# Patient Record
Sex: FEMALE | Race: BLACK OR AFRICAN AMERICAN | NOT HISPANIC OR LATINO | Employment: OTHER | URBAN - METROPOLITAN AREA
[De-identification: names, ages, dates, MRNs, and addresses within clinical notes are randomized per-mention and may not be internally consistent; named-entity substitution may affect disease eponyms.]

---

## 2017-01-20 ENCOUNTER — HOSPITAL ENCOUNTER (OUTPATIENT)
Facility: HOSPITAL | Age: 62
Discharge: HOME | End: 2017-01-20
Attending: INTERNAL MEDICINE | Admitting: INTERNAL MEDICINE
Payer: MEDICARE

## 2017-01-20 VITALS
SYSTOLIC BLOOD PRESSURE: 150 MMHG | HEART RATE: 97 BPM | TEMPERATURE: 98 F | OXYGEN SATURATION: 88 % | HEIGHT: 59 IN | RESPIRATION RATE: 20 BRPM | WEIGHT: 163.13 LBS | DIASTOLIC BLOOD PRESSURE: 67 MMHG | BODY MASS INDEX: 32.88 KG/M2

## 2017-01-20 DIAGNOSIS — T82.598D MALFUNCTION OF PERIPHERAL INSERTED CENTRAL CATHETER, SUBSEQUENT ENCOUNTER: Primary | ICD-10-CM

## 2017-01-20 DIAGNOSIS — N18.6 END STAGE RENAL DISEASE: Chronic | ICD-10-CM

## 2017-01-20 PROBLEM — T82.598A MALFUNCTION OF PERIPHERAL INSERTED CENTRAL CATHETER: Status: ACTIVE | Noted: 2017-01-20

## 2017-01-20 PROCEDURE — 25000003 PHARM REV CODE 250

## 2017-01-20 PROCEDURE — 77001 FLUOROGUIDE FOR VEIN DEVICE: CPT | Mod: 26,,, | Performed by: INTERNAL MEDICINE

## 2017-01-20 PROCEDURE — C1769 GUIDE WIRE: HCPCS

## 2017-01-20 PROCEDURE — 63600175 PHARM REV CODE 636 W HCPCS

## 2017-01-20 PROCEDURE — 25500020 PHARM REV CODE 255

## 2017-01-20 PROCEDURE — 36581 REPLACE TUNNELED CV CATH: CPT

## 2017-01-20 PROCEDURE — 36581 REPLACE TUNNELED CV CATH: CPT | Mod: ,,, | Performed by: INTERNAL MEDICINE

## 2017-01-20 NOTE — PROGRESS NOTES
Pt is AAOx3 and in no apparent distress.  Provided a copy of discharge instructions.  Teaching performed.  Pt verbalized understanding and denied any questions.  PIV d/c catheter tip intact.  2x2 applied and no active bleeding noted.  Pt waiting for  to arrive for transport home.

## 2017-01-20 NOTE — PLAN OF CARE
Problem: Patient Care Overview  Goal: Plan of Care Review  Outcome: Ongoing (interventions implemented as appropriate)  Received report from JEFFERY Crowe. Patient s/p fistulagram, AAOx3. VSS, no c/o pain or discomfort at this time, resp even and unlabored. Post procedure protocol reviewed with patient. Understanding verbalized. Family members called and updated by RN. Nurse call bell within reach. Will continue to monitor per post procedure protocol.

## 2017-01-20 NOTE — IP AVS SNAPSHOT
[facility]  [address]  [facility] [address]  Phone: [phone]           Patient Discharge Instructions     Our goal is to set you up for success. This packet includes information on your condition, medications, and your home care. It will help you to care for yourself so you don't get sicker and need to go back to the hospital.     Please ask your nurse if you have any questions.        There are many details to remember when preparing to leave the hospital. Here is what you will need to do:    1. Take your medicine. If you are prescribed medications, review your Medication List in the following pages. You may have new medications to  at the pharmacy and others that you'll need to stop taking. Review the instructions for how and when to take your medications. Talk with your doctor or nurses if you are unsure of what to do.     2. Go to your follow-up appointments. Specific follow-up information is listed in the following pages. Your may be contacted by a transition nurse or clinical provider about future appointments. Be sure we have all of the phone numbers to reach you, if needed. Please contact your provider's office if you are unable to make an appointment.     3. Watch for warning signs. Your doctor or nurse will give you detailed warning signs to watch for and when to call for assistance. These instructions may also include educational information about your condition. If you experience any of warning signs to your health, call your doctor.               [facility]  Unless otherwise directed by your provider, please contact [facility], our nurse care line that is available for 24/7 assistance.     [phone] (toll-free)    Registered nurses in the [facility] provide clinical advisement, health education, appointment booking, and other advisory services.                    ** Verify the list of medication(s) below is accurate and up  to date. Carry this with you in case of emergency. If your medications have changed, please notify your healthcare provider.             Medication List      ASK your doctor about these medications        Additional Info                      amlodipine 5 MG tablet   Commonly known as:  NORVASC   Quantity:  90 tablet   Refills:  3   Dose:  5 mg    Instructions:  Take 1 tablet (5 mg total) by mouth once daily.     Begin Date    AM    Noon    PM    Bedtime       calcitRIOL 0.5 MCG Cap   Commonly known as:  ROCALTROL   Quantity:  30 capsule   Refills:  1   Dose:  0.5 mcg    Instructions:  Take 1 capsule (0.5 mcg total) by mouth once daily.     Begin Date    AM    Noon    PM    Bedtime       epoetin batsheva 10,000 unit/mL injection   Commonly known as:  PROCRIT   Refills:  0   Dose:  100 Units/kg    Instructions:  Inject 0.74 mLs (7,400 Units total) into the skin every Mon, Wed, Fri.     Begin Date    AM    Noon    PM    Bedtime       insulin detemir 100 unit/mL (3 mL) Inpn pen   Commonly known as:  LEVEMIR FLEXPEN   Refills:  0   Dose:  8 Units    Instructions:  Inject 8 Units into the skin 2 (two) times daily.     Begin Date    AM    Noon    PM    Bedtime       inulin 1.5 gram Chew   Commonly known as:  FIBER CHOICE (INULIN)   Refills:  11   Dose:  2 tablet    Instructions:  Take 2 tablets by mouth 2 (two) times daily. Or as directed on product packaging.     Begin Date    AM    Noon    PM    Bedtime       lisinopril 20 MG tablet   Commonly known as:  PRINIVIL,ZESTRIL   Quantity:  180 tablet   Refills:  3   Dose:  40 mg    Instructions:  Take 2 tablets (40 mg total) by mouth once daily.     Begin Date    AM    Noon    PM    Bedtime       metoprolol tartrate 100 MG tablet   Commonly known as:  LOPRESSOR   Quantity:  180 tablet   Refills:  3   Comments:  **Patient requests 90 days supply**    Instructions:  TAKE 1 TABLET BY MOUTH TWICE DAILY     Begin Date    AM    Noon    PM    Bedtime       NOVOLOG FLEXPEN 100 unit/mL  "Inpn pen   Refills:  0   Generic drug:  insulin aspart    Instructions:  as dir Insulin Pen Subcutaneous Before meals and at bedtime.  If blood sugar is 151-200 1 units SQif blood sugar is 201-250 2 units SQIf blood sugar is 251-300 3 units SQIf blood sugar is 301-350 4 units SQIf blood sugar is > or = 351 5 units SQand call MD;  Dispense with needles     Begin Date    AM    Noon    PM    Bedtime       polyethylene glycol 17 gram/dose powder   Commonly known as:  GLYCOLAX   Quantity:  510 g   Refills:  11   Dose:  17 g    Instructions:  Take 17 g by mouth once daily. May take up to 3 times per day as needed for constipation.     Begin Date    AM    Noon    PM    Bedtime       sevelamer carbonate 800 mg Tab   Commonly known as:  RENVELA   Refills:  0   Dose:  2400 mg    Instructions:  Take 2,400 mg by mouth 3 (three) times daily with meals.     Begin Date    AM    Noon    PM    Bedtime                  Please bring to all follow up appointments:    1. A copy of your discharge instructions.  2. All medicines you are currently taking in their original bottles.  3. Identification and insurance card.    Please arrive 15 minutes ahead of scheduled appointment time.    Please call 24 hours in advance if you must reschedule your appointment and/or time.            Admission Information     Date & Time Provider Department CSN    1/20/2017 12:24 PM Rikki Doan MD [facility] [id number]      Care Providers     Provider Role Specialty Primary office phone    Rikki Doan MD Attending Provider Nephrology [phone]      Your Vitals Were     BP Pulse Temp Resp Height Weight    150/67 97 98 °F (36.7 °C) (Oral) 20 4' 11" (1.499 m) 74 kg (163 lb 2.3 oz)    SpO2 BMI             88% 32.95 kg/m2         Recent Lab Values        9/2/2009 11/4/2009 5/21/2013 7/2/2013 9/5/2013 7/31/2015 4/8/2016 7/6/2016      4:44 PM 11:37 AM  9:15 AM  8:30 AM  7:00 AM  3:41 PM  8:23 PM 11:42 AM    A1C 10.3 (H) 9.6 (H) " 7.2 (H) 7.0 (H) 7.5 (H) 7.8 (H) 5.3 5.4    Comment for A1C at 11:42 AM on 7/6/2016:  According to ADA guidelines, hemoglobin A1C <7.0% represents  optimal control in non-pregnant diabetic patients.  Different  metrics may apply to specific populations.   Standards of Medical Care in Diabetes - 2016.  For the purpose of screening for the presence of diabetes:  <5.7%     Consistent with the absence of diabetes  5.7-6.4%  Consistent with increasing risk for diabetes   (prediabetes)  >or=6.5%  Consistent with diabetes  Currently no consensus exists for use of hemoglobin A1C  for diagnosis of diabetes for children.        Allergies as of 1/20/2017     No Known Allergies      Advance Directives     An advance directive is a document which, in the event you are no longer able to make decisions for yourself, tells your healthcare team what kind of treatment you do or do not want to receive, or who you would like to make those decisions for you.  If you do not currently have an advance directive, [facility] encourages you to create one.  For more information call:  [phone]), [phone] ([phone]),  or log on to [email or web address].        Smoking Cessation     If you would like to quit smoking:   You may be eligible for free services if you are a [place] resident and started smoking cigarettes before September 1, 1988.  Call the Smoking Cessation Trust ([facility]) toll free at (933) 673-5013 or ([phone].   Call 8-800-QUIT-NOW if you do not meet the above criteria.            Language Assistance Services     ATTENTION: Language assistance services are available, free of charge. Please call [phone].      ATENCIÓN: Si habla español, tiene a shook disposición servicios gratuitos de asistencia lingüística. Llame al [phone].     CHÚ Ý: N?u b?n nói Ti?ng Vi?t, có các d?ch v? h? tr? ngôn ng? mi?n phí dành cho b?n. G?i s? [phone].        Stroke Education              Chronic [facility]  Disease Education             Diabetes Discharge Instructions                                   [facility] Sign-Up     Activating your [facility] account is as easy as 1-2-3!     1) Visit [email or web address], select Sign Up Now, enter this activation code and your date of birth, then select Next.  [email or web address]  Expires: 3/6/2017  5:10 PM      2) Create a username and password to use when you visit [facility] in the future and select a security question in case you lose your password and select Next.    3) Enter your e-mail address and click Sign Up!    Additional Information  If you have questions, please e-mail [email or web address] or call [phone] to talk to our [facility] staff. Remember, [facility] is NOT to be used for urgent needs. For medical emergencies, dial 911.          [facility] complies with applicable Federal civil rights laws and does not discriminate on the basis of race, color, national origin, age, disability, or sex.

## 2017-01-20 NOTE — DISCHARGE SUMMARY
[facility]  Discharge Note  Short Stay    Admit Date: 1/20/2017    Discharge Date and Time: 1/20/17    Attending Physician: Rikki Doan MD     Discharge Provider: Rikki Doan    Diagnoses:  Active Hospital Problems    Diagnosis  POA    Malfunction of peripheral inserted central catheter [T82.598A]  Yes      Resolved Hospital Problems    Diagnosis Date Resolved POA   No resolved problems to display.       Discharged Condition: stable    Hospital Course: Patient was admitted for an outpatient procedure and tolerated the procedure well with no complications.    Final Diagnoses: Same as principal problem.    Disposition: Home or Self Care    Follow up/Patient Instructions:  Instruction given. F/u with Dr. Navas as outpatient.     Medications:  Reconciled Home Medications:   Current Discharge Medication List      CONTINUE these medications which have NOT CHANGED    Details   amlodipine (NORVASC) 5 MG tablet Take 1 tablet (5 mg total) by mouth once daily.  Qty: 90 tablet, Refills: 3      calcitRIOL (ROCALTROL) 0.5 MCG Cap Take 1 capsule (0.5 mcg total) by mouth once daily.  Qty: 30 capsule, Refills: 1      epoetin batsheva (PROCRIT) 10,000 unit/mL injection Inject 0.74 mLs (7,400 Units total) into the skin every Mon, Wed, Fri.      insulin aspart (NOVOLOG FLEXPEN) 100 unit/mL InPn as dir Insulin Pen Subcutaneous Before meals and at bedtime.  If blood sugar is 151-200 1 units SQif blood sugar is 201-250 2 units SQIf blood sugar is 251-300 3 units SQIf blood sugar is 301-350 4 units SQIf blood sugar is > or = 351 5 units SQand call MD;  Dispense with needles      insulin detemir (LEVEMIR FLEXPEN) 100 unit/mL (3 mL) SubQ InPn pen Inject 8 Units into the skin 2 (two) times daily.      inulin (FIBER CHOICE, INULIN,) 1.5 gram Chew Take 2 tablets by mouth 2 (two) times daily. Or as directed on product packaging.  Refills: 11      lisinopril (PRINIVIL,ZESTRIL) 20 MG tablet Take 2 tablets (40 mg total) by  mouth once daily.  Qty: 180 tablet, Refills: 3    Associated Diagnoses: Hypertension, essential      metoprolol tartrate (LOPRESSOR) 100 MG tablet TAKE 1 TABLET BY MOUTH TWICE DAILY  Qty: 180 tablet, Refills: 3    Comments: **Patient requests 90 days supply**      polyethylene glycol (GLYCOLAX) 17 gram/dose powder Take 17 g by mouth once daily. May take up to 3 times per day as needed for constipation.  Qty: 510 g, Refills: 11      sevelamer carbonate (RENVELA) 800 mg Tab Take 2,400 mg by mouth 3 (three) times daily with meals.           No discharge procedures on file.      Discharge Procedure Orders: Resume previous diet and activity.

## 2017-01-20 NOTE — PLAN OF CARE
Problem: Patient Care Overview  Goal: Plan of Care Review  Outcome: Ongoing (interventions implemented as appropriate)  Patient arrived to room. Admit assessment completed. Plan of care discussed with patient. Will monitor

## 2017-01-20 NOTE — H&P
[facility]  History & Physical    Subjective:      Chief Complaint/Reason for Admission: catheter malfunction     Aleta Herrera is a 61 y.o. female with right femoral vein TDC presents with catheter malfunction after catheter was inadvertently pulled and subcutaneous cuff came out.      Past Medical History   Diagnosis Date    Anemia of chronic renal failure 2013    Anticoagulant long-term use 2015    CHF (congestive heart failure)     Coronary artery disease     Diabetes mellitus     Diabetic neuropathy 2013    DR (diabetic retinopathy) 2013    End stage renal disease on dialysis     Hypertension     Hypertension, renal 2013    Kidney transplant candidate 2013    Secondary hyperparathyroidism of renal origin 2013    Stroke 2015    Type 2 diabetes mellitus since 2013     Past Surgical History   Procedure Laterality Date    Av fistula placement      Hysterectomy       Endometriosis    Tonsillectomy       section, low transverse       x 3    Eye surgery      Vascular surgery      Cataract surgery      Colonoscopy N/A 2016     Procedure: COLONOSCOPY;  Surgeon: Roverto Patel MD;  [facility] [place] [address]);  Service: Endoscopy;  Laterality: N/A;     Family History   Problem Relation Age of Onset    Heart disease Mother      MI    Hypertension Mother     Cancer Mother 70     breast    Heart attack Neg Hx      Social History   Substance Use Topics    Smoking status: Former Smoker     Packs/day: 0.25     Years: 0.50     Types: Cigarettes     Quit date: 1975    Smokeless tobacco: Never Used      Comment: quit smoking cigarettes 40+ years ago    Alcohol use No       PTA Medications   Medication Sig    amlodipine (NORVASC) 5 MG tablet Take 1 tablet (5 mg total) by mouth once daily.    calcitRIOL (ROCALTROL) 0.5 MCG Cap Take 1 capsule (0.5 mcg total) by mouth once daily.    epoetin batsheva (PROCRIT) 10,000  unit/mL injection Inject 0.74 mLs (7,400 Units total) into the skin every Mon, Wed, Fri.    insulin aspart (NOVOLOG FLEXPEN) 100 unit/mL InPn as dir Insulin Pen Subcutaneous Before meals and at bedtime.  If blood sugar is 151-200 1 units SQif blood sugar is 201-250 2 units SQIf blood sugar is 251-300 3 units SQIf blood sugar is 301-350 4 units SQIf blood sugar is > or = 351 5 units SQand call MD;  Dispense with needles    insulin detemir (LEVEMIR FLEXPEN) 100 unit/mL (3 mL) SubQ InPn pen Inject 8 Units into the skin 2 (two) times daily. (Patient taking differently: Inject 4 Units into the skin 2 (two) times daily. )    inulin (FIBER CHOICE, INULIN,) 1.5 gram Chew Take 2 tablets by mouth 2 (two) times daily. Or as directed on product packaging.    lisinopril (PRINIVIL,ZESTRIL) 20 MG tablet Take 2 tablets (40 mg total) by mouth once daily.    metoprolol tartrate (LOPRESSOR) 100 MG tablet TAKE 1 TABLET BY MOUTH TWICE DAILY    polyethylene glycol (GLYCOLAX) 17 gram/dose powder Take 17 g by mouth once daily. May take up to 3 times per day as needed for constipation.    sevelamer carbonate (RENVELA) 800 mg Tab Take 2,400 mg by mouth 3 (three) times daily with meals.     Review of patient's allergies indicates:  No Known Allergies     ROS Constitutional: No fever or chills, no weight changes.  Eyes: No visual changes or photophobia  HEENT: No nasal congestion or sore throat  Respiratory: No cough or shortness of breath  Cardiovascular: No chest pain or palpitations  Gastrointestinal: Good appetite, no nausea or vomiting, no change in bowel habits  Genitourinary: No hematuria or dysuria  Skin: No rash or pruritis  Hematologic/lymphatic: No easy bruising, bleeding or lymphadenopathy  Musculoskeletal: No arthralgias or myalgias  Neurological: No seizures or tremors  Endocrine: No heat/cold intolerance.  No polyuria/polydipsia.  Psychiatric:  No depression or anxiety.    Objective:      Vital Signs (Most Recent)  Temp:  97.9 °F (36.6 °C) (01/20/17 1300)  Pulse: 70 (01/20/17 1300)  Resp: 20 (01/20/17 1300)  BP: (!) 167/77 (01/20/17 1302)  SpO2: (!) 89 % (01/20/17 1300)    Vital Signs Range (Last 24H):  Temp:  [97.9 °F (36.6 °C)]   Pulse:  [70]   Resp:  [20]   BP: (167-180)/(77-80)   SpO2:  [89 %]     Physical Exam General appearance: Well developed, well nourished  Head: Normocephalic, atraumatic  Eyes:  Conjunctivae nl. Sclera anicteric. PERRL.  HEENT: Lips, mucosa, and tongue normal; teeth and gums normal and oropharynx clear.  Neck: Supple, trachea midline, thyroid not enlarged,   Lungs: Clear to auscultation bilaterally and normal respiratory effort  Heart: Regular rate and rhythm, S1, S2 normal, no murmur, click, rub or gallop  Abdomen: Soft, non-tender non-distended; bowel sounds normal; no masses,  no organomegaly  Extremities: No cyanosis or clubbing. No edema  Pulses: 2+ and symmetric  Skin: Skin color, texture, turgor normal. No rashes or lesions  Lymph nodes: Cervical, supraclavicular, and axillary nodes normal.  Neurologic: Normal strength and tone. No focal numbness or weakness  Psychiatric:  Alert and oriented times 3.  Affect appropriate.  Right FVTDC; no evidence of tunnel infection.    Assessment:      Active Hospital Problems    Diagnosis  POA    Malfunction of peripheral inserted central catheter [T82.246B]  Yes      Resolved Hospital Problems    Diagnosis Date Resolved POA   No resolved problems to display.   CRBSI    Plan:    1. OTW of Right FVTDC.

## 2017-01-20 NOTE — PROCEDURES
DATE OF PROCEDURE: 1/20/17      PROCEDURE TYPE:   1. Placement of right femoral vein tunneled dialysis catheter rewiring from the existing tunneled dialysis catheter site.   2. Inferior Venacavogram       INDICATIONS:   1. catheter malfunction   2. Exposed cuff      Pre-procedure Diagnoses:   ESRD  Catheter Malfunction       Post-procedure diagnosis:  ESRD  Catheter Malfunction      Operators: Rikki Doan MD       PROCEDURE NOTE: After informed consent was obtained, the area of the right thigh, catheter and right groin were sterilely prepped and draped in the usual fashion. Anesthesia was then obtained with 2% lidocaine with epinephrine, then the subcutaneous cuff was dissected free and the catheter was pulled back to the level of right femoral vein. Then a central venogram/inferior venacavogram was done that showed no evidence of fibrin sheath, clots or stenosis. Then a two guidewire were inserted under fluoroscopy guidance and parked in the RA. Then, catheter was removed. Then a 44cm GlidePath catheter was inserted over the wire and the catheter was advanced into the RA using fluoroscopic guidance. The tip of the catheter was confirmed to be in the RA via fluoroscopy. The lock hub was connected. There was excellent aspiration and flush through both ports. The exit site was closed with 3-0 Prolene in a wrapped stitch. She tolerated the procedure well. There were no immediate complications. Estimated blood loss was less than 5 mL. She was then discharged from the Nephrology Access Suite in stable condition.

## 2017-01-23 RX ORDER — AMLODIPINE BESYLATE 10 MG/1
TABLET ORAL
Qty: 90 TABLET | Refills: 0 | Status: SHIPPED | OUTPATIENT
Start: 2017-01-23 | End: 2017-06-03 | Stop reason: SDUPTHER

## 2017-03-09 ENCOUNTER — HOSPITAL ENCOUNTER (OUTPATIENT)
Facility: HOSPITAL | Age: 62
Discharge: HOME | End: 2017-03-09
Attending: INTERNAL MEDICINE | Admitting: INTERNAL MEDICINE
Payer: MEDICARE

## 2017-03-09 VITALS
HEIGHT: 59 IN | SYSTOLIC BLOOD PRESSURE: 127 MMHG | RESPIRATION RATE: 18 BRPM | OXYGEN SATURATION: 91 % | TEMPERATURE: 96 F | WEIGHT: 162.06 LBS | HEART RATE: 73 BPM | DIASTOLIC BLOOD PRESSURE: 74 MMHG | BODY MASS INDEX: 32.67 KG/M2

## 2017-03-09 DIAGNOSIS — N18.6 END STAGE RENAL DISEASE: Primary | Chronic | ICD-10-CM

## 2017-03-09 DIAGNOSIS — T82.598D MALFUNCTION OF PERIPHERAL INSERTED CENTRAL CATHETER, SUBSEQUENT ENCOUNTER: ICD-10-CM

## 2017-03-09 PROBLEM — Z95.828 PERMANENT CENTRAL VENOUS CATHETER IN PLACE: Status: ACTIVE | Noted: 2017-03-09

## 2017-03-09 LAB — POCT GLUCOSE: 84 MG/DL (ref 70–110)

## 2017-03-09 PROCEDURE — 63600175 PHARM REV CODE 636 W HCPCS

## 2017-03-09 PROCEDURE — 77001 FLUOROGUIDE FOR VEIN DEVICE: CPT

## 2017-03-09 PROCEDURE — 36581 REPLACE TUNNELED CV CATH: CPT

## 2017-03-09 PROCEDURE — 77001 FLUOROGUIDE FOR VEIN DEVICE: CPT | Mod: 26,,, | Performed by: INTERNAL MEDICINE

## 2017-03-09 PROCEDURE — 25000003 PHARM REV CODE 250

## 2017-03-09 PROCEDURE — 36581 REPLACE TUNNELED CV CATH: CPT | Mod: ,,, | Performed by: INTERNAL MEDICINE

## 2017-03-09 NOTE — IP AVS SNAPSHOT
[facility] [address]  [address]  [facility] [address]  Phone: [phone]           Patient Discharge Instructions     Our goal is to set you up for success. This packet includes information on your condition, medications, and your home care. It will help you to care for yourself so you don't get sicker and need to go back to the hospital.     Please ask your nurse if you have any questions.        There are many details to remember when preparing to leave the hospital. Here is what you will need to do:    1. Take your medicine. If you are prescribed medications, review your Medication List in the following pages. You may have new medications to  at the pharmacy and others that you'll need to stop taking. Review the instructions for how and when to take your medications. Talk with your doctor or nurses if you are unsure of what to do.     2. Go to your follow-up appointments. Specific follow-up information is listed in the following pages. Your may be contacted by a transition nurse or clinical provider about future appointments. Be sure we have all of the phone numbers to reach you, if needed. Please contact your provider's office if you are unable to make an appointment.     3. Watch for warning signs. Your doctor or nurse will give you detailed warning signs to watch for and when to call for assistance. These instructions may also include educational information about your condition. If you experience any of warning signs to your health, call your doctor.               [facility]  Unless otherwise directed by your provider, please contact [facility], our nurse care line that is available for 24/7 assistance.     [phone] (toll-free)    Registered nurses in the [facility] provide clinical advisement, health education, appointment booking, and other advisory services.                    ** Verify the list of medication(s) below is accurate and up  to date. Carry this with you in case of emergency. If your medications have changed, please notify your healthcare provider.             Medication List      ASK your doctor about these medications        Additional Info                      * amlodipine 5 MG tablet   Commonly known as:  NORVASC   Quantity:  90 tablet   Refills:  3   Dose:  5 mg    Instructions:  Take 1 tablet (5 mg total) by mouth once daily.     Begin Date    AM    Noon    PM    Bedtime       * amlodipine 10 MG tablet   Commonly known as:  NORVASC   Quantity:  90 tablet   Refills:  0    Instructions:  TAKE ONE TABLET BY MOUTH ONCE DAILY     Begin Date    AM    Noon    PM    Bedtime       calcitRIOL 0.5 MCG Cap   Commonly known as:  ROCALTROL   Quantity:  30 capsule   Refills:  1   Dose:  0.5 mcg    Instructions:  Take 1 capsule (0.5 mcg total) by mouth once daily.     Begin Date    AM    Noon    PM    Bedtime       epoetin batsheva 10,000 unit/mL injection   Commonly known as:  PROCRIT   Refills:  0   Dose:  100 Units/kg    Instructions:  Inject 0.74 mLs (7,400 Units total) into the skin every Mon, Wed, Fri.     Begin Date    AM    Noon    PM    Bedtime       insulin detemir 100 unit/mL (3 mL) Inpn pen   Commonly known as:  LEVEMIR FLEXPEN   Refills:  0   Dose:  8 Units    Instructions:  Inject 8 Units into the skin 2 (two) times daily.     Begin Date    AM    Noon    PM    Bedtime       inulin 1.5 gram Chew   Commonly known as:  FIBER CHOICE (INULIN)   Refills:  11   Dose:  2 tablet    Instructions:  Take 2 tablets by mouth 2 (two) times daily. Or as directed on product packaging.     Begin Date    AM    Noon    PM    Bedtime       lisinopril 20 MG tablet   Commonly known as:  PRINIVIL,ZESTRIL   Quantity:  180 tablet   Refills:  3   Dose:  40 mg    Instructions:  Take 2 tablets (40 mg total) by mouth once daily.     Begin Date    AM    Noon    PM    Bedtime       metoprolol tartrate 100 MG tablet   Commonly known as:  LOPRESSOR   Quantity:  180  tablet   Refills:  3   Comments:  **Patient requests 90 days supply**    Instructions:  TAKE 1 TABLET BY MOUTH TWICE DAILY     Begin Date    AM    Noon    PM    Bedtime       NOVOLOG FLEXPEN 100 unit/mL Inpn pen   Refills:  0   Generic drug:  insulin aspart    Instructions:  as dir Insulin Pen Subcutaneous Before meals and at bedtime.  If blood sugar is 151-200 1 units SQif blood sugar is 201-250 2 units SQIf blood sugar is 251-300 3 units SQIf blood sugar is 301-350 4 units SQIf blood sugar is > or = 351 5 units SQand call MD;  Dispense with needles     Begin Date    AM    Noon    PM    Bedtime       polyethylene glycol 17 gram/dose powder   Commonly known as:  GLYCOLAX   Quantity:  510 g   Refills:  11   Dose:  17 g    Instructions:  Take 17 g by mouth once daily. May take up to 3 times per day as needed for constipation.     Begin Date    AM    Noon    PM    Bedtime       sevelamer carbonate 800 mg Tab   Commonly known as:  RENVELA   Refills:  0   Dose:  2400 mg    Instructions:  Take 2,400 mg by mouth 3 (three) times daily with meals.     Begin Date    AM    Noon    PM    Bedtime       * Notice:  This list has 2 medication(s) that are the same as other medications prescribed for you. Read the directions carefully, and ask your doctor or other care provider to review them with you.               Please bring to all follow up appointments:    1. A copy of your discharge instructions.  2. All medicines you are currently taking in their original bottles.  3. Identification and insurance card.    Please arrive 15 minutes ahead of scheduled appointment time.    Please call 24 hours in advance if you must reschedule your appointment and/or time.            Admission Information     Date & Time Provider Department [facility]    3/9/2017  8:36 AM Bobby Sanabria MD [facility] [phone]      Care Providers     Provider Role Specialty Primary office phone    Bobby Sanabria MD Attending Provider  "Nephrology [phone]      Your Vitals Were     BP Pulse Temp Resp Height Weight    127/74 (BP Location: Left arm, Patient Position: Lying, BP Method: Automatic) 73 96.4 °F (35.8 °C) (Oral) 18 4' 11" (1.499 m) 73.5 kg (162 lb 0.6 oz)    SpO2 BMI             91% 32.73 kg/m2         Recent Lab Values        9/2/2009 11/4/2009 5/21/2013 7/2/2013 9/5/2013 7/31/2015 4/8/2016 7/6/2016      4:44 PM 11:37 AM  9:15 AM  8:30 AM  7:00 AM  3:41 PM  8:23 PM 11:42 AM    A1C 10.3 (H) 9.6 (H) 7.2 (H) 7.0 (H) 7.5 (H) 7.8 (H) 5.3 5.4    Comment for A1C at 11:42 AM on 7/6/2016:  According to ADA guidelines, hemoglobin A1C <7.0% represents  optimal control in non-pregnant diabetic patients.  Different  metrics may apply to specific populations.   Standards of Medical Care in Diabetes - 2016.  For the purpose of screening for the presence of diabetes:  <5.7%     Consistent with the absence of diabetes  5.7-6.4%  Consistent with increasing risk for diabetes   (prediabetes)  >or=6.5%  Consistent with diabetes  Currently no consensus exists for use of hemoglobin A1C  for diagnosis of diabetes for children.        Allergies as of 3/9/2017     No Known Allergies      Advance Directives     An advance directive is a document which, in the event you are no longer able to make decisions for yourself, tells your healthcare team what kind of treatment you do or do not want to receive, or who you would like to make those decisions for you.  If you do not currently have an advance directive, [facility] encourages you to create one.  For more information call:  [phone]), [phone] ([phone]),  or log on to [email or web address].        Language Assistance Services     ATTENTION: Language assistance services are available, free of charge. Please call [phone].      ATENCIÓN: Si habla español, tiene a shook disposición servicios gratuitos de asistencia lingüística. Llame al [phone].     MELISSA Ý: N?u b?n nói Ti?ng " Vi?t, có các d?ch v? h? tr? ngôn ng? mi?n phí dành cho b?n. G?i s? [phone].        Stroke Education              Chronic Kindey Disease Education             Diabetes Discharge Instructions                                   [facility] Sign-Up     Activating your [facility] account is as easy as 1-2-3!     1) Visit [email or web address], select Sign Up Now, enter this activation code and your date of birth, then select Next.  [email or web address]  Expires: 4/23/2017 12:05 PM      2) Create a username and password to use when you visit [facility] in the future and select a security question in case you lose your password and select Next.    3) Enter your e-mail address and click Sign Up!    Additional Information  If you have questions, please e-mail [email or web address] or call [phone] to talk to our [facility] staff. Remember, [facility] is NOT to be used for urgent needs. For medical emergencies, dial 911.          [facility] complies with applicable Federal civil rights laws and does not discriminate on the basis of race, color, national origin, age, disability, or sex.

## 2017-03-09 NOTE — H&P
[facility]  History & Physical    Subjective:      Chief Complaint/Reason for Admission: catheter malfunction      Aleta Herrera is a 61 y.o. female with right femoral vein TDC presents with catheter malfunction after catheter was inadvertently pulled and subcutaneous cuff came out.            Past Medical History:   Diagnosis Date    Anemia of chronic renal failure 2013    Anticoagulant long-term use 2015    CHF (congestive heart failure)     Coronary artery disease     Diabetes mellitus     Diabetic neuropathy 2013    DR (diabetic retinopathy) 2013    End stage renal disease on dialysis     Hypertension     Hypertension, renal 2013    Kidney transplant candidate 2013    Secondary hyperparathyroidism of renal origin 2013    Stroke 2015    Type 2 diabetes mellitus since 2013     Past Surgical History:   Procedure Laterality Date    AV FISTULA PLACEMENT      CATARACT SURGERY       SECTION, LOW TRANSVERSE      x 3    COLONOSCOPY N/A 2016    Procedure: COLONOSCOPY;  Surgeon: Roverto Patel MD;  Location: [facility] ([address] [place] [address]);  Service: Endoscopy;  Laterality: N/A;    EYE SURGERY      HYSTERECTOMY      Endometriosis    TONSILLECTOMY      VASCULAR SURGERY       Family History   Problem Relation Age of Onset    Heart disease Mother      MI    Hypertension Mother     Cancer Mother 70     breast    Heart attack Neg Hx      Social History   Substance Use Topics    Smoking status: Former Smoker     Packs/day: 0.25     Years: 0.50     Types: Cigarettes     Quit date: 1975    Smokeless tobacco: Never Used      Comment: quit smoking cigarettes 40+ years ago    Alcohol use No       PTA Medications   Medication Sig    amlodipine (NORVASC) 10 MG tablet TAKE ONE TABLET BY MOUTH ONCE DAILY    amlodipine (NORVASC) 5 MG tablet Take 1 tablet (5 mg total) by mouth once daily.    calcitRIOL (ROCALTROL) 0.5 MCG Cap Take 1  capsule (0.5 mcg total) by mouth once daily.    epoetin batsheva (PROCRIT) 10,000 unit/mL injection Inject 0.74 mLs (7,400 Units total) into the skin every Mon, Wed, Fri.    insulin aspart (NOVOLOG FLEXPEN) 100 unit/mL InPn as dir Insulin Pen Subcutaneous Before meals and at bedtime.  If blood sugar is 151-200 1 units SQif blood sugar is 201-250 2 units SQIf blood sugar is 251-300 3 units SQIf blood sugar is 301-350 4 units SQIf blood sugar is > or = 351 5 units SQand call MD;  Dispense with needles    insulin detemir (LEVEMIR FLEXPEN) 100 unit/mL (3 mL) SubQ InPn pen Inject 8 Units into the skin 2 (two) times daily. (Patient taking differently: Inject 4 Units into the skin 2 (two) times daily. )    inulin (FIBER CHOICE, INULIN,) 1.5 gram Chew Take 2 tablets by mouth 2 (two) times daily. Or as directed on product packaging.    lisinopril (PRINIVIL,ZESTRIL) 20 MG tablet Take 2 tablets (40 mg total) by mouth once daily.    metoprolol tartrate (LOPRESSOR) 100 MG tablet TAKE 1 TABLET BY MOUTH TWICE DAILY    polyethylene glycol (GLYCOLAX) 17 gram/dose powder Take 17 g by mouth once daily. May take up to 3 times per day as needed for constipation.    sevelamer carbonate (RENVELA) 800 mg Tab Take 2,400 mg by mouth 3 (three) times daily with meals.     Review of patient's allergies indicates:  No Known Allergies     ROS Constitutional: No fever or chills, no weight changes.  Eyes: No visual changes or photophobia  HEENT: No nasal congestion or sore throat  Respiratory: No cough or shortness of breath  Cardiovascular: No chest pain or palpitations  Gastrointestinal: Good appetite, no nausea or vomiting, no change in bowel habits  Genitourinary: No hematuria or dysuria  Skin: No rash or pruritis  Hematologic/lymphatic: No easy bruising, bleeding or lymphadenopathy  Musculoskeletal: No arthralgias or myalgias  Neurological: No seizures or tremors  Endocrine: No heat/cold intolerance.  No polyuria/polydipsia.  Psychiatric:   No depression or anxiety.    Objective:      Vital Signs (Most Recent)  Temp: 98.8 °F (37.1 °C) (03/09/17 0900)  Pulse: 73 (03/09/17 0900)  Resp: 20 (03/09/17 0900)  BP: 134/65 (03/09/17 0908)  SpO2: 95 % (03/09/17 0908)    Vital Signs Range (Last 24H):  Temp:  [98.8 °F (37.1 °C)]   Pulse:  [73]   Resp:  [20]   BP: (134-148)/(65-70)   SpO2:  [80 %-95 %]     Physical Exam   General appearance: Well developed, well nourished  Head: Normocephalic, atraumatic  Eyes:  Conjunctivae nl. Sclera anicteric. PERRL.  HEENT: Lips, mucosa, and tongue normal; teeth and gums normal and oropharynx clear.  Neck: Supple, trachea midline, thyroid not enlarged,   Lungs: Clear to auscultation bilaterally and normal respiratory effort  Heart: Regular rate and rhythm, S1, S2 normal, no murmur, click, rub or gallop  Abdomen: Soft, non-tender non-distended; bowel sounds normal; no masses,  no organomegaly  Extremities: No cyanosis or clubbing. No edema  Pulses: 2+ and symmetric  Skin: Skin color, texture, turgor normal. No rashes or lesions  Lymph nodes: Cervical, supraclavicular, and axillary nodes normal.  Neurologic: Normal strength and tone. No focal numbness or weakness  Psychiatric:  Alert and oriented times 3.  Affect appropriate.  Right femoral vein TDC ; subcutaneous cuff out side the tunnel and no evidence of tunnel tenderness.  Assessment:      Active Hospital Problems    Diagnosis  POA    Permanent central venous catheter in place [Z95.828]  Not Applicable      Resolved Hospital Problems    Diagnosis Date Resolved POA   No resolved problems to display.       Plan:    1. Over the wire exchange of TDC and blood cultures and antibiotics per KDOQI recommendations.

## 2017-03-09 NOTE — PROGRESS NOTES
Patient discharged per MD orders. Instructions given on medications, wound care, activity, signs of infection, when to call MD, and follow up appointments. Pt verbalized understanding. PIV removed. Patient brought to car by escort.

## 2017-03-09 NOTE — PLAN OF CARE
Problem: Patient Care Overview  Goal: Plan of Care Review  Outcome: Ongoing (interventions implemented as appropriate)  Report received from JEFFERY Crowe. Patient s/p permacath rewire. Dressing cdi. Vss. Call light in reach. Will monitor.

## 2017-03-09 NOTE — DISCHARGE SUMMARY
[facility]  Brief Operative Note     SUMMARY     Surgery Date: 3/9/2017     Surgeon(s) and Role:     * Bobby Sanabria MD - Primary    Assisting Surgeon: None    Pre-op Diagnosis:  ESRD (end stage renal disease) [N18.6]  Malfunction of peripheral inserted central catheter, subsequent encounter [T82.204D]    Post-op Diagnosis:  Procedure(s) (LRB):  PERMCATH REWIRE- TUNNELED CATH REWIRE (Right)    Anesthesia: RN IV Sedation  Estimated Blood Loss: 5 ml.       Specimens:   Specimen     None          Discharge Note    SUMMARY     Admit Date: 3/9/2017    Discharge Date and Time: 3/9/2017    Hospital Course; Tolerated procedure    Final Diagnosis: PROCEDURE TYPE:  1. Over the wires exchange of right femoral tunneled dialysis catheter with   44-cm Palindrome catheter.  .    Disposition: Home or Self Care    Follow Up/Patient Instructions:  Resume previous diet and activity, follow up as needed basis.    Medications:  Reconciled Home Medications:   Current Discharge Medication List      CONTINUE these medications which have NOT CHANGED    Details   !! amlodipine (NORVASC) 10 MG tablet TAKE ONE TABLET BY MOUTH ONCE DAILY  Qty: 90 tablet, Refills: 0      !! amlodipine (NORVASC) 5 MG tablet Take 1 tablet (5 mg total) by mouth once daily.  Qty: 90 tablet, Refills: 3      calcitRIOL (ROCALTROL) 0.5 MCG Cap Take 1 capsule (0.5 mcg total) by mouth once daily.  Qty: 30 capsule, Refills: 1      epoetin batsheva (PROCRIT) 10,000 unit/mL injection Inject 0.74 mLs (7,400 Units total) into the skin every Mon, Wed, Fri.      insulin aspart (NOVOLOG FLEXPEN) 100 unit/mL InPn as dir Insulin Pen Subcutaneous Before meals and at bedtime.  If blood sugar is 151-200 1 units SQif blood sugar is 201-250 2 units SQIf blood sugar is 251-300 3 units SQIf blood sugar is 301-350 4 units SQIf blood sugar is > or = 351 5 units SQand call MD;  Dispense with needles      insulin detemir (LEVEMIR FLEXPEN) 100 unit/mL (3 mL) SubQ InPn  pen Inject 8 Units into the skin 2 (two) times daily.      inulin (FIBER CHOICE, INULIN,) 1.5 gram Chew Take 2 tablets by mouth 2 (two) times daily. Or as directed on product packaging.  Refills: 11      lisinopril (PRINIVIL,ZESTRIL) 20 MG tablet Take 2 tablets (40 mg total) by mouth once daily.  Qty: 180 tablet, Refills: 3    Associated Diagnoses: Hypertension, essential      metoprolol tartrate (LOPRESSOR) 100 MG tablet TAKE 1 TABLET BY MOUTH TWICE DAILY  Qty: 180 tablet, Refills: 3    Comments: **Patient requests 90 days supply**      polyethylene glycol (GLYCOLAX) 17 gram/dose powder Take 17 g by mouth once daily. May take up to 3 times per day as needed for constipation.  Qty: 510 g, Refills: 11      sevelamer carbonate (RENVELA) 800 mg Tab Take 2,400 mg by mouth 3 (three) times daily with meals.       !! - Potential duplicate medications found. Please discuss with provider.        No discharge procedures on file.

## 2017-03-09 NOTE — PROCEDURES
DATE OF PROCEDURE: 3/9/17     PROCEDURE TYPE:  1. Over the wires exchange of right femoral tunneled dialysis catheter with   44-cm Palindrome catheter.  .     INDICATION: Cather related blood stream infection     REFERRING PHYSICIAN: Harinder Navas D.O.     : Bobby Sanabria M.D.     POSTPROCEDURE DIAGNOSES:  1. Over the wires exchange of right femoral tunneled dialysis catheter with a   44-cm Palindrome catheter and tip of the catheter is in the right atrium.     PROCEDURE NOTE IN DETAIL: After informed consent was obtained from Ms. Herrera,   She was brought to the Access Suite and placed in a supine position. The area   Of her right femoral triangle was cleaned and draped in the usual sterile   Fashion. After achieving local anesthesia with lidocaine and epinephrine at the  exit site it was blunt dissected and cuff was released  both stiff angled guidewires and amplatz wire cwere advanced under fluoroscopy guidance   into the SVC through the two ports of the both lumens of the catheter.   The catheter was removed and subsequently a new 44-cm   Palindrome catheter was exchanged over the wire and tip of the catheter was   placed in the right atrium and both the wires were removed and exit portion of   the catheter was attached to the tip and there was good aspiration and flush in   both ports. No immediate complication. Estimated blood loss was 5 mL. The   patient tolerated the procedure well. Prior to this, the exit site was closed   with 3-0 Prolene sutures. No immediate complications. Discharged from Access   Suite in a stable condition.     PLAN:We will treat her for catheter related bloodstream infection with the broad-spectrum antibiotic   initially and subsequently deescalate abx based on the culture results.

## 2017-03-13 ENCOUNTER — TELEPHONE (OUTPATIENT)
Dept: VASCULAR SURGERY | Facility: CLINIC | Age: 62
End: 2017-03-13

## 2017-03-13 DIAGNOSIS — N18.5 CKD (CHRONIC KIDNEY DISEASE), STAGE 5: ICD-10-CM

## 2017-03-13 DIAGNOSIS — N18.6 END STAGE RENAL DISEASE: ICD-10-CM

## 2017-03-13 DIAGNOSIS — M79.604 LEG PAIN, BILATERAL: ICD-10-CM

## 2017-03-13 DIAGNOSIS — N18.6 ESRD (END STAGE RENAL DISEASE) ON DIALYSIS: Primary | ICD-10-CM

## 2017-03-13 DIAGNOSIS — M79.605 LEG PAIN, BILATERAL: ICD-10-CM

## 2017-03-13 DIAGNOSIS — Z99.2 ESRD (END STAGE RENAL DISEASE) ON DIALYSIS: Primary | ICD-10-CM

## 2017-03-13 DIAGNOSIS — I73.9 PVD (PERIPHERAL VASCULAR DISEASE): ICD-10-CM

## 2017-03-13 DIAGNOSIS — I87.2 VENOUS INSUFFICIENCY (CHRONIC) (PERIPHERAL): ICD-10-CM

## 2017-04-06 ENCOUNTER — HOSPITAL ENCOUNTER (OUTPATIENT)
Dept: VASCULAR SURGERY | Facility: CLINIC | Age: 62
Discharge: HOME | End: 2017-04-06
Attending: SURGERY
Payer: MEDICARE

## 2017-04-06 ENCOUNTER — OFFICE VISIT (OUTPATIENT)
Dept: VASCULAR SURGERY | Facility: CLINIC | Age: 62
End: 2017-04-06
Payer: MEDICARE

## 2017-04-06 VITALS
HEART RATE: 67 BPM | SYSTOLIC BLOOD PRESSURE: 117 MMHG | WEIGHT: 163.13 LBS | DIASTOLIC BLOOD PRESSURE: 71 MMHG | TEMPERATURE: 98 F | HEIGHT: 60 IN | BODY MASS INDEX: 32.02 KG/M2

## 2017-04-06 DIAGNOSIS — I73.9 PERIPHERAL VASCULAR DISEASE: ICD-10-CM

## 2017-04-06 DIAGNOSIS — N18.5 CKD (CHRONIC KIDNEY DISEASE), STAGE 5: ICD-10-CM

## 2017-04-06 DIAGNOSIS — I87.2 VENOUS INSUFFICIENCY (CHRONIC) (PERIPHERAL): ICD-10-CM

## 2017-04-06 DIAGNOSIS — M79.604 LEG PAIN, BILATERAL: ICD-10-CM

## 2017-04-06 DIAGNOSIS — N18.6 END STAGE RENAL DISEASE: Primary | Chronic | ICD-10-CM

## 2017-04-06 DIAGNOSIS — Z01.818 PRE-OP EVALUATION: Primary | ICD-10-CM

## 2017-04-06 DIAGNOSIS — M79.605 LEG PAIN, BILATERAL: ICD-10-CM

## 2017-04-06 PROCEDURE — 99213 OFFICE O/P EST LOW 20 MIN: CPT | Mod: PBBFAC | Performed by: SURGERY

## 2017-04-06 PROCEDURE — 99214 OFFICE O/P EST MOD 30 MIN: CPT | Mod: S$PBB,,, | Performed by: SURGERY

## 2017-04-06 PROCEDURE — 99999 PR PBB SHADOW E&M-EST. PATIENT-LVL III: CPT | Mod: PBBFAC,,, | Performed by: SURGERY

## 2017-04-06 PROCEDURE — G0365 VESSEL MAPPING HEMO ACCESS: HCPCS | Mod: 26,S$PBB,, | Performed by: SURGERY

## 2017-04-06 RX ORDER — LIDOCAINE HYDROCHLORIDE 10 MG/ML
1 INJECTION, SOLUTION EPIDURAL; INFILTRATION; INTRACAUDAL; PERINEURAL ONCE
Status: CANCELLED | OUTPATIENT
Start: 2017-04-06 | End: 2017-04-06

## 2017-04-06 NOTE — PROGRESS NOTES
Patient ID: Aleta Herrera is a 61 y.o. female.    I. HISTORY     Chief Complaint: Follow-up      HPI Aleta Herrera is a 61 y.o. female here for evaluation of her dialysis access. Renal failure is due to HTN/DM. Currently dialyzes on M/W/F via a Right femoral catheter. Patient is right handed. She has had bilateral upper arm AVGs. The right was complicated by steal. Both are now thrombosed following an acute illness. Previously planned for R thigh AVG which patient cancelled, she has since been using a catheter. She is now ready to proceed with the surgery.      Review of Systems   Constitution: Negative.   HENT: Negative.    Eyes: Negative.    Cardiovascular: Negative.    Respiratory: Negative.    Endocrine: Negative.    Hematologic/Lymphatic: Negative.    Skin: Negative.    Musculoskeletal: Negative.    Gastrointestinal: Negative.    Neurological: Negative.    Psychiatric/Behavioral: Negative.    Allergic/Immunologic: Negative.        II. PHYSICAL EXAM     Physical Exam   Constitutional: She is oriented to person, place, and time. No distress.   HENT:   Head: Normocephalic and atraumatic.   Eyes: Conjunctivae and EOM are normal.   Neck: Neck supple. No JVD present.   Cardiovascular: Normal rate.    Pulses:       Dorsalis pedis pulses are 2+ on the right side, and 2+ on the left side.   Pulmonary/Chest: Effort normal. No respiratory distress.   Abdominal: Soft.   Musculoskeletal: Normal range of motion. She exhibits edema. She exhibits no tenderness.   Neurological: She is alert and oriented to person, place, and time.   Skin: Skin is warm. No rash noted. No erythema.   Psychiatric: She has a normal mood and affect.       III. ASSESSMENT & PLAN (MEDICAL DECISION MAKING)     1. ESRD 2/2 HTN on HD since 11/12/09        Assessment/Diagnosis and Plan:  Aleta Herrera is a 61 y.o. female with ESRD, on dialysis in need of new access.    Plan left thigh AVG Thursday April 27th.     Dr. Madera has explained the  risks, benefits, and alternatives of the procedure in detail.   Risks include but are not limited to arterial steal, inability to use access, failure to mature, permanent injury to nerves or blood vessels, and need for repeat interventions or new access creation.  They have voiced understanding, all questions were answered, and agree to proceed as planned.    Jet Wolff MD  Surgery Resident, PGY-1  Pager [phone]  04/06/2017      Vascular Surgery Staff  I have seen and examined the patient and reviewed the residents note. I agree with their assessment and plan.    Left thigh AVG Thursday Apr 27 - date picked at pt request    Nathalie Madera MD FACS [facility]  Vascular & Endovascular Surgery

## 2017-04-14 ENCOUNTER — DOCUMENTATION ONLY (OUTPATIENT)
Dept: NEPHROLOGY | Facility: CLINIC | Age: 62
End: 2017-04-14

## 2017-04-14 NOTE — PROGRESS NOTES
Notified about panic value of potassium levels of 8.3 on the patient. Called and Ms. Herrera  answered, Was notified about the laboratories and he notified me she [facility]. Called was made to [facility] to notified the value and laboratories will be draw after HD for follow up.     Tony Snowden   Nephrology fellow PGY- 5  [phone]

## 2017-04-26 ENCOUNTER — ANESTHESIA EVENT (OUTPATIENT)
Dept: SURGERY | Facility: HOSPITAL | Age: 62
DRG: 252 | End: 2017-04-26
Payer: MEDICARE

## 2017-04-26 ENCOUNTER — TELEPHONE (OUTPATIENT)
Dept: VASCULAR SURGERY | Facility: CLINIC | Age: 62
End: 2017-04-26

## 2017-04-26 NOTE — PRE-PROCEDURE INSTRUCTIONS
"Preop instructions: NPO after midnight or 8 hours prior to procedure time, shower instructions, directions, leave all valuables at home, medication instructions for PM prior & am of procedure explained. Patient stated an understanding.    Patient and  very concerned of prior issues with anesthesia. Stated it "puts me in a coma" or "she bottoms out".     AM blood sugars running in the 180's  "

## 2017-04-26 NOTE — ANESTHESIA PREPROCEDURE EVALUATION
2017  Aleta Herrera is a 62 y.o., female.    Anesthesia Evaluation         Review of Systems  Anesthesia Hx:  Personal Hx of Anesthesia complications Slow To Awaken/Delayed Emergence                                                                                                                  2017  Pre-operative evaluation for [id number] Left Thigh (Left)    HPI: Ms. Aleta Herrera is a 60yo F with PMH significant for ESRD on HD, HFpEF, CAD, HTN, DM2, and CVA Previous Airway:  -8/3 EGD: Grade II, Attempts 1 with Ceja #2, easy mask ventilation noted, complicating factors included large/floppy epiglottis     Drips: none    PMH:  ESRD on HD MWF - last obtained HD yesterday 16  HFpEF  CAD  HTN  DM2  CVA - no residual deficits  Remote DVT, no longer on Coumadin    Past Surgical History:   Procedure Laterality Date    AV FISTULA PLACEMENT      CATARACT SURGERY       SECTION, LOW TRANSVERSE      x 3    COLONOSCOPY N/A 2016    Procedure: COLONOSCOPY;  Surgeon: Roverto Patel MD;  Location: [facility] ([address] [place] [address]);  Service: Endoscopy;  Laterality: N/A;    EYE SURGERY      HYSTERECTOMY      Endometriosis    TONSILLECTOMY      VASCULAR SURGERY       Vital Signs Range (Last 24H):         CBC:       Recent Labs  Lab 17  1221   WBC 6.87   RBC 4.14   HGB 13.2   HCT 41.2   *   *   MCH 31.9*   MCHC 32.0       CMP:     Recent Labs  Lab 17  1221      K 5.3*      CO2 24   BUN 30*   CREATININE 5.2*   GLU 77   CALCIUM 8.5*     INR:  No results for input(s): INR, PROTIME, APTT in the last 720 hours.    Invalid input(s): PT  Diagnostic Studies:  CXR 16:   1. Improved right sided pleural effusion with persistent right lower lung consolidation, status post right thoracentesis.    EK16: Normal sinus rhythm  Left axis  deviation  Nonspecific T wave abnormality  Abnormal ECG  When compared with ECG of 14-APR-2016 17:50,  Nonspecific T wave abnormality now evident in Anterior leads  Confirmed by AKASH SMALLS MD ([id number]) on 7/6/2016 12:32:17 PM    2D Echo (6/21/16:  CONCLUSIONS     1 - Normal left ventricular systolic function (EF 60-65%).     2 - Concentric hypertrophy.     3 - Biatrial enlargement.     4 - Left ventricular diastolic dysfunction.     5 - Normal right ventricular systolic function .     6 - Pulmonary hypertension. The estimated PA systolic pressure is 57 mmHg.     7 - Increased central venous pressure.     [facility] Anesthesia Evaluation    I have reviewed the Patient Summary Reports.    I have reviewed the Nursing Notes.   I have reviewed the Medications.     Review of Systems  Anesthesia Hx:  Hx of Anesthetic complications  History of prior surgery of interest to airway management or planning: Denies Family Hx of Anesthesia complications.  Personal Hx of Anesthesia complications (Pt with cardiac arrest immediately post EGD. Was found to be in asytole and required 2 round of ACLS. Ultimately ROSC acheived, intubated, to SICU. systolic, 2 rounds of epi, compressions. Intubated and sent to ICU. )   Social:  Non-Smoker, No Alcohol Use    Hematology/Oncology:         -- Anemia: Denies Current/Recent Cancer   EENT/Dental:   denies chronic allergic rhinitis   Cardiovascular:   Denies MI. CAD    Denies CABG/stent.  Denies Dysrhythmias.   Denies Angina. CHF    Pulmonary:   Denies Pneumonia Denies COPD.  Denies Asthma. Shortness of breath (Reports SOB while obtaining dialysis)    Renal/:   Chronic Renal Disease, ESRD    Hepatic/GI:   Bowel Prep. PUD, Denies GERD.    Neurological:   CVA, no residual symptoms Denies Seizures.    Endocrine:   Diabetes, type 2        Physical Exam  General:  Well nourished, Obesity    Airway/Jaw/Neck:  Airway Findings: Mouth Opening: Small, but > 3cm Tongue: Normal  Pre-Existing Airway Tube(s):  "Oral Endotracheal tube  General Airway Assessment: Adult  Mallampati: III  Improves to II with phonation.  TM Distance: Normal, at least 6 cm  Jaw/Neck Findings:  Neck ROM: Normal ROM      Dental:  Dental Findings:    Chest/Lungs:  Chest/Lungs Findings: Clear to auscultation, Normal Respiratory Rate     Heart/Vascular:  Heart Findings: Rate: Normal  Rhythm: Regular Rhythm  Sounds: Normal     Abdomen:  Abdomen Findings:  Normal, Soft, Nontender     Musculoskeletal:  Musculoskeletal Findings:     Mental Status:  Mental Status Findings:  Cooperative, Alert and Oriented                   patient/ stated she "bottomed out", was "in a coma" post anesthesia       Anesthesia Plan  Type of Anesthesia, risks & benefits discussed:  Anesthesia Type:  general  Patient's Preference: General  Intra-op Monitoring Plan: standard ASA monitors  Intra-op Monitoring Plan Comments:   Post Op Pain Control Plan:   Post Op Pain Control Plan Comments: Per primary service  Induction:   IV  Beta Blocker:         Informed Consent: Patient understands risks and agrees with Anesthesia plan.  Questions answered. Anesthesia consent signed with patient.  ASA Score: 3     Day of Surgery Review of History & Physical:    H&P update referred to the surgeon.         Ready For Surgery From Anesthesia Perspective.       "

## 2017-04-27 ENCOUNTER — HOSPITAL ENCOUNTER (INPATIENT)
Facility: HOSPITAL | Age: 62
LOS: 8 days | Discharge: HOME HEALTH | DRG: 252 | End: 2017-05-05
Attending: SURGERY | Admitting: SURGERY
Payer: MEDICARE

## 2017-04-27 ENCOUNTER — ANESTHESIA (OUTPATIENT)
Dept: SURGERY | Facility: HOSPITAL | Age: 62
DRG: 252 | End: 2017-04-27
Payer: MEDICARE

## 2017-04-27 DIAGNOSIS — I12.0 TYPE 2 DM WITH HYPERTENSION AND ESRD ON DIALYSIS: ICD-10-CM

## 2017-04-27 DIAGNOSIS — J90 PLEURAL EFFUSION, RIGHT: Primary | ICD-10-CM

## 2017-04-27 DIAGNOSIS — I50.9 CONGESTIVE HEART FAILURE, UNSPECIFIED CONGESTIVE HEART FAILURE CHRONICITY, UNSPECIFIED CONGESTIVE HEART FAILURE TYPE: ICD-10-CM

## 2017-04-27 DIAGNOSIS — Z79.01 ANTICOAGULANT LONG-TERM USE: ICD-10-CM

## 2017-04-27 DIAGNOSIS — I46.9 CARDIAC ARREST: ICD-10-CM

## 2017-04-27 DIAGNOSIS — D63.1 ANEMIA OF CHRONIC RENAL FAILURE, STAGE 5: Chronic | ICD-10-CM

## 2017-04-27 DIAGNOSIS — N18.6 END STAGE RENAL DISEASE: Chronic | ICD-10-CM

## 2017-04-27 DIAGNOSIS — S09.8XXA APHASIA DUE TO TBI (TRAUMATIC BRAIN INJURY), CLOSED: ICD-10-CM

## 2017-04-27 DIAGNOSIS — E08.43 DIABETIC AUTONOMIC NEUROPATHY ASSOCIATED WITH DIABETES MELLITUS DUE TO UNDERLYING CONDITION: Chronic | ICD-10-CM

## 2017-04-27 DIAGNOSIS — N25.81 SECONDARY HYPERPARATHYROIDISM OF RENAL ORIGIN: Chronic | ICD-10-CM

## 2017-04-27 DIAGNOSIS — J90 RECURRENT PLEURAL EFFUSION ON RIGHT: ICD-10-CM

## 2017-04-27 DIAGNOSIS — N18.9 CKD (CHRONIC KIDNEY DISEASE), UNSPECIFIED STAGE: ICD-10-CM

## 2017-04-27 DIAGNOSIS — E11.319 DIABETIC RETINOPATHY ASSOCIATED WITH TYPE 2 DIABETES MELLITUS, MACULAR EDEMA PRESENCE UNSPECIFIED, UNSPECIFIED LATERALITY, UNSPECIFIED RETINOPATHY SEVERITY: Chronic | ICD-10-CM

## 2017-04-27 DIAGNOSIS — I12.0 HYPERTENSION, RENAL, STAGE 5 CHRONIC KIDNEY DISEASE OR END STAGE RENAL DISEASE: Chronic | ICD-10-CM

## 2017-04-27 DIAGNOSIS — Z99.2 HEMODIALYSIS STATUS: ICD-10-CM

## 2017-04-27 DIAGNOSIS — Z12.11 SCREEN FOR COLON CANCER: ICD-10-CM

## 2017-04-27 DIAGNOSIS — N18.6 TYPE 2 DM WITH HYPERTENSION AND ESRD ON DIALYSIS: ICD-10-CM

## 2017-04-27 DIAGNOSIS — R47.01 APHASIA DUE TO TBI (TRAUMATIC BRAIN INJURY), CLOSED: ICD-10-CM

## 2017-04-27 DIAGNOSIS — K57.31 DIVERTICULOSIS OF LARGE INTESTINE WITH HEMORRHAGE: ICD-10-CM

## 2017-04-27 DIAGNOSIS — M79.605 LEG PAIN, BILATERAL: ICD-10-CM

## 2017-04-27 DIAGNOSIS — R41.89 UNRESPONSIVE STATE: ICD-10-CM

## 2017-04-27 DIAGNOSIS — Z12.39 BREAST CANCER SCREENING, HIGH RISK PATIENT: ICD-10-CM

## 2017-04-27 DIAGNOSIS — N18.5 ANEMIA OF CHRONIC RENAL FAILURE, STAGE 5: Chronic | ICD-10-CM

## 2017-04-27 DIAGNOSIS — I73.9 PERIPHERAL VASCULAR DISEASE: ICD-10-CM

## 2017-04-27 DIAGNOSIS — Z99.2 TYPE 2 DM WITH HYPERTENSION AND ESRD ON DIALYSIS: ICD-10-CM

## 2017-04-27 DIAGNOSIS — Z95.828 PERMANENT CENTRAL VENOUS CATHETER IN PLACE: ICD-10-CM

## 2017-04-27 DIAGNOSIS — K92.2 ACUTE GASTROINTESTINAL HEMORRHAGE: ICD-10-CM

## 2017-04-27 DIAGNOSIS — M79.604 LEG PAIN, BILATERAL: ICD-10-CM

## 2017-04-27 DIAGNOSIS — J90 PLEURAL EFFUSION ON RIGHT: ICD-10-CM

## 2017-04-27 DIAGNOSIS — E11.22 TYPE 2 DM WITH HYPERTENSION AND ESRD ON DIALYSIS: ICD-10-CM

## 2017-04-27 DIAGNOSIS — I63.9 CEREBROVASCULAR ACCIDENT (CVA), UNSPECIFIED MECHANISM: ICD-10-CM

## 2017-04-27 DIAGNOSIS — D62 ACUTE BLOOD LOSS ANEMIA: ICD-10-CM

## 2017-04-27 DIAGNOSIS — Z86.718 HISTORY OF DVT (DEEP VEIN THROMBOSIS): ICD-10-CM

## 2017-04-27 LAB
ABO GROUP BLD: NORMAL
ALBUMIN SERPL BCP-MCNC: 3.4 G/DL
ALLENS TEST: ABNORMAL
ALP SERPL-CCNC: 75 U/L
ALT SERPL W/O P-5'-P-CCNC: 11 U/L
ANION GAP SERPL CALC-SCNC: 15 MMOL/L
AST SERPL-CCNC: 18 U/L
BASOPHILS # BLD AUTO: 0.05 K/UL
BASOPHILS NFR BLD: 0.4 %
BILIRUB SERPL-MCNC: 0.5 MG/DL
BLD GP AB SCN CELLS X3 SERPL QL: NORMAL
BUN SERPL-MCNC: 26 MG/DL
CALCIUM SERPL-MCNC: 8.6 MG/DL
CHLORIDE SERPL-SCNC: 104 MMOL/L
CK MB SERPL-MCNC: 2.2 NG/ML
CK MB SERPL-RTO: 4.2 %
CK SERPL-CCNC: 52 U/L
CO2 SERPL-SCNC: 20 MMOL/L
CREAT SERPL-MCNC: 5.4 MG/DL
DELSYS: ABNORMAL
DIFFERENTIAL METHOD: ABNORMAL
EOSINOPHIL # BLD AUTO: 0.2 K/UL
EOSINOPHIL NFR BLD: 1.4 %
ERYTHROCYTE [DISTWIDTH] IN BLOOD BY AUTOMATED COUNT: 17.6 %
EST. GFR  (AFRICAN AMERICAN): 9.1 ML/MIN/1.73 M^2
EST. GFR  (NON AFRICAN AMERICAN): 7.9 ML/MIN/1.73 M^2
GLUCOSE SERPL-MCNC: 196 MG/DL
HCO3 UR-SCNC: 24.6 MMOL/L (ref 24–28)
HCT VFR BLD AUTO: 40.2 %
HGB BLD-MCNC: 13.3 G/DL
INR PPP: 1.1
LACTATE SERPL-SCNC: 3.9 MMOL/L
LYMPHOCYTES # BLD AUTO: 4 K/UL
LYMPHOCYTES NFR BLD: 30.2 %
MCH RBC QN AUTO: 31.8 PG
MCHC RBC AUTO-ENTMCNC: 33.1 %
MCV RBC AUTO: 96 FL
MODE: ABNORMAL
MONOCYTES # BLD AUTO: 1.2 K/UL
MONOCYTES NFR BLD: 8.8 %
NEUTROPHILS # BLD AUTO: 7.7 K/UL
NEUTROPHILS NFR BLD: 58.1 %
PCO2 BLDA: 48.7 MMHG (ref 35–45)
PH SMN: 7.31 [PH] (ref 7.35–7.45)
PLATELET # BLD AUTO: 178 K/UL
PMV BLD AUTO: 11.3 FL
PO2 BLDA: 222 MMHG (ref 80–100)
POC BE: -2 MMOL/L
POC SATURATED O2: 100 % (ref 95–100)
POC TCO2: 26 MMOL/L (ref 23–27)
POCT GLUCOSE: 102 MG/DL (ref 70–110)
POCT GLUCOSE: 117 MG/DL (ref 70–110)
POCT GLUCOSE: 145 MG/DL (ref 70–110)
POCT GLUCOSE: 150 MG/DL (ref 70–110)
POTASSIUM SERPL-SCNC: 4.7 MMOL/L
PROT SERPL-MCNC: 8.2 G/DL
PROTHROMBIN TIME: 11.3 SEC
RBC # BLD AUTO: 4.18 M/UL
RH BLD: NORMAL
SAMPLE: ABNORMAL
SITE: ABNORMAL
SODIUM SERPL-SCNC: 139 MMOL/L
TROPONIN I SERPL DL<=0.01 NG/ML-MCNC: 0.02 NG/ML
WBC # BLD AUTO: 13.24 K/UL

## 2017-04-27 PROCEDURE — 25000003 PHARM REV CODE 250: Performed by: SURGERY

## 2017-04-27 PROCEDURE — 63600531 PHARM REV CODE 636 NO ALT 250 W HCPCS: Performed by: SURGERY

## 2017-04-27 PROCEDURE — 36000706: Performed by: SURGERY

## 2017-04-27 PROCEDURE — 92950 HEART/LUNG RESUSCITATION CPR: CPT

## 2017-04-27 PROCEDURE — 85025 COMPLETE CBC W/AUTO DIFF WBC: CPT

## 2017-04-27 PROCEDURE — 63600175 PHARM REV CODE 636 W HCPCS: Performed by: NURSE ANESTHETIST, CERTIFIED REGISTERED

## 2017-04-27 PROCEDURE — 20000000 HC ICU ROOM

## 2017-04-27 PROCEDURE — 82803 BLOOD GASES ANY COMBINATION: CPT

## 2017-04-27 PROCEDURE — 93005 ELECTROCARDIOGRAM TRACING: CPT

## 2017-04-27 PROCEDURE — 86900 BLOOD TYPING SEROLOGIC ABO: CPT

## 2017-04-27 PROCEDURE — 71000039 HC RECOVERY, EACH ADD'L HOUR: Performed by: SURGERY

## 2017-04-27 PROCEDURE — 94002 VENT MGMT INPAT INIT DAY: CPT

## 2017-04-27 PROCEDURE — 0BH17EZ INSERTION OF ENDOTRACHEAL AIRWAY INTO TRACHEA, VIA NATURAL OR ARTIFICIAL OPENING: ICD-10-PCS | Performed by: SURGERY

## 2017-04-27 PROCEDURE — 27201423 OPTIME MED/SURG SUP & DEVICES STERILE SUPPLY: Performed by: SURGERY

## 2017-04-27 PROCEDURE — 37000008 HC ANESTHESIA 1ST 15 MINUTES: Performed by: SURGERY

## 2017-04-27 PROCEDURE — 25000003 PHARM REV CODE 250: Performed by: NURSE ANESTHETIST, CERTIFIED REGISTERED

## 2017-04-27 PROCEDURE — 25000003 PHARM REV CODE 250

## 2017-04-27 PROCEDURE — 36415 COLL VENOUS BLD VENIPUNCTURE: CPT

## 2017-04-27 PROCEDURE — 71000033 HC RECOVERY, INTIAL HOUR: Performed by: SURGERY

## 2017-04-27 PROCEDURE — 83605 ASSAY OF LACTIC ACID: CPT

## 2017-04-27 PROCEDURE — 63600175 PHARM REV CODE 636 W HCPCS: Performed by: STUDENT IN AN ORGANIZED HEALTH CARE EDUCATION/TRAINING PROGRAM

## 2017-04-27 PROCEDURE — 71000015 HC POSTOP RECOV 1ST HR: Performed by: SURGERY

## 2017-04-27 PROCEDURE — 63600175 PHARM REV CODE 636 W HCPCS: Performed by: ANESTHESIOLOGY

## 2017-04-27 PROCEDURE — 37000009 HC ANESTHESIA EA ADD 15 MINS: Performed by: SURGERY

## 2017-04-27 PROCEDURE — C1768 GRAFT, VASCULAR: HCPCS | Performed by: SURGERY

## 2017-04-27 PROCEDURE — 86901 BLOOD TYPING SEROLOGIC RH(D): CPT

## 2017-04-27 PROCEDURE — 36830 ARTERY-VEIN NONAUTOGRAFT: CPT | Mod: GC,,, | Performed by: SURGERY

## 2017-04-27 PROCEDURE — D9220A PRA ANESTHESIA: Mod: ANES,,, | Performed by: ANESTHESIOLOGY

## 2017-04-27 PROCEDURE — 80053 COMPREHEN METABOLIC PANEL: CPT

## 2017-04-27 PROCEDURE — 82553 CREATINE MB FRACTION: CPT

## 2017-04-27 PROCEDURE — 25500020 PHARM REV CODE 255: Performed by: SURGERY

## 2017-04-27 PROCEDURE — 93010 ELECTROCARDIOGRAM REPORT: CPT | Mod: ,,, | Performed by: INTERNAL MEDICINE

## 2017-04-27 PROCEDURE — 25000003 PHARM REV CODE 250: Performed by: ANESTHESIOLOGY

## 2017-04-27 PROCEDURE — D9220A PRA ANESTHESIA: Mod: CRNA,,, | Performed by: NURSE ANESTHETIST, CERTIFIED REGISTERED

## 2017-04-27 PROCEDURE — 5A1935Z RESPIRATORY VENTILATION, LESS THAN 24 CONSECUTIVE HOURS: ICD-10-PCS | Performed by: SURGERY

## 2017-04-27 PROCEDURE — 86850 RBC ANTIBODY SCREEN: CPT

## 2017-04-27 PROCEDURE — 84484 ASSAY OF TROPONIN QUANT: CPT

## 2017-04-27 PROCEDURE — 37799 UNLISTED PX VASCULAR SURGERY: CPT

## 2017-04-27 PROCEDURE — 27000221 HC OXYGEN, UP TO 24 HOURS

## 2017-04-27 PROCEDURE — 041L4JS BYPASS LEFT FEMORAL ARTERY TO LOWER EXTREMITY VEIN WITH SYNTHETIC SUBSTITUTE, PERCUTANEOUS ENDOSCOPIC APPROACH: ICD-10-PCS | Performed by: SURGERY

## 2017-04-27 PROCEDURE — 25000003 PHARM REV CODE 250: Performed by: STUDENT IN AN ORGANIZED HEALTH CARE EDUCATION/TRAINING PROGRAM

## 2017-04-27 PROCEDURE — 36000707: Performed by: SURGERY

## 2017-04-27 PROCEDURE — 85610 PROTHROMBIN TIME: CPT

## 2017-04-27 PROCEDURE — 63600175 PHARM REV CODE 636 W HCPCS: Performed by: SURGERY

## 2017-04-27 DEVICE — GRAFT STRETCH TW RING 4-7 45C: Type: IMPLANTABLE DEVICE | Site: LEG | Status: FUNCTIONAL

## 2017-04-27 RX ORDER — SODIUM CHLORIDE 0.9 % (FLUSH) 0.9 %
3 SYRINGE (ML) INJECTION EVERY 8 HOURS
Status: DISCONTINUED | OUTPATIENT
Start: 2017-04-27 | End: 2017-05-05 | Stop reason: HOSPADM

## 2017-04-27 RX ORDER — LIDOCAINE HYDROCHLORIDE 10 MG/ML
1 INJECTION, SOLUTION EPIDURAL; INFILTRATION; INTRACAUDAL; PERINEURAL ONCE
Status: COMPLETED | OUTPATIENT
Start: 2017-04-27 | End: 2017-04-27

## 2017-04-27 RX ORDER — SODIUM CHLORIDE 0.9 % (FLUSH) 0.9 %
3 SYRINGE (ML) INJECTION
Status: DISCONTINUED | OUTPATIENT
Start: 2017-04-27 | End: 2017-04-27

## 2017-04-27 RX ORDER — HYDROCODONE BITARTRATE AND ACETAMINOPHEN 5; 325 MG/1; MG/1
1 TABLET ORAL EVERY 4 HOURS PRN
Status: DISCONTINUED | OUTPATIENT
Start: 2017-04-27 | End: 2017-04-27

## 2017-04-27 RX ORDER — CEFAZOLIN SODIUM 2 G/50ML
2 SOLUTION INTRAVENOUS
Status: COMPLETED | OUTPATIENT
Start: 2017-04-27 | End: 2017-04-27

## 2017-04-27 RX ORDER — HEPARIN SODIUM 1000 [USP'U]/ML
INJECTION, SOLUTION INTRAVENOUS; SUBCUTANEOUS
Status: DISCONTINUED | OUTPATIENT
Start: 2017-04-27 | End: 2017-04-27 | Stop reason: HOSPADM

## 2017-04-27 RX ORDER — SODIUM CHLORIDE 9 MG/ML
INJECTION, SOLUTION INTRAVENOUS CONTINUOUS PRN
Status: DISCONTINUED | OUTPATIENT
Start: 2017-04-27 | End: 2017-04-27

## 2017-04-27 RX ORDER — HYDROMORPHONE HYDROCHLORIDE 1 MG/ML
0.2 INJECTION, SOLUTION INTRAMUSCULAR; INTRAVENOUS; SUBCUTANEOUS EVERY 5 MIN PRN
Status: DISCONTINUED | OUTPATIENT
Start: 2017-04-27 | End: 2017-04-27

## 2017-04-27 RX ORDER — MAGNESIUM SULFATE HEPTAHYDRATE 40 MG/ML
4 INJECTION, SOLUTION INTRAVENOUS
Status: DISCONTINUED | OUTPATIENT
Start: 2017-04-27 | End: 2017-04-28

## 2017-04-27 RX ORDER — PROTAMINE SULFATE 10 MG/ML
INJECTION, SOLUTION INTRAVENOUS
Status: DISCONTINUED | OUTPATIENT
Start: 2017-04-27 | End: 2017-04-27

## 2017-04-27 RX ORDER — NEOSTIGMINE METHYLSULFATE 1 MG/ML
INJECTION, SOLUTION INTRAVENOUS
Status: DISCONTINUED | OUTPATIENT
Start: 2017-04-27 | End: 2017-04-27

## 2017-04-27 RX ORDER — SODIUM CHLORIDE 9 MG/ML
INJECTION, SOLUTION INTRAVENOUS CONTINUOUS
Status: DISCONTINUED | OUTPATIENT
Start: 2017-04-27 | End: 2017-04-28

## 2017-04-27 RX ORDER — MAGNESIUM SULFATE HEPTAHYDRATE 40 MG/ML
2 INJECTION, SOLUTION INTRAVENOUS
Status: DISCONTINUED | OUTPATIENT
Start: 2017-04-27 | End: 2017-04-28

## 2017-04-27 RX ORDER — POTASSIUM CHLORIDE 7.45 MG/ML
10 INJECTION INTRAVENOUS
Status: DISCONTINUED | OUTPATIENT
Start: 2017-04-27 | End: 2017-04-28

## 2017-04-27 RX ORDER — EPINEPHRINE 1 MG/ML
INJECTION INTRAMUSCULAR; INTRAVENOUS; SUBCUTANEOUS
Status: COMPLETED | OUTPATIENT
Start: 2017-04-27 | End: 2017-04-27

## 2017-04-27 RX ORDER — HYDROCODONE BITARTRATE AND ACETAMINOPHEN 5; 325 MG/1; MG/1
1 TABLET ORAL EVERY 4 HOURS PRN
Qty: 40 TABLET | Refills: 0 | Status: SHIPPED | OUTPATIENT
Start: 2017-04-27 | End: 2017-05-05 | Stop reason: HOSPADM

## 2017-04-27 RX ORDER — ROCURONIUM BROMIDE 10 MG/ML
INJECTION, SOLUTION INTRAVENOUS
Status: DISCONTINUED | OUTPATIENT
Start: 2017-04-27 | End: 2017-04-27

## 2017-04-27 RX ORDER — NICARDIPINE HYDROCHLORIDE 0.2 MG/ML
1 INJECTION INTRAVENOUS CONTINUOUS
Status: DISCONTINUED | OUTPATIENT
Start: 2017-04-27 | End: 2017-04-27 | Stop reason: SDUPTHER

## 2017-04-27 RX ORDER — OXYCODONE HYDROCHLORIDE 5 MG/1
10 TABLET ORAL EVERY 4 HOURS PRN
Status: DISCONTINUED | OUTPATIENT
Start: 2017-04-27 | End: 2017-04-27

## 2017-04-27 RX ORDER — FENTANYL CITRATE 50 UG/ML
INJECTION, SOLUTION INTRAMUSCULAR; INTRAVENOUS
Status: DISCONTINUED | OUTPATIENT
Start: 2017-04-27 | End: 2017-04-27

## 2017-04-27 RX ORDER — NICARDIPINE HYDROCHLORIDE 0.2 MG/ML
INJECTION INTRAVENOUS CONTINUOUS PRN
Status: COMPLETED | OUTPATIENT
Start: 2017-04-27 | End: 2017-04-27

## 2017-04-27 RX ORDER — FENTANYL CITRAT/DEXTROSE 5%/PF 100 MCG/10
PATIENT CONTROLLED ANALGESIA SYRINGE INTRAVENOUS
Status: COMPLETED
Start: 2017-04-27 | End: 2017-04-27

## 2017-04-27 RX ORDER — FENTANYL CITRAT/DEXTROSE 5%/PF 100 MCG/10
12.5 PATIENT CONTROLLED ANALGESIA SYRINGE INTRAVENOUS CONTINUOUS
Status: DISCONTINUED | OUTPATIENT
Start: 2017-04-27 | End: 2017-04-29

## 2017-04-27 RX ORDER — LIDOCAINE HCL/PF 100 MG/5ML
SYRINGE (ML) INTRAVENOUS
Status: DISCONTINUED | OUTPATIENT
Start: 2017-04-27 | End: 2017-04-27

## 2017-04-27 RX ORDER — NICARDIPINE HYDROCHLORIDE 0.2 MG/ML
1 INJECTION INTRAVENOUS CONTINUOUS
Status: DISCONTINUED | OUTPATIENT
Start: 2017-04-27 | End: 2017-04-29

## 2017-04-27 RX ORDER — PROPOFOL 10 MG/ML
VIAL (ML) INTRAVENOUS
Status: DISCONTINUED | OUTPATIENT
Start: 2017-04-27 | End: 2017-04-27

## 2017-04-27 RX ORDER — GLYCOPYRROLATE 0.2 MG/ML
INJECTION INTRAMUSCULAR; INTRAVENOUS
Status: DISCONTINUED | OUTPATIENT
Start: 2017-04-27 | End: 2017-04-27

## 2017-04-27 RX ADMIN — HYDROCODONE BITARTRATE AND ACETAMINOPHEN 1 TABLET: 5; 325 TABLET ORAL at 04:04

## 2017-04-27 RX ADMIN — FENTANYL CITRATE 100 MCG: 50 INJECTION, SOLUTION INTRAMUSCULAR; INTRAVENOUS at 12:04

## 2017-04-27 RX ADMIN — SODIUM CHLORIDE: 0.9 INJECTION, SOLUTION INTRAVENOUS at 09:04

## 2017-04-27 RX ADMIN — EPINEPHRINE 1 MG: 1 INJECTION, SOLUTION INTRAMUSCULAR; INTRAVENOUS; SUBCUTANEOUS at 07:04

## 2017-04-27 RX ADMIN — IOHEXOL 100 ML: 350 INJECTION, SOLUTION INTRAVENOUS at 08:04

## 2017-04-27 RX ADMIN — Medication 3 ML: at 10:04

## 2017-04-27 RX ADMIN — SODIUM CHLORIDE 1 UNITS/HR: 9 INJECTION, SOLUTION INTRAVENOUS at 10:04

## 2017-04-27 RX ADMIN — NEOSTIGMINE METHYLSULFATE 5 MG: 1 INJECTION INTRAVENOUS at 02:04

## 2017-04-27 RX ADMIN — LIDOCAINE HYDROCHLORIDE 1 MG: 10 INJECTION, SOLUTION EPIDURAL; INFILTRATION; INTRACAUDAL; PERINEURAL at 12:04

## 2017-04-27 RX ADMIN — ROCURONIUM BROMIDE 30 MG: 10 INJECTION, SOLUTION INTRAVENOUS at 12:04

## 2017-04-27 RX ADMIN — Medication 25 MCG/HR: at 08:04

## 2017-04-27 RX ADMIN — CEFAZOLIN SODIUM 2 G: 2 SOLUTION INTRAVENOUS at 12:04

## 2017-04-27 RX ADMIN — FENTANYL CITRATE 50 MCG: 50 INJECTION, SOLUTION INTRAMUSCULAR; INTRAVENOUS at 12:04

## 2017-04-27 RX ADMIN — NICARDIPINE HYDROCHLORIDE 2.5 MG/HR: 0.2 INJECTION, SOLUTION INTRAVENOUS at 09:04

## 2017-04-27 RX ADMIN — HEPARIN SODIUM 5000 UNITS: 1000 INJECTION, SOLUTION INTRAVENOUS; SUBCUTANEOUS at 01:04

## 2017-04-27 RX ADMIN — PROTAMINE SULFATE 25 MG: 10 INJECTION, SOLUTION INTRAVENOUS at 02:04

## 2017-04-27 RX ADMIN — NICARDIPINE HYDROCHLORIDE 5 MG/HR: 0.2 INJECTION, SOLUTION INTRAVENOUS at 07:04

## 2017-04-27 RX ADMIN — SODIUM CHLORIDE: 0.9 INJECTION, SOLUTION INTRAVENOUS at 12:04

## 2017-04-27 RX ADMIN — GLYCOPYRROLATE 0.6 MG: 0.2 INJECTION, SOLUTION INTRAMUSCULAR; INTRAVENOUS at 02:04

## 2017-04-27 RX ADMIN — PROPOFOL 120 MG: 10 INJECTION, EMULSION INTRAVENOUS at 12:04

## 2017-04-27 RX ADMIN — LIDOCAINE HYDROCHLORIDE 70 MG: 20 INJECTION, SOLUTION INTRAVENOUS at 12:04

## 2017-04-27 NOTE — ANESTHESIA RELEASE NOTE
Anesthesia Release from PACU Note    Patient: Aleta Herrera    Procedure(s) Performed: Procedure(s) (LRB):  [id number] Left Thigh (Left)    Anesthesia type: general    Post pain: Adequate analgesia    Post assessment: no apparent anesthetic complications    Last Vitals:   Visit Vitals    /61    Pulse (!) 58    Temp 36.6 °C (97.9 °F) (Oral)    Resp 16    Ht 5' (1.524 m)    Wt 73.9 kg (163 lb)    SpO2 99%    Breastfeeding No    BMI 31.83 kg/m2       Post vital signs: stable    Level of consciousness: awake    Nausea/Vomiting: no nausea/no vomiting    Complications: none    Airway Patency: patent    Respiratory: unassisted    Cardiovascular: stable and blood pressure at baseline    Hydration: euvolemic

## 2017-04-27 NOTE — DISCHARGE INSTRUCTIONS
Arteriovenous (AV) Fistula for Dialysis  An AV fistula is a connection between an artery and a vein. For this procedure, an AV fistula is surgically created using an artery and a vein in your arm. (Your doctor will let you know if another site is to be used.) When the artery and vein are joined, blood flow increases from the artery into the vein. As a result, the vein enlarges over time. The enlarged vein provides easier access to the blood for dialysis (a treatment for kidney failure). This sheet explains the procedure and what to expect.     An AV fistula increases blood flow from the artery into the vein. Over time, the vein becomes stronger and enlarged.   Preparing for the Procedure  Prepare as you have been told. In addition:  · Tell your doctor about all medications you take. This includes over-the-counter drugs. It also includes herbs and other supplements. You may need to stop taking some or all of them before the procedure.  · Follow any directions youre given for not eating or drinking before the procedure.  · Do not allow anyone to draw blood from or take blood pressure on the arm that will have the fistula prior to the procedure.  The Day of the Procedure  The procedure takes about 1-2 hours. Youll likely go home the same day.  Before the procedure begins:  · An IV line is put into a vein in the arm or hand not being used for the procedure. This line supplies fluids and medications.  · To keep you free of pain during the procedure, youre given general anesthesia. This medication puts you into a state like deep sleep through the procedure. Or a nerve block may be used. This medication numbs the arm. With it, you may also be given medication that makes you relaxed and drowsy through the procedure.  During the procedure:  · The skin over your arm may be injected with numbing medication.  · One or more small incisions are then made through the numbed skin. This depends on the size of your arm and the  depth of the vein in your arm.  · The vein is attached to the selected artery.  · Any incisions made are then closed with sutures (stitches), staples, surgical glue, or strips of surgical tape.  After the procedure:  · Youll be asked to keep your arm raised (elevated) as often as possible for at least a week after the procedure.  · Youll be given medications to manage pain as needed.  · Your arm and hand will be checked to make sure blood is flowing through the fistula properly. The feeling of blood rushing through the fistula is called a thrill. It is somewhat similar to the purring of a cat. Youll be taught how to check for this feeling each day to make sure there are no problems with your fistula. Youll also be taught how to care for your fistula at home.  · When its time for you to leave the hospital, have an adult family member or friend ready to drive you home.  Recovering at Home  Once at home, follow all of the instructions youve been given. Be sure to:  · Take all medications as directed.  · Care for your incision as instructed.  · Check for signs of infection at the incision site (see below).  · Avoid heavy lifting and strenuous activities as directed.  · Monitor and care for your fistula as instructed.  · Do your hand and arm exercises as instructed. This usually involves squeezing a ball in your hand for a few minutes each hour.  Call Your Health Care Provider If You Have Any of the Following:  · Fever of 100.4°F or higher  · Signs of infection at the incision site, such as increased redness or swelling, warmth, worsening pain, bleeding, or foul-smelling drainage  · Lack of a thrill (you cant feel it)  · Pain or numbness in your fingers, hand, or arm  · Bleeding, redness, or warmth around your fistula  · Sudden bulging of the fistula (more than usual; a slight bulge is normal)   Follow-Up  Your health care provider will check your fistula within 1 to 2 weeks after the procedure. It will likely  take about 6 to 8 weeks for the fistula to enlarge enough to start dialysis. After that, make sure the fistula is checked each time you have dialysis. Your health care provider may also suggest checkups every 6 months.  Risks and Possible Complications Include:  · Failure of the fistula to work properly  · Long wait before the fistula is ready (up to 6 months)  · Coldness or numbness in the hand (due to blood flowing away from the hand and into the fistula)  · An unsightly bump under the skin (due to enlargement of the fistula)  · Prolonged bleeding from the fistula after dialysis  · Narrowing or weakening of the blood vessels used for the fistula  · Formation of blood clots in the blood vessels used for the fistula  · Risks of anesthesia or any other medications used during the procedure   Living with an AV Fistula  A problem, such as narrowing of the vein (stricture) or infection, can make the fistula unusable. If this happens, you may need other treatments to repair or make a new fistula. To protect your fistula, follow these and any other guidelines youre given:  · Check your fistula as often as your health care provider says. If you cant feel your thrill, let he or she know right away.  · Make sure your fistula is checked before each dialysis treatment.  · Dont let anyone draw blood from or take blood pressure on the arm that has the fistula.  · Wash your hands often and keep the area around your fistula clean.  · Dont sleep on the arm that has the fistula.  · Dont wear tight jewelry or a watch on the arm with your fistula.  · Protect your fistula from cuts, scrapes, or blows.  Date Last Reviewed: 11/26/2014  © 3009-2840 [employer]. [address] [place] [address], [place] [address]. All rights reserved. This information is not intended as a substitute for professional medical care. Always follow your healthcare professional's instructions.      Procedural Sedation (Adult)  You have been given  medicine by vein to make you sleep during your surgery. This may have included both a pain medicine and sleeping medicine. Most of the effects have worn off. But you may still have some drowsiness for the next 6 to 8 hours.  Home care  Follow these guidelines when you get home:  · For the next 8 hours, you should be watched by a responsible adult. This person should make sure your condition is not getting worse.  · Don't take any medicine by mouth for pain or for sleep during the next 4 hours. These might react with the medicines you were given in the hospital. This could cause a much stronger response than usual.  · Don't drink any alcohol for the next 24 hours.  · Don't drive, operate dangerous machinery, or make important business or personal decisions during the next 24 hours.  Follow-up care  Follow up with your healthcare provider if you are not alert and back to your usual level of activity within 12 hours.  When to seek medical advice  Call your healthcare provider right away if any of these occur:  · Drowsiness gets worse  · Weakness or dizziness gets worse  · Repeated vomiting  · You cannot be awakened   Date Last Reviewed: 10/18/2016  © 6205-6560 [employer]. [address] [place] [address], [place] [address]. All rights reserved. This information is not intended as a substitute for professional medical care. Always follow your healthcare professional's instructions.

## 2017-04-27 NOTE — H&P (VIEW-ONLY)
Patient ID: Aleta Herrera is a 61 y.o. female.    I. HISTORY     Chief Complaint: Follow-up      HPI Aleta Herrera is a 61 y.o. female here for evaluation of her dialysis access. Renal failure is due to HTN/DM. Currently dialyzes on M/W/F via a Right femoral catheter. Patient is right handed. She has had bilateral upper arm AVGs. The right was complicated by steal. Both are now thrombosed following an acute illness. Previously planned for R thigh AVG which patient cancelled, she has since been using a catheter. She is now ready to proceed with the surgery.      Review of Systems   Constitution: Negative.   HENT: Negative.    Eyes: Negative.    Cardiovascular: Negative.    Respiratory: Negative.    Endocrine: Negative.    Hematologic/Lymphatic: Negative.    Skin: Negative.    Musculoskeletal: Negative.    Gastrointestinal: Negative.    Neurological: Negative.    Psychiatric/Behavioral: Negative.    Allergic/Immunologic: Negative.        II. PHYSICAL EXAM     Physical Exam   Constitutional: She is oriented to person, place, and time. No distress.   HENT:   Head: Normocephalic and atraumatic.   Eyes: Conjunctivae and EOM are normal.   Neck: Neck supple. No JVD present.   Cardiovascular: Normal rate.    Pulses:       Dorsalis pedis pulses are 2+ on the right side, and 2+ on the left side.   Pulmonary/Chest: Effort normal. No respiratory distress.   Abdominal: Soft.   Musculoskeletal: Normal range of motion. She exhibits edema. She exhibits no tenderness.   Neurological: She is alert and oriented to person, place, and time.   Skin: Skin is warm. No rash noted. No erythema.   Psychiatric: She has a normal mood and affect.       III. ASSESSMENT & PLAN (MEDICAL DECISION MAKING)     1. ESRD 2/2 HTN on HD since 11/12/09        Assessment/Diagnosis and Plan:  Aleta Herrera is a 61 y.o. female with ESRD, on dialysis in need of new access.    Plan left thigh AVG Thursday April 27th.     Dr. Madera has explained the  risks, benefits, and alternatives of the procedure in detail.   Risks include but are not limited to arterial steal, inability to use access, failure to mature, permanent injury to nerves or blood vessels, and need for repeat interventions or new access creation.  They have voiced understanding, all questions were answered, and agree to proceed as planned.    Jet Wolff MD  Surgery Resident, PGY-1  Pager [phone]  04/06/2017      Vascular Surgery Staff  I have seen and examined the patient and reviewed the residents note. I agree with their assessment and plan.    Left thigh AVG Thursday Apr 27 - date picked at pt request    Nathalie Madera MD FACS [facility]  Vascular & Endovascular Surgery

## 2017-04-27 NOTE — ANESTHESIA POSTPROCEDURE EVALUATION
Anesthesia Post Evaluation    Patient: Aleta Herrera    Procedure(s) Performed: Procedure(s) (LRB):  [id number] Left Thigh (Left)    Final Anesthesia Type: general  Patient location during evaluation: PACU  Patient participation: Yes- Able to Participate  Level of consciousness: awake and alert  Post-procedure vital signs: reviewed and stable  Pain management: adequate  Airway patency: patent  PONV status at discharge: No PONV  Anesthetic complications: no      Cardiovascular status: blood pressure returned to baseline  Respiratory status: unassisted  Hydration status: euvolemic          Visit Vitals    /61    Pulse (!) 58    Temp 36.6 °C (97.9 °F) (Oral)    Resp 16    Ht 5' (1.524 m)    Wt 73.9 kg (163 lb)    SpO2 99%    Breastfeeding No    BMI 31.83 kg/m2       Pain/Hector Score: Pain Assessment Performed: Yes (4/27/2017  3:30 PM)  Presence of Pain: denies (4/27/2017  3:30 PM)  Pain Rating Prior to Med Admin: 4 (4/27/2017  4:25 PM)  Hector Score: 8 (4/27/2017  3:30 PM)

## 2017-04-27 NOTE — TRANSFER OF CARE
Anesthesia Transfer of Care Note    Patient: Aleta Herrera    Procedure(s) Performed: Procedure(s) (LRB):  [id number] Left Thigh (Left)    Patient location: PACU    Anesthesia Type: general    Transport from OR: Transported from OR on 6-10 L/min O2 by face mask with adequate spontaneous ventilation    Post pain: adequate analgesia    Post assessment: no apparent anesthetic complications and tolerated procedure well    Post vital signs: stable    Level of consciousness: responds to stimulation    Nausea/Vomiting: no nausea/vomiting    Complications: none          Last vitals:   Visit Vitals    /60    Pulse (!) 58    Temp 36.6 °C (97.9 °F) (Oral)    Resp 18    Ht 5' (1.524 m)    Wt 73.9 kg (163 lb)    SpO2 97%    Breastfeeding No    BMI 31.83 kg/m2

## 2017-04-27 NOTE — BRIEF OP NOTE
[facility]  Brief Operative Note    SUMMARY     Surgery Date: 4/27/2017     Surgeon(s) and Role:     * Nathalie Madera MD - Primary     * An Allan MD - Fellow    Assisting Surgeon: None    Pre-op Diagnosis:  End stage renal disease [N18.6]    Post-op Diagnosis:  Post-Op Diagnosis Codes:     * End stage renal disease [N18.6]    Procedure:  Left femoral AV Graft (4-7mm [place] Intering)    Anesthesia:   General    Description of Procedure: Femoral AV graft    Description of the findings of the procedure: Excellent thrill    Discharge Note    SUMMARY     Admit Date: 4/27/2017    Discharge Date and Time:  04/27/2017     Hospital Course (synopsis of major diagnoses, care, treatment, and services provided during the course of the hospital stay): no complications     Final Diagnosis: Post-Op Diagnosis Codes:     * End stage renal disease [N18.6]    Disposition: Home or Self Care    Follow Up/Patient Instructions: Resume regular diet, follow-up in 2 weeks, resume regular activity in 2-3 days.    Resume medications per post-procedure med reconciliation.    Medications:  Reconciled Home Medications:   Current Discharge Medication List      CONTINUE these medications which have NOT CHANGED    Details   amlodipine (NORVASC) 10 MG tablet TAKE ONE TABLET BY MOUTH ONCE DAILY  Qty: 90 tablet, Refills: 0      aspirin 81 mg Tab Take by mouth.      calcitRIOL (ROCALTROL) 0.5 MCG Cap Take 1 capsule (0.5 mcg total) by mouth once daily.  Qty: 30 capsule, Refills: 1      epoetin batsheva (PROCRIT) 10,000 unit/mL injection Inject 0.74 mLs (7,400 Units total) into the skin every Mon, Wed, Fri.      insulin aspart (NOVOLOG FLEXPEN) 100 unit/mL InPn as dir Insulin Pen Subcutaneous Before meals and at bedtime.  If blood sugar is 151-200 1 units SQif blood sugar is 201-250 2 units SQIf blood sugar is 251-300 3 units SQIf blood sugar is 301-350 4 units SQIf blood sugar is > or = 351 5 units SQand call MD;  Dispense with  needles      inulin (FIBER CHOICE, INULIN,) 1.5 gram Chew Take 2 tablets by mouth 2 (two) times daily. Or as directed on product packaging.  Refills: 11      lisinopril (PRINIVIL,ZESTRIL) 20 MG tablet Take 2 tablets (40 mg total) by mouth once daily.  Qty: 180 tablet, Refills: 3    Associated Diagnoses: Hypertension, essential      metoprolol tartrate (LOPRESSOR) 100 MG tablet TAKE 1 TABLET BY MOUTH TWICE DAILY  Qty: 180 tablet, Refills: 3    Comments: **Patient requests 90 days supply**      polyethylene glycol (GLYCOLAX) 17 gram/dose powder Take 17 g by mouth once daily. May take up to 3 times per day as needed for constipation.  Qty: 510 g, Refills: 11      sevelamer carbonate (RENVELA) 800 mg Tab Take 2,400 mg by mouth 3 (three) times daily with meals.      insulin detemir (LEVEMIR FLEXPEN) 100 unit/mL (3 mL) SubQ InPn pen Inject 8 Units into the skin 2 (two) times daily.           No discharge procedures on file.      Estimated Blood Loss: 25mL         Specimens:   Specimen     None

## 2017-04-27 NOTE — OP NOTE
04/27/2017      Indications: Aleta Herrera is a 62 y.o. female with chronic renal insufficiency and need for long-term dialysis access.    Pre-operative Diagnosis:  ESRD.    Post-operative Diagnosis: Same    Operation: Left Femoral AV graft (4-7mm [place] Intering)          Surgeon: Nathalie Madera MD FACS    Assistants: ADRI Allan DO    Anesthesia: IV + Regional    Findings:  Adequate artery and vein for graft. .    Estimate Blood Loss:  25mL            Specimens: None               Complications:  None; patient tolerated the procedure well.           Disposition: PACU, then home.           Attending Attestation: I was present and scrubbed for the entire procedure.      Procedure Details   The patient was seen in the Holding Room. The risks, benefits, complications, treatment options, and expected outcomes were discussed with the patient. The patient concurred with the proposed plan, giving informed consent.  The site of surgery properly noted/marked. The patient was taken to the Operating Room, identified correctly and the procedure verified. A Time Out was held and the above information confirmed.    After an informed consent was obtained the patient was taken to the operating room and placed in the supine position. The left lower extremity/groin was prepped and draped in standard sterile fashion. An oblique skin incision was made along the left upper thigh.  The SFA was dissected free of the surrounding tissue and controlled with vessel loops. The left femoral vein was dissected free of the surrounding tissue and isolated with vessel loops. The patient was systemically anticoagulated with IV heparin. A 4-7mm PTFE graft was tunneled through the subcutaneous tissue with a 1cm counter incision in the mid-left thigh. The vein was controlled with a profunda clamp and vessel loop and the 7mm end of the graft was sewn end-to-side to the femoral vein with 6-0 Prolene in a continuous fashion. The vascular clamp  and vessel loops were released from the femoral vein and a a-traumatic clamp was placed on the graft distal to the anastomosis. Next, the 4mm end of the graft was sewn in an end-to-side to the superficial femoral artery with 6-0 Prolene. Prior to completion the graft clamp was released and the femoral vein was allowed to backbleed into the graft to evacuate all air and debris. After completion of the anastomosis, the clamps were released. Hemostasis was achieved and good thrill was noted in the graft. The incisions were then closed in two layers, with deep dermal reapproximated with 3-0 Monocryl and skin with 4-0 Monocryl. Steri-Strips, telfa dressing and tegaderm were applied

## 2017-04-27 NOTE — IP AVS SNAPSHOT
[facility]  [address]  [facility] [address]  Phone: [phone]           Patient Discharge Instructions   Our goal is to set you up for success. This packet includes information on your condition, medications, and your home care.  It will help you care for yourself to prevent having to return to the hospital.     Please ask your nurse if you have any questions.      There are many details to remember when preparing to leave the hospital. Here is what you will need to do:    1. Take your medicine. If you are prescribed medications, review your Medication List on the following pages. You may have new medications to  at the pharmacy and others that you'll need to stop taking. Review the instructions for how and when to take your medications. Talk with your doctor or nurses if you are unsure of what to do.     2. Go to your follow-up appointments. Specific follow-up information is listed in the following pages. Your may be contacted by a nurse or clinical provider about future appointments. Be sure we have all of the phone numbers to reach you. Please contact your provider's office if you are unable to make an appointment.     3. Watch for warning signs. Your doctor or nurse will give you detailed warning signs to watch for and when to call for assistance. These instructions may also include educational information about your condition. If you experience any of warning signs to your health, call your doctor.           [facility]  Unless otherwise directed by your provider, please   contact [facility], our nurse care line   that is available for 24/7 assistance.     [phone] (toll-free)     Registered nurses in the [facility]   provide: appointment scheduling, clinical advisement, health education, and other advisory services.                  ** Verify the list of medication(s) below is accurate and up to date. Carry this with you in case of  emergency. If your medications have changed, please notify your healthcare provider.             Medication List      START taking these medications        Additional Info                      oxycodone 5 MG immediate release tablet   Commonly known as:  ROXICODONE   Quantity:  31 tablet   Refills:  0   Dose:  5-10 mg    Last time this was given:  10 mg on 5/5/2017  6:00 AM   Instructions:  Take 1-2 tablets (5-10 mg total) by mouth every 4 to 6 hours as needed for Pain.     Begin Date    AM    Noon    PM    Bedtime         CHANGE how you take these medications        Additional Info                      insulin detemir 100 unit/mL (3 mL) Inpn pen   Commonly known as:  LEVEMIR FLEXPEN   Refills:  0   Dose:  8 Units   What changed:    - how much to take  - when to take this    Instructions:  Inject 8 Units into the skin 2 (two) times daily.     Begin Date    AM    Noon    PM    Bedtime         CONTINUE taking these medications        Additional Info                      amlodipine 10 MG tablet   Commonly known as:  NORVASC   Quantity:  90 tablet   Refills:  0    Instructions:  TAKE ONE TABLET BY MOUTH ONCE DAILY     Begin Date    AM    Noon    PM    Bedtime       aspirin 81 mg Tab   Refills:  0    Instructions:  Take by mouth.     Begin Date    AM    Noon    PM    Bedtime       calcitRIOL 0.5 MCG Cap   Commonly known as:  ROCALTROL   Quantity:  30 capsule   Refills:  1   Dose:  0.5 mcg    Instructions:  Take 1 capsule (0.5 mcg total) by mouth once daily.     Begin Date    AM    Noon    PM    Bedtime       epoetin batsheva 10,000 unit/mL injection   Commonly known as:  PROCRIT   Refills:  0   Dose:  100 Units/kg    Last time this was given:  7,000 Units on 5/5/2017 12:39 PM   Instructions:  Inject 0.74 mLs (7,400 Units total) into the skin every Mon, Wed, Fri.     Begin Date    AM    Noon    PM    Bedtime       inulin 1.5 gram Chew   Commonly known as:  FIBER CHOICE (INULIN)   Refills:  11   Dose:  2 tablet     Instructions:  Take 2 tablets by mouth 2 (two) times daily. Or as directed on product packaging.     Begin Date    AM    Noon    PM    Bedtime       lisinopril 20 MG tablet   Commonly known as:  PRINIVIL,ZESTRIL   Quantity:  180 tablet   Refills:  3   Dose:  40 mg    Instructions:  Take 2 tablets (40 mg total) by mouth once daily.     Begin Date    AM    Noon    PM    Bedtime       metoprolol tartrate 100 MG tablet   Commonly known as:  LOPRESSOR   Quantity:  180 tablet   Refills:  3   Comments:  **Patient requests 90 days supply**    Instructions:  TAKE 1 TABLET BY MOUTH TWICE DAILY     Begin Date    AM    Noon    PM    Bedtime       NOVOLOG FLEXPEN 100 unit/mL Inpn pen   Refills:  0   Generic drug:  insulin aspart    Last time this was given:  1 Units on 5/3/2017  9:16 PM   Instructions:  as dir Insulin Pen Subcutaneous Before meals and at bedtime.  If blood sugar is 151-200 1 units SQif blood sugar is 201-250 2 units SQIf blood sugar is 251-300 3 units SQIf blood sugar is 301-350 4 units SQIf blood sugar is > or = 351 5 units SQand call MD;  Dispense with needles     Begin Date    AM    Noon    PM    Bedtime       polyethylene glycol 17 gram/dose powder   Commonly known as:  GLYCOLAX   Quantity:  510 g   Refills:  11   Dose:  17 g    Instructions:  Take 17 g by mouth once daily. May take up to 3 times per day as needed for constipation.     Begin Date    AM    Noon    PM    Bedtime       sevelamer carbonate 800 mg Tab   Commonly known as:  RENVELA   Refills:  0   Dose:  2400 mg    Instructions:  Take 2,400 mg by mouth 3 (three) times daily with meals.     Begin Date    AM    Noon    PM    Bedtime            Where to Get Your Medications      You can get these medications from any pharmacy     Bring a paper prescription for each of these medications     oxycodone 5 MG immediate release tablet                  Please bring to all follow up appointments:    1. A copy of your discharge instructions.  2. All medicines  you are currently taking in their original bottles.  3. Identification and insurance card.    Please arrive 15 minutes ahead of scheduled appointment time.    Please call 24 hours in advance if you must reschedule your appointment and/or time.        Your Scheduled Appointments     May 25, 2017  9:30 AM CDT   [facility] Hemodialysis AC with VASCULAR, LAB   [facility] ([facility] )    [address]  [place] [id number] [phone]            May 25, 2017 10:15 AM CDT   Post OP with Nathalie Madera MD   [facility] - Vascular Surgery ([facility] )    [address]  [place] [id number] [phone]              Follow-up Information     Follow up with Nathalie Madera MD In 2 weeks.    Specialty:  Vascular Surgery    Why:  For wound re-check    Contact information:    [address] [facility] [address]  [phone]        Referrals     Future Orders    Ambulatory referral to Outpatient Case Management     Questions:    Does the patient have a chronic or uncontrolled disease process?:      Does the patient have a new diagnosis of a catastrophic or life altering illness/treatment?:      Does the patient have any psycho-social issues that may affect their ability to adhere to treatment plan?:      Does patient have any behaviors or circumstances that may impede ability to adhere to treatment plan?:      Is patient at risk for admission/readmission?:          Discharge Instructions     Future Orders    Activity as tolerated     Call MD for:  difficulty breathing, headache or visual disturbances     Call MD for:  hives     Call MD for:  persistent dizziness or light-headedness     Call MD for:  persistent nausea and vomiting     Call MD for:  redness, tenderness, or signs of infection (pain, swelling, redness, odor or green/yellow discharge around incision site)     Call MD for:  severe uncontrolled pain     Call MD for:  temperature >100.4     Diet  general     Questions:    Total calories:      Fat restriction, if any:      Protein restriction, if any:      Na restriction, if any:      Fluid restriction:      Additional restrictions:      Diet general     Questions:    Total calories:      Fat restriction, if any:      Protein restriction, if any:      Na restriction, if any:      Fluid restriction:      Additional restrictions:      Other restrictions (specify):     Comments:    Keep left groin surgical incision clean and dry. Can shower or sponge bath but no sitting in standing water until the surgical incision site is well healed    OXYGEN FOR HOME USE     Questions:    Liter Flow:  3 Comment - 1-10    Duration:  Continuous Comment - as needed    Qualifying SpO2:  greater than or equal to 92    Testing done at:  [facility]    Route:  nasal cannula    Portable mode:      Device:  home concentrator with portable unit    Length of need (in months):      Patient condition with qualifying saturation:  CHF Comment - With hypoxemia     Height:  5' (1.524 m)    Weight:  71.2 kg (156 lb 15.5 oz)    Does patient have medical equipment at home?:  bedside commode    rollator    wheelchair    Alternative treatment measures have been tried or considered and deemed clinically ineffective.:  Yes    Vendor:  Other (use comments) Comment - [employer]reg[employer]    Expected Date of Delivery:  5/5/2017    Expected Time of Delivery:      OXYGEN FOR HOME USE     Questions:    Liter Flow:  3    Duration:  Continuous    Qualifying SpO2:  81%    Testing done at:  [facility]    Route:  nasal cannula    Portable mode:  continuous    Device:  home concentrator with portable unit    Length of need (in months):  99 mos    Patient condition with qualifying saturation:  CHF Comment - Hypoxia    Height:  5' (1.524 m)    Weight:  71.2 kg (156 lb 15.5 oz)    Does patient have medical equipment at home?:  bedside commode    rollator    wheelchair    Alternative treatment measures have been tried or considered and  deemed clinically ineffective.:  Yes    Vendor:  Other (use comments) Comment - [employer]    Expected Date of Delivery:  5/5/2017    Expected Time of Delivery:      Remove dressing in 48 hours     Comments:    Remove dressing in 48hrs. Clean access site with soap and water. Pat dry and cover with dry dressing. Keep clean and dry.    VAS US Hemodialysis Access         Discharge Instructions         Arteriovenous (AV) Fistula for Dialysis  An AV fistula is a connection between an artery and a vein. For this procedure, an AV fistula is surgically created using an artery and a vein in your arm. (Your doctor will let you know if another site is to be used.) When the artery and vein are joined, blood flow increases from the artery into the vein. As a result, the vein enlarges over time. The enlarged vein provides easier access to the blood for dialysis (a treatment for kidney failure). This sheet explains the procedure and what to expect.     An AV fistula increases blood flow from the artery into the vein. Over time, the vein becomes stronger and enlarged.   Preparing for the Procedure  Prepare as you have been told. In addition:  · Tell your doctor about all medications you take. This includes over-the-counter drugs. It also includes herbs and other supplements. You may need to stop taking some or all of them before the procedure.  · Follow any directions youre given for not eating or drinking before the procedure.  · Do not allow anyone to draw blood from or take blood pressure on the arm that will have the fistula prior to the procedure.  The Day of the Procedure  The procedure takes about 1-2 hours. Youll likely go home the same day.  Before the procedure begins:  · An IV line is put into a vein in the arm or hand not being used for the procedure. This line supplies fluids and medications.  · To keep you free of pain during the procedure, youre given general anesthesia. This medication puts you into a state  like deep sleep through the procedure. Or a nerve block may be used. This medication numbs the arm. With it, you may also be given medication that makes you relaxed and drowsy through the procedure.  During the procedure:  · The skin over your arm may be injected with numbing medication.  · One or more small incisions are then made through the numbed skin. This depends on the size of your arm and the depth of the vein in your arm.  · The vein is attached to the selected artery.  · Any incisions made are then closed with sutures (stitches), staples, surgical glue, or strips of surgical tape.  After the procedure:  · Youll be asked to keep your arm raised (elevated) as often as possible for at least a week after the procedure.  · Youll be given medications to manage pain as needed.  · Your arm and hand will be checked to make sure blood is flowing through the fistula properly. The feeling of blood rushing through the fistula is called a thrill. It is somewhat similar to the purring of a cat. Youll be taught how to check for this feeling each day to make sure there are no problems with your fistula. Youll also be taught how to care for your fistula at home.  · When its time for you to leave the hospital, have an adult family member or friend ready to drive you home.  Recovering at Home  Once at home, follow all of the instructions youve been given. Be sure to:  · Take all medications as directed.  · Care for your incision as instructed.  · Check for signs of infection at the incision site (see below).  · Avoid heavy lifting and strenuous activities as directed.  · Monitor and care for your fistula as instructed.  · Do your hand and arm exercises as instructed. This usually involves squeezing a ball in your hand for a few minutes each hour.  Call Your Health Care Provider If You Have Any of the Following:  · Fever of 100.4°F or higher  · Signs of infection at the incision site, such as increased redness or  swelling, warmth, worsening pain, bleeding, or foul-smelling drainage  · Lack of a thrill (you cant feel it)  · Pain or numbness in your fingers, hand, or arm  · Bleeding, redness, or warmth around your fistula  · Sudden bulging of the fistula (more than usual; a slight bulge is normal)   Follow-Up  Your health care provider will check your fistula within 1 to 2 weeks after the procedure. It will likely take about 6 to 8 weeks for the fistula to enlarge enough to start dialysis. After that, make sure the fistula is checked each time you have dialysis. Your health care provider may also suggest checkups every 6 months.  Risks and Possible Complications Include:  · Failure of the fistula to work properly  · Long wait before the fistula is ready (up to 6 months)  · Coldness or numbness in the hand (due to blood flowing away from the hand and into the fistula)  · An unsightly bump under the skin (due to enlargement of the fistula)  · Prolonged bleeding from the fistula after dialysis  · Narrowing or weakening of the blood vessels used for the fistula  · Formation of blood clots in the blood vessels used for the fistula  · Risks of anesthesia or any other medications used during the procedure   Living with an AV Fistula  A problem, such as narrowing of the vein (stricture) or infection, can make the fistula unusable. If this happens, you may need other treatments to repair or make a new fistula. To protect your fistula, follow these and any other guidelines youre given:  · Check your fistula as often as your health care provider says. If you cant feel your thrill, let he or she know right away.  · Make sure your fistula is checked before each dialysis treatment.  · Dont let anyone draw blood from or take blood pressure on the arm that has the fistula.  · Wash your hands often and keep the area around your fistula clean.  · Dont sleep on the arm that has the fistula.  · Dont wear tight jewelry or a watch on the arm  with your fistula.  · Protect your fistula from cuts, scrapes, or blows.  Date Last Reviewed: 11/26/2014  © 0218-3241 [employer]. [address] [place] [address]. All rights reserved. This information is not intended as a substitute for professional medical care. Always follow your healthcare professional's instructions.      Procedural Sedation (Adult)  You have been given medicine by vein to make you sleep during your surgery. This may have included both a pain medicine and sleeping medicine. Most of the effects have worn off. But you may still have some drowsiness for the next 6 to 8 hours.  Home care  Follow these guidelines when you get home:  · For the next 8 hours, you should be watched by a responsible adult. This person should make sure your condition is not getting worse.  · Don't take any medicine by mouth for pain or for sleep during the next 4 hours. These might react with the medicines you were given in the hospital. This could cause a much stronger response than usual.  · Don't drink any alcohol for the next 24 hours.  · Don't drive, operate dangerous machinery, or make important business or personal decisions during the next 24 hours.  Follow-up care  Follow up with your healthcare provider if you are not alert and back to your usual level of activity within 12 hours.  When to seek medical advice  Call your healthcare provider right away if any of these occur:  · Drowsiness gets worse  · Weakness or dizziness gets worse  · Repeated vomiting  · You cannot be awakened   Date Last Reviewed: 10/18/2016  © 1781-5476 [employer]. [address] [place] [address]. All rights reserved. This information is not intended as a substitute for professional medical care. Always follow your healthcare professional's instructions.              Primary Diagnosis     Your primary diagnosis was:  Not on File      Admission Information     Date & Time Provider Department  CSN    4/27/2017 10:19 AM Nathalie Madera MD [facility] [id number]      Care Providers     Provider Role Specialty Primary office phone    Nathalie Madera MD Attending Provider Vascular Surgery [phone]    Nathalie Madera MD Surgeon  Vascular Surgery [phone]    Bobby Sanabria MD Consulting Physician  Nephrology [phone]      Important Medicare Message          Most Recent Value    Important Message from Medicare Regarding Discharge Appeal Rights  Given to patient/caregiver, Explained to patient/caregiver, Signed/date by patient/caregiver yes 05/02/2017 1030      Your Vitals Were     BP Pulse Temp Resp Height Weight    140/89 (BP Location: Left arm, Patient Position: Lying, BP Method: Automatic) 99 98.8 °F (37.1 °C) (Oral) 16 5' (1.524 m) 71.2 kg (156 lb 15.5 oz)    SpO2 BMI             98% 30.66 kg/m2         Recent Lab Values        11/4/2009 5/21/2013 7/2/2013 9/5/2013 7/31/2015 4/8/2016 7/6/2016 4/28/2017     11:37 AM  9:15 AM  8:30 AM  7:00 AM  3:41 PM  8:23 PM 11:42 AM  3:10 AM    A1C 9.6 (H) 7.2 (H) 7.0 (H) 7.5 (H) 7.8 (H) 5.3 5.4 6.3 (H)    Comment for A1C at 11:42 AM on 7/6/2016:  According to ADA guidelines, hemoglobin A1C <7.0% represents  optimal control in non-pregnant diabetic patients.  Different  metrics may apply to specific populations.   Standards of Medical Care in Diabetes - 2016.  For the purpose of screening for the presence of diabetes:  <5.7%     Consistent with the absence of diabetes  5.7-6.4%  Consistent with increasing risk for diabetes   (prediabetes)  >or=6.5%  Consistent with diabetes  Currently no consensus exists for use of hemoglobin A1C  for diagnosis of diabetes for children.      Comment for A1C at  3:10 AM on 4/28/2017:  According to ADA guidelines, hemoglobin A1C <7.0% represents  optimal control in non-pregnant diabetic patients.  Different  metrics may apply to specific populations.   Standards of Medical Care in Diabetes - 2016.  For  the purpose of screening for the presence of diabetes:  <5.7%     Consistent with the absence of diabetes  5.7-6.4%  Consistent with increasing risk for diabetes   (prediabetes)  >or=6.5%  Consistent with diabetes  Currently no consensus exists for use of hemoglobin A1C  for diagnosis of diabetes for children.        Pending Labs     Order Current Status    Prepare RBC 1 Unit In process    AFB Culture & Smear Preliminary result    Culture, Anaerobe Preliminary result    Prepare RBC Preliminary result      Allergies as of 5/5/2017     No Known Allergies      Advance Directives     An advance directive is a document which, in the event you are no longer able to make decisions for yourself, tells your healthcare team what kind of treatment you do or do not want to receive, or who you would like to make those decisions for you.  If you do not currently have an advance directive, [facility] encourages you to create one.  For more information call:  [phone]), [phone] ([phone]),  or log on to [email or web address].        Smoking Cessation     If you would like to quit smoking:   You may be eligible for free services if you are a [place] resident and started smoking cigarettes before September 1, 1988.  Call the Smoking Cessation Trust (RUST) toll free at (581) 264-0417 or (070) 923-0896.   Call 9-344-QUIT-NOW if you do not meet the above criteria.   Contact us via email: [email or web address]   View our website for more information: [email or web address]        Language Assistance Services     ATTENTION: Language assistance services are available, free of charge. Please call [phone].      ATENCIÓN: Si habla español, tiene a shook disposición servicios gratuitos de asistencia lingüística. Llame al [phone].     CHÚ Ý: N?u b?n nói Ti?ng Vi?t, có các d?ch v? h? tr? ngôn ng? mi?n phí dành cho b?n. G?i s? [phone].        Blood Transfusion Reaction Signs and Symptoms      The blood you have received has been matched for you as carefully as possible. Most patients who receive a blood transfusion do not experience problems. However, there can be a delayed reaction that happens a few weeks after your blood transfusion. Contact your physician immediately if you experience any NEW SYMPTOMS listed below:     Fever greater than 100.4 degrees    Chills   Yellow color to your skin or eyes(Jaundice)   Back pain, chest pain, or pain at the infusion site   Weakness (more than usual)   Discomfort or uneasiness more than usual (Malaise)   Nausea or vomiting   Shortness of breath, wheezing, or coughing   Higher or lower blood pressure than normal   Skin rash, itching, skin redness, or localized swelling (example: hands or feet)   Urinating less than normal   Urine appears reddish or orange and is darker than normal      Remember that some these signs may already exist for you--such as having chronic back pain or high blood pressure. You only need to look for and report to your doctor any new occurrences since your blood transfusion that are of concern.        Stroke Education              Chronic Kindey Disease Education             Diabetes Discharge Instructions                                   [facility] Sign-Up     Activating your [facility] account is as easy as 1-2-3!     1) Visit [email or web address], select Sign Up Now, enter this activation code and your date of birth, then select Next.  [email or web address]  Expires: 6/11/2017  6:46 PM      2) Create a username and password to use when you visit [facility] in the future and select a security question in case you lose your password and select Next.    3) Enter your e-mail address and click Sign Up!    Additional Information  If you have questions, please e-mail [email or web address] or call [phone] to talk to our [facility] staff. Remember, [facility] is NOT to be used for urgent needs. For medical emergencies, dial 911.           [facility] complies with applicable Federal civil rights laws and does not discriminate on the basis of race, color, national origin, age, disability, or sex.

## 2017-04-28 PROBLEM — J90 RECURRENT PLEURAL EFFUSION ON RIGHT: Status: ACTIVE | Noted: 2017-04-28

## 2017-04-28 LAB
ALBUMIN SERPL BCP-MCNC: 3.2 G/DL
ALLENS TEST: ABNORMAL
ALLENS TEST: NORMAL
ALP SERPL-CCNC: 61 U/L
ALT SERPL W/O P-5'-P-CCNC: 10 U/L
ANION GAP SERPL CALC-SCNC: 12 MMOL/L
AST SERPL-CCNC: 18 U/L
BASOPHILS # BLD AUTO: 0.01 K/UL
BASOPHILS NFR BLD: 0.1 %
BILIRUB SERPL-MCNC: 0.5 MG/DL
BNP SERPL-MCNC: 3083 PG/ML
BUN SERPL-MCNC: 29 MG/DL
CALCIUM SERPL-MCNC: 8.5 MG/DL
CHLORIDE SERPL-SCNC: 105 MMOL/L
CO2 SERPL-SCNC: 21 MMOL/L
CREAT SERPL-MCNC: 5.5 MG/DL
DELSYS: ABNORMAL
DELSYS: NORMAL
DIFFERENTIAL METHOD: ABNORMAL
EOSINOPHIL # BLD AUTO: 0 K/UL
EOSINOPHIL NFR BLD: 0.1 %
ERYTHROCYTE [DISTWIDTH] IN BLOOD BY AUTOMATED COUNT: 17 %
ERYTHROCYTE [SEDIMENTATION RATE] IN BLOOD BY WESTERGREN METHOD: 14 MM/H
EST. GFR  (AFRICAN AMERICAN): 8.9 ML/MIN/1.73 M^2
EST. GFR  (NON AFRICAN AMERICAN): 7.8 ML/MIN/1.73 M^2
ESTIMATED AVG GLUCOSE: 134 MG/DL
FIO2: 50
FIO2: 50
GLUCOSE SERPL-MCNC: 102 MG/DL
HBA1C MFR BLD HPLC: 6.3 %
HCO3 UR-SCNC: 24.3 MMOL/L (ref 24–28)
HCO3 UR-SCNC: 25.3 MMOL/L (ref 24–28)
HCT VFR BLD AUTO: 33.5 %
HGB BLD-MCNC: 11.6 G/DL
LACTATE SERPL-SCNC: 0.9 MMOL/L
LYMPHOCYTES # BLD AUTO: 0.7 K/UL
LYMPHOCYTES NFR BLD: 6.7 %
MAGNESIUM SERPL-MCNC: 2.1 MG/DL
MCH RBC QN AUTO: 32 PG
MCHC RBC AUTO-ENTMCNC: 34.6 %
MCV RBC AUTO: 92 FL
MODE: ABNORMAL
MODE: NORMAL
MONOCYTES # BLD AUTO: 0.7 K/UL
MONOCYTES NFR BLD: 6.4 %
NEUTROPHILS # BLD AUTO: 9.1 K/UL
NEUTROPHILS NFR BLD: 86.5 %
PCO2 BLDA: 36.3 MMHG (ref 35–45)
PCO2 BLDA: 47.6 MMHG (ref 35–45)
PEEP: 5
PEEP: 5
PH SMN: 7.33 [PH] (ref 7.35–7.45)
PH SMN: 7.43 [PH] (ref 7.35–7.45)
PHOSPHATE SERPL-MCNC: 5 MG/DL
PLATELET # BLD AUTO: 150 K/UL
PMV BLD AUTO: 10.8 FL
PO2 BLDA: 80 MMHG (ref 80–100)
PO2 BLDA: 85 MMHG (ref 80–100)
POC BE: -1 MMOL/L
POC BE: 0 MMOL/L
POC SATURATED O2: 96 % (ref 95–100)
POC SATURATED O2: 96 % (ref 95–100)
POC TCO2: 25 MMOL/L (ref 23–27)
POC TCO2: 27 MMOL/L (ref 23–27)
POCT GLUCOSE: 100 MG/DL (ref 70–110)
POCT GLUCOSE: 101 MG/DL (ref 70–110)
POCT GLUCOSE: 101 MG/DL (ref 70–110)
POCT GLUCOSE: 106 MG/DL (ref 70–110)
POCT GLUCOSE: 114 MG/DL (ref 70–110)
POCT GLUCOSE: 133 MG/DL (ref 70–110)
POCT GLUCOSE: 90 MG/DL (ref 70–110)
POCT GLUCOSE: 93 MG/DL (ref 70–110)
POCT GLUCOSE: 96 MG/DL (ref 70–110)
POCT GLUCOSE: 98 MG/DL (ref 70–110)
POCT GLUCOSE: 99 MG/DL (ref 70–110)
POTASSIUM SERPL-SCNC: 4.8 MMOL/L
PROT SERPL-MCNC: 7.4 G/DL
PS: 10
PS: 5
RBC # BLD AUTO: 3.63 M/UL
SAMPLE: ABNORMAL
SAMPLE: NORMAL
SITE: ABNORMAL
SITE: NORMAL
SODIUM SERPL-SCNC: 138 MMOL/L
SP02: 100
SP02: 99
SPONT RATE: 17
VT: 450
WBC # BLD AUTO: 10.46 K/UL

## 2017-04-28 PROCEDURE — 25000003 PHARM REV CODE 250: Performed by: SURGERY

## 2017-04-28 PROCEDURE — 0W9930Z DRAINAGE OF RIGHT PLEURAL CAVITY WITH DRAINAGE DEVICE, PERCUTANEOUS APPROACH: ICD-10-PCS | Performed by: THORACIC SURGERY (CARDIOTHORACIC VASCULAR SURGERY)

## 2017-04-28 PROCEDURE — 25000003 PHARM REV CODE 250: Performed by: THORACIC SURGERY (CARDIOTHORACIC VASCULAR SURGERY)

## 2017-04-28 PROCEDURE — 87205 SMEAR GRAM STAIN: CPT

## 2017-04-28 PROCEDURE — 83036 HEMOGLOBIN GLYCOSYLATED A1C: CPT

## 2017-04-28 PROCEDURE — 80100014 HC HEMODIALYSIS 1:1

## 2017-04-28 PROCEDURE — 99223 1ST HOSP IP/OBS HIGH 75: CPT | Mod: ,,, | Performed by: NURSE PRACTITIONER

## 2017-04-28 PROCEDURE — 63600175 PHARM REV CODE 636 W HCPCS

## 2017-04-28 PROCEDURE — 87070 CULTURE OTHR SPECIMN AEROBIC: CPT

## 2017-04-28 PROCEDURE — 84100 ASSAY OF PHOSPHORUS: CPT

## 2017-04-28 PROCEDURE — 25000003 PHARM REV CODE 250: Performed by: STUDENT IN AN ORGANIZED HEALTH CARE EDUCATION/TRAINING PROGRAM

## 2017-04-28 PROCEDURE — 27000221 HC OXYGEN, UP TO 24 HOURS

## 2017-04-28 PROCEDURE — 87015 SPECIMEN INFECT AGNT CONCNTJ: CPT

## 2017-04-28 PROCEDURE — 20000000 HC ICU ROOM

## 2017-04-28 PROCEDURE — 83735 ASSAY OF MAGNESIUM: CPT

## 2017-04-28 PROCEDURE — 88112 CYTOPATH CELL ENHANCE TECH: CPT | Mod: 26,,, | Performed by: PATHOLOGY

## 2017-04-28 PROCEDURE — 37799 UNLISTED PX VASCULAR SURGERY: CPT

## 2017-04-28 PROCEDURE — 82803 BLOOD GASES ANY COMBINATION: CPT

## 2017-04-28 PROCEDURE — 63600175 PHARM REV CODE 636 W HCPCS: Performed by: ANESTHESIOLOGY

## 2017-04-28 PROCEDURE — 80053 COMPREHEN METABOLIC PANEL: CPT

## 2017-04-28 PROCEDURE — 87116 MYCOBACTERIA CULTURE: CPT

## 2017-04-28 PROCEDURE — 87075 CULTR BACTERIA EXCEPT BLOOD: CPT

## 2017-04-28 PROCEDURE — 83605 ASSAY OF LACTIC ACID: CPT

## 2017-04-28 PROCEDURE — 85025 COMPLETE CBC W/AUTO DIFF WBC: CPT

## 2017-04-28 PROCEDURE — 83880 ASSAY OF NATRIURETIC PEPTIDE: CPT

## 2017-04-28 PROCEDURE — 99223 1ST HOSP IP/OBS HIGH 75: CPT | Mod: ,,, | Performed by: THORACIC SURGERY (CARDIOTHORACIC VASCULAR SURGERY)

## 2017-04-28 PROCEDURE — 94003 VENT MGMT INPAT SUBQ DAY: CPT

## 2017-04-28 PROCEDURE — 88305 TISSUE EXAM BY PATHOLOGIST: CPT | Performed by: PATHOLOGY

## 2017-04-28 RX ORDER — CHLORHEXIDINE GLUCONATE ORAL RINSE 1.2 MG/ML
15 SOLUTION DENTAL 2 TIMES DAILY
Status: DISCONTINUED | OUTPATIENT
Start: 2017-04-28 | End: 2017-05-05 | Stop reason: HOSPADM

## 2017-04-28 RX ORDER — ONDANSETRON HYDROCHLORIDE 4 MG/5ML
4 SOLUTION ORAL EVERY 6 HOURS PRN
Status: DISCONTINUED | OUTPATIENT
Start: 2017-04-28 | End: 2017-04-28

## 2017-04-28 RX ORDER — INSULIN ASPART 100 [IU]/ML
0-5 INJECTION, SOLUTION INTRAVENOUS; SUBCUTANEOUS EVERY 6 HOURS PRN
Status: DISCONTINUED | OUTPATIENT
Start: 2017-04-28 | End: 2017-05-05 | Stop reason: HOSPADM

## 2017-04-28 RX ORDER — HYDROMORPHONE HYDROCHLORIDE 1 MG/ML
0.5 INJECTION, SOLUTION INTRAMUSCULAR; INTRAVENOUS; SUBCUTANEOUS ONCE
Status: COMPLETED | OUTPATIENT
Start: 2017-04-28 | End: 2017-04-28

## 2017-04-28 RX ORDER — ONDANSETRON 2 MG/ML
4 INJECTION INTRAMUSCULAR; INTRAVENOUS EVERY 6 HOURS PRN
Status: DISCONTINUED | OUTPATIENT
Start: 2017-04-28 | End: 2017-05-05 | Stop reason: HOSPADM

## 2017-04-28 RX ORDER — HEPARIN SODIUM 5000 [USP'U]/ML
5000 INJECTION, SOLUTION INTRAVENOUS; SUBCUTANEOUS EVERY 8 HOURS
Status: DISCONTINUED | OUTPATIENT
Start: 2017-04-28 | End: 2017-05-05 | Stop reason: HOSPADM

## 2017-04-28 RX ORDER — ONDANSETRON 2 MG/ML
INJECTION INTRAMUSCULAR; INTRAVENOUS
Status: COMPLETED
Start: 2017-04-28 | End: 2017-04-28

## 2017-04-28 RX ORDER — FAMOTIDINE 10 MG/ML
20 INJECTION INTRAVENOUS DAILY
Status: DISCONTINUED | OUTPATIENT
Start: 2017-04-28 | End: 2017-04-30

## 2017-04-28 RX ORDER — GLUCAGON 1 MG
1 KIT INJECTION
Status: DISCONTINUED | OUTPATIENT
Start: 2017-04-28 | End: 2017-05-05 | Stop reason: HOSPADM

## 2017-04-28 RX ORDER — ONDANSETRON 2 MG/ML
4 INJECTION INTRAMUSCULAR; INTRAVENOUS ONCE
Status: COMPLETED | OUTPATIENT
Start: 2017-04-28 | End: 2017-04-28

## 2017-04-28 RX ADMIN — HYDROMORPHONE HYDROCHLORIDE 0.5 MG: 1 INJECTION, SOLUTION INTRAMUSCULAR; INTRAVENOUS; SUBCUTANEOUS at 08:04

## 2017-04-28 RX ADMIN — Medication 3 ML: at 06:04

## 2017-04-28 RX ADMIN — HEPARIN SODIUM 5000 UNITS: 5000 INJECTION, SOLUTION INTRAVENOUS; SUBCUTANEOUS at 09:04

## 2017-04-28 RX ADMIN — Medication 3 ML: at 02:04

## 2017-04-28 RX ADMIN — HEPARIN SODIUM 5000 UNITS: 5000 INJECTION, SOLUTION INTRAVENOUS; SUBCUTANEOUS at 03:04

## 2017-04-28 RX ADMIN — ONDANSETRON 4 MG: 2 INJECTION INTRAMUSCULAR; INTRAVENOUS at 09:04

## 2017-04-28 RX ADMIN — ONDANSETRON 4 MG: 2 INJECTION INTRAMUSCULAR; INTRAVENOUS at 07:04

## 2017-04-28 RX ADMIN — FAMOTIDINE 20 MG: 10 INJECTION, SOLUTION INTRAVENOUS at 09:04

## 2017-04-28 RX ADMIN — CHLORHEXIDINE GLUCONATE 15 ML: 1.2 RINSE ORAL at 09:04

## 2017-04-28 RX ADMIN — Medication 3 ML: at 10:04

## 2017-04-28 NOTE — PROGRESS NOTES
Patient awake, alert, oriented, stating ready for discharge. Patient met all discharge criteria. Patient discharged and being wheeled out of Phase 2 by  in personal wheelchair.  promptly return with patient. Patient appeeared unresponsive. Patient immediately transferred PACU in personal wheelchair. Patient placed on stretcher and placed on monitor. Anesthesia at bedside for evaluation. PACU RNs to assume care. Report given.

## 2017-04-28 NOTE — SUBJECTIVE & OBJECTIVE
Interval HPI:   Extubated, NPO. BG stable. Insulin has been off since midnight and she has not developed hyperglycemia without insulin therapy. She has been afebrile with no c/o nausea. No hypoglycemia.     PMH, PSH, FH, SH updated and reviewed     Review of Systems   Constitutional: Negative for fatigue and fever.   Eyes: Positive for visual disturbance (reports blurry vision). Negative for discharge.   Respiratory: Negative for cough and shortness of breath.    Cardiovascular: Negative for chest pain. Leg swelling: reports chronic right foot swelling.   Gastrointestinal: Negative for abdominal distention and nausea.   Endocrine: Negative for cold intolerance and heat intolerance.   Genitourinary: Negative for decreased urine volume.   Musculoskeletal: Negative for back pain and gait problem.   Skin: Negative for rash and wound.   Neurological: Negative for tremors and numbness.   Psychiatric/Behavioral: Negative for behavioral problems, confusion and decreased concentration.       Current Medications and/or Treatments Impacting Glycemic Control  Immunotherapy:    Immunosuppressants     None        Steroids:   Hormones     None        Pressors:    Autonomic Drugs     None        Hyperglycemia/Diabetes Medications:   Antihyperglycemics     Start     Stop Route Frequency Ordered    04/27/17 2230  insulin regular (Humulin R) 100 Units in sodium chloride 0.9% 100 mL infusion      -- IV Continuous 04/27/17 2118          PHYSICAL EXAMINATION:  Vitals:    04/28/17 0858   BP:    Pulse: 68   Resp: 17   Temp:      Body mass index is 31.47 kg/(m^2).    Physical Exam   Constitutional: She is oriented to person, place, and time. She appears well-developed and well-nourished.   HENT:   Head: Normocephalic and atraumatic.   Eyes: EOM are normal.   Neck: Normal range of motion. Neck supple. No thyromegaly present.   Cardiovascular: Normal rate and regular rhythm.    No murmur heard.  Pulmonary/Chest: Effort normal and breath  sounds normal.   Abdominal: She exhibits no distension. A hernia (umbilical) is present.   Musculoskeletal:   Unable to assess ROM   Neurological: She is alert and oriented to person, place, and time.   Skin: Skin is warm and dry.   Psychiatric: She has a normal mood and affect. Her behavior is normal.

## 2017-04-28 NOTE — CONSULTS
Consult Note    Consult Requested by:  Dr. Nathalie Madera   Reason for Consult:  Right pleural effusion   SUBJECTIVE:     History of Present Illness:    Patient is a 62 y.o. female with PMHx of CHF, CAD, HTN, anemia, DM II (with retinopathy and neuropathy), PVD, DVT, CVA, and ESRD (on HD M/W/F) who is s/p Left AV graft placement yesterday with right pleural effusion. As patient was being wheeled out for discharge from hospital following procedure yesterday  Noted to be unresponsive and brought back to PACU by . Noted sudden cardiac arrest and asystole with ROSC after 1 round of ACLS. Intubated and started on Cardene after noted to be hypertensive with SBP of 300. Cardene has since been weaned off and patient is extubated. CT head and chest following event noted large right pleural effusion with compressive atelectasis and ground glass nodule. Multiple CXR dating back to April 2016 noting right sided pleural effusion. Of note, patient reports hx of right sided pleural effusion with need for thoracentesis in July '16.           She is currently extubated in the SICU. Saturations 98% on 4LNC. Awake and alert, answering questions appropriately. Afebrile. BMP 3083. INR 1.1. HDS. Thoracic Surgery was consulted for management of effusion.     Review of patient's allergies indicates:  No Known Allergies    Past Medical History:   Diagnosis Date    Anemia of chronic renal failure 9/5/2013    Anticoagulant long-term use 11/23/2015    CHF (congestive heart failure)     Coronary artery disease     Diabetes mellitus     Diabetic neuropathy 9/5/2013     (diabetic retinopathy) 9/5/2013    End stage renal disease on dialysis     Hypertension     Hypertension, renal 9/5/2013    Kidney transplant candidate 9/5/2013    Secondary hyperparathyroidism of renal origin 9/5/2013    Stroke 7/31/2015    Type 2 diabetes mellitus since 1995 9/5/2013     Past Surgical History:   Procedure Laterality Date    AV FISTULA  PLACEMENT      CATARACT SURGERY       SECTION, LOW TRANSVERSE      x 3    COLONOSCOPY N/A 2016    Procedure: COLONOSCOPY;  Surgeon: Roverto Patel MD;  Location: [facility] ([address] [place] [address]);  Service: Endoscopy;  Laterality: N/A;    EYE SURGERY      HYSTERECTOMY      Endometriosis    TONSILLECTOMY      VASCULAR SURGERY       Family History   Problem Relation Age of Onset    Heart disease Mother      MI    Hypertension Mother     Cancer Mother 70     breast    Heart attack Neg Hx      Social History   Substance Use Topics    Smoking status: Former Smoker     Packs/day: 0.25     Years: 0.50     Types: Cigarettes     Quit date: 1975    Smokeless tobacco: Never Used      Comment: quit smoking cigarettes 40+ years ago    Alcohol use No       Review of Systems:  Constitutional: no fever or chills  Respiratory: positive for shortness of breath  Cardiovascular: no chest pain or palpitations  Gastrointestinal: no nausea or vomiting, no abdominal pain or change in bowel habits  Integument/Breast: no rash or pruritis  Musculoskeletal: negative   Neurological: no seizures or tremors  Behavioral/Psych: negative for anxiety    OBJECTIVE:     Vital Signs:  Temp:  [98.2 °F (36.8 °C)-98.7 °F (37.1 °C)]   Pulse:  [64-75]   Resp:  [8-25]   BP: (106-160)/(55-80)   SpO2:  [96 %-100 %]   Arterial Line BP: (107-146)/(50-67)     Physical Exam:  General: well developed, well nourished, no distress  HENT: Head:normocephalic, atraumatic.   Lungs:  normal respiratory effort and diminished breath sounds RLL, RML and RUL, 98% on 4LNC  Cardiovascular: Heart: regular rate and rhythm. Chest Wall: no tenderness.   Extremities: no cyanosis or edema, or clubbing. Pulses: 2+ and symmetric.  Abdomen/Rectal: Abdomen: soft, non-tender non-distented; bowel sounds normal; no masses,  no organomegaly.   Skin: Numerous collateral vessels apparent. No rashes or lesions  Neurologic: Normal strength and tone. No focal numbness or  weakness  Psych/Behavioral:  Normal. and Alert and oriented, appropriate affect.    Laboratory:  CBC:   Recent Labs  Lab 04/28/17  0310   WBC 10.46   RBC 3.63*   HGB 11.6*   HCT 33.5*      MCV 92   MCH 32.0*   MCHC 34.6     CMP:   Recent Labs  Lab 04/28/17 0310      CALCIUM 8.5*   ALBUMIN 3.2*   PROT 7.4      K 4.8   CO2 21*      BUN 29*   CREATININE 5.5*   ALKPHOS 61   ALT 10   AST 18   BILITOT 0.5     Coagulation:   Recent Labs  Lab 04/27/17  1929   INR 1.1       Diagnostic Results:    CT chest 4/27/17:  1. No evidence of pulmonary thromboembolism.  2.  Large loculated right pleural effusion with partial atelectasis of the right upper and middle lobes and near complete atelectasis of the right lower lobe.  3.  The lungs demonstrate patchy ground glass attenuation and interlobular septal thickening, nonspecific and can be seen with pulmonary edema.  4.  A 1.2 cm ground glass nodule is identified in the left upper lobe. Per Fleischner Society 2017 guidelines; CT 6-12 months, if the nodule is present, followup every 2 years until 5 year followup is complete.  5.  Small pericardial effusion.    ASSESSMENT/PLAN:     Patient is a 62 y.o. female with PMHx of CHF, CAD, HTN, anemia, DM II (with retinopathy and neuropathy), PVD, DVT, CVA, and ESRD (on HD M/W/F) who is s/p Left AV graft placement yesterday with right pleural effusion on Chest CT following cardiac arrest yesterday.    Plan:     Will aim for conservative management of effusion with placement of bedside chest tube this afternoon.  Will obtain consent prior to procedure.   Continue care per primary.    Thank you for this consult. Will continue to follow for tube management.       Karen Goetz PA-C  Thoracic Surgery  [phone]

## 2017-04-28 NOTE — CONSULTS
"[facility]  Endocrinology  Diabetes Consult Note    Consult Requested by: Nathalie Madera MD   Reason for admit: 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    HISTORY OF PRESENT ILLNESS:  Reason for Consult: Management of T2DM, Hyperglycemia     Surgical Procedure and Date: S/P left femoral AV graft 4/27/17    Diabetes diagnosis year: 1995    Home Diabetes Medications:  Levemir 3-4 units daily, Novolog correction scale only    How often checking glucose at home? Once daily  BG readings on regimen: 130s-150s  Hypoglycemia on the regimen?  Yes  Missed doses on regimen?  "time to time" readings of 60s-80s with symptoms    Diabetes Complications include:     Hyperglycemia and Diabetic chronic kidney disease         Complicating diabetes co morbidities:   ESRD, Retinopathy, and Neuropathy    HPI:   Patient is a 62 y.o. female admitted for higher level of care s/p L AV graft procedure on 4/27/17. Following procedure and subsequent discharge, patient was being wheeled out to her car and experienced cardiac arrest and asystole. Patient was immediately brought back into PACU by . ACLS was initiated. She was sent to SICU for further management. She reports FH of DM in mother, maternal aunts, and children. Endocrine consulted for BG management.        Interval HPI:   Extubated, NPO. BG stable. Insulin has been off since midnight and she has not developed hyperglycemia without insulin therapy. She has been afebrile with no c/o nausea. No hypoglycemia.     PMH, PSH, FH, SH updated and reviewed     Review of Systems   Constitutional: Negative for fatigue and fever.   Eyes: Positive for visual disturbance (reports blurry vision). Negative for discharge.   Respiratory: Negative for cough and shortness of breath.    Cardiovascular: Negative for chest pain. Leg swelling: reports chronic right foot swelling.   Gastrointestinal: Negative for abdominal distention and nausea.   Endocrine: Negative for " cold intolerance and heat intolerance.   Genitourinary: Negative for decreased urine volume.   Musculoskeletal: Negative for back pain and gait problem.   Skin: Negative for rash and wound.   Neurological: Negative for tremors and numbness.   Psychiatric/Behavioral: Negative for behavioral problems, confusion and decreased concentration.       Current Medications and/or Treatments Impacting Glycemic Control  Immunotherapy:    Immunosuppressants     None        Steroids:   Hormones     None        Pressors:    Autonomic Drugs     None        Hyperglycemia/Diabetes Medications:   Antihyperglycemics     Start     Stop Route Frequency Ordered    04/27/17 2230  insulin regular (Humulin R) 100 Units in sodium chloride 0.9% 100 mL infusion      -- IV Continuous 04/27/17 2118          PHYSICAL EXAMINATION:  Vitals:    04/28/17 0858   BP:    Pulse: 68   Resp: 17   Temp:      Body mass index is 31.47 kg/(m^2).    Physical Exam   Constitutional: She is oriented to person, place, and time. She appears well-developed and well-nourished.   HENT:   Head: Normocephalic and atraumatic.   Eyes: EOM are normal.   Neck: Normal range of motion. Neck supple. No thyromegaly present.   Cardiovascular: Normal rate and regular rhythm.    No murmur heard.  Pulmonary/Chest: Effort normal and breath sounds normal.   Abdominal: She exhibits no distension. A hernia (umbilical) is present.   Musculoskeletal:   Unable to assess ROM   Neurological: She is alert and oriented to person, place, and time.   Skin: Skin is warm and dry.   Psychiatric: She has a normal mood and affect. Her behavior is normal.         Labs Reviewed and Include     Recent Labs  Lab 04/28/17  0310      CALCIUM 8.5*   ALBUMIN 3.2*   PROT 7.4      K 4.8   CO2 21*      BUN 29*   CREATININE 5.5*   ALKPHOS 61   ALT 10   AST 18   BILITOT 0.5     Lab Results   Component Value Date    WBC 10.46 04/28/2017    HGB 11.6 (L) 04/28/2017    HCT 33.5 (L) 04/28/2017    MCV  92 04/28/2017     04/28/2017     No results for input(s): TSH, FREET4 in the last 168 hours.  Lab Results   Component Value Date    HGBA1C 6.3 (H) 04/28/2017       Nutritional status:   Body mass index is 31.47 kg/(m^2).  Lab Results   Component Value Date    ALBUMIN 3.2 (L) 04/28/2017    ALBUMIN 3.4 (L) 04/27/2017    ALBUMIN 3.7 07/18/2016     Lab Results   Component Value Date    PREALBUMIN 13 (L) 11/27/2009       Estimated Creatinine Clearance: 9.6 mL/min (based on Cr of 5.5).    Accu-Checks  Recent Labs      04/27/17   1212  04/27/17   1534  04/27/17   2134  04/27/17   2202   POCTGLUCOSE  102  117*  145*  150*        ASSESSMENT and PLAN    * 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  BG goal 140-180. Change to low dose correction scale, monitor Q6h while NPO. No current insulin needs. Watch one more day, then sign off.     Discharge Planning  1. DM managed by Nephrologist per pt report.   2. Recommend d/c Levemir upon discharge--correction scale Novolog only.   3. Has all insulin and supplies.       DR (diabetic retinopathy)  Keep BG controlled. See above.       Diabetic neuropathy  Keep BG controlled. See above.       Cardiac arrest  Per Vascular, events r/t cardiac arrest are being reviewed. Monitoring in ICU now.       YVETTE Magana,ANP-C  Endocrinology  [facility]

## 2017-04-28 NOTE — CONSULTS
Consult Note  Nephrology    Consult Requested By: Nathalie Madera MD  Reason for Consult: ESRD (MWF)    SUBJECTIVE:     History of Present Illness:  Patient is a 62 y.o. female with PMHx of HTN, DM2, CHF, CAD, ESRD on HD q MWF who presented to hospital for elective outpatient R thigh AVG placement for vascular access.  Procedure appeared to be uneventful and patient was discharged home accompanied by her .  While transferring to car,  noted patient appeared unresponsive and she was brought back to PACU and noted to have cardiac arrest and asystole.  ACLS was initiated and ROS obtained after 1.5 rounds of CPR and 1 dose of epi.  She was intubated and transferred to ICU for closer monitoring.  This morning she is HDS, normotensive, and remains intubated with FiO2 of 50%.  Electrolytes are stable and she is neurologically intact and able to answer questions and follow commands.  Chest x-ray with significant R pleural fluid.  Last HD treatment was on Wednesday.    Past Medical History:   Diagnosis Date    Anemia of chronic renal failure 2013    Anticoagulant long-term use 2015    CHF (congestive heart failure)     Coronary artery disease     Diabetes mellitus     Diabetic neuropathy 2013    DR (diabetic retinopathy) 2013    End stage renal disease on dialysis     Hypertension     Hypertension, renal 2013    Kidney transplant candidate 2013    Secondary hyperparathyroidism of renal origin 2013    Stroke 2015    Type 2 diabetes mellitus since 2013     Past Surgical History:   Procedure Laterality Date    AV FISTULA PLACEMENT      CATARACT SURGERY       SECTION, LOW TRANSVERSE      x 3    COLONOSCOPY N/A 2016    Procedure: COLONOSCOPY;  Surgeon: Roverto Patel MD;  Location: [facility] ([address] [place] [address]);  Service: Endoscopy;  Laterality: N/A;    EYE SURGERY      HYSTERECTOMY      Endometriosis    TONSILLECTOMY      VASCULAR SURGERY        Family History   Problem Relation Age of Onset    Heart disease Mother      MI    Hypertension Mother     Cancer Mother 70     breast    Heart attack Neg Hx      Social History   Substance Use Topics    Smoking status: Former Smoker     Packs/day: 0.25     Years: 0.50     Types: Cigarettes     Quit date: 9/5/1975    Smokeless tobacco: Never Used      Comment: quit smoking cigarettes 40+ years ago    Alcohol use No       Review of patient's allergies indicates:  No Known Allergies     Review of Systems:  KRISTIN, intubated.    OBJECTIVE:     Vital Signs (Most Recent)  Temp: 98.2 °F (36.8 °C) (04/28/17 0702)  Pulse: 75 (04/28/17 0958)  Resp: 18 (04/28/17 0958)  BP: 132/65 (04/28/17 0900)  SpO2: 98 % (04/28/17 0958)    Vital Signs Range (Last 24H):  Temp:  [97.9 °F (36.6 °C)-98.7 °F (37.1 °C)]   Pulse:  []   Resp:  [8-35]   BP: (106-189)/()   SpO2:  [92 %-100 %]   Arterial Line BP: (102-146)/(50-68)     Physical Exam:  General:AAF in NAD.  Intubated, able to follow commands.  Respiratory: Clear bilaterally.  Diminished R base.  ETT in place.  Cardiovacular: Regular rate and rhythm, S1, S2 normal, no murmurs, rubs or gallops  Gastrointestinal: Soft, non-tender, bowel sounds normal.  Extremities: No clubbing or cyanosis of bilateral upper extremities; no lower extremity edema bilaterally, radial pulses 2+ bilaterally, symmetric  Skin: warm and dry; no rash on exposed skin      Laboratory:  CBC:   Recent Labs  Lab 04/28/17 0310   WBC 10.46   RBC 3.63*   HGB 11.6*   HCT 33.5*      MCV 92   MCH 32.0*   MCHC 34.6     BMP:   Recent Labs  Lab 04/28/17 0310         CO2 21*   BUN 29*   CREATININE 5.5*   CALCIUM 8.5*   MG 2.1     ABGs:   Recent Labs  Lab 04/28/17 0932   PH 7.333*   PCO2 47.6*   HCO3 25.3   POCSATURATED 96   BE -1           ASSESSMENT/PLAN:   62 y.o. female with PMHx of HTN, DM2, CHF, CAD, ESRD on HD q MWF who presented to hospital for elective outpatient R thigh AVG  placement for vascular access.  After discharge on way to car, patient became unresponsive and was brought back to PACU by , found to be in asystole and ACLS was initiated.  ROSC obtained at 1.5 rounds and 1 dose of epi.  She is not intubated in ICU and neurologically intact.      Active Hospital Problems    Diagnosis  POA    *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 [E11.22, I12.0, Z99.2, N18.6]  Not Applicable    Cardiac arrest [I46.9]  Yes    DR (diabetic retinopathy) [E11.319]  Yes     Chronic    Diabetic neuropathy [E11.40]  Yes     Chronic    ESRD 2/2 HTN on HD since 11/12/09 [N18.6]  Yes     Chronic      Resolved Hospital Problems    Diagnosis Date Resolved POA   No resolved problems to display.         Plan:   ESRD (MWF)  -Reports last HD on Wednesday.  -ESRD thought to be 2/2 HTN/DM2  -AVG to L femoral + bruit  -Dialysis catheter to R femoral.  -chest x-ray with pleural effusion R>L.  Remains intubated.  HDS.  -will provide 3 hour HD treatment today for metabolic clearance/UF  -UF 2-3L as tolerated.          YVETTE Ruffin, NATOP-BC  Nephrology  Pager:  [phone]

## 2017-04-28 NOTE — PROGRESS NOTES
Pt completed two hour hemodialysis treatment. Net UF 2 L. Pt tolerated well. Pt with high arterial and venous pressures on machine. Blood returned with normal saline to right femoral cath, capped. Report given to primary RN

## 2017-04-28 NOTE — PROGRESS NOTES
Maintenance hemodialysis treatment initiated via right femoral catheter. Both limbs sluggish with good blood return. Pt tolerated well

## 2017-04-28 NOTE — CODE DOCUMENTATION
Pt VSS. PT intubated, ventilatior on A/C, FiO2 100%.  Pt awake and with spontaneous movements. Not following commands at this time.

## 2017-04-28 NOTE — PROGRESS NOTES
Pt transported to CT scan, bagged with ambu bag during scan. Pt transported to ICU room 6078 and placed on [id number] vent on documented settings. Report given to Caity TELLES RRT.

## 2017-04-28 NOTE — PROGRESS NOTES
Events from yesterday evening reviewed.   Apparent cardiac arrest during discharge from hospital. ROSC after 1 round ACLS.  Remains intubated but on a breathing trial for extubation.  No pressors. HD normal.  Awake and alert. Motor function appears intact to all 4 ext.    Monitor in ICU today.    Nathalie Madera MD FACS [facility]  Vascular & Endovascular Surgery

## 2017-04-28 NOTE — PROGRESS NOTES
Progress Note  Surgery    Admit Date: 4/27/2017  Attending: Santy  S/P: Procedure(s) (LRB):  [id number] Left Thigh (Left)    Post-operative Day: 1 Day Post-Op    Hospital Day: 2    SUBJECTIVE:     Cardiac arrest and asystole last night on the way to her car.  ROSC after 1.5 rounds of ACLS, and epi push x1.    No evidence of PE on CTA, but large R pleural effusion noted (loculated).  No bleeding seen on CT head.  EKG without ST changes, and troponin wnl.  ABG looks good this morning.    OBJECTIVE:     Vital Signs (Most Recent)  Temp: 98.2 °F (36.8 °C) (04/28/17 0702)  Pulse: 67 (04/28/17 0702)  Resp: 14 (04/28/17 0702)  BP: 136/69 (04/28/17 0702)  SpO2: 100 % (04/28/17 0702)    Vital Signs Range (Last 24H):  Temp:  [97.8 °F (36.6 °C)-98.7 °F (37.1 °C)]   Pulse:  []   Resp:  [8-35]   BP: (106-189)/()   SpO2:  [92 %-100 %]   Arterial Line BP: (102-146)/(50-68)     I & O (Last 24H):  Intake/Output Summary (Last 24 hours) at 04/28/17 0800  Last data filed at 04/28/17 0500   Gross per 24 hour   Intake              669 ml   Output                0 ml   Net              669 ml       Physical Exam:  Gen: Intubated, sedated  HEENT: NCAT  CV: RRR, no m/r/g   Pulm: Intubated.  Decreased breath sounds R side  Abd: Soft, nttp. No rebound, guarding.   Extremities: no cyanosis or edema, or clubbing  Skin: Skin color, texture, turgor normal. No rashes or lesions    Wound/Incision:  Bandage in place to L thigh, c/d/i    Laboratory:  CBC:   Recent Labs  Lab 04/28/17  0310   WBC 10.46   RBC 3.63*   HGB 11.6*   HCT 33.5*      MCV 92   MCH 32.0*   MCHC 34.6     CMP:   Recent Labs  Lab 04/28/17  0310      CALCIUM 8.5*   ALBUMIN 3.2*   PROT 7.4      K 4.8   CO2 21*      BUN 29*   CREATININE 5.5*   ALKPHOS 61   ALT 10   AST 18   BILITOT 0.5     Coagulation:   Recent Labs  Lab 04/27/17  1929   INR 1.1     Labs within the past 24 hours have been reviewed.    ASSESSMENT/PLAN:      Assessment: 62 year old woman with ESRD, s/p L AV graft insertion POD 1, with cardiac arrest and asystole last night, ROSC after 1.5 rounds of ACLS.    Plan:    Neuro: Sedated, following commands    Resp:   Large R pleural effusion: Will seek Thoracic Surgery input  50%, 5 PEEP this morning.  Wean to extubate    CV: HDS without pressor  No ST elevations, or troponinemia    Renal: Needs HD today.  Has RLE permacath in place    Abd/FEN/GI: NPO.  HLIV in ESRD patient    Heme/ID: Hb 11.  Afebrile, no leukocytosis    Dispo: Wean to extubate today      Mateo Munguia MD  General Surgery, PGY-3  [phone]

## 2017-04-28 NOTE — CARE UPDATE
Pt alert and calm all night. Followed commands and nodded appropriately to questions. Pt remained in high flanagan's postioning all night, per pt request and comfort. Unable to wean from vent due to frequent apneic episodes during trial and borderline ABGs. Current vent settings: SIMV with 50% FiO2, 5 of PEEP and 10 pressure support. Weaned off cardene and insulin drip currently off for the past few hours. Pt's pain managed well by fentanyl drip @ 25. Left thigh AV fistula noted to have thrill and bruit per Neurovascular team this AM. Dr. Mabry and neurovascular made aware of pt's labs and BNP level. Nephrology consulted and plans are to dialyze pt today. Pt anuric and had no bowel movements last night. All other VSS. Will continue to monitor.

## 2017-04-28 NOTE — PLAN OF CARE
Patient is a 62 year old female admitted from home and underwent Left Femoral AV Graft placement 4/27/2017.  Patient tolerated procedure, was discharged home with no needs from [facility].  Patient's  pushed patient's personal wheelchair to car, on the way patient became unresponsive.  Patient's  wheeled patient back to [facility], patient taken to PACU, placed on stretcher and coded, intubated and Arterial blood pressure line placed.  Patient transported to CT and ICU 6078.  Patient extubated this morning.  Noted on CT to have a large R pleural effusion on CT.    Patient lives with her , who will drive her home, care for her and obtain anything she needs after discharge.  Patient is ESRD, [facility] and had a run of HD today.  Will continue to follow for needs.    PCP  Harinder Navas DO  [address] [facility] [address] [phone]      [facility] [address] [place] [address] [address]  [address] [place] [address]  Phone: [phone] Fax: [phone]    Extended Emergency Contact Information  Primary Emergency Contact: Barrera Herrera Sr.  Address: [address] [place] [address] [facility] [place] [address] [facility]  Mobile Phone: [phone]  Relation: Spouse  Secondary Emergency Contact: Barrera Herrera Jr   [place]  Mobile Phone: [phone]  Relation: Son       04/28/17 1512   Discharge Assessment   Assessment Type Discharge Planning Assessment   Confirmed/corrected address and phone number on facesheet? Yes   Assessment information obtained from? Medical Record   Expected Length of Stay (days) 4   Prior to hospitilization cognitive status: Alert/Oriented   Prior to hospitalization functional status: Assistive Equipment;Needs Assistance   Current cognitive status: Alert/Oriented   Current Functional Status: Assistive Equipment;Needs Assistance   Arrived From home or self-care   Lives With spouse   Able to Return to Prior  Arrangements yes   Is patient able to care for self after discharge? Yes   How many people do you have in your home that can help with your care after discharge? 1   Who are your caregiver(s) and their phone number(s)?    Patient's perception of discharge disposition home or selfcare;home health   Does the patient currently use HME? Yes   Equipment Currently Used at Home bedside commode;wheelchair;rollator   Do you have any problems affording any of your prescribed medications? No   Is the patient taking medications as prescribed? yes   Do you have any financial concerns preventing you from receiving the healthcare you need? No   Does the patient have transportation to healthcare appointments? Yes   Transportation Available family or friend will provide   On Dialysis? Yes   If yes, what is the name of the dialysis unit? [facility]   Does the patient receive outpatient dialysis? Yes   Does the patient receive services at the Coumadin Clinic? No   Are there any open cases? No   Discharge Plan A Home with family;Home Health   Discharge Plan B Home with family   Patient/Family In Agreement With Plan yes

## 2017-04-28 NOTE — PROGRESS NOTES
Arrived to patients bedside as pt arrived with DOSC RN. Multiple members of PACU staff at bedside. Pt transferred to stretcher from wheelchair and placed on bedside tele.  Code cart moved to pt bedside. Dr Myers and CRNA at bedside. Pt determined to be pulseless and Code started. During code, pt was intubated and a-line placed.  Carmelo Allan and Clotilde arrived to bedside and labs ordered as well as EKG and CXR.  Dr Allan notified regarding pts lack of withdrawing to pain in all extremities and spontaneous movements to left leg only. Head and chest CT ordered. Report called to SICU JEFFERY Dunham and pt transported to CT and then ICU without incident.

## 2017-04-28 NOTE — PROGRESS NOTES
Pt intubated with 8.0 ETT secured at 22 at the lips. Pt placed on espirt vent on documented settings. ABG and EKG obtained. Will continue to monitor.

## 2017-04-28 NOTE — PROGRESS NOTES
Dept of Vascular surgery      Called to bedside. Patient being wheel out to car after discharge and experienced cardiac arrest and asystole. Patient brought back into [facility] by . ACLS initiated. Patient received 1.5 rounds of CPR with 1 dose of epinephrine with ROSC. Patient intubated by anesthesia in PACU. Arterial line placed. Patient then became hypertensive with SBP of 300 for which cardene was initiated. BP stabilized and cardene weaned. Patient noted to not be moving her right side at this time   EKG obtained which does not demonstrated any ST elevation concerning for acute STEMI.     Plan:  Admit to ICU under vascular surgery   Obtain CXR to check placement of ETT and investigate for any acute changes   Obtain ABG and wean ventilator as tolerated  Obtain stat labs: CBC, BMP, LFT, Coags  Obtain stat Cardiac enzymes   Will obtain CTA chest to evaluate for PE   Obtain CT head to rule out stroke   Will consult nephrology to dialysis   Continue aggressive hemodynamic monitoring with goal of normotension (SBP btwn 100 and 150)       Discussed with vascular surgery staff who agrees with above plan of care   Discussed patient's clinical status and plan of care with patient's      An Allan DO   Vascular Surgery Fellow  04/27/2017

## 2017-04-28 NOTE — PHYSICIAN QUERY
"PT Name: Aleta Herrera  MR #: [id number]    Physician Query Form - Heart  Condition Clarification     CDS/: Aleah Bryant RN                Contact information:[email or web address]  This form is a permanent document in the medical record.     Query Date: April 28, 2017    By submitting this query, we are merely seeking further clarification of documentation. Please utilize your independent clinical judgment when addressing the question(s) below.    The medical record contains the following   Indicators     Supporting Clinical Findings Location in Medical Record   x BNP BNP 3083 Lab 4/28   x EF EF 60-65% Anes Note 4/27    Radiology findings     x Echo Results 2D Echo (6/21/16:  CONCLUSIONS     1 - Normal left ventricular systolic function (EF 60-65%).     2 - Concentric hypertrophy.     3 - Biatrial enlargement.     4 - Left ventricular diastolic dysfunction.     5 - Normal right ventricular systolic function .     6 - Pulmonary hypertension. The estimated PA systolic pressure is 57 mmHg.     7 - Increased central venous pressure.  Anes note 4/27    "Ascites" documented      "SOB" or "HOWARD" documented      "Hypoxia" documented     x Heart Failure documented HFpEF Anes note 2/27    "Edema" documented      Diuretics/Meds      Treatment:     x Other:  Following procedure and subsequent discharge, patient was being wheeled out to her car and experienced cardiac arrest and asystole. Patient was immediately brought back into PACU by . ACLS was then initiated. The pt  received 1.5 rounds of CPR with one dose of 1mg epinephrine with ROSC. Patient was then intubated by anesthesia service and an arterial line was placed CC H&P 4/27       Provider, please specify diagnosis or diagnoses associated with above clinical findings.                               [  ] Acute Diastolic Heart Failure ( EF > 40)*  [  ] Acute on Chronic Diastolic Heart Failure( EF > 40)*  [  ] Chronic Diastolic Heart Failure (EF > " 40)*  [  ] Other Type of Heart Failure (please specify type): _________________________  [  ] Heart Failure Ruled Out  [  ] Other (please specify): ___________________________________  [XXX  ] Clinically Undetermined            *American Heart Association                                                                                                          Please document in your progress notes daily for the duration of treatment until resolved and include in your discharge summary.

## 2017-04-28 NOTE — H&P
History & Physical  Surgical Intensive Care    SUBJECTIVE:     Chief Complaint/Reason for Admission: Cardiac Arrest    History of Present Illness:  Patient is a 62 y.o. female with PMHx of HTN, anemia, DM II (with retinopathy and neuropathy), PVD, DVT, CVA, and ESRD (on HD M/W/F) who presents to SICU for higher level of care s/p L AV graft procedure today.   Following procedure and subsequent discharge, patient was being wheeled out to her car and experienced cardiac arrest and asystole. Patient was immediately brought back into PACU by . ACLS was then initiated. The pt  received 1.5 rounds of CPR with one dose of 1mg epinephrine with ROSC. Patient was then intubated by anesthesia service and an arterial line was placed. Patient then became hypertensive with SBP of 300 for which cardene was initiated. BP stabilized and cardene was weaned. At this time patient noted to not be moving her right side at this time. EKG obtained which does not demonstrated any ST elevation concerning for acute STEMI. Shortly following code, pt noted to have pinpoint pupils and delayed reactivity. She was sent to CT for CT Head/Chest then brought to SICU intubated, on Cardene gtt following scan.     Continuous Infusion:   fentanyl 25 mcg/hr (04/27/17 2010)    nicardipine      niCARdipine 2.5 mg/hr (04/27/17 2000)       PTA Medications   Medication Sig    amlodipine (NORVASC) 10 MG tablet TAKE ONE TABLET BY MOUTH ONCE DAILY    aspirin 81 mg Tab Take by mouth.    calcitRIOL (ROCALTROL) 0.5 MCG Cap Take 1 capsule (0.5 mcg total) by mouth once daily.    epoetin batsheva (PROCRIT) 10,000 unit/mL injection Inject 0.74 mLs (7,400 Units total) into the skin every Mon, Wed, Fri.    insulin aspart (NOVOLOG FLEXPEN) 100 unit/mL InPn as dir Insulin Pen Subcutaneous Before meals and at bedtime.  If blood sugar is 151-200 1 units SQif blood sugar is 201-250 2 units SQIf blood sugar is 251-300 3 units SQIf blood sugar is 301-350 4 units SQIf  blood sugar is > or = 351 5 units SQand call MD;  Dispense with needles    inulin (FIBER CHOICE, INULIN,) 1.5 gram Chew Take 2 tablets by mouth 2 (two) times daily. Or as directed on product packaging.    lisinopril (PRINIVIL,ZESTRIL) 20 MG tablet Take 2 tablets (40 mg total) by mouth once daily.    metoprolol tartrate (LOPRESSOR) 100 MG tablet TAKE 1 TABLET BY MOUTH TWICE DAILY    polyethylene glycol (GLYCOLAX) 17 gram/dose powder Take 17 g by mouth once daily. May take up to 3 times per day as needed for constipation.    sevelamer carbonate (RENVELA) 800 mg Tab Take 2,400 mg by mouth 3 (three) times daily with meals.    insulin detemir (LEVEMIR FLEXPEN) 100 unit/mL (3 mL) SubQ InPn pen Inject 8 Units into the skin 2 (two) times daily. (Patient taking differently: Inject 4 Units into the skin every evening. )       Review of patient's allergies indicates:  No Known Allergies    Past Medical History:   Diagnosis Date    Anemia of chronic renal failure 2013    Anticoagulant long-term use 2015    CHF (congestive heart failure)     Coronary artery disease     Diabetes mellitus     Diabetic neuropathy 2013    DR (diabetic retinopathy) 2013    End stage renal disease on dialysis     Hypertension     Hypertension, renal 2013    Kidney transplant candidate 2013    Secondary hyperparathyroidism of renal origin 2013    Stroke 2015    Type 2 diabetes mellitus since 2013     Past Surgical History:   Procedure Laterality Date    AV FISTULA PLACEMENT      CATARACT SURGERY       SECTION, LOW TRANSVERSE      x 3    COLONOSCOPY N/A 2016    Procedure: COLONOSCOPY;  Surgeon: Roverto Patel MD;  Location: [facility] ([address] [place] [address]);  Service: Endoscopy;  Laterality: N/A;    EYE SURGERY      HYSTERECTOMY      Endometriosis    TONSILLECTOMY      VASCULAR SURGERY       Family History   Problem Relation Age of Onset    Heart disease Mother      MI     Hypertension Mother     Cancer Mother 70     breast    Heart attack Neg Hx      Social History   Substance Use Topics    Smoking status: Former Smoker     Packs/day: 0.25     Years: 0.50     Types: Cigarettes     Quit date: 9/5/1975    Smokeless tobacco: Never Used      Comment: quit smoking cigarettes 40+ years ago    Alcohol use No        Review of Systems:  Review of systems not obtained due to patient factors sedated and intubated.    OBJECTIVE:     Vital Signs (Most Recent)  Temp: 98.1 °F (36.7 °C) (04/27/17 2015)  Pulse: 82 (04/27/17 2015)  Resp: 15 (04/27/17 2015)  BP: 130/60 (04/27/17 2015)  SpO2: 100 % (04/27/17 2015)  Ventilator Data (Last 24H):     Vent Mode: A/C  Oxygen Concentration (%):  [100] 100  Resp Rate Total:  [12.2 br/min-31 br/min] 23.9 br/min  Vt Set:  [450 mL] 450 mL  PEEP/CPAP:  [7 cmH20] 7 cmH20  Mean Airway Pressure:  [12 cmH20] 12 cmH20    Hemodynamic Parameters (Last 24H):       Physical Exam:  General: sedated, intubated, moderately obese  Head: normocephalic, atraumatic  Eyes:  conjunctivae/corneas clear. PERRL.  Throat: limited visualization due to endotracheal tube  Neck: supple, symmetrical, trachea midline, no JVD and thyroid not enlarged, symmetric, no tenderness/mass/nodules  Lungs:  normal respiratory effort and diminished breath sounds bilaterally  Heart: regular rate and rhythm  Abdomen: soft, non-tender non-distented; bowel sounds normal; periumbilical hernia present  Extremities: no cyanosis or edema, or clubbing  Neurologic: Sedated, intubated    Lines/Drains:       Hemodialysis Catheter right femoral (Active)   Number of days:            Hemodialysis Catheter right femoral (Active)   Number of days:            Percutaneous Central Line Insertion/Assessment - triple lumen  03/09/17 1110 right femoral (Active)   Number of days:49            Peripheral IV - Single Lumen 04/27/17 2010 Right Antecubital (Active)   Number of days:0            Arterial Line 04/27/17 1928  Left Radial (Active)   Number of days:0       Laboratory  CBC:   Recent Labs  Lab 04/27/17 1929   WBC 13.24*   RBC 4.18   HGB 13.3   HCT 40.2      MCV 96   MCH 31.8*   MCHC 33.1     BMP:   Recent Labs  Lab 04/27/17 1929   *      K 4.7      CO2 20*   BUN 26*   CREATININE 5.4*   CALCIUM 8.6*     CMP:   Recent Labs  Lab 04/27/17 1929   *   CALCIUM 8.6*   ALBUMIN 3.4*   PROT 8.2      K 4.7   CO2 20*      BUN 26*   CREATININE 5.4*   ALKPHOS 75   ALT 11   AST 18   BILITOT 0.5     LFTs:   Recent Labs  Lab 04/27/17 1929   ALT 11   AST 18   ALKPHOS 75   BILITOT 0.5   PROT 8.2   ALBUMIN 3.4*     Coagulation:   Recent Labs  Lab 04/27/17 1929   INR 1.1     Cardiac markers:   Recent Labs  Lab 04/27/17 1929   CPKMB 2.2   TROPONINI 0.018     ABGs:   Recent Labs  Lab 04/27/17 1942   PH 7.311*   PCO2 48.7*   PO2 222*   HCO3 24.6   POCSATURATED 100   BE -2     Microbiology Results (last 7 days)     ** No results found for the last 168 hours. **        Specimen     None        No results for input(s): COLORU, CLARITYU, SPECGRAV, PHUR, PROTEINUA, GLUCOSEU, BILIRUBINCON, BLOODU, WBCU, RBCU, BACTERIA, MUCUS, NITRITE, LEUKOCYTESUR, UROBILINOGEN, HYALINECASTS in the last 168 hours.    Chest X-Ray: Endotracheal tube tip lies approximately 2 cm above the panda.  Defibrillator pads project over the left lower lung zone, and right mid to lower lung zones obscuring view. The cardiomediastinal silhouette is enlarged, similar to the previous exam noting calcification of the aortic arch.  There is a right pleural effusion, suboptimally evaluated, appears worsened.  The trachea is midline.  The lungs are symmetrically expanded bilaterally with patchy increased interstitial and parenchymal attenuation bilaterally, suggesting edema noting there may be developing consolidation versus atelectasis of the right lower lung zone.   There is no pneumothorax.  The osseous structures are remarkable for  degenerative changes of the spine and shoulders.  There is an ascending catheter, terminating at the approximate level of T7.    CTA:  1. No evidence of pulmonary thromboembolism.  2.  Large loculated right pleural effusion with partial atelectasis of the right upper and middle lobes and near complete atelectasis of the right lower lobe.  3.  The lungs demonstrate patchy ground glass attenuation and interlobular septal thickening, nonspecific and can be seen with pulmonary edema.  4.  A 1.2 cm ground glass nodule is identified in the left upper lobe. Per Fleischner Society 2017 guidelines; CT 6-12 months, if the nodule is present, followup every 2 years until 5 year followup is complete.  5.  Small pericardial effusion.    Diagnostic Results:  EKG:  Normal sinus rhythm  Right superior axis deviation  Pulmonary disease pattern  Right ventricular hypertrophy  Septal infarct ,age undetermined  Abnormal ECG    ASSESSMENT/PLAN:   Patient is a 62 y.o. female with PMHx of HTN, anemia, DM II (with retinopathy and neuropathy), PVD, DVT, CVA, and ESRD (on HD M/W/F) who presents to SICU for higher level of care s/p L AV graft procedure today where she experienced a code while being wheeled to her vehicle following discharge.      Plan:  Neuro:   -fentanyl gtt, wean as tolerated  -Neuro checks per protocol  -CT Head ordered in PACU per primary 2/2 pt with pinpoint pupils and slow reactivity. Results show no evidence of intracranial abnormality.     Pulmonary:   intubated, on vent  Vent Mode: SIMV  Oxygen Concentration (%):  [] 50  Resp Rate Total:  [12 br/min-39 br/min] 19 br/min  Vt Set:  [450 mL] 450 mL  PEEP/CPAP:  [5 cmH20-7 cmH20] 5 cmH20  Pressure Support:  [0 cmH20-10 cmH20] 10 cmH20  Mean Airway Pressure:  [8.4 jyS69-35 cmH20] 9.4 cmH20  -wean vent as tolerated  -CXR as above. Repeat in AM.  -CTA Chest ordered. Results show no evidence of PE, however pleural effusions, grand glass attenuation, and 1.2cm DAYLIN ground  glass nodule.    -ABG q6    Cardiac:  -s/p cardiac arrest with 1.5 rounds CPR, 1mg Epi and ROSC  -EKG without signs of ST elevations  -cardiac enzymes stable  -Cardene gtt. Wean as tolerated.    Renal:   -ESRD on HD M/W/F  -s/p AV graft today  -Nephrology consulted. Appreciate recs.    Infectious Disease:   -WBC at 13.24  -lactate 3.9. Trend q12.  -monitor CBC qday    Hematology/Oncology:  -H/H stable at 13.3/40.2  -monitor CBC and transfuse if indicated    Endocrine:  -DM II with retinopathy and neuropathy  -insulin gtt  -monitor POCT glucose  -Endocrinology consult. Appreciate recs.     Fluids/Electrolytes/Nutrition/GI:   -NPO  -NS at 50cc/hr  -replace lytes PRN    Dispo: Cont ICU care. Stepdown per primary team.       Carson Mabry MD  [facility]

## 2017-04-28 NOTE — NURSING
Pt transported to unit  by PACU RNs via bed. Upon arrival, pt already intubated, connected to telemetry and had Fentanyl drip going. Pt alert and calm. Follows commands and nods appropriately. No breakdown to sacrum noted during transfer to SICU bed. Patient chart, personal wheelchair and clothing brought up with pt.  in waiting room. Pulses faint, but palpable in all extremities. LLE cool; all other extremeties warm. Will continue to monitor.

## 2017-04-29 LAB
ALBUMIN SERPL BCP-MCNC: 2.9 G/DL
ALP SERPL-CCNC: 53 U/L
ALT SERPL W/O P-5'-P-CCNC: <5 U/L
ANION GAP SERPL CALC-SCNC: 13 MMOL/L
AST SERPL-CCNC: 13 U/L
BASOPHILS # BLD AUTO: 0.01 K/UL
BASOPHILS NFR BLD: 0.1 %
BILIRUB SERPL-MCNC: 0.4 MG/DL
BUN SERPL-MCNC: 25 MG/DL
CALCIUM SERPL-MCNC: 8.3 MG/DL
CHLORIDE SERPL-SCNC: 105 MMOL/L
CO2 SERPL-SCNC: 22 MMOL/L
CREAT SERPL-MCNC: 5 MG/DL
DIFFERENTIAL METHOD: ABNORMAL
EOSINOPHIL # BLD AUTO: 0.1 K/UL
EOSINOPHIL NFR BLD: 1.1 %
ERYTHROCYTE [DISTWIDTH] IN BLOOD BY AUTOMATED COUNT: 17.1 %
EST. GFR  (AFRICAN AMERICAN): 10 ML/MIN/1.73 M^2
EST. GFR  (NON AFRICAN AMERICAN): 8.7 ML/MIN/1.73 M^2
GLUCOSE SERPL-MCNC: 87 MG/DL
GRAM STN SPEC: NORMAL
GRAM STN SPEC: NORMAL
HCT VFR BLD AUTO: 35.7 %
HGB BLD-MCNC: 11.3 G/DL
LYMPHOCYTES # BLD AUTO: 0.7 K/UL
LYMPHOCYTES NFR BLD: 8.6 %
MAGNESIUM SERPL-MCNC: 2 MG/DL
MCH RBC QN AUTO: 31 PG
MCHC RBC AUTO-ENTMCNC: 31.7 %
MCV RBC AUTO: 98 FL
MONOCYTES # BLD AUTO: 0.7 K/UL
MONOCYTES NFR BLD: 8.8 %
NEUTROPHILS # BLD AUTO: 6.7 K/UL
NEUTROPHILS NFR BLD: 81.4 %
PHOSPHATE SERPL-MCNC: 5.5 MG/DL
PLATELET # BLD AUTO: 127 K/UL
PMV BLD AUTO: 11.1 FL
POCT GLUCOSE: 88 MG/DL (ref 70–110)
POCT GLUCOSE: 89 MG/DL (ref 70–110)
POCT GLUCOSE: 90 MG/DL (ref 70–110)
POCT GLUCOSE: 96 MG/DL (ref 70–110)
POCT GLUCOSE: 98 MG/DL (ref 70–110)
POTASSIUM SERPL-SCNC: 4.7 MMOL/L
PROT SERPL-MCNC: 7 G/DL
RBC # BLD AUTO: 3.64 M/UL
SODIUM SERPL-SCNC: 140 MMOL/L
WBC # BLD AUTO: 8.28 K/UL

## 2017-04-29 PROCEDURE — 85025 COMPLETE CBC W/AUTO DIFF WBC: CPT

## 2017-04-29 PROCEDURE — 25000003 PHARM REV CODE 250: Performed by: STUDENT IN AN ORGANIZED HEALTH CARE EDUCATION/TRAINING PROGRAM

## 2017-04-29 PROCEDURE — 99232 SBSQ HOSP IP/OBS MODERATE 35: CPT | Mod: ,,, | Performed by: THORACIC SURGERY (CARDIOTHORACIC VASCULAR SURGERY)

## 2017-04-29 PROCEDURE — 80053 COMPREHEN METABOLIC PANEL: CPT

## 2017-04-29 PROCEDURE — 83735 ASSAY OF MAGNESIUM: CPT

## 2017-04-29 PROCEDURE — 20000000 HC ICU ROOM

## 2017-04-29 PROCEDURE — 32551 INSERTION OF CHEST TUBE: CPT

## 2017-04-29 PROCEDURE — 84100 ASSAY OF PHOSPHORUS: CPT

## 2017-04-29 PROCEDURE — 99232 SBSQ HOSP IP/OBS MODERATE 35: CPT | Mod: ,,, | Performed by: NURSE PRACTITIONER

## 2017-04-29 PROCEDURE — 25000003 PHARM REV CODE 250: Performed by: SURGERY

## 2017-04-29 PROCEDURE — 27000221 HC OXYGEN, UP TO 24 HOURS

## 2017-04-29 RX ADMIN — Medication 3 ML: at 10:04

## 2017-04-29 RX ADMIN — HEPARIN SODIUM 5000 UNITS: 5000 INJECTION, SOLUTION INTRAVENOUS; SUBCUTANEOUS at 06:04

## 2017-04-29 RX ADMIN — CHLORHEXIDINE GLUCONATE 15 ML: 1.2 RINSE ORAL at 08:04

## 2017-04-29 RX ADMIN — Medication 3 ML: at 06:04

## 2017-04-29 RX ADMIN — FAMOTIDINE 20 MG: 10 INJECTION, SOLUTION INTRAVENOUS at 08:04

## 2017-04-29 RX ADMIN — Medication 3 ML: at 02:04

## 2017-04-29 RX ADMIN — HEPARIN SODIUM 5000 UNITS: 5000 INJECTION, SOLUTION INTRAVENOUS; SUBCUTANEOUS at 02:04

## 2017-04-29 RX ADMIN — HEPARIN SODIUM 5000 UNITS: 5000 INJECTION, SOLUTION INTRAVENOUS; SUBCUTANEOUS at 10:04

## 2017-04-29 NOTE — PROGRESS NOTES
Vascular Surgery Staff  I have seen and examined the patient and reviewed the residents note. I agree with their assessment and plan.  Resting comfortably. No acute changes.  Transfer to floor.    Nathalie Madera MD FACS [facility]  Vascular & Endovascular Surgery

## 2017-04-29 NOTE — PROGRESS NOTES
Progress Note  Nephrology    Admit Date: 4/27/2017   LOS: 2 days     SUBJECTIVE:     Follow-up For:  ESRD  Interval Hx:  Tolerated HD treatment yesterday with net UF of 2L with adequate metabolic clearance.  Chest tube inserted for pleural effusion, 1.5L output so far.  Plans for stepdown to floor today.    Scheduled Meds:   chlorhexidine  15 mL Mouth/Throat BID    famotidine (PF)  20 mg Intravenous Daily    heparin (porcine)  5,000 Units Subcutaneous Q8H    sodium chloride 0.9%  3 mL Intravenous Q8H    sodium chloride 0.9%  3 mL Intravenous Q8H     Continuous Infusions:   PRN Meds:dextrose 50%, glucagon (human recombinant), insulin aspart, ondansetron    Review of patient's allergies indicates:  No Known Allergies      OBJECTIVE:     Vital Signs (Most Recent)  Temp: 97.9 °F (36.6 °C) (04/29/17 0700)  Pulse: 80 (04/29/17 1000)  Resp: 17 (04/29/17 0715)  BP: (!) 125/58 (04/29/17 1000)  SpO2: 98 % (04/29/17 1000)    Vital Signs Range (Last 24H):  Temp:  [97.9 °F (36.6 °C)-99.1 °F (37.3 °C)]   Pulse:  [72-84]   Resp:  [14-36]   BP: (116-149)/(55-81)   SpO2:  [93 %-100 %]   Arterial Line BP: (130-170)/(55-79)     I & O (Last 24H):  Intake/Output Summary (Last 24 hours) at 04/29/17 1013  Last data filed at 04/29/17 0900   Gross per 24 hour   Intake              500 ml   Output             4150 ml   Net            -3650 ml     Physical Exam:  General:AAF in NAD.  AOX4 On NC.  Respiratory: Diminished LLL.  Other lobes clear.  Good chest expansion w/o increased WOB.  Cardiovacular: Regular rate and rhythm, S1, S2 normal, no murmurs, rubs or gallops  Gastrointestinal: Soft, non-tender, bowel sounds normal.  Extremities: No clubbing or cyanosis of bilateral upper extremities; no lower extremity edema bilaterally, radial pulses 2+ bilaterally, symmetric  Skin: warm and dry; no rash on exposed skin    Laboratory:  CBC:   Recent Labs  Lab 04/28/17  0310 04/29/17  0331   WBC 10.46  --    RBC 3.63* 3.64*   HGB 11.6* 11.3*    HCT 33.5* 35.7*    127*   MCV 92 98   MCH 32.0* 31.0   MCHC 34.6 31.7*     BMP:   Recent Labs  Lab 04/29/17  0331   GLU 87      K 4.7      CO2 22*   BUN 25*   CREATININE 5.0*   CALCIUM 8.3*   MG 2.0           ASSESSMENT/PLAN:     62 y.o. female with PMHx of HTN, DM2, CHF, CAD, ESRD on HD q MWF who presented to hospital for elective outpatient R thigh AVG placement for vascular access. After discharge on way to car, patient became unresponsive and was brought back to PACU by , found to be in asystole and ACLS was initiated. ROSC obtained at 1.5 rounds and 1 dose of epi.     Plan:   ESRD  -HD treatment completed yesterday, tolerated net UF of 2L.  -adequate metabolic clearance.  -chest tube placed yesterday, output of 1.5L.  -net negative 3.5L/24h.  -no urgent need for RRT at this time.  -will re-evaluate tomorrow.        YVETTE Ruffin, NATOP-BC  Nephrology  Pager:  [phone]

## 2017-04-29 NOTE — PROGRESS NOTES
Progress Note  Cardiothoracic Surgery    Admit Date: 4/27/2017  Attending: Dr. Lynn  S/P: Procedure(s) (LRB):  [id number] Left Thigh (Left)    Post-operative Day: 2 Days Post-Op    Hospital Day: 3    SUBJECTIVE:     HPI:      Patient is a 62 y.o. female with PMHx of CHF, CAD, HTN, anemia, DM II (with retinopathy and neuropathy), PVD, DVT, CVA, and ESRD (on HD M/W/F) who is s/p Left AV graft placement yesterday with right pleural effusion. As patient was being wheeled out for discharge from hospital following procedure yesterday Noted to be unresponsive and brought back to PACU by . Noted sudden cardiac arrest and asystole with ROSC after 1 round of ACLS. Intubated and started on Cardene after noted to be hypertensive with SBP of 300. Cardene has since been weaned off and patient is extubated. CT head and chest following event noted large right pleural effusion with compressive atelectasis and ground glass nodule. Multiple CXR dating back to April 2016 noting right sided pleural effusion. Of note, patient reports hx of right sided pleural effusion with need for thoracentesis in July '16.      Interval:    Patient tolerated CT placement last night; >1500mL ss drainage. Resting comfortably on rounds this morning.     OBJECTIVE:     Vital Signs (Most Recent)  Temp: 97.9 °F (36.6 °C) (04/29/17 0700)  Pulse: 80 (04/29/17 1000)  Resp: 17 (04/29/17 0715)  BP: (!) 125/58 (04/29/17 1000)  SpO2: 98 % (04/29/17 1000)    Vital Signs Range (Last 24H):  Temp:  [97.9 °F (36.6 °C)-99.1 °F (37.3 °C)]   Pulse:  [72-84]   Resp:  [14-36]   BP: (116-149)/(55-81)   SpO2:  [93 %-100 %]   Arterial Line BP: (130-170)/(55-79)     I & O (Last 24H):  Intake/Output Summary (Last 24 hours) at 04/29/17 1049  Last data filed at 04/29/17 0900   Gross per 24 hour   Intake              500 ml   Output             4150 ml   Net            -3650 ml     Physical Exam:  General: well developed, well nourished in no distress;  in ICU  Heart: regular rate and rhythm  Pulm: ct in right lung, draining ss fluid, non labored breathing  Abd: soft, non tender, non distended  Ext: warm and well perfused, no c/c/e    Laboratory:  CBC:   Recent Labs  Lab 04/28/17  0310 04/29/17  0331   WBC 10.46  --    RBC 3.63* 3.64*   HGB 11.6* 11.3*   HCT 33.5* 35.7*    127*   MCV 92 98   MCH 32.0* 31.0   MCHC 34.6 31.7*     BMP:   Recent Labs  Lab 04/29/17  0331   GLU 87      K 4.7      CO2 22*   BUN 25*   CREATININE 5.0*   CALCIUM 8.3*     Labs within the past 24 hours have been reviewed.    Imaging:  CXR 4/29: CT in good position, pleural effusion greatly reduced, appears to have small loculated residual effusion  ASSESSMENT/PLAN:     Patient is a 62 y.o. female with PMHx of CHF, CAD, HTN, anemia, DM II, PVD, DVT, CVA, and ESRD on HD who is s/p Left AV graft placement. Shortly after graft placement she experienced cardiac arrest and was resuscitated and now has right pleural effusion.    - continue CT to wall suction  - will monitor remaining effusion  - additional management per primary team    Daryl Cross MD  General Surgery PGY1  [phone]

## 2017-04-29 NOTE — NURSING
Pt had chest tube placed tonight due to right pleural effusion. 0.5mg dilaudid given pre-procedure and timeout completed. Pt tolerated procedure well. Chest tube sutured and CXR completed immediately after. Verified by Dr. Madera. 500 ml serosang chest tube output immediately after placement. Pt's dressing changed and reinforced. Will continue to monitor.

## 2017-04-29 NOTE — PROCEDURES
"Aleta Herrera is a 62 y.o. female patient.    Temp: 98.1 °F (36.7 °C) (17 1502)  Pulse: 74 (17 1502)  Resp: (!) 22 (17 1502)  BP: 137/63 (17 1745)  SpO2: 96 % (17 1502)  Weight: 73.1 kg (161 lb 2.5 oz) (17 0509)  Height: 5' (152.4 cm) (17 1159)       Chest Tube Insertion  Date/Time: 2017 8:24 PM  Location procedure was performed: [facility] SURGICAL ICU (SICU)  Performed by: YARON HICKS  Authorized by: DASH ZUNIGA   Assisting provider: DASH ZUNIGA  Post-operative diagnosis: right pleural effusion  Pre-operative diagnosis: right pleural effusion  Consent Done: Yes  Consent: Verbal consent obtained. Written consent obtained.  Risks and benefits: risks, benefits and alternatives were discussed  Consent given by: patient  Patient understanding: patient states understanding of the procedure being performed  Patient consent: the patient's understanding of the procedure matches consent given  Procedure consent: procedure consent matches procedure scheduled  Relevant documents: relevant documents present and verified  Test results: test results available and properly labeled  Site marked: the operative site was marked  Imaging studies: imaging studies available  Required items: required blood products, implants, devices, and special equipment available  Patient identity confirmed: , name and verbally with patient  Time out: Immediately prior to procedure a "time out" was called to verify the correct patient, procedure, equipment, support staff and site/side marked as required.  Indications: pleural effusion  Patient sedated: no  Anesthesia: local infiltration    Anesthesia:  Anesthesia: local infiltration  Local Anesthetic: lidocaine 1% without epinephrine   Preparation: skin prepped with ChloraPrep  Placement location: right lateral  Scalpel size: 11  Tube size: 24 Dutch  Dissection instrument: Maia gifford clamp and " scissors  Ultrasound guidance: no  Tension pneumothorax heard: no  Tube connected to: suction  Drainage characteristics: serosanguinous  Drainage amount: 600 ml  Suture material: 0 silk  Dressing: 4x4 sterile gauze and Xeroform gauze  Post-insertion x-ray findings: tube in good position  Patient tolerance: Patient tolerated the procedure well with no immediate complications  Technical procedures used: right chest tube  Significant surgical tasks conducted by the assistant(s): right chest tube  Complications: No  Estimated blood loss (mL): 1  Specimens: Yes (pleural fluid for Gram stain, cultures, cytology)  Implants: No          Malick Madera  4/28/2017

## 2017-04-29 NOTE — PLAN OF CARE
Problem: Patient Care Overview  Goal: Plan of Care Review  Outcome: Ongoing (interventions implemented as appropriate)  No acute events throughout shift. Patient titrated down to 2 l nc. Accuchecks monitored q6. Chest tube output monitored frequently. See flowsheet. No complaints of pain. A-line removed. Transfer orders received pending bed availability. VSS. Will continue to monitor.

## 2017-04-29 NOTE — PROGRESS NOTES
Progress Note  Surgery    Admit Date: 4/27/2017  Attending: Santy  S/P: Procedure(s) (LRB):  [id number] Left Thigh (Left)    Post-operative Day: 2 Days Post-Op    Hospital Day: 3    SUBJECTIVE:     NAEON.  Chest tube placed yesterday with 1500 output.  CXR much improved this morning.  Tolerated HD yesterday.  Didn't eat because poor appetite yesterday    OBJECTIVE:     Vital Signs (Most Recent)  Temp: 97.9 °F (36.6 °C) (04/29/17 0700)  Pulse: 81 (04/29/17 0827)  Resp: 17 (04/29/17 0715)  BP: 135/60 (04/29/17 0700)  SpO2: 96 % (04/29/17 0827)    Vital Signs Range (Last 24H):  Temp:  [97.9 °F (36.6 °C)-99.1 °F (37.3 °C)]   Pulse:  [66-84]   Resp:  [14-36]   BP: (119-149)/(55-81)   SpO2:  [93 %-100 %]   Arterial Line BP: (130-170)/(55-79)     I & O (Last 24H):    Intake/Output Summary (Last 24 hours) at 04/29/17 0844  Last data filed at 04/29/17 0700   Gross per 24 hour   Intake              500 ml   Output             4050 ml   Net            -3550 ml       Physical Exam:  Gen: NAD  HEENT: NCAT  CV: RRR, no m/r/g   Pulm: Unlabored  Abd: Soft, nttp. No rebound, guarding.   Extremities: no cyanosis or edema, or clubbing  Skin: Skin color, texture, turgor normal. No rashes or lesions    Wound/Incision:  Bandage in place to L thigh changed, c/d/i    Laboratory:  CBC:     Recent Labs  Lab 04/28/17  0310 04/29/17 0331   WBC 10.46  --    RBC 3.63* 3.64*   HGB 11.6* 11.3*   HCT 33.5* 35.7*    127*   MCV 92 98   MCH 32.0* 31.0   MCHC 34.6 31.7*     CMP:     Recent Labs  Lab 04/29/17 0331   GLU 87   CALCIUM 8.3*   ALBUMIN 2.9*   PROT 7.0      K 4.7   CO2 22*      BUN 25*   CREATININE 5.0*   ALKPHOS 53*   ALT <5*   AST 13   BILITOT 0.4     Coagulation:     Recent Labs  Lab 04/27/17 1929   INR 1.1     Labs within the past 24 hours have been reviewed.    ASSESSMENT/PLAN:     Assessment: 62 year old woman with ESRD, s/p L AV graft insertion POD 2, with cardiac arrest and asystole on POD 0  (ROSC after 1.5 rounds of ACLS)    Plan:  S/p ACLS:  Mental status appropriate- no focal neuro deficits  Regular diet, HLIV    S/p AV graft insertion:  Pain well controlled    ESRD: Rc HD yesterday    R pleural effusion: Chest tube in place, appreciate Thoracic surgery recommendations    Dispo: Stable for transfer to floor today    Mateo Munguia MD  General Surgery, PGY-3  [phone]

## 2017-04-29 NOTE — PROGRESS NOTES
History & Physical  Surgical Intensive Care    SUBJECTIVE:     Chief Complaint/Reason for Admission: Cardiac Arrest     History of Present Illness:  Patient is a 62 y.o. female with PMHx of HTN, anemia, DM II (with retinopathy and neuropathy), PVD, DVT, CVA, and ESRD (on HD M/W/F) who presents to SICU for higher level of care s/p L AV graft procedure today.   Following procedure and subsequent discharge, patient was being wheeled out to her car and experienced cardiac arrest and asystole. Patient was immediately brought back into PACU by . ACLS was then initiated. The pt  received 1.5 rounds of CPR with one dose of 1mg epinephrine with ROSC. Patient was then intubated by anesthesia service and an arterial line was placed. Patient then became hypertensive with SBP of 300 for which cardene was initiated. BP stabilized and cardene was weaned. At this time patient noted to not be moving her right side at this time. EKG obtained which does not demonstrated any ST elevation concerning for acute STEMI. Shortly following code, pt noted to have pinpoint pupils and delayed reactivity. She was sent to CT for CT Head/Chest then brought to SICU intubated, on Cardene gtt following scan.    Interval History  Patient was extubated successfully yesterday morning.  Has been HDS.  Thoracic surgery placed a Right chest tube for loculated pleural effusion, 1500mL output, CXR improved.      PTA Medications   Medication Sig    amlodipine (NORVASC) 10 MG tablet TAKE ONE TABLET BY MOUTH ONCE DAILY    aspirin 81 mg Tab Take by mouth.    calcitRIOL (ROCALTROL) 0.5 MCG Cap Take 1 capsule (0.5 mcg total) by mouth once daily.    epoetin batsheva (PROCRIT) 10,000 unit/mL injection Inject 0.74 mLs (7,400 Units total) into the skin every Mon, Wed, Fri.    insulin aspart (NOVOLOG FLEXPEN) 100 unit/mL InPn as dir Insulin Pen Subcutaneous Before meals and at bedtime.  If blood sugar is 151-200 1 units SQif blood sugar is 201-250 2 units SQIf  blood sugar is 251-300 3 units SQIf blood sugar is 301-350 4 units SQIf blood sugar is > or = 351 5 units SQand call MD;  Dispense with needles    inulin (FIBER CHOICE, INULIN,) 1.5 gram Chew Take 2 tablets by mouth 2 (two) times daily. Or as directed on product packaging.    lisinopril (PRINIVIL,ZESTRIL) 20 MG tablet Take 2 tablets (40 mg total) by mouth once daily.    metoprolol tartrate (LOPRESSOR) 100 MG tablet TAKE 1 TABLET BY MOUTH TWICE DAILY    polyethylene glycol (GLYCOLAX) 17 gram/dose powder Take 17 g by mouth once daily. May take up to 3 times per day as needed for constipation.    sevelamer carbonate (RENVELA) 800 mg Tab Take 2,400 mg by mouth 3 (three) times daily with meals.    insulin detemir (LEVEMIR FLEXPEN) 100 unit/mL (3 mL) SubQ InPn pen Inject 8 Units into the skin 2 (two) times daily. (Patient taking differently: Inject 4 Units into the skin every evening. )       Review of patient's allergies indicates:  No Known Allergies    Past Medical History:   Diagnosis Date    Anemia of chronic renal failure 2013    Anticoagulant long-term use 2015    CHF (congestive heart failure)     Coronary artery disease     Diabetes mellitus     Diabetic neuropathy 2013    DR (diabetic retinopathy) 2013    End stage renal disease on dialysis     Hypertension     Hypertension, renal 2013    Kidney transplant candidate 2013    Secondary hyperparathyroidism of renal origin 2013    Stroke 2015    Type 2 diabetes mellitus since 2013     Past Surgical History:   Procedure Laterality Date    AV FISTULA PLACEMENT      CATARACT SURGERY       SECTION, LOW TRANSVERSE      x 3    COLONOSCOPY N/A 2016    Procedure: COLONOSCOPY;  Surgeon: Roverto Patel MD;  Location: [address]);  Service: Endoscopy;  Laterality: N/A;    EYE SURGERY      HYSTERECTOMY      Endometriosis    TONSILLECTOMY      VASCULAR SURGERY       Family History    Problem Relation Age of Onset    Heart disease Mother      MI    Hypertension Mother     Cancer Mother 70     breast    Heart attack Neg Hx      Social History   Substance Use Topics    Smoking status: Former Smoker     Packs/day: 0.25     Years: 0.50     Types: Cigarettes     Quit date: 9/5/1975    Smokeless tobacco: Never Used      Comment: quit smoking cigarettes 40+ years ago    Alcohol use No        Review of Systems:  A 10-point review of systems is negative except for the above mentioned in the HPI.      OBJECTIVE:     Vital Signs (Most Recent)  Temp: 97.9 °F (36.6 °C) (04/29/17 0700)  Pulse: 80 (04/29/17 1000)  Resp: 17 (04/29/17 0715)  BP: (!) 125/58 (04/29/17 1000)  SpO2: 98 % (04/29/17 1000)      Physical Exam:  Gen: NAD  HEENT: NCAT  CV: RRR, no m/r/g   Pulm: Unlabored  Abd: Soft, nttp. No rebound, guarding.   Extremities: no cyanosis or edema, or clubbing  Skin: Skin color, texture, turgor normal. No rashes or lesions    Lines/Drains:       Hemodialysis Catheter right femoral (Active)   Number of days:            Hemodialysis Catheter right femoral (Active)   Site Assessment Dry;Clean;Intact;No redness;No swelling 4/29/2017  7:15 AM   Status Deaccessed 4/29/2017  7:15 AM   Flows Poor 4/28/2017  4:00 PM   Dressing Intervention Dressing reinforced 4/29/2017  7:15 AM   Dressing Status Clean;Dry;Intact 4/29/2017  7:15 AM   Site Condition No complications 4/29/2017  7:15 AM   Dressing Gauze 4/29/2017  7:15 AM   Daily Line Review Performed 4/29/2017  7:15 AM   Number of days:            Percutaneous Central Line Insertion/Assessment - triple lumen  03/09/17 1110 right femoral (Active)   Number of days:50            Peripheral IV - Single Lumen 04/27/17 2010 Right Antecubital (Active)   Site Assessment Dry;Intact;No redness;No swelling;Clean 4/29/2017  7:15 AM   Line Status Flushed;Saline locked 4/29/2017  7:15 AM   Dressing Status Clean;Intact;Dry 4/29/2017  7:15 AM   Dressing Intervention Dressing  reinforced 4/29/2017  7:15 AM   Dressing Change Due 05/01/17 4/29/2017  7:15 AM   Site Change Due 05/01/17 4/29/2017  7:15 AM   Reason Not Rotated Not due 4/29/2017  7:15 AM   Number of days:1            Arterial Line 04/27/17 1928 Left Radial (Active)   Site Assessment Clean;Intact;No redness;No swelling;Dry 4/29/2017  7:15 AM   Line Status Pulsatile blood flow 4/29/2017  7:15 AM   Art Line Waveform Appropriate;Square wave test performed 4/29/2017  7:15 AM   Arterial Line Interventions Zeroed and calibrated;Leveled;Tubing changed;Connections checked and tightened;Flushed per protocol;Transducer changed 4/29/2017  7:15 AM   Color/Movement/Sensation Capillary refill less than 3 sec 4/29/2017  7:15 AM   Dressing Type Transparent 4/29/2017  7:15 AM   Dressing Status Clean;Intact;Dry 4/29/2017  7:15 AM   Dressing Intervention Dressing reinforced 4/29/2017  7:15 AM   Dressing Change Due 05/01/17 4/29/2017  7:15 AM   Number of days:1            Chest Tube 04/28/17 1945 1 Right Pleural (Active)   Chest Tube WDL ex 4/29/2017  7:15 AM   Function -20 cm H2O 4/29/2017  7:15 AM   Air Leak/Fluctuation air leak not present;connections tightened;dependent drainage cleared 4/29/2017  7:15 AM   Safety all tubing connections taped;2 rubber-tipped hemostats w/ patient;all connections secured;suction checked 4/29/2017  7:15 AM   Securement tubing anchored to body distal to insertion site with sutures 4/29/2017  7:15 AM   Patency Intervention Tip/tilt 4/29/2017  7:15 AM   Drainage Description Serosanguineous 4/29/2017  9:00 AM   Dressing Appearance w/ moist drainage;saturated 4/29/2017  9:00 AM   Dressing Care dressing changed 4/29/2017  9:00 AM   Left Subcutaneous Emphysema none present 4/29/2017  7:15 AM   Right Subcutaneous Emphysema none present 4/29/2017  7:15 AM   Site Assessment Other (Comment) 4/29/2017  7:15 AM   Surrounding Skin Unable to view 4/29/2017  7:15 AM   Output (mL) 100 mL 4/29/2017  9:00 AM   Number of days:0        Laboratory  CBC:   Recent Labs  Lab 04/28/17  0310 04/29/17  0331   WBC 10.46  --    RBC 3.63* 3.64*   HGB 11.6* 11.3*   HCT 33.5* 35.7*    127*   MCV 92 98   MCH 32.0* 31.0   MCHC 34.6 31.7*     CMP:   Recent Labs  Lab 04/29/17  0331   GLU 87   CALCIUM 8.3*   ALBUMIN 2.9*   PROT 7.0      K 4.7   CO2 22*      BUN 25*   CREATININE 5.0*   ALKPHOS 53*   ALT <5*   AST 13   BILITOT 0.4     ABGs:   Recent Labs  Lab 04/28/17  0932   PH 7.333*   PCO2 47.6*   PO2 85   HCO3 25.3   POCSATURATED 96   BE -1       Chest X-Ray: Reviewed       ASSESSMENT/PLAN:     Patient is a 62 y.o. female with PMHx of HTN, anemia, DM II (with retinopathy and neuropathy), PVD, DVT, CVA, and ESRD (on HD M/W/F) who presents to SICU for higher level of care s/p L AV graft procedure today where she experienced a code while being wheeled to her vehicle following discharge.       Plan:  Neuro:   - Alert and oriented\  - No current pain needs    Pulmonary:   - No issues since extubation yesterday   - Wean supplemental O2 as tolerated    Cardiac:  - HDS off pressors  - Holding home BP meds currently     Renal:   - HD yesterday per nephrology    Infectious Disease:   - No leukocytosis    Hematology/Oncology:  - H/H stable  - Subq Heparin for DVT ppx    Endocrine:  - SSI    Fluids/Electrolytes/Nutrition/GI:   - Diabetic diet     Dispo:  - Stepdown to the floor      Mason Melendez M.D.  General Surgery PGY1  [phone]

## 2017-04-29 NOTE — NURSING
Pt jumped out of her sleep unsure of her whereabouts and situation. Pt thought she was at home, but was oriented to self. Pt now calm and attempting to go back to sleep. Will continue to monitor.

## 2017-04-29 NOTE — CARE UPDATE
Pt became acutely confused around 2am. Oriented only to self but remembered situation with prompting. Pt slept well throughout the night. Chest tube output totaled 1400 since insertion with sanguineous drainage. Chest tube dressing changed and reinforced numerous times due to bulky tubing, patient positioning, and site leakage. Pt appeared to have no complications following chest tube insertion. All other VSS throughout night. Will continue to monitor.

## 2017-04-30 LAB
ALBUMIN SERPL BCP-MCNC: 2.9 G/DL
ALP SERPL-CCNC: 49 U/L
ALT SERPL W/O P-5'-P-CCNC: <5 U/L
ANION GAP SERPL CALC-SCNC: 13 MMOL/L
AST SERPL-CCNC: 12 U/L
BASOPHILS # BLD AUTO: 0.01 K/UL
BASOPHILS NFR BLD: 0.1 %
BILIRUB SERPL-MCNC: 0.4 MG/DL
BUN SERPL-MCNC: 34 MG/DL
CALCIUM SERPL-MCNC: 8 MG/DL
CHLORIDE SERPL-SCNC: 103 MMOL/L
CO2 SERPL-SCNC: 21 MMOL/L
CREAT SERPL-MCNC: 6 MG/DL
DIFFERENTIAL METHOD: ABNORMAL
EOSINOPHIL # BLD AUTO: 0.2 K/UL
EOSINOPHIL NFR BLD: 3 %
ERYTHROCYTE [DISTWIDTH] IN BLOOD BY AUTOMATED COUNT: 16.6 %
EST. GFR  (AFRICAN AMERICAN): 8.1 ML/MIN/1.73 M^2
EST. GFR  (NON AFRICAN AMERICAN): 7 ML/MIN/1.73 M^2
GLUCOSE SERPL-MCNC: 96 MG/DL
HCT VFR BLD AUTO: 30 %
HGB BLD-MCNC: 9.6 G/DL
LYMPHOCYTES # BLD AUTO: 0.8 K/UL
LYMPHOCYTES NFR BLD: 10 %
MAGNESIUM SERPL-MCNC: 2.3 MG/DL
MCH RBC QN AUTO: 31 PG
MCHC RBC AUTO-ENTMCNC: 32 %
MCV RBC AUTO: 97 FL
MONOCYTES # BLD AUTO: 0.8 K/UL
MONOCYTES NFR BLD: 10.6 %
NEUTROPHILS # BLD AUTO: 6 K/UL
NEUTROPHILS NFR BLD: 76 %
PHOSPHATE SERPL-MCNC: 6.4 MG/DL
PLATELET # BLD AUTO: 138 K/UL
PMV BLD AUTO: 10.5 FL
POCT GLUCOSE: 104 MG/DL (ref 70–110)
POCT GLUCOSE: 140 MG/DL (ref 70–110)
POCT GLUCOSE: 155 MG/DL (ref 70–110)
POCT GLUCOSE: 176 MG/DL (ref 70–110)
POTASSIUM SERPL-SCNC: 5.4 MMOL/L
PROT SERPL-MCNC: 6.9 G/DL
RBC # BLD AUTO: 3.1 M/UL
SODIUM SERPL-SCNC: 137 MMOL/L
WBC # BLD AUTO: 7.93 K/UL

## 2017-04-30 PROCEDURE — 99232 SBSQ HOSP IP/OBS MODERATE 35: CPT | Mod: ,,, | Performed by: THORACIC SURGERY (CARDIOTHORACIC VASCULAR SURGERY)

## 2017-04-30 PROCEDURE — 20600001 HC STEP DOWN PRIVATE ROOM

## 2017-04-30 PROCEDURE — 97161 PT EVAL LOW COMPLEX 20 MIN: CPT

## 2017-04-30 PROCEDURE — 85025 COMPLETE CBC W/AUTO DIFF WBC: CPT

## 2017-04-30 PROCEDURE — 25000003 PHARM REV CODE 250: Performed by: STUDENT IN AN ORGANIZED HEALTH CARE EDUCATION/TRAINING PROGRAM

## 2017-04-30 PROCEDURE — 90935 HEMODIALYSIS ONE EVALUATION: CPT | Mod: ,,, | Performed by: NURSE PRACTITIONER

## 2017-04-30 PROCEDURE — 83735 ASSAY OF MAGNESIUM: CPT

## 2017-04-30 PROCEDURE — 25000242 PHARM REV CODE 250 ALT 637 W/ HCPCS: Performed by: STUDENT IN AN ORGANIZED HEALTH CARE EDUCATION/TRAINING PROGRAM

## 2017-04-30 PROCEDURE — 27000221 HC OXYGEN, UP TO 24 HOURS

## 2017-04-30 PROCEDURE — 25000003 PHARM REV CODE 250: Performed by: SURGERY

## 2017-04-30 PROCEDURE — 94799 UNLISTED PULMONARY SVC/PX: CPT

## 2017-04-30 PROCEDURE — 80100014 HC HEMODIALYSIS 1:1

## 2017-04-30 PROCEDURE — 99232 SBSQ HOSP IP/OBS MODERATE 35: CPT | Mod: GC,,, | Performed by: ANESTHESIOLOGY

## 2017-04-30 PROCEDURE — 94640 AIRWAY INHALATION TREATMENT: CPT

## 2017-04-30 PROCEDURE — 84100 ASSAY OF PHOSPHORUS: CPT

## 2017-04-30 PROCEDURE — 80053 COMPREHEN METABOLIC PANEL: CPT

## 2017-04-30 RX ORDER — IPRATROPIUM BROMIDE AND ALBUTEROL SULFATE 2.5; .5 MG/3ML; MG/3ML
3 SOLUTION RESPIRATORY (INHALATION) EVERY 4 HOURS
Status: DISCONTINUED | OUTPATIENT
Start: 2017-04-30 | End: 2017-04-30

## 2017-04-30 RX ORDER — FAMOTIDINE 20 MG/1
20 TABLET, FILM COATED ORAL DAILY
Status: DISCONTINUED | OUTPATIENT
Start: 2017-05-01 | End: 2017-05-05 | Stop reason: HOSPADM

## 2017-04-30 RX ORDER — IPRATROPIUM BROMIDE AND ALBUTEROL SULFATE 2.5; .5 MG/3ML; MG/3ML
3 SOLUTION RESPIRATORY (INHALATION)
Status: DISCONTINUED | OUTPATIENT
Start: 2017-04-30 | End: 2017-05-05 | Stop reason: HOSPADM

## 2017-04-30 RX ADMIN — IPRATROPIUM BROMIDE AND ALBUTEROL SULFATE 3 ML: .5; 3 SOLUTION RESPIRATORY (INHALATION) at 09:04

## 2017-04-30 RX ADMIN — FAMOTIDINE 20 MG: 10 INJECTION, SOLUTION INTRAVENOUS at 09:04

## 2017-04-30 RX ADMIN — CHLORHEXIDINE GLUCONATE 15 ML: 1.2 RINSE ORAL at 09:04

## 2017-04-30 RX ADMIN — Medication 3 ML: at 10:04

## 2017-04-30 RX ADMIN — Medication 3 ML: at 02:04

## 2017-04-30 RX ADMIN — IPRATROPIUM BROMIDE AND ALBUTEROL SULFATE 3 ML: .5; 3 SOLUTION RESPIRATORY (INHALATION) at 04:04

## 2017-04-30 RX ADMIN — Medication 3 ML: at 06:04

## 2017-04-30 RX ADMIN — HEPARIN SODIUM 5000 UNITS: 5000 INJECTION, SOLUTION INTRAVENOUS; SUBCUTANEOUS at 09:04

## 2017-04-30 RX ADMIN — HEPARIN SODIUM 5000 UNITS: 5000 INJECTION, SOLUTION INTRAVENOUS; SUBCUTANEOUS at 05:04

## 2017-04-30 RX ADMIN — IPRATROPIUM BROMIDE AND ALBUTEROL SULFATE 3 ML: .5; 3 SOLUTION RESPIRATORY (INHALATION) at 12:04

## 2017-04-30 RX ADMIN — HEPARIN SODIUM 5000 UNITS: 5000 INJECTION, SOLUTION INTRAVENOUS; SUBCUTANEOUS at 02:04

## 2017-04-30 NOTE — PLAN OF CARE
Problem: Physical Therapy Goal  Goal: Physical Therapy Goal  Outcome: Ongoing (interventions implemented as appropriate)  PT verito completed.  Laura Ellington, PT     4/30/2017

## 2017-04-30 NOTE — PLAN OF CARE
Problem: Patient Care Overview  Goal: Plan of Care Review  Outcome: Ongoing (interventions implemented as appropriate)  Hemodialysis is complete.  Blood was returned.  CVC dialysis catheter to right theigh was flushed, capped and Locked with NS.  HD time = 120 min.  UF = 2000 ml.

## 2017-04-30 NOTE — PROGRESS NOTES
History & Physical  Surgical Intensive Care    SUBJECTIVE:     Chief Complaint/Reason for Admission: Cardiac Arrest     History of Present Illness:  Patient is a 62 y.o. female with PMHx of HTN, anemia, DM II (with retinopathy and neuropathy), PVD, DVT, CVA, and ESRD (on HD M/W/F) who presents to SICU for higher level of care s/p L AV graft procedure today.   Following procedure and subsequent discharge, patient was being wheeled out to her car and experienced cardiac arrest and asystole. Patient was immediately brought back into PACU by . ACLS was then initiated. The pt  received 1.5 rounds of CPR with one dose of 1mg epinephrine with ROSC. Patient was then intubated by anesthesia service and an arterial line was placed. Patient then became hypertensive with SBP of 300 for which cardene was initiated. BP stabilized and cardene was weaned. At this time patient noted to not be moving her right side at this time. EKG obtained which does not demonstrated any ST elevation concerning for acute STEMI. Shortly following code, pt noted to have pinpoint pupils and delayed reactivity. She was sent to CT for CT Head/Chest then brought to SICU intubated, on Cardene gtt following scan.    Interval History  Brief desaturation episodes overnight into the high 80s.  Was noted that her chest tube had been disconnected, reconnected to suction.  O2 requirements increased this AM     PTA Medications   Medication Sig    amlodipine (NORVASC) 10 MG tablet TAKE ONE TABLET BY MOUTH ONCE DAILY    aspirin 81 mg Tab Take by mouth.    calcitRIOL (ROCALTROL) 0.5 MCG Cap Take 1 capsule (0.5 mcg total) by mouth once daily.    epoetin batsheva (PROCRIT) 10,000 unit/mL injection Inject 0.74 mLs (7,400 Units total) into the skin every Mon, Wed, Fri.    insulin aspart (NOVOLOG FLEXPEN) 100 unit/mL InPn as dir Insulin Pen Subcutaneous Before meals and at bedtime.  If blood sugar is 151-200 1 units SQif blood sugar is 201-250 2 units SQIf blood  sugar is 251-300 3 units SQIf blood sugar is 301-350 4 units SQIf blood sugar is > or = 351 5 units SQand call MD;  Dispense with needles    inulin (FIBER CHOICE, INULIN,) 1.5 gram Chew Take 2 tablets by mouth 2 (two) times daily. Or as directed on product packaging.    lisinopril (PRINIVIL,ZESTRIL) 20 MG tablet Take 2 tablets (40 mg total) by mouth once daily.    metoprolol tartrate (LOPRESSOR) 100 MG tablet TAKE 1 TABLET BY MOUTH TWICE DAILY    polyethylene glycol (GLYCOLAX) 17 gram/dose powder Take 17 g by mouth once daily. May take up to 3 times per day as needed for constipation.    sevelamer carbonate (RENVELA) 800 mg Tab Take 2,400 mg by mouth 3 (three) times daily with meals.    insulin detemir (LEVEMIR FLEXPEN) 100 unit/mL (3 mL) SubQ InPn pen Inject 8 Units into the skin 2 (two) times daily. (Patient taking differently: Inject 4 Units into the skin every evening. )       Review of patient's allergies indicates:  No Known Allergies    Past Medical History:   Diagnosis Date    Anemia of chronic renal failure 2013    Anticoagulant long-term use 2015    CHF (congestive heart failure)     Coronary artery disease     Diabetes mellitus     Diabetic neuropathy 2013    DR (diabetic retinopathy) 2013    End stage renal disease on dialysis     Hypertension     Hypertension, renal 2013    Kidney transplant candidate 2013    Secondary hyperparathyroidism of renal origin 2013    Stroke 2015    Type 2 diabetes mellitus since 2013     Past Surgical History:   Procedure Laterality Date    AV FISTULA PLACEMENT      CATARACT SURGERY       SECTION, LOW TRANSVERSE      x 3    COLONOSCOPY N/A 2016    Procedure: COLONOSCOPY;  Surgeon: Roverto Patel MD;  Location: [facility] ([address] [place] [address]);  Service: Endoscopy;  Laterality: N/A;    EYE SURGERY      HYSTERECTOMY      Endometriosis    TONSILLECTOMY      VASCULAR SURGERY       Family History   Problem  Relation Age of Onset    Heart disease Mother      MI    Hypertension Mother     Cancer Mother 70     breast    Heart attack Neg Hx      Social History   Substance Use Topics    Smoking status: Former Smoker     Packs/day: 0.25     Years: 0.50     Types: Cigarettes     Quit date: 9/5/1975    Smokeless tobacco: Never Used      Comment: quit smoking cigarettes 40+ years ago    Alcohol use No        Review of Systems:  A 10-point review of systems is negative except for the above mentioned in the HPI.      OBJECTIVE:     Vital Signs (Most Recent)  Temp: 98.2 °F (36.8 °C) (04/30/17 0700)  Pulse: 80 (04/30/17 0800)  Resp: 19 (04/30/17 0800)  BP: (!) 143/69 (04/30/17 0800)  SpO2: 96 % (04/30/17 0800)      Physical Exam:  Gen: NAD  HEENT: NCAT  CV: RRR, no m/r/g   Pulm: Unlabored  Abd: Soft, nttp. No rebound, guarding.   Extremities: no cyanosis or edema, or clubbing  Skin: Skin color, texture, turgor normal. No rashes or lesions    Lines/Drains:       Hemodialysis Catheter right femoral (Active)   Number of days:            Hemodialysis Catheter right femoral (Active)   Site Assessment Dry;Clean;Intact;No redness;No swelling 4/29/2017  7:15 AM   Status Deaccessed 4/29/2017  7:15 AM   Flows Poor 4/28/2017  4:00 PM   Dressing Intervention Dressing reinforced 4/29/2017  7:15 AM   Dressing Status Clean;Dry;Intact 4/29/2017  7:15 AM   Site Condition No complications 4/29/2017  7:15 AM   Dressing Gauze 4/29/2017  7:15 AM   Daily Line Review Performed 4/29/2017  7:15 AM   Number of days:            Percutaneous Central Line Insertion/Assessment - triple lumen  03/09/17 1110 right femoral (Active)   Number of days:50            Peripheral IV - Single Lumen 04/27/17 2010 Right Antecubital (Active)   Site Assessment Dry;Intact;No redness;No swelling;Clean 4/29/2017  7:15 AM   Line Status Flushed;Saline locked 4/29/2017  7:15 AM   Dressing Status Clean;Intact;Dry 4/29/2017  7:15 AM   Dressing Intervention Dressing reinforced  4/29/2017  7:15 AM   Dressing Change Due 05/01/17 4/29/2017  7:15 AM   Site Change Due 05/01/17 4/29/2017  7:15 AM   Reason Not Rotated Not due 4/29/2017  7:15 AM   Number of days:1            Arterial Line 04/27/17 1928 Left Radial (Active)   Site Assessment Clean;Intact;No redness;No swelling;Dry 4/29/2017  7:15 AM   Line Status Pulsatile blood flow 4/29/2017  7:15 AM   Art Line Waveform Appropriate;Square wave test performed 4/29/2017  7:15 AM   Arterial Line Interventions Zeroed and calibrated;Leveled;Tubing changed;Connections checked and tightened;Flushed per protocol;Transducer changed 4/29/2017  7:15 AM   Color/Movement/Sensation Capillary refill less than 3 sec 4/29/2017  7:15 AM   Dressing Type Transparent 4/29/2017  7:15 AM   Dressing Status Clean;Intact;Dry 4/29/2017  7:15 AM   Dressing Intervention Dressing reinforced 4/29/2017  7:15 AM   Dressing Change Due 05/01/17 4/29/2017  7:15 AM   Number of days:1            Chest Tube 04/28/17 1945 1 Right Pleural (Active)   Chest Tube WDL ex 4/29/2017  7:15 AM   Function -20 cm H2O 4/29/2017  7:15 AM   Air Leak/Fluctuation air leak not present;connections tightened;dependent drainage cleared 4/29/2017  7:15 AM   Safety all tubing connections taped;2 rubber-tipped hemostats w/ patient;all connections secured;suction checked 4/29/2017  7:15 AM   Securement tubing anchored to body distal to insertion site with sutures 4/29/2017  7:15 AM   Patency Intervention Tip/tilt 4/29/2017  7:15 AM   Drainage Description Serosanguineous 4/29/2017  9:00 AM   Dressing Appearance w/ moist drainage;saturated 4/29/2017  9:00 AM   Dressing Care dressing changed 4/29/2017  9:00 AM   Left Subcutaneous Emphysema none present 4/29/2017  7:15 AM   Right Subcutaneous Emphysema none present 4/29/2017  7:15 AM   Site Assessment Other (Comment) 4/29/2017  7:15 AM   Surrounding Skin Unable to view 4/29/2017  7:15 AM   Output (mL) 100 mL 4/29/2017  9:00 AM   Number of days:0        Laboratory  CBC:     Recent Labs  Lab 04/30/17  0400   WBC 7.93   RBC 3.10*   HGB 9.6*   HCT 30.0*   *   MCV 97   MCH 31.0   MCHC 32.0     CMP:     Recent Labs  Lab 04/30/17  0400   GLU 96   CALCIUM 8.0*   ALBUMIN 2.9*   PROT 6.9      K 5.4*   CO2 21*      BUN 34*   CREATININE 6.0*   ALKPHOS 49*   ALT <5*   AST 12   BILITOT 0.4     ABGs:     Recent Labs  Lab 04/28/17  0932   PH 7.333*   PCO2 47.6*   PO2 85   HCO3 25.3   POCSATURATED 96   BE -1       Chest X-Ray: Reviewed       ASSESSMENT/PLAN:     Patient is a 62 y.o. female with PMHx of HTN, anemia, DM II (with retinopathy and neuropathy), PVD, DVT, CVA, and ESRD (on HD M/W/F) who presents to SICU for higher level of care s/p L AV graft procedure today where she experienced a code while being wheeled to her vehicle following discharge.       Plan:  Neuro:   - Alert and oriented\  - No current pain needs    Pulmonary:   - O2 requirements increased overnight  - CT to suction     Cardiac:  - HDS off pressors  - Holding home BP meds currently     Renal:   - HD yesterday per nephrology    Infectious Disease:   - No leukocytosis    Hematology/Oncology:  - H/H stable  - Subq Heparin for DVT ppx    Endocrine:  - SSI    Fluids/Electrolytes/Nutrition/GI:   - Diabetic diet     Dispo:  - Stepdown to the floor      Mason Melendez M.D.  General Surgery PGY1  [phone]

## 2017-04-30 NOTE — PLAN OF CARE
Problem: Patient Care Overview  Goal: Plan of Care Review  Outcome: Ongoing (interventions implemented as appropriate)  All VSS. No acute events occurred throughout the shift. Patient's chest tube output has increased since day shift, but color and consistency remains the same. Patient is on 4 L nasal cannula with oxygen saturation >90%. No distress noted.

## 2017-04-30 NOTE — NURSING
Dr. Melendez notified of patient's increase in oxygen demand. Also notified that patient's chest tube became disconnected for a short period of time during the beginning of the shift. Chest Xray are being ordered to evaluate. Will continue to monitor for any distress

## 2017-04-30 NOTE — PROGRESS NOTES
"Arrived in patints room with equipment.  Equipment was tested just before arrival to be operating with in normal limits.  X 2 green lights on d/i tanks.  CVC was then prepped and accessed.  No resistance noted upon "push - pull" test.  HD was then initiated.  "

## 2017-04-30 NOTE — PROGRESS NOTES
Patient transferred to [facility] room 646. Report given to JEFFERY Bean. Tele box applied to patient prior to leaving SICU. VSS. Spouse notified of new room.

## 2017-04-30 NOTE — NURSING TRANSFER
Nursing Transfer Note      4/30/2017     Transfer To: 646    Transfer via wheelchair    Transfer with to O2, cardiac monitoring    Transported by Ciara Hairston RN     Medicines sent: N/A    Chart send with patient: Yes    Notified: spouse    Patient reassessed at: 4/30/2017 1530     Upon arrival to floor: patient oriented to room, call bell in reach and bed in lowest position

## 2017-04-30 NOTE — PROGRESS NOTES
Progress Note  Surgery    Admit Date: 4/27/2017  Attending: Santy  S/P: Procedure(s) (LRB):  [id number] Left Thigh (Left)    Post-operative Day: 3 Days Post-Op    Hospital Day: 4    SUBJECTIVE:     NAEON.  Chest tube with 860 output.  CXR pending.  Tolerating regular diet    OBJECTIVE:     Vital Signs (Most Recent)  Temp: 98.2 °F (36.8 °C) (04/30/17 0700)  Pulse: 80 (04/30/17 0800)  Resp: 19 (04/30/17 0800)  BP: (!) 143/69 (04/30/17 0800)  SpO2: 96 % (04/30/17 0800)    Vital Signs Range (Last 24H):  Temp:  [98.2 °F (36.8 °C)-99 °F (37.2 °C)]   Pulse:  [79-87]   Resp:  [10-26]   BP: (125-146)/(58-71)   SpO2:  [90 %-100 %]     I & O (Last 24H):    Intake/Output Summary (Last 24 hours) at 04/30/17 0943  Last data filed at 04/30/17 0700   Gross per 24 hour   Intake                0 ml   Output              760 ml   Net             -760 ml       Physical Exam:  Gen: NAD  HEENT: NCAT  CV: RRR, no m/r/g   Pulm: Unlabored  Abd: Soft, nttp. No rebound, guarding.   Extremities: no cyanosis or edema, or clubbing  Skin: Skin color, texture, turgor normal. No rashes or lesions    Wound/Incision:  C/d/i.  Bandage removed    Laboratory:  CBC:     Recent Labs  Lab 04/30/17  0400   WBC 7.93   RBC 3.10*   HGB 9.6*   HCT 30.0*   *   MCV 97   MCH 31.0   MCHC 32.0     CMP:     Recent Labs  Lab 04/30/17  0400   GLU 96   CALCIUM 8.0*   ALBUMIN 2.9*   PROT 6.9      K 5.4*   CO2 21*      BUN 34*   CREATININE 6.0*   ALKPHOS 49*   ALT <5*   AST 12   BILITOT 0.4     Coagulation:     Recent Labs  Lab 04/27/17  1929   INR 1.1     Labs within the past 24 hours have been reviewed.    ASSESSMENT/PLAN:     Assessment: 62 year old woman with ESRD, s/p L AV graft insertion POD 3, with cardiac arrest and asystole on POD 0 (ROSC after 1.5 rounds of ACLS)    Plan:  S/p ACLS:  Mental status appropriate- no focal neuro deficits  Regular diet, HLIV    S/p AV graft insertion:  Pain well controlled    ESRD: Rc HD  yesterday    PT/OT     R pleural effusion: Chest tube in place, appreciate Thoracic surgery recommendations  jimbo Ordonez toilet    Dispo: Remains stable for transfer to floor     Mateo Munguia MD  General Surgery, PGY-3  [phone]

## 2017-04-30 NOTE — PROGRESS NOTES
Progress Note  Nephrology    Admit Date: 4/27/2017   LOS: 3 days     SUBJECTIVE:     Follow-up For:  ESRD  Interval Hx:  ABBY.  Remains in ICU waiting on Bed.  Slightly hyperkalemic this morning at 5.4 (on regular diet).  She denies SOB, chest x-ray with increased vascular congestion.  Oxygenating well on 5L NC.    Scheduled Meds:   albuterol-ipratropium 2.5mg-0.5mg/3mL  3 mL Nebulization Q4H    chlorhexidine  15 mL Mouth/Throat BID    famotidine (PF)  20 mg Intravenous Daily    heparin (porcine)  5,000 Units Subcutaneous Q8H    sodium chloride 0.9%  3 mL Intravenous Q8H    sodium chloride 0.9%  3 mL Intravenous Q8H     Continuous Infusions:   PRN Meds:dextrose 50%, glucagon (human recombinant), insulin aspart, ondansetron    Review of patient's allergies indicates:  No Known Allergies      OBJECTIVE:     Vital Signs (Most Recent)  Temp: 98.2 °F (36.8 °C) (04/30/17 0700)  Pulse: 80 (04/30/17 0800)  Resp: 19 (04/30/17 0800)  BP: (!) 143/69 (04/30/17 0800)  SpO2: 96 % (04/30/17 0800)    Vital Signs Range (Last 24H):  Temp:  [98.2 °F (36.8 °C)-99 °F (37.2 °C)]   Pulse:  [79-87]   Resp:  [10-26]   BP: (127-146)/(59-71)   SpO2:  [90 %-100 %]     I & O (Last 24H):    Intake/Output Summary (Last 24 hours) at 04/30/17 1005  Last data filed at 04/30/17 0700   Gross per 24 hour   Intake                0 ml   Output              760 ml   Net             -760 ml     Physical Exam:  General:AAF in NAD.  AOX4 On NC.  Respiratory: Diminished LLL.  Other lobes clear.  Good chest expansion w/o increased WOB.  Cardiovacular: Regular rate and rhythm, S1, S2 normal, no murmurs, rubs or gallops  Gastrointestinal: Soft, non-tender, bowel sounds normal.  Extremities: No clubbing or cyanosis of bilateral upper extremities; no lower extremity edema bilaterally, radial pulses 2+ bilaterally, symmetric  Skin: warm and dry; no rash on exposed skin    Laboratory:  CBC:     Recent Labs  Lab 04/30/17  0400   WBC 7.93   RBC 3.10*   HGB 9.6*    HCT 30.0*   *   MCV 97   MCH 31.0   MCHC 32.0     BMP:     Recent Labs  Lab 04/30/17  0400   GLU 96      K 5.4*      CO2 21*   BUN 34*   CREATININE 6.0*   CALCIUM 8.0*   MG 2.3           ASSESSMENT/PLAN:     62 y.o. female with PMHx of HTN, DM2, CHF, CAD, ESRD on HD q MWF who presented to hospital for elective outpatient R thigh AVG placement for vascular access. After discharge on way to car, patient became unresponsive and was brought back to PACU by , found to be in asystole and ACLS was initiated. ROSC obtained at 1.5 rounds and 1 dose of epi.     Plan:   ESRD  Outpatient unit [facility]  Nephrologist is Dr. Navas  Normal dialysis days MWF    -Last RRT on Friday and tolerated well  -on regular diet, Hyperkalemic this morning at 5.4  -increased vascular congestion on chest x-ray.  -will provide 2 hour HD treatment today for metabolic clearance/UF  -UF 2L as tolerated.      YVETTE Ruffin, NATOP-BC  Nephrology  Pager:  [phone]

## 2017-05-01 LAB
ALBUMIN SERPL BCP-MCNC: 2.7 G/DL
ALP SERPL-CCNC: 49 U/L
ALT SERPL W/O P-5'-P-CCNC: <5 U/L
ANION GAP SERPL CALC-SCNC: 13 MMOL/L
AST SERPL-CCNC: 13 U/L
BASOPHILS # BLD AUTO: 0.02 K/UL
BASOPHILS NFR BLD: 0.3 %
BILIRUB SERPL-MCNC: 0.4 MG/DL
BUN SERPL-MCNC: 28 MG/DL
CALCIUM SERPL-MCNC: 7.8 MG/DL
CHLORIDE SERPL-SCNC: 103 MMOL/L
CO2 SERPL-SCNC: 19 MMOL/L
CREAT SERPL-MCNC: 5.2 MG/DL
DIFFERENTIAL METHOD: ABNORMAL
EOSINOPHIL # BLD AUTO: 0.4 K/UL
EOSINOPHIL NFR BLD: 5.2 %
ERYTHROCYTE [DISTWIDTH] IN BLOOD BY AUTOMATED COUNT: 16.2 %
EST. GFR  (AFRICAN AMERICAN): 9.6 ML/MIN/1.73 M^2
EST. GFR  (NON AFRICAN AMERICAN): 8.3 ML/MIN/1.73 M^2
GLUCOSE SERPL-MCNC: 111 MG/DL
HCT VFR BLD AUTO: 26.3 %
HGB BLD-MCNC: 8.7 G/DL
LYMPHOCYTES # BLD AUTO: 0.9 K/UL
LYMPHOCYTES NFR BLD: 12.7 %
MAGNESIUM SERPL-MCNC: 2.1 MG/DL
MCH RBC QN AUTO: 31.6 PG
MCHC RBC AUTO-ENTMCNC: 33.1 %
MCV RBC AUTO: 96 FL
MONOCYTES # BLD AUTO: 0.9 K/UL
MONOCYTES NFR BLD: 12.8 %
NEUTROPHILS # BLD AUTO: 4.9 K/UL
NEUTROPHILS NFR BLD: 68.7 %
PHOSPHATE SERPL-MCNC: 5.3 MG/DL
PLATELET # BLD AUTO: 141 K/UL
PMV BLD AUTO: 10.7 FL
POCT GLUCOSE: 135 MG/DL (ref 70–110)
POCT GLUCOSE: 188 MG/DL (ref 70–110)
POCT GLUCOSE: 191 MG/DL (ref 70–110)
POTASSIUM SERPL-SCNC: 4.8 MMOL/L
PROT SERPL-MCNC: 6.4 G/DL
RBC # BLD AUTO: 2.75 M/UL
SODIUM SERPL-SCNC: 135 MMOL/L
WBC # BLD AUTO: 7.11 K/UL

## 2017-05-01 PROCEDURE — 97110 THERAPEUTIC EXERCISES: CPT

## 2017-05-01 PROCEDURE — 97165 OT EVAL LOW COMPLEX 30 MIN: CPT

## 2017-05-01 PROCEDURE — 94799 UNLISTED PULMONARY SVC/PX: CPT

## 2017-05-01 PROCEDURE — 94640 AIRWAY INHALATION TREATMENT: CPT

## 2017-05-01 PROCEDURE — 25000003 PHARM REV CODE 250: Performed by: STUDENT IN AN ORGANIZED HEALTH CARE EDUCATION/TRAINING PROGRAM

## 2017-05-01 PROCEDURE — 25000242 PHARM REV CODE 250 ALT 637 W/ HCPCS: Performed by: STUDENT IN AN ORGANIZED HEALTH CARE EDUCATION/TRAINING PROGRAM

## 2017-05-01 PROCEDURE — 80053 COMPREHEN METABOLIC PANEL: CPT

## 2017-05-01 PROCEDURE — 25000003 PHARM REV CODE 250: Performed by: SURGERY

## 2017-05-01 PROCEDURE — 27000221 HC OXYGEN, UP TO 24 HOURS

## 2017-05-01 PROCEDURE — 84100 ASSAY OF PHOSPHORUS: CPT

## 2017-05-01 PROCEDURE — 97116 GAIT TRAINING THERAPY: CPT

## 2017-05-01 PROCEDURE — 85025 COMPLETE CBC W/AUTO DIFF WBC: CPT

## 2017-05-01 PROCEDURE — 97166 OT EVAL MOD COMPLEX 45 MIN: CPT

## 2017-05-01 PROCEDURE — 83735 ASSAY OF MAGNESIUM: CPT

## 2017-05-01 PROCEDURE — 99231 SBSQ HOSP IP/OBS SF/LOW 25: CPT | Mod: ,,, | Performed by: THORACIC SURGERY (CARDIOTHORACIC VASCULAR SURGERY)

## 2017-05-01 PROCEDURE — 94761 N-INVAS EAR/PLS OXIMETRY MLT: CPT

## 2017-05-01 PROCEDURE — 36415 COLL VENOUS BLD VENIPUNCTURE: CPT

## 2017-05-01 PROCEDURE — 20600001 HC STEP DOWN PRIVATE ROOM

## 2017-05-01 RX ORDER — SODIUM CHLORIDE 9 MG/ML
INJECTION, SOLUTION INTRAVENOUS
Status: DISCONTINUED | OUTPATIENT
Start: 2017-05-02 | End: 2017-05-05 | Stop reason: HOSPADM

## 2017-05-01 RX ORDER — OXYCODONE HYDROCHLORIDE 5 MG/1
5 TABLET ORAL
Status: DISCONTINUED | OUTPATIENT
Start: 2017-05-01 | End: 2017-05-05 | Stop reason: HOSPADM

## 2017-05-01 RX ORDER — SODIUM CHLORIDE 9 MG/ML
INJECTION, SOLUTION INTRAVENOUS ONCE
Status: COMPLETED | OUTPATIENT
Start: 2017-05-02 | End: 2017-05-02

## 2017-05-01 RX ORDER — OXYCODONE HYDROCHLORIDE 5 MG/1
10 TABLET ORAL
Status: DISCONTINUED | OUTPATIENT
Start: 2017-05-01 | End: 2017-05-05 | Stop reason: HOSPADM

## 2017-05-01 RX ADMIN — CHLORHEXIDINE GLUCONATE 15 ML: 1.2 RINSE ORAL at 10:05

## 2017-05-01 RX ADMIN — IPRATROPIUM BROMIDE AND ALBUTEROL SULFATE 3 ML: .5; 3 SOLUTION RESPIRATORY (INHALATION) at 07:05

## 2017-05-01 RX ADMIN — Medication 3 ML: at 02:05

## 2017-05-01 RX ADMIN — OXYCODONE HYDROCHLORIDE 10 MG: 5 TABLET ORAL at 01:05

## 2017-05-01 RX ADMIN — Medication 3 ML: at 06:05

## 2017-05-01 RX ADMIN — IPRATROPIUM BROMIDE AND ALBUTEROL SULFATE 3 ML: .5; 3 SOLUTION RESPIRATORY (INHALATION) at 03:05

## 2017-05-01 RX ADMIN — HEPARIN SODIUM 5000 UNITS: 5000 INJECTION, SOLUTION INTRAVENOUS; SUBCUTANEOUS at 02:05

## 2017-05-01 RX ADMIN — IPRATROPIUM BROMIDE AND ALBUTEROL SULFATE 3 ML: .5; 3 SOLUTION RESPIRATORY (INHALATION) at 12:05

## 2017-05-01 RX ADMIN — Medication 3 ML: at 10:05

## 2017-05-01 RX ADMIN — HEPARIN SODIUM 5000 UNITS: 5000 INJECTION, SOLUTION INTRAVENOUS; SUBCUTANEOUS at 10:05

## 2017-05-01 RX ADMIN — IPRATROPIUM BROMIDE AND ALBUTEROL SULFATE 3 ML: .5; 3 SOLUTION RESPIRATORY (INHALATION) at 08:05

## 2017-05-01 RX ADMIN — OXYCODONE HYDROCHLORIDE 5 MG: 5 TABLET ORAL at 10:05

## 2017-05-01 RX ADMIN — FAMOTIDINE 20 MG: 20 TABLET, FILM COATED ORAL at 06:05

## 2017-05-01 RX ADMIN — HEPARIN SODIUM 5000 UNITS: 5000 INJECTION, SOLUTION INTRAVENOUS; SUBCUTANEOUS at 06:05

## 2017-05-01 RX ADMIN — Medication 3 ML: at 01:05

## 2017-05-01 RX ADMIN — OXYCODONE HYDROCHLORIDE 10 MG: 5 TABLET ORAL at 04:05

## 2017-05-01 RX ADMIN — CHLORHEXIDINE GLUCONATE 15 ML: 1.2 RINSE ORAL at 09:05

## 2017-05-01 NOTE — SUBJECTIVE & OBJECTIVE
Interval HPI:     /67 (BP Location: Left arm, Patient Position: Lying, BP Method: Automatic)  Pulse 86  Temp 98.2 °F (36.8 °C) (Oral)   Resp 16  Ht 5' (1.524 m)  Wt 71.2 kg (156 lb 15.5 oz)  SpO2 96%  Breastfeeding? No  BMI 30.66 kg/m2    Labs Reviewed and Include      Recent Labs  Lab 05/01/17  0419   *   CALCIUM 7.8*   ALBUMIN 2.7*   PROT 6.4   *   K 4.8   CO2 19*      BUN 28*   CREATININE 5.2*   ALKPHOS 49*   ALT <5*   AST 13   BILITOT 0.4     Lab Results   Component Value Date    WBC 7.11 05/01/2017    HGB 8.7 (L) 05/01/2017    HCT 26.3 (L) 05/01/2017    MCV 96 05/01/2017     (L) 05/01/2017     No results for input(s): TSH, FREET4 in the last 168 hours.  Lab Results   Component Value Date    HGBA1C 6.3 (H) 04/28/2017       Nutritional status:   Body mass index is 30.66 kg/(m^2).  Lab Results   Component Value Date    ALBUMIN 2.7 (L) 05/01/2017    ALBUMIN 2.9 (L) 04/30/2017    ALBUMIN 2.9 (L) 04/29/2017     Lab Results   Component Value Date    PREALBUMIN 13 (L) 11/27/2009       Estimated Creatinine Clearance: 10 mL/min (based on Cr of 5.2).    Accu-Checks  Recent Labs      04/28/17   1256  04/28/17   1907  04/29/17   0017  04/29/17   0646  04/29/17   1154  04/29/17   1704  04/30/17   0534  04/30/17   1500  04/30/17   1939  04/30/17   2153   POCTGLUCOSE  98  90  98  88  96  89  104  140*  155*  176*       Current Medications and/or Treatments Impacting Glycemic Control  Immunotherapy:  Immunosuppressants     None        Steroids:   Hormones     None        Pressors:    Autonomic Drugs     None        Hyperglycemia/Diabetes Medications: Antihyperglycemics     Start     Stop Route Frequency Ordered    04/28/17 1016  insulin aspart pen 0-5 Units      -- SubQ Every 6 hours PRN 04/28/17 0916

## 2017-05-01 NOTE — PROGRESS NOTES
Progress Note  Vascular Surgery    Admit Date: 4/27/2017  Attending: Santy  S/P: Procedure(s) (LRB):  [id number] Left Thigh (Left)    Post-operative Day: 4 Days Post-Op    Hospital Day: 5    SUBJECTIVE:     NAEON.  Chest tube with 520 output over past 24.  CXR showing slightly improved effusion. Pt on 4L NC. Tolerating regular diet. Overall doing well.     OBJECTIVE:     Vital Signs (Most Recent)  Temp: 98.2 °F (36.8 °C) (05/01/17 0425)  Pulse: 86 (05/01/17 0700)  Resp: 16 (05/01/17 0425)  BP: 138/67 (05/01/17 0425)  SpO2: 96 % (05/01/17 0425)    Vital Signs Range (Last 24H):  Temp:  [98.2 °F (36.8 °C)-98.7 °F (37.1 °C)]   Pulse:  [77-88]   Resp:  [13-33]   BP: (114-150)/(50-80)   SpO2:  [90 %-100 %]     I & O (Last 24H):    Intake/Output Summary (Last 24 hours) at 05/01/17 0737  Last data filed at 05/01/17 0500   Gross per 24 hour   Intake              420 ml   Output             2820 ml   Net            -2400 ml       Physical Exam:  Gen: NAD  HEENT: NCAT  CV: RRR, no m/r/g   Pulm: Unlabored  Abd: Soft, nttp. No rebound, guarding.   Extremities: no cyanosis or edema, or clubbing  Skin: Skin color, texture, turgor normal. No rashes or lesions    Wound/Incision:  C/d/i.      Laboratory:  CBC:     Recent Labs  Lab 05/01/17 0419   WBC 7.11   RBC 2.75*   HGB 8.7*   HCT 26.3*   *   MCV 96   MCH 31.6*   MCHC 33.1     CMP:     Recent Labs  Lab 05/01/17 0419   *   CALCIUM 7.8*   ALBUMIN 2.7*   PROT 6.4   *   K 4.8   CO2 19*      BUN 28*   CREATININE 5.2*   ALKPHOS 49*   ALT <5*   AST 13   BILITOT 0.4     Coagulation:     Recent Labs  Lab 04/27/17  1929   INR 1.1     Labs within the past 24 hours have been reviewed.    ASSESSMENT/PLAN:     Assessment: 62 year old woman with ESRD, s/p L AV graft insertion POD 3, with cardiac arrest and asystole on POD 4 (ROSC after 1.5 rounds of ACLS)    Plan:  S/p ACLS:  Mental status appropriate- no focal neuro deficits  Regular diet    S/p  AV graft insertion:  Pain well controlled    ESRD: Rc HD yesterday - 2.3L removed.     PT/OT     R pleural effusion: Chest tube in place, appreciate Thoracic surgery recommendations  jimbo Ordonez toilet    Dispo: Patient stable for discharge home with H/H PT per PT/OT when management of effusion has resolved.     Ken Dodd MD  General Surgery PGY1  Pager: [phone]

## 2017-05-01 NOTE — PT/OT/SLP EVAL
Occupational Therapy  Evaluation    Aleta Herrera   MRN: [id number]   Admitting Diagnosis: 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    OT Date of Treatment: 17   OT Start Time: 1240  OT Stop Time: 1309  OT Total Time (min): 29 min    Billable Minutes:  Evaluation 29    Diagnosis: 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       Past Medical History:   Diagnosis Date    Anemia of chronic renal failure 2013    Anticoagulant long-term use 2015    CHF (congestive heart failure)     Coronary artery disease     Diabetes mellitus     Diabetic neuropathy 2013    DR (diabetic retinopathy) 2013    End stage renal disease on dialysis     Hypertension     Hypertension, renal 2013    Kidney transplant candidate 2013    Secondary hyperparathyroidism of renal origin 2013    Stroke 2015    Type 2 diabetes mellitus since 2013      Past Surgical History:   Procedure Laterality Date    AV FISTULA PLACEMENT      CATARACT SURGERY       SECTION, LOW TRANSVERSE      x 3    COLONOSCOPY N/A 2016    Procedure: COLONOSCOPY;  Surgeon: Roverto Patel MD;  Location: [address]);  Service: Endoscopy;  Laterality: N/A;    EYE SURGERY      HYSTERECTOMY      Endometriosis    TONSILLECTOMY      VASCULAR SURGERY         Referring physician: J LUIS Munguia  Date referred to OT: 2017    General Precautions: Standard, fall  Orthopedic Precautions:    Braces:      Do you have any cultural, spiritual, Voodoo conflicts, given your current situation?: no     Patient History:  Living Environment  Living Environment Comment: Pt lives with  and adult daughter in 2  with small ramp (for threshold TWIN). Pt daughter resides on 2nd floor and pt and her spouse stay on the 1st level. Pt and spouse indicated that the pt required supervision for all aDLs and functional mobiltiy for safety. Pt would experience SOB and have increased  ""difficlty" with daily tasks and mobiltiy. Pt rported her spouse would provide physical A with bathing for safety. Pt indicated she has rollator, WC, and bedside commode.   Equipment Currently Used at Home: bedside commode, rollator, wheelchair    Prior level of function:   Bed Mobility/Transfers: other (see comments) (Sup from  with occasional A required for stability per pt and spouse report)  Grooming: other (see comments)  Bathing: other (see comments)  Upper Body Dressing: other (see comments)  Lower Body Dressing: other (see comments)  Toileting: other (see comments)  Home Management Skills: other (see comments)  Driving License: No  Mode of Transportation: Family  Occupation: Retired     Dominant hand: right    Subjective:  Communicated with RN prior to session.  "I am waiting on pain medicine. My chest hurts. I've been waiting since this morning. They need to put the order in." "I don't want to bother them though." - pt  "she's not so sure of herself. That can hurt you. (regarding balance)"-spouse  Chief Complaint: pain  Patient/Family stated goals: go home; improve (I)    Pain Ratin/10  Location - Side: Right  Location - Orientation: generalized  Location: chest  Pain Addressed: Reposition, Distraction, Nurse notified  Pain Rating Post-Intervention: 9/10    Objective:  Patient found with: oxygen, telemetry, chest tube    Cognitive Exam:  Oriented to: Person, Place and Time and situation  Follows Commands/attention: Follows multistep  commands  Communication: clear/fluent  Memory:  No Deficits noted  Safety awareness/insight to disability: intact  Coping skills/emotional control: Appropriate to situation    Visual/perceptual:  Intact    Physical Exam:  Postural examination/scapula alignment: Rounded shoulder and Posterior pelvic tilt  Skin integrity: Visible skin intact  Edema: None noted     Sensation:   Intact    Upper Extremity Range of Motion:  Right Upper Extremity: WFL  Left Upper Extremity: " "WFL    Upper Extremity Strength:  Right Upper Extremity: WFL  Left Upper Extremity: WFL   Strength: WFL    Fine motor coordination:   Intact    Gross motor coordination: WFL    Functional Mobility:  Bed Mobility:       Transfers:  Sit <> Stand Assistance: Moderate Assistance (Mod A to stand, CGA to sit in chair)  Sit <> Stand Assistive Device: Rolling Walker    Functional Ambulation: Pt performed functional mobility ~6ft with 10 marches with CGA and RW.      Activities of Daily Living:     UE Dressing Level of Assistance: Minimum assistance (doff/don gown and gown like robe)  LE Dressing Level of Assistance: Supervision  Grooming Position: Seated, bedside chair  Grooming Level of Assistance: Stand by assistance (wash face and hands with cloth)           Therapeutic Activities and Exercises:  Pt educated on safety with daily tasks OOB, and importance of participating in daily ax. Pt whiteboard updated.      AM-PAC 6 CLICK ADL  How much help from another person does this patient currently need?  1 = Unable, Total/Dependent Assistance  2 = A lot, Maximum/Moderate Assistance  3 = A little, Minimum/Contact Guard/Supervision  4 = None, Modified [place]/Independent    Putting on and taking off regular lower body clothing? : 4  Bathing (including washing, rinsing, drying)?: 3  Toileting, which includes using toilet, bedpan, or urinal? : 3  Putting on and taking off regular upper body clothing?: 3  Taking care of personal grooming such as brushing teeth?: 3  Eating meals?: 4  Total Score: 20    AM-PAC Raw Score CMS "G-Code Modifier Level of Impairment Assistance   6 % Total / Unable   7 - 9 CM 80 - 100% Maximal Assist   10 - 14 CL 60 - 80% Moderate Assist   15 - 19 CK 40 - 60% Moderate Assist   20 - 22 CJ 20 - 40% Minimal Assist   23 CI 1-20% SBA / CGA   24 CH 0% Independent/ Mod I       Patient left up in chair with all lines intact, call button in reach and RN notified    Assessment:  Aleta Agudelo " Sharon is a 62 y.o. female with a medical diagnosis of 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. Pt tolerated session well and put forth good effort to participate. Pt presented with decreased (I), endurance, stability and safety for ADLs, self-care and functional mobility. Pt will benefit from further OT in order to maximize (I) and safety for functional tasks.      Rehab identified problem list/impairments: Rehab identified problem list/impairments: weakness, gait instability, impaired endurance, impaired balance, impaired self care skills, pain    Rehab potential is good.    Activity tolerance: Good    Discharge recommendations: Discharge Facility/Level Of Care Needs: home health PT     Barriers to discharge: Barriers to Discharge: None    Equipment recommendations: none     GOALS:   Occupational Therapy Goals        Problem: Occupational Therapy Goal    Goal Priority Disciplines Outcome Interventions   Occupational Therapy Goal     OT, PT/OT     Description:  Goals to be met by:  2 Weeks (5/15/2017)    Patient will increase functional independence with ADLs by performin. Supine to sit with Supervision.  2. Sit to Stand transfers with Supervision.   3. Toilet transfer to bedside commode with Supervision.  4. Grooming while standing with Supervision.  5. UE Dressing with Supervision.                    PLAN:  Patient to be seen 3 x/week to address the above listed problems via self-care/home management, community/work re-entry, therapeutic activities, therapeutic exercises  Plan of Care expires: 17  Plan of Care reviewed with: patient, spouse         ROGELIO Chavarria  2017

## 2017-05-01 NOTE — PLAN OF CARE
Problem: Occupational Therapy Goal  Goal: Occupational Therapy Goal  Goals to be met by: 2 Weeks (5/15/2017)    Patient will increase functional independence with ADLs by performin. Supine to sit with Supervision.  2. Sit to Stand transfers with Supervision.   3. Toilet transfer to bedside commode with Supervision.  4. Grooming while standing with Supervision.  5. UE Dressing with Supervision.  OT eval completed and goals set.  ROGELIO Chavarria  2017

## 2017-05-01 NOTE — PT/OT/SLP PROGRESS
Physical Therapy  Treatment    Aleta Herrera   MRN: [id number]   Admitting Diagnosis: 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    PT Received On: 05/01/17  PT Start Time: 0920     PT Stop Time: 0944    PT Total Time (min): 24 min       Billable Minutes:  Gait [id number] and Therapeutic Exercise 10    Treatment Type: Treatment  PT/PTA: PTA     PTA Visit Number: 1       General Precautions: Standard, fall  Orthopedic Precautions:     Braces:           Subjective:  Communicated with RN prior to session.  I want to walk with my walker    Pain Rating: 3/10  Location - Side: Right     Location: chest  Pain Addressed: Reposition, Distraction, Cessation of Activity  Pain Rating Post-Intervention: 3/10    Objective:   Patient found with: telemetry, pulse ox (continuous), oxygen, chest tube    Functional Mobility:  Bed Mobility:        Transfers:  Sit <> Stand Assistance: Minimum Assistance  Sit <> Stand Assistive Device: 4 wheeled walker  Bed <> Chair Technique: Stand Pivot  Bed <> Chair Assistance: Minimum Assistance  Bed <> Chair Assistive Device: No Assistive Device    Gait:   Gait Distance: 68 ft with vcsfor posture and safety with O2 and chest tube in tow  Assistance 1: Contact Guard Assistance, Minimum assistance  Gait Assistive Device: Rollator  Gait Pattern: swing-through gait  Gait Deviation(s): decreased magda, decreased step length, decreased stride length, increased time in double stance    Therapeutic Activities and Exercises:   Discussed pt progress, safety   Patient performed therex X 15 reps seated in bedside chair B LE AROM AP, LAQ, Hip Flexion, Hip Abd/Add   White board updated      AM-PAC 6 CLICK MOBILITY  How much help from another person does this patient currently need?   1 = Unable, Total/Dependent Assistance  2 = A lot, Maximum/Moderate Assistance  3 = A little, Minimum/Contact Guard/Supervision  4 = None, Modified [place]/Independent    Turning over in bed (including adjusting  bedclothes, sheets and blankets)?: 2  Sitting down on and standing up from a chair with arms (e.g., wheelchair, bedside commode, etc.): 3  Moving from lying on back to sitting on the side of the bed?: 3  Moving to and from a bed to a chair (including a wheelchair)?: 3  Need to walk in hospital room?: 3  Climbing 3-5 steps with a railing?: 1  Total Score: 15    AM-PAC Raw Score CMS G-Code Modifier Level of Impairment Assistance   6 % Total / Unable   7 - 9 CM 80 - 100% Maximal Assist   10 - 14 CL 60 - 80% Moderate Assist   15 - 19 CK 40 - 60% Moderate Assist   20 - 22 CJ 20 - 40% Minimal Assist   23 CI 1-20% SBA / CGA   24 CH 0% Independent/ Mod I     Patient left up in chair with all lines intact, call button in reach and nsg notified.    Assessment:  Aleta Herrera is a 62 y.o. female with a medical diagnosis of 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 and presents with continued deficits as listed below.  Pt Progressing with PT Intervention. Pt Progressing with increase gait distance. Pt would continue to benefit from skilled PT to address overall functional mobility and goals. Goals remain appropriate.        Rehab identified problem list/impairments: Rehab identified problem list/impairments: weakness, impaired endurance, impaired self care skills, impaired functional mobilty, gait instability, impaired balance    Rehab potential is good.    Activity tolerance: Good    Discharge recommendations: Discharge Facility/Level Of Care Needs: home health PT     Barriers to discharge:      Equipment recommendations:       GOALS:   Physical Therapy Goals        Problem: Physical Therapy Goal    Goal Priority Disciplines Outcome Goal Variances Interventions   Physical Therapy Goal     PT/OT, PT Ongoing (interventions implemented as appropriate)     Description:  Goals to be met by:      Patient will increase functional independence with mobility by performin. Supine to sit with Stand-by  Assistance  2. Sit to supine with Stand-by Assistance  3. Sit to stand transfer with Stand-by Assistance using RW  4. Gait  x 100 feet with Stand-by Assistance using Rolling Walker.                 PLAN:    Patient to be seen 4 x/week  to address the above listed problems via gait training, therapeutic activities, therapeutic exercises  Plan of Care expires:    Plan of Care reviewed with: patient         Rehan Holt, PTA  05/01/2017

## 2017-05-01 NOTE — PHYSICIAN QUERY
PT Name: Aleta Herrera  MR #: [id number]    Physician Query Form - Relationship to Procedure Clarification     CDS/: Gemini Branham RN,BSN,CDI              Contact information: Ext. [phone]    This form is a permanent document in the medical record.     Query Date: May 1, 2017      By submitting this query, we are merely seeking further clarification of documentation. Please utilize your independent clinical judgment when addressing the question(s) below.    The Medical record contains the following:  Supporting Clinical Findings Location in Medical Record   Patient is a 62 y.o. female with PMHx of CHF, CAD, HTN, anemia, DM II, PVD, DVT, CVA, and ESRD on HD who is s/p Left AV graft placement. Shortly after graft placement she experienced cardiac arrest  and was resuscitated and now has right pleural effusion.         Please clarify if _____Cardiac Arrest__________ is    [  ] Inherent/Integral to procedure  [  ] Routine outcome  [  ] Incidental finding  [  ] Complication of procedure  [  ] Clinically insignificant  [ XX ] Clinically undetermined

## 2017-05-01 NOTE — NURSING TRANSFER
Nursing Transfer Note      5/1/2017     Transfer To: xray    Transfer via stretcher    Transfer with 3L  to O2, cardiac monitoring    Transported by transporter    Medicines sent: none    Chart send with patient: No    Notified: none

## 2017-05-01 NOTE — PLAN OF CARE
Pt to d/c mid-week with resumption of  care and [facility] Sw will continue to follow.     Monica Corona, NADEEN [phone]

## 2017-05-01 NOTE — PT/OT/SLP EVAL
Physical Therapy  Evaluation    Aleta Herrera   MRN: [id number]   Admitting Diagnosis: 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    PT Received On: 17  PT Start Time: 1130     PT Stop Time: 1150    PT Total Time (min): 20 min       Billable Minutes:  Evaluation 20    Diagnosis: 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      Past Medical History:   Diagnosis Date    Anemia of chronic renal failure 2013    Anticoagulant long-term use 2015    CHF (congestive heart failure)     Coronary artery disease     Diabetes mellitus     Diabetic neuropathy 2013    DR (diabetic retinopathy) 2013    End stage renal disease on dialysis     Hypertension     Hypertension, renal 2013    Kidney transplant candidate 2013    Secondary hyperparathyroidism of renal origin 2013    Stroke 2015    Type 2 diabetes mellitus since 2013      Past Surgical History:   Procedure Laterality Date    AV FISTULA PLACEMENT      CATARACT SURGERY       SECTION, LOW TRANSVERSE      x 3    COLONOSCOPY N/A 2016    Procedure: COLONOSCOPY;  Surgeon: Roverto Patel MD;  Location: [address]);  Service: Endoscopy;  Laterality: N/A;    EYE SURGERY      HYSTERECTOMY      Endometriosis    TONSILLECTOMY      VASCULAR SURGERY         General Precautions: Standard,  (fall)       Patient History:  Lives With: spouse, child(maryana), adult  Living Arrangements: house  Equipment Currently Used at Home:  (rollator, w/c)  DME owned (not currently used): none    Previous Level of Function:  Ambulation Skills:  (mod I with rollator and w/c for community distances)    Subjective:  Pt found seated in bedside chair.  Pain Ratin/10                    Objective:   Patient found with:  (tele, pulse ox, Bp cuff, oxygen)     Cognitive Exam:  Oriented to: Person, Place and Time    Follows Commands/attention: Follows multistep  commands  Communication:  clear/fluent    Physical Exam:  Lower Extremity Range of Motion:  Right Lower Extremity: WFL  Left Lower Extremity: WFL    Lower Extremity Strength:  Right Lower Extremity: WFL  Left Lower Extremity: WFL     Functional Mobility:  Bed Mobility:   NT as pt found seated in bedside chair    Transfers:  Sit <> Stand Assistance: Minimum Assistance  Sit <> Stand Assistive Device: Rolling Walker    Gait:   Gait Distance:  (10 feet)  Assistance 1: Minimum assistance  Gait Assistive Device: Rolling walker  Gait Deviation(s): decreased magda, increased time in double stance    Balance:   Static Sit: FAIR-: Maintains without assist but inconsistent   Dynamic Sit: POOR: N/A  Static Stand: POOR+: Needs MINIMAL assist to maintain  Dynamic stand: POOR: N/A    AM-PAC 6 CLICK MOBILITY  How much help from another person does this patient currently need?   1 = Unable, Total/Dependent Assistance  2 = A lot, Maximum/Moderate Assistance  3 = A little, Minimum/Contact Guard/Supervision  4 = None, Modified [place]/Independent    Turning over in bed (including adjusting bedclothes, sheets and blankets)?: 2  Sitting down on and standing up from a chair with arms (e.g., wheelchair, bedside commode, etc.): 2  Moving from lying on back to sitting on the side of the bed?: 2  Moving to and from a bed to a chair (including a wheelchair)?: 2  Need to walk in hospital room?: 2  Climbing 3-5 steps with a railing?: 1  Total Score: 11     AM-PAC Raw Score CMS G-Code Modifier Level of Impairment Assistance   6 % Total / Unable   7 - 9 CM 80 - 100% Maximal Assist   10 - 14 CL 60 - 80% Moderate Assist   15 - 19 CK 40 - 60% Moderate Assist   20 - 22 CJ 20 - 40% Minimal Assist   23 CI 1-20% SBA / CGA   24 CH 0% Independent/ Mod I     Patient left up in chair with all lines intact and call button in reach.    Assessment:   Aleta Herrera is a 62 y.o. female with a medical diagnosis of 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  and presents with decreased functional independence.    Rehab identified problem list/impairments: Rehab identified problem list/impairments: impaired endurance, impaired functional mobilty, impaired self care skills    Rehab potential is good.    Activity tolerance: Good    Discharge recommendations: Discharge Facility/Level Of Care Needs: home health PT       GOALS:   Physical Therapy Goals        Problem: Physical Therapy Goal    Goal Priority Disciplines Outcome Goal Variances Interventions   Physical Therapy Goal     PT/OT, PT Ongoing (interventions implemented as appropriate)     Description:  Goals to be met by:      Patient will increase functional independence with mobility by performin. Supine to sit with Stand-by Assistance  2. Sit to supine with Stand-by Assistance  3. Sit to stand transfer with Stand-by Assistance using RW  4. Gait  x 100 feet with Stand-by Assistance using Rolling Walker.                 PLAN:    Patient to be seen 4 x/week to address the above listed problems via therapeutic exercises, therapeutic activities, gait training  Plan of Care reviewed with: patient          Laura Ellington, PT  2017

## 2017-05-01 NOTE — PROGRESS NOTES
Pt has been euglycemic during this hospital admission with no insulin need. Max BG reading 176.     Will sign off. Reconsult if needed. Thank you for this consult.     Discharge Planning  1. DM managed by Nephrologist per pt report.   2. Recommend d/c Levemir upon discharge--correction scale Novolog only.   3. Has all insulin and supplies

## 2017-05-01 NOTE — NURSING TRANSFER
Nursing Transfer Note      5/1/2017     Transfer To: 646    Transfer via stretcher    Transfer with 3L to O2, cardiac monitoring    Transported by transporter    Medicines sent: none    Chart send with patient: No    Notified: none    Patient reassessed at: 5/1/17 1115    Upon arrival to floor: patient oriented to room, call bell in reach and bed in lowest position, pt up in chair

## 2017-05-01 NOTE — PLAN OF CARE
Problem: Physical Therapy Goal  Goal: Physical Therapy Goal  Goals to be met by:      Patient will increase functional independence with mobility by performin. Supine to sit with Stand-by Assistance  2. Sit to supine with Stand-by Assistance  3. Sit to stand transfer with Stand-by Assistance using RW  4. Gait x 100 feet with Stand-by Assistance using Rolling Walker.    Pt progressing towards goals. continue with PT POC.Goals remain appropriate.     Rehan Holt PTA  2017

## 2017-05-01 NOTE — PROGRESS NOTES
RN paged Dr. Dodd about pt requesting pain med for anterior R chest pain. Awaiting orders.     RN paged again for Dr. Rodriguez about pain med orders and spoke with OR RN. Awaiting orders.

## 2017-05-01 NOTE — PROGRESS NOTES
Progress Note  Cardiothoracic Surgery    Admit Date: 4/27/2017  Attending: Dr. Lynn  S/P: Procedure(s) (LRB):  [id number] Left Thigh (Left)    Post-operative Day: 4 Days Post-Op    Hospital Day: 5    SUBJECTIVE:     HPI:      Patient is a 62 y.o. female with PMHx of CHF, CAD, HTN, anemia, DM II (with retinopathy and neuropathy), PVD, DVT, CVA, and ESRD (on HD M/W/F) who is s/p Left AV graft placement yesterday with right pleural effusion. As patient was being wheeled out for discharge from hospital following procedure yesterday Noted to be unresponsive and brought back to PACU by . Noted sudden cardiac arrest and asystole with ROSC after 1 round of ACLS. Intubated and started on Cardene after noted to be hypertensive with SBP of 300. Cardene has since been weaned off and patient is extubated. CT head and chest following event noted large right pleural effusion with compressive atelectasis and ground glass nodule. Multiple CXR dating back to April 2016 noting right sided pleural effusion. Of note, patient reports hx of right sided pleural effusion with need for thoracentesis in July '16.      Interval:    NAEON. AFVSS, resting comfortably. Chest tube draining serosanguinous output.     OBJECTIVE:     Vital Signs (Most Recent)  Temp: 97.5 °F (36.4 °C) (05/01/17 0739)  Pulse: 85 (05/01/17 0748)  Resp: 16 (05/01/17 0748)  BP: 137/67 (05/01/17 0739)  SpO2: (!) 94 % (05/01/17 0748)    Vital Signs Range (Last 24H):  Temp:  [97.5 °F (36.4 °C)-98.7 °F (37.1 °C)]   Pulse:  [77-88]   Resp:  [13-33]   BP: (114-150)/(50-80)   SpO2:  [90 %-100 %]     I & O (Last 24H):    Intake/Output Summary (Last 24 hours) at 05/01/17 0750  Last data filed at 05/01/17 0500   Gross per 24 hour   Intake              420 ml   Output             2820 ml   Net            -2400 ml     Physical Exam:  General: well developed, well nourished in no distress; in ICU  Heart: regular rate and rhythm  Pulm: ct in right lung,  draining ss fluid, non labored breathing  Abd: soft, non tender, non distended  Ext: warm and well perfused, no c/c/e    Laboratory:  CBC:     Recent Labs  Lab 05/01/17  0419   WBC 7.11   RBC 2.75*   HGB 8.7*   HCT 26.3*   *   MCV 96   MCH 31.6*   MCHC 33.1     BMP:     Recent Labs  Lab 05/01/17  0419   *   *   K 4.8      CO2 19*   BUN 28*   CREATININE 5.2*   CALCIUM 7.8*     Labs within the past 24 hours have been reviewed.    Imaging:  CXR 5/1: Volume of pleural fluid on the right side has decreased slightly since 4/30/17 at 8:53 AM, though overall there has been little interval change in the appearance of the chest since that time, with the current examination demonstrating an improved inspiratory depth.  No pneumothorax.    ASSESSMENT/PLAN:     Patient is a 62 y.o. female with PMHx of CHF, CAD, HTN, anemia, DM II, PVD, DVT, CVA, and ESRD on HD who is s/p Left AV graft placement. Shortly after graft placement she experienced cardiac arrest and was resuscitated and now has right pleural effusion.    - Continue CT to water seal  - will monitor remaining effusion  - additional management per primary team    Karen Shin PA-C  Thoracic Surgery  [id number]

## 2017-05-01 NOTE — PLAN OF CARE
Problem: Patient Care Overview  Goal: Plan of Care Review  Outcome: Ongoing (interventions implemented as appropriate)  POC reviewed with pt. Pt verbalized understanding. AAOx4, VSS on 4 L NC with O2 >92%. Denies SOB and chest pain. R CT to water seal, serosanguinous drainage recorded. R femoral tunnel cath clean/dry/intact. L groin incision C/D/I. Tolerating renal diet, no N/V. No flatus or bowel movement. Skin intact. Up with x2 assist. In chair since SICU transfer. accuchecks changed to ACHS. Neurovascular checks Q4H, denies numbness/tingling, weak palpable pulses.  No falls or injuries at this time. WCTM.

## 2017-05-01 NOTE — PLAN OF CARE
Problem: Patient Care Overview  Goal: Plan of Care Review  Outcome: Ongoing (interventions implemented as appropriate)  Plan of care reviewed with pt. Pt verbalized understanding. AAOx4. VSS on 4L nasal cannula. Chest tube intact. Denied pain. Tolerating diet. Denied n/v. Remained free from falls or injuries. Call light within reach. Will call for assistance. No distress noted. Will continue to monitor.

## 2017-05-02 LAB
ALBUMIN SERPL BCP-MCNC: 2.9 G/DL
ALP SERPL-CCNC: 53 U/L
ALT SERPL W/O P-5'-P-CCNC: <5 U/L
ANION GAP SERPL CALC-SCNC: 10 MMOL/L
AST SERPL-CCNC: 12 U/L
BACTERIA SPEC AEROBE CULT: NO GROWTH
BASOPHILS # BLD AUTO: 0.02 K/UL
BASOPHILS NFR BLD: 0.2 %
BILIRUB SERPL-MCNC: 0.4 MG/DL
BUN SERPL-MCNC: 35 MG/DL
CALCIUM SERPL-MCNC: 7.6 MG/DL
CHLORIDE SERPL-SCNC: 104 MMOL/L
CO2 SERPL-SCNC: 23 MMOL/L
CREAT SERPL-MCNC: 5.9 MG/DL
DIFFERENTIAL METHOD: ABNORMAL
EOSINOPHIL # BLD AUTO: 0.6 K/UL
EOSINOPHIL NFR BLD: 6.6 %
ERYTHROCYTE [DISTWIDTH] IN BLOOD BY AUTOMATED COUNT: 16.2 %
EST. GFR  (AFRICAN AMERICAN): 8.2 ML/MIN/1.73 M^2
EST. GFR  (NON AFRICAN AMERICAN): 7.1 ML/MIN/1.73 M^2
GLUCOSE SERPL-MCNC: 107 MG/DL
HCT VFR BLD AUTO: 26.2 %
HGB BLD-MCNC: 8.6 G/DL
LYMPHOCYTES # BLD AUTO: 1 K/UL
LYMPHOCYTES NFR BLD: 12 %
MAGNESIUM SERPL-MCNC: 1.9 MG/DL
MCH RBC QN AUTO: 31.6 PG
MCHC RBC AUTO-ENTMCNC: 32.8 %
MCV RBC AUTO: 96 FL
MONOCYTES # BLD AUTO: 1.1 K/UL
MONOCYTES NFR BLD: 12.4 %
NEUTROPHILS # BLD AUTO: 5.8 K/UL
NEUTROPHILS NFR BLD: 68.7 %
PHOSPHATE SERPL-MCNC: 5.1 MG/DL
PLATELET # BLD AUTO: 153 K/UL
PMV BLD AUTO: 10.9 FL
POCT GLUCOSE: 120 MG/DL (ref 70–110)
POCT GLUCOSE: 137 MG/DL (ref 70–110)
POCT GLUCOSE: 140 MG/DL (ref 70–110)
POCT GLUCOSE: 141 MG/DL (ref 70–110)
POCT GLUCOSE: 188 MG/DL (ref 70–110)
POTASSIUM SERPL-SCNC: 4.7 MMOL/L
PROT SERPL-MCNC: 6.8 G/DL
RBC # BLD AUTO: 2.72 M/UL
SODIUM SERPL-SCNC: 137 MMOL/L
WBC # BLD AUTO: 8.48 K/UL

## 2017-05-02 PROCEDURE — 25000003 PHARM REV CODE 250: Performed by: SURGERY

## 2017-05-02 PROCEDURE — 25000003 PHARM REV CODE 250: Performed by: INTERNAL MEDICINE

## 2017-05-02 PROCEDURE — 90935 HEMODIALYSIS ONE EVALUATION: CPT | Mod: ,,, | Performed by: INTERNAL MEDICINE

## 2017-05-02 PROCEDURE — 83735 ASSAY OF MAGNESIUM: CPT

## 2017-05-02 PROCEDURE — 99232 SBSQ HOSP IP/OBS MODERATE 35: CPT | Mod: ,,, | Performed by: THORACIC SURGERY (CARDIOTHORACIC VASCULAR SURGERY)

## 2017-05-02 PROCEDURE — 80100016 HC MAINTENANCE HEMODIALYSIS

## 2017-05-02 PROCEDURE — 25000003 PHARM REV CODE 250: Performed by: STUDENT IN AN ORGANIZED HEALTH CARE EDUCATION/TRAINING PROGRAM

## 2017-05-02 PROCEDURE — 20600001 HC STEP DOWN PRIVATE ROOM

## 2017-05-02 PROCEDURE — 85025 COMPLETE CBC W/AUTO DIFF WBC: CPT

## 2017-05-02 PROCEDURE — 99900035 HC TECH TIME PER 15 MIN (STAT)

## 2017-05-02 PROCEDURE — 94640 AIRWAY INHALATION TREATMENT: CPT

## 2017-05-02 PROCEDURE — 94761 N-INVAS EAR/PLS OXIMETRY MLT: CPT

## 2017-05-02 PROCEDURE — 36415 COLL VENOUS BLD VENIPUNCTURE: CPT

## 2017-05-02 PROCEDURE — 97116 GAIT TRAINING THERAPY: CPT

## 2017-05-02 PROCEDURE — 94664 DEMO&/EVAL PT USE INHALER: CPT

## 2017-05-02 PROCEDURE — 84100 ASSAY OF PHOSPHORUS: CPT

## 2017-05-02 PROCEDURE — 94799 UNLISTED PULMONARY SVC/PX: CPT

## 2017-05-02 PROCEDURE — 63600175 PHARM REV CODE 636 W HCPCS: Performed by: INTERNAL MEDICINE

## 2017-05-02 PROCEDURE — 97110 THERAPEUTIC EXERCISES: CPT

## 2017-05-02 PROCEDURE — 25000242 PHARM REV CODE 250 ALT 637 W/ HCPCS: Performed by: STUDENT IN AN ORGANIZED HEALTH CARE EDUCATION/TRAINING PROGRAM

## 2017-05-02 PROCEDURE — 80053 COMPREHEN METABOLIC PANEL: CPT

## 2017-05-02 RX ORDER — HEPARIN SODIUM 1000 [USP'U]/ML
1000 INJECTION, SOLUTION INTRAVENOUS; SUBCUTANEOUS
Status: DISCONTINUED | OUTPATIENT
Start: 2017-05-02 | End: 2017-05-05 | Stop reason: HOSPADM

## 2017-05-02 RX ADMIN — IPRATROPIUM BROMIDE AND ALBUTEROL SULFATE 3 ML: .5; 3 SOLUTION RESPIRATORY (INHALATION) at 09:05

## 2017-05-02 RX ADMIN — Medication 3 ML: at 10:05

## 2017-05-02 RX ADMIN — IPRATROPIUM BROMIDE AND ALBUTEROL SULFATE 3 ML: .5; 3 SOLUTION RESPIRATORY (INHALATION) at 11:05

## 2017-05-02 RX ADMIN — HEPARIN SODIUM 5000 UNITS: 5000 INJECTION, SOLUTION INTRAVENOUS; SUBCUTANEOUS at 10:05

## 2017-05-02 RX ADMIN — CHLORHEXIDINE GLUCONATE 15 ML: 1.2 RINSE ORAL at 10:05

## 2017-05-02 RX ADMIN — OXYCODONE HYDROCHLORIDE 10 MG: 5 TABLET ORAL at 10:05

## 2017-05-02 RX ADMIN — OXYCODONE HYDROCHLORIDE 5 MG: 5 TABLET ORAL at 12:05

## 2017-05-02 RX ADMIN — IPRATROPIUM BROMIDE AND ALBUTEROL SULFATE 3 ML: .5; 3 SOLUTION RESPIRATORY (INHALATION) at 08:05

## 2017-05-02 RX ADMIN — Medication 3 ML: at 06:05

## 2017-05-02 RX ADMIN — SODIUM CHLORIDE: 0.9 INJECTION, SOLUTION INTRAVENOUS at 04:05

## 2017-05-02 RX ADMIN — HEPARIN SODIUM 5000 UNITS: 5000 INJECTION, SOLUTION INTRAVENOUS; SUBCUTANEOUS at 01:05

## 2017-05-02 RX ADMIN — OXYCODONE HYDROCHLORIDE 10 MG: 5 TABLET ORAL at 06:05

## 2017-05-02 RX ADMIN — HEPARIN SODIUM 1000 UNITS: 1000 INJECTION, SOLUTION INTRAVENOUS; SUBCUTANEOUS at 05:05

## 2017-05-02 RX ADMIN — ERYTHROPOIETIN 7000 UNITS: 10000 INJECTION, SOLUTION INTRAVENOUS; SUBCUTANEOUS at 04:05

## 2017-05-02 RX ADMIN — HEPARIN SODIUM 5000 UNITS: 5000 INJECTION, SOLUTION INTRAVENOUS; SUBCUTANEOUS at 05:05

## 2017-05-02 RX ADMIN — FAMOTIDINE 20 MG: 20 TABLET, FILM COATED ORAL at 05:05

## 2017-05-02 NOTE — NURSING
Spoke to vascular MD Dr. Dodd re: pt's oxygen needs of 3L O2 maintaining her sats at ~92%. Will continue to monitor. Made MD aware that pt was asking about home BP meds, made MD aware of pt's BPs. MD states he will hold off on re-ordering at this time. Requested chlorhexidine rinse be DC'd, MD states pt does not need to use this med if she does not want to. Will continue to monitor.

## 2017-05-02 NOTE — PLAN OF CARE
Pt accepted by [facility]. Sw awaiting word from team about whether Pt will need Home O2, and what qualifying Dx she has for the home O2. Sw will continue to follow.       Monica Corona LMSW [id number]

## 2017-05-02 NOTE — PLAN OF CARE
05/02/17 1030   Medicare Message   Important Message from Medicare regarding Discharge Appeal Rights Given to patient/caregiver;Explained to patient/caregiver;Signed/date by patient/caregiver   Date IMM was signed 05/02/17   Time IMM was signed 1020

## 2017-05-02 NOTE — PLAN OF CARE
Problem: Patient Care Overview  Goal: Plan of Care Review  Outcome: Ongoing (interventions implemented as appropriate)  POC reviewed with pt. Pt verbalized understanding. AAOx4, VSS on 3 L NC with O2 >92%. Denies SOB and chest pain. R CT to water seal, serosanguinous drainage recorded. R femoral tunnel cath clean/dry/intact. L groin incision C/D/I. Tolerating renal diet, no N/V. No flatus or bowel movement. Skin intact. Up with x2 assist. In chair and sitting on edge of bed most of shift, walked with PT in sutton. accuchecks ACHS. Neurovascular checks Q4H, denies numbness/tingling, weak palpable pulses. C/o anterior R chest pain today, treated with prn pain meds. No falls or injuries at this time. WCTM.

## 2017-05-02 NOTE — PLAN OF CARE
[facility]    HOME HEALTH ORDERS  FACE TO FACE ENCOUNTER    Patient Name: Aleta Herrera  YOB: 1955    PCP: Harinder Navas DO   PCP Address: [address] / [place] [address]  PCP Phone Number: [phone]  PCP Fax: [phone]    Encounter Date: 05/02/2017    Admit to Home Health    Diagnoses:  Active Hospital Problems    Diagnosis  POA    *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 [E11.22, I12.0, Z99.2, N18.6]  Not Applicable    Pleural effusion, right [J90]  Yes    Cardiac arrest [I46.9]  Yes    DR (diabetic retinopathy) [E11.319]  Yes     Chronic    Diabetic neuropathy [E11.40]  Yes     Chronic    ESRD 2/2 HTN on HD since 11/12/09 [N18.6]  Yes     Chronic      Resolved Hospital Problems    Diagnosis Date Resolved POA   No resolved problems to display.       Future Appointments  Date Time Provider Department Center   5/4/2017 9:40 AM Radha Cortez MD [facility] James Sanders PCW     Follow-up Information     Follow up with Nathalie Madera MD In 2 weeks.    Specialty:  Vascular Surgery    Why:  For wound re-check    Contact information:    [address]  [facility] [id number]  [phone]              I have seen and examined this patient face to face today. My clinical findings that support the need for the home health skilled services and home bound status are the following:  Weakness/numbness causing balance and gait disturbance due to Surgery making it taxing to leave home.    Allergies:Review of patient's allergies indicates:  No Known Allergies    Diet: regular diet    Activities: activity as tolerated    Nursing:   SN to complete comprehensive assessment including routine vital signs. Instruct on disease process and s/s of complications to report to MD. Review/verify medication list sent home with the patient at time of discharge  and instruct patient/caregiver as needed. Frequency may be adjusted depending on start of care  date.    Notify MD if SBP > 160 or < 90; DBP > 90 or < 50; HR > 120 or < 50; Temp > 101; Other:   Other medical concerns.      CONSULTS:    Physical Therapy to evaluate and treat. Evaluate for home safety and equipment needs; Establish/upgrade home exercise program. Perform / instruct on therapeutic exercises, gait training, transfer training, and Range of Motion.  Occupational Therapy to evaluate and treat. Evaluate home environment for safety and equipment needs. Perform/Instruct on transfers, ADL training, ROM, and therapeutic exercises.    MISCELLANEOUS CARE:  N/A    WOUND CARE ORDERS  n/a      Medications: Review discharge medications with patient and family and provide education.      Current Discharge Medication List      START taking these medications    Details   hydrocodone-acetaminophen 5-325mg (NORCO) 5-325 mg per tablet Take 1 tablet by mouth every 4 (four) hours as needed for Pain.  Qty: 40 tablet, Refills: 0         CONTINUE these medications which have NOT CHANGED    Details   amlodipine (NORVASC) 10 MG tablet TAKE ONE TABLET BY MOUTH ONCE DAILY  Qty: 90 tablet, Refills: 0      aspirin 81 mg Tab Take by mouth.      calcitRIOL (ROCALTROL) 0.5 MCG Cap Take 1 capsule (0.5 mcg total) by mouth once daily.  Qty: 30 capsule, Refills: 1      epoetin batsheva (PROCRIT) 10,000 unit/mL injection Inject 0.74 mLs (7,400 Units total) into the skin every Mon, Wed, Fri.      insulin aspart (NOVOLOG FLEXPEN) 100 unit/mL InPn as dir Insulin Pen Subcutaneous Before meals and at bedtime.  If blood sugar is 151-200 1 units SQif blood sugar is 201-250 2 units SQIf blood sugar is 251-300 3 units SQIf blood sugar is 301-350 4 units SQIf blood sugar is > or = 351 5 units SQand call MD;  Dispense with needles      inulin (FIBER CHOICE, INULIN,) 1.5 gram Chew Take 2 tablets by mouth 2 (two) times daily. Or as directed on product packaging.  Refills: 11      lisinopril (PRINIVIL,ZESTRIL) 20 MG tablet Take 2 tablets (40 mg total) by  mouth once daily.  Qty: 180 tablet, Refills: 3    Associated Diagnoses: Hypertension, essential      metoprolol tartrate (LOPRESSOR) 100 MG tablet TAKE 1 TABLET BY MOUTH TWICE DAILY  Qty: 180 tablet, Refills: 3    Comments: **Patient requests 90 days supply**      polyethylene glycol (GLYCOLAX) 17 gram/dose powder Take 17 g by mouth once daily. May take up to 3 times per day as needed for constipation.  Qty: 510 g, Refills: 11      sevelamer carbonate (RENVELA) 800 mg Tab Take 2,400 mg by mouth 3 (three) times daily with meals.      insulin detemir (LEVEMIR FLEXPEN) 100 unit/mL (3 mL) SubQ InPn pen Inject 8 Units into the skin 2 (two) times daily.             I certify that this patient is confined to her home and needs physical therapy and occupational therapy.

## 2017-05-02 NOTE — PROGRESS NOTES
RN unable to assess pt's BG prior to dinner because dietary DID NOT call the RN even regardless of note on tray and sign on door and pt ate. Night RN to check at bedtime.

## 2017-05-02 NOTE — PROGRESS NOTES
Progress Note  Nephrology    Admit Date: 4/27/2017   LOS: 5 days     SUBJECTIVE:     Follow-up For: ESRD    Interval follow up: NAEON. Patient evaluated during HD and tolerating it well. Denies any SOB at the time of evaluation.     OBJECTIVE:     Vital Signs (Most Recent)  Temp: 98.1 °F (36.7 °C) (05/02/17 1400)  Pulse: 96 (05/02/17 1430)  Resp: 18 (05/02/17 1430)  BP: 113/70 (05/02/17 1430)  SpO2: 100 % (05/02/17 1430)    Vital Signs Range (Last 24H):  Temp:  [96 °F (35.6 °C)-98.2 °F (36.8 °C)]   Pulse:  []   Resp:  [16-22]   BP: (113-155)/(60-82)   SpO2:  [62 %-100 %]       Date 05/02/17 0700 - 05/03/17 0659   Shift 5021-9738 8526-4258 6762-4141 24 Hour Total   I  N  T  A  K  E   P.O. 480   480    Shift Total  (mL/kg) 480  (6.7)   480  (6.7)   O  U  T  P  U  T   Shift Total  (mL/kg)       Weight (kg) 71.2 71.2 71.2 71.2     Physical Exam:   General:AAF in NAD. AOX4 On NC.  Respiratory: Diminished LLL. Other lobes clear. Good chest expansion w/o increased WOB.  Cardiovacular: Regular rate and rhythm, S1, S2 normal, no murmurs, rubs or gallops  Gastrointestinal: Soft, non-tender, bowel sounds normal.  Extremities: No clubbing or cyanosis of bilateral upper extremities; no lower extremity edema bilaterally, radial pulses 2+ bilaterally, symmetric  Skin: warm and dry; no rash on exposed skin    Laboratory:  CBC:   Recent Labs  Lab 05/02/17  0400   WBC 8.48   RBC 2.72*   HGB 8.6*   HCT 26.2*      MCV 96   MCH 31.6*   MCHC 32.8     CMP:   Recent Labs  Lab 05/02/17  0400      CALCIUM 7.6*   ALBUMIN 2.9*   PROT 6.8      K 4.7   CO2 23      BUN 35*   CREATININE 5.9*   ALKPHOS 53*   ALT <5*   AST 12   BILITOT 0.4       Diagnostic Results:  Labs: Reviewed  X-Ray: Reviewed    ASSESSMENT/PLAN:     Active Hospital Problems    Diagnosis  POA    *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 [E11.22, I12.0, Z99.2, N18.6]  Not Applicable    Pleural effusion, right [J90]  Yes    Cardiac arrest  [I46.9]  Yes     (diabetic retinopathy) [E11.319]  Yes     Chronic    Diabetic neuropathy [E11.40]  Yes     Chronic    ESRD 2/2 HTN on HD since 11/12/09 [N18.6]  Yes     Chronic      Resolved Hospital Problems    Diagnosis Date Resolved POA   No resolved problems to display.     A 62 y.o. female with PMHx of HTN, DM2, CHF, CAD, ESRD on HD q MWF who presented to hospital for elective outpatient R thigh AVG placement for vascular access. After discharge on way to car, patient became unresponsive and was brought back to PACU by , found to be in asystole and ACLS was initiated. ROSC obtained at 1.5 rounds and 1 dose of epi.       ESRD on IHD MWF   Out patient HD Center - [facility]  Nephrologist: Dr. Lamb  Duration of outpatient dialysis session - 5 hours  EDW -69 kg   Residual Renal Function - No  - Will provide dialysis for metabolic clearance and volume   - Seen and examined today during hemodialysis; tolerating treatment well without issues. Denied headaches, chest pain, abdominal pain, or muscle cramps   - Target ultrafiltration up to 1-2L as tolerated by patient   - Dialysate adjusted to current labs     Anemia of Chronic Kidney Disease   - Target Hgb 10- 11.5  - Currently not at goal   - Will start epoetin 7,000 units on HD days     Mineral Bone Disease in CKD   - Already on renal diet   - Start on binders as outpatient (Renvela 2,400 mg PO with meals)     Tony Snowden   Nephrology fellow PGY- 5  [phone]

## 2017-05-02 NOTE — PLAN OF CARE
Pt to d/c later today with HH care and resumption of HD at [facility]. Sw will get HH preference and send referrals through [employer].     UPDATE 10:17 AM   Dileep met with Pt at the bedside. Sw provided list of HH agencies covered by Pt's insurance for Pt to choose from. Pt was previous with [facility] and would like to use that company again if possible. Dileep sent referral via [employer]. Dileep contacted [facility] [phone]. Dileep informed Charge JEFFERY Pringle of Pt's planned d/c today. [facility] requested that Dileep fax clinicals to [phone]. Dileep faxed requested clinicals.     Sw awaiting word from team about whether Pt will need Home O2, and what qualifying Dx she has for the home O2.      Monica Corona, NADEEN [phone]

## 2017-05-02 NOTE — PLAN OF CARE
Problem: Patient Care Overview  Goal: Plan of Care Review  Outcome: Ongoing (interventions implemented as appropriate)  Reviewed plan of care with patient whom verbalized understanding. Vital signs stable overnight. No acute events or falls during shift. Patient is AAOx4. Right chest tube intact and patent, no air leak present, to water seal; draining SS fluid into collection cannister. Right femoral tunneled catheter in place. Dressing clean, dry and intact. Left leg with bruising near groin area. No urine produced during shift, patient anuric. Pain controlled via PRN pain medications. Patient on 3 L NC; RN not able to wean off. Patient independently adjusted weight in bed throughout night. Tele monitored, NSR. BG monitored ACHS, no coverage required. NV performed every 4 hours, no new changes. Tolerating diet. Bed is in lowest position. Call bell is within reach. Will continue to monitor.

## 2017-05-02 NOTE — PROGRESS NOTES
Patient received from [facility] with report from JEFFERY Soriano.  Maintenance dialysis began per orders via right femoral catheter without difficulty.

## 2017-05-02 NOTE — PT/OT/SLP PROGRESS
Physical Therapy  Treatment    Aleta Herrera   MRN: [id number]   Admitting Diagnosis: 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    PT Received On: 05/02/17  PT Start Time: 0842     PT Stop Time: 0907    PT Total Time (min): 25 min       Billable Minutes:  Gait [id number] and Therapeutic Exercise 12    Treatment Type: Treatment  PT/PTA: PTA     PTA Visit Number: 2       General Precautions: Standard, fall  Orthopedic Precautions:     Braces:           Subjective:  Communicated with RN prior to session.  I don't know if I feel like doing anything today    Pain Rating: 3/10  Location - Side: Right  Location - Orientation: generalized  Location: chest  Pain Addressed: Reposition, Distraction, Nurse notified  Pain Rating Post-Intervention: 4/10    Objective:   Patient found with: telemetry, oxygen    Functional Mobility:  Bed Mobility:   Supine to Sit: Stand by Assistance    Transfers:  Sit <> Stand Assistance: Contact Guard Assistance  Sit <> Stand Assistive Device: Rolling Walker    Gait:   Gait Distance: 40 ft with O2 in tow, decreased dorsiflexion L LE  Assistance 1: Contact Guard Assistance, Minimum assistance  Gait Assistive Device: Rolling walker  Gait Pattern: swing-through gait  Gait Deviation(s): decreased magda, decreased step length, decreased stride length, increased time in double stance    Therapeutic Activities and Exercises:   Discussed pt progress, safety and d/c   Patient performed therex X 15 reps seated  B LE AROM AP, LAQ, Hip Flexion, Hip Abd/Add   White board updated      AM-PAC 6 CLICK MOBILITY  How much help from another person does this patient currently need?   1 = Unable, Total/Dependent Assistance  2 = A lot, Maximum/Moderate Assistance  3 = A little, Minimum/Contact Guard/Supervision  4 = None, Modified [place]/Independent    Turning over in bed (including adjusting bedclothes, sheets and blankets)?: 2  Sitting down on and standing up from a chair with arms (e.g.,  wheelchair, bedside commode, etc.): 3  Moving from lying on back to sitting on the side of the bed?: 3  Moving to and from a bed to a chair (including a wheelchair)?: 3  Need to walk in hospital room?: 3  Climbing 3-5 steps with a railing?: 1  Total Score: 15    AM-PAC Raw Score CMS G-Code Modifier Level of Impairment Assistance   6 % Total / Unable   7 - 9 CM 80 - 100% Maximal Assist   10 - 14 CL 60 - 80% Moderate Assist   15 - 19 CK 40 - 60% Moderate Assist   20 - 22 CJ 20 - 40% Minimal Assist   23 CI 1-20% SBA / CGA   24 CH 0% Independent/ Mod I     Patient left seated on EOB with all lines intact, call button in reach and nsg notified.    Assessment:  Aleat Herrera is a 62 y.o. female with a medical diagnosis of 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 and presents with continued deficits as listed below. Pt tolerated treatment fairly well and is progressing slowly with mobility. Pt would continue to benefit from skilled PT to address overall functional mobility and goals. Goals remain appropriate      Rehab identified problem list/impairments: Rehab identified problem list/impairments: weakness, impaired endurance, impaired self care skills, impaired functional mobilty, gait instability, pain    Rehab potential is good.    Activity tolerance: Good    Discharge recommendations: Discharge Facility/Level Of Care Needs: home health PT     Barriers to discharge: Barriers to Discharge: None    Equipment recommendations: Equipment Needed After Discharge: none     GOALS:   Physical Therapy Goals        Problem: Physical Therapy Goal    Goal Priority Disciplines Outcome Goal Variances Interventions   Physical Therapy Goal     PT/OT, PT Ongoing (interventions implemented as appropriate)     Description:  Goals to be met by:      Patient will increase functional independence with mobility by performin. Supine to sit with Stand-by Assistance  2. Sit to supine with Stand-by  Assistance  3. Sit to stand transfer with Stand-by Assistance using RW  4. Gait  x 100 feet with Stand-by Assistance using Rolling Walker.                 PLAN:    Patient to be seen 4 x/week  to address the above listed problems via gait training, therapeutic activities, therapeutic exercises  Plan of Care expires:    Plan of Care reviewed with: patient         Rehan Holt, PTA  05/02/2017

## 2017-05-02 NOTE — PROGRESS NOTES
Progress Note  Vascular Surgery    Admit Date: 4/27/2017  Attending: Santy  S/P: Procedure(s) (LRB):  [id number] Left Thigh (Left)    Post-operative Day: 5 Days Post-Op    Hospital Day: 6    SUBJECTIVE:     NAEON.  Chest tube with 300 output over past 24. Pt on 3L NC. Tolerating regular diet. Overall doing well.     OBJECTIVE:     Vital Signs (Most Recent)  Temp: 98 °F (36.7 °C) (05/02/17 0545)  Pulse: 86 (05/02/17 0545)  Resp: 18 (05/02/17 0545)  BP: 127/73 (05/02/17 0545)  SpO2: 96 % (05/02/17 0545)    Vital Signs Range (Last 24H):  Temp:  [96 °F (35.6 °C)-98.2 °F (36.8 °C)]   Pulse:  [79-92]   Resp:  [16-18]   BP: (113-137)/(60-73)   SpO2:  [92 %-98 %]     I & O (Last 24H):    Intake/Output Summary (Last 24 hours) at 05/02/17 0708  Last data filed at 05/02/17 0554   Gross per 24 hour   Intake              780 ml   Output              297 ml   Net              483 ml       Physical Exam:  Gen: NAD  HEENT: NCAT  CV: RRR, no m/r/g   Pulm: Unlabored  Abd: Soft, nttp. No rebound, guarding.   Extremities: no cyanosis or edema, or clubbing  Skin: Skin color, texture, turgor normal. No rashes or lesions    Wound/Incision:  C/d/i.      Laboratory:  CBC:     Recent Labs  Lab 05/02/17  0400   WBC 8.48   RBC 2.72*   HGB 8.6*   HCT 26.2*      MCV 96   MCH 31.6*   MCHC 32.8     CMP:     Recent Labs  Lab 05/02/17  0400      CALCIUM 7.6*   ALBUMIN 2.9*   PROT 6.8      K 4.7   CO2 23      BUN 35*   CREATININE 5.9*   ALKPHOS 53*   ALT <5*   AST 12   BILITOT 0.4     Coagulation:     Recent Labs  Lab 04/27/17  1929   INR 1.1     Labs within the past 24 hours have been reviewed.    ASSESSMENT/PLAN:     Assessment:     62 year old woman with ESRD, s/p L AV graft insertion POD 4, with cardiac arrest and asystole post op(ROSC after 1.5 rounds of ACLS)    Plan:  S/p ACLS:  Mental status appropriate- no focal neuro deficits  Regular diet    S/p AV graft insertion:  Pain well  controlled    ESRD: HD today    PT/OT     R pleural effusion: Chest tube in place, appreciate Thoracic surgery recommendations  jimbo Ordonez toilet    Dispo: Patient stable for discharge home with H/H PT per PT/OT when management of effusion has resolved.     Gina Donato MD  General Surgery PGY3  Beeper: [phone]

## 2017-05-02 NOTE — PLAN OF CARE
"Problem: Fall Risk (Adult)  Intervention: Safety Promotion/Fall Prevention  Pt with generalized weakness, L medial thigh incision. Pt with nasal cannula tubing and tele monitor. Pt oriented to equipment, call bell and belongings kept within reach. Pt verbalizes understanding of only getting OOB with staff assist. Pt found to have removed O2 tubing, stating "I took it off to eat." Pt educated on need for O2, pt verbalizes understanding. Will continue to monitor.        "

## 2017-05-02 NOTE — PROGRESS NOTES
Progress Note  Cardiothoracic Surgery    Admit Date: 4/27/2017  Attending: Dr. Lynn  S/P: Procedure(s) (LRB):  [id number] Left Thigh (Left)    Post-operative Day: 5 Days Post-Op    Hospital Day: 6    SUBJECTIVE:     HPI:      Patient is a 62 y.o. female with PMHx of CHF, CAD, HTN, anemia, DM II (with retinopathy and neuropathy), PVD, DVT, CVA, and ESRD (on HD M/W/F) who is s/p Left AV graft placement yesterday with right pleural effusion. As patient was being wheeled out for discharge from hospital following procedure yesterday Noted to be unresponsive and brought back to PACU by . Noted sudden cardiac arrest and asystole with ROSC after 1 round of ACLS. Intubated and started on Cardene after noted to be hypertensive with SBP of 300. Cardene has since been weaned off and patient is extubated. CT head and chest following event noted large right pleural effusion with compressive atelectasis and ground glass nodule. Multiple CXR dating back to April 2016 noting right sided pleural effusion. Of note, patient reports hx of right sided pleural effusion with need for thoracentesis in July '16.      Interval:    NAEON. AFVSS, resting comfortably. Chest tube draining serosanguinous output, no air leak. Stable on 3LNC.      OBJECTIVE:     Vital Signs (Most Recent)  Temp: 98.1 °F (36.7 °C) (05/02/17 0827)  Pulse: 90 (05/02/17 0827)  Resp: 18 (05/02/17 0545)  BP: 127/66 (05/02/17 0827)  SpO2: (!) 92 % (05/02/17 0827)    Vital Signs Range (Last 24H):  Temp:  [96 °F (35.6 °C)-98.2 °F (36.8 °C)]   Pulse:  [79-92]   Resp:  [16-18]   BP: (113-133)/(60-73)   SpO2:  [92 %-98 %]     I & O (Last 24H):    Intake/Output Summary (Last 24 hours) at 05/02/17 0829  Last data filed at 05/02/17 0554   Gross per 24 hour   Intake              780 ml   Output              297 ml   Net              483 ml     Physical Exam:  General: well developed, well nourished in no distress; in ICU  Heart: regular rate and  rhythm  Pulm: ct in right lung, draining ss fluid, non labored breathing  Abd: soft, non tender, non distended  Ext: warm and well perfused, no c/c/e    Laboratory:  CBC:     Recent Labs  Lab 05/02/17  0400   WBC 8.48   RBC 2.72*   HGB 8.6*   HCT 26.2*      MCV 96   MCH 31.6*   MCHC 32.8     BMP:     Recent Labs  Lab 05/02/17  0400         K 4.7      CO2 23   BUN 35*   CREATININE 5.9*   CALCIUM 7.6*     Labs within the past 24 hours have been reviewed.    Imaging:  CXR 5/1: Volume of pleural fluid on the right side has decreased slightly since 4/30/17 at 8:53 AM, though overall there has been little interval change in the appearance of the chest since that time, with the current examination demonstrating an improved inspiratory depth.  No pneumothorax.    ASSESSMENT/PLAN:     Patient is a 62 y.o. female with PMHx of CHF, CAD, HTN, anemia, DM II, PVD, DVT, CVA, and ESRD on HD who is s/p Left AV graft placement. Shortly after graft placement she experienced cardiac arrest and was resuscitated and now has right pleural effusion.    - Chest tube removal today. Repeat CXR.   - Okay for discharge from our standpoint after repeat CXR.   - Home O2 eval per primary team      Karen Shin PA-C  Thoracic Surgery  [phone]

## 2017-05-02 NOTE — NURSING
Pt found to have removed nasal cannula. Pulse ox found to be 62%, pt sitting on EOB awake and alert, placed back on O2 via nasal cannula at 6L. Pt's sats returned to 95%, able to wean pt to 3L. Pt's pulse ox maintained in the mid 90s. /70, HR 96. Vascular MD paged and made aware, awaiting orders. Will continue to monitor.

## 2017-05-02 NOTE — PLAN OF CARE
Problem: Patient Care Overview  Goal: Plan of Care Review  Outcome: Ongoing (interventions implemented as appropriate)  Dialysis completed. Right femoral catheter flushed with normal saline, heparinized to fill volumes, capped and taped. Patient dialyzed for 3 hours with fluid removal of 1.6 liters.   Unable to ultrafiltrate any additional fluid due to patient becoming hypotensive during treatment. Patient was asymptomatic. Dr. Schneider was aware. Patient post dialysis blood pressure stable and patient without complaint.  Report called to floor to JEFFERY Soriano.

## 2017-05-03 ENCOUNTER — OUTPATIENT CASE MANAGEMENT (OUTPATIENT)
Dept: ADMINISTRATIVE | Facility: OTHER | Age: 62
End: 2017-05-03

## 2017-05-03 LAB
ALBUMIN SERPL BCP-MCNC: 2.7 G/DL
ALP SERPL-CCNC: 48 U/L
ALT SERPL W/O P-5'-P-CCNC: <5 U/L
ANION GAP SERPL CALC-SCNC: 9 MMOL/L
ANION GAP SERPL CALC-SCNC: 9 MMOL/L
AST SERPL-CCNC: 11 U/L
BASOPHILS # BLD AUTO: 0.02 K/UL
BASOPHILS NFR BLD: 0.3 %
BILIRUB SERPL-MCNC: 0.5 MG/DL
BUN SERPL-MCNC: 23 MG/DL
BUN SERPL-MCNC: 24 MG/DL
CALCIUM SERPL-MCNC: 8.1 MG/DL
CALCIUM SERPL-MCNC: 8.3 MG/DL
CHLORIDE SERPL-SCNC: 104 MMOL/L
CHLORIDE SERPL-SCNC: 104 MMOL/L
CO2 SERPL-SCNC: 23 MMOL/L
CO2 SERPL-SCNC: 23 MMOL/L
CREAT SERPL-MCNC: 4.4 MG/DL
CREAT SERPL-MCNC: 4.5 MG/DL
DIFFERENTIAL METHOD: ABNORMAL
EOSINOPHIL # BLD AUTO: 0.4 K/UL
EOSINOPHIL NFR BLD: 5.1 %
ERYTHROCYTE [DISTWIDTH] IN BLOOD BY AUTOMATED COUNT: 16.2 %
EST. GFR  (AFRICAN AMERICAN): 11.4 ML/MIN/1.73 M^2
EST. GFR  (AFRICAN AMERICAN): 11.7 ML/MIN/1.73 M^2
EST. GFR  (NON AFRICAN AMERICAN): 10.2 ML/MIN/1.73 M^2
EST. GFR  (NON AFRICAN AMERICAN): 9.9 ML/MIN/1.73 M^2
GLUCOSE SERPL-MCNC: 100 MG/DL
GLUCOSE SERPL-MCNC: 119 MG/DL
HCT VFR BLD AUTO: 22.6 %
HGB BLD-MCNC: 7.2 G/DL
LYMPHOCYTES # BLD AUTO: 0.9 K/UL
LYMPHOCYTES NFR BLD: 12.4 %
MAGNESIUM SERPL-MCNC: 1.9 MG/DL
MAGNESIUM SERPL-MCNC: 2 MG/DL
MCH RBC QN AUTO: 31 PG
MCHC RBC AUTO-ENTMCNC: 31.9 %
MCV RBC AUTO: 97 FL
MONOCYTES # BLD AUTO: 1 K/UL
MONOCYTES NFR BLD: 14.1 %
NEUTROPHILS # BLD AUTO: 4.8 K/UL
NEUTROPHILS NFR BLD: 67.7 %
PHOSPHATE SERPL-MCNC: 3.6 MG/DL
PLATELET # BLD AUTO: 133 K/UL
PMV BLD AUTO: 11.5 FL
POCT GLUCOSE: 121 MG/DL (ref 70–110)
POCT GLUCOSE: 141 MG/DL (ref 70–110)
POCT GLUCOSE: 206 MG/DL (ref 70–110)
POTASSIUM SERPL-SCNC: 4.2 MMOL/L
POTASSIUM SERPL-SCNC: 4.4 MMOL/L
PROT SERPL-MCNC: 6.3 G/DL
RBC # BLD AUTO: 2.32 M/UL
SODIUM SERPL-SCNC: 136 MMOL/L
SODIUM SERPL-SCNC: 136 MMOL/L
WBC # BLD AUTO: 7.04 K/UL

## 2017-05-03 PROCEDURE — 94664 DEMO&/EVAL PT USE INHALER: CPT

## 2017-05-03 PROCEDURE — 99900035 HC TECH TIME PER 15 MIN (STAT)

## 2017-05-03 PROCEDURE — 25000003 PHARM REV CODE 250: Performed by: STUDENT IN AN ORGANIZED HEALTH CARE EDUCATION/TRAINING PROGRAM

## 2017-05-03 PROCEDURE — 20600001 HC STEP DOWN PRIVATE ROOM

## 2017-05-03 PROCEDURE — 90935 HEMODIALYSIS ONE EVALUATION: CPT | Mod: GC,,, | Performed by: INTERNAL MEDICINE

## 2017-05-03 PROCEDURE — 25000242 PHARM REV CODE 250 ALT 637 W/ HCPCS: Performed by: STUDENT IN AN ORGANIZED HEALTH CARE EDUCATION/TRAINING PROGRAM

## 2017-05-03 PROCEDURE — 83735 ASSAY OF MAGNESIUM: CPT

## 2017-05-03 PROCEDURE — 80100016 HC MAINTENANCE HEMODIALYSIS

## 2017-05-03 PROCEDURE — 94799 UNLISTED PULMONARY SVC/PX: CPT

## 2017-05-03 PROCEDURE — 27000221 HC OXYGEN, UP TO 24 HOURS

## 2017-05-03 PROCEDURE — 94640 AIRWAY INHALATION TREATMENT: CPT

## 2017-05-03 PROCEDURE — 25000003 PHARM REV CODE 250: Performed by: SURGERY

## 2017-05-03 PROCEDURE — 83735 ASSAY OF MAGNESIUM: CPT | Mod: 91

## 2017-05-03 PROCEDURE — 25000003 PHARM REV CODE 250: Performed by: INTERNAL MEDICINE

## 2017-05-03 PROCEDURE — 36415 COLL VENOUS BLD VENIPUNCTURE: CPT

## 2017-05-03 PROCEDURE — 85025 COMPLETE CBC W/AUTO DIFF WBC: CPT

## 2017-05-03 PROCEDURE — 84100 ASSAY OF PHOSPHORUS: CPT

## 2017-05-03 PROCEDURE — 80053 COMPREHEN METABOLIC PANEL: CPT

## 2017-05-03 PROCEDURE — 63600175 PHARM REV CODE 636 W HCPCS: Performed by: NURSE PRACTITIONER

## 2017-05-03 PROCEDURE — 80048 BASIC METABOLIC PNL TOTAL CA: CPT

## 2017-05-03 RX ORDER — SODIUM CHLORIDE 9 MG/ML
INJECTION, SOLUTION INTRAVENOUS ONCE
Status: COMPLETED | OUTPATIENT
Start: 2017-05-03 | End: 2017-05-03

## 2017-05-03 RX ORDER — SODIUM CHLORIDE 9 MG/ML
INJECTION, SOLUTION INTRAVENOUS
Status: DISCONTINUED | OUTPATIENT
Start: 2017-05-03 | End: 2017-05-05 | Stop reason: HOSPADM

## 2017-05-03 RX ADMIN — HEPARIN SODIUM 5000 UNITS: 5000 INJECTION, SOLUTION INTRAVENOUS; SUBCUTANEOUS at 01:05

## 2017-05-03 RX ADMIN — INSULIN ASPART 1 UNITS: 100 INJECTION, SOLUTION INTRAVENOUS; SUBCUTANEOUS at 09:05

## 2017-05-03 RX ADMIN — IPRATROPIUM BROMIDE AND ALBUTEROL SULFATE 3 ML: .5; 3 SOLUTION RESPIRATORY (INHALATION) at 07:05

## 2017-05-03 RX ADMIN — Medication 3 ML: at 02:05

## 2017-05-03 RX ADMIN — CHLORHEXIDINE GLUCONATE 15 ML: 1.2 RINSE ORAL at 09:05

## 2017-05-03 RX ADMIN — Medication 3 ML: at 10:05

## 2017-05-03 RX ADMIN — IPRATROPIUM BROMIDE AND ALBUTEROL SULFATE 3 ML: .5; 3 SOLUTION RESPIRATORY (INHALATION) at 08:05

## 2017-05-03 RX ADMIN — OXYCODONE HYDROCHLORIDE 10 MG: 5 TABLET ORAL at 03:05

## 2017-05-03 RX ADMIN — IPRATROPIUM BROMIDE AND ALBUTEROL SULFATE 3 ML: .5; 3 SOLUTION RESPIRATORY (INHALATION) at 12:05

## 2017-05-03 RX ADMIN — FAMOTIDINE 20 MG: 20 TABLET, FILM COATED ORAL at 06:05

## 2017-05-03 RX ADMIN — IPRATROPIUM BROMIDE AND ALBUTEROL SULFATE 3 ML: .5; 3 SOLUTION RESPIRATORY (INHALATION) at 04:05

## 2017-05-03 RX ADMIN — HEPARIN SODIUM 5000 UNITS: 5000 INJECTION, SOLUTION INTRAVENOUS; SUBCUTANEOUS at 06:05

## 2017-05-03 RX ADMIN — HEPARIN SODIUM 1000 UNITS: 1000 INJECTION, SOLUTION INTRAVENOUS; SUBCUTANEOUS at 11:05

## 2017-05-03 RX ADMIN — HEPARIN SODIUM 5000 UNITS: 5000 INJECTION, SOLUTION INTRAVENOUS; SUBCUTANEOUS at 09:05

## 2017-05-03 RX ADMIN — SODIUM CHLORIDE: 0.9 INJECTION, SOLUTION INTRAVENOUS at 09:05

## 2017-05-03 NOTE — PROGRESS NOTES
For your Information:    Please note the following patient has been assigned to Syeda Anguiano RN with Outpatient Complex [facility] Mgmt for screening.    Reason: Pleural effusion, right [J90]    Please contact [facility] at ext [phone] with questions.    Thank you    Yudy Mcnulty, SSC

## 2017-05-03 NOTE — PLAN OF CARE
Problem: Patient Care Overview  Goal: Plan of Care Review  Outcome: Ongoing (interventions implemented as appropriate)  POC reviewed with pt. Pt aaox4 with generalized weakness. Pt OOB with assist of one. Pt on 3L O2, sats at ~92% on 3L. Chest tube removed this AM, dressing intact with serosanguinous drainage noted on gauze. Encouraged pt to perform IS and PEP. Pt verbalizes understanding. Pt remains on tele, NSR to ST in the 100s. Pt with good intake. Pt to HD today. L thigh AV graft incision CDI, surrounding skin ecchymotic. Pt denies numbness and tingling to arms or legs, pulses palpable. Will continue to monitor.

## 2017-05-03 NOTE — PLAN OF CARE
Problem: Patient Care Overview  Goal: Plan of Care Review  Outcome: Ongoing (interventions implemented as appropriate)  Reviewed plan of care with patient and patient verbalized understanding. VSS, afebrile, and free of falls and trauma. Patient on 2L NC. Patient up oob with one person assist and ambulates with a walker. Tolerating diabetic diet. Blood sugars monitored ACHS. Neurovascular checks performed q 4 hours, WDL. Pain managed with PO pain medication per MAR. Patient received dialysis today. No acute events during shift, patient resting comfortably, call light within reach, wctm.

## 2017-05-03 NOTE — PROGRESS NOTES
Progress Note  Vascular Surgery    Admit Date: 4/27/2017  Attending: Santy  S/P: Procedure(s) (LRB):  [id number] Left Thigh (Left)    Post-operative Day: 6 Days Post-Op    Hospital Day: 7    SUBJECTIVE:     NAEON.  Chest tube removed yesterday, post removal CXR stable. Pt on 3L NC - unable to wean overnight 2/2 desaturation. Tolerating regular diet. Has not ambulated in hallway.      OBJECTIVE:     Vital Signs (Most Recent)  Temp: 98.9 °F (37.2 °C) (05/03/17 0721)  Pulse: 97 (05/03/17 0741)  Resp: 14 (05/03/17 0741)  BP: 125/64 (05/03/17 0721)  SpO2: 96 % (05/03/17 0741)    Vital Signs Range (Last 24H):  Temp:  [98 °F (36.7 °C)-98.9 °F (37.2 °C)]   Pulse:  []   Resp:  [14-18]   BP: ()/(57-82)   SpO2:  [62 %-100 %]     I & O (Last 24H):    Intake/Output Summary (Last 24 hours) at 05/03/17 0921  Last data filed at 05/03/17 0616   Gross per 24 hour   Intake              900 ml   Output             2200 ml   Net            -1300 ml       Physical Exam:  Gen: NAD  HEENT: NCAT  CV: RRR, no m/r/g   Pulm: Unlabored  Abd: Soft, nttp. No rebound, guarding.   Extremities: no cyanosis or edema, or clubbing  Skin: Skin color, texture, turgor normal. No rashes or lesions    Wound/Incision:  C/d/i.      Laboratory:  CBC:     Recent Labs  Lab 05/03/17 0428   WBC 7.04   RBC 2.32*   HGB 7.2*   HCT 22.6*   *   MCV 97   MCH 31.0   MCHC 31.9*     CMP:     Recent Labs  Lab 05/03/17 0428      CALCIUM 8.1*   ALBUMIN 2.7*   PROT 6.3      K 4.2   CO2 23      BUN 24*   CREATININE 4.5*   ALKPHOS 48*   ALT <5*   AST 11   BILITOT 0.5     Coagulation:     Recent Labs  Lab 04/27/17 1929   INR 1.1     Labs within the past 24 hours have been reviewed.    ASSESSMENT/PLAN:     Assessment:     62 year old woman with ESRD, s/p L AV graft insertion POD 5, with cardiac arrest and asystole post op(ROSC after 1.5 rounds of ACLS)    Plan:  S/p ACLS:  Mental status appropriate- no focal neuro  deficits  Regular diet    S/p AV graft insertion:  Pain well controlled    ESRD: HD today    PT/OT - Patient must be OOBTC and ambulate in hallway.     R pleural effusion: Improved - Chest tube removed  Duonebs, pulm toilet    Dispo: Patient stable for discharge home with H/H PT per PT/OT when able to wean O2.       Ken Dodd MD  General Surgery PGY1  Pager: [phone]

## 2017-05-03 NOTE — PLAN OF CARE
Problem: Patient Care Overview  Goal: Plan of Care Review  Outcome: Ongoing (interventions implemented as appropriate)  HD treatment complete. Duration of treatment 2 hours and 1 L removed. Treatment was tolerated well and no complications with access to right groin. Catheter flushed with NS and locked with heparin. Capped and tapped.

## 2017-05-03 NOTE — PROGRESS NOTES
Progress Note  Nephrology    Admit Date: 4/27/2017   LOS: 6 days     SUBJECTIVE:     Follow-up For: ESRD    Interval follow up: NAEON. Had HD done yesterday. Unable to wean off oxygen overnight. Evaluated during HD and without complaints. Net - 1.3L since yesterday.     OBJECTIVE:     Vital Signs (Most Recent)  Temp: 98.7 °F (37.1 °C) (05/03/17 0900)  Pulse: 98 (05/03/17 1015)  Resp: 18 (05/03/17 1015)  BP: 115/63 (05/03/17 1015)  SpO2: 96 % (05/03/17 0741)    Vital Signs Range (Last 24H):  Temp:  [98 °F (36.7 °C)-98.9 °F (37.2 °C)]   Pulse:  []   Resp:  [14-18]   BP: ()/(57-82)   SpO2:  [62 %-100 %]        Physical Exam:   General:AAF in NAD. AOX4 On NC.  Respiratory: Diminished LLL. Other lobes clear. Good chest expansion w/o increased WOB.  Cardiovacular: Regular rate and rhythm, S1, S2 normal, no murmurs, rubs or gallops  Gastrointestinal: Soft, non-tender, bowel sounds normal.  Extremities: No clubbing or cyanosis of bilateral upper extremities; no lower extremity edema bilaterally, radial pulses 2+ bilaterally, symmetric  Skin: warm and dry; no rash on exposed skin    Laboratory:  CBC:     Recent Labs  Lab 05/03/17  0428   WBC 7.04   RBC 2.32*   HGB 7.2*   HCT 22.6*   *   MCV 97   MCH 31.0   MCHC 31.9*     CMP:     Recent Labs  Lab 05/03/17  0428      CALCIUM 8.1*   ALBUMIN 2.7*   PROT 6.3      K 4.2   CO2 23      BUN 24*   CREATININE 4.5*   ALKPHOS 48*   ALT <5*   AST 11   BILITOT 0.5       Diagnostic Results:  Labs: Reviewed  X-Ray: Reviewed    ASSESSMENT/PLAN:     Active Hospital Problems    Diagnosis  POA    *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 [E11.22, I12.0, Z99.2, N18.6]  Not Applicable    Pleural effusion, right [J90]  Yes    Cardiac arrest [I46.9]  Yes     (diabetic retinopathy) [E11.319]  Yes     Chronic    Diabetic neuropathy [E11.40]  Yes     Chronic    ESRD 2/2 HTN on HD since 11/12/09 [N18.6]  Yes     Chronic      Resolved Hospital  Problems    Diagnosis Date Resolved POA   No resolved problems to display.     A 62 y.o. female with PMHx of HTN, DM2, CHF, CAD, ESRD on HD q MWF who presented to hospital for elective outpatient R thigh AVG placement for vascular access. After discharge on way to car, patient became unresponsive and was brought back to PACU by , found to be in asystole and ACLS was initiated. ROSC obtained at 1.5 rounds and 1 dose of epi.       ESRD on IHD MWF   Out patient HD Center - [facility] [place] [facility]  Nephrologist: Dr. Navas  Duration of outpatient dialysis session - 5 hours  EDW -69 kg   Residual Renal Function - No  - Will provide dialysis for metabolic clearance and volume   - Seen and examined today during hemodialysis; tolerating treatment well without issues. Denied headaches, chest pain, abdominal pain, or muscle cramps   - Target ultrafiltration up to 1-2L as tolerated by patient   - Dialysate adjusted to current labs     Anemia of Chronic Kidney Disease   - Target Hgb 10- 11.5  - Currently not at goal   - Will start epoetin 7,000 units on HD days     Mineral Bone Disease in CKD   - Already on renal diet   - Start on Renvela 800 mg PO TID with meals    Tony Snowden   Nephrology fellow PGY- 5  [phone]

## 2017-05-03 NOTE — PLAN OF CARE
Dileep paged Dr. Dodd and Dr. Pradhan for updates on Pt d/c plan. Sw awaiting call back.     UPDATE 1:30 PM   Sw spoke to MD. Pt still weaning off O2. Pt to d/c with [facility] once stable for d/c. Pt accepted by [facility]. [facility] has EPIC access and will coordinate care.      Monica Corona, NADEEN [phone]

## 2017-05-03 NOTE — PHYSICIAN QUERY
PT Name: Aleta Herrera  MR #: [id number]    Physician Query Form - Respiratory Condition Clarification      CDS/: Gemini Branham  RN,BSN,CDI             Contact information: [phone]    This form is a permanent document in the medical record.    Query Date: May 3, 2017    By submitting this query, we are merely seeking further clarification of documentation. Please utilize your independent clinical judgment when addressing the question(s) below.    The Medical record contains the following   Indicators   Supporting Clinical Findings Location in Medical Record   x   SOB, HOWARD, Wheezing, Productive Cough, Use of Accessory Muscles, etc. Lungs:  normal respiratory effort and diminished breath sounds bilaterally    04/27/17 H&P   x   Acute/Chronic Illness Cardiac Arrest, ESRD 04/27/17 H&P   x   Radiology Findings Large loculated right pleural effusion with partial atelectasis of the right upper and middle lobes and near complete atelectasis of the right lower lobe.      The lungs demonstrate patchy ground glass attenuation and interlobular septal thickening, nonspecific and can be seen with pulmonary edema.            04/27/17  CTA Chest Non   Coronary      Respiratory Distress or Failure        Hypoxia or Hypercapnia     x   RR         ABGs         O2 sat PH=7.333  PCO2=47.6  PO2=85  HCO3=25.3  POCSATURATED=96  BE=-1 04/28/17 ABG @0932 Lab   x   BiPAP/Intubation Pt intubated with 8.0 ETT secured at 22 at the lips.    Vent Mode:Spontaneous  Oxygen Concentration:50  RR=14 br/min  Vt. Set: 450 ml  Peep/Cpap: 5 cmH20  Pressure Support:5cmH20  Mean Airway Pressure:7 cmH20  04/27/17 Respiratory Therapy Progress Note       04/27/17 H&P   x   Home O2, Oxygen Dependence Pt will need Home O2, and what qualifying Dx she has for the home O2  05/02/17 Care Management   Care Plan    x   Treatment   Chest tube placed yesterday with 1500 output.  CXR much improved this morning       albuterol-ipratropium  2.5mg-0.5mg/3mL nebulizer solution 3 mL  :  Dose 3 mL  :  Nebulization  :  Every 4 hours while awake                             04/29/17 Vascular Surgery Dr. Munguia.            04/30/17 @ 1200 MAR   x   Other Pt extubated to a 4L nasal cannula per order and is sating 99%.  04/28/17 Respiratory Therapist    Progress Note     Provider, please specify diagnosis or diagnoses associated with above clinical findings.    [  ]  Acute Respiratory Failure, POA    [XX  ] Acute Respiratory Failure with Hypoxia    [  ] Acute Respiratory Failure with Hypercapnia    [  ] Acute Respiratory Failure with Hypoxia and Hypercapnia    [  ] Other Acute Respiratory Failure______________________________________________      Please document in your progress notes daily for the duration of treatment until resolved and include in your discharge summary.

## 2017-05-03 NOTE — PROGRESS NOTES
have reviewed and concur with the fellow's hemodialysis prescription plan. I have personally interviewed and examined the patient at bedside during hemodialysis session.

## 2017-05-03 NOTE — PROGRESS NOTES
Notified MD (Tay) regarding patient's change in peripheral vision. BMP and Mag lab orders placed. Will continue to monitor.

## 2017-05-03 NOTE — PROGRESS NOTES
Maintenance HD treatment started. No complications with access to right groin catheter. Lines secured and telemetry in place. No complaints of discomfort at this time.

## 2017-05-04 ENCOUNTER — OUTPATIENT CASE MANAGEMENT (OUTPATIENT)
Dept: ADMINISTRATIVE | Facility: OTHER | Age: 62
End: 2017-05-04

## 2017-05-04 LAB
ABO + RH BLD: NORMAL
ALBUMIN SERPL BCP-MCNC: 2.8 G/DL
ALP SERPL-CCNC: 58 U/L
ALT SERPL W/O P-5'-P-CCNC: <5 U/L
ANION GAP SERPL CALC-SCNC: 10 MMOL/L
AST SERPL-CCNC: 14 U/L
BASOPHILS # BLD AUTO: 0.01 K/UL
BASOPHILS NFR BLD: 0.1 %
BILIRUB SERPL-MCNC: 0.6 MG/DL
BLD GP AB SCN CELLS X3 SERPL QL: NORMAL
BLD PROD TYP BPU: NORMAL
BLOOD UNIT EXPIRATION DATE: NORMAL
BLOOD UNIT TYPE CODE: 8400
BLOOD UNIT TYPE: NORMAL
BUN SERPL-MCNC: 22 MG/DL
CALCIUM SERPL-MCNC: 8.3 MG/DL
CHLORIDE SERPL-SCNC: 104 MMOL/L
CO2 SERPL-SCNC: 21 MMOL/L
CODING SYSTEM: NORMAL
CREAT SERPL-MCNC: 4.5 MG/DL
DIFFERENTIAL METHOD: ABNORMAL
DISPENSE STATUS: NORMAL
EOSINOPHIL # BLD AUTO: 0.2 K/UL
EOSINOPHIL NFR BLD: 2.3 %
ERYTHROCYTE [DISTWIDTH] IN BLOOD BY AUTOMATED COUNT: 16 %
EST. GFR  (AFRICAN AMERICAN): 11.4 ML/MIN/1.73 M^2
EST. GFR  (NON AFRICAN AMERICAN): 9.9 ML/MIN/1.73 M^2
GLUCOSE SERPL-MCNC: 146 MG/DL
HCT VFR BLD AUTO: 22.1 %
HGB BLD-MCNC: 7.1 G/DL
LYMPHOCYTES # BLD AUTO: 0.9 K/UL
LYMPHOCYTES NFR BLD: 10.4 %
MAGNESIUM SERPL-MCNC: 2 MG/DL
MCH RBC QN AUTO: 31 PG
MCHC RBC AUTO-ENTMCNC: 32.1 %
MCV RBC AUTO: 97 FL
MONOCYTES # BLD AUTO: 0.9 K/UL
MONOCYTES NFR BLD: 10.4 %
NEUTROPHILS # BLD AUTO: 6.7 K/UL
NEUTROPHILS NFR BLD: 76.6 %
PHOSPHATE SERPL-MCNC: 3.5 MG/DL
PLATELET # BLD AUTO: 151 K/UL
PMV BLD AUTO: 10.9 FL
POCT GLUCOSE: 136 MG/DL (ref 70–110)
POCT GLUCOSE: 140 MG/DL (ref 70–110)
POCT GLUCOSE: 171 MG/DL (ref 70–110)
POCT GLUCOSE: 182 MG/DL (ref 70–110)
POTASSIUM SERPL-SCNC: 4.5 MMOL/L
PROT SERPL-MCNC: 6.9 G/DL
RBC # BLD AUTO: 2.29 M/UL
SODIUM SERPL-SCNC: 135 MMOL/L
TRANS ERYTHROCYTES VOL PATIENT: NORMAL ML
WBC # BLD AUTO: 8.73 K/UL

## 2017-05-04 PROCEDURE — 86901 BLOOD TYPING SEROLOGIC RH(D): CPT

## 2017-05-04 PROCEDURE — 20600001 HC STEP DOWN PRIVATE ROOM

## 2017-05-04 PROCEDURE — 25000003 PHARM REV CODE 250: Performed by: STUDENT IN AN ORGANIZED HEALTH CARE EDUCATION/TRAINING PROGRAM

## 2017-05-04 PROCEDURE — 94664 DEMO&/EVAL PT USE INHALER: CPT

## 2017-05-04 PROCEDURE — 80053 COMPREHEN METABOLIC PANEL: CPT

## 2017-05-04 PROCEDURE — 25000003 PHARM REV CODE 250: Performed by: SURGERY

## 2017-05-04 PROCEDURE — 94760 N-INVAS EAR/PLS OXIMETRY 1: CPT

## 2017-05-04 PROCEDURE — 94640 AIRWAY INHALATION TREATMENT: CPT

## 2017-05-04 PROCEDURE — 86920 COMPATIBILITY TEST SPIN: CPT

## 2017-05-04 PROCEDURE — 25000242 PHARM REV CODE 250 ALT 637 W/ HCPCS: Performed by: STUDENT IN AN ORGANIZED HEALTH CARE EDUCATION/TRAINING PROGRAM

## 2017-05-04 PROCEDURE — 85025 COMPLETE CBC W/AUTO DIFF WBC: CPT

## 2017-05-04 PROCEDURE — 84100 ASSAY OF PHOSPHORUS: CPT

## 2017-05-04 PROCEDURE — 97110 THERAPEUTIC EXERCISES: CPT

## 2017-05-04 PROCEDURE — 97116 GAIT TRAINING THERAPY: CPT

## 2017-05-04 PROCEDURE — 86900 BLOOD TYPING SEROLOGIC ABO: CPT

## 2017-05-04 PROCEDURE — P9021 RED BLOOD CELLS UNIT: HCPCS

## 2017-05-04 PROCEDURE — 36415 COLL VENOUS BLD VENIPUNCTURE: CPT

## 2017-05-04 PROCEDURE — 27000221 HC OXYGEN, UP TO 24 HOURS

## 2017-05-04 PROCEDURE — 27201040 HC RC 50 FILTER

## 2017-05-04 PROCEDURE — 83735 ASSAY OF MAGNESIUM: CPT

## 2017-05-04 RX ORDER — HYDROCODONE BITARTRATE AND ACETAMINOPHEN 500; 5 MG/1; MG/1
TABLET ORAL
Status: ACTIVE | OUTPATIENT
Start: 2017-05-04 | End: 2017-05-05

## 2017-05-04 RX ADMIN — HEPARIN SODIUM 5000 UNITS: 5000 INJECTION, SOLUTION INTRAVENOUS; SUBCUTANEOUS at 09:05

## 2017-05-04 RX ADMIN — Medication 3 ML: at 02:05

## 2017-05-04 RX ADMIN — CHLORHEXIDINE GLUCONATE 15 ML: 1.2 RINSE ORAL at 09:05

## 2017-05-04 RX ADMIN — Medication 3 ML: at 06:05

## 2017-05-04 RX ADMIN — HEPARIN SODIUM 5000 UNITS: 5000 INJECTION, SOLUTION INTRAVENOUS; SUBCUTANEOUS at 06:05

## 2017-05-04 RX ADMIN — IPRATROPIUM BROMIDE AND ALBUTEROL SULFATE 3 ML: .5; 3 SOLUTION RESPIRATORY (INHALATION) at 08:05

## 2017-05-04 RX ADMIN — HEPARIN SODIUM 5000 UNITS: 5000 INJECTION, SOLUTION INTRAVENOUS; SUBCUTANEOUS at 01:05

## 2017-05-04 RX ADMIN — Medication 3 ML: at 09:05

## 2017-05-04 RX ADMIN — FAMOTIDINE 20 MG: 20 TABLET, FILM COATED ORAL at 06:05

## 2017-05-04 RX ADMIN — IPRATROPIUM BROMIDE AND ALBUTEROL SULFATE 3 ML: .5; 3 SOLUTION RESPIRATORY (INHALATION) at 11:05

## 2017-05-04 RX ADMIN — OXYCODONE HYDROCHLORIDE 10 MG: 5 TABLET ORAL at 09:05

## 2017-05-04 RX ADMIN — IPRATROPIUM BROMIDE AND ALBUTEROL SULFATE 3 ML: .5; 3 SOLUTION RESPIRATORY (INHALATION) at 03:05

## 2017-05-04 RX ADMIN — Medication 3 ML: at 10:05

## 2017-05-04 NOTE — PLAN OF CARE
Problem: Patient Care Overview  Goal: Plan of Care Review  Pt received 1 unit of blood this shift.     Problem: Pressure Ulcer Risk (Bg Scale) (Adult,Obstetrics,Pediatric)  Goal: Identify Related Risk Factors and Signs and Symptoms  Related risk factors and signs and symptoms are identified upon initiation of Human Response Clinical Practice Guideline (CPG)   Pt repositions self in bed independently.     Problem: Fall Risk (Adult)  Goal: Identify Related Risk Factors and Signs and Symptoms  Related risk factors and signs and symptoms are identified upon initiation of Human Response Clinical Practice Guideline (CPG)   No falls during this shift.

## 2017-05-04 NOTE — PLAN OF CARE
11:00 AM  Dileep LVM for Mery regarding Pt's O2 needs. Dileep awaiting call back. Dileep paged MD, awaiting call back.     UPDATE 12:48 PM   Dileep spoke to Mery at [facility]. Dileep inquired whether pleural effusion qualifies Pt for O2, as MD stated that is the reason for her O2 use. Mery informed Dileep that pleural effusion is no longer a qualifying Dx. Dileep informed Mery that Pt's d/c is currently held up due to her need for home O2, but lack of Dx. Dileep requested that Mery review Pt chart to see if there is any other potential qualifying Dx. After reviewing Pt's chart, Mery stated the only potential qualifying Dx Pt has is CHF with shortness of breath or hypoxia, but no shortness of breath or hypoxia have been documented as of yet, so if either is present it would need to be heavily documented in order to qualify. Dileep paged CAMILLE SHAFFER to inform of above and inquire about potential shortness of breath or hypoxia. Dileep awaiting call back from MD. Dileep will continue to follow.      UPDATE 1:05 PM   Dileep informed MD of above. Dileep will continue to follow.      Monica Corona, NADEEN [phone]

## 2017-05-04 NOTE — PT/OT/SLP PROGRESS
Physical Therapy  Treatment    Aleta Herrera   MRN: [id number]   Admitting Diagnosis: 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    PT Received On: 17  PT Start Time: 844     PT Stop Time: 908    PT Total Time (min): 24 min       Billable Minutes:  Gait [id number] and Therapeutic Exercise 10     Treatment Type: Treatment  PT/PTA: PTA     PTA Visit Number: 3       General Precautions: Standard, fall  Orthopedic Precautions:     Braces:           Subjective:  Communicated with RN prior to session.  I will try    Pain Ratin/10              Pain Rating Post-Intervention: 0/10    Objective:   Patient found with: telemetry, oxygen    Functional Mobility:  Bed Mobility:   Supine to Sit: Stand by Assistance    Transfers:  Sit <> Stand Assistance: Contact Guard Assistance  Sit <> Stand Assistive Device: Rolling Walker    Gait:   Gait Distance: 67 ft with decreased dorsiflexion on  L LE  Assistance 1: Contact Guard Assistance, Minimum assistance  Gait Assistive Device: Rolling walker  Gait Pattern: swing-through gait  Gait Deviation(s): decreased magda, decreased step length, decreased stride length, increased time in double stance  Therapeutic Activities and Exercises:  Discussed pt progress, safety and d/c   Patient performed therex X 15 reps seated B LE AROM AP, LAQ, Hip Flexion, Hip Abd/Add   White board updated      AM-PAC 6 CLICK MOBILITY  How much help from another person does this patient currently need?   1 = Unable, Total/Dependent Assistance  2 = A lot, Maximum/Moderate Assistance  3 = A little, Minimum/Contact Guard/Supervision  4 = None, Modified [place]/Independent    Turning over in bed (including adjusting bedclothes, sheets and blankets)?: 2  Sitting down on and standing up from a chair with arms (e.g., wheelchair, bedside commode, etc.): 3  Moving from lying on back to sitting on the side of the bed?: 3  Moving to and from a bed to a chair (including a wheelchair)?: 3  Need  to walk in hospital room?: 3  Climbing 3-5 steps with a railing?: 1  Total Score: 15    AM-PAC Raw Score CMS G-Code Modifier Level of Impairment Assistance   6 % Total / Unable   7 - 9 CM 80 - 100% Maximal Assist   10 - 14 CL 60 - 80% Moderate Assist   15 - 19 CK 40 - 60% Moderate Assist   20 - 22 CJ 20 - 40% Minimal Assist   23 CI 1-20% SBA / CGA   24 CH 0% Independent/ Mod I     Patient left up in chair with all lines intact, call button in reach and nsg notified.    Assessment:  Aleta Herrera is a 62 y.o. female with a medical diagnosis of 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 and presents with continued deficits as listed below. Pt  motivated and cooperative with treatment session. Pt Progressing with PT Intervention. Pt demo decreased dorsiflexion L LE with gait. Pt would continue to benefit from skilled PT to address overall functional mobility and goals. Goals remain appropriate.    Rehab identified problem list/impairments: Rehab identified problem list/impairments: weakness, impaired endurance, impaired functional mobilty, gait instability, impaired balance, pain    Rehab potential is good.    Activity tolerance: Good    Discharge recommendations: Discharge Facility/Level Of Care Needs: home health PT     Barriers to discharge: Barriers to Discharge: None    Equipment recommendations: Equipment Needed After Discharge: none     GOALS:   Physical Therapy Goals        Problem: Physical Therapy Goal    Goal Priority Disciplines Outcome Goal Variances Interventions   Physical Therapy Goal     PT/OT, PT Ongoing (interventions implemented as appropriate)     Description:  Goals to be met by:      Patient will increase functional independence with mobility by performin. Supine to sit with Stand-by Assistance  2. Sit to supine with Stand-by Assistance  3. Sit to stand transfer with Stand-by Assistance using RW  4. Gait  x 100 feet with Stand-by Assistance using Rolling  Walker.                 PLAN:    Patient to be seen 4 x/week  to address the above listed problems via gait training, therapeutic activities, therapeutic exercises  Plan of Care expires:    Plan of Care reviewed with: patient         Rehna Holt, PTA  05/04/2017

## 2017-05-04 NOTE — PLAN OF CARE
Problem: Patient Care Overview  Goal: Plan of Care Review  Outcome: Ongoing (interventions implemented as appropriate)  Plan of care reviewed with pt. Pt verbalized understanding. AAOx4. VSS on 3L nasal cannula. Pt O2 stats kept dropping to 80's on 2L. Denied pain. Tolerating diet. Denied n/v. Neurovascular checks q4. Remained free from falls or injuries. Call light within reach. Will call for assistance. No distress noted. Will continue to monitor.

## 2017-05-04 NOTE — PROGRESS NOTES
Dept of Surgery     Ms. Herrera seen and evaluated. Patient became significantly short of breath with minimal exertion. Patient noted to desaturate into the low 80s (hypoxemia) with exertion and has drop in O2 saturation below 86% (hypoxemia) when weaned from 3 to 2L Nasal cannula.     Discussed with patient and with social work. Per my evaluation patient needs home O2 due to the above HOWARD, SOB and Hypoxemia     An Allan DO   Vascular Surgery Fellow  05/04/2017

## 2017-05-04 NOTE — PROGRESS NOTES
Progress Note  Vascular Surgery    Admit Date: 4/27/2017  Attending: Santy  S/P: Procedure(s) (LRB):  [id number] Left Thigh (Left)    Post-operative Day: 7 Days Post-Op    Hospital Day: 8    SUBJECTIVE:     NAEON.  Patient had dialysis yesterday. On 3 L NC today. Didn't see PT yesterday.     OBJECTIVE:     Vital Signs (Most Recent)  Temp: 97.7 °F (36.5 °C) (05/04/17 0404)  Pulse: 106 (05/04/17 0404)  Resp: 18 (05/04/17 0404)  BP: 131/66 (05/04/17 0404)  SpO2: 99 % (05/04/17 0404)    Vital Signs Range (Last 24H):  Temp:  [97.7 °F (36.5 °C)-99.4 °F (37.4 °C)]   Pulse:  []   Resp:  [14-18]   BP: ()/(52-78)   SpO2:  [93 %-100 %]     I & O (Last 24H):    Intake/Output Summary (Last 24 hours) at 05/04/17 0723  Last data filed at 05/03/17 1115   Gross per 24 hour   Intake              500 ml   Output             1500 ml   Net            -1000 ml       Physical Exam:  Gen: NAD  HEENT: NCAT  CV: RRR, no m/r/g   Pulm: Unlabored  Abd: Soft, nttp. No rebound, guarding.   Extremities: no cyanosis or edema, or clubbing  Skin: Skin color, texture, turgor normal. No rashes or lesions    Wound/Incision:  C/d/i.      Laboratory:  CBC:     Recent Labs  Lab 05/04/17 0528   WBC 8.73   RBC 2.29*   HGB 7.1*   HCT 22.1*      MCV 97   MCH 31.0   MCHC 32.1     CMP:     Recent Labs  Lab 05/04/17 0528   *   CALCIUM 8.3*   ALBUMIN 2.8*   PROT 6.9   *   K 4.5   CO2 21*      BUN 22   CREATININE 4.5*   ALKPHOS 58   ALT <5*   AST 14   BILITOT 0.6     Coagulation:     Recent Labs  Lab 04/27/17 1929   INR 1.1     Labs within the past 24 hours have been reviewed.    ASSESSMENT/PLAN:     Assessment:     62 year old woman with ESRD, s/p L AV graft insertion POD 6, with cardiac arrest and asystole post op(ROSC after 1.5 rounds of ACLS)    Plan:  S/p ACLS:  Mental status appropriate- no focal neuro deficits  Regular diet    S/p AV graft insertion:  Pain well controlled    ESRD: HD  yesterday    PT/OT - Patient must be OOBTC and ambulate in hallway.     R pleural effusion: Improved - Chest tube removed  Duonebs, pulm toilet    Dispo: Patient stable for discharge home with H/H PT per PT/OT when able to wean O2 or get O2 home approved.       Gina Donato MD  General Surgery PGY3  Beeper: [phone]

## 2017-05-04 NOTE — PROGRESS NOTES
"5/4/2017  1131  Received referral on 5/3.  Pt. Is currently admitted to [facility], this was a scheduled DA on April 27 for Left femoral AV Graft.  "A 62 y.o. female with PMHx of HTN, DM2, CHF, CAD, ESRD on HD q MWF who presented to hospital for elective outpatient R thigh AVG placement for vascular access. After discharge on way to car, patient became unresponsive and was brought back to PACU by , found to be in asystole and ACLS was initiated. ROSC obtained at 1.5 rounds and 1 dose of epi", per [facility] MIR Anderson  plan for d/c home with [facility]  to follow.   Requested IPSW inform patient that referral was sent to [facility], and I will be calling post discharge for follow up, given my contact information to give to patient.      Follow up plan:    Coordination of care with [facility]  Complete assessment with patient post d/c    JEFFERY Grajeda      "

## 2017-05-04 NOTE — PLAN OF CARE
Problem: Physical Therapy Goal  Goal: Physical Therapy Goal  Goals to be met by:      Patient will increase functional independence with mobility by performin. Supine to sit with Stand-by Assistance-met  2. Sit to supine with Stand-by Assistance  3. Sit to stand transfer with Stand-by Assistance using RW  4. Gait x 100 feet with Stand-by Assistance using Rolling Walker.    Pt progressing towards goals. continue with PT POC.Goals remain appropriate.    Rehan Holt PTA     2017

## 2017-05-05 ENCOUNTER — OUTPATIENT CASE MANAGEMENT (OUTPATIENT)
Dept: ADMINISTRATIVE | Facility: OTHER | Age: 62
End: 2017-05-05

## 2017-05-05 VITALS
HEART RATE: 99 BPM | BODY MASS INDEX: 30.81 KG/M2 | DIASTOLIC BLOOD PRESSURE: 89 MMHG | TEMPERATURE: 99 F | WEIGHT: 156.94 LBS | OXYGEN SATURATION: 98 % | RESPIRATION RATE: 16 BRPM | SYSTOLIC BLOOD PRESSURE: 140 MMHG | HEIGHT: 60 IN

## 2017-05-05 LAB
ALBUMIN SERPL BCP-MCNC: 2.9 G/DL
ALP SERPL-CCNC: 55 U/L
ALT SERPL W/O P-5'-P-CCNC: <5 U/L
ANION GAP SERPL CALC-SCNC: 9 MMOL/L
AST SERPL-CCNC: 14 U/L
BASOPHILS # BLD AUTO: 0.01 K/UL
BASOPHILS NFR BLD: 0.1 %
BILIRUB SERPL-MCNC: 0.8 MG/DL
BUN SERPL-MCNC: 32 MG/DL
CALCIUM SERPL-MCNC: 8.4 MG/DL
CHLORIDE SERPL-SCNC: 104 MMOL/L
CO2 SERPL-SCNC: 22 MMOL/L
CREAT SERPL-MCNC: 5.2 MG/DL
DIFFERENTIAL METHOD: ABNORMAL
EOSINOPHIL # BLD AUTO: 0.4 K/UL
EOSINOPHIL NFR BLD: 3.8 %
ERYTHROCYTE [DISTWIDTH] IN BLOOD BY AUTOMATED COUNT: 18.2 %
EST. GFR  (AFRICAN AMERICAN): 9.6 ML/MIN/1.73 M^2
EST. GFR  (NON AFRICAN AMERICAN): 8.3 ML/MIN/1.73 M^2
GLUCOSE SERPL-MCNC: 113 MG/DL
HCT VFR BLD AUTO: 24.5 %
HGB BLD-MCNC: 7.9 G/DL
LYMPHOCYTES # BLD AUTO: 1.3 K/UL
LYMPHOCYTES NFR BLD: 12.3 %
MAGNESIUM SERPL-MCNC: 1.8 MG/DL
MCH RBC QN AUTO: 30.4 PG
MCHC RBC AUTO-ENTMCNC: 32.2 %
MCV RBC AUTO: 94 FL
MONOCYTES # BLD AUTO: 1.1 K/UL
MONOCYTES NFR BLD: 10.7 %
NEUTROPHILS # BLD AUTO: 7.5 K/UL
NEUTROPHILS NFR BLD: 72.8 %
PHOSPHATE SERPL-MCNC: 3.6 MG/DL
PLATELET # BLD AUTO: 171 K/UL
PMV BLD AUTO: 10.2 FL
POCT GLUCOSE: 115 MG/DL (ref 70–110)
POCT GLUCOSE: 120 MG/DL (ref 70–110)
POCT GLUCOSE: 139 MG/DL (ref 70–110)
POTASSIUM SERPL-SCNC: 4.7 MMOL/L
PROT SERPL-MCNC: 7.2 G/DL
RBC # BLD AUTO: 2.6 M/UL
SODIUM SERPL-SCNC: 135 MMOL/L
WBC # BLD AUTO: 10.28 K/UL

## 2017-05-05 PROCEDURE — 84100 ASSAY OF PHOSPHORUS: CPT

## 2017-05-05 PROCEDURE — 90935 HEMODIALYSIS ONE EVALUATION: CPT | Mod: GC,,, | Performed by: INTERNAL MEDICINE

## 2017-05-05 PROCEDURE — 25000003 PHARM REV CODE 250: Performed by: SURGERY

## 2017-05-05 PROCEDURE — 80100016 HC MAINTENANCE HEMODIALYSIS

## 2017-05-05 PROCEDURE — 25000003 PHARM REV CODE 250: Performed by: INTERNAL MEDICINE

## 2017-05-05 PROCEDURE — 27000221 HC OXYGEN, UP TO 24 HOURS

## 2017-05-05 PROCEDURE — 99900035 HC TECH TIME PER 15 MIN (STAT)

## 2017-05-05 PROCEDURE — 25000003 PHARM REV CODE 250: Performed by: STUDENT IN AN ORGANIZED HEALTH CARE EDUCATION/TRAINING PROGRAM

## 2017-05-05 PROCEDURE — 94664 DEMO&/EVAL PT USE INHALER: CPT

## 2017-05-05 PROCEDURE — 80053 COMPREHEN METABOLIC PANEL: CPT

## 2017-05-05 PROCEDURE — 96372 THER/PROPH/DIAG INJ SC/IM: CPT

## 2017-05-05 PROCEDURE — 94640 AIRWAY INHALATION TREATMENT: CPT

## 2017-05-05 PROCEDURE — 25000242 PHARM REV CODE 250 ALT 637 W/ HCPCS: Performed by: STUDENT IN AN ORGANIZED HEALTH CARE EDUCATION/TRAINING PROGRAM

## 2017-05-05 PROCEDURE — 63600175 PHARM REV CODE 636 W HCPCS: Performed by: INTERNAL MEDICINE

## 2017-05-05 PROCEDURE — 36415 COLL VENOUS BLD VENIPUNCTURE: CPT

## 2017-05-05 PROCEDURE — 83735 ASSAY OF MAGNESIUM: CPT

## 2017-05-05 PROCEDURE — 85025 COMPLETE CBC W/AUTO DIFF WBC: CPT

## 2017-05-05 RX ORDER — SODIUM CHLORIDE 9 MG/ML
INJECTION, SOLUTION INTRAVENOUS
Status: DISCONTINUED | OUTPATIENT
Start: 2017-05-05 | End: 2017-05-05 | Stop reason: HOSPADM

## 2017-05-05 RX ORDER — SODIUM CHLORIDE 9 MG/ML
INJECTION, SOLUTION INTRAVENOUS ONCE
Status: COMPLETED | OUTPATIENT
Start: 2017-05-05 | End: 2017-05-05

## 2017-05-05 RX ORDER — OXYCODONE HYDROCHLORIDE 5 MG/1
5-10 TABLET ORAL
Qty: 31 TABLET | Refills: 0 | Status: ON HOLD | OUTPATIENT
Start: 2017-05-05 | End: 2017-08-24 | Stop reason: HOSPADM

## 2017-05-05 RX ADMIN — FAMOTIDINE 20 MG: 20 TABLET, FILM COATED ORAL at 06:05

## 2017-05-05 RX ADMIN — ERYTHROPOIETIN 7000 UNITS: 10000 INJECTION, SOLUTION INTRAVENOUS; SUBCUTANEOUS at 12:05

## 2017-05-05 RX ADMIN — IPRATROPIUM BROMIDE AND ALBUTEROL SULFATE 3 ML: .5; 3 SOLUTION RESPIRATORY (INHALATION) at 08:05

## 2017-05-05 RX ADMIN — Medication 3 ML: at 02:05

## 2017-05-05 RX ADMIN — HEPARIN SODIUM 5000 UNITS: 5000 INJECTION, SOLUTION INTRAVENOUS; SUBCUTANEOUS at 06:05

## 2017-05-05 RX ADMIN — HEPARIN SODIUM 5000 UNITS: 5000 INJECTION, SOLUTION INTRAVENOUS; SUBCUTANEOUS at 02:05

## 2017-05-05 RX ADMIN — HEPARIN SODIUM 1000 UNITS: 1000 INJECTION, SOLUTION INTRAVENOUS; SUBCUTANEOUS at 12:05

## 2017-05-05 RX ADMIN — IPRATROPIUM BROMIDE AND ALBUTEROL SULFATE 3 ML: .5; 3 SOLUTION RESPIRATORY (INHALATION) at 04:05

## 2017-05-05 RX ADMIN — SODIUM CHLORIDE: 0.9 INJECTION, SOLUTION INTRAVENOUS at 08:05

## 2017-05-05 RX ADMIN — OXYCODONE HYDROCHLORIDE 10 MG: 5 TABLET ORAL at 06:05

## 2017-05-05 NOTE — PROGRESS NOTES
System clotted with 1-hour remaining on the machine. Rinsed back arterial line. Unable to rinse back venous. Changed bloolines and dialyzer. Treatment restarted. All lines secured.

## 2017-05-05 NOTE — PROGRESS NOTES
Progress Note  Vascular Surgery    Admit Date: 4/27/2017  Attending: Santy  S/P: Procedure(s) (LRB):  [id number] Left Thigh (Left)    Post-operative Day: 8 Days Post-Op    Hospital Day: 9    SUBJECTIVE:     NAEON.  Pt transfused 1 u PRBCs yesterday with adequate response in H/H. Pt on 2 L NC today; however continues to become hypoxic with minimal exertion. Patient ambulating in hallway with assistance.      OBJECTIVE:     Vital Signs (Most Recent)  Temp: 98.8 °F (37.1 °C) (05/05/17 0708)  Pulse: 90 (05/05/17 0708)  Resp: 16 (05/05/17 0708)  BP: 128/70 (05/05/17 0708)  SpO2: 95 % (05/05/17 0708)    Vital Signs Range (Last 24H):  Temp:  [97.1 °F (36.2 °C)-98.8 °F (37.1 °C)]   Pulse:  []   Resp:  [14-18]   BP: (105-141)/(58-77)   SpO2:  [94 %-100 %]     I & O (Last 24H):    Intake/Output Summary (Last 24 hours) at 05/05/17 0748  Last data filed at 05/04/17 1555   Gross per 24 hour   Intake              853 ml   Output                0 ml   Net              853 ml       Physical Exam:  Gen: NAD  HEENT: NCAT  CV: RRR, no m/r/g   Pulm: Unlabored, on 2L NC  Abd: Soft, nttp. No rebound, guarding.   Extremities: no cyanosis or edema, or clubbing  Skin: Skin color, texture, turgor normal. No rashes or lesions    Wound/Incision:  C/d/i.      Laboratory:  CBC:     Recent Labs  Lab 05/05/17  0354   WBC 10.28   RBC 2.60*   HGB 7.9*   HCT 24.5*      MCV 94   MCH 30.4   MCHC 32.2     CMP:     Recent Labs  Lab 05/05/17  0354   *   CALCIUM 8.4*   ALBUMIN 2.9*   PROT 7.2   *   K 4.7   CO2 22*      BUN 32*   CREATININE 5.2*   ALKPHOS 55   ALT <5*   AST 14   BILITOT 0.8     Coagulation:   No results for input(s): INR, APTT in the last 168 hours.    Invalid input(s): PT  Labs within the past 24 hours have been reviewed.    ASSESSMENT/PLAN:     Assessment:     62 year old woman with ESRD, s/p L AV graft insertion POD 8, with cardiac arrest and asystole post op(ROSC after 1.5 rounds of  ACLS)    Plan:  S/p ACLS:  Mental status appropriate- no focal neuro deficits  Regular diet    S/p AV graft insertion:  Pain well controlled    ESRD: HD yesterday    PT/OT - Patient must be OOBTC and ambulate in hallway.     R pleural effusion: Improved - Chest tube removed  Mery pulm toilet    Dispo: Patient stable for discharge home with H/H PT per PT/OT when able to wean O2 or get O2 home approved.       Ken Dodd MD  General Surgery PGY1  Pager: [phone]

## 2017-05-05 NOTE — PROGRESS NOTES
Pt arrived to room from Dialysis. Pt on bedside commode at this time. Will continue to monitor closely.

## 2017-05-05 NOTE — PLAN OF CARE
[facility]    HOME HEALTH ORDERS  FACE TO FACE ENCOUNTER    Patient Name: Aleta Herrera  YOB: 1955    PCP: Harinder Navas DO   PCP Address: [address] / [place] [address]  PCP Phone Number: [phone]  PCP Fax: [phone]    Encounter Date: 05/05/2017    Admit to Home Health    Diagnoses:  Active Hospital Problems    Diagnosis  POA    *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 [E11.22, I12.0, Z99.2, N18.6]  Not Applicable    Pleural effusion, right [J90]  Yes    Cardiac arrest [I46.9]  Yes    DR (diabetic retinopathy) [E11.319]  Yes     Chronic    Diabetic neuropathy [E11.40]  Yes     Chronic    ESRD 2/2 HTN on HD since 11/12/09 [N18.6]  Yes     Chronic      Resolved Hospital Problems    Diagnosis Date Resolved POA   No resolved problems to display.       Future Appointments  Date Time Provider Department Center   5/25/2017 9:30 AM VASCULAR, LAB [facility] MARCIO Martinez   5/25/2017 10:15 AM Nathalie Madera MD [facility] ANAIS Martinez     Follow-up Information     Follow up with Nathalie Madera MD In 2 weeks.    Specialty:  Vascular Surgery    Why:  For wound re-check    Contact information:    [address]  [facility] [address]  [phone]              I have seen and examined this patient face to face today. My clinical findings that support the need for the home health skilled services and home bound status are the following:  Weakness/numbness causing balance and gait disturbance due to Surgery making it taxing to leave home.    Allergies:Review of patient's allergies indicates:  No Known Allergies    Diet: regular diet    Activities: activity as tolerated    Nursing:   SN to complete comprehensive assessment including routine vital signs. Instruct on disease process and s/s of complications to report to MD. Review/verify medication list sent home with the patient at time of discharge  and instruct patient/caregiver as needed.  Frequency may be adjusted depending on start of care date.    Notify MD if SBP > 160 or < 90; DBP > 90 or < 50; HR > 120 or < 50; Temp > 101; Other:   Other medical concerns.      CONSULTS:    Physical Therapy to evaluate and treat. Evaluate for home safety and equipment needs; Establish/upgrade home exercise program. Perform / instruct on therapeutic exercises, gait training, transfer training, and Range of Motion.  Occupational Therapy to evaluate and treat. Evaluate home environment for safety and equipment needs. Perform/Instruct on transfers, ADL training, ROM, and therapeutic exercises.    MISCELLANEOUS CARE:    Home Oxygen:  Oxygen at 1-5 L/min nasal canula to be used:  Continuously to maintain a SpO2 goal of greater than or equal to 89%.       WOUND CARE ORDERS  n/a      Medications: Review discharge medications with patient and family and provide education.      Current Discharge Medication List      START taking these medications    Details   oxycodone (ROXICODONE) 5 MG immediate release tablet Take 1-2 tablets (5-10 mg total) by mouth every 4 to 6 hours as needed for Pain.  Qty: 31 tablet, Refills: 0         CONTINUE these medications which have NOT CHANGED    Details   amlodipine (NORVASC) 10 MG tablet TAKE ONE TABLET BY MOUTH ONCE DAILY  Qty: 90 tablet, Refills: 0      aspirin 81 mg Tab Take by mouth.      calcitRIOL (ROCALTROL) 0.5 MCG Cap Take 1 capsule (0.5 mcg total) by mouth once daily.  Qty: 30 capsule, Refills: 1      epoetin batsheva (PROCRIT) 10,000 unit/mL injection Inject 0.74 mLs (7,400 Units total) into the skin every Mon, Wed, Fri.      insulin aspart (NOVOLOG FLEXPEN) 100 unit/mL InPn as dir Insulin Pen Subcutaneous Before meals and at bedtime.  If blood sugar is 151-200 1 units SQif blood sugar is 201-250 2 units SQIf blood sugar is 251-300 3 units SQIf blood sugar is 301-350 4 units SQIf blood sugar is > or = 351 5 units SQand call MD;  Dispense with needles      inulin (FIBER CHOICE,  INULIN,) 1.5 gram Chew Take 2 tablets by mouth 2 (two) times daily. Or as directed on product packaging.  Refills: 11      lisinopril (PRINIVIL,ZESTRIL) 20 MG tablet Take 2 tablets (40 mg total) by mouth once daily.  Qty: 180 tablet, Refills: 3    Associated Diagnoses: Hypertension, essential      metoprolol tartrate (LOPRESSOR) 100 MG tablet TAKE 1 TABLET BY MOUTH TWICE DAILY  Qty: 180 tablet, Refills: 3    Comments: **Patient requests 90 days supply**      polyethylene glycol (GLYCOLAX) 17 gram/dose powder Take 17 g by mouth once daily. May take up to 3 times per day as needed for constipation.  Qty: 510 g, Refills: 11      sevelamer carbonate (RENVELA) 800 mg Tab Take 2,400 mg by mouth 3 (three) times daily with meals.      insulin detemir (LEVEMIR FLEXPEN) 100 unit/mL (3 mL) SubQ InPn pen Inject 8 Units into the skin 2 (two) times daily.             I certify that this patient is confined to her home and needs physical therapy and occupational therapy.

## 2017-05-05 NOTE — PT/OT/SLP PROGRESS
Physical Therapy      Aleta Herrera  MRN: [id number]    Patient not seen today secondary to pt away at dialysis x 2 attempts . Will follow-up next scheduled treatment per PT POC.  Rehan Holt, PTA  5/5/2017

## 2017-05-05 NOTE — LETTER
May 5, 2017    Aleta Herrera  [address] [place] [address]             Outpatient Case Management  [address]  [place] [address] Dear Aleta Herrera:    We understand that receiving many services from different doctors and healthcare providers is overwhelming. There are appointments to make, transportation to arrange, dietary instructions to understand, and new medications to obtain.    This is where [facility] can help.     You are eligible to receive Outpatient Case Management services when you have healthcare needs that require the coordination of many providers, treatments, and services. You also qualify if you need assistance with a new treatment plan.     There is no charge for this support. You may have been referred to this program from your doctor(s), hospital staff member(s), or insurance company but you always have a choice to participate or not participate. To participate, you must give us your permission to be enrolled.     When you are enrolled in the [facility] program, the  who is assigned to you is    Syeda Anguiano RN  [phone]    Depending on your needs and wishes, your  may speak with you by phone, visit you at your place of living (for example your home, skilled nursing facility, or rehabilitation facility), or meet you at your doctors office.     Your  will tell you why you have been selected to participate in the program and will complete an assessment of your needs. Then a personalized plan of care will be developed with you and or your caregiver.             Here are examples of the services your [facility]  provides.     Coordinate communication among multiple providers.   Arrange for transportation, doctors visits, durable medical equipment, home care services, and special clinics.    Provide coaching on how to manage your health condition.     Answer questions about your health condition.   Help you understand your doctors treatment plan.    Provide additional instruction about your health condition, treatments, and medications.    Help you obtain information about your insurance coverage.    Advocate for your individual needs.    Request a Licensed Clinical  (LCSW) to visit you if you need their services. LCSWs help with long term planning (discussing placement options, advanced planning directives), financial planning, and assistance (for example rent, utilities, medication funding).     Your  will coordinate their activities with other outpatient services you are receiving. All services provided by [facility]  are coordinated with and communicated to your primary care physician.    Our goal is to help you manage your health condition(s) safely within your living environment, whether that is your home or a medical facility. We want to help you function at the healthiest and highest level possible.     Sincerely,      Mark Sparrow MD  Medical Director    Enclosures:    Frequently Asked Questions  Patient Rights and Responsibilities   Reporting a Grievance or Complaint  Consent/Release of Information  Stamped Addressed Envelope                  Frequently Asked Questions about [facility]    What is [facility]?  Outpatient Case management is not Home healthcare services. [facility]  do not provide hands-on care. [facility]  will work with your doctor to arrange for home health services, if needed. Home health services have a limited duration and there are some restrictions as to who can get these services. There is no prescribed limit to the amount of time you receive [facility] services. [facility]  are not agents of your insurance company. However, [facility]  can help you obtain information from your insurance company.     Who are the [facility] ?  [facility]  are Registered Nurses and Social Workers. It is important to remember that you and your  are a team that works together with your primary care physician to create your individualized plan of care. The ultimate goal of your care plan is to help you implement your doctors treatment plan and to help you function at the highest level of health possible.     What are my rights as a patient?  It is important for you to know and understand your rights and responsibilities while receiving services from the [facility] program. We have enclosed a complete description of your rights and responsibilities. You can help to make your care more effective when you understand your right and responsibilities.     What is needed to be enrolled in the program?  You are only enrolled in the [facility] Program when you give us your consent to participate. You will find enclosed a consent form. You are receiving this letter because you or your caregivers have given us a verbal consent to enroll you in [facility] Program. We ask that you sign and return the enclosed written consent in the stamped self-addressed envelope.                           Patient Rights and Responsibilities    We consider you a partner in your care. When you are well informed, participate in treatment decisions and communicate openly with your doctor and other healthcare professionals, you help make your care more effective.     While you are in the Outpatient Case Management Program, your rights include the following:     You have a right to be provided services in a non-discriminatory manner in accordance with the provisions of Title VI of the Civil Rights Act of 1964, Section 504 of the Rehabilitation Act of  1973, the Age Discrimination Act of 1975, the Americans with Disabilities Act as well as any other applicable Federal and State laws and regulations.     You have the right to a reasonable, timely response to your request or need for care, as well as the right to considerate and respectful care including an environment that preserves dignity and contributes to a positive self-image. You are responsible for being considerate and respectful of our staff.     You have a right to information regarding patient rights, advocacy services and complaint mechanisms, and the right to prompt resolution of any complaint. You or a designee has the right to participate in the resolution of ethical issues surrounding your care. You have a right to file a complaint if you feel that your rights have been infringed, without fear or penalty from [facility] or the federal government. You may file a complaint with the Director of Outpatient Case Management by calling [phone]. At any time, you may lodge a grievance with the [facility] and [facility] by calling [phone], or the Joint Commission on Accreditation of Healthcare Organizations at [phone].     You, or someone acting on your behalf, have the right to understandable information on your health status, treatment and progress in order to make decisions. You have the right to know the nature, risks and alternatives to treatment. You have the right to be informed, when appropriate, regarding the outcome of the care that has been provided. You have the right to refuse treatment to the extent permitted by law, and the right to be informed of the alternatives and consequences of refusing treatment.     You, in collaboration with your physician, have the right to make decisions regarding care and the right to participate in the development and implementation of the plan of care and effective pain management. You have the right to know the name and  professional status of those responsible for the delivery of your care and treatment.       You have a right, within legal guidelines, to have a guardian, next-of-kin or legal designee exercises your patient rights when you are unable to do so. You have the right for your wishes regarding end-of-life decisions to be addressed by the healthcare team through advance directives. You have the right to personal privacy and confidentiality and to expect confidentiality of all records and communications pertaining to your care.      You have the right to receive communications about your health information confidentially. You have the right to request restrictions on the uses and disclosures of your health information. You have the right to inspect, copy, request amendments and receive an accounting of to whom we have disclosed your health information.     You have the right to be provided with interpretation services if you do not speak English; to alternative communication techniques if you are hearing or vision impaired; and to have any other resources needed on your behalf to ensure effective communication. These services are provided free of charge.     You have a right to personal safety (free from mental, physical, sexual and verbal abuse, neglect and exploitation). You have the right to access protective and advocacy services.     Advance Directives  A Patient Advocate is available to meet with patients to answer questions regarding advance directives.    Living Will  A document that outlines what medical treatment the patient does or does not want in the event the patient becomes unable to make those decisions at the appropriate time.    Durable Medical Power of   A document by which the patient designates an individual to be responsible for making medical decisions in the event the patient becomes unable to do so.    HIPAA Notice of Privacy Practices  Your medical information is governed by federal  privacy laws. HIPAA protects private medical information and how that information is disclosed. If you have a question regarding the HIPAA Notice of Privacy Practices, or if you believe your privacy rights have been violated, you may call our designated hotline at [phone].            Quality Improvement  Because we consistently strive to improve the care and service provided to our patients, we welcome your feedback. Your comments are an important part of our quality improvement process, as we like to know what we are doing right and which areas are in need of improvement. Our policy is to listen, be responsive and provide you with an appropriate and timely follow-up to your questions or concerns. Our goal is active patient and family involvement in all aspects of the care process.                                                                                  Reporting a Grievance or Complaint    During your time with the [facility] team you may have a grievance or complaint with our services. Your Patients Bill of Rights gives patients, families, and caregivers the right to express concerns and grievances and the right to expect a reasonable and timely response.     Your presentation of your concerns is not viewed negatively. It is an opportunity for us to improve the quality of our care and services we provide to you.     You may report your concerns directly to your , or you can phone in a complaint to:     Director of Outpatient Case Management  [phone]    You may also send a complaint letter to:    Director of Outpatient Case Management Services  [address] [place] [id number]    Tell us the details of your complaint and provide us with a contact phone number so we can contact you to obtain additional information. We will return a call to you within two business days of our receipt of your complaint, and to request additional information as  needed. If you choose to mail a letter, your complaint may take a few days longer to reach us.     All grievances will be addressed as quickly as possible. A grievance or complaint that involves situations or practices which place patients in immediate danger will be addressed as an urgent matter. We will work to resolve all other complaints within seven days of receipt. By that time, you will receive a phone call with either the resolution of your complaint, or a plan for corrective action. A formal written response will be sent to you within 30 days of receipt of your grievance.     If a resolution cannot be completed within 30 days, a letter will be sent to you or your family member with an estimated time for the final response.    Additionally, all patients have the right to file complaints with external agencies, without exception. Complaints/grievances can be addressed to the following agencies:            Patient Safety or Quality of Care Concerns  Office of Quality Monitoring   [facility] [place] [address]  [phone] Toll Free    HIPPA Privacy/Security Concerns  Office for Civil Rights Region IV  [place] Department of Health & Human Services  [address] [place] [address], [address]  [place] [address] [phone] Phone  [phone] TDD  [phone] Toll Free    Medicare/Medicaid Billing Concerns  [place] for Medicare [place] Medicaid Services  Region [address]  [address] [place] [address], [address]  [place] [address] [phone] Phone  [phone] Toll Free    General Concerns  [facility] ([facility])  [phone] Toll Free Complaint Hotline                                                                  Consent Form/Release of Information    By signing--     (1) I agree I have read the Outpatient Case Management information provided to me;     (2) I agree to voluntarily participate in the Outpatient Case Management program;     (3) I understand  I must consent to participation in the Outpatient Case Management program during my first interview with my ;    (4) I consent to the discussion and release of my personal health information to my healthcare team (including my personal physician, my medical home care team, any specialty physician(s), and my [facility] team);     (5) I agree my consent is valid for the length of time I am receiving Outpatient Case Management;    (6) I agree to referrals to community resources which my Case Management team recommends for me. I agree to the release of my personal information and personal health information as necessary to referral sources.    ___________________________________________________________________  Patients Printed Name     ___________________________________________________________________  Patients Signature       Date    If patient is in being cared for, please complete this section:     ___________________________________________________________________  Printed Name of Person Caring For Patient   Relationship To Patient    ___________________________________________________________________   Signature of Person Caring For Patient     Date    PLEASE SIGN AND RETURN IN THE ENCLOSED PRE-ADDRESSED ENVELOPE.

## 2017-05-05 NOTE — PLAN OF CARE
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       Future Appointments  Date Time Provider Department Center   5/25/2017 9:30 AM VASCULAR, LAB [facility] MARCIO Martinez   5/25/2017 10:15 AM Nathalie Madera MD [facility] ANAIS Martinez     Follow-up Information     Follow up with Nathalie Madera MD In 2 weeks.    Specialty:  Vascular Surgery    Why:  For wound re-check    Contact information:    Madina [address]  [facility] [address]  [phone]              I have seen and examined this patient face to face today. My clinical findings that support the need for the home health skilled services and home bound status are the following:  Weakness/numbness causing balance and gait disturbance due to Surgery making it taxing to leave home.    Allergies:Review of patient's allergies indicates:  No Known Allergies    Diet: regular diet    Activities: activity as tolerated    Nursing:   SN to complete comprehensive assessment including routine vital signs. Instruct on disease process and s/s of complications to report to MD. Review/verify medication list sent home with the patient at time of discharge  and instruct patient/caregiver as needed.  Frequency may be adjusted depending on start of care date.    Notify MD if SBP > 160 or < 90; DBP > 90 or < 50; HR > 120 or < 50; Temp > 101; Other:   Other medical concerns.      CONSULTS:    Physical Therapy to evaluate and treat. Evaluate for home safety and equipment needs; Establish/upgrade home exercise program. Perform / instruct on therapeutic exercises, gait training, transfer training, and Range of Motion.  Occupational Therapy to evaluate and treat. Evaluate home environment for safety and equipment needs. Perform/Instruct on transfers, ADL training, ROM, and therapeutic exercises.    MISCELLANEOUS CARE:    Home Oxygen:  Oxygen at 1-5 L/min nasal canula to be used:  Continuously to maintain a SpO2 goal of greater than or equal to 100%.       WOUND CARE ORDERS  n/a      Medications: Review discharge medications with patient and family and provide education.      Current Discharge Medication List      START taking these medications    Details   hydrocodone-acetaminophen 5-325mg (NORCO) 5-325 mg per tablet Take 1 tablet by mouth every 4 (four) hours as needed for Pain.  Qty: 40 tablet, Refills: 0         CONTINUE these medications which have NOT CHANGED    Details   amlodipine (NORVASC) 10 MG tablet TAKE ONE TABLET BY MOUTH ONCE DAILY  Qty: 90 tablet, Refills: 0      aspirin 81 mg Tab Take by mouth.      calcitRIOL (ROCALTROL) 0.5 MCG Cap Take 1 capsule (0.5 mcg total) by mouth once daily.  Qty: 30 capsule, Refills: 1      epoetin batsheva (PROCRIT) 10,000 unit/mL injection Inject 0.74 mLs (7,400 Units total) into the skin every Mon, Wed, Fri.      insulin aspart (NOVOLOG FLEXPEN) 100 unit/mL InPn as dir Insulin Pen Subcutaneous Before meals and at bedtime.  If blood sugar is 151-200 1 units SQif blood sugar is 201-250 2 units SQIf blood sugar is 251-300 3 units SQIf blood sugar is 301-350 4 units SQIf blood sugar is > or = 351 5 units SQand call MD;  Dispense with needles      inulin (FIBER CHOICE, INULIN,) 1.5  gram Chew Take 2 tablets by mouth 2 (two) times daily. Or as directed on product packaging.  Refills: 11      lisinopril (PRINIVIL,ZESTRIL) 20 MG tablet Take 2 tablets (40 mg total) by mouth once daily.  Qty: 180 tablet, Refills: 3    Associated Diagnoses: Hypertension, essential      metoprolol tartrate (LOPRESSOR) 100 MG tablet TAKE 1 TABLET BY MOUTH TWICE DAILY  Qty: 180 tablet, Refills: 3    Comments: **Patient requests 90 days supply**      polyethylene glycol (GLYCOLAX) 17 gram/dose powder Take 17 g by mouth once daily. May take up to 3 times per day as needed for constipation.  Qty: 510 g, Refills: 11      sevelamer carbonate (RENVELA) 800 mg Tab Take 2,400 mg by mouth 3 (three) times daily with meals.      insulin detemir (LEVEMIR FLEXPEN) 100 unit/mL (3 mL) SubQ InPn pen Inject 8 Units into the skin 2 (two) times daily.             I certify that this patient is confined to her home and needs physical therapy and occupational therapy.

## 2017-05-05 NOTE — PLAN OF CARE
Home Oxygen Evaluation    Date Performed: 5/5/2017    1) Patient's Home O2 Sat on room air, while at rest: 81%        If O2 sats on room air at rest are 88% or below, patient qualifies. No additional testing needed. Document N/A in steps 2 and 3. If 89% or above, complete steps 2.      2) Patient's O2 Sat on room air while exercising: N/A        If O2 sats on room air while exercising remain 89% or above patient does not qualify, no further testing needed Document N/A in step 3. If O2 sats on room air while exercising are 88% or below, continue to step 3.      3) Patient's O2 Sat while exercising on O2: N/A         (Must show improvement from #2 for patients to qualify)    If O2 sats improve on oxygen, patient qualifies for portable oxygen. If not, the patient does not qualify.

## 2017-05-05 NOTE — PLAN OF CARE
Dileep spoke to CAMILLE SHAFFER this AM. MD stated progress note is to include CHF with hypoxia. Dileep informed Mery at [facility]. Mery stated no home O2 order has been placed yet. Dileep text paged Dr. Dodd, requested home O2 order.     UPDATE 12:57 PM   Sw awaiting O2 order from MD so [facility] can proceed with getting Pt's O2 approved by Medicare. Dileep paged Dr. Luo to request order. Sw awaiting call back.     Monica Corona, SW [id number]

## 2017-05-05 NOTE — PLAN OF CARE
Pt is going home with [facility], and O2 from [facility].      05/05/17 3901   Final Note   Assessment Type Final Discharge Note   Discharge Disposition Home-Health   Hospital Follow Up  Appt(s) scheduled? Yes   Offered [email or web address] -- Bedside Delivery? n/a   Discharge/Hospital Encounter Summary to ([facility]) PCP n/a   Referral to Outpatient Case Management complete? n/a   Referral to / orders for Home Health Complete? Yes   30 day supply of medicines given at discharge, if documented non-compliance / non-adherence? n/a   Any social issues identified prior to discharge? n/a   Did you assess the readiness or willingness of the family or caregiver to support self management of care? n/a   Right Care Referral Info   Post Acute Recommendation Home-care   Facility Name ([facility])

## 2017-05-05 NOTE — PROGRESS NOTES
An [facility] welcome Packet and consent form was created and mailed to the patient on 5-5-17        Thank you,    Radha Mueller, SSC

## 2017-05-05 NOTE — PLAN OF CARE
Problem: Patient Care Overview  Goal: Plan of Care Review  Outcome: Ongoing (interventions implemented as appropriate)  4-hr dialysis treatment completed with net UF of 1800ml. Rinsed back blood. CVC flushed with saline. Both lumen filled with heparin. Capped and taped. Treatment tolerated well. Report given to Anju. Transported patient back to room 646 via wheelchair by transport.

## 2017-05-05 NOTE — PLAN OF CARE
Problem: Diabetes, Type 2 (Adult)  Goal: Signs and Symptoms of Listed Potential Problems Will be Absent, Minimized or Managed (Diabetes, Type 2)  Signs and symptoms of listed potential problems will be absent, minimized or managed by discharge/transition of care (reference Diabetes, Type 2 (Adult) CPG).   No insulin coverage administered this shift.     Problem: Pressure Ulcer Risk (Bg Scale) (Adult,Obstetrics,Pediatric)  Goal: Identify Related Risk Factors and Signs and Symptoms  Related risk factors and signs and symptoms are identified upon initiation of Human Response Clinical Practice Guideline (CPG)   Pt repositions self in bed independently.     Problem: Fall Risk (Adult)  Goal: Identify Related Risk Factors and Signs and Symptoms  Related risk factors and signs and symptoms are identified upon initiation of Human Response Clinical Practice Guideline (CPG)   No falls during this shift.     Problem: Hemodialysis (Adult)  Goal: Signs and Symptoms of Listed Potential Problems Will be Absent, Minimized or Managed (Hemodialysis)  Signs and symptoms of listed potential problems will be absent, minimized or managed by discharge/transition of care (reference Hemodialysis (Adult) CPG).   Pt received dialysis this shift.

## 2017-05-05 NOTE — PLAN OF CARE
Dileep contacted [facility] [phone]. Dileep informed Charge RN Pino of Pt's planned d/c today. Dileep informed [facility] of d/c today. O2 to be delivered to Pt's room today prior to d/c. No other Sw needs identified at this time.      Monica Corona, NADEEN [phone]

## 2017-05-05 NOTE — PLAN OF CARE
Spoke to Mery with [facility], advised her pt's walk test and corrected O2 orders are in. She stated she would send the information over to [facility] now. Pt is clear to d/c from a CM standpoint. Per MIR Corona she is resuming pt with [facility] and arranging HH with [facility].     1512- Recv'd a call from Kiera with [facility], advised her pt will d/c home today after O2 is delivered.

## 2017-05-06 NOTE — PROGRESS NOTES
Discharge instruction explained jose patient and , both verbalized understanding. Patient ivana any pain at this time not acute distress noted. Home oxygen is at home per . DAVID

## 2017-05-08 LAB — BACTERIA SPEC ANAEROBE CULT: NORMAL

## 2017-05-08 NOTE — DISCHARGE SUMMARY
[facility]  General Surgery  Discharge Summary      Patient Name: Aleta Herrera  MRN: [id number]  Admission Date: 4/27/2017  Hospital Length of Stay: 8 days  Discharge Date and Time: 5/5/2017  8:45 PM  Attending Physician: No att. providers found   Discharging Provider: Ken Dodd MD  Primary Care Provider: Harinder Navas DO       Procedure(s) (LRB):  [id number] Left Thigh (Left)     Hospital Course: The patient was admitted for left femoral AV graft insertion on 4/27/17. She tolerated the procedure well; however her postoperative course was complicated by cardiac arrest requiring 1.5 rounds of ACLS followed by ROSC. She was stabilized in the SICU and subsequently stepped down to the regular floor. A chest tube was placed for a left-sided pleural effusion. This was removed several days later as the output continued to lessen each day. The rest of her hospital course was largely uneventful. She had difficulty weaning her oxygen via nasal cannula off, and she was deemed safe for discharge home with home health PT, OT, and home oxygen on 5/5/17.     Consults:   Consults         Status Ordering Provider     Inpatient consult to Cardiothoracic Surgery  Once     Provider:  (Not yet assigned)    Completed J LUIS GRAY JR     Inpatient consult to Endocrinology  Once     Provider:  (Not yet assigned)    Completed ELSA RAYMOND     Inpatient consult to Nephrology  Once     Provider:  (Not yet assigned)    ELSA Arenas          Significant Diagnostic Studies: Please see full medical record.       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 [E11.22, I12.0, Z99.2, N18.6] 08/02/2015 Not Applicable    Pleural effusion, right [J90] 04/28/2017 Yes    Cardiac arrest [I46.9] 04/27/2017 Yes     (diabetic retinopathy) [E11.319]  09/05/2013 Yes     Chronic    Diabetic neuropathy [E11.40] 09/05/2013 Yes     Chronic    ESRD 2/2 HTN on HD since 11/12/09 [N18.6] 07/02/2013 Yes     Chronic      Problems Resolved During this Admission:    Diagnosis Date Noted Date Resolved POA      Discharged Condition: good    Disposition: Home-Health Care [facility]    Follow Up:  Follow-up Information     Follow up with Nathalie Madera MD In 2 weeks.    Specialty:  Vascular Surgery    Why:  For wound re-check    Contact information:    [address]  [facility] [id number] [phone]          Patient Instructions:     OXYGEN FOR HOME USE   Order Specific Question Answer Comments   Liter Flow 3 1-10   Duration Continuous as needed   Qualifying SpO2: greater than or equal to 92    Testing done at: Rest    Route nasal cannula    Device home concentrator with portable unit    Patient condition with qualifying saturation CHF With hypoxemia    Height: 5' (1.524 m)    Weight: 71.2 kg (156 lb 15.5 oz)    Does patient have medical equipment at home? bedside commode    Does patient have medical equipment at home? rollator    Does patient have medical equipment at home? wheelchair    Alternative treatment measures have been tried or considered and deemed clinically ineffective. Yes    Vendor: Other (use comments) Disregard   Expected Date of Delivery: 5/5/2017      OXYGEN FOR HOME USE   Order Specific Question Answer Comments   Liter Flow 3    Duration Continuous    Qualifying SpO2: 81%    Testing done at: Rest    Route nasal cannula    Portable mode: continuous    Device home concentrator with portable unit    Length of need (in months): 99 mos    Patient condition with qualifying saturation CHF Hypoxia   Height: 5' (1.524 m)    Weight: 71.2 kg (156 lb 15.5 oz)    Does patient have medical equipment at home? bedside commode    Does patient have medical equipment at home? rollator    Does patient have medical equipment at home? wheelchair    Alternative treatment  measures have been tried or considered and deemed clinically ineffective. Yes    Vendor: Other (use comments) [facility]   Expected Date of Delivery: 5/5/2017      VAS US Hemodialysis Access   Standing Status: Future  Standing Exp. Date: 04/27/18     Ambulatory referral to Outpatient Case Management   Referral Priority: Routine Referral Type: Consultation   Referral Reason: Specialty Services Required    Number of Visits Requested: 1      Diet general     Diet general     Activity as tolerated     Other restrictions (specify):   Order Comments: Keep left groin surgical incision clean and dry. Can shower or sponge bath but no sitting in standing water until the surgical incision site is well healed     Call MD for:  temperature >100.4     Call MD for:  persistent nausea and vomiting     Call MD for:  severe uncontrolled pain     Call MD for:  difficulty breathing, headache or visual disturbances     Call MD for:  redness, tenderness, or signs of infection (pain, swelling, redness, odor or green/yellow discharge around incision site)     Call MD for:  hives     Call MD for:  persistent dizziness or light-headedness     Remove dressing in 48 hours   Order Comments: Remove dressing in 48hrs. Clean access site with soap and water. Pat dry and cover with dry dressing. Keep clean and dry.     REASON FOR NOT PRESCRIBING STATIN MEDICATION AT DISCHARGE       Medications:  Reconciled Home Medications:   Discharge Medication List as of 5/5/2017  8:26 PM      START taking these medications    Details   oxycodone (ROXICODONE) 5 MG immediate release tablet Take 1-2 tablets (5-10 mg total) by mouth every 4 to 6 hours as needed for Pain., Starting 5/5/2017, Until Discontinued, Print         CONTINUE these medications which have NOT CHANGED    Details   amlodipine (NORVASC) 10 MG tablet TAKE ONE TABLET BY MOUTH ONCE DAILY, Normal      aspirin 81 mg Tab Take by mouth., Until Discontinued, Historical Med      calcitRIOL  (ROCALTROL) 0.5 MCG Cap Take 1 capsule (0.5 mcg total) by mouth once daily., Starting 7/9/2016, Until Discontinued, Normal      epoetin batsheva (PROCRIT) 10,000 unit/mL injection Inject 0.74 mLs (7,400 Units total) into the skin every Mon, Wed, Fri., Starting 7/11/2016, Until Discontinued, Historical Med      insulin aspart (NOVOLOG FLEXPEN) 100 unit/mL InPn as dir Insulin Pen Subcutaneous Before meals and at bedtime.  If blood sugar is 151-200 1 units SQif blood sugar is 201-250 2 units SQIf blood sugar is 251-300 3 units SQIf blood sugar is 301-350 4 units SQIf blood sugar is > or = 351 5 units SQand call  MD;  Dispense with needles, Until Discontinued, Historical Med      insulin detemir (LEVEMIR FLEXPEN) 100 unit/mL (3 mL) SubQ InPn pen Inject 8 Units into the skin 2 (two) times daily., Starting 8/19/2015, Until Discontinued, No Print      inulin (FIBER CHOICE, INULIN,) 1.5 gram Chew Take 2 tablets by mouth 2 (two) times daily. Or as directed on product packaging., Starting 7/9/2016, Until Discontinued, OTC      lisinopril (PRINIVIL,ZESTRIL) 20 MG tablet Take 2 tablets (40 mg total) by mouth once daily., Starting 4/6/2016, Until Wed 4/26/17, Normal      metoprolol tartrate (LOPRESSOR) 100 MG tablet TAKE 1 TABLET BY MOUTH TWICE DAILY, Normal      polyethylene glycol (GLYCOLAX) 17 gram/dose powder Take 17 g by mouth once daily. May take up to 3 times per day as needed for constipation., Starting 7/9/2016, Until Discontinued, OTC      sevelamer carbonate (RENVELA) 800 mg Tab Take 2,400 mg by mouth 3 (three) times daily with meals., Until Discontinued, Historical Med             Ken Dodd MD  General Surgery  [facility]

## 2017-05-11 ENCOUNTER — OUTPATIENT CASE MANAGEMENT (OUTPATIENT)
Dept: ADMINISTRATIVE | Facility: OTHER | Age: 62
End: 2017-05-11

## 2017-05-11 NOTE — PROGRESS NOTES
5/11/2017  1250  Call to patients mobile number [phone] reached Barrera, patients spouse, he states he is currently work and the patient is currently at home.  Barrera reports that  nurse visited the patient this am, and [facility] is providing the service.  Barrera reports that the patient is improving, and that the swelling in her leg from the A-V graft is improving.  Per Barrera patient is still not able to ambulate alone.  Barrera is unable to complete assessment at this time, he is at work.  Call to patients home number @ [phone], no answer and unable to leave message, mailbox is full.  Will attempt to follow up with patient at later time.    Follow up Plan:    Complete assessment with patient  Coordination of care with CAROLE Grajeda RN

## 2017-05-19 ENCOUNTER — OUTPATIENT CASE MANAGEMENT (OUTPATIENT)
Dept: ADMINISTRATIVE | Facility: OTHER | Age: 62
End: 2017-05-19

## 2017-05-19 NOTE — PROGRESS NOTES
5/19/2017  1320  Call to patients home number [phone], no answer, unable to leave message mailbox is full.  Will attempt to follow up later date.      Follow up Plan:    Complete assessment with patient  Coordination of care with CAROLE Grajeda RN

## 2017-06-02 ENCOUNTER — OUTPATIENT CASE MANAGEMENT (OUTPATIENT)
Dept: ADMINISTRATIVE | Facility: OTHER | Age: 62
End: 2017-06-02

## 2017-06-02 NOTE — PROGRESS NOTES
6/2/2017  1446  Attempted to reach at home number, [phone], no answer and unable to leave a message the mailbox is full.  Will send letter to patients home requesting a return call.    Follow up Plan:    Complete assessment with patient  Coordination of care with CAROLE Grajeda RN

## 2017-06-02 NOTE — LETTER
June 2, 2017    Aleta Jammie Herrera  [address]  [place] [address]             [facility] [address] [place] [address] Dear Mrs. Aleta Herrera:      I work with [facility] Outpatient Case Management Department. We received a referral to call you to discuss your medical history. These services are free of charge and are offered to [facility] patients who have recently been discharged from any of our facilities or who have complex medical conditions that may require the skill of a nurse to assist with management.             I am a Registered Nurse who specializes in connecting patients with available medical and financial resources as well as addressing any educational needs that may be indicated.      I attempted to reach you by telephone, but I was unsuccessful. Please call our department so that we can go over some questions with you regarding your health.    The Outpatient Case Management Department can be reached at [phone] from 8:00AM to 4:30 PM on Monday thru Friday. [facility] also has a program where a nurse is available 24/7 to answer questions or provide medical advice, their number is [phone].    Thanks,      JEFFERY Grajeda  Outpatient Complex Case Management

## 2017-06-05 RX ORDER — AMLODIPINE BESYLATE 10 MG/1
TABLET ORAL
Qty: 90 TABLET | Refills: 0 | Status: SHIPPED | OUTPATIENT
Start: 2017-06-05 | End: 2017-09-28 | Stop reason: ALTCHOICE

## 2017-06-09 ENCOUNTER — OUTPATIENT CASE MANAGEMENT (OUTPATIENT)
Dept: ADMINISTRATIVE | Facility: OTHER | Age: 62
End: 2017-06-09

## 2017-06-09 NOTE — PROGRESS NOTES
"6/9/2017  1534  Call to patient's home number [phone] no answer and the mailbox is full, unable to leave message.  Call to mobile number [phone], no answer but was able to leave message requesting return call.  Call to [facility] @ [phone], states patient was not enrolled in  post discharge from the hospital.  Review of EMR noted missed appointments.  Call to isma Rust Jr. [phone], he states he doesn't know how his Mom is doing "I don't live there", he states to call his Dad.  CM will close case at this time, been unsuccessful with follow up.  JEFFERY Grajeda      "

## 2017-06-29 ENCOUNTER — OFFICE VISIT (OUTPATIENT)
Dept: VASCULAR SURGERY | Facility: CLINIC | Age: 62
End: 2017-06-29
Payer: MEDICARE

## 2017-06-29 ENCOUNTER — HOSPITAL ENCOUNTER (OUTPATIENT)
Dept: VASCULAR SURGERY | Facility: CLINIC | Age: 62
Discharge: HOME | End: 2017-06-29
Attending: SURGERY
Payer: MEDICARE

## 2017-06-29 VITALS
HEIGHT: 59 IN | BODY MASS INDEX: 27.2 KG/M2 | HEART RATE: 78 BPM | SYSTOLIC BLOOD PRESSURE: 131 MMHG | DIASTOLIC BLOOD PRESSURE: 66 MMHG | TEMPERATURE: 98 F | WEIGHT: 134.94 LBS

## 2017-06-29 DIAGNOSIS — N18.6 END STAGE RENAL DISEASE: Chronic | ICD-10-CM

## 2017-06-29 DIAGNOSIS — N18.6 END STAGE RENAL DISEASE: Primary | Chronic | ICD-10-CM

## 2017-06-29 PROCEDURE — 99213 OFFICE O/P EST LOW 20 MIN: CPT | Mod: PBBFAC | Performed by: SURGERY

## 2017-06-29 PROCEDURE — 99999 PR PBB SHADOW E&M-EST. PATIENT-LVL III: CPT | Mod: PBBFAC,,, | Performed by: SURGERY

## 2017-06-29 PROCEDURE — 99024 POSTOP FOLLOW-UP VISIT: CPT | Mod: ,,, | Performed by: SURGERY

## 2017-06-29 PROCEDURE — 93990 DOPPLER FLOW TESTING: CPT | Mod: 26,S$PBB,, | Performed by: SURGERY

## 2017-06-29 RX ORDER — HYDROCODONE BITARTRATE AND ACETAMINOPHEN 5; 325 MG/1; MG/1
1 TABLET ORAL EVERY 6 HOURS PRN
Status: ON HOLD | COMMUNITY
Start: 2017-05-08 | End: 2017-08-24 | Stop reason: HOSPADM

## 2017-06-29 RX ORDER — LISINOPRIL 40 MG/1
40 TABLET ORAL DAILY
Status: ON HOLD | COMMUNITY
Start: 2017-06-09 | End: 2017-08-24 | Stop reason: HOSPADM

## 2017-06-29 NOTE — PROGRESS NOTES
Aleta Herrera is a 62 y.o. female with ESRD here for post-op exam following left thigh AV graft.   Suffered a cardiac arrest during discharge. She is recovering slowly.  No complaints. No foot pain, numbness or coolness.    Good thrill in graft. Incisions healing well.    Duplex - no stenosis, flow vol 934ml/min    OK to use graft. Return to clinic in 3 weeks for right femoral catheter removal.    Nathalie Madera MD FACS VI  Vascular & Endovascular Surgery

## 2017-07-01 LAB
ACID FAST MOD KINY STN SPEC: NORMAL
MYCOBACTERIUM SPEC QL CULT: NORMAL

## 2017-07-20 ENCOUNTER — OFFICE VISIT (OUTPATIENT)
Dept: VASCULAR SURGERY | Facility: CLINIC | Age: 62
DRG: 853 | End: 2017-07-20
Payer: MEDICARE

## 2017-07-20 VITALS
HEART RATE: 80 BPM | DIASTOLIC BLOOD PRESSURE: 51 MMHG | WEIGHT: 134 LBS | TEMPERATURE: 100 F | SYSTOLIC BLOOD PRESSURE: 96 MMHG | HEIGHT: 59 IN | BODY MASS INDEX: 27.01 KG/M2

## 2017-07-20 DIAGNOSIS — R20.2 NUMBNESS OR TINGLING: ICD-10-CM

## 2017-07-20 DIAGNOSIS — Z95.828 PERMANENT CENTRAL VENOUS CATHETER IN PLACE: ICD-10-CM

## 2017-07-20 DIAGNOSIS — R20.0 NUMBNESS OR TINGLING: ICD-10-CM

## 2017-07-20 DIAGNOSIS — N18.6 END STAGE RENAL DISEASE: Primary | Chronic | ICD-10-CM

## 2017-07-20 PROCEDURE — 99024 POSTOP FOLLOW-UP VISIT: CPT | Mod: ,,, | Performed by: SURGERY

## 2017-07-20 PROCEDURE — 36589 REMOVAL TUNNELED CV CATH: CPT | Mod: 58,S$PBB,RT, | Performed by: SURGERY

## 2017-07-20 PROCEDURE — 99999 PR PBB SHADOW E&M-EST. PATIENT-LVL III: CPT | Mod: PBBFAC,,, | Performed by: SURGERY

## 2017-07-20 RX ORDER — LIDOCAINE AND PRILOCAINE 25; 25 MG/G; MG/G
CREAM TOPICAL
Refills: 3 | COMMUNITY
Start: 2017-06-30

## 2017-07-20 NOTE — PROGRESS NOTES
Patient ID: Aleta Herrera is a 62 y.o. female.    I. HISTORY     Chief Complaint: No chief complaint on file.      HPI Aleta Herrera is a 62 y.o. female wiith ESRD on HD using the Left thigh AVG. She dialyzes M,W, F at [facility].  She has been using the Left thigh AVG x 2-3 weeks without issues with access, deaccess, or with flow.  She denies pain or numbness to left leg.  The right femoral permacath is in place. Pt. Reports yesterday she started developing low grade fevers- ~ 99 degrees F.  Today her temp is 100.0 degrees.  She states that cultures were drawn from the left femoral AVG at the dialysis center yesterday.       Review of Systems   Constitution: Positive for chills, fever, weakness (requires full assist from wheelchair to examining table) and night sweats.   HENT: Negative.    Eyes: Negative.    Cardiovascular: Negative.    Respiratory: Negative.    Endocrine: Negative.    Hematologic/Lymphatic: Negative.    Skin: Negative.    Musculoskeletal: Negative.    Gastrointestinal: Negative.    Psychiatric/Behavioral: Negative.    Allergic/Immunologic: Negative.        II. PHYSICAL EXAM     Physical Exam   Constitutional: She is oriented to person, place, and time. She appears well-developed and well-nourished. No distress.   HENT:   Head: Normocephalic and atraumatic.   Eyes: Conjunctivae and EOM are normal.   Neck: Neck supple. No JVD present.   Cardiovascular: Normal rate, regular rhythm and intact distal pulses.    Pulses:       Dorsalis pedis pulses are 2+ on the right side, and 2+ on the left side.   Right Femoral Permacath  Left thigh AVG + thrill   Pulmonary/Chest: Effort normal. No respiratory distress.   Abdominal: Soft.   Musculoskeletal: Normal range of motion. She exhibits edema. She exhibits no tenderness.   Neurological: She is alert and oriented to person, place, and time.   Skin: Skin is warm. No rash noted. No erythema.   Psychiatric: She has a normal mood and affect.        III. ASSESSMENT & PLAN (MEDICAL DECISION MAKING)     1. ESRD 2/2 HTN on HD since 11/12/09    2. Permanent central venous catheter in place    3. Numbness or tingling        Assessment/Diagnosis and Plan:  62 y.o.ESRD on HD using the Left thigh AVG. She dialyzes M,W, F at [facility]. She has been using the Left thigh AVG x 2-3 weeks without issues with access, deaccess, or with flow. The right femoral permacath is in place.  Pt. Reports low grade fevers that began yesterday.  Blood cultures were taken through the right femoral permacath at dialysis yesterday.     1.  Right femoral permacath removed - see separate note  2. ABIs for left ankle numbness and RTC within 2-3 weeks or sooner if issues.    JAREN Banks, APRN, FNP-BC  Nurse Practitioner  Vascular and Endovascular Surgery      Vascular Surgery Staff  I have seen and examined the patient and reviewed the residents note. I agree with their assessment and plan.    Nathalie Madera MD FACS [facility]  Vascular & Endovascular Surgery

## 2017-07-21 NOTE — PROGRESS NOTES
07/21/2017    PROCEDURE:  Removal of right femoral vein tunneled, cuffed, catheter.    PREOPERATIVE DIAGNOSIS:  End-stage renal disease.    POSTOPERATIVE DIAGNOSIS:  End-stage renal disease.    SURGEON:  Nathalie Madera M.D.    ASSISTANT: Danae Pelayo NP.    ANESTHESIA:  5 mL of 1% plain lidocaine.    ESTIMATED BLOOD LOSS:  Less than 5 mL.    COMPLICATIONS:  None.    SPECIMEN:  None.    DISPOSITION:  Return home.    HISTORY:  Aleta Herrera is a 62 y.o. female with end-stage renal disease.  Her access is functioning well, and comes in today for removal of the catheter.  The risks and benefits were explained, and the patient agreed to proceed with the minor procedure.    DESCRIPTION OF PROCEDURE:  After prepping and draping the catheter in the standard fashion, the area around the insertion site on the thigh was infiltrated with 1% lidocaine.  A small skin nick was created with a #11 scalpel blade in order to expose the underlying cuff.  Blunt dissection was then used to separate the cuff from the surrounding soft tissues.  Gentle pressure was then used to remove the catheter in its entirety.  Pressure was held over the insertion site at the vein. Sterile dressing was applied.  Hemostasis was achieved.  The patient was returned home in good stable condition.

## 2017-07-23 ENCOUNTER — HOSPITAL ENCOUNTER (INPATIENT)
Facility: HOSPITAL | Age: 62
LOS: 15 days | Discharge: NURSING, REHAB OR LONG-TERM CARE | DRG: 853 | End: 2017-08-07
Attending: EMERGENCY MEDICINE | Admitting: INTERNAL MEDICINE
Payer: MEDICARE

## 2017-07-23 DIAGNOSIS — Z99.2 TYPE 2 DM WITH HYPERTENSION AND ESRD ON DIALYSIS: ICD-10-CM

## 2017-07-23 DIAGNOSIS — L97.529 TYPE 2 DIABETES MELLITUS WITH LEFT DIABETIC FOOT ULCER: ICD-10-CM

## 2017-07-23 DIAGNOSIS — R09.02 HYPOXIA: ICD-10-CM

## 2017-07-23 DIAGNOSIS — R50.9 FEVER, UNSPECIFIED FEVER CAUSE: ICD-10-CM

## 2017-07-23 DIAGNOSIS — E11.22 TYPE 2 DM WITH HYPERTENSION AND ESRD ON DIALYSIS: ICD-10-CM

## 2017-07-23 DIAGNOSIS — I73.9 PERIPHERAL VASCULAR DISEASE, UNSPECIFIED: ICD-10-CM

## 2017-07-23 DIAGNOSIS — S88.919A AMPUTATION OF LEG: ICD-10-CM

## 2017-07-23 DIAGNOSIS — M79.675 PAIN AND SWELLING OF TOE OF LEFT FOOT: ICD-10-CM

## 2017-07-23 DIAGNOSIS — I73.9 PVD (PERIPHERAL VASCULAR DISEASE): ICD-10-CM

## 2017-07-23 DIAGNOSIS — I12.0 TYPE 2 DM WITH HYPERTENSION AND ESRD ON DIALYSIS: ICD-10-CM

## 2017-07-23 DIAGNOSIS — L08.9 LEFT FOOT INFECTION: Primary | ICD-10-CM

## 2017-07-23 DIAGNOSIS — A41.9 SEPSIS, DUE TO UNSPECIFIED ORGANISM: ICD-10-CM

## 2017-07-23 DIAGNOSIS — N18.6 ESRD (END STAGE RENAL DISEASE): ICD-10-CM

## 2017-07-23 DIAGNOSIS — A41.9 SEPSIS: ICD-10-CM

## 2017-07-23 DIAGNOSIS — M79.89 PAIN AND SWELLING OF TOE OF LEFT FOOT: ICD-10-CM

## 2017-07-23 DIAGNOSIS — E11.621 TYPE 2 DIABETES MELLITUS WITH LEFT DIABETIC FOOT ULCER: ICD-10-CM

## 2017-07-23 DIAGNOSIS — N18.6 TYPE 2 DM WITH HYPERTENSION AND ESRD ON DIALYSIS: ICD-10-CM

## 2017-07-23 LAB
ABO + RH BLD: NORMAL
ALBUMIN SERPL BCP-MCNC: 2.7 G/DL
ALP SERPL-CCNC: 98 U/L
ALT SERPL W/O P-5'-P-CCNC: 6 U/L
ANION GAP SERPL CALC-SCNC: 16 MMOL/L
APTT BLDCRRT: 25.4 SEC
AST SERPL-CCNC: 15 U/L
BASOPHILS # BLD AUTO: 0.01 K/UL
BASOPHILS NFR BLD: 0 %
BILIRUB SERPL-MCNC: 0.5 MG/DL
BLD GP AB SCN CELLS X3 SERPL QL: NORMAL
BNP SERPL-MCNC: 2835 PG/ML
BUN SERPL-MCNC: 25 MG/DL
CALCIUM SERPL-MCNC: 8.8 MG/DL
CHLORIDE SERPL-SCNC: 98 MMOL/L
CO2 SERPL-SCNC: 20 MMOL/L
CORTIS SERPL-MCNC: 25.6 UG/DL
CREAT SERPL-MCNC: 4.7 MG/DL
DIFFERENTIAL METHOD: ABNORMAL
EOSINOPHIL # BLD AUTO: 0.1 K/UL
EOSINOPHIL NFR BLD: 0.2 %
ERYTHROCYTE [DISTWIDTH] IN BLOOD BY AUTOMATED COUNT: 18.5 %
EST. GFR  (AFRICAN AMERICAN): 10.8 ML/MIN/1.73 M^2
EST. GFR  (NON AFRICAN AMERICAN): 9.4 ML/MIN/1.73 M^2
GLUCOSE SERPL-MCNC: 132 MG/DL
HCT VFR BLD AUTO: 26.5 %
HGB BLD-MCNC: 8.3 G/DL
INR PPP: 1
LACTATE SERPL-SCNC: 2 MMOL/L
LIPASE SERPL-CCNC: <3 U/L
LYMPHOCYTES # BLD AUTO: 0.8 K/UL
LYMPHOCYTES NFR BLD: 3.5 %
MAGNESIUM SERPL-MCNC: 2 MG/DL
MCH RBC QN AUTO: 28 PG
MCHC RBC AUTO-ENTMCNC: 31.3 G/DL
MCV RBC AUTO: 90 FL
MONOCYTES # BLD AUTO: 1.2 K/UL
MONOCYTES NFR BLD: 5 %
NEUTROPHILS # BLD AUTO: 21.9 K/UL
NEUTROPHILS NFR BLD: 91.3 %
PHOSPHATE SERPL-MCNC: 4.4 MG/DL
PLATELET # BLD AUTO: 351 K/UL
PMV BLD AUTO: 8.8 FL
POTASSIUM SERPL-SCNC: 3.7 MMOL/L
PROCALCITONIN SERPL IA-MCNC: 1.64 NG/ML
PROT SERPL-MCNC: 9 G/DL
PROTHROMBIN TIME: 10.6 SEC
RBC # BLD AUTO: 2.96 M/UL
SODIUM SERPL-SCNC: 134 MMOL/L
TROPONIN I SERPL DL<=0.01 NG/ML-MCNC: 0.01 NG/ML
TSH SERPL DL<=0.005 MIU/L-ACNC: 1.9 UIU/ML
WBC # BLD AUTO: 24.04 K/UL

## 2017-07-23 PROCEDURE — 93010 ELECTROCARDIOGRAM REPORT: CPT | Mod: ,,, | Performed by: INTERNAL MEDICINE

## 2017-07-23 PROCEDURE — 99285 EMERGENCY DEPT VISIT HI MDM: CPT | Mod: 25

## 2017-07-23 PROCEDURE — 83735 ASSAY OF MAGNESIUM: CPT

## 2017-07-23 PROCEDURE — 80053 COMPREHEN METABOLIC PANEL: CPT

## 2017-07-23 PROCEDURE — 86900 BLOOD TYPING SEROLOGIC ABO: CPT

## 2017-07-23 PROCEDURE — 83880 ASSAY OF NATRIURETIC PEPTIDE: CPT

## 2017-07-23 PROCEDURE — 96367 TX/PROPH/DG ADDL SEQ IV INF: CPT

## 2017-07-23 PROCEDURE — 87040 BLOOD CULTURE FOR BACTERIA: CPT | Mod: 59

## 2017-07-23 PROCEDURE — 99285 EMERGENCY DEPT VISIT HI MDM: CPT | Mod: ,,, | Performed by: EMERGENCY MEDICINE

## 2017-07-23 PROCEDURE — 83690 ASSAY OF LIPASE: CPT

## 2017-07-23 PROCEDURE — 85610 PROTHROMBIN TIME: CPT

## 2017-07-23 PROCEDURE — 63600175 PHARM REV CODE 636 W HCPCS: Performed by: EMERGENCY MEDICINE

## 2017-07-23 PROCEDURE — 86901 BLOOD TYPING SEROLOGIC RH(D): CPT

## 2017-07-23 PROCEDURE — 82533 TOTAL CORTISOL: CPT

## 2017-07-23 PROCEDURE — 84484 ASSAY OF TROPONIN QUANT: CPT

## 2017-07-23 PROCEDURE — 84100 ASSAY OF PHOSPHORUS: CPT

## 2017-07-23 PROCEDURE — 84443 ASSAY THYROID STIM HORMONE: CPT

## 2017-07-23 PROCEDURE — 83605 ASSAY OF LACTIC ACID: CPT

## 2017-07-23 PROCEDURE — 85730 THROMBOPLASTIN TIME PARTIAL: CPT

## 2017-07-23 PROCEDURE — 11000001 HC ACUTE MED/SURG PRIVATE ROOM

## 2017-07-23 PROCEDURE — 85025 COMPLETE CBC W/AUTO DIFF WBC: CPT

## 2017-07-23 PROCEDURE — 12000002 HC ACUTE/MED SURGE SEMI-PRIVATE ROOM

## 2017-07-23 PROCEDURE — 96365 THER/PROPH/DIAG IV INF INIT: CPT

## 2017-07-23 PROCEDURE — 25000003 PHARM REV CODE 250: Performed by: EMERGENCY MEDICINE

## 2017-07-23 PROCEDURE — 93005 ELECTROCARDIOGRAM TRACING: CPT

## 2017-07-23 PROCEDURE — 96366 THER/PROPH/DIAG IV INF ADDON: CPT

## 2017-07-23 PROCEDURE — 84145 PROCALCITONIN (PCT): CPT

## 2017-07-23 RX ORDER — NAPROXEN SODIUM 220 MG/1
81 TABLET, FILM COATED ORAL DAILY
Status: DISCONTINUED | OUTPATIENT
Start: 2017-07-24 | End: 2017-08-07 | Stop reason: HOSPADM

## 2017-07-23 RX ORDER — IBUPROFEN 200 MG
24 TABLET ORAL
Status: DISCONTINUED | OUTPATIENT
Start: 2017-07-24 | End: 2017-08-07 | Stop reason: HOSPADM

## 2017-07-23 RX ORDER — CALCITRIOL 0.25 UG/1
0.5 CAPSULE ORAL DAILY
Status: DISCONTINUED | OUTPATIENT
Start: 2017-07-24 | End: 2017-08-01

## 2017-07-23 RX ORDER — HEPARIN SODIUM 5000 [USP'U]/ML
7500 INJECTION, SOLUTION INTRAVENOUS; SUBCUTANEOUS EVERY 8 HOURS
Status: DISCONTINUED | OUTPATIENT
Start: 2017-07-24 | End: 2017-07-24

## 2017-07-23 RX ORDER — ACETAMINOPHEN 325 MG/1
650 TABLET ORAL
Status: COMPLETED | OUTPATIENT
Start: 2017-07-23 | End: 2017-07-23

## 2017-07-23 RX ORDER — SEVELAMER CARBONATE 800 MG/1
2400 TABLET, FILM COATED ORAL
Status: DISCONTINUED | OUTPATIENT
Start: 2017-07-24 | End: 2017-08-07 | Stop reason: HOSPADM

## 2017-07-23 RX ORDER — GLUCAGON 1 MG
1 KIT INJECTION
Status: DISCONTINUED | OUTPATIENT
Start: 2017-07-24 | End: 2017-08-07 | Stop reason: HOSPADM

## 2017-07-23 RX ORDER — INSULIN ASPART 100 [IU]/ML
0-5 INJECTION, SOLUTION INTRAVENOUS; SUBCUTANEOUS
Status: DISCONTINUED | OUTPATIENT
Start: 2017-07-24 | End: 2017-08-07 | Stop reason: HOSPADM

## 2017-07-23 RX ORDER — IBUPROFEN 200 MG
16 TABLET ORAL
Status: DISCONTINUED | OUTPATIENT
Start: 2017-07-24 | End: 2017-08-07 | Stop reason: HOSPADM

## 2017-07-23 RX ORDER — OXYCODONE HYDROCHLORIDE 5 MG/1
5 TABLET ORAL EVERY 6 HOURS PRN
Status: DISCONTINUED | OUTPATIENT
Start: 2017-07-24 | End: 2017-07-29

## 2017-07-23 RX ORDER — POLYETHYLENE GLYCOL 3350 17 G/17G
17 POWDER, FOR SOLUTION ORAL 2 TIMES DAILY PRN
Status: DISCONTINUED | OUTPATIENT
Start: 2017-07-24 | End: 2017-08-07 | Stop reason: HOSPADM

## 2017-07-23 RX ORDER — CEFEPIME HYDROCHLORIDE 1 G/50ML
1 INJECTION, SOLUTION INTRAVENOUS
Status: COMPLETED | OUTPATIENT
Start: 2017-07-23 | End: 2017-07-23

## 2017-07-23 RX ADMIN — VANCOMYCIN HYDROCHLORIDE 1000 MG: 1 INJECTION, POWDER, LYOPHILIZED, FOR SOLUTION INTRAVENOUS at 08:07

## 2017-07-23 RX ADMIN — ACETAMINOPHEN 650 MG: 325 TABLET ORAL at 08:07

## 2017-07-23 RX ADMIN — CEFEPIME HYDROCHLORIDE 1 G: 1 INJECTION, POWDER, FOR SOLUTION INTRAMUSCULAR; INTRAVENOUS at 07:07

## 2017-07-24 PROBLEM — I50.30 (HFPEF) HEART FAILURE WITH PRESERVED EJECTION FRACTION: Status: ACTIVE | Noted: 2017-07-24

## 2017-07-24 PROBLEM — A41.9 SEPSIS: Status: ACTIVE | Noted: 2017-07-23

## 2017-07-24 PROBLEM — R50.9 FEVER: Status: ACTIVE | Noted: 2017-07-24

## 2017-07-24 PROBLEM — J90 PLEURAL EFFUSION: Status: ACTIVE | Noted: 2017-07-24

## 2017-07-24 LAB
ALBUMIN SERPL BCP-MCNC: 2.2 G/DL
ALP SERPL-CCNC: 128 U/L
ALT SERPL W/O P-5'-P-CCNC: 7 U/L
ANION GAP SERPL CALC-SCNC: 12 MMOL/L
ANISOCYTOSIS BLD QL SMEAR: SLIGHT
AST SERPL-CCNC: 19 U/L
BASOPHILS # BLD AUTO: 0.02 K/UL
BASOPHILS NFR BLD: 0.1 %
BILIRUB SERPL-MCNC: 0.3 MG/DL
BUN SERPL-MCNC: 31 MG/DL
BURR CELLS BLD QL SMEAR: ABNORMAL
CALCIUM SERPL-MCNC: 8.2 MG/DL
CHLORIDE SERPL-SCNC: 98 MMOL/L
CO2 SERPL-SCNC: 23 MMOL/L
CREAT SERPL-MCNC: 5 MG/DL
CRP SERPL-MCNC: 190.3 MG/L
DACRYOCYTES BLD QL SMEAR: ABNORMAL
DIFFERENTIAL METHOD: ABNORMAL
EOSINOPHIL # BLD AUTO: 0.1 K/UL
EOSINOPHIL NFR BLD: 0.2 %
ERYTHROCYTE [DISTWIDTH] IN BLOOD BY AUTOMATED COUNT: 18.8 %
ERYTHROCYTE [SEDIMENTATION RATE] IN BLOOD BY WESTERGREN METHOD: 100 MM/HR
EST. GFR  (AFRICAN AMERICAN): 10 ML/MIN/1.73 M^2
EST. GFR  (NON AFRICAN AMERICAN): 8.7 ML/MIN/1.73 M^2
GLUCOSE SERPL-MCNC: 246 MG/DL
HCT VFR BLD AUTO: 24.4 %
HGB BLD-MCNC: 7.7 G/DL
HYPOCHROMIA BLD QL SMEAR: ABNORMAL
LYMPHOCYTES # BLD AUTO: 1 K/UL
LYMPHOCYTES NFR BLD: 4.3 %
MCH RBC QN AUTO: 27.9 PG
MCHC RBC AUTO-ENTMCNC: 31.6 G/DL
MCV RBC AUTO: 88 FL
MONOCYTES # BLD AUTO: 1.3 K/UL
MONOCYTES NFR BLD: 5.8 %
NEUTROPHILS # BLD AUTO: 20.2 K/UL
NEUTROPHILS NFR BLD: 89.6 %
OVALOCYTES BLD QL SMEAR: ABNORMAL
PLATELET # BLD AUTO: 318 K/UL
PMV BLD AUTO: 9 FL
POCT GLUCOSE: 121 MG/DL (ref 70–110)
POCT GLUCOSE: 145 MG/DL (ref 70–110)
POCT GLUCOSE: 204 MG/DL (ref 70–110)
POCT GLUCOSE: 216 MG/DL (ref 70–110)
POIKILOCYTOSIS BLD QL SMEAR: SLIGHT
POLYCHROMASIA BLD QL SMEAR: ABNORMAL
POTASSIUM SERPL-SCNC: 3.3 MMOL/L
PROT SERPL-MCNC: 7.6 G/DL
RBC # BLD AUTO: 2.76 M/UL
SCHISTOCYTES BLD QL SMEAR: ABNORMAL
SODIUM SERPL-SCNC: 133 MMOL/L
WBC # BLD AUTO: 22.58 K/UL

## 2017-07-24 PROCEDURE — 85651 RBC SED RATE NONAUTOMATED: CPT

## 2017-07-24 PROCEDURE — 99233 SBSQ HOSP IP/OBS HIGH 50: CPT | Mod: ,,, | Performed by: INTERNAL MEDICINE

## 2017-07-24 PROCEDURE — 99223 1ST HOSP IP/OBS HIGH 75: CPT | Mod: ,,, | Performed by: INTERNAL MEDICINE

## 2017-07-24 PROCEDURE — 85025 COMPLETE CBC W/AUTO DIFF WBC: CPT

## 2017-07-24 PROCEDURE — 11000001 HC ACUTE MED/SURG PRIVATE ROOM

## 2017-07-24 PROCEDURE — 80053 COMPREHEN METABOLIC PANEL: CPT

## 2017-07-24 PROCEDURE — 25000003 PHARM REV CODE 250: Performed by: NURSE PRACTITIONER

## 2017-07-24 PROCEDURE — 25000003 PHARM REV CODE 250: Performed by: STUDENT IN AN ORGANIZED HEALTH CARE EDUCATION/TRAINING PROGRAM

## 2017-07-24 PROCEDURE — 63600175 PHARM REV CODE 636 W HCPCS: Performed by: NURSE PRACTITIONER

## 2017-07-24 PROCEDURE — 63600175 PHARM REV CODE 636 W HCPCS: Performed by: STUDENT IN AN ORGANIZED HEALTH CARE EDUCATION/TRAINING PROGRAM

## 2017-07-24 PROCEDURE — 86140 C-REACTIVE PROTEIN: CPT

## 2017-07-24 PROCEDURE — 36415 COLL VENOUS BLD VENIPUNCTURE: CPT

## 2017-07-24 PROCEDURE — 93922 UPR/L XTREMITY ART 2 LEVELS: CPT | Performed by: SURGERY

## 2017-07-24 PROCEDURE — 63600175 PHARM REV CODE 636 W HCPCS: Performed by: INTERNAL MEDICINE

## 2017-07-24 PROCEDURE — 80100016 HC MAINTENANCE HEMODIALYSIS

## 2017-07-24 PROCEDURE — 25000003 PHARM REV CODE 250: Performed by: INTERNAL MEDICINE

## 2017-07-24 RX ORDER — AMLODIPINE BESYLATE 10 MG/1
10 TABLET ORAL DAILY
Status: DISCONTINUED | OUTPATIENT
Start: 2017-07-24 | End: 2017-08-07 | Stop reason: HOSPADM

## 2017-07-24 RX ORDER — HEPARIN SODIUM 1000 [USP'U]/ML
2000 INJECTION, SOLUTION INTRAVENOUS; SUBCUTANEOUS
Status: DISCONTINUED | OUTPATIENT
Start: 2017-07-24 | End: 2017-08-07 | Stop reason: HOSPADM

## 2017-07-24 RX ORDER — HEPARIN SODIUM 5000 [USP'U]/ML
5000 INJECTION, SOLUTION INTRAVENOUS; SUBCUTANEOUS EVERY 8 HOURS
Status: DISCONTINUED | OUTPATIENT
Start: 2017-07-24 | End: 2017-07-24

## 2017-07-24 RX ORDER — METRONIDAZOLE 500 MG/100ML
500 INJECTION, SOLUTION INTRAVENOUS
Status: DISCONTINUED | OUTPATIENT
Start: 2017-07-24 | End: 2017-07-24

## 2017-07-24 RX ORDER — SODIUM CHLORIDE 9 MG/ML
INJECTION, SOLUTION INTRAVENOUS
Status: DISCONTINUED | OUTPATIENT
Start: 2017-07-24 | End: 2017-07-26

## 2017-07-24 RX ORDER — POTASSIUM CHLORIDE 20 MEQ/15ML
40 SOLUTION ORAL DAILY
Status: DISCONTINUED | OUTPATIENT
Start: 2017-07-24 | End: 2017-07-24

## 2017-07-24 RX ORDER — SODIUM CHLORIDE 9 MG/ML
INJECTION, SOLUTION INTRAVENOUS ONCE
Status: COMPLETED | OUTPATIENT
Start: 2017-07-24 | End: 2017-07-24

## 2017-07-24 RX ORDER — HEPARIN SODIUM 5000 [USP'U]/ML
5000 INJECTION, SOLUTION INTRAVENOUS; SUBCUTANEOUS EVERY 12 HOURS
Status: DISCONTINUED | OUTPATIENT
Start: 2017-07-24 | End: 2017-07-27

## 2017-07-24 RX ORDER — POTASSIUM CHLORIDE 20 MEQ/15ML
40 SOLUTION ORAL ONCE
Status: COMPLETED | OUTPATIENT
Start: 2017-07-24 | End: 2017-07-24

## 2017-07-24 RX ADMIN — POTASSIUM CHLORIDE 40 MEQ: 20 SOLUTION ORAL at 09:07

## 2017-07-24 RX ADMIN — ERYTHROPOIETIN 6400 UNITS: 10000 INJECTION, SOLUTION INTRAVENOUS; SUBCUTANEOUS at 05:07

## 2017-07-24 RX ADMIN — METRONIDAZOLE 500 MG: 500 SOLUTION INTRAVENOUS at 04:07

## 2017-07-24 RX ADMIN — HEPARIN SODIUM 5000 UNITS: 5000 INJECTION, SOLUTION INTRAVENOUS; SUBCUTANEOUS at 09:07

## 2017-07-24 RX ADMIN — OXYCODONE HYDROCHLORIDE 5 MG: 5 TABLET ORAL at 12:07

## 2017-07-24 RX ADMIN — HEPARIN SODIUM 7500 UNITS: 5000 INJECTION, SOLUTION INTRAVENOUS; SUBCUTANEOUS at 06:07

## 2017-07-24 RX ADMIN — OXYCODONE HYDROCHLORIDE 5 MG: 5 TABLET ORAL at 09:07

## 2017-07-24 RX ADMIN — POTASSIUM CHLORIDE 40 MEQ: 20 SOLUTION ORAL at 10:07

## 2017-07-24 RX ADMIN — INSULIN DETEMIR 5 UNITS: 100 INJECTION, SOLUTION SUBCUTANEOUS at 09:07

## 2017-07-24 RX ADMIN — SEVELAMER CARBONATE 2400 MG: 800 TABLET, FILM COATED ORAL at 05:07

## 2017-07-24 RX ADMIN — CEFTRIAXONE 1 G: 1 INJECTION, SOLUTION INTRAVENOUS at 04:07

## 2017-07-24 RX ADMIN — OXYCODONE HYDROCHLORIDE 5 MG: 5 TABLET ORAL at 10:07

## 2017-07-24 RX ADMIN — AMLODIPINE BESYLATE 10 MG: 10 TABLET ORAL at 09:07

## 2017-07-24 RX ADMIN — ASPIRIN 81 MG CHEWABLE TABLET 81 MG: 81 TABLET CHEWABLE at 09:07

## 2017-07-24 RX ADMIN — SEVELAMER CARBONATE 2400 MG: 800 TABLET, FILM COATED ORAL at 01:07

## 2017-07-24 RX ADMIN — CALCITRIOL 0.5 MCG: 0.25 CAPSULE, LIQUID FILLED ORAL at 09:07

## 2017-07-24 RX ADMIN — INSULIN ASPART 2 UNITS: 100 INJECTION, SOLUTION INTRAVENOUS; SUBCUTANEOUS at 01:07

## 2017-07-24 RX ADMIN — SODIUM CHLORIDE: 0.9 INJECTION, SOLUTION INTRAVENOUS at 02:07

## 2017-07-24 NOTE — ASSESSMENT & PLAN NOTE
Consulted nephrology. Dialysis MWF. No signs of fluid overload or significant electrolyte abnormalities.

## 2017-07-24 NOTE — CONSULTS
[facility]  Podiatry  Consult Note    Patient Name: Aleta Herrera  MRN: [id number]  Admission Date: 7/23/2017  Hospital Length of Stay: 1 days  Attending Physician: Fred Howard MD  Primary Care Provider: Radha Lao MD     Inpatient consult to Podiatry  Consult performed by: LUIS CARLOS BRIGHT  Consult ordered by: MINNA SANDHU          Went to patient room, patient was in Dialysis.  Will see the patient first thing tomorrow morning.    No new subjective & objective note has been filed under this hospital service since the last note was generated.    Luis Carlos Bright MD  Podiatry  [facility]

## 2017-07-24 NOTE — ASSESSMENT & PLAN NOTE
Presenting to the ED w/ new bleeding from L leg ulcer along with fevers, chills and new leukocytosis. Differential: osteo (less likely given exam), DVT, PNA, diverticulosis, AVG infection, or endocarditis. Will need broad antibiotics at this point w/ vancomycin scheduled with dialysis, Flagyl and Rocephin. Blood cultures pending. LE US b/l and ABIs ordered. Fevers responsive to Tylenol and no requiring any fluid resuscitation.

## 2017-07-24 NOTE — PLAN OF CARE
Radha Lao MD   [address] / [facility] [address]       [facility] [address] [place] [address] - [address]  [address] [place] [address]  Phone: [phone] Fax: [phone]    Payor: MEDICARE / Plan: MEDICARE PART A & B / Product Type: [facility] /         07/24/17 1137   Discharge Assessment   Assessment Type Discharge Planning Assessment   Confirmed/corrected address and phone number on facesheet? Yes   Assessment information obtained from? Patient   Expected Length of Stay (days) 3   Communicated expected length of stay with patient/caregiver yes   Prior to hospitilization cognitive status: Alert/Oriented   Prior to hospitalization functional status: Assistive Equipment   Current cognitive status: Alert/Oriented   Current Functional Status: Assistive Equipment   Arrived From home or self-care   Lives With spouse   Able to Return to Prior Arrangements yes   Is patient able to care for self after discharge? Yes   Who are your caregiver(s) and their phone number(s)? Barrera Herrera Sr. (spouse) [phone]   Patient's perception of discharge disposition home or selfcare   Readmission Within The Last 30 Days no previous admission in last 30 days   Patient currently being followed by outpatient case management? Yes   If yes, name of outpatient case management following: [facility]   Patient currently receives home health services? No   Does the patient currently use HME? Yes   Patient currently receives private duty nursing? No   Patient currently receives any other outside agency services? No   Equipment Currently Used at Home bedside commode;rollator;wheelchair   Do you have any problems affording any of your prescribed medications? No   Is the patient taking medications as prescribed? yes   Do you have any financial concerns preventing you from receiving the healthcare you need? No   Does the patient have transportation to healthcare appointments? Yes    Transportation Available family or friend will provide   On Dialysis? Yes   If yes, what is the name of the dialysis unit? [facility]   Does the patient receive outpatient dialysis? Yes   Does the patient receive services at the Coumadin Clinic? No   Discharge Plan A Home with family   Discharge Plan B Home Health   Patient/Family In Agreement With Plan yes

## 2017-07-24 NOTE — H&P
[facility] Hospital Medicine  History & Physical    Patient Name: Aleta Herrera  MRN: [id number]  Admission Date: 7/23/2017  Attending Physician: Fred Howard MD   Primary Care Provider: Harinder Navas DO    [facility] Medicine Team: [facility] MED 2 Carson Marcus DO     Patient information was obtained from patient, past medical records and ER records.     Subjective:     Principal Problem:Sepsis    Chief Complaint:   Chief Complaint   Patient presents with    Wound Infection     pt reports DM and wound to bottom of left foot, pt states she has been picking at it and  states he thought it was time to come in the gets it checked out, pt reports stinging, denies severe pain-- pt supposed to be on home O2 but does not have it on currently. Denies CP, denies SOB.        HPI: Aleta Herrera is a 62-year-old female with HTN, HFpEF (EF 60%; home O2 2.5 liters), anemia 2/2 CKD and IDDM c/b retinopathy, neuropathy and ESRD (HD MWF). She presents today with her  to the ED because of new bleeding from the left foot ulcer. She's had on/off fevers and chills for past week or so. Apparently blood cultures were drawn at dialysis (7/19). She was seen by vascular surgery the next day where her right femoral permacath was pulled. She has a working left femoral AVG. Her upper extremity access has failed over the years since being placed on dialysis in 2009. Besides left leg pain, she denies any SOB, CP, cough, congestion, abdominal pain, n/v or diarrhea. Not received any recent antibiotics.   Complaining of foot pain in the ED, otherwise has no complaints. CXR showed mild b/l effusions. Left foot x-ray soft tissue swelling, but no gas formation. Labs significant for WBC count of 24k. Procalcitonin 1.64.       Past Medical History:   Diagnosis Date    Anemia of chronic renal failure 9/5/2013    Anticoagulant long-term use 11/23/2015    CHF (congestive heart  failure)     Coronary artery disease     Diabetes mellitus     Diabetic neuropathy 2013    DR (diabetic retinopathy) 2013    End stage renal disease on dialysis     Hypertension     Hypertension, renal 2013    Kidney transplant candidate 2013    Secondary hyperparathyroidism of renal origin 2013    Stroke 2015    Type 2 diabetes mellitus since 2013       Past Surgical History:   Procedure Laterality Date    AV FISTULA PLACEMENT      CATARACT SURGERY       SECTION, LOW TRANSVERSE      x 3    COLONOSCOPY N/A 2016    Procedure: COLONOSCOPY;  Surgeon: Roverto Patel MD;  Location: [facility] ([address] [place] [address]);  Service: Endoscopy;  Laterality: N/A;    EYE SURGERY      HYSTERECTOMY      Endometriosis    TONSILLECTOMY      VASCULAR SURGERY         Review of patient's allergies indicates:  No Known Allergies    No current facility-administered medications on file prior to encounter.      Current Outpatient Prescriptions on File Prior to Encounter   Medication Sig    amlodipine (NORVASC) 10 MG tablet TAKE ONE TABLET BY MOUTH ONCE DAILY    aspirin 81 mg Tab Take by mouth.    calcitRIOL (ROCALTROL) 0.5 MCG Cap Take 1 capsule (0.5 mcg total) by mouth once daily.    epoetin batsheva (PROCRIT) 10,000 unit/mL injection Inject 0.74 mLs (7,400 Units total) into the skin every Mon, Wed, Fri.    hydrocodone-acetaminophen 5-325mg (NORCO) 5-325 mg per tablet     insulin aspart (NOVOLOG FLEXPEN) 100 unit/mL InPn as dir Insulin Pen Subcutaneous Before meals and at bedtime.  If blood sugar is 151-200 1 units SQif blood sugar is 201-250 2 units SQIf blood sugar is 251-300 3 units SQIf blood sugar is 301-350 4 units SQIf blood sugar is > or = 351 5 units SQand call MD;  Dispense with needles    insulin detemir (LEVEMIR FLEXPEN) 100 unit/mL (3 mL) SubQ InPn pen Inject 8 Units into the skin 2 (two) times daily. (Patient taking differently: Inject 4 Units into the skin every  evening. )    inulin (FIBER CHOICE, INULIN,) 1.5 gram Chew Take 2 tablets by mouth 2 (two) times daily. Or as directed on product packaging.    lidocaine-prilocaine (EMLA) cream APPLY SMALL AMOUNT TO ACCESS SITE 1 TO 2 HOURS BEFORE TREATMENT. COVER AREA WITH AN OCCLUSIVE DRESSING.    lisinopril (PRINIVIL,ZESTRIL) 40 MG tablet     metoprolol tartrate (LOPRESSOR) 100 MG tablet TAKE 1 TABLET BY MOUTH TWICE DAILY    oxycodone (ROXICODONE) 5 MG immediate release tablet Take 1-2 tablets (5-10 mg total) by mouth every 4 to 6 hours as needed for Pain.    polyethylene glycol (GLYCOLAX) 17 gram/dose powder Take 17 g by mouth once daily. May take up to 3 times per day as needed for constipation.    sevelamer carbonate (RENVELA) 800 mg Tab Take 2,400 mg by mouth 3 (three) times daily with meals.     Family History     Problem Relation (Age of Onset)    Cancer Mother (70)    Heart disease Mother    Hypertension Mother        Social History Main Topics    Smoking status: Former Smoker     Packs/day: 0.25     Years: 0.50     Types: Cigarettes     Quit date: 9/5/1975    Smokeless tobacco: Never Used      Comment: quit smoking cigarettes 40+ years ago    Alcohol use No    Drug use: No    Sexual activity: Yes     Review of Systems   Constitutional: Positive for appetite change, chills, fatigue and fever.   HENT: Negative for congestion and sore throat.    Eyes: Negative for photophobia and visual disturbance.   Respiratory: Negative for chest tightness and shortness of breath.    Cardiovascular: Negative for chest pain, palpitations and leg swelling.   Gastrointestinal: Negative for abdominal pain, diarrhea and nausea.   Endocrine: Negative for cold intolerance and heat intolerance.   Genitourinary:        Anuric    Musculoskeletal: Positive for arthralgias. Negative for myalgias.   Skin: Positive for wound.   Neurological: Negative for headaches.   Psychiatric/Behavioral: Negative for agitation and confusion.      Objective:     Vital Signs (Most Recent):  Temp: 98.4 °F (36.9 °C) (07/23/17 2350)  Pulse: 81 (07/23/17 2350)  Resp: 20 (07/23/17 2350)  BP: (!) 121/56 (07/23/17 2350)  SpO2: (!) 94 % (07/23/17 2350) Vital Signs (24h Range):  Temp:  [98.4 °F (36.9 °C)-101.2 °F (38.4 °C)] 98.4 °F (36.9 °C)  Pulse:  [81-90] 81  Resp:  [15-20] 20  SpO2:  [75 %-99 %] 94 %  BP: ()/(53-59) 121/56     Weight: 63.5 kg (139 lb 15.9 oz)  Body mass index is 28.27 kg/m².    Physical Exam   Constitutional: She is oriented to person, place, and time. She appears well-developed and well-nourished. No distress.   HENT:   Head: Normocephalic and atraumatic.   Eyes: Conjunctivae and EOM are normal. Pupils are equal, round, and reactive to light. No scleral icterus.   Neck: Normal range of motion. No thyromegaly present.   Cardiovascular: Normal rate, regular rhythm and normal heart sounds.    Pulmonary/Chest: Effort normal and breath sounds normal. She has no wheezes. She has no rales.   Musculoskeletal: Normal range of motion. She exhibits edema.   Upper extremities w/ prior failed HD access. L femoral AVG warm w/o erythema. LLE shows significant discoloration w/ ulcer near lateral malleolus    Neurological: She is alert and oriented to person, place, and time.   Skin: Skin is warm and dry. Capillary refill takes less than 2 seconds. She is not diaphoretic.        Significant Labs:   CBC:   Recent Labs  Lab 07/23/17 1946   WBC 24.04*   HGB 8.3*   HCT 26.5*   *     CMP:   Recent Labs  Lab 07/23/17 1946   *   K 3.7   CL 98   CO2 20*   *   BUN 25*   CREATININE 4.7*   CALCIUM 8.8   PROT 9.0*   ALBUMIN 2.7*   BILITOT 0.5   ALKPHOS 98   AST 15   ALT 6*   ANIONGAP 16   EGFRNONAA 9.4*       Significant Imaging: I have reviewed all pertinent imaging results/findings within the past 24 hours.    Assessment/Plan:     * Sepsis    Presenting to the ED w/ new bleeding from L leg ulcer along with fevers, chills and new leukocytosis.  Differential: osteo (less likely given exam), DVT, PNA, diverticulosis, AVG infection, or endocarditis. Will need broad antibiotics at this point w/ vancomycin scheduled with dialysis, Flagyl and Rocephin. Blood cultures pending. LE US b/l and ABIs ordered. Fevers responsive to Tylenol and no requiring any fluid resuscitation.           (HFpEF) heart failure with preserved ejection fraction    No signs of decompensation.         Pleural effusion    Seen on prior CXR. Unclear etiology.           ESRD (end stage renal disease)    Consulted nephrology. Dialysis MWF. No signs of fluid overload or significant electrolyte abnormalities.           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    Resume Norvasc, lisinopril and Lopressor.         Cerebral embolism with cerebral infarction    Continued ASA 81 mg.           Type 2 diabetes mellitus since 1995    Resume long acting insulin w/ low dose sliding scale.           Anemia of chronic renal failure    Procrit w/ dialysis. Iron studies from last year.           VTE Risk Mitigation         Ordered     heparin (porcine) injection 7,500 Units  Every 8 hours     Route:  Subcutaneous        07/23/17 2306     Medium Risk of VTE  Once      07/23/17 2306        Carson Marcus DO  Department of Hospital Medicine   [facility]

## 2017-07-24 NOTE — SUBJECTIVE & OBJECTIVE
Past Medical History:   Diagnosis Date    Anemia of chronic renal failure 2013    Anticoagulant long-term use 2015    CHF (congestive heart failure)     Coronary artery disease     Diabetes mellitus     Diabetic neuropathy 2013    DR (diabetic retinopathy) 2013    End stage renal disease on dialysis     Hypertension     Hypertension, renal 2013    Kidney transplant candidate 2013    Secondary hyperparathyroidism of renal origin 2013    Stroke 2015    Type 2 diabetes mellitus since 2013       Past Surgical History:   Procedure Laterality Date    AV FISTULA PLACEMENT      CATARACT SURGERY       SECTION, LOW TRANSVERSE      x 3    COLONOSCOPY N/A 2016    Procedure: COLONOSCOPY;  Surgeon: Roverto Patel MD;  Location: [facility] ([address] [place] [address]);  Service: Endoscopy;  Laterality: N/A;    EYE SURGERY      HYSTERECTOMY      Endometriosis    TONSILLECTOMY      VASCULAR SURGERY         Review of patient's allergies indicates:  No Known Allergies  Current Facility-Administered Medications   Medication Frequency    0.9%  NaCl infusion PRN    0.9%  NaCl infusion Once    amlodipine tablet 10 mg Daily    aspirin chewable tablet 81 mg Daily    calcitRIOL capsule 0.5 mcg Daily    cefTRIAXone (ROCEPHIN) 1 g in dextrose 5 % 50 mL IVPB Q24H    dextrose 50% injection 12.5 g PRN    dextrose 50% injection 25 g PRN    epoetin batsheva injection 6,400 Units Every Mon, Wed, Fri    glucagon (human recombinant) injection 1 mg PRN    glucose chewable tablet 16 g PRN    glucose chewable tablet 24 g PRN    heparin (porcine) injection 2,000 Units PRN    heparin (porcine) injection 5,000 Units Q12H    insulin aspart pen 0-5 Units QID (AC + HS) PRN    insulin detemir pen 5 Units BID    oxycodone immediate release tablet 5 mg Q6H PRN    polyethylene glycol packet 17 g BID PRN    sevelamer carbonate tablet 2,400 mg TID WM     Family History     Problem Relation  (Age of Onset)    Cancer Mother (70)    Heart disease Mother    Hypertension Mother        Social History Main Topics    Smoking status: Former Smoker     Packs/day: 0.25     Years: 0.50     Types: Cigarettes     Quit date: 9/5/1975    Smokeless tobacco: Never Used      Comment: quit smoking cigarettes 40+ years ago    Alcohol use No    Drug use: No    Sexual activity: Yes     Review of Systems   Constitutional: Positive for fatigue and fever. Negative for activity change.   HENT: Negative.    Eyes: Negative.    Respiratory: Negative.    Cardiovascular: Negative.    Gastrointestinal: Negative.    Musculoskeletal: Positive for joint swelling.        Left foot wound and pain.    Skin: Positive for wound.     Objective:     Vital Signs (Most Recent):  Temp: 98 °F (36.7 °C) (07/24/17 1242)  Pulse: 79 (07/24/17 1242)  Resp: 19 (07/24/17 1242)  BP: 130/60 (07/24/17 1242)  SpO2: 95 % (07/24/17 1242)  O2 Device (Oxygen Therapy): nasal cannula (07/24/17 0750) Vital Signs (24h Range):  Temp:  [98 °F (36.7 °C)-101.2 °F (38.4 °C)] 98 °F (36.7 °C)  Pulse:  [74-90] 79  Resp:  [15-20] 19  SpO2:  [75 %-99 %] 95 %  BP: ()/(53-62) 130/60     Weight: 63.5 kg (139 lb 15.9 oz) (07/24/17 0006)  Body mass index is 28.27 kg/m².  Body surface area is 1.63 meters squared.    I/O last 3 completed shifts:  In: 210 [P.O.:210]  Out: 0     Physical Exam   Constitutional: She is oriented to person, place, and time. She appears well-developed and well-nourished. No distress.   HENT:   Head: Normocephalic.   Eyes: EOM are normal.   Cardiovascular: Normal rate and regular rhythm.    Pulmonary/Chest: Effort normal and breath sounds normal.   Abdominal: Soft. Bowel sounds are normal. She exhibits no distension. There is no tenderness. There is no guarding.   Abd hernia.    Neurological: She is alert and oriented to person, place, and time.   Right sided hemiparesis. RLE chronic swelling. Right femoral graft in use.    Skin: Skin is warm and  dry.   Left foot dressing noted, dry and intact.        Significant Labs:  All labs within the past 24 hours have been reviewed.    Significant Imaging:

## 2017-07-24 NOTE — PROGRESS NOTES
Patient returned to unit via stretcher. Pt assisted to bed. Oxygen on at 3L. Patient complains of foot pain at a 7/10.

## 2017-07-24 NOTE — HPI
61 yo AA female with significant history for hypertension, DMT2 with retinopathy,  CVA with residual right sided hemiparesis, diastolic heart failure, home oxygen 2.5 L NC, and anemia of CKD who is admitted for left foot pain and new bleeding from the left foot ulcer then found to be septic possible due for SSTI/osteomyelitis vs AVG infection. Patient had right femoral dialysis catheter removed on 7/20 by Vascular Surgery. Nephrology consulted for ESRD HD. HD MWF 4.45hrs/tx via now left femoral graft at [facility] under the direction of Dr. Navas. IDW 62 kg. CXR with cardiomegaly and small bilateral effusion R>L.

## 2017-07-24 NOTE — PROGRESS NOTES
Pt arrived to MRI on continuous tele, HR 91, pt on 2LNC, sats 91%, pt alert, tele room notified that pt will be off portable tele & placed on MRI compatible monitor, will place pt back on portable tele post MRI

## 2017-07-24 NOTE — CONSULTS
[facility]  Nephrology  Consult Note    Patient Name: Aleta Herrera  MRN: [id number]  Admission Date: 2017  Hospital Length of Stay: 1 days  Attending Provider: Fred Howard MD   Primary Care Physician: Radha Lao MD  Principal Problem:<principal problem not specified>    Consults  Subjective:     HPI: 63 yo AA female with significant history for hypertension, DMT2 with retinopathy,  CVA with residual right sided hemiparesis, diastolic heart failure, home oxygen 2.5 L NC, and anemia of CKD who is admitted for left foot pain and new bleeding from the left foot ulcer then found to be septic possible due for SSTI/osteomyelitis vs AVG infection. Patient had right femoral dialysis catheter removed on  by Vascular Surgery. Nephrology consulted for ESRD HD. HD MWF 4.45hrs/tx via now left femoral graft at [facility] under the direction of Dr. Navas. IDW 62 kg. CXR with cardiomegaly and small bilateral effusion R>L.     Past Medical History:   Diagnosis Date    Anemia of chronic renal failure 2013    Anticoagulant long-term use 2015    CHF (congestive heart failure)     Coronary artery disease     Diabetes mellitus     Diabetic neuropathy 2013    DR (diabetic retinopathy) 2013    End stage renal disease on dialysis     Hypertension     Hypertension, renal 2013    Kidney transplant candidate 2013    Secondary hyperparathyroidism of renal origin 2013    Stroke 2015    Type 2 diabetes mellitus since 2013       Past Surgical History:   Procedure Laterality Date    AV FISTULA PLACEMENT      CATARACT SURGERY       SECTION, LOW TRANSVERSE      x 3    COLONOSCOPY N/A 2016    Procedure: COLONOSCOPY;  Surgeon: Roverto Patel MD;  Location: [facility] ([address] [place] [address]);  Service: Endoscopy;  Laterality: N/A;    EYE SURGERY      HYSTERECTOMY      Endometriosis    TONSILLECTOMY      VASCULAR SURGERY          Review of patient's allergies indicates:  No Known Allergies  Current Facility-Administered Medications   Medication Frequency    0.9%  NaCl infusion PRN    0.9%  NaCl infusion Once    amlodipine tablet 10 mg Daily    aspirin chewable tablet 81 mg Daily    calcitRIOL capsule 0.5 mcg Daily    cefTRIAXone (ROCEPHIN) 1 g in dextrose 5 % 50 mL IVPB Q24H    dextrose 50% injection 12.5 g PRN    dextrose 50% injection 25 g PRN    epoetin batsheva injection 6,400 Units Every Mon, Wed, Fri    glucagon (human recombinant) injection 1 mg PRN    glucose chewable tablet 16 g PRN    glucose chewable tablet 24 g PRN    heparin (porcine) injection 2,000 Units PRN    heparin (porcine) injection 5,000 Units Q12H    insulin aspart pen 0-5 Units QID (AC + HS) PRN    insulin detemir pen 5 Units BID    oxycodone immediate release tablet 5 mg Q6H PRN    polyethylene glycol packet 17 g BID PRN    sevelamer carbonate tablet 2,400 mg TID WM     Family History     Problem Relation (Age of Onset)    Cancer Mother (70)    Heart disease Mother    Hypertension Mother        Social History Main Topics    Smoking status: Former Smoker     Packs/day: 0.25     Years: 0.50     Types: Cigarettes     Quit date: 9/5/1975    Smokeless tobacco: Never Used      Comment: quit smoking cigarettes 40+ years ago    Alcohol use No    Drug use: No    Sexual activity: Yes     Review of Systems   Constitutional: Positive for fatigue and fever. Negative for activity change.   HENT: Negative.    Eyes: Negative.    Respiratory: Negative.    Cardiovascular: Negative.    Gastrointestinal: Negative.    Musculoskeletal: Positive for joint swelling.        Left foot wound and pain.    Skin: Positive for wound.     Objective:     Vital Signs (Most Recent):  Temp: 98 °F (36.7 °C) (07/24/17 1242)  Pulse: 79 (07/24/17 1242)  Resp: 19 (07/24/17 1242)  BP: 130/60 (07/24/17 1242)  SpO2: 95 % (07/24/17 1242)  O2 Device (Oxygen Therapy): nasal cannula  (07/24/17 0750) Vital Signs (24h Range):  Temp:  [98 °F (36.7 °C)-101.2 °F (38.4 °C)] 98 °F (36.7 °C)  Pulse:  [74-90] 79  Resp:  [15-20] 19  SpO2:  [75 %-99 %] 95 %  BP: ()/(53-62) 130/60     Weight: 63.5 kg (139 lb 15.9 oz) (07/24/17 0006)  Body mass index is 28.27 kg/m².  Body surface area is 1.63 meters squared.    I/O last 3 completed shifts:  In: 210 [P.O.:210]  Out: 0     Physical Exam   Constitutional: She is oriented to person, place, and time. She appears well-developed and well-nourished. No distress.   HENT:   Head: Normocephalic.   Eyes: EOM are normal.   Cardiovascular: Normal rate and regular rhythm.    Pulmonary/Chest: Effort normal and breath sounds normal.   Abdominal: Soft. Bowel sounds are normal. She exhibits no distension. There is no tenderness. There is no guarding.   Abd hernia.    Neurological: She is alert and oriented to person, place, and time.   Right sided hemiparesis. RLE chronic swelling. Right femoral graft in use.    Skin: Skin is warm and dry.   Left foot dressing noted, dry and intact.        Significant Labs:  All labs within the past 24 hours have been reviewed.    Significant Imaging:      Assessment/Plan:     ESRD (end stage renal disease)    -HD MWF at [facility] 4.45 hrs/tx via left femoral graft. IDW 62 kg.   -Labs reviewed. Continue calcitriol and Renvela. Increase BFR. Continue HD MWF.         Sepsis    Per Primary Team.        Anemia of chronic renal failure    Hold off venofer for now. Epogen with HD treatments MWF.             Thank you for your consult. I will follow-up with patient. Please contact us if you have any additional questions.    Ivonne Huertas NP  Nephrology  [facility]

## 2017-07-24 NOTE — PROGRESS NOTES
Patient transported to dialysis via stretcher. Vitals stable. Cardiac monitor on and oxygen on at 3L via nasal cannula.

## 2017-07-24 NOTE — HOSPITAL COURSE
Patient was admitted to Hospital medicine on 7/24 for left diabetic foot infection and patient was started on empiric ceftriaxone.  Podiatry was consulted and recommended MRI to rule out osteomyelitis and Vascular surgery consult given concern for poor perfusion.  MRI showed no concern for osteomyelitis and I and D with debridement was performed on 7/26.  Patient underwent an aortogram + Left LE angiogram which showed significant steal from AVG.  It was deemed that the left foot was likely not salvageable, and she underwent an left AKA on 7/31.  Patient tolerated the procedure well and was transitioned to PO cipro, vascular surgery wished to discontinue abx on 8/02 given source control.  She was prepped for SNF however developed a fever on 8/04 fever. Workup was negative, including blood cultures, UA, and CXR.  Patient was deemed stable for discharge to SNF on 8/07.  On day of discharge patient was VSS, tolerating PO, afebrile and able to participate with therapy.

## 2017-07-24 NOTE — PROGRESS NOTES
Patient transported to ultrasound via stretcher. Vitals stable. Oxygen on at 3L via nasal cannula.

## 2017-07-24 NOTE — HPI
Pt is a 61 y/o female  has a past medical history of Anemia of chronic renal failure; Anticoagulant long-term use; CHF (congestive heart failure); Coronary artery disease; Diabetes mellitus; Diabetic neuropathy; DR (diabetic retinopathy); End stage renal disease on dialysis; Fever; Hypertension; Hypertension, renal; Kidney transplant candidate; Secondary hyperparathyroidism of renal origin; Stroke; and Type 2 diabetes mellitus since 1995. Pt admitted for sepsis and was consulted by vascular surgery to evaluate left wound. Pt seen today with  at bedside. Pt  relates he is frustrated with staff. No pedal complaints at this time.

## 2017-07-24 NOTE — ASSESSMENT & PLAN NOTE
-HD MWF at [facility] 4.45 hrs/tx via left femoral graft. IDW 62 kg.   -Labs reviewed. Continue calcitriol and Renvela. Increase BFR. Continue HD MWF.

## 2017-07-24 NOTE — PROGRESS NOTES
HD treatment started. No complications with access to left thigh. Lines secured and telemetry in place. No complaint of discomfort at this time.

## 2017-07-24 NOTE — SUBJECTIVE & OBJECTIVE
Past Medical History:   Diagnosis Date    Anemia of chronic renal failure 2013    Anticoagulant long-term use 2015    CHF (congestive heart failure)     Coronary artery disease     Diabetes mellitus     Diabetic neuropathy 2013    DR (diabetic retinopathy) 2013    End stage renal disease on dialysis     Hypertension     Hypertension, renal 2013    Kidney transplant candidate 2013    Secondary hyperparathyroidism of renal origin 2013    Stroke 2015    Type 2 diabetes mellitus since 2013       Past Surgical History:   Procedure Laterality Date    AV FISTULA PLACEMENT      CATARACT SURGERY       SECTION, LOW TRANSVERSE      x 3    COLONOSCOPY N/A 2016    Procedure: COLONOSCOPY;  Surgeon: Roverto Patel MD;  Location: [facility] ([address] [place] [address]);  Service: Endoscopy;  Laterality: N/A;    EYE SURGERY      HYSTERECTOMY      Endometriosis    TONSILLECTOMY      VASCULAR SURGERY         Review of patient's allergies indicates:  No Known Allergies    No current facility-administered medications on file prior to encounter.      Current Outpatient Prescriptions on File Prior to Encounter   Medication Sig    amlodipine (NORVASC) 10 MG tablet TAKE ONE TABLET BY MOUTH ONCE DAILY    aspirin 81 mg Tab Take by mouth.    calcitRIOL (ROCALTROL) 0.5 MCG Cap Take 1 capsule (0.5 mcg total) by mouth once daily.    epoetin batsheva (PROCRIT) 10,000 unit/mL injection Inject 0.74 mLs (7,400 Units total) into the skin every Mon, Wed, Fri.    hydrocodone-acetaminophen 5-325mg (NORCO) 5-325 mg per tablet     insulin aspart (NOVOLOG FLEXPEN) 100 unit/mL InPn as dir Insulin Pen Subcutaneous Before meals and at bedtime.  If blood sugar is 151-200 1 units SQif blood sugar is 201-250 2 units SQIf blood sugar is 251-300 3 units SQIf blood sugar is 301-350 4 units SQIf blood sugar is > or = 351 5 units SQand call MD;  Dispense with needles    insulin detemir (LEVEMIR FLEXPEN)  100 unit/mL (3 mL) SubQ InPn pen Inject 8 Units into the skin 2 (two) times daily. (Patient taking differently: Inject 4 Units into the skin every evening. )    inulin (FIBER CHOICE, INULIN,) 1.5 gram Chew Take 2 tablets by mouth 2 (two) times daily. Or as directed on product packaging.    lidocaine-prilocaine (EMLA) cream APPLY SMALL AMOUNT TO ACCESS SITE 1 TO 2 HOURS BEFORE TREATMENT. COVER AREA WITH AN OCCLUSIVE DRESSING.    lisinopril (PRINIVIL,ZESTRIL) 40 MG tablet     metoprolol tartrate (LOPRESSOR) 100 MG tablet TAKE 1 TABLET BY MOUTH TWICE DAILY    oxycodone (ROXICODONE) 5 MG immediate release tablet Take 1-2 tablets (5-10 mg total) by mouth every 4 to 6 hours as needed for Pain.    polyethylene glycol (GLYCOLAX) 17 gram/dose powder Take 17 g by mouth once daily. May take up to 3 times per day as needed for constipation.    sevelamer carbonate (RENVELA) 800 mg Tab Take 2,400 mg by mouth 3 (three) times daily with meals.     Family History     Problem Relation (Age of Onset)    Cancer Mother (70)    Heart disease Mother    Hypertension Mother        Social History Main Topics    Smoking status: Former Smoker     Packs/day: 0.25     Years: 0.50     Types: Cigarettes     Quit date: 9/5/1975    Smokeless tobacco: Never Used      Comment: quit smoking cigarettes 40+ years ago    Alcohol use No    Drug use: No    Sexual activity: Yes     Review of Systems   Constitutional: Positive for appetite change, chills, fatigue and fever.   HENT: Negative for congestion and sore throat.    Eyes: Negative for photophobia and visual disturbance.   Respiratory: Negative for chest tightness and shortness of breath.    Cardiovascular: Negative for chest pain, palpitations and leg swelling.   Gastrointestinal: Negative for abdominal pain, diarrhea and nausea.   Endocrine: Negative for cold intolerance and heat intolerance.   Genitourinary:        Anuric    Musculoskeletal: Positive for arthralgias. Negative for  myalgias.   Skin: Positive for wound.   Neurological: Negative for headaches.   Psychiatric/Behavioral: Negative for agitation and confusion.     Objective:     Vital Signs (Most Recent):  Temp: 98.4 °F (36.9 °C) (07/23/17 2350)  Pulse: 81 (07/23/17 2350)  Resp: 20 (07/23/17 2350)  BP: (!) 121/56 (07/23/17 2350)  SpO2: (!) 94 % (07/23/17 2350) Vital Signs (24h Range):  Temp:  [98.4 °F (36.9 °C)-101.2 °F (38.4 °C)] 98.4 °F (36.9 °C)  Pulse:  [81-90] 81  Resp:  [15-20] 20  SpO2:  [75 %-99 %] 94 %  BP: ()/(53-59) 121/56     Weight: 63.5 kg (139 lb 15.9 oz)  Body mass index is 28.27 kg/m².    Physical Exam   Constitutional: She is oriented to person, place, and time. She appears well-developed and well-nourished. No distress.   HENT:   Head: Normocephalic and atraumatic.   Eyes: Conjunctivae and EOM are normal. Pupils are equal, round, and reactive to light. No scleral icterus.   Neck: Normal range of motion. No thyromegaly present.   Cardiovascular: Normal rate, regular rhythm and normal heart sounds.    Pulmonary/Chest: Effort normal and breath sounds normal. She has no wheezes. She has no rales.   Musculoskeletal: Normal range of motion. She exhibits edema.   Upper extremities w/ prior failed HD access. L femoral AVG warm w/o erythema. LLE shows significant discoloration w/ ulcer near lateral malleolus    Neurological: She is alert and oriented to person, place, and time.   Skin: Skin is warm and dry. Capillary refill takes less than 2 seconds. She is not diaphoretic.        Significant Labs:   CBC:   Recent Labs  Lab 07/23/17 1946   WBC 24.04*   HGB 8.3*   HCT 26.5*   *     CMP:   Recent Labs  Lab 07/23/17 1946   *   K 3.7   CL 98   CO2 20*   *   BUN 25*   CREATININE 4.7*   CALCIUM 8.8   PROT 9.0*   ALBUMIN 2.7*   BILITOT 0.5   ALKPHOS 98   AST 15   ALT 6*   ANIONGAP 16   EGFRNONAA 9.4*       Significant Imaging: I have reviewed all pertinent imaging results/findings within the past 24  hours.

## 2017-07-24 NOTE — ED PROVIDER NOTES
Encounter Date: 7/23/2017    SCRIBE #1 NOTE: I, Nic Young, am scribing for, and in the presence of,  Dr. Woodward . I have scribed the entire note.       History     Chief Complaint   Patient presents with    Wound Infection     pt reports DM and wound to bottom of left foot, pt states she has been picking at it and  states he thought it was time to come in the gets it checked out, pt reports stinging, denies severe pain-- pt supposed to be on home O2 but does not have it on currently. Denies CP, denies SOB.     Time patient was seen by the provider: 7:49 PM      The patient is a 62 y.o. female with hx of: HTN, DM, CHF, ESRD, CAD, and recent removal of right dialysis access 3 days ago in clinic that presents to the ED with a complaint of a possible wound infection on her left foot. Pt mentions that she has had this wound for 2x weeks. Pt states she has been picking at it and  states he thought it was time to come in to the ED for it to be evaluated. She also states that she recently had a left leg dialysis access placed 2 days ago. Associated sx include: fevers for 2x days, nausea, emesis, neck pain, right leg swelling since removal of catheter, and some numbness to left foot. Pt denies taking any abx, taking any tylenol today, productive cough, worsened SOB, chest pain, abnormal bowel movements, using CPAP at night although she is supposed to, any pain to her left foot, and any injury to her left foot. She adds that she has taken her prescribed medications today. Pt also states that she has an appointment in a few days with Dr. Nathalie Madera concerning her leg.         The history is provided by the patient, the spouse and medical records.     Review of patient's allergies indicates:  No Known Allergies  Past Medical History:   Diagnosis Date    Anemia of chronic renal failure 9/5/2013    Anticoagulant long-term use 11/23/2015    CHF (congestive heart failure)     Coronary artery disease      Diabetes mellitus     Diabetic neuropathy 2013    DR (diabetic retinopathy) 2013    End stage renal disease on dialysis     Hypertension     Hypertension, renal 2013    Kidney transplant candidate 2013    Secondary hyperparathyroidism of renal origin 2013    Stroke 2015    Type 2 diabetes mellitus since 2013     Past Surgical History:   Procedure Laterality Date    AV FISTULA PLACEMENT      CATARACT SURGERY       SECTION, LOW TRANSVERSE      x 3    COLONOSCOPY N/A 2016    Procedure: COLONOSCOPY;  Surgeon: Roverto Patel MD;  Location: [facility] ([address] [place] [address]);  Service: Endoscopy;  Laterality: N/A;    EYE SURGERY      HYSTERECTOMY      Endometriosis    TONSILLECTOMY      VASCULAR SURGERY       Family History   Problem Relation Age of Onset    Heart disease Mother      MI    Hypertension Mother     Cancer Mother 70     breast    Heart attack Neg Hx      Social History   Substance Use Topics    Smoking status: Former Smoker     Packs/day: 0.25     Years: 0.50     Types: Cigarettes     Quit date: 1975    Smokeless tobacco: Never Used      Comment: quit smoking cigarettes 40+ years ago    Alcohol use No     Review of Systems   Constitutional: Positive for fever (2x days).   HENT: Negative for sore throat.    Respiratory: Negative for cough (no productive cough) and shortness of breath.    Cardiovascular: Negative for chest pain.   Gastrointestinal: Positive for nausea and vomiting.        - abnormal bowel movements   Genitourinary: Negative for dysuria.   Musculoskeletal: Positive for neck pain. Negative for back pain.        + right leg swelling   Skin: Positive for color change (blackening of left foot) and wound (not open). Negative for rash.   Neurological: Positive for numbness (slight left foot numbness). Negative for weakness.   Hematological: Does not bruise/bleed easily.       Physical Exam     Initial Vitals [17 1926]   BP Pulse  Resp Temp SpO2   (!) 99/53 90 18 (!) 101.2 °F (38.4 °C) (!) 75 %      MAP       68.33         Physical Exam    Vitals reviewed.  Constitutional:   62  woman. NAD noted   HENT:   Head: Normocephalic and atraumatic.   Redundant soft palate noted   Eyes: EOM are normal. Pupils are equal, round, and reactive to light.   Neck: Normal range of motion. Neck supple. No tracheal deviation present. No JVD present.   Cardiovascular: Normal rate and normal heart sounds. Exam reveals no gallop.    No murmur heard.  Diminished pulses to lower extremities   Pulmonary/Chest: Breath sounds normal. No stridor. No respiratory distress.   Diminished bibasilar breath sounds    Abdominal: Soft. She exhibits no distension. There is no tenderness.   Mildly obese   Musculoskeletal: Normal range of motion. She exhibits no edema.   Generalized muscle weakness (left greater than right) consistent with previous stroke.  1-2+ pitting bilateral lower extremity edema   Neurological: She is alert and oriented to person, place, and time.   Skin:   Left foot:  Global swelling of the left foot with ecchymosis to the proximal plantar aspect without associated deformity or open wound.    Psychiatric: Her behavior is normal. Thought content normal.         ED Course   Procedures  Labs Reviewed   B-TYPE NATRIURETIC PEPTIDE - Abnormal; Notable for the following:        Result Value    BNP 2,835 (*)     All other components within normal limits   CBC W/ AUTO DIFFERENTIAL - Abnormal; Notable for the following:     WBC 24.04 (*)     RBC 2.96 (*)     Hemoglobin 8.3 (*)     Hematocrit 26.5 (*)     MCHC 31.3 (*)     RDW 18.5 (*)     Platelets 351 (*)     MPV 8.8 (*)     Gran # 21.9 (*)     Lymph # 0.8 (*)     Mono # 1.2 (*)     Gran% 91.3 (*)     Lymph% 3.5 (*)     All other components within normal limits   COMPREHENSIVE METABOLIC PANEL - Abnormal; Notable for the following:     Sodium 134 (*)     CO2 20 (*)     Glucose 132 (*)     BUN, Bld 25  (*)     Creatinine 4.7 (*)     Total Protein 9.0 (*)     Albumin 2.7 (*)     ALT 6 (*)     eGFR if  10.8 (*)     eGFR if non  9.4 (*)     All other components within normal limits   LIPASE - Abnormal; Notable for the following:     Lipase <3 (*)     All other components within normal limits   PROCALCITONIN - Abnormal; Notable for the following:     Procalcitonin 1.64 (*)     All other components within normal limits   CULTURE, BLOOD   CULTURE, BLOOD   APTT   CORTISOL, RANDOM   LACTIC ACID, PLASMA   MAGNESIUM   PHOSPHORUS   PROTIME-INR   TROPONIN I   TSH   URINALYSIS, REFLEX TO URINE CULTURE   TYPE & SCREEN     EKG Readings: (Independently Interpreted)   Sinus rhythm with right superior axis deviation and normal ST segmetns at rate of 87 bpm.        X-Rays:   Independently Interpreted Readings:   Chest X-Ray: Enlarged cardiac silhouette with small bilateral pleural effusion with increased opacity of right lower lung zone concerning for consolidation.      Medical Decision Making:   History:   Old Medical Records: I decided to obtain old medical records.  Differential Diagnosis:   Ddx:  Fever without known origin, bacteremia, pneumonia, chronic respiratory failure, wound infection.   Independently Interpreted Test(s):   I have ordered and independently interpreted X-rays - see prior notes.  I have ordered and independently interpreted EKG Reading(s) - see prior notes  Clinical Tests:   Lab Tests: Ordered and Reviewed  Radiological Study: Ordered and Reviewed  Medical Tests: Ordered and Reviewed            Scribe Attestation:   Scribe #1: I performed the above scribed service and the documentation accurately describes the services I performed. I attest to the accuracy of the note.    Attending Attestation:           Physician Attestation for Scribe:  Physician Attestation Statement for Scribe #1: I, Dr. Woodward , reviewed documentation, as scribed by Nic Young in my presence, and it is  both accurate and complete.         Attending ED Notes:   Emergency department evaluation today reveals evidence of a significant leukocytosis in this patient presenting with generalized weakness and fever.  Despite the patient and family's concern, the left lower extremity does not reveal evidence of a significant wound to account for her fever and malaise.  Notably, the chest x-ray reveals findings concerning for a developing right lower lobe pneumonia in this patient with chronic hypoxia and oxygen dependent at home.  Broad-spectrum IV antibiotics have been administered including cefepime and vancomycin.  Notably, the serum lactic acid is within normal limits.  The patient exhibits appropriate oxygenation with supplemental oxygen with a baseline conversant mental status.  Findings and concerns have been discussed with internal medicine, and the patient will be admitted to their service in fair condition for further therapy and management.          ED Course     Clinical Impression:   The primary encounter diagnosis was Fever, unspecified fever cause. Diagnoses of Pain and swelling of toe of left foot, Sepsis, due to unspecified organism, and Hypoxia were also pertinent to this visit.    Disposition:   Disposition: Admitted  Condition: Fair                          Melchor Woodward MD  07/23/17 7027

## 2017-07-24 NOTE — PLAN OF CARE
Problem: Patient Care Overview  Goal: Plan of Care Review  Outcome: Ongoing (interventions implemented as appropriate)  HD treatment complete. Duration of treatment 3 hours and 2 L removed. Treatment was tolerated well and no complications with access to left thigh. Needles removed and hemostasis achieved. Dressing intact and no drainage noted. Thrill and bruit present.

## 2017-07-25 PROBLEM — L97.529 TYPE 2 DIABETES MELLITUS WITH LEFT DIABETIC FOOT ULCER: Status: ACTIVE | Noted: 2017-07-25

## 2017-07-25 PROBLEM — E11.621 TYPE 2 DIABETES MELLITUS WITH LEFT DIABETIC FOOT ULCER: Status: ACTIVE | Noted: 2017-07-25

## 2017-07-25 PROBLEM — J96.11 CHRONIC RESPIRATORY FAILURE WITH HYPOXIA: Status: ACTIVE | Noted: 2017-07-25

## 2017-07-25 LAB
ANION GAP SERPL CALC-SCNC: 9 MMOL/L
ANISOCYTOSIS BLD QL SMEAR: SLIGHT
BASO STIPL BLD QL SMEAR: ABNORMAL
BASOPHILS # BLD AUTO: 0.02 K/UL
BASOPHILS NFR BLD: 0.1 %
BUN SERPL-MCNC: 21 MG/DL
BURR CELLS BLD QL SMEAR: ABNORMAL
CALCIUM SERPL-MCNC: 8.1 MG/DL
CHLORIDE SERPL-SCNC: 103 MMOL/L
CO2 SERPL-SCNC: 22 MMOL/L
CREAT SERPL-MCNC: 3.7 MG/DL
DIFFERENTIAL METHOD: ABNORMAL
EOSINOPHIL # BLD AUTO: 0.1 K/UL
EOSINOPHIL NFR BLD: 0.6 %
ERYTHROCYTE [DISTWIDTH] IN BLOOD BY AUTOMATED COUNT: 18.8 %
EST. GFR  (AFRICAN AMERICAN): 14.4 ML/MIN/1.73 M^2
EST. GFR  (NON AFRICAN AMERICAN): 12.5 ML/MIN/1.73 M^2
GLUCOSE SERPL-MCNC: 119 MG/DL
HCT VFR BLD AUTO: 24.5 %
HGB BLD-MCNC: 7.6 G/DL
HYPOCHROMIA BLD QL SMEAR: ABNORMAL
LYMPHOCYTES # BLD AUTO: 1 K/UL
LYMPHOCYTES NFR BLD: 5.3 %
MCH RBC QN AUTO: 27.6 PG
MCHC RBC AUTO-ENTMCNC: 31 G/DL
MCV RBC AUTO: 89 FL
MONOCYTES # BLD AUTO: 1.2 K/UL
MONOCYTES NFR BLD: 6.4 %
NEUTROPHILS # BLD AUTO: 16.7 K/UL
NEUTROPHILS NFR BLD: 87.6 %
PLATELET # BLD AUTO: 354 K/UL
PLATELET BLD QL SMEAR: ABNORMAL
PMV BLD AUTO: 9.3 FL
POCT GLUCOSE: 112 MG/DL (ref 70–110)
POCT GLUCOSE: 115 MG/DL (ref 70–110)
POCT GLUCOSE: 75 MG/DL (ref 70–110)
POCT GLUCOSE: 96 MG/DL (ref 70–110)
POIKILOCYTOSIS BLD QL SMEAR: SLIGHT
POLYCHROMASIA BLD QL SMEAR: ABNORMAL
POTASSIUM SERPL-SCNC: 4.2 MMOL/L
RBC # BLD AUTO: 2.75 M/UL
SCHISTOCYTES BLD QL SMEAR: ABNORMAL
SODIUM SERPL-SCNC: 134 MMOL/L
WBC # BLD AUTO: 19.18 K/UL

## 2017-07-25 PROCEDURE — 87070 CULTURE OTHR SPECIMN AEROBIC: CPT

## 2017-07-25 PROCEDURE — 63600175 PHARM REV CODE 636 W HCPCS: Performed by: INTERNAL MEDICINE

## 2017-07-25 PROCEDURE — 99232 SBSQ HOSP IP/OBS MODERATE 35: CPT | Mod: GC,,, | Performed by: HOSPITALIST

## 2017-07-25 PROCEDURE — 80048 BASIC METABOLIC PNL TOTAL CA: CPT

## 2017-07-25 PROCEDURE — 63600175 PHARM REV CODE 636 W HCPCS: Performed by: STUDENT IN AN ORGANIZED HEALTH CARE EDUCATION/TRAINING PROGRAM

## 2017-07-25 PROCEDURE — 36415 COLL VENOUS BLD VENIPUNCTURE: CPT

## 2017-07-25 PROCEDURE — 87075 CULTR BACTERIA EXCEPT BLOOD: CPT

## 2017-07-25 PROCEDURE — 87186 SC STD MICRODIL/AGAR DIL: CPT | Mod: 59

## 2017-07-25 PROCEDURE — 85025 COMPLETE CBC W/AUTO DIFF WBC: CPT

## 2017-07-25 PROCEDURE — 87076 CULTURE ANAEROBE IDENT EACH: CPT

## 2017-07-25 PROCEDURE — 99223 1ST HOSP IP/OBS HIGH 75: CPT | Mod: ,,, | Performed by: PODIATRIST

## 2017-07-25 PROCEDURE — 11000001 HC ACUTE MED/SURG PRIVATE ROOM

## 2017-07-25 PROCEDURE — 25000003 PHARM REV CODE 250: Performed by: INTERNAL MEDICINE

## 2017-07-25 PROCEDURE — 87077 CULTURE AEROBIC IDENTIFY: CPT | Mod: 59

## 2017-07-25 RX ADMIN — INSULIN DETEMIR 5 UNITS: 100 INJECTION, SOLUTION SUBCUTANEOUS at 09:07

## 2017-07-25 RX ADMIN — AMLODIPINE BESYLATE 10 MG: 10 TABLET ORAL at 09:07

## 2017-07-25 RX ADMIN — HEPARIN SODIUM 5000 UNITS: 5000 INJECTION, SOLUTION INTRAVENOUS; SUBCUTANEOUS at 09:07

## 2017-07-25 RX ADMIN — CEFTRIAXONE 1 G: 1 INJECTION, SOLUTION INTRAVENOUS at 02:07

## 2017-07-25 RX ADMIN — OXYCODONE HYDROCHLORIDE 5 MG: 5 TABLET ORAL at 05:07

## 2017-07-25 RX ADMIN — ASPIRIN 81 MG CHEWABLE TABLET 81 MG: 81 TABLET CHEWABLE at 09:07

## 2017-07-25 RX ADMIN — OXYCODONE HYDROCHLORIDE 5 MG: 5 TABLET ORAL at 11:07

## 2017-07-25 RX ADMIN — SEVELAMER CARBONATE 2400 MG: 800 TABLET, FILM COATED ORAL at 12:07

## 2017-07-25 RX ADMIN — HEPARIN SODIUM 5000 UNITS: 5000 INJECTION, SOLUTION INTRAVENOUS; SUBCUTANEOUS at 08:07

## 2017-07-25 RX ADMIN — INSULIN DETEMIR 5 UNITS: 100 INJECTION, SOLUTION SUBCUTANEOUS at 08:07

## 2017-07-25 RX ADMIN — SEVELAMER CARBONATE 2400 MG: 800 TABLET, FILM COATED ORAL at 04:07

## 2017-07-25 RX ADMIN — OXYCODONE HYDROCHLORIDE 5 MG: 5 TABLET ORAL at 04:07

## 2017-07-25 RX ADMIN — SEVELAMER CARBONATE 2400 MG: 800 TABLET, FILM COATED ORAL at 09:07

## 2017-07-25 RX ADMIN — CALCITRIOL 0.5 MCG: 0.25 CAPSULE, LIQUID FILLED ORAL at 09:07

## 2017-07-25 NOTE — SUBJECTIVE & OBJECTIVE
Interval History: NAEON.  Pt has some left foot pain, but reports b/l diabetic neuropathy at baseline.  Denies any fevers or chills overnight. Denies chest pain, SOB, abdominal pain, n/v/d.     Review of Systems   Constitutional: Negative for activity change, appetite change, chills and fever.   HENT: Negative for congestion and rhinorrhea.    Eyes: Negative for photophobia and redness.   Respiratory: Negative for cough and shortness of breath.    Cardiovascular: Negative for chest pain and palpitations.   Gastrointestinal: Negative for abdominal pain, diarrhea, nausea and vomiting.   Genitourinary:        Does not make urine   Musculoskeletal: Positive for joint swelling (left foot) and myalgias (left foot). Negative for arthralgias.   Skin: Positive for color change (left foot) and wound (left heel).   Neurological: Negative for dizziness, light-headedness and headaches.     Objective:     Vital Signs (Most Recent):  Temp: 98 °F (36.7 °C) (07/25/17 1200)  Pulse: 86 (07/25/17 1500)  Resp: 16 (07/25/17 1200)  BP: (!) 114/56 (07/25/17 1200)  SpO2: (!) 91 % (07/25/17 1200) Vital Signs (24h Range):  Temp:  [98 °F (36.7 °C)-99.3 °F (37.4 °C)] 98 °F (36.7 °C)  Pulse:  [80-92] 86  Resp:  [16-18] 16  SpO2:  [91 %-98 %] 91 %  BP: ()/(53-72) 114/56     Weight: 63.5 kg (139 lb 15.9 oz)  Body mass index is 28.27 kg/m².    Intake/Output Summary (Last 24 hours) at 07/25/17 1648  Last data filed at 07/25/17 0524   Gross per 24 hour   Intake             1250 ml   Output             2600 ml   Net            -1350 ml      Physical Exam   Constitutional: She is oriented to person, place, and time. She appears well-developed and well-nourished.   HENT:   Head: Normocephalic and atraumatic.   Eyes: EOM are normal. Pupils are equal, round, and reactive to light.   Cardiovascular: Normal rate and regular rhythm.    Unable to appreciate DP pulse bilaterally   Left femoral avg with palpable thrill.    Pulmonary/Chest: Effort normal.  No respiratory distress.   Abdominal: She exhibits no distension. There is no tenderness.   Musculoskeletal:   Left plantar foot surface with non-tender, foul-smelling, dark, necrotic appearing tissue extending to heel; ulcer noted to lateral heel with foul smelling dark drainage. No crepitus. Tenderness and erythema over dorsal surface of left foot.    Neurological: She is alert and oriented to person, place, and time.   Skin: Skin is warm and dry.   Left femoral AVG non-tender, no erythema, no fluctuance   Psychiatric: She has a normal mood and affect.       Significant Labs:   Blood Culture:   Recent Labs  Lab 07/23/17 1947   LABBLOO Gram stain aer bottle: Gram positive cocci in clusters resembling Staph  Results called to and read back by:Andres Echeverria RN 07/25/2017  08:11  No Growth to date  No Growth to date     BMP:   Recent Labs  Lab 07/23/17 1946 07/25/17 0434   *  < > 119*   *  < > 134*   K 3.7  < > 4.2   CL 98  < > 103   CO2 20*  < > 22*   BUN 25*  < > 21   CREATININE 4.7*  < > 3.7*   CALCIUM 8.8  < > 8.1*   MG 2.0  --   --    < > = values in this interval not displayed.  CBC:   Recent Labs  Lab 07/23/17 1946 07/24/17 0558 07/25/17 0434   WBC 24.04* 22.58* 19.18*   HGB 8.3* 7.7* 7.6*   HCT 26.5* 24.4* 24.5*   * 318 354*     CMP:   Recent Labs  Lab 07/23/17 1946 07/24/17 0558 07/25/17  0434   * 133* 134*   K 3.7 3.3* 4.2   CL 98 98 103   CO2 20* 23 22*   * 246* 119*   BUN 25* 31* 21   CREATININE 4.7* 5.0* 3.7*   CALCIUM 8.8 8.2* 8.1*   PROT 9.0* 7.6  --    ALBUMIN 2.7* 2.2*  --    BILITOT 0.5 0.3  --    ALKPHOS 98 128  --    AST 15 19  --    ALT 6* 7*  --    ANIONGAP 16 12 9   EGFRNONAA 9.4* 8.7* 12.5*     Cardiac Markers:   Recent Labs  Lab 07/23/17 1946   BNP 2,835*       Significant Imaging:  Imaging Results          MRI Lower Extremity Joint WO Cont Left (Final result)  Result time 07/24/17 22:30:22    Final result by Prabhu Hilario MD (07/24/17  22:30:22)                 Impression:        No MR imaging finding to suggest osteomyelitis.    Diffuse heterogeneous marrow signal intensity throughout the visualized osseous structures, a nonspecific finding that may relate to prolonged immobilization or atypical red marrow reconversion. Early osteonecrosis is possible but felt to be less likely.    Extensive hyperintensity throughout visualized musculature, likely related to diabetic neuropathy or chronic disuse. No focal intramuscular drainable collections there    Extensive subcutaneous signal hyperintensity suggesting noninfectious edema versus cellulitis.  No definite focal drainable collections.      Electronically signed by: SCOTTY HANKS MD  Date:     17  Time:    22:30              Narrative:    Technique: Routine MRI evaluation of the left hindfoot and forefoot performed without contrast.    Comparison: Radiographs 2017.    Findings:    There is diffuse heterogeneous marrow signal intensity throughout the visualized osseous structures which is overall nonspecific.  No MR imaging finding to suggest osteomyelitis.  No fractures.    There is extensive signal hyperintensity throughout the visualized musculature without focal intramuscular drainable collection.    Visualized flexor and extensor tendons are normal.  No tendinosis or tenosynovitis.    Tibiotalar, subtalar and mid foot joint spaces are well preserved.    Lisfranc ligament is intact.    There is extensive signal hyperintensity throughout the visualized subcutaneous soft tissues, most pronounced within the lateral aspect of the lower leg and ankle.  No definite focal drainable collections.                             VAS Ankle Brachial Indices Resting (Final result)  Result time 17 14:16:47    Final result by Cody, Lab [facility] (17 14:16:47)                 Narrative:    PAT NAME: SELMA FANG  MED REC#: [id number]  :      1955  SEX:      F  EXAM MURRAY:  07/24/2017 10:30  REF PHYS: SELF, AAAREFERRAL      Indication:  pvd.  Results:  Lower Extremities Segmental Pressure [mmHg]:                    Right             Left  _______________________________________________________________  Brachial          122               122  Dorsalis Pedis    202               179  JULIUS (Dors. Ped.)  1.66              1.47    The presence of artifactually elevated pressures in arteries of the lower extremity renders all pressure measurements unreliable due to suspected medial calcinosis.    Report Summary:  Impression:    Right leg:The peripheral arteries are non-compressable. The PVR waveforms suggest moderate peripheral arterial occlusive disease.     Left leg:The peripheral arteries are non-compressable.The PVR waveforms suggest moderate  to severe peripheral arterial occlusive disease.    Sonographer: MARCIN BowlingS    Electronically Signed by:  Nathalie Madera MD [[id number]]                        On: 07/24/2017 14:16                             US Lower Extremity Veins Bilateral (Final result)  Result time 07/24/17 09:47:26    Final result by Hardy Navarro MD (07/24/17 09:47:26)                 Impression:        No evidence of deep venous thrombosis bilaterally.    Bilateral lower extremity edema.  ______________________________________     Electronically signed by resident: ELICEO IRWIN MD  Date:     07/24/17  Time:    08:43            As the supervising and teaching physician, I personally reviewed the images and resident's interpretation and I agree with the findings.          Electronically signed by: HARDY NAVARRO MD  Date:     07/24/17  Time:    09:47              Narrative:    Time of Procedure: 07/24/17 07:54:00  Accession # [id number]    Technique: Duplex and color flow Doppler evaluation of the bilateral lower extremities.    Comparison: Ultrasound lower extremity 11/24/2015.    Clinical Indication:  Bilateral lower extremity edema.    Findings:    Right -  There is no evidence of acute venous thrombosis in the common femoral, superficial femoral, greater saphenous, popliteal, peroneal, anterior tibial and posterior tibial veins.  There is no reflux to suggest valvular incompetence.    Left - There is no evidence of acute venous thrombosis in the common femoral, superficial femoral, greater saphenous, popliteal, peroneal, anterior tibial and posterior tibial veins.  There is no reflux to suggest valvular incompetence.                             X-Ray Chest AP Portable (Final result)  Result time 07/23/17 20:35:18    Final result by Arnaud Gutierrez MD (07/23/17 20:35:18)                 Impression:        Stable cardiomegaly and small bilateral effusions, RIGHT larger the LEFT. RIGHT effusion appears to be partially loculated but unchanged. Mild edema in the lungs, stable.    Aside from removal of central catheter, no significant change from prior study.        Electronically signed by: ARNAUD GUTIERREZ MD  Date:     07/23/17  Time:    20:35              Narrative:    Chest AP portable    Indication:sepsis.    Comparison:May 3, 2017.    Findings:     Stable cardiomegaly and small bilateral effusions, RIGHT larger the LEFT. RIGHT effusion appears to be partially loculated but unchanged. Mild edema in the lungs, stable. Previous central catheter has been removed.    Heart and lungs otherwise unchanged when allowing for differences in technique and positioning.                             X-Ray Foot Complete Left (Final result)  Result time 07/23/17 20:41:18    Final result by Arnaud Gutierrez MD (07/23/17 20:41:18)                 Impression:      No acute fracture.    Hindfoot valgus with pes planus.    Soft tissue swelling throughout the foot.      Electronically signed by: ARNAUD GUTIERREZ MD  Date:     07/23/17  Time:    20:41              Narrative:    History: .    LEFT foot 3 views:    No fractures or dislocations. Hindfoot valgus with pes planus. Mild  degenerative changes at the IP joints of the toes. Soft tissue swelling throughout the LEFT foot. Atherosclerotic vascular calcifications.

## 2017-07-25 NOTE — CONSULTS
[facility]  Vascular Surgery  Consult Note    Inpatient consult to Vascular Surgery  Consult performed by: JOCE PABON  Consult ordered by: MINNA SANDHU        Subjective:     Chief Complaint/Reason for Admission: sepsis    History of Present Illness: 61 yo F with h/o HTN, HLD, DM2 (Hgb A1c 6.3),HFpEF (EF 60%), COPD (home O2, 2.5 L), ESRD on HD (MWF) via L femoral AVG presenting to hospital with left foot bleeding.  Reports that she has had fever/chills over the past week.  First noted pain to foot a few weeks prior, noted that he  noted drainage over the weekend thus came to hospital.  She was noted to have a leukocytosis of 24 on admission, was started on broad spectrum antibiotics.    Patient was last seen by Dr. Madera on 7/21 with JULIUS obtained.  Right femoral permacath was also removed in office, blood cultures had been drawn at HD on 7/19.    Prescriptions Prior to Admission   Medication Sig Dispense Refill Last Dose    amlodipine (NORVASC) 10 MG tablet TAKE ONE TABLET BY MOUTH ONCE DAILY 90 tablet 0 Taking    aspirin 81 mg Tab Take 1 tablet by mouth once daily.    Taking    epoetin batsheva (PROCRIT) 10,000 unit/mL injection Inject 0.74 mLs (7,400 Units total) into the skin every Mon, Wed, Fri.   Taking    hydrocodone-acetaminophen 5-325mg (NORCO) 5-325 mg per tablet Take 1 tablet by mouth every 6 (six) hours as needed for Pain.    Taking    insulin aspart (NOVOLOG FLEXPEN) 100 unit/mL InPn as dir Insulin Pen Subcutaneous Before meals and at bedtime.  If blood sugar is 151-200 1 units SQif blood sugar is 201-250 2 units SQIf blood sugar is 251-300 3 units SQIf blood sugar is 301-350 4 units SQIf blood sugar is > or = 351 5 units SQand call MD;  Dispense with needles   Taking    insulin detemir (LEVEMIR FLEXPEN) 100 unit/mL (3 mL) SubQ InPn pen Inject 8 Units into the skin 2 (two) times daily. (Patient taking differently: Inject 4 Units into the skin every evening. )    Taking    inulin (FIBER CHOICE, INULIN,) 1.5 gram Chew Take 2 tablets by mouth 2 (two) times daily. Or as directed on product packaging.  11 Taking    lidocaine-prilocaine (EMLA) cream APPLY SMALL AMOUNT TO ACCESS SITE 1 TO 2 HOURS BEFORE TREATMENT. COVER AREA WITH AN OCCLUSIVE DRESSING.  3     lisinopril (PRINIVIL,ZESTRIL) 40 MG tablet Take 40 mg by mouth once daily.    Taking    metoprolol tartrate (LOPRESSOR) 100 MG tablet TAKE 1 TABLET BY MOUTH TWICE DAILY 180 tablet 3 Taking    oxycodone (ROXICODONE) 5 MG immediate release tablet Take 1-2 tablets (5-10 mg total) by mouth every 4 to 6 hours as needed for Pain. 31 tablet 0 Taking    polyethylene glycol (GLYCOLAX) 17 gram/dose powder Take 17 g by mouth once daily. May take up to 3 times per day as needed for constipation. 510 g 11 Taking    sevelamer carbonate (RENVELA) 800 mg Tab Take 2,400 mg by mouth 3 (three) times daily with meals.   Taking    calcitRIOL (ROCALTROL) 0.5 MCG Cap Take 1 capsule (0.5 mcg total) by mouth once daily. 30 capsule 1 Taking       Review of patient's allergies indicates:  No Known Allergies    Past Medical History:   Diagnosis Date    Anemia of chronic renal failure 2013    Anticoagulant long-term use 2015    CHF (congestive heart failure)     Coronary artery disease     Diabetes mellitus     Diabetic neuropathy 2013    DR (diabetic retinopathy) 2013    End stage renal disease on dialysis     Fever 2017    Hypertension     Hypertension, renal 2013    Kidney transplant candidate 2013    Secondary hyperparathyroidism of renal origin 2013    Stroke 2015    Type 2 diabetes mellitus since 2013     Past Surgical History:   Procedure Laterality Date    AV FISTULA PLACEMENT      CATARACT SURGERY       SECTION, LOW TRANSVERSE      x 3    COLONOSCOPY N/A 2016    Procedure: COLONOSCOPY;  Surgeon: Roverto Patel MD;  Location: [facility] ([address] [place] [address]);  Service:  Endoscopy;  Laterality: N/A;    EYE SURGERY      HYSTERECTOMY  2003    Endometriosis    TONSILLECTOMY      VASCULAR SURGERY       Family History     Problem Relation (Age of Onset)    Cancer Mother (70)    Heart disease Mother    Hypertension Mother        Social History Main Topics    Smoking status: Former Smoker     Packs/day: 0.25     Years: 0.50     Types: Cigarettes     Quit date: 9/5/1975    Smokeless tobacco: Never Used      Comment: quit smoking cigarettes 40+ years ago    Alcohol use No    Drug use: No    Sexual activity: Yes     Review of Systems   Constitutional: Positive for chills and fever.   HENT: Negative for congestion, ear pain and postnasal drip.    Eyes: Negative for pain, discharge and itching.   Respiratory: Negative for apnea, cough and chest tightness.    Cardiovascular: Positive for leg swelling. Negative for chest pain and palpitations.   Gastrointestinal: Negative for abdominal distention and abdominal pain.   Endocrine: Negative for cold intolerance.   Musculoskeletal: Positive for joint swelling. Negative for arthralgias.   Allergic/Immunologic: Negative for environmental allergies.   Neurological: Negative for dizziness, light-headedness and headaches.   Hematological: Negative for adenopathy.   Psychiatric/Behavioral: Negative for agitation and behavioral problems.     Objective:     Vital Signs (Most Recent):  Temp: (P) 98.8 °F (37.1 °C) (07/25/17 0817)  Pulse: 81 (07/25/17 0855)  Resp: (P) 16 (07/25/17 0817)  BP: (P) 125/60 (07/25/17 0817)  SpO2: (!) 91 % (07/25/17 0817) Vital Signs (24h Range):  Temp:  [98 °F (36.7 °C)-99.3 °F (37.4 °C)] (P) 98.8 °F (37.1 °C)  Pulse:  [79-92] 81  Resp:  [16-19] (P) 16  SpO2:  [91 %-98 %] 91 %  BP: ()/(40-72) (P) 125/60     Weight: 63.5 kg (139 lb 15.9 oz)  Body mass index is 28.27 kg/m².    Physical Exam   Constitutional: She is oriented to person, place, and time. She appears well-developed and well-nourished.   HENT:   Head:  Normocephalic and atraumatic.   Eyes: EOM are normal. Pupils are equal, round, and reactive to light.   Cardiovascular: Normal rate and regular rhythm.    Pulses:       Femoral pulses are 1+ on the right side, and 1+ on the left side.  Left DP monophasic; no PT  Right DP biphasic; monophasic PT  Thrill in L femoral AVG   Pulmonary/Chest: Effort normal. No respiratory distress.   Abdominal: She exhibits no distension. There is no tenderness.   Musculoskeletal:   No evidence of infection of left femoral AVG;  No erythema, no fluctuance    Left plantar foot from metatarsal heads to heel with area of compromised ischemic dermis and fluctuant with foul smelling drainage; erythema to dorsum of foot; nontender, no evidence of ascending infection, no crepitus   Neurological: She is alert and oriented to person, place, and time.   Skin: Skin is warm and dry.   Psychiatric: She has a normal mood and affect.       Significant Labs:  CBC:   Recent Labs  Lab 1758 17  0434   WBC 22.58*  --    RBC 2.76* 2.75*   HGB 7.7* 7.6*   HCT 24.4* 24.5*    354*   MCV 88 89   MCH 27.9 27.6   MCHC 31.6* 31.0*     CMP:   Recent Labs  Lab 17  0558 17  0434   * 119*   CALCIUM 8.2* 8.1*   ALBUMIN 2.2*  --    PROT 7.6  --    * 134*   K 3.3* 4.2   CO2 23 22*   CL 98 103   BUN 31* 21   CREATININE 5.0* 3.7*   ALKPHOS 128  --    ALT 7*  --    AST 19  --    BILITOT 0.3  --        Significant Diagnostics:  PAT NAME: SELMA FANG  MED REC#: [id number]  :      1955  SEX:      F  EXAM MURRAY: 2017 10:30  REF PHYS: SELF, AAAREFERRAL      Indication:  pvd.  Results:  Lower Extremities Segmental Pressure [mmHg]:                    Right             Left  _______________________________________________________________  Brachial          122               122  Dorsalis Pedis    202               179  JULIUS (Dors. Ped.)  1.66              1.47    The presence of artifactually elevated pressures in arteries  of the lower extremity renders all pressure measurements unreliable due to suspected medial calcinosis.    Report Summary:  Impression:    Right leg:The peripheral arteries are non-compressable. The PVR waveforms suggest moderate peripheral arterial occlusive disease.     Left leg:The peripheral arteries are non-compressable.The PVR waveforms suggest moderate  to severe peripheral arterial occlusive disease.    Sonographer: ELIUD Bowling    Assessment/Plan:     Type 2 diabetes mellitus with left diabetic foot ulcer    63 yo F with h/o HTN, HLD, DM2 (Hgb A1c 6.3),HFpEF (EF 60%), COPD (home O2, 2.5 L), ESRD on HD (MWF) via L femoral AVG presenting to hospital with left foot bleeding.      Left dorsum foot with fluctuant foul smelling drainage; podiatry following; recommend drainage/debridement  Left femoral AVG with possible vascular steal; will perform LLE angiogram on Friday, if no arterial stenosis identified will plan for femoral graft ligation and permacath placement  Please call with questions or concerns; vascular surgery to follow            Thank you for your consult.     Melissa Graham MD  Vascular Surgery  [facility]

## 2017-07-25 NOTE — NURSING
Pt arrived from dialysis via stretcher, no complaints of pain nor distress noted, vitals stable, will continue to monitor.

## 2017-07-25 NOTE — PLAN OF CARE
Problem: Fall Risk (Adult)  Goal: Identify Related Risk Factors and Signs and Symptoms  Related risk factors and signs and symptoms are identified upon initiation of [employer] Clinical Practice Guideline (CPG)   Outcome: Ongoing (interventions implemented as appropriate)  Pt has BLE edema, left heel wound with dressing in place, left foot pain,uses a walker at home to ambulate, unsteady gait. Pt is encouraged to call for assist as needed prior to ambulation/transferring, call bell in reach, bed low and locked.

## 2017-07-25 NOTE — HPI
63 yo F with h/o HTN, HLD, DM2 (Hgb A1c 6.3),HFpEF (EF 60%), COPD (home O2, 2.5 L), ESRD on HD (MWF) via L femoral AVG presenting to hospital with left foot bleeding.  Reports that she has had fever/chills over the past week.  First noted pain to foot a few weeks prior, noted that he  noted drainage over the weekend thus came to hospital.  She was noted to have a leukocytosis of 24 on admission, was started on broad spectrum antibiotics.    Patient was last seen by Dr. Madera on 7/21 with JULIUS obtained.  Right femoral permacath was also removed in office, blood cultures had been drawn at HD on 7/19.

## 2017-07-25 NOTE — ASSESSMENT & PLAN NOTE
-Gangrenous changes noted, to plantar arch of left foot, stable at this time, no surgical intervention planned at this time   -If gangrene progresses with demarcatation in the future, pt may need a BKA, high risk for limb loss   -MRI reviewed with no drainable collections of fluid  -Superficial bullae noted which was drained and cultured, orders placed  -Continue current abx, tailor abx to culture results  -Wound painted with betadine and wrapped with a loose kerlix, secured with tape  -Nursing to follow with daily dressing changes, orders placed

## 2017-07-25 NOTE — ASSESSMENT & PLAN NOTE
- JULIUS showed  Right leg:The peripheral arteries are non-compressable. The PVR waveforms suggest moderate peripheral arterial occlusive disease. Left leg:The peripheral arteries are non-compressable.The PVR waveforms suggest moderate  to severe peripheral arterial occlusive disease.  - Vascular surgery consulted. Left femoral AVG with possible vascular steal; will perform LLE angiogram on 7/28, if no arterial stenosis identified will plan for femoral graft ligation and permacath placement.

## 2017-07-25 NOTE — ASSESSMENT & PLAN NOTE
- Podiatry following.  Gangrenous changes noted, to plantar arch of left foot, stable at this time, no acute surgical intervention planned at this time    -Extensive necrosis is likely irreversible. Will wait until after angiogram to discuss further.     -MRI reviewed with no drainable collections of fluid   -Superficial bullae noted which was drained and cultured, orders placed   -Continue current abx, tailor abx to culture results   -Wound painted with betadine and wrapped with a loose kerlix, secured with tape   -Nursing to follow with daily dressing changes, orders placed

## 2017-07-25 NOTE — ASSESSMENT & PLAN NOTE
63 yo F with h/o HTN, HLD, DM2 (Hgb A1c 6.3),HFpEF (EF 60%), COPD (home O2, 2.5 L), ESRD on HD (MWF) via L femoral AVG presenting to hospital with left foot bleeding.      Left dorsum foot with fluctuant foul smelling drainage; podiatry following; recommend drainage/debridement  Left femoral AVG with possible vascular steal; will perform LLE angiogram on Friday, if no arterial stenosis identified will plan for femoral graft ligation and permacath placement  Please call with questions or concerns; vascular surgery to follow

## 2017-07-25 NOTE — PLAN OF CARE
Problem: Infection, Risk/Actual (Adult)  Intervention: Manage Suspected/Actual Infection   07/25/17 5340   Safety Interventions   Infection Management aseptic technique maintained;cultures obtained and sent to lab

## 2017-07-25 NOTE — SUBJECTIVE & OBJECTIVE
Prescriptions Prior to Admission   Medication Sig Dispense Refill Last Dose    amlodipine (NORVASC) 10 MG tablet TAKE ONE TABLET BY MOUTH ONCE DAILY 90 tablet 0 Taking    aspirin 81 mg Tab Take 1 tablet by mouth once daily.    Taking    epoetin batsheva (PROCRIT) 10,000 unit/mL injection Inject 0.74 mLs (7,400 Units total) into the skin every Mon, Wed, Fri.   Taking    hydrocodone-acetaminophen 5-325mg (NORCO) 5-325 mg per tablet Take 1 tablet by mouth every 6 (six) hours as needed for Pain.    Taking    insulin aspart (NOVOLOG FLEXPEN) 100 unit/mL InPn as dir Insulin Pen Subcutaneous Before meals and at bedtime.  If blood sugar is 151-200 1 units SQif blood sugar is 201-250 2 units SQIf blood sugar is 251-300 3 units SQIf blood sugar is 301-350 4 units SQIf blood sugar is > or = 351 5 units SQand call MD;  Dispense with needles   Taking    insulin detemir (LEVEMIR FLEXPEN) 100 unit/mL (3 mL) SubQ InPn pen Inject 8 Units into the skin 2 (two) times daily. (Patient taking differently: Inject 4 Units into the skin every evening. )   Taking    inulin (FIBER CHOICE, INULIN,) 1.5 gram Chew Take 2 tablets by mouth 2 (two) times daily. Or as directed on product packaging.  11 Taking    lidocaine-prilocaine (EMLA) cream APPLY SMALL AMOUNT TO ACCESS SITE 1 TO 2 HOURS BEFORE TREATMENT. COVER AREA WITH AN OCCLUSIVE DRESSING.  3     lisinopril (PRINIVIL,ZESTRIL) 40 MG tablet Take 40 mg by mouth once daily.    Taking    metoprolol tartrate (LOPRESSOR) 100 MG tablet TAKE 1 TABLET BY MOUTH TWICE DAILY 180 tablet 3 Taking    oxycodone (ROXICODONE) 5 MG immediate release tablet Take 1-2 tablets (5-10 mg total) by mouth every 4 to 6 hours as needed for Pain. 31 tablet 0 Taking    polyethylene glycol (GLYCOLAX) 17 gram/dose powder Take 17 g by mouth once daily. May take up to 3 times per day as needed for constipation. 510 g 11 Taking    sevelamer carbonate (RENVELA) 800 mg Tab Take 2,400 mg by mouth 3 (three) times daily  with meals.   Taking    calcitRIOL (ROCALTROL) 0.5 MCG Cap Take 1 capsule (0.5 mcg total) by mouth once daily. 30 capsule 1 Taking       Review of patient's allergies indicates:  No Known Allergies    Past Medical History:   Diagnosis Date    Anemia of chronic renal failure 2013    Anticoagulant long-term use 2015    CHF (congestive heart failure)     Coronary artery disease     Diabetes mellitus     Diabetic neuropathy 2013    DR (diabetic retinopathy) 2013    End stage renal disease on dialysis     Fever 2017    Hypertension     Hypertension, renal 2013    Kidney transplant candidate 2013    Secondary hyperparathyroidism of renal origin 2013    Stroke 2015    Type 2 diabetes mellitus since 2013     Past Surgical History:   Procedure Laterality Date    AV FISTULA PLACEMENT      CATARACT SURGERY       SECTION, LOW TRANSVERSE      x 3    COLONOSCOPY N/A 2016    Procedure: COLONOSCOPY;  Surgeon: Roverto Patel MD;  Location: [facility] ([address] [place] [address]);  Service: Endoscopy;  Laterality: N/A;    EYE SURGERY      HYSTERECTOMY      Endometriosis    TONSILLECTOMY      VASCULAR SURGERY       Family History     Problem Relation (Age of Onset)    Cancer Mother (70)    Heart disease Mother    Hypertension Mother        Social History Main Topics    Smoking status: Former Smoker     Packs/day: 0.25     Years: 0.50     Types: Cigarettes     Quit date: 1975    Smokeless tobacco: Never Used      Comment: quit smoking cigarettes 40+ years ago    Alcohol use No    Drug use: No    Sexual activity: Yes     Review of Systems   Constitutional: Positive for chills and fever.   HENT: Negative for congestion, ear pain and postnasal drip.    Eyes: Negative for pain, discharge and itching.   Respiratory: Negative for apnea, cough and chest tightness.    Cardiovascular: Positive for leg swelling. Negative for chest pain and palpitations.    Gastrointestinal: Negative for abdominal distention and abdominal pain.   Endocrine: Negative for cold intolerance.   Musculoskeletal: Positive for joint swelling. Negative for arthralgias.   Allergic/Immunologic: Negative for environmental allergies.   Neurological: Negative for dizziness, light-headedness and headaches.   Hematological: Negative for adenopathy.   Psychiatric/Behavioral: Negative for agitation and behavioral problems.     Objective:     Vital Signs (Most Recent):  Temp: (P) 98.8 °F (37.1 °C) (07/25/17 0817)  Pulse: 81 (07/25/17 0855)  Resp: (P) 16 (07/25/17 0817)  BP: (P) 125/60 (07/25/17 0817)  SpO2: (!) 91 % (07/25/17 0817) Vital Signs (24h Range):  Temp:  [98 °F (36.7 °C)-99.3 °F (37.4 °C)] (P) 98.8 °F (37.1 °C)  Pulse:  [79-92] 81  Resp:  [16-19] (P) 16  SpO2:  [91 %-98 %] 91 %  BP: ()/(40-72) (P) 125/60     Weight: 63.5 kg (139 lb 15.9 oz)  Body mass index is 28.27 kg/m².    Physical Exam   Constitutional: She is oriented to person, place, and time. She appears well-developed and well-nourished.   HENT:   Head: Normocephalic and atraumatic.   Eyes: EOM are normal. Pupils are equal, round, and reactive to light.   Cardiovascular: Normal rate and regular rhythm.    Pulses:       Femoral pulses are 1+ on the right side, and 1+ on the left side.  Left DP monophasic; no PT  Right DP biphasic; monophasic PT  Thrill in L femoral AVG   Pulmonary/Chest: Effort normal. No respiratory distress.   Abdominal: She exhibits no distension. There is no tenderness.   Musculoskeletal:   No evidence of infection of left femoral AVG;  No erythema, no fluctuance    Left plantar foot from metatarsal heads to heel with area of compromised ischemic dermis and fluctuant with foul smelling drainage; erythema to dorsum of foot; nontender, no evidence of ascending infection, no crepitus   Neurological: She is alert and oriented to person, place, and time.   Skin: Skin is warm and dry.   Psychiatric: She has a  normal mood and affect.       Significant Labs:  CBC:   Recent Labs  Lab 17  0558 17  0434   WBC 22.58*  --    RBC 2.76* 2.75*   HGB 7.7* 7.6*   HCT 24.4* 24.5*    354*   MCV 88 89   MCH 27.9 27.6   MCHC 31.6* 31.0*     CMP:   Recent Labs  Lab 17  0558 17  0434   * 119*   CALCIUM 8.2* 8.1*   ALBUMIN 2.2*  --    PROT 7.6  --    * 134*   K 3.3* 4.2   CO2 23 22*   CL 98 103   BUN 31* 21   CREATININE 5.0* 3.7*   ALKPHOS 128  --    ALT 7*  --    AST 19  --    BILITOT 0.3  --        Significant Diagnostics:  PAT NAME: SELMA FANG  MED REC#: [id number]  :      1955  SEX:      F  EXAM MURRAY: 2017 10:30  REF PHYS: SELF, AAAREFERRAL      Indication:  pvd.  Results:  Lower Extremities Segmental Pressure [mmHg]:                    Right             Left  _______________________________________________________________  Brachial          122               122  Dorsalis Pedis    202               179  JULIUS (Dors. Ped.)  1.66              1.47    The presence of artifactually elevated pressures in arteries of the lower extremity renders all pressure measurements unreliable due to suspected medial calcinosis.    Report Summary:  Impression:    Right leg:The peripheral arteries are non-compressable. The PVR waveforms suggest moderate peripheral arterial occlusive disease.     Left leg:The peripheral arteries are non-compressable.The PVR waveforms suggest moderate  to severe peripheral arterial occlusive disease.    Sonographer: ELIUD Bowling

## 2017-07-25 NOTE — PROGRESS NOTES
[facility]  Podiatry  Progress Note    Patient Name: Aleta Herrera  MRN: [id number]  Admission Date: 7/23/2017  Hospital Length of Stay: 2 days  Attending Physician: Malick Mahajan MD  Primary Care Provider: Radha Lao MD     Subjective:     Interval History: Pt seen with  at bedside. Pt  relates he has been frustrated with staff. No other pedal complaints at this time.     Follow-up For: Procedure(s) (LRB):  ANGIOGRAM-EXTREMITY-LOWER (Left)  [id number] (Left)  INSERTION-CATHETER-HEMODIALYSIS (N/A)    Post-Operative Day:      Scheduled Meds:   amlodipine  10 mg Oral Daily    aspirin  81 mg Oral Daily    calcitRIOL  0.5 mcg Oral Daily    cefTRIAXone (ROCEPHIN) IVPB  1 g Intravenous Q24H    epoetin batsheva (PROCRIT) injection  100 Units/kg Intravenous Every Mon, Wed, Fri    heparin (porcine)  5,000 Units Subcutaneous Q12H    insulin detemir  5 Units Subcutaneous BID    sevelamer carbonate  2,400 mg Oral TID WM     Continuous Infusions:   PRN Meds:sodium chloride 0.9%, dextrose 50%, dextrose 50%, glucagon (human recombinant), glucose, glucose, heparin (porcine), insulin aspart, oxycodone, polyethylene glycol    Review of Systems   Skin: Positive for color change and wound.   All other systems reviewed and are negative.    Objective:     Vital Signs (Most Recent):  Temp: (P) 98.8 °F (37.1 °C) (07/25/17 0817)  Pulse: 84 (07/25/17 1100)  Resp: (P) 16 (07/25/17 0817)  BP: 125/60 (07/25/17 0817)  SpO2: (!) 91 % (07/25/17 0817) Vital Signs (24h Range):  Temp:  [98 °F (36.7 °C)-99.3 °F (37.4 °C)] (P) 98.8 °F (37.1 °C)  Pulse:  [79-92] 84  Resp:  [16-19] (P) 16  SpO2:  [91 %-98 %] 91 %  BP: ()/(40-72) 125/60     Weight: 63.5 kg (139 lb 15.9 oz)  Body mass index is 28.27 kg/m².    Foot Exam    General  General Appearance: appears stated age and healthy   Orientation: alert and oriented to person, place, and time   Affect: appropriate       Right Foot/Ankle      Inspection and Palpation  Ecchymosis: none  Tenderness: none   Swelling: none   Skin Exam: skin intact;     Neurovascular  Dorsalis pedis: absent  Posterior tibial: absent  Saphenous nerve sensation: diminished  Tibial nerve sensation: diminished  Superficial peroneal nerve sensation: diminished  Deep peroneal nerve sensation: diminished  Sural nerve sensation: diminished      Left Foot/Ankle      Inspection and Palpation  Ecchymosis: dorsum  Tenderness: calcaneus tenderness   Swelling: dorsum   Skin Exam: ulcer;     Neurovascular  Dorsalis pedis: absent  Posterior tibial: absent  Saphenous nerve sensation: diminished  Tibial nerve sensation: diminished  Superficial peroneal nerve sensation: diminished  Deep peroneal nerve sensation: diminished  Sural nerve sensation: diminished                    Laboratory:  Blood Cultures:   Recent Labs  Lab 07/23/17 1947   LABBLOO Gram stain aer bottle: Gram positive cocci in clusters resembling Staph  Results called to and read back by:Andres Echeverria RN 07/25/2017  08:11  No Growth to date  No Growth to date     BMP:   Recent Labs  Lab 07/23/17 1946 07/25/17 0434   *  < > 119*   *  < > 134*   K 3.7  < > 4.2   CL 98  < > 103   CO2 20*  < > 22*   BUN 25*  < > 21   CREATININE 4.7*  < > 3.7*   CALCIUM 8.8  < > 8.1*   MG 2.0  --   --    < > = values in this interval not displayed.  CBC:   Recent Labs  Lab 07/24/17 0558 07/25/17 0434   WBC 22.58*  --    RBC 2.76* 2.75*   HGB 7.7* 7.6*   HCT 24.4* 24.5*    354*   MCV 88 89   MCH 27.9 27.6   MCHC 31.6* 31.0*     CMP:   Recent Labs  Lab 07/24/17 0558 07/25/17 0434   * 119*   CALCIUM 8.2* 8.1*   ALBUMIN 2.2*  --    PROT 7.6  --    * 134*   K 3.3* 4.2   CO2 23 22*   CL 98 103   BUN 31* 21   CREATININE 5.0* 3.7*   ALKPHOS 128  --    ALT 7*  --    AST 19  --    BILITOT 0.3  --      Coagulation:   Recent Labs  Lab 07/23/17 1946   INR 1.0   APTT 25.4     CRP:   Recent Labs  Lab 07/24/17 0558    .3*     ESR:   Recent Labs  Lab 07/24/17  0558   SEDRATE 100*     Prealbumin: No results for input(s): PREALBUMIN in the last 48 hours.  Wound Cultures:   Recent Labs  Lab 04/28/17 2037   LABAERO No growth       Diagnostic Results:  I have reviewed all pertinent imaging results/findings within the past 24 hours.    Clinical Findings:  Gangrenous changes noted to plantar arch of left foot with superficial blister noted, draining serosanguinous drainage. Mild erythema and edema noted to dorsum of foot and periwound ischemic changes noted. No acute SOI noted, stable for now.      Assessment/Plan:     Type 2 diabetes mellitus with left diabetic foot ulcer    -Gangrenous changes noted, to plantar arch of left foot, stable at this time, no acute surgical intervention planned at this time   -Extensive necrosis is likely irreversible. Will wait until after angiogram to discuss further.    -MRI reviewed with no drainable collections of fluid  -Superficial bullae noted which was drained and cultured, orders placed  -Continue current abx, tailor abx to culture results  -Wound painted with betadine and wrapped with a loose kerlix, secured with tape  -Nursing to follow with daily dressing changes, orders placed        ESRD (end stage renal disease)    Per primary        Peripheral vascular disease, unspecified    -Vascular sx following, appreciate recs  -Planning angiogram on Friday  -Podiatry will follow        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    Per primary        * Sepsis    Per primary, podiatry will monitor            Marlyn Frank MD  Podiatry  [facility]

## 2017-07-25 NOTE — PROGRESS NOTES
[facility] Hospital Medicine  Progress Note    Patient Name: Aleta Herrera  MRN: [id number]  Patient Class: IP- Inpatient   Admission Date: 7/23/2017  Length of Stay: 2 days  Attending Physician: Malick Mahajan MD  Primary Care Provider: Radha Lao MD    [facility] Medicine Team: [facility] HOSP MED 2 Estrella Ramos MD    Subjective:     Principal Problem:Sepsis    HPI:  Aleta Herrera is a 62-year-old female with HTN, HFpEF (EF 60%; home O2 2.5 liters), anemia 2/2 CKD and IDDM c/b retinopathy, neuropathy and ESRD (HD MWF). She presents today with her  to the ED because of new bleeding from the left foot ulcer. She's had on/off fevers and chills for past week or so. Apparently blood cultures were drawn at dialysis (7/19). She was seen by vascular surgery the next day where her right femoral permacath was pulled. She has a working left femoral AVG. Her upper extremity access has failed over the years since being placed on dialysis in 2009. Besides left leg pain, she denies any SOB, CP, cough, congestion, abdominal pain, n/v or diarrhea. Not received any recent antibiotics.   Complaining of foot pain in the ED, otherwise has no complaints. CXR showed mild b/l effusions. Left foot x-ray soft tissue swelling, but no gas formation. Labs significant for WBC count of 24k. Procalcitonin 1.64.       Hospital Course:  7/24: VSS. WBC 22.58 down from 24.04 in ED. Sed rate 100. .3. CXR stable. L Foot xray soft tissue swelling. MRI L foot no osteomylitis, no drainable collections. JULIUS L with mod-severe occlusive disease, R with mod. Continue ceftriaxone. HD today.  7/25: VSS. WBC 19.18. Vasc Surg consulted, L femoral AVG w/ possible vascular steal; will perform LLE angiogram on 7/28, if no arterial stenosis will plan for femoral graft ligation & permacath placement. Podiatry consulted, extensive stable left plantar arch necrosis, no acute surgical intervention; will f/u  after angiogram.  Wound cx pending. 1/4 Bcx +GPC in clusters. Continue ceftriaxone.     Interval History: NAEON.  Pt has some left foot pain, but reports b/l diabetic neuropathy at baseline.  Denies any fevers or chills overnight. Denies chest pain, SOB, abdominal pain, n/v/d.     Review of Systems   Constitutional: Negative for activity change, appetite change, chills and fever.   HENT: Negative for congestion and rhinorrhea.    Eyes: Negative for photophobia and redness.   Respiratory: Negative for cough and shortness of breath.    Cardiovascular: Negative for chest pain and palpitations.   Gastrointestinal: Negative for abdominal pain, diarrhea, nausea and vomiting.   Genitourinary:        Does not make urine   Musculoskeletal: Positive for joint swelling (left foot) and myalgias (left foot). Negative for arthralgias.   Skin: Positive for color change (left foot) and wound (left heel).   Neurological: Negative for dizziness, light-headedness and headaches.     Objective:     Vital Signs (Most Recent):  Temp: 98 °F (36.7 °C) (07/25/17 1200)  Pulse: 86 (07/25/17 1500)  Resp: 16 (07/25/17 1200)  BP: (!) 114/56 (07/25/17 1200)  SpO2: (!) 91 % (07/25/17 1200) Vital Signs (24h Range):  Temp:  [98 °F (36.7 °C)-99.3 °F (37.4 °C)] 98 °F (36.7 °C)  Pulse:  [80-92] 86  Resp:  [16-18] 16  SpO2:  [91 %-98 %] 91 %  BP: ()/(53-72) 114/56     Weight: 63.5 kg (139 lb 15.9 oz)  Body mass index is 28.27 kg/m².    Intake/Output Summary (Last 24 hours) at 07/25/17 1648  Last data filed at 07/25/17 0524   Gross per 24 hour   Intake             1250 ml   Output             2600 ml   Net            -1350 ml      Physical Exam   Constitutional: She is oriented to person, place, and time. She appears well-developed and well-nourished.   HENT:   Head: Normocephalic and atraumatic.   Eyes: EOM are normal. Pupils are equal, round, and reactive to light.   Cardiovascular: Normal rate and regular rhythm.    Unable to appreciate DP pulse  bilaterally   Left femoral avg with palpable thrill.    Pulmonary/Chest: Effort normal. No respiratory distress.   Abdominal: She exhibits no distension. There is no tenderness.   Musculoskeletal:   Left plantar foot surface with non-tender, foul-smelling, dark, necrotic appearing tissue extending to heel; ulcer noted to lateral heel with foul smelling dark drainage. No crepitus. Tenderness and erythema over dorsal surface of left foot.    Neurological: She is alert and oriented to person, place, and time.   Skin: Skin is warm and dry.   Left femoral AVG non-tender, no erythema, no fluctuance   Psychiatric: She has a normal mood and affect.       Significant Labs:   Blood Culture:   Recent Labs  Lab 07/23/17 1947   LABBLOO Gram stain aer bottle: Gram positive cocci in clusters resembling Staph  Results called to and read back by:Andres Echeverria RN 07/25/2017  08:11  No Growth to date  No Growth to date     BMP:   Recent Labs  Lab 07/23/17 1946 07/25/17 0434   *  < > 119*   *  < > 134*   K 3.7  < > 4.2   CL 98  < > 103   CO2 20*  < > 22*   BUN 25*  < > 21   CREATININE 4.7*  < > 3.7*   CALCIUM 8.8  < > 8.1*   MG 2.0  --   --    < > = values in this interval not displayed.  CBC:   Recent Labs  Lab 07/23/17 1946 07/24/17 0558 07/25/17 0434   WBC 24.04* 22.58* 19.18*   HGB 8.3* 7.7* 7.6*   HCT 26.5* 24.4* 24.5*   * 318 354*     CMP:   Recent Labs  Lab 07/23/17 1946 07/24/17 0558 07/25/17  0434   * 133* 134*   K 3.7 3.3* 4.2   CL 98 98 103   CO2 20* 23 22*   * 246* 119*   BUN 25* 31* 21   CREATININE 4.7* 5.0* 3.7*   CALCIUM 8.8 8.2* 8.1*   PROT 9.0* 7.6  --    ALBUMIN 2.7* 2.2*  --    BILITOT 0.5 0.3  --    ALKPHOS 98 128  --    AST 15 19  --    ALT 6* 7*  --    ANIONGAP 16 12 9   EGFRNONAA 9.4* 8.7* 12.5*     Cardiac Markers:   Recent Labs  Lab 07/23/17  1946   BNP 2,835*       Significant Imaging:  Imaging Results          MRI Lower Extremity Joint WO Cont Left (Final result)   Result time 07/24/17 22:30:22    Final result by Scotty Hilario MD (07/24/17 22:30:22)                 Impression:        No MR imaging finding to suggest osteomyelitis.    Diffuse heterogeneous marrow signal intensity throughout the visualized osseous structures, a nonspecific finding that may relate to prolonged immobilization or atypical red marrow reconversion. Early osteonecrosis is possible but felt to be less likely.    Extensive hyperintensity throughout visualized musculature, likely related to diabetic neuropathy or chronic disuse. No focal intramuscular drainable collections there    Extensive subcutaneous signal hyperintensity suggesting noninfectious edema versus cellulitis.  No definite focal drainable collections.      Electronically signed by: SCOTTY HILARIO MD  Date:     07/24/17  Time:    22:30              Narrative:    Technique: Routine MRI evaluation of the left hindfoot and forefoot performed without contrast.    Comparison: Radiographs 07/23/2017.    Findings:    There is diffuse heterogeneous marrow signal intensity throughout the visualized osseous structures which is overall nonspecific.  No MR imaging finding to suggest osteomyelitis.  No fractures.    There is extensive signal hyperintensity throughout the visualized musculature without focal intramuscular drainable collection.    Visualized flexor and extensor tendons are normal.  No tendinosis or tenosynovitis.    Tibiotalar, subtalar and mid foot joint spaces are well preserved.    Lisfranc ligament is intact.    There is extensive signal hyperintensity throughout the visualized subcutaneous soft tissues, most pronounced within the lateral aspect of the lower leg and ankle.  No definite focal drainable collections.                             VAS Ankle Brachial Indices Resting (Final result)  Result time 07/24/17 14:16:47    Final result by [facility] (07/24/17 14:16:47)                 Narrative:    PAT NAME:  SELMA FANG  [facility] REC#: [id number]  :      1955  SEX:      F  EXAM MURRAY: 2017 10:30  REF PHYS: SELF, AAAREFERRAL      Indication:  pvd.  Results:  Lower Extremities Segmental Pressure [mmHg]:                    Right             Left  _______________________________________________________________  Brachial          122               122  Dorsalis Pedis    202               179  JULIUS (Dors. Ped.)  1.66              1.47    The presence of artifactually elevated pressures in arteries of the lower extremity renders all pressure measurements unreliable due to suspected medial calcinosis.    Report Summary:  Impression:    Right leg:The peripheral arteries are non-compressable. The PVR waveforms suggest moderate peripheral arterial occlusive disease.     Left leg:The peripheral arteries are non-compressable.The PVR waveforms suggest moderate  to severe peripheral arterial occlusive disease.    Sonographer: ELIUD Bowling    Electronically Signed by:  Nathalie Madera MD [[id number]]                        On: 2017 14:16                             US Lower Extremity Veins Bilateral (Final result)  Result time 17 09:47:26    Final result by Hardy Navarro MD (17 09:47:26)                 Impression:        No evidence of deep venous thrombosis bilaterally.    Bilateral lower extremity edema.  ______________________________________     Electronically signed by resident: ELICEO IRWIN MD  Date:     17  Time:    08:43            As the supervising and teaching physician, I personally reviewed the images and resident's interpretation and I agree with the findings.          Electronically signed by: HARDY NAVARRO MD  Date:     17  Time:    09:47              Narrative:    Time of Procedure: 17 07:54:00  Accession # [id number]    Technique: Duplex and color flow Doppler evaluation of the bilateral lower extremities.    Comparison: Ultrasound lower extremity  11/24/2015.    Clinical Indication:  Bilateral lower extremity edema.    Findings:    Right - There is no evidence of acute venous thrombosis in the common femoral, superficial femoral, greater saphenous, popliteal, peroneal, anterior tibial and posterior tibial veins.  There is no reflux to suggest valvular incompetence.    Left - There is no evidence of acute venous thrombosis in the common femoral, superficial femoral, greater saphenous, popliteal, peroneal, anterior tibial and posterior tibial veins.  There is no reflux to suggest valvular incompetence.                             X-Ray Chest AP Portable (Final result)  Result time 07/23/17 20:35:18    Final result by Arnaud Gutierrez MD (07/23/17 20:35:18)                 Impression:        Stable cardiomegaly and small bilateral effusions, RIGHT larger the LEFT. RIGHT effusion appears to be partially loculated but unchanged. Mild edema in the lungs, stable.    Aside from removal of central catheter, no significant change from prior study.        Electronically signed by: ARNAUD GUTIERREZ MD  Date:     07/23/17  Time:    20:35              Narrative:    Chest AP portable    Indication:sepsis.    Comparison:May 3, 2017.    Findings:     Stable cardiomegaly and small bilateral effusions, RIGHT larger the LEFT. RIGHT effusion appears to be partially loculated but unchanged. Mild edema in the lungs, stable. Previous central catheter has been removed.    Heart and lungs otherwise unchanged when allowing for differences in technique and positioning.                             X-Ray Foot Complete Left (Final result)  Result time 07/23/17 20:41:18    Final result by Arnaud Gutierrez MD (07/23/17 20:41:18)                 Impression:      No acute fracture.    Hindfoot valgus with pes planus.    Soft tissue swelling throughout the foot.      Electronically signed by: ARNAUD GUTIERREZ MD  Date:     07/23/17  Time:    20:41              Narrative:    History: .    LEFT  foot 3 views:    No fractures or dislocations. Hindfoot valgus with pes planus. Mild degenerative changes at the IP joints of the toes. Soft tissue swelling throughout the LEFT foot. Atherosclerotic vascular calcifications.                                Assessment/Plan:      * Sepsis    Presenting to the ED w/ new bleeding from L leg ulcer along with fevers, chills and new leukocytosis.   - Received vancomycin , flagyl and rocephin in ED. Fever resolved with Tylenol in ED. Dc'd vancomycin and flagyl on the floor.   - CXR no significant change from prior study (5/3/17) small bilateral effusions, R> L.  R effusion appears to be partially loculated but unchanged. Mild edema in the lungs, stable.  - US with doppler b/l LE negative for DVT  - L foot x-ray showed soft tissue swelling, no acute fx  - MRI LLE showed no osteomyelitis, no drainable collections.  - Blood cx 1/4 +gram positive cocci in clusters resembling staph   - Continue Rocephin         Type 2 diabetes mellitus with left diabetic foot ulcer    - Podiatry following.  Gangrenous changes noted, to plantar arch of left foot, stable at this time, no acute surgical intervention planned at this time    -Extensive necrosis is likely irreversible. Will wait until after angiogram to discuss further.     -MRI reviewed with no drainable collections of fluid   -Superficial bullae noted which was drained and cultured, orders placed   -Continue current abx, tailor abx to culture results   -Wound painted with betadine and wrapped with a loose kerlix, secured with tape   -Nursing to follow with daily dressing changes, orders placed        Peripheral vascular disease, unspecified    - JULIUS showed  Right leg:The peripheral arteries are non-compressable. The PVR waveforms suggest moderate peripheral arterial occlusive disease. Left leg:The peripheral arteries are non-compressable.The PVR waveforms suggest moderate  to severe peripheral arterial occlusive disease.  - Vascular  surgery consulted. Left femoral AVG with possible vascular steal; will perform LLE angiogram on 7/28, if no arterial stenosis identified will plan for femoral graft ligation and permacath placement.        Anemia of chronic renal failure    - Procrit w/ dialysis.   - Continue to monitor        ESRD (end stage renal disease)    - Consulted nephrology.   - Dialysis MWF.  - Will monitor electrolytes        (HFpEF) heart failure with preserved ejection fraction    - BNP 2835 on admission   - Pt with ESRD on HD            Pleural effusion    - Seen on prior CXR (5/3/17). Unclear etiology. Stable.        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    - Resume Norvasc          Hypertension, renal    - Continue home amlodipine 10 mg QD        Late effect of ischemic cerebral stroke    - Continued ASA 81 mg.             VTE Risk Mitigation         Ordered     heparin (porcine) injection 5,000 Units  Every 12 hours     Route:  Subcutaneous        07/24/17 0919     Medium Risk of VTE  Once      07/23/17 7766          Estrella Ramos MD  Department of Hospital Medicine   [facility]

## 2017-07-25 NOTE — PLAN OF CARE
Problem: Patient Care Overview  Goal: Plan of Care Review  Outcome: Ongoing (interventions implemented as appropriate)  Pt is AAOX4, cooperative with care, vitals stable, receiving IV antibiotics, wound care in place, patient c/o left foot pain during night, prn pain med given as ordered, pt expressed relief/satisfaction, no acute events noted, plan of care reviewed with patient, expresses understanding, bed low and locked and call bell in reach, will continue to monitor.

## 2017-07-25 NOTE — ASSESSMENT & PLAN NOTE
Presenting to the ED w/ new bleeding from L leg ulcer along with fevers, chills and new leukocytosis.   - Received vancomycin , flagyl and rocephin in ED. Fever resolved with Tylenol in ED. Dc'd vancomycin and flagyl on the floor.   - CXR no significant change from prior study (5/3/17) small bilateral effusions, R> L.  R effusion appears to be partially loculated but unchanged. Mild edema in the lungs, stable.  - US with doppler b/l LE negative for DVT  - L foot x-ray showed soft tissue swelling, no acute fx  - MRI LLE showed no osteomyelitis, no drainable collections.  - Blood cx 1/4 +gram positive cocci in clusters resembling staph   - Continue Rocephin

## 2017-07-25 NOTE — SUBJECTIVE & OBJECTIVE
Subjective:     Interval History: Pt seen with  at bedside. Pt  relates he has been frustrated with staff. No other pedal complaints at this time.     Follow-up For: Procedure(s) (LRB):  ANGIOGRAM-EXTREMITY-LOWER (Left)  ALZSXOWE-BXYSHTS-FE (Left)  INSERTION-CATHETER-HEMODIALYSIS (N/A)    Post-Operative Day:      Scheduled Meds:   amlodipine  10 mg Oral Daily    aspirin  81 mg Oral Daily    calcitRIOL  0.5 mcg Oral Daily    cefTRIAXone (ROCEPHIN) IVPB  1 g Intravenous Q24H    epoetin batsheva (PROCRIT) injection  100 Units/kg Intravenous Every Mon, Wed, Fri    heparin (porcine)  5,000 Units Subcutaneous Q12H    insulin detemir  5 Units Subcutaneous BID    sevelamer carbonate  2,400 mg Oral TID WM     Continuous Infusions:   PRN Meds:sodium chloride 0.9%, dextrose 50%, dextrose 50%, glucagon (human recombinant), glucose, glucose, heparin (porcine), insulin aspart, oxycodone, polyethylene glycol    Review of Systems   Skin: Positive for color change and wound.   All other systems reviewed and are negative.    Objective:     Vital Signs (Most Recent):  Temp: (P) 98.8 °F (37.1 °C) (07/25/17 0817)  Pulse: 84 (07/25/17 1100)  Resp: (P) 16 (07/25/17 0817)  BP: 125/60 (07/25/17 0817)  SpO2: (!) 91 % (07/25/17 0817) Vital Signs (24h Range):  Temp:  [98 °F (36.7 °C)-99.3 °F (37.4 °C)] (P) 98.8 °F (37.1 °C)  Pulse:  [79-92] 84  Resp:  [16-19] (P) 16  SpO2:  [91 %-98 %] 91 %  BP: ()/(40-72) 125/60     Weight: 63.5 kg (139 lb 15.9 oz)  Body mass index is 28.27 kg/m².    Foot Exam    General  General Appearance: appears stated age and healthy   Orientation: alert and oriented to person, place, and time   Affect: appropriate       Right Foot/Ankle     Inspection and Palpation  Ecchymosis: none  Tenderness: none   Swelling: none   Skin Exam: skin intact;     Neurovascular  Dorsalis pedis: absent  Posterior tibial: absent  Saphenous nerve sensation: diminished  Tibial nerve sensation:  diminished  Superficial peroneal nerve sensation: diminished  Deep peroneal nerve sensation: diminished  Sural nerve sensation: diminished      Left Foot/Ankle      Inspection and Palpation  Ecchymosis: dorsum  Tenderness: calcaneus tenderness   Swelling: dorsum   Skin Exam: ulcer;     Neurovascular  Dorsalis pedis: absent  Posterior tibial: absent  Saphenous nerve sensation: diminished  Tibial nerve sensation: diminished  Superficial peroneal nerve sensation: diminished  Deep peroneal nerve sensation: diminished  Sural nerve sensation: diminished                    Laboratory:  Blood Cultures:   Recent Labs  Lab 07/23/17 1947   LABBLOO Gram stain aer bottle: Gram positive cocci in clusters resembling Staph  Results called to and read back by:Andres Echeverria RN 07/25/2017  08:11  No Growth to date  No Growth to date     BMP:   Recent Labs  Lab 07/23/17 1946 07/25/17 0434   *  < > 119*   *  < > 134*   K 3.7  < > 4.2   CL 98  < > 103   CO2 20*  < > 22*   BUN 25*  < > 21   CREATININE 4.7*  < > 3.7*   CALCIUM 8.8  < > 8.1*   MG 2.0  --   --    < > = values in this interval not displayed.  CBC:   Recent Labs  Lab 07/24/17 0558 07/25/17 0434   WBC 22.58*  --    RBC 2.76* 2.75*   HGB 7.7* 7.6*   HCT 24.4* 24.5*    354*   MCV 88 89   MCH 27.9 27.6   MCHC 31.6* 31.0*     CMP:   Recent Labs  Lab 07/24/17 0558 07/25/17 0434   * 119*   CALCIUM 8.2* 8.1*   ALBUMIN 2.2*  --    PROT 7.6  --    * 134*   K 3.3* 4.2   CO2 23 22*   CL 98 103   BUN 31* 21   CREATININE 5.0* 3.7*   ALKPHOS 128  --    ALT 7*  --    AST 19  --    BILITOT 0.3  --      Coagulation:   Recent Labs  Lab 07/23/17 1946   INR 1.0   APTT 25.4     CRP:   Recent Labs  Lab 07/24/17 0558   .3*     ESR:   Recent Labs  Lab 07/24/17 0558   SEDRATE 100*     Prealbumin: No results for input(s): PREALBUMIN in the last 48 hours.  Wound Cultures:   Recent Labs  Lab 04/28/17 2037   LABAERO No growth       Diagnostic  Results:  I have reviewed all pertinent imaging results/findings within the past 24 hours.    Clinical Findings:  Gangrenous changes noted to plantar arch of left foot with superficial blister noted, draining serosanguinous drainage. Mild erythema and edema noted to dorsum of foot and periwound ischemic changes noted. No acute SOI noted, stable for now.

## 2017-07-26 ENCOUNTER — ANESTHESIA EVENT (OUTPATIENT)
Dept: SURGERY | Facility: HOSPITAL | Age: 62
DRG: 853 | End: 2017-07-26
Payer: MEDICARE

## 2017-07-26 ENCOUNTER — ANESTHESIA (OUTPATIENT)
Dept: SURGERY | Facility: HOSPITAL | Age: 62
DRG: 853 | End: 2017-07-26
Payer: MEDICARE

## 2017-07-26 PROBLEM — A41.9 SEPSIS: Status: RESOLVED | Noted: 2017-07-23 | Resolved: 2017-07-26

## 2017-07-26 LAB
ANION GAP SERPL CALC-SCNC: 9 MMOL/L
ANISOCYTOSIS BLD QL SMEAR: SLIGHT
BASOPHILS # BLD AUTO: 0.02 K/UL
BASOPHILS NFR BLD: 0.1 %
BUN SERPL-MCNC: 32 MG/DL
CALCIUM SERPL-MCNC: 8.4 MG/DL
CHLORIDE SERPL-SCNC: 103 MMOL/L
CO2 SERPL-SCNC: 23 MMOL/L
CREAT SERPL-MCNC: 5.2 MG/DL
DACRYOCYTES BLD QL SMEAR: ABNORMAL
DIFFERENTIAL METHOD: ABNORMAL
EOSINOPHIL # BLD AUTO: 0.2 K/UL
EOSINOPHIL NFR BLD: 1.3 %
ERYTHROCYTE [DISTWIDTH] IN BLOOD BY AUTOMATED COUNT: 18.7 %
EST. GFR  (AFRICAN AMERICAN): 9.6 ML/MIN/1.73 M^2
EST. GFR  (NON AFRICAN AMERICAN): 8.3 ML/MIN/1.73 M^2
GLUCOSE SERPL-MCNC: 70 MG/DL
GRAM STN SPEC: NORMAL
HCT VFR BLD AUTO: 24.7 %
HGB BLD-MCNC: 7.8 G/DL
HYPOCHROMIA BLD QL SMEAR: ABNORMAL
LYMPHOCYTES # BLD AUTO: 1 K/UL
LYMPHOCYTES NFR BLD: 5.1 %
MCH RBC QN AUTO: 27.8 PG
MCHC RBC AUTO-ENTMCNC: 31.6 G/DL
MCV RBC AUTO: 88 FL
MONOCYTES # BLD AUTO: 1.1 K/UL
MONOCYTES NFR BLD: 5.8 %
NEUTROPHILS # BLD AUTO: 16.2 K/UL
NEUTROPHILS NFR BLD: 87.7 %
OVALOCYTES BLD QL SMEAR: ABNORMAL
PHOSPHATE SERPL-MCNC: 4.8 MG/DL
PLATELET # BLD AUTO: 350 K/UL
PLATELET BLD QL SMEAR: ABNORMAL
PMV BLD AUTO: 8.9 FL
POCT GLUCOSE: 72 MG/DL (ref 70–110)
POCT GLUCOSE: 74 MG/DL (ref 70–110)
POCT GLUCOSE: 74 MG/DL (ref 70–110)
POCT GLUCOSE: 81 MG/DL (ref 70–110)
POCT GLUCOSE: 84 MG/DL (ref 70–110)
POIKILOCYTOSIS BLD QL SMEAR: SLIGHT
POLYCHROMASIA BLD QL SMEAR: ABNORMAL
POTASSIUM SERPL-SCNC: 5 MMOL/L
RBC # BLD AUTO: 2.81 M/UL
SODIUM SERPL-SCNC: 135 MMOL/L
TARGETS BLD QL SMEAR: ABNORMAL
WBC # BLD AUTO: 18.58 K/UL

## 2017-07-26 PROCEDURE — 36000706: Performed by: PODIATRIST

## 2017-07-26 PROCEDURE — 63600175 PHARM REV CODE 636 W HCPCS: Performed by: NURSE ANESTHETIST, CERTIFIED REGISTERED

## 2017-07-26 PROCEDURE — 63600175 PHARM REV CODE 636 W HCPCS: Performed by: INTERNAL MEDICINE

## 2017-07-26 PROCEDURE — 99232 SBSQ HOSP IP/OBS MODERATE 35: CPT | Mod: GC,,, | Performed by: HOSPITALIST

## 2017-07-26 PROCEDURE — 76942 ECHO GUIDE FOR BIOPSY: CPT | Performed by: ANESTHESIOLOGY

## 2017-07-26 PROCEDURE — 37000008 HC ANESTHESIA 1ST 15 MINUTES: Performed by: PODIATRIST

## 2017-07-26 PROCEDURE — 87015 SPECIMEN INFECT AGNT CONCNTJ: CPT

## 2017-07-26 PROCEDURE — 85025 COMPLETE CBC W/AUTO DIFF WBC: CPT

## 2017-07-26 PROCEDURE — 87075 CULTR BACTERIA EXCEPT BLOOD: CPT

## 2017-07-26 PROCEDURE — 0JBR0ZZ EXCISION OF LEFT FOOT SUBCUTANEOUS TISSUE AND FASCIA, OPEN APPROACH: ICD-10-PCS | Performed by: PODIATRIST

## 2017-07-26 PROCEDURE — 25000003 PHARM REV CODE 250: Performed by: PODIATRIST

## 2017-07-26 PROCEDURE — 36000707: Performed by: PODIATRIST

## 2017-07-26 PROCEDURE — 82962 GLUCOSE BLOOD TEST: CPT | Performed by: PODIATRIST

## 2017-07-26 PROCEDURE — 25000003 PHARM REV CODE 250: Performed by: STUDENT IN AN ORGANIZED HEALTH CARE EDUCATION/TRAINING PROGRAM

## 2017-07-26 PROCEDURE — 25000003 PHARM REV CODE 250: Performed by: NURSE PRACTITIONER

## 2017-07-26 PROCEDURE — 88305 TISSUE EXAM BY PATHOLOGIST: CPT | Performed by: PATHOLOGY

## 2017-07-26 PROCEDURE — 87186 SC STD MICRODIL/AGAR DIL: CPT

## 2017-07-26 PROCEDURE — 87076 CULTURE ANAEROBE IDENT EACH: CPT

## 2017-07-26 PROCEDURE — 63600175 PHARM REV CODE 636 W HCPCS: Performed by: STUDENT IN AN ORGANIZED HEALTH CARE EDUCATION/TRAINING PROGRAM

## 2017-07-26 PROCEDURE — 37000009 HC ANESTHESIA EA ADD 15 MINS: Performed by: PODIATRIST

## 2017-07-26 PROCEDURE — 87070 CULTURE OTHR SPECIMN AEROBIC: CPT

## 2017-07-26 PROCEDURE — 87040 BLOOD CULTURE FOR BACTERIA: CPT

## 2017-07-26 PROCEDURE — 87116 MYCOBACTERIA CULTURE: CPT

## 2017-07-26 PROCEDURE — 25000003 PHARM REV CODE 250: Performed by: INTERNAL MEDICINE

## 2017-07-26 PROCEDURE — 87184 SC STD DISK METHOD PER PLATE: CPT

## 2017-07-26 PROCEDURE — 80048 BASIC METABOLIC PNL TOTAL CA: CPT

## 2017-07-26 PROCEDURE — 87077 CULTURE AEROBIC IDENTIFY: CPT | Mod: 59

## 2017-07-26 PROCEDURE — D9220A PRA ANESTHESIA: Mod: CRNA,,, | Performed by: NURSE ANESTHETIST, CERTIFIED REGISTERED

## 2017-07-26 PROCEDURE — 64447 NJX AA&/STRD FEMORAL NRV IMG: CPT | Mod: 59,LT,, | Performed by: ANESTHESIOLOGY

## 2017-07-26 PROCEDURE — 71000015 HC POSTOP RECOV 1ST HR: Performed by: PODIATRIST

## 2017-07-26 PROCEDURE — 36415 COLL VENOUS BLD VENIPUNCTURE: CPT

## 2017-07-26 PROCEDURE — 84100 ASSAY OF PHOSPHORUS: CPT

## 2017-07-26 PROCEDURE — 63600175 PHARM REV CODE 636 W HCPCS

## 2017-07-26 PROCEDURE — D9220A PRA ANESTHESIA: Mod: ANES,,, | Performed by: ANESTHESIOLOGY

## 2017-07-26 PROCEDURE — 64450 NJX AA&/STRD OTHER PN/BRANCH: CPT | Mod: 59,LT,, | Performed by: ANESTHESIOLOGY

## 2017-07-26 PROCEDURE — 28003 TREATMENT OF FOOT INFECTION: CPT | Mod: LT,GC,, | Performed by: PODIATRIST

## 2017-07-26 PROCEDURE — 11000001 HC ACUTE MED/SURG PRIVATE ROOM

## 2017-07-26 PROCEDURE — 87102 FUNGUS ISOLATION CULTURE: CPT

## 2017-07-26 PROCEDURE — 88305 TISSUE EXAM BY PATHOLOGIST: CPT | Mod: 26,,, | Performed by: PATHOLOGY

## 2017-07-26 PROCEDURE — 87205 SMEAR GRAM STAIN: CPT

## 2017-07-26 RX ORDER — PHENYLEPHRINE HYDROCHLORIDE 10 MG/ML
INJECTION INTRAVENOUS
Status: DISCONTINUED | OUTPATIENT
Start: 2017-07-26 | End: 2017-07-26

## 2017-07-26 RX ORDER — FENTANYL CITRATE 50 UG/ML
INJECTION, SOLUTION INTRAMUSCULAR; INTRAVENOUS
Status: COMPLETED
Start: 2017-07-26 | End: 2017-07-26

## 2017-07-26 RX ORDER — SODIUM CHLORIDE 9 MG/ML
INJECTION, SOLUTION INTRAVENOUS
Status: DISCONTINUED | OUTPATIENT
Start: 2017-07-26 | End: 2017-08-07 | Stop reason: HOSPADM

## 2017-07-26 RX ORDER — BACITRACIN 500 [USP'U]/G
OINTMENT TOPICAL
Status: DISPENSED
Start: 2017-07-26 | End: 2017-07-27

## 2017-07-26 RX ORDER — LIDOCAINE HCL/PF 100 MG/5ML
SYRINGE (ML) INTRAVENOUS
Status: DISCONTINUED | OUTPATIENT
Start: 2017-07-26 | End: 2017-07-26

## 2017-07-26 RX ORDER — MIDAZOLAM HYDROCHLORIDE 1 MG/ML
1 INJECTION INTRAMUSCULAR; INTRAVENOUS EVERY 5 MIN PRN
Status: DISCONTINUED | OUTPATIENT
Start: 2017-07-26 | End: 2017-07-31

## 2017-07-26 RX ORDER — FENTANYL CITRATE 50 UG/ML
25 INJECTION, SOLUTION INTRAMUSCULAR; INTRAVENOUS EVERY 5 MIN PRN
Status: DISCONTINUED | OUTPATIENT
Start: 2017-07-26 | End: 2017-07-26 | Stop reason: HOSPADM

## 2017-07-26 RX ORDER — SODIUM CHLORIDE 9 MG/ML
INJECTION, SOLUTION INTRAVENOUS ONCE
Status: COMPLETED | OUTPATIENT
Start: 2017-07-26 | End: 2017-07-26

## 2017-07-26 RX ORDER — BACITRACIN ZINC 500 UNIT/G
OINTMENT (GRAM) TOPICAL
Status: DISCONTINUED | OUTPATIENT
Start: 2017-07-26 | End: 2017-07-26 | Stop reason: HOSPADM

## 2017-07-26 RX ORDER — PANTOPRAZOLE SODIUM 40 MG/1
40 TABLET, DELAYED RELEASE ORAL DAILY
Status: DISCONTINUED | OUTPATIENT
Start: 2017-07-26 | End: 2017-08-01

## 2017-07-26 RX ORDER — MIDAZOLAM HYDROCHLORIDE 1 MG/ML
INJECTION INTRAMUSCULAR; INTRAVENOUS
Status: COMPLETED
Start: 2017-07-26 | End: 2017-07-26

## 2017-07-26 RX ORDER — PROPOFOL 10 MG/ML
VIAL (ML) INTRAVENOUS CONTINUOUS PRN
Status: DISCONTINUED | OUTPATIENT
Start: 2017-07-26 | End: 2017-07-26

## 2017-07-26 RX ADMIN — SODIUM CHLORIDE: 0.9 INJECTION, SOLUTION INTRAVENOUS at 01:07

## 2017-07-26 RX ADMIN — CEFTRIAXONE 1 G: 1 INJECTION, SOLUTION INTRAVENOUS at 03:07

## 2017-07-26 RX ADMIN — MIDAZOLAM HYDROCHLORIDE 1 MG: 1 INJECTION, SOLUTION INTRAMUSCULAR; INTRAVENOUS at 12:07

## 2017-07-26 RX ADMIN — AMLODIPINE BESYLATE 10 MG: 10 TABLET ORAL at 08:07

## 2017-07-26 RX ADMIN — PROPOFOL 50 MCG/KG/MIN: 10 INJECTION, EMULSION INTRAVENOUS at 01:07

## 2017-07-26 RX ADMIN — OXYCODONE HYDROCHLORIDE 5 MG: 5 TABLET ORAL at 07:07

## 2017-07-26 RX ADMIN — OXYCODONE HYDROCHLORIDE 5 MG: 5 TABLET ORAL at 08:07

## 2017-07-26 RX ADMIN — FENTANYL CITRATE 50 MCG: 50 INJECTION INTRAMUSCULAR; INTRAVENOUS at 12:07

## 2017-07-26 RX ADMIN — CALCITRIOL 0.5 MCG: 0.25 CAPSULE, LIQUID FILLED ORAL at 08:07

## 2017-07-26 RX ADMIN — INSULIN DETEMIR 5 UNITS: 100 INJECTION, SOLUTION SUBCUTANEOUS at 08:07

## 2017-07-26 RX ADMIN — PHENYLEPHRINE HYDROCHLORIDE 100 MCG: 10 INJECTION INTRAVENOUS at 01:07

## 2017-07-26 RX ADMIN — LIDOCAINE HYDROCHLORIDE 50 MG: 20 INJECTION, SOLUTION INTRAVENOUS at 01:07

## 2017-07-26 RX ADMIN — HEPARIN SODIUM 5000 UNITS: 5000 INJECTION, SOLUTION INTRAVENOUS; SUBCUTANEOUS at 08:07

## 2017-07-26 RX ADMIN — PANTOPRAZOLE SODIUM 40 MG: 40 TABLET, DELAYED RELEASE ORAL at 08:07

## 2017-07-26 RX ADMIN — SEVELAMER CARBONATE 2400 MG: 800 TABLET, FILM COATED ORAL at 05:07

## 2017-07-26 NOTE — PLAN OF CARE
Problem: Patient Care Overview  Goal: Plan of Care Review  Outcome: Ongoing (interventions implemented as appropriate)  Patient is AAOX4, cooperative, vitals stable, no acute events, complains of intermittent left foot pain, prn given as ordered, pt is encouraged to use call bell as needed and not attempt ambulation alone, follows commands, plan of care reviewed with patient, verbalizes understanding, will continue to monitor.

## 2017-07-26 NOTE — MEDICAL/APP STUDENT
Progress Note  Hospital Medicine    Patient Name: Aleta Herrera  YOB: 1955    Admit Date: 7/23/2017                     LOS: 3    SUBJECTIVE:     Patient is a 61 yo female with a history of HTN, HF (EF60%; home O2 2.5 liters), DM with retinopathy, ESRD and neuropathy admitted for  bleeding from her left foot ulcer and sepsis. Patient is on dialysis and   Has a a left femoral AVG in place due to failure of the upper extremity access.  A possible steal syndrome from the femoral AVG is suspected.  Patient reports no acute events overnight. Denies fevers, chills, chest pain, SOB, dizziness. Reports sleeping well last night and good appetite. Reports pain in the left foot. States that she does not make urine.    OBJECTIVE:     Vital Signs Range (Last 24H):  Temp:  [98 °F (36.7 °C)-98.8 °F (37.1 °C)]   Pulse:  [81-92]   Resp:  [16-19]   BP: (113-129)/(56-68)   SpO2:  [90 %-98 %] Body mass index is 28.27 kg/m².  Wt Readings from Last 1 Encounters:   07/24/17 0006 63.5 kg (139 lb 15.9 oz)   07/23/17 1926 63.5 kg (140 lb)       I & O (Last 24H):  Intake/Output Summary (Last 24 hours) at 07/26/17 0732  Last data filed at 07/26/17 0333   Gross per 24 hour   Intake              290 ml   Output                0 ml   Net              290 ml       Physical Exam:  On physical exam, patient is awake, alert and oriented to TPP. Heart and lungs are clear to auscultation.   Radial pulses and DP  difficult to palpate. Cap refill <2 sounds. Skin is dry and non-flushed.  Left plantar surface with non-tender, foul-smelling, necrotic tissue extending to heel; ulcer has a foul smelling dark discharge. No crepitus. Tenderness and and erythema over dorsal surface of left foot.  Left femoral AVG non-tender and no erythema    Diagnostic Results: WBC same as yesterday?  Lab Results   Component Value Date    WBC 19.18 (H) 07/25/2017    HGB 7.8 (L) 07/26/2017    HCT 24.7 (L) 07/26/2017    MCV 88 07/26/2017      07/26/2017       Recent Labs  Lab 07/26/17  0508   GLU 70   *   K 5.0      CO2 23   BUN 32*   CREATININE 5.2*   CALCIUM 8.4*     Lab Results   Component Value Date    INR 1.0 07/23/2017    INR 1.1 04/27/2017    INR 0.9 07/18/2016     Lab Results   Component Value Date    HGBA1C 6.3 (H) 04/28/2017     Recent Labs      07/24/17   0922  07/24/17   1319  07/24/17   1734  07/24/17   2118  07/25/17   0822  07/25/17   1214  07/25/17   1646  07/25/17   2040   POCTGLUCOSE  216*  204*  121*  145*  115*  75  96  112*       ASSESSMENT/PLAN:     Active Hospital Problems    Diagnosis  POA    *Sepsis [A41.9]  Yes    Chronic respiratory failure with hypoxia [J96.11]  Yes    Type 2 diabetes mellitus with left diabetic foot ulcer [E11.621, L97.529]  Yes    Left foot infection [L08.9]  Unknown    Pain and swelling of toe of left foot [M79.675, M79.89]  Unknown    Pleural effusion [J90]  Yes    (HFpEF) heart failure with preserved ejection fraction [I50.30]  Yes    ESRD (end stage renal disease) [N18.6]  Yes    Peripheral vascular disease, unspecified [I73.9]  Yes    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 [E11.22, I12.0, Z99.2, N18.6]  Not Applicable    Late effect of ischemic cerebral stroke [I69.30]  Not Applicable    Anemia of chronic renal failure [N18.9, D63.1]  Yes     Chronic    Hypertension, renal [I12.9]  Yes     Chronic      Resolved Hospital Problems    Diagnosis Date Resolved POA   No resolved problems to display.         Assessment and Plan:  Left foot ulcer with plantar arch necrosis: podiatry consult , Wound cultures pending.  Vascular surgery consult for possible steal syndrome--> LLE angiogram on Friday.If no stenosis identified, will plan for femoral graft ligation and permacath placement.  Sepsis: continue on Rocephin.  ESRD: dialysis MWF, monitor electrolytes.  T2DM: Norvasc  HTN: amlodipine 10 mg QD  DVT prophylaxis: 5,000 injection every 12 hours.      Signing Physician:  Radha  Ayan

## 2017-07-26 NOTE — PROGRESS NOTES
[facility]  Vascular Surgery  Progress Note    Patient Name: Aleta Herrera  MRN: [id number]  Admission Date: 7/23/2017  Primary Care Provider: Radha Lao MD    Subjective:     Interval History: no acute events overnight.    Post-Op Info:  Procedure(s) (LRB):  ANGIOGRAM-EXTREMITY-LOWER (Left)  CULHUWZO-IXGKENL-WD (Left)  INSERTION-CATHETER-HEMODIALYSIS (N/A)           Medications:  Continuous Infusions:   Scheduled Meds:   amlodipine  10 mg Oral Daily    aspirin  81 mg Oral Daily    calcitRIOL  0.5 mcg Oral Daily    cefTRIAXone (ROCEPHIN) IVPB  1 g Intravenous Q24H    epoetin batsheva (PROCRIT) injection  100 Units/kg Intravenous Every Mon, Wed, Fri    heparin (porcine)  5,000 Units Subcutaneous Q12H    insulin detemir  5 Units Subcutaneous BID    pantoprazole  40 mg Oral Daily    sevelamer carbonate  2,400 mg Oral TID WM     PRN Meds:sodium chloride 0.9%, dextrose 50%, dextrose 50%, glucagon (human recombinant), glucose, glucose, heparin (porcine), insulin aspart, oxycodone, polyethylene glycol     Objective:     Vital Signs (Most Recent):  Temp: 98.8 °F (37.1 °C) (07/26/17 0704)  Pulse: 92 (07/26/17 0704)  Resp: 16 (07/26/17 0704)  BP: 124/68 (07/26/17 0704)  SpO2: (!) 90 % (07/26/17 0704) Vital Signs (24h Range):  Temp:  [98 °F (36.7 °C)-98.8 °F (37.1 °C)] 98.8 °F (37.1 °C)  Pulse:  [81-92] 92  Resp:  [16-19] 16  SpO2:  [90 %-98 %] 90 %  BP: (113-129)/(56-68) 124/68          Physical Exam   Constitutional: She is oriented to person, place, and time. She appears well-developed and well-nourished.   Cardiovascular: Normal rate and regular rhythm.    Pulses:       Femoral pulses are 2+ on the right side, and 2+ on the left side.  Thrill in L femoral AVG  monophasic L PT   Pulmonary/Chest: Effort normal. No respiratory distress.   Abdominal: Soft. She exhibits no distension.   Musculoskeletal:   Left foot plantar with large area of ischemic tissue,foul smelling; erythema  extending to distal posterior calf war   Neurological: She is alert and oriented to person, place, and time.   Skin: Skin is warm and dry.       Significant Labs:  CBC:   Recent Labs  Lab 07/25/17  0434 07/26/17  0508   WBC 19.18*  --    RBC 2.75* 2.81*   HGB 7.6* 7.8*   HCT 24.5* 24.7*   * 350   MCV 89 88   MCH 27.6 27.8   MCHC 31.0* 31.6*     CMP:   Recent Labs  Lab 07/26/17  0508   GLU 70   CALCIUM 8.4*   *   K 5.0   CO2 23      BUN 32*   CREATININE 5.2*       Significant Diagnostics:      Assessment/Plan:     Left foot infection    61 yo F with h/o HTN, HLD, DM2 (Hgb A1c 6.3),HFpEF (EF 60%), COPD (home O2, 2.5 L), ESRD on HD (MWF) via L femoral AVG presenting to hospital with left foot bleeding.      Left dorsum foot with fluctuant foul smelling drainage; podiatry following to I+D today  Left femoral AVG with possible vascular steal; will perform LLE angiogram on Friday, if no arterial stenosis identified will plan for femoral graft ligation and permacath placement   Please call with questions or concerns; vascular surgery to follow        Type 2 diabetes mellitus with left diabetic foot ulcer    61 yo F with h/o HTN, HLD, DM2 (Hgb A1c 6.3),HFpEF (EF 60%), COPD (home O2, 2.5 L), ESRD on HD (MWF) via L femoral AVG presenting to hospital with left foot bleeding.      Left dorsum foot with fluctuant foul smelling drainage; podiatry following; recommend drainage/debridement  Left femoral AVG with possible vascular steal; will perform LLE angiogram on Friday, if no arterial stenosis identified will plan for femoral graft ligation and permacath placement  Please call with questions or concerns; vascular surgery to follow            Melissa Graham MD  Vascular Surgery  [facility]

## 2017-07-26 NOTE — PROGRESS NOTES
Report given to Andres, nurse to rm 942 A. Pt aaox3 and in no distress. Pt to go to dialysis.   1430- Per Narendra in dialysis, unable to take pt today. Will take pt tomorrow. Notified Andres receiving rn. Pt aaox3 and in no distress.   1500- Per central monitoring able too see pt on monitor. Pt aaox3 and in no distress.

## 2017-07-26 NOTE — ASSESSMENT & PLAN NOTE
63 yo F with h/o HTN, HLD, DM2 (Hgb A1c 6.3),HFpEF (EF 60%), COPD (home O2, 2.5 L), ESRD on HD (MWF) via L femoral AVG presenting to hospital with left foot bleeding.      Left dorsum foot with fluctuant foul smelling drainage; podiatry following to I+D today  Left femoral AVG with possible vascular steal; will perform LLE angiogram on Friday, if no arterial stenosis identified will plan for femoral graft ligation and permacath placement   Please call with questions or concerns; vascular surgery to follow

## 2017-07-26 NOTE — ASSESSMENT & PLAN NOTE
- Podiatry following. Gangrenous changes noted, to plantar arch of left foot, stable at this time, no acute surgical intervention planned at this time   Plan  - I&D and debridement today   - F/u wound cx  -F/u blood cx  - Continue Rocephin pending wound cx results

## 2017-07-26 NOTE — ASSESSMENT & PLAN NOTE
Presenting to the ED w/ new bleeding from L leg ulcer along with fevers, chills and new leukocytosis.   - Received vancomycin , flagyl and rocephin in ED. Fever resolved with Tylenol in ED. Dc'd vancomycin and flagyl on the floor.   - CXR no significant change from prior study (5/3/17) small bilateral effusions, R> L.  R effusion appears to be partially loculated but unchanged. Mild edema in the lungs, stable.  - US with doppler b/l LE negative for DVT  - L foot x-ray showed soft tissue swelling, no acute fx  - MRI LLE showed no osteomyelitis, no drainable collections.  - Blood cx 1/4 +gram positive cocci in clusters resembling staph   - Blood cx 1/4 +gram positive rods, likely contaminant     Plan  - Continuing rocephin at this time   - Repeat blood cx x2  - Awaiting wound cx results

## 2017-07-26 NOTE — PLAN OF CARE
Problem: Fall Risk (Adult)  Goal: Identify Related Risk Factors and Signs and Symptoms  Related risk factors and signs and symptoms are identified upon initiation of [employer] Clinical Practice Guideline (CPG)   Outcome: Ongoing (interventions implemented as appropriate)  Patient has unsteady gait and a left foot wound, uses walker when at home, generalized weakness as well, pt is encouraged to call for assist as needed. Call bell in reach.

## 2017-07-26 NOTE — SUBJECTIVE & OBJECTIVE
Interval History:  Still has intermittent left foot pain. Also complains of heartburn this morning. Plan today is to have I&D with debridement with Podiatry at 12:00 Pm.  HD this afternoon. Denies fevers, chills, SOB, chest pain, abdominal pain, n/v/d.     Review of Systems   Constitutional: Negative for activity change, appetite change, chills and fever.   HENT: Negative for congestion and rhinorrhea.    Eyes: Negative for photophobia and redness.   Respiratory: Negative for cough and shortness of breath.    Cardiovascular: Negative for chest pain and palpitations.   Gastrointestinal: Negative for abdominal pain, diarrhea, nausea and vomiting.   Genitourinary:        Does not make urine   Musculoskeletal: Positive for joint swelling (left foot) and myalgias (left foot). Negative for arthralgias.   Skin: Positive for color change (left foot) and wound (left heel).   Neurological: Negative for dizziness, light-headedness and headaches.     Objective:     Vital Signs (Most Recent):  Temp: 98.4 °F (36.9 °C) (07/26/17 0409)  Pulse: 86 (07/26/17 0409)  Resp: 17 (07/26/17 0409)  BP: 129/67 (07/26/17 0409)  SpO2: (!) 92 % (07/26/17 0409) Vital Signs (24h Range):  Temp:  [98 °F (36.7 °C)-98.8 °F (37.1 °C)] 98.4 °F (36.9 °C)  Pulse:  [81-90] 86  Resp:  [16-19] 17  SpO2:  [91 %-98 %] 92 %  BP: (113-129)/(56-67) 129/67     Weight: 63.5 kg (139 lb 15.9 oz)  Body mass index is 28.27 kg/m².    Intake/Output Summary (Last 24 hours) at 07/26/17 0708  Last data filed at 07/26/17 0333   Gross per 24 hour   Intake              290 ml   Output                0 ml   Net              290 ml      Physical Exam   Constitutional: She is oriented to person, place, and time. She appears well-developed and well-nourished.   HENT:   Head: Normocephalic and atraumatic.   Eyes: EOM are normal. Pupils are equal, round, and reactive to light.   Cardiovascular: Normal rate and regular rhythm.    Unable to appreciate DP pulse bilaterally   Left  femoral avg with palpable thrill.    Pulmonary/Chest: Effort normal. No respiratory distress.   Abdominal: She exhibits no distension. There is no tenderness. A hernia (soft, non-tender) is present.   Musculoskeletal:   Left plantar foot surface with non-tender, foul-smelling, dark, necrotic appearing tissue extending to heel; ulcer noted to lateral heel with foul smelling dark drainage. No crepitus. Tenderness and erythema over dorsal surface of left foot.    Neurological: She is alert and oriented to person, place, and time.   Skin: Skin is warm and dry.   Left femoral AVG non-tender, no erythema, no fluctuance   Psychiatric: She has a normal mood and affect.       Significant Labs:   Blood Culture: No results for input(s): LABBLOO in the last 48 hours.  BMP:   Recent Labs  Lab 07/26/17  0508   GLU 70   *   K 5.0      CO2 23   BUN 32*   CREATININE 5.2*   CALCIUM 8.4*     CBC:   Recent Labs  Lab 07/25/17  0434 07/26/17  0508   WBC 19.18*  --    HGB 7.6* 7.8*   HCT 24.5* 24.7*   * 350     CMP:   Recent Labs  Lab 07/25/17  0434 07/26/17  0508   * 135*   K 4.2 5.0    103   CO2 22* 23   * 70   BUN 21 32*   CREATININE 3.7* 5.2*   CALCIUM 8.1* 8.4*   ANIONGAP 9 9   EGFRNONAA 12.5* 8.3*       Significant Imaging: No new imaging in last 24 hours.

## 2017-07-26 NOTE — ANESTHESIA POSTPROCEDURE EVALUATION
"Anesthesia Post Evaluation    Patient: Aleta Herrera    Procedure(s) Performed: Procedure(s) (LRB):  INCISION AND DRAINAGE (I&D), FOOT (Left)  DEBRIDEMENT-FOOT    Final Anesthesia Type: regional  Patient location during evaluation: PACU  Patient participation: Yes- Able to Participate  Level of consciousness: awake and alert  Pain management: adequate  Airway patency: patent  PONV status at discharge: No PONV  Anesthetic complications: no      Cardiovascular status: blood pressure returned to baseline  Respiratory status: spontaneous ventilation and nasal cannula  Hydration status: euvolemic  Follow-up not needed.        Visit Vitals  /60   Pulse 92   Temp 37 °C (98.6 °F) (Temporal)   Resp 16   Ht 4' 11" (1.499 m)   Wt 63.5 kg (139 lb 15.9 oz)   SpO2 95%   Breastfeeding? No   BMI 28.27 kg/m²       Pain/Hector Score: Pain Assessment Performed: Yes (7/26/2017  2:15 PM)  Presence of Pain: denies (7/26/2017  2:15 PM)  Pain Rating Prior to Med Admin: 0 (preop block) (7/26/2017 12:49 PM)  Pain Rating Post Med Admin: 2 (7/25/2017  5:30 PM)  Hector Score: 7 (7/26/2017  2:15 PM)      "

## 2017-07-26 NOTE — ANESTHESIA PROCEDURE NOTES
Left adductor canal single shot    Patient location during procedure: pre-op   Block not for primary anesthetic.  Reason for block: at surgeon's request and post-op pain management   Post-op Pain Location: left foot pain  Start time: 7/26/2017 12:51 PM  Timeout: 7/26/2017 12:50 PM   End time: 7/26/2017 12:58 PM  Staffing  Anesthesiologist: LANNY CARNEY  Other anesthesia staff: VARUN SARKAR  Performed: other anesthesia staff   Preanesthetic Checklist  Completed: patient identified, site marked, surgical consent, pre-op evaluation, timeout performed, IV checked, risks and benefits discussed and monitors and equipment checked  Peripheral Block  Patient position: supine  Prep: ChloraPrep  Patient monitoring: heart rate, cardiac monitor, continuous pulse ox, continuous capnometry and frequent blood pressure checks  Block type: adductor canal  Laterality: left  Injection technique: single shot  Needle  Needle type: Stimuplex   Needle gauge: 21 G  Needle length: 4 in  Needle localization: anatomical landmarks and ultrasound guidance   -ultrasound image captured on disc.  Assessment  Injection assessment: negative aspiration, negative parasthesia and local visualized surrounding nerve  Paresthesia pain: none  Heart rate change: no  Slow fractionated injection: yes  Medications:  Bolus administered: 10 mL of 0.5 ropivacaine  Epinephrine added: 3.75 mcg/mL (1/300,000)  Additional Notes  VSS.  [facility] RN monitoring vitals throughout procedure.  Patient tolerated procedure well.   Performed by Curt Sarkar MD

## 2017-07-26 NOTE — PROGRESS NOTES
Pt was on dialysis schedule for today but due to OR schedule she could not come for HD until late afternoon. Her labs were reviewed, relatively acceptable metabolic clearance. Plan to keep her on HD schedule for tomorrow morning.

## 2017-07-26 NOTE — ASSESSMENT & PLAN NOTE
- JULIUS showed  Right leg:The peripheral arteries are non-compressable. The PVR waveforms suggest moderate peripheral arterial occlusive disease. Left leg:The peripheral arteries are non-compressable.The PVR waveforms suggest moderate  to severe peripheral arterial occlusive disease.  -Vascular surgery consulted. Left femoral AVG with possible vascular steal;   Plan   - Vasc Surg will perform LLE angiogram on 7/28, if no arterial stenosis identified will plan for femoral graft ligation and permacath placement.

## 2017-07-26 NOTE — PROGRESS NOTES
[facility] Hospital Medicine  Progress Note    Patient Name: Aleta Herrera  MRN: [id number]  Patient Class: IP- Inpatient   Admission Date: 7/23/2017  Length of Stay: 3 days  Attending Physician: Malick Mahajan MD  Primary Care Provider: Radha Lao MD    [facility] Medicine Team: [facility] – [place], [place] HOSP MED 2 Estrella Ramos MD    Subjective:     Principal Problem:Left foot infection    HPI:  Aleta Herrera is a 62-year-old female with HTN, HFpEF (EF 60%; home O2 2.5 liters), anemia 2/2 CKD and IDDM c/b retinopathy, neuropathy and ESRD (HD MWF). She presents today with her  to the ED because of new bleeding from the left foot ulcer. She's had on/off fevers and chills for past week or so. Apparently blood cultures were drawn at dialysis (7/19). She was seen by vascular surgery the next day where her right femoral permacath was pulled. She has a working left femoral AVG. Her upper extremity access has failed over the years since being placed on dialysis in 2009. Besides left leg pain, she denies any SOB, CP, cough, congestion, abdominal pain, n/v or diarrhea. Not received any recent antibiotics.   Complaining of foot pain in the ED, otherwise has no complaints. CXR showed mild b/l effusions. Left foot x-ray soft tissue swelling, but no gas formation. Labs significant for WBC count of 24k. Procalcitonin 1.64.       Hospital Course:  7/24: VSS. WBC 22.58 down from 24.04 in ED. Sed rate 100. .3. CXR stable. L Foot xray soft tissue swelling. MRI L foot no osteomylitis, no drainable collections. JULIUS L with mod-severe occlusive disease, R with mod. Continue ceftriaxone. HD today.  7/25: VSS. WBC 19.18. Vasc Surg consulted, L femoral AVG w/ possible vascular steal; will perform LLE angiogram on 7/28, if no arterial stenosis will plan for femoral graft ligation & permacath placement. Podiatry consulted, extensive stable left plantar arch necrosis, no acute surgical  intervention; will f/u after angiogram.  Wound cx pending. 1/4 Bcx +GPC in clusters. Continue ceftriaxone.   7/26: I&D and debridement with Podiatry today.  Continue Ceftriaxone.     Interval History:  Still has intermittent left foot pain. Also complains of heartburn this morning. Plan today is to have I&D with debridement with Podiatry at 12:00 Pm.  HD this afternoon. Denies fevers, chills, SOB, chest pain, abdominal pain, n/v/d.     Review of Systems   Constitutional: Negative for activity change, appetite change, chills and fever.   HENT: Negative for congestion and rhinorrhea.    Eyes: Negative for photophobia and redness.   Respiratory: Negative for cough and shortness of breath.    Cardiovascular: Negative for chest pain and palpitations.   Gastrointestinal: Negative for abdominal pain, diarrhea, nausea and vomiting.   Genitourinary:        Does not make urine   Musculoskeletal: Positive for joint swelling (left foot) and myalgias (left foot). Negative for arthralgias.   Skin: Positive for color change (left foot) and wound (left heel).   Neurological: Negative for dizziness, light-headedness and headaches.     Objective:     Vital Signs (Most Recent):  Temp: 98.4 °F (36.9 °C) (07/26/17 0409)  Pulse: 86 (07/26/17 0409)  Resp: 17 (07/26/17 0409)  BP: 129/67 (07/26/17 0409)  SpO2: (!) 92 % (07/26/17 0409) Vital Signs (24h Range):  Temp:  [98 °F (36.7 °C)-98.8 °F (37.1 °C)] 98.4 °F (36.9 °C)  Pulse:  [81-90] 86  Resp:  [16-19] 17  SpO2:  [91 %-98 %] 92 %  BP: (113-129)/(56-67) 129/67     Weight: 63.5 kg (139 lb 15.9 oz)  Body mass index is 28.27 kg/m².    Intake/Output Summary (Last 24 hours) at 07/26/17 0708  Last data filed at 07/26/17 0333   Gross per 24 hour   Intake              290 ml   Output                0 ml   Net              290 ml      Physical Exam   Constitutional: She is oriented to person, place, and time. She appears well-developed and well-nourished.   HENT:   Head: Normocephalic and  atraumatic.   Eyes: EOM are normal. Pupils are equal, round, and reactive to light.   Cardiovascular: Normal rate and regular rhythm.    Unable to appreciate DP pulse bilaterally   Left femoral avg with palpable thrill.    Pulmonary/Chest: Effort normal. No respiratory distress.   Abdominal: She exhibits no distension. There is no tenderness. A hernia (soft, non-tender) is present.   Musculoskeletal:   Left plantar foot surface with non-tender, foul-smelling, dark, necrotic appearing tissue extending to heel; ulcer noted to lateral heel with foul smelling dark drainage. No crepitus. Tenderness and erythema over dorsal surface of left foot.    Neurological: She is alert and oriented to person, place, and time.   Skin: Skin is warm and dry.   Left femoral AVG non-tender, no erythema, no fluctuance   Psychiatric: She has a normal mood and affect.       Significant Labs:   Blood Culture: No results for input(s): LABBLOO in the last 48 hours.  BMP:   Recent Labs  Lab 07/26/17  0508   GLU 70   *   K 5.0      CO2 23   BUN 32*   CREATININE 5.2*   CALCIUM 8.4*     CBC:   Recent Labs  Lab 07/25/17  0434 07/26/17  0508   WBC 19.18*  --    HGB 7.6* 7.8*   HCT 24.5* 24.7*   * 350     CMP:   Recent Labs  Lab 07/25/17  0434 07/26/17  0508   * 135*   K 4.2 5.0    103   CO2 22* 23   * 70   BUN 21 32*   CREATININE 3.7* 5.2*   CALCIUM 8.1* 8.4*   ANIONGAP 9 9   EGFRNONAA 12.5* 8.3*       Significant Imaging: No new imaging in last 24 hours.         Assessment/Plan:      * Left foot infection    Presenting to the ED w/ new bleeding from L leg ulcer along with fevers, chills and new leukocytosis.   - Received vancomycin , flagyl and rocephin in ED. Fever resolved with Tylenol in ED. Dc'd vancomycin and flagyl on the floor.   - CXR no significant change from prior study (5/3/17) small bilateral effusions, R> L.  R effusion appears to be partially loculated but unchanged. Mild edema in the lungs,  stable.  - US with doppler b/l LE negative for DVT  - L foot x-ray showed soft tissue swelling, no acute fx  - MRI LLE showed no osteomyelitis, no drainable collections.  - Blood cx 1/4 +gram positive cocci in clusters resembling staph   - Blood cx 1/4 +gram positive rods, likely contaminant     Plan  - Continuing rocephin at this time   - Repeat blood cx x2  - Awaiting wound cx results        Type 2 diabetes mellitus with left diabetic foot ulcer    - Podiatry following. Gangrenous changes noted, to plantar arch of left foot, stable at this time, no acute surgical intervention planned at this time   Plan  - I&D and debridement today   - F/u wound cx  -F/u blood cx  - Continue Rocephin pending wound cx results            Peripheral vascular disease, unspecified    - JULIUS showed  Right leg:The peripheral arteries are non-compressable. The PVR waveforms suggest moderate peripheral arterial occlusive disease. Left leg:The peripheral arteries are non-compressable.The PVR waveforms suggest moderate  to severe peripheral arterial occlusive disease.  -Vascular surgery consulted. Left femoral AVG with possible vascular steal;   Plan   - Vasc Surg will perform LLE angiogram on 7/28, if no arterial stenosis identified will plan for femoral graft ligation and permacath placement.        Anemia of chronic renal failure    - Procrit w/ dialysis.   - Continue to monitor H/H        ESRD (end stage renal disease)    - Consulted nephrology.   Plan  - Dialysis MWF.  - Will monitor electrolytes        (HFpEF) heart failure with preserved ejection fraction    - BNP 2835 on admission   - Pt with ESRD on HD            Pleural effusion    - Seen on prior CXR (5/3/17). Unclear etiology. Stable.        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    - Resume Norvasc  - Diabetic diet inpatient  - SSI        Hypertension, renal    - Continue home amlodipine 10 mg QD        Late effect of ischemic cerebral stroke    - Continued ASA 81 mg.              VTE Risk Mitigation         Ordered     heparin (porcine) injection 5,000 Units  Every 12 hours     Route:  Subcutaneous        07/24/17 0919     Medium Risk of VTE  Once      07/23/17 1138          Estrella Ramos MD  Department of Hospital Medicine   [facility]

## 2017-07-26 NOTE — BRIEF OP NOTE
[facility]  Brief Operative Note    SUMMARY     Surgery Date: 7/26/2017     Surgeon(s) and Role:     * Margaret Huertas DPM - Primary     * Marlyn Frank MD - Resident - Assisting        Pre-op Diagnosis:  PVD (peripheral vascular disease) [I73.9]  Type 2 diabetes mellitus with left diabetic foot ulcer [E11.621, L97.529]  Left foot infection [L08.9]  Sepsis, due to unspecified organism [A41.9]  Pain and swelling of toe of left foot [M79.675, M79.89]    Post-op Diagnosis:  Post-Op Diagnosis Codes:     * PVD (peripheral vascular disease) [I73.9]     * Type 2 diabetes mellitus with left diabetic foot ulcer [E11.621, L97.529]     * Left foot infection [L08.9]     * Sepsis, due to unspecified organism [A41.9]     * Pain and swelling of toe of left foot [M79.675, M79.89]    Procedure(s) (LRB):  INCISION AND DRAINAGE (I&D), FOOT (Left)  DEBRIDEMENT-FOOT    Anesthesia: Regional    Description of Procedure: I&D of left heel     Description of the findings of the procedure: Extremely malodorous serous gray drainage with bubbles noted in extensive area of the heel. No bleeding noted. Tissue necrotic. Incision was left open and packed.     Estimated Blood Loss: 0 cc without tourniquet       Specimens:   Specimen (12h ago through future)    Start     Ordered    07/26/17 2043  Specimen to Pathology - Surgery  Once     Comments:  1.  Left heel, permanent, placed in pathology refrigerator.      07/26/17 6422

## 2017-07-26 NOTE — PLAN OF CARE
Pt arrived via w/c AAO x4, family at bs a/a nad noted or reported. Pt gave informed consent and voiced intended procedure. Pt gave two verbal patient identifiers, allergies, NPO status and reported meds taken this am. Pt with tele in use war room notified pt going to surgery. lle with kerlix dressing noted cdi. Using oxygen via nc @ 3L N C

## 2017-07-26 NOTE — SUBJECTIVE & OBJECTIVE
Medications:  Continuous Infusions:   Scheduled Meds:   amlodipine  10 mg Oral Daily    aspirin  81 mg Oral Daily    calcitRIOL  0.5 mcg Oral Daily    cefTRIAXone (ROCEPHIN) IVPB  1 g Intravenous Q24H    epoetin batsheva (PROCRIT) injection  100 Units/kg Intravenous Every Mon, Wed, Fri    heparin (porcine)  5,000 Units Subcutaneous Q12H    insulin detemir  5 Units Subcutaneous BID    pantoprazole  40 mg Oral Daily    sevelamer carbonate  2,400 mg Oral TID WM     PRN Meds:sodium chloride 0.9%, dextrose 50%, dextrose 50%, glucagon (human recombinant), glucose, glucose, heparin (porcine), insulin aspart, oxycodone, polyethylene glycol     Objective:     Vital Signs (Most Recent):  Temp: 98.8 °F (37.1 °C) (07/26/17 0704)  Pulse: 92 (07/26/17 0704)  Resp: 16 (07/26/17 0704)  BP: 124/68 (07/26/17 0704)  SpO2: (!) 90 % (07/26/17 0704) Vital Signs (24h Range):  Temp:  [98 °F (36.7 °C)-98.8 °F (37.1 °C)] 98.8 °F (37.1 °C)  Pulse:  [81-92] 92  Resp:  [16-19] 16  SpO2:  [90 %-98 %] 90 %  BP: (113-129)/(56-68) 124/68          Physical Exam   Constitutional: She is oriented to person, place, and time. She appears well-developed and well-nourished.   Cardiovascular: Normal rate and regular rhythm.    Pulses:       Femoral pulses are 2+ on the right side, and 2+ on the left side.  Thrill in L femoral AVG  monophasic L PT   Pulmonary/Chest: Effort normal. No respiratory distress.   Abdominal: Soft. She exhibits no distension.   Musculoskeletal:   Left foot plantar with large area of ischemic tissue,foul smelling; erythema extending to distal posterior calf war   Neurological: She is alert and oriented to person, place, and time.   Skin: Skin is warm and dry.       Significant Labs:  CBC:   Recent Labs  Lab 07/25/17  0434 07/26/17  0508   WBC 19.18*  --    RBC 2.75* 2.81*   HGB 7.6* 7.8*   HCT 24.5* 24.7*   * 350   MCV 89 88   MCH 27.6 27.8   MCHC 31.0* 31.6*     CMP:   Recent Labs  Lab 07/26/17  0508   GLU 70    CALCIUM 8.4*   *   K 5.0   CO2 23      BUN 32*   CREATININE 5.2*       Significant Diagnostics:

## 2017-07-26 NOTE — ANESTHESIA RELEASE NOTE
"Anesthesia Release from PACU Note    Patient: Aleta Herrera    Procedure(s) Performed: Procedure(s) (LRB):  INCISION AND DRAINAGE (I&D), FOOT (Left)  DEBRIDEMENT-FOOT    Anesthesia type: regional and MAC    Post pain: Adequate analgesia    Post assessment: no apparent anesthetic complications    Last Vitals:   Visit Vitals  /60   Pulse 92   Temp 37 °C (98.6 °F) (Temporal)   Resp 16   Ht 4' 11" (1.499 m)   Wt 63.5 kg (139 lb 15.9 oz)   SpO2 95%   Breastfeeding? No   BMI 28.27 kg/m²       Post vital signs: stable    Level of consciousness: awake    Nausea/Vomiting: no nausea/no vomiting    Complications: none    Airway Patency: patent    Respiratory: nasal cannula    Cardiovascular: stable    Hydration: euvolemic  "

## 2017-07-26 NOTE — ANESTHESIA PREPROCEDURE EVALUATION
07/26/2017  Aleta Herrera is a 62 y.o., female with PMH significant for ESRD on HD, HFpEF, CAD, HTN, DM2, CVA, pulmonary HTN, hx of cardiac arrest s/p EGD, and delayed emergence now with a foot abscess. Patient is scheduled for the below procedure.     Pre-operative evaluation for Procedure(s) (LRB):  INCISION AND DRAINAGE (I&D), FOOT (Left)    LDAs:   Right hand 22 G PIV  2 L NC    Previous intubation: Placement Date: 04/27/17; Placement Time: 1240; Method of Intubation: Direct laryngoscopy; Inserted by: CRNA; Airway Device: Endotracheal Tube; Mask Ventilation: Easy - oral; Intubated: Postinduction; Blade: Ceja #2; Airway Device Size: 7.5; Style: Cuffed; Cuff Inflation: Minimal occlusive pressure; Inflation Amount: 6; Placement Verified By: Auscultation, Capnometry; Grade: Grade II; Complicating Factors: Poor neck/head extension; Intubation Findings: Positive EtCO2, Bilateral breath sounds, Atraumatic/Condition of teeth unchanged;  Depth of Insertion: 21; Securment: Lips; Complications: None; Breath Sounds: Equal Bilateral; Insertion Attempts: 1      Aleta Herrera is a 62 y.o. female     Patient Active Problem List   Diagnosis    ESRD 2/2 HTN on HD since 11/12/09    Anemia of chronic renal failure    Secondary hyperparathyroidism of renal origin    Hypertension, renal    DR (diabetic retinopathy)    Diabetic neuropathy    Thrombus due to any device, implant or graft    Aphasia due to TBI (traumatic brain injury), closed    Acute blood loss anemia    Hemodialysis status    Late effect of ischemic cerebral stroke    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    Acute gastrointestinal hemorrhage    DVT (deep venous thrombosis), left    Screen for colon cancer    Breast cancer screening, high risk patient    Bleeding due to dialysis catheter placement     Anticoagulant long-term use    Cardiomyopathy    Pleural effusion on right    History of DVT (deep vein thrombosis)    Diverticulosis of large intestine with hemorrhage    Malfunction of peripheral inserted central catheter    Permanent central venous catheter in place    Leg pain, bilateral    Peripheral vascular disease, unspecified    CKD (chronic kidney disease)    Cardiac arrest    Pleural effusion, right    Numbness or tingling    Sepsis    ESRD (end stage renal disease)    Pleural effusion    (HFpEF) heart failure with preserved ejection fraction    Chronic respiratory failure with hypoxia    Type 2 diabetes mellitus with left diabetic foot ulcer    Left foot infection    Pain and swelling of toe of left foot       Review of patient's allergies indicates:  No Known Allergies    No current facility-administered medications on file prior to encounter.      Current Outpatient Prescriptions on File Prior to Encounter   Medication Sig Dispense Refill    amlodipine (NORVASC) 10 MG tablet TAKE ONE TABLET BY MOUTH ONCE DAILY 90 tablet 0    aspirin 81 mg Tab Take 1 tablet by mouth once daily.       epoetin batsheva (PROCRIT) 10,000 unit/mL injection Inject 0.74 mLs (7,400 Units total) into the skin every Mon, Wed, Fri.      hydrocodone-acetaminophen 5-325mg (NORCO) 5-325 mg per tablet Take 1 tablet by mouth every 6 (six) hours as needed for Pain.       insulin aspart (NOVOLOG FLEXPEN) 100 unit/mL InPn as dir Insulin Pen Subcutaneous Before meals and at bedtime.  If blood sugar is 151-200 1 units SQif blood sugar is 201-250 2 units SQIf blood sugar is 251-300 3 units SQIf blood sugar is 301-350 4 units SQIf blood sugar is > or = 351 5 units SQand call MD;  Dispense with needles      insulin detemir (LEVEMIR FLEXPEN) 100 unit/mL (3 mL) SubQ InPn pen Inject 8 Units into the skin 2 (two) times daily. (Patient taking differently: Inject 4 Units into the skin every evening. )      inulin (FIBER  CHOICE, INULIN,) 1.5 gram Chew Take 2 tablets by mouth 2 (two) times daily. Or as directed on product packaging.  11    lidocaine-prilocaine (EMLA) cream APPLY SMALL AMOUNT TO ACCESS SITE 1 TO 2 HOURS BEFORE TREATMENT. COVER AREA WITH AN OCCLUSIVE DRESSING.  3    lisinopril (PRINIVIL,ZESTRIL) 40 MG tablet Take 40 mg by mouth once daily.       metoprolol tartrate (LOPRESSOR) 100 MG tablet TAKE 1 TABLET BY MOUTH TWICE DAILY 180 tablet 3    oxycodone (ROXICODONE) 5 MG immediate release tablet Take 1-2 tablets (5-10 mg total) by mouth every 4 to 6 hours as needed for Pain. 31 tablet 0    polyethylene glycol (GLYCOLAX) 17 gram/dose powder Take 17 g by mouth once daily. May take up to 3 times per day as needed for constipation. 510 g 11    sevelamer carbonate (RENVELA) 800 mg Tab Take 2,400 mg by mouth 3 (three) times daily with meals.      calcitRIOL (ROCALTROL) 0.5 MCG Cap Take 1 capsule (0.5 mcg total) by mouth once daily. 30 capsule 1       Past Surgical History:   Procedure Laterality Date    AV FISTULA PLACEMENT      CATARACT SURGERY       SECTION, LOW TRANSVERSE      x 3    COLONOSCOPY N/A 2016    Procedure: COLONOSCOPY;  Surgeon: Roverto Patel MD;  Location: [address]);  Service: Endoscopy;  Laterality: N/A;    EYE SURGERY      HYSTERECTOMY      Endometriosis    TONSILLECTOMY      VASCULAR SURGERY         Social History     Social History    Marital status:      Spouse name: N/A    Number of children: N/A    Years of education: N/A     Occupational History          Social History Main Topics    Smoking status: Former Smoker     Packs/day: 0.25     Years: 0.50     Types: Cigarettes     Quit date: 1975    Smokeless tobacco: Never Used      Comment: quit smoking cigarettes 40+ years ago    Alcohol use No    Drug use: No    Sexual activity: Yes     Other Topics Concern    Not on file     Social History Narrative    2015        The patient is   she lives with her , eldest son, middle son Christopher age 26 who accompanies her to this visit     and her youngest child who is her daughter.      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    She had been a  in [place] but retired in May 2009 when she required dialysis        This summer she had a stroke which affected her left side.  She had a dense left hemiparesis which quickly resolved.  Now she is able to walk a bit at home without a walker.  She is able to wash dishes, do the laundry and cook.  She sometimes goes grocery shopping.  She was recently placed on the renal transplant list.         Vital Signs Range (Last 24H):  Temp:  [36.7 °C (98 °F)-37.1 °C (98.8 °F)]   Pulse:  [81-92]   Resp:  [16-19]   BP: (113-129)/(56-68)   SpO2:  [90 %-98 %]       CBC:   Recent Labs      07/25/17   0434  07/26/17   0508   WBC  19.18*  18.58*   RBC  2.75*  2.81*   HGB  7.6*  7.8*   HCT  24.5*  24.7*   PLT  354*  350   MCV  89  88   MCH  27.6  27.8   MCHC  31.0*  31.6*       CMP:   Recent Labs      07/23/17   1946  07/24/17   0558  07/25/17   0434  07/26/17   0508   NA  134*  133*  134*  135*   K  3.7  3.3*  4.2  5.0   CL  98  98  103  103   CO2  20*  23  22*  23   BUN  25*  31*  21  32*   CREATININE  4.7*  5.0*  3.7*  5.2*   GLU  132*  246*  119*  70   MG  2.0   --    --    --    PHOS  4.4   --    --    --    CALCIUM  8.8  8.2*  8.1*  8.4*   ALBUMIN  2.7*  2.2*   --    --    PROT  9.0*  7.6   --    --    ALKPHOS  98  128   --    --    ALT  6*  7*   --    --    AST  15  19   --    --    BILITOT  0.5  0.3   --    --        INR  Recent Labs      07/23/17 1946   INR  1.0   APTT  25.4           Diagnostic Studies:  EKG (7/23/2017):  Normal sinus rhythm  Possible Left atrial enlargement  Right superior axis deviation  Pulmonary disease  pattern  Nonspecific ST and T wave abnormality  Abnormal ECG  When compared with ECG of 27-APR-2017 19:29,  Criteria for Septal infarct are no longer Present    Nonspecific T wave abnormality, worse in Anterior-lateral leads  Confirmed by AKASH LOMBARDI MD ([id number]) on 7/24/2017 9:38:51 AM    2D Echo (6/21/2016):  CONCLUSIONS     1 - Normal left ventricular systolic function (EF 60-65%).     2 - Concentric hypertrophy.     3 - Biatrial enlargement.     4 - Left ventricular diastolic dysfunction.     5 - Normal right ventricular systolic function .     6 - Pulmonary hypertension. The estimated PA systolic pressure is 57 mmHg.     7 - Increased central venous pressure.     Anesthesia Evaluation    I have reviewed the Patient Summary Reports.    I have reviewed the Nursing Notes.   I have reviewed the Medications.     Review of Systems  Anesthesia Hx:  Hx of Anesthetic complications  (Pt with cardiac arrest immediately post EGD. Was found to be in asytole and required 2 round of ACLS. Ultimately ROSC acheived, intubated, to SICU. systolic, 2 rounds of epi, compressions. Intubated and sent to ICU. ) History of prior surgery of interest to airway management or planning: Denies Family Hx of Anesthesia complications.  Personal Hx of Anesthesia complications Slow To Awaken/Delayed Emergence   Social:  Non-Smoker, No Alcohol Use    Hematology/Oncology:         -- Anemia: Denies Current/Recent Cancer   EENT/Dental:   denies chronic allergic rhinitis   Cardiovascular:   Hypertension Denies MI. CAD    Denies CABG/stent.  Denies Dysrhythmias.   Denies Angina. CHF    Pulmonary:   Denies Pneumonia Denies COPD.  Denies Asthma. Shortness of breath (Reports SOB while obtaining dialysis) On supplemental oxygen   Renal/:   Chronic Renal Disease, ESRD    Hepatic/GI:   Bowel Prep. PUD, Denies GERD.    Musculoskeletal:   Foot abscess   Neurological:   CVA, no residual symptoms Denies Seizures.    Endocrine:   Diabetes, type 2         Physical Exam  General:  Well nourished    Airway/Jaw/Neck:  Airway Findings: Mouth Opening: Small, but > 3cm Tongue: Normal  General Airway Assessment: Adult  Mallampati: IV  Improves to III with phonation.  TM Distance: Normal, at least 6 cm  Jaw/Neck Findings:  Neck ROM: Normal ROM  Neck Findings: Normal    Eyes/Ears/Nose:  EYES/EARS/NOSE FINDINGS: Normal   Dental:  Dental Findings: In tact   Chest/Lungs:  Chest/Lungs Findings: Clear to auscultation, Normal Respiratory Rate  On nasal cannula   Heart/Vascular:  Heart Findings: Rate: Normal  Rhythm: Regular Rhythm  Sounds: Normal        Mental Status:  Mental Status Findings:  Cooperative, Alert and Oriented         Anesthesia Plan  Type of Anesthesia, risks & benefits discussed:  Anesthesia Type:  MAC, regional  Patient's Preference:   Intra-op Monitoring Plan: standard ASA monitors  Intra-op Monitoring Plan Comments:   Post Op Pain Control Plan:   Post Op Pain Control Plan Comments:   Induction:   IV  Beta Blocker:  Patient is not currently on a Beta-Blocker (No further documentation required).       Informed Consent: Patient understands risks and agrees with Anesthesia plan.  Questions answered. Anesthesia consent signed with patient.  ASA Score: 3     Day of Surgery Review of History & Physical:        Anesthesia Plan Notes:   62F ESRD on HD, HFpEF, mod pHTN, CAD, HTN, DM2, CVA, prior hx of cardiac arrest s/p EGD after HD, now with a foot abscess, for I&D under single shot pop-saph blocks and mod sedation        Ready For Surgery From Anesthesia Perspective.

## 2017-07-26 NOTE — ANESTHESIA PROCEDURE NOTES
Left popliteal single shot    Patient location during procedure: pre-op   Block not for primary anesthetic.  Reason for block: at surgeon's request and post-op pain management   Post-op Pain Location: left foot pain  Start time: 7/26/2017 12:51 PM  Timeout: 7/26/2017 12:50 PM   End time: 7/26/2017 12:58 PM  Staffing  Anesthesiologist: LANNY CARNEY  Other anesthesia staff: VARUN SARKAR  Performed: other anesthesia staff   Preanesthetic Checklist  Completed: patient identified, site marked, surgical consent, pre-op evaluation, timeout performed, IV checked, risks and benefits discussed and monitors and equipment checked  Peripheral Block  Patient position: supine  Prep: ChloraPrep  Patient monitoring: heart rate, cardiac monitor, continuous capnometry, continuous pulse ox and frequent blood pressure checks  Block type: popliteal  Laterality: left  Injection technique: single shot  Needle  Needle type: Stimuplex   Needle gauge: 21 G  Needle length: 4 in  Needle localization: anatomical landmarks and ultrasound guidance   -ultrasound image captured on disc.  Assessment  Injection assessment: negative aspiration, negative parasthesia and local visualized surrounding nerve  Paresthesia pain: none  Heart rate change: no  Slow fractionated injection: yes  Medications:  Bolus administered: 30 mL of 0.5 ropivacaine  Epinephrine added: 3.75 mcg/mL (1/300,000)  Additional Notes  VSS.  [facility] RN monitoring vitals throughout procedure.  Patient tolerated procedure well.   Performed by Curt Sarkar MD

## 2017-07-27 ENCOUNTER — ANESTHESIA EVENT (OUTPATIENT)
Dept: SURGERY | Facility: HOSPITAL | Age: 62
DRG: 853 | End: 2017-07-27
Payer: MEDICARE

## 2017-07-27 LAB
ANION GAP SERPL CALC-SCNC: 13 MMOL/L
BACTERIA BLD CULT: NORMAL
BASOPHILS # BLD AUTO: 0.02 K/UL
BASOPHILS NFR BLD: 0.1 %
BUN SERPL-MCNC: 40 MG/DL
CALCIUM SERPL-MCNC: 8.4 MG/DL
CHLORIDE SERPL-SCNC: 102 MMOL/L
CO2 SERPL-SCNC: 19 MMOL/L
CREAT SERPL-MCNC: 6.5 MG/DL
DIFFERENTIAL METHOD: ABNORMAL
EOSINOPHIL # BLD AUTO: 0.1 K/UL
EOSINOPHIL NFR BLD: 0.6 %
ERYTHROCYTE [DISTWIDTH] IN BLOOD BY AUTOMATED COUNT: 19.1 %
EST. GFR  (AFRICAN AMERICAN): 7.3 ML/MIN/1.73 M^2
EST. GFR  (NON AFRICAN AMERICAN): 6.3 ML/MIN/1.73 M^2
GLUCOSE SERPL-MCNC: 75 MG/DL
HCT VFR BLD AUTO: 23.4 %
HGB BLD-MCNC: 7.4 G/DL
LYMPHOCYTES # BLD AUTO: 0.9 K/UL
LYMPHOCYTES NFR BLD: 5 %
MCH RBC QN AUTO: 27.7 PG
MCHC RBC AUTO-ENTMCNC: 31.6 G/DL
MCV RBC AUTO: 88 FL
MONOCYTES # BLD AUTO: 1.2 K/UL
MONOCYTES NFR BLD: 6.9 %
NEUTROPHILS # BLD AUTO: 14.8 K/UL
NEUTROPHILS NFR BLD: 86.8 %
PLATELET # BLD AUTO: 376 K/UL
PMV BLD AUTO: 9 FL
POCT GLUCOSE: 100 MG/DL (ref 70–110)
POCT GLUCOSE: 110 MG/DL (ref 70–110)
POCT GLUCOSE: 127 MG/DL (ref 70–110)
POCT GLUCOSE: 202 MG/DL (ref 70–110)
POTASSIUM SERPL-SCNC: 5.4 MMOL/L
RBC # BLD AUTO: 2.67 M/UL
SODIUM SERPL-SCNC: 134 MMOL/L
WBC # BLD AUTO: 17.04 K/UL

## 2017-07-27 PROCEDURE — 25000003 PHARM REV CODE 250: Performed by: STUDENT IN AN ORGANIZED HEALTH CARE EDUCATION/TRAINING PROGRAM

## 2017-07-27 PROCEDURE — 36415 COLL VENOUS BLD VENIPUNCTURE: CPT

## 2017-07-27 PROCEDURE — 80048 BASIC METABOLIC PNL TOTAL CA: CPT

## 2017-07-27 PROCEDURE — 25000003 PHARM REV CODE 250: Performed by: NURSE PRACTITIONER

## 2017-07-27 PROCEDURE — 90935 HEMODIALYSIS ONE EVALUATION: CPT | Mod: ,,, | Performed by: INTERNAL MEDICINE

## 2017-07-27 PROCEDURE — 87040 BLOOD CULTURE FOR BACTERIA: CPT | Mod: 59

## 2017-07-27 PROCEDURE — 80100016 HC MAINTENANCE HEMODIALYSIS

## 2017-07-27 PROCEDURE — 25000003 PHARM REV CODE 250: Performed by: INTERNAL MEDICINE

## 2017-07-27 PROCEDURE — 63600175 PHARM REV CODE 636 W HCPCS: Performed by: INTERNAL MEDICINE

## 2017-07-27 PROCEDURE — 85025 COMPLETE CBC W/AUTO DIFF WBC: CPT

## 2017-07-27 PROCEDURE — 11000001 HC ACUTE MED/SURG PRIVATE ROOM

## 2017-07-27 PROCEDURE — 27000221 HC OXYGEN, UP TO 24 HOURS

## 2017-07-27 PROCEDURE — 99232 SBSQ HOSP IP/OBS MODERATE 35: CPT | Mod: GC,,, | Performed by: HOSPITALIST

## 2017-07-27 RX ORDER — PANTOPRAZOLE SODIUM 40 MG/1
40 TABLET, DELAYED RELEASE ORAL ONCE
Status: COMPLETED | OUTPATIENT
Start: 2017-07-27 | End: 2017-07-27

## 2017-07-27 RX ORDER — ACETAMINOPHEN 325 MG/1
650 TABLET ORAL EVERY 8 HOURS PRN
Status: DISCONTINUED | OUTPATIENT
Start: 2017-07-27 | End: 2017-07-31

## 2017-07-27 RX ADMIN — PANTOPRAZOLE SODIUM 40 MG: 40 TABLET, DELAYED RELEASE ORAL at 09:07

## 2017-07-27 RX ADMIN — AMLODIPINE BESYLATE 10 MG: 10 TABLET ORAL at 01:07

## 2017-07-27 RX ADMIN — CALCITRIOL 0.5 MCG: 0.25 CAPSULE, LIQUID FILLED ORAL at 01:07

## 2017-07-27 RX ADMIN — ASPIRIN 81 MG CHEWABLE TABLET 81 MG: 81 TABLET CHEWABLE at 01:07

## 2017-07-27 RX ADMIN — SODIUM CHLORIDE 1000 ML: 0.9 INJECTION, SOLUTION INTRAVENOUS at 08:07

## 2017-07-27 RX ADMIN — PANTOPRAZOLE SODIUM 40 MG: 40 TABLET, DELAYED RELEASE ORAL at 01:07

## 2017-07-27 RX ADMIN — INSULIN DETEMIR 5 UNITS: 100 INJECTION, SOLUTION SUBCUTANEOUS at 09:07

## 2017-07-27 RX ADMIN — INSULIN DETEMIR 5 UNITS: 100 INJECTION, SOLUTION SUBCUTANEOUS at 02:07

## 2017-07-27 RX ADMIN — INSULIN ASPART 1 UNITS: 100 INJECTION, SOLUTION INTRAVENOUS; SUBCUTANEOUS at 09:07

## 2017-07-27 RX ADMIN — ACETAMINOPHEN 650 MG: 325 TABLET ORAL at 02:07

## 2017-07-27 RX ADMIN — CEFTRIAXONE 1 G: 1 INJECTION, SOLUTION INTRAVENOUS at 03:07

## 2017-07-27 NOTE — PROGRESS NOTES
[facility] Hospital Medicine  Progress Note    Patient Name: Aleta Herrera  MRN: [id number]  Patient Class: IP- Inpatient   Admission Date: 7/23/2017  Length of Stay: 4 days  Attending Physician: Malick Mahajan MD  Primary Care Provider: Radha Lao MD    [facility] Medicine Team: [facility] – [place], [place] HOSP MED 2 Estrella Ramos MD    Subjective:     Principal Problem:Left foot infection    HPI:  Aleta Herrera is a 62-year-old female with HTN, HFpEF (EF 60%; home O2 2.5 liters), anemia 2/2 CKD and IDDM c/b retinopathy, neuropathy and ESRD (HD MWF). She presents today with her  to the ED because of new bleeding from the left foot ulcer. She's had on/off fevers and chills for past week or so. Apparently blood cultures were drawn at dialysis (7/19). She was seen by vascular surgery the next day where her right femoral permacath was pulled. She has a working left femoral AVG. Her upper extremity access has failed over the years since being placed on dialysis in 2009. Besides left leg pain, she denies any SOB, CP, cough, congestion, abdominal pain, n/v or diarrhea. Not received any recent antibiotics.   Complaining of foot pain in the ED, otherwise has no complaints. CXR showed mild b/l effusions. Left foot x-ray soft tissue swelling, but no gas formation. Labs significant for WBC count of 24k. Procalcitonin 1.64.       Hospital Course:  7/24: VSS. WBC 22.58 down from 24.04 in ED. Sed rate 100. .3. CXR stable. L Foot xray soft tissue swelling. MRI L foot no osteomylitis, no drainable collections. JULIUS L with mod-severe occlusive disease, R with mod. Continue ceftriaxone. HD today.  7/25: VSS. WBC 19.18. Vasc Surg consulted, L femoral AVG w/ possible vascular steal; will perform LLE angiogram on 7/28, if no arterial stenosis will plan for femoral graft ligation & permacath placement. Podiatry consulted, extensive stable left plantar arch necrosis, no acute surgical  intervention; will f/u after angiogram.  Wound cx pending. 1/4 Bcx +GPC in clusters. Continue ceftriaxone.   7/26: I&D and debridement with Podiatry today.  Continue Ceftriaxone.   7/27: Spiked fever to 101.1F, improved with tylenol. Repeated Blood cx x2. HD today. NPO at midnight.        Interval History: Spoke to pt while receiving dialysis. She reports some left foot pain that responds well to prn pain medications.  Plans for angiogram tomorrow with Vascular Surgery team.     Review of Systems   Constitutional: Negative for activity change, appetite change, chills and fever.   Respiratory: Negative for cough and shortness of breath.    Cardiovascular: Negative for chest pain and palpitations.   Gastrointestinal: Negative for abdominal pain, diarrhea, nausea and vomiting.   Genitourinary:        Does not make urine   Musculoskeletal: Negative for arthralgias, joint swelling (left foot) and myalgias (left foot).   Skin: Positive for wound (s/p debridement).   Neurological: Negative for dizziness, light-headedness and headaches.   All other systems reviewed and are negative.    Objective:     Vital Signs (Most Recent):  Temp: 99 °F (37.2 °C) (07/27/17 1500)  Pulse: 96 (07/27/17 1500)  Resp: 20 (07/27/17 1334)  BP: 136/66 (07/27/17 1334)  SpO2: (!) 92 % (07/27/17 1334) Vital Signs (24h Range):  Temp:  [98.8 °F (37.1 °C)-101.1 °F (38.4 °C)] 99 °F (37.2 °C)  Pulse:  [] 96  Resp:  [18-20] 20  SpO2:  [91 %-94 %] 92 %  BP: (100-136)/(45-66) 136/66     Weight: 63.5 kg (139 lb 15.9 oz)  Body mass index is 28.27 kg/m².    Intake/Output Summary (Last 24 hours) at 07/27/17 1628  Last data filed at 07/27/17 1400   Gross per 24 hour   Intake             1560 ml   Output             2600 ml   Net            -1040 ml      Physical Exam   Constitutional: She is oriented to person, place, and time. She appears well-developed and well-nourished.   HENT:   Head: Normocephalic and atraumatic.   Eyes: EOM are normal. Pupils are  equal, round, and reactive to light.   Cardiovascular: Normal rate and regular rhythm.    Pulmonary/Chest: Effort normal. No respiratory distress.   Abdominal: She exhibits no distension. There is no tenderness. A hernia (soft, non-tender) is present.   Musculoskeletal:   Left foot with loosely wrapped dressing, which is clean, dry, and intact.  No surrounding tenderness, edema, or erythema.    Neurological: She is alert and oriented to person, place, and time.   Skin: Skin is warm and dry.   Psychiatric: She has a normal mood and affect.       Significant Labs:   Blood Culture:   Recent Labs  Lab 07/26/17  0800   LABBLOO No Growth to date  No Growth to date  No Growth to date  No Growth to date     BMP:   Recent Labs  Lab 07/27/17  0433   GLU 75   *   K 5.4*      CO2 19*   BUN 40*   CREATININE 6.5*   CALCIUM 8.4*     CBC:   Recent Labs  Lab 07/26/17  0508 07/27/17  0433   WBC 18.58* 17.04*   HGB 7.8* 7.4*   HCT 24.7* 23.4*    376*     Wound Cx growing presumptive proteus species and gram negative marisol, lactose        Significant Imaging: No new imaging.     Assessment/Plan:      * Left foot infection    Presenting to the ED w/ new bleeding from L leg ulcer along with fevers, chills and new leukocytosis.   - Received vancomycin , flagyl and rocephin in ED. Fever resolved with Tylenol in ED. Dc'd vancomycin and flagyl on the floor.   - CXR no significant change from prior study (5/3/17) small bilateral effusions, R> L.  R effusion appears to be partially loculated but unchanged. Mild edema in the lungs, stable.  - US with doppler b/l LE negative for DVT  - L foot x-ray showed soft tissue swelling, no acute fx  - MRI LLE showed no osteomyelitis, no drainable collections.  - Blood cx 1/4 +gram positive cocci in clusters resembling staph   - Blood cx 1/4 +gram positive rods, likely contaminant   - Wound cx growing proteus and GNR, lactose   - WBC continuing downward trend  - Pt  spiked fever this afternoon to 101.1F, responded to tylenol    Plan  - Continuing rocephin at this time, may broaden coverage if fevers persist   - Repeat blood cx x2 given fever today.   - F/u on podiatry recs        Type 2 diabetes mellitus with left diabetic foot ulcer    As above           Peripheral vascular disease, unspecified     JULIUS showed  Right leg:The peripheral arteries are non-compressable. The PVR waveforms suggest moderate peripheral arterial occlusive disease. Left leg:The peripheral arteries are non-compressable.The PVR waveforms suggest moderate  to severe peripheral arterial occlusive disease. Vascular surgery consulted. Left femoral AVG with possible vascular steal    Plan   - Vasc Surg will perform LLE angiogram on 7/28, if no arterial stenosis identified will plan for femoral graft ligation and permacath placement.        Anemia of chronic renal failure    - Procrit w/ dialysis.   - Continue to monitor H/H        ESRD (end stage renal disease)    - Consulted nephrology.   Plan  - Dialysis inpatient  - Will monitor electrolytes        (HFpEF) heart failure with preserved ejection fraction    - BNP 2835 on admission   - Pt with ESRD on HD            Pleural effusion    - Seen on prior CXR (5/3/17). Unclear etiology. Stable.        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    - Resume Norvasc  - Diabetic diet inpatient  - SSI        Hypertension, renal    - Continue home amlodipine 10 mg QD        Late effect of ischemic cerebral stroke    - Continued ASA 81 mg.             VTE Risk Mitigation         Ordered     Medium Risk of VTE  Once      07/23/17 5410          Estrella Ramos MD  Department of Hospital Medicine   [facility]

## 2017-07-27 NOTE — PLAN OF CARE
Problem: Fall Risk (Adult)  Goal: Absence of Falls  Patient will demonstrate the desired outcomes by discharge/transition of care.   Outcome: Ongoing (interventions implemented as appropriate)  Absence of falls noted at present. Pt reminded not to get OOB. Verbalized understanding. SR up x's 3. Call bell within reach. Will cont. To monitor.

## 2017-07-27 NOTE — PLAN OF CARE
Problem: Patient Care Overview  Goal: Plan of Care Review  Outcome: Ongoing (interventions implemented as appropriate)  3 hour dialysis complete.  Blood rinsed back.  Needles pulled from left thigh graft.  Pressure held x 5 minutes.  Hemostasis achieved.  Covered with gauze and paper tape.  (-) thrill and (+) bruit.  Dr. Singh aware.  Net UF 2L.  Tolerated well.

## 2017-07-27 NOTE — PROGRESS NOTES
[facility]  Podiatry  Progress Note    Patient Name: Aleta Herrera  MRN: [id number]  Admission Date: 7/23/2017  Hospital Length of Stay: 3 days  Attending Physician: Malick Mahajan MD  Primary Care Provider: Radha Lao MD     Subjective:     Interval History: Pt seen with  at bedside. Pt  relates he has been frustrated with staff. No other pedal complaints at this time.     Follow-up For: Procedure(s) (LRB):  ANGIOGRAM-EXTREMITY-LOWER (Left)  TQJUGROL-[id number]-RC (Left)  INSERTION-CATHETER-HEMODIALYSIS (N/A)    Post-Operative Day:      Scheduled Meds:   amlodipine  10 mg Oral Daily    aspirin  81 mg Oral Daily    bacitracin        calcitRIOL  0.5 mcg Oral Daily    cefTRIAXone (ROCEPHIN) IVPB  1 g Intravenous Q24H    epoetin batsheva (PROCRIT) injection  100 Units/kg Intravenous Every Mon, Wed, Fri    heparin (porcine)  5,000 Units Subcutaneous Q12H    insulin detemir  5 Units Subcutaneous BID    pantoprazole  40 mg Oral Daily    sevelamer carbonate  2,400 mg Oral TID WM     Continuous Infusions:   PRN Meds:sodium chloride 0.9%, dextrose 50%, dextrose 50%, glucagon (human recombinant), glucose, glucose, heparin (porcine), insulin aspart, midazolam (PF), oxycodone, polyethylene glycol    Review of Systems   Skin: Positive for color change and wound.   All other systems reviewed and are negative.    Objective:     Vital Signs (Most Recent):  Temp: 99.3 °F (37.4 °C) (07/26/17 1600)  Pulse: 90 (07/26/17 1900)  Resp: 16 (07/26/17 1455)  BP: 128/60 (07/26/17 1600)  SpO2: 96 % (07/26/17 1600) Vital Signs (24h Range):  Temp:  [98.2 °F (36.8 °C)-99.3 °F (37.4 °C)] 99.3 °F (37.4 °C)  Pulse:  [] 90  Resp:  [16-18] 16  SpO2:  [90 %-100 %] 96 %  BP: (105-147)/(54-77) 128/60     Weight: 63.5 kg (139 lb 15.9 oz)  Body mass index is 28.27 kg/m².    Foot Exam    General  General Appearance: appears stated age and healthy   Orientation: alert and oriented to person,  place, and time   Affect: appropriate       Right Foot/Ankle     Inspection and Palpation  Ecchymosis: none  Tenderness: none   Swelling: none   Skin Exam: skin intact;     Neurovascular  Dorsalis pedis: absent  Posterior tibial: absent  Saphenous nerve sensation: diminished  Tibial nerve sensation: diminished  Superficial peroneal nerve sensation: diminished  Deep peroneal nerve sensation: diminished  Sural nerve sensation: diminished      Left Foot/Ankle      Inspection and Palpation  Ecchymosis: dorsum  Tenderness: calcaneus tenderness   Swelling: dorsum   Skin Exam: ulcer;     Neurovascular  Dorsalis pedis: absent  Posterior tibial: absent  Saphenous nerve sensation: diminished  Tibial nerve sensation: diminished  Superficial peroneal nerve sensation: diminished  Deep peroneal nerve sensation: diminished  Sural nerve sensation: diminished                    Laboratory:  Blood Cultures:     Recent Labs  Lab 07/26/17  0800   LABBLOO No Growth to date  No Growth to date     BMP:   Recent Labs  Lab 07/23/17 1946 07/26/17  0508   *  < > 70   *  < > 135*   K 3.7  < > 5.0   CL 98  < > 103   CO2 20*  < > 23   BUN 25*  < > 32*   CREATININE 4.7*  < > 5.2*   CALCIUM 8.8  < > 8.4*   MG 2.0  --   --    < > = values in this interval not displayed.  CBC:     Recent Labs  Lab 07/26/17  0508   WBC 18.58*   RBC 2.81*   HGB 7.8*   HCT 24.7*      MCV 88   MCH 27.8   MCHC 31.6*     CMP:   Recent Labs  Lab 07/24/17 0558 07/26/17  0508   *  < > 70   CALCIUM 8.2*  < > 8.4*   ALBUMIN 2.2*  --   --    PROT 7.6  --   --    *  < > 135*   K 3.3*  < > 5.0   CO2 23  < > 23   CL 98  < > 103   BUN 31*  < > 32*   CREATININE 5.0*  < > 5.2*   ALKPHOS 128  --   --    ALT 7*  --   --    AST 19  --   --    BILITOT 0.3  --   --    < > = values in this interval not displayed.  Coagulation:     Recent Labs  Lab 07/23/17 1946   INR 1.0   APTT 25.4     CRP:     Recent Labs  Lab 07/24/17 0558   .3*     ESR:      Recent Labs  Lab 07/24/17  0558   SEDRATE 100*     Prealbumin: No results for input(s): PREALBUMIN in the last 48 hours.  Wound Cultures:     Recent Labs  Lab 04/28/17 2037 07/25/17  1000   LABAERO No growth ESCHERICHIA COLIModerateSusceptibility pending  PROTEUS MIRABILISModerateSusceptibility pending       Diagnostic Results:  I have reviewed all pertinent imaging results/findings within the past 24 hours.    Clinical Findings:  Gangrenous changes noted to plantar arch of left foot with superficial blister noted, draining serosanguinous drainage. Mild erythema and edema noted to dorsum of foot and periwound ischemic changes noted. No acute SOI noted, stable for now.      Assessment/Plan:     Type 2 diabetes mellitus with left diabetic foot ulcer    -Wet gangrenous changes noted to plantar arch of left foot, surgical intervention planned at this time   -Plan for I&D at noon  -Long discussion with patient regarding the procedure in detail. Patient understands all risks, potential complications, and alternatives, including, but not limited to those listed on the consent form. All questions were answered. No guarantees given or implied as to outcome. Informed verbal and written consent was obtained. Consent forms read, signed, witnessed. Patient for surgery  -If gangrene progresses, pt may need a BKA, high risk for limb loss   -Continue current abx, tailor abx to culture results  -Wound painted with betadine and wrapped with a loose kerlix, secured with tape  -Podiatry to follow with dressing changes        ESRD (end stage renal disease)    Per primary        Peripheral vascular disease, unspecified    -Vascular sx following, appreciate recs  -Planning angiogram on Friday  -Podiatry will follow        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    Per primary            Marlyn Frank MD  Podiatry  [facility]

## 2017-07-27 NOTE — PROGRESS NOTES
Maintenance dialysis started to left thigh graft with 2-15 gauge needles.  Tolerated well.  Venous side of access without a thrill, but + bruit.  Arterial side with faint thrill and +bruit.  Good flows upon starting treatment.

## 2017-07-27 NOTE — PROGRESS NOTES
[facility]  Vascular Surgery  Progress Note    Patient Name: Aleta Herrera  MRN: [id number]  Admission Date: 7/23/2017  Primary Care Provider: Radha Lao MD    Subjective:     Interval History: VSS. Afebrile. No pain to left foot. S/p I&D by podiatry. No complaints this morning. Plan for HD today.     Post-Op Info:  Procedure(s) (LRB):  INCISION AND DRAINAGE (I&D), FOOT (Left)  DEBRIDEMENT-FOOT   1 Day Post-Op       Medications:  Continuous Infusions:   Scheduled Meds:   amlodipine  10 mg Oral Daily    aspirin  81 mg Oral Daily    calcitRIOL  0.5 mcg Oral Daily    cefTRIAXone (ROCEPHIN) IVPB  1 g Intravenous Q24H    epoetin batsheva (PROCRIT) injection  100 Units/kg Intravenous Every Mon, Wed, Fri    heparin (porcine)  5,000 Units Subcutaneous Q12H    insulin detemir  5 Units Subcutaneous BID    pantoprazole  40 mg Oral Daily    sevelamer carbonate  2,400 mg Oral TID WM     PRN Meds:sodium chloride 0.9%, dextrose 50%, dextrose 50%, glucagon (human recombinant), glucose, glucose, heparin (porcine), insulin aspart, midazolam (PF), oxycodone, polyethylene glycol     Objective:     Vital Signs (Most Recent):  Temp: 99.4 °F (37.4 °C) (07/27/17 0754)  Pulse: 89 (07/27/17 0754)  Resp: 18 (07/27/17 0754)  BP: (!) 121/59 (07/27/17 0754)  SpO2: (!) 94 % (07/27/17 0300) Vital Signs (24h Range):  Temp:  [98.6 °F (37 °C)-99.5 °F (37.5 °C)] 99.4 °F (37.4 °C)  Pulse:  [] 89  Resp:  [16-20] 18  SpO2:  [91 %-100 %] 94 %  BP: (105-147)/(52-77) 121/59          Physical Exam   Constitutional: She is oriented to person, place, and time. She appears well-developed and well-nourished.   HENT:   Head: Normocephalic and atraumatic.   Eyes: EOM are normal. Pupils are equal, round, and reactive to light.   Cardiovascular: Normal rate and regular rhythm.    Pulses:       Femoral pulses are 1+ on the right side, and 1+ on the left side.  Left DP monophasic; no PT  Right DP biphasic; monophasic  PT  Thrill in L femoral AVG   Pulmonary/Chest: Effort normal. No respiratory distress.   Abdominal: She exhibits no distension. There is no tenderness.   Musculoskeletal:   No evidence of infection of left femoral AVG;  No erythema, no fluctuance    Left plantar foot from metatarsal heads to heel with area of compromised ischemic tissue. Epidermis removed, incision to calcaneus, necrotic tissue extending to bone. nontender, no evidence of ascending infection, no crepitus. See pictures in media.   Neurological: She is alert and oriented to person, place, and time.   Skin: Skin is warm and dry.   Psychiatric: She has a normal mood and affect.     Significant Labs:  CBC:   Recent Labs  Lab 07/27/17 0433   WBC 17.04*   RBC 2.67*   HGB 7.4*   HCT 23.4*   *   MCV 88   MCH 27.7   MCHC 31.6*     CMP:   Recent Labs  Lab 07/27/17 0433   GLU 75   CALCIUM 8.4*   *   K 5.4*   CO2 19*      BUN 40*   CREATININE 6.5*     Coagulation: No results for input(s): LABPROT, INR, APTT in the last 48 hours.    Significant Diagnostics:  Microbiology Results (last 7 days)     Procedure Component Value Units Date/Time    Culture, Anaerobe [[id number]] Collected:  07/26/17 1414    Order Status:  Completed Specimen:  Wound from Foot, Left Updated:  07/27/17 0742     Anaerobic Culture Culture in progress    Narrative:       1.  Left heel    Gram stain [[id number]] Collected:  07/26/17 1414    Order Status:  Completed Specimen:  Wound from Foot, Left Updated:  07/26/17 1809     Gram Stain Result Rare WBC's      Few Gram negative rods      Rare Gram positive cocci    Narrative:       1.  Left heel    Blood culture [[id number]] Collected:  07/26/17 0800    Order Status:  Completed Specimen:  Blood Updated:  07/26/17 1715     Blood Culture, Routine No Growth to date    Blood culture [[id number]] Collected:  07/26/17 0800    Order Status:  Completed Specimen:  Blood Updated:  07/26/17 1715     Blood Culture, Routine No Growth to date     AFB Culture & Smear [[id number]] Collected:  07/26/17 1414    Order Status:  Sent Specimen:  Wound from Foot, Left Updated:  07/26/17 1540    Fungus culture [[id number]] Collected:  07/26/17 1414    Order Status:  Sent Specimen:  Wound from Foot, Left Updated:  07/26/17 1538    Aerobic culture [[id number]] Collected:  07/26/17 1414    Order Status:  Sent Specimen:  Wound from Foot, Left Updated:  07/26/17 1537    Aerobic culture [[id number]] Collected:  07/25/17 1000    Order Status:  Completed Specimen:  Wound from Foot, Left Updated:  07/26/17 1236     Aerobic Bacterial Culture --     ESCHERICHIA COLI  Moderate  Susceptibility pending       Aerobic Bacterial Culture --     PROTEUS MIRABILIS  Moderate  Susceptibility pending      Blood culture x two cultures. Draw prior to antibiotics. [[id number]] Collected:  07/23/17 1947    Order Status:  Completed Specimen:  Blood from Peripheral, Hand, Right Updated:  07/26/17 0954     Blood Culture, Routine Gram stain aer bottle: Gram positive cocci in clusters resembling Staph     Blood Culture, Routine Results called to and read back by:Andres Echeverria RN 07/25/2017  08:11     Blood Culture, Routine --     COAGULASE-NEGATIVE STAPHYLOCOCCUS SPECIES  Organism is a probable contaminant      Narrative:       Aerobic and anaerobic    Culture, Anaerobe [[id number]] Collected:  07/25/17 1000    Order Status:  Completed Specimen:  Wound from Foot, Left Updated:  07/26/17 0744     Anaerobic Culture Culture in progress    Blood culture x two cultures. Draw prior to antibiotics. [[id number]] Collected:  07/23/17 1947    Order Status:  Completed Specimen:  Blood from Peripheral, Wrist, Right Updated:  07/26/17 0613     Blood Culture, Routine Gram stain aer bottle: Gram positive rods      Blood Culture, Routine Results called to and read back by: Candy La RN  07/26/2017       Blood Culture, Routine 06:13    Narrative:       Aerobic and anaerobic            Assessment/Plan:     Type  2 diabetes mellitus with left diabetic foot ulcer    61 yo F with h/o HTN, HLD, DM2 (Hgb A1c 6.3),HFpEF (EF 60%), COPD (home O2, 2.5 L), ESRD on HD (MWF) via L femoral AVG presenting to hospital with left foot bleeding.      See below        * Left foot infection    61 yo F with h/o HTN, HLD, DM2 (Hgb A1c 6.3),HFpEF (EF 60%), COPD (home O2, 2.5 L), ESRD on HD (MWF) via L femoral AVG presenting to hospital with left foot ischemic ulcer, wet gangrene s/p I&D by podiatry 07/26    Large plantar wound to bone on left foot. Podiatry following for wound care and s/p I&D  Recent blood cx NGTD. F/u wound cultures. Currently on Rocephin. abx per primary  Wbc decreasing. Afebrile.   Anemia present. Please transfuse for hb <7   Left femoral AVG with possible vascular steal; will perform LLE angiogram tomorrow. NPO at midnight. if no arterial stenosis identified will plan for femoral graft ligation and permacath placement. Consent in chart.   Plan for HD through left femoral AVG today.   Please call with questions or concerns; vascular surgery to follow            Jonathon Hernandez MD  Vascular Surgery  [facility]

## 2017-07-27 NOTE — ANESTHESIA PREPROCEDURE EVALUATION
Pre-operative evaluation for Procedure(s) (LRB):  ANGIOGRAM-EXTREMITY-LOWER (Left)  AYBYIQCI-[id number]-UH (Left)  INSERTION-CATHETER-HEMODIALYSIS (N/A)    Aleta Herrera is a 62 y.o. female PMH ESRD on HD, HRpEF, CAD, HTN, DM2 with associated retinopathy and neuropathy, pleural effusion on home O2, and PVD who presented with sepsis due to left foot infection (cultures growing staph) 2/2 ulcer s/p I/D in the OR 7/26/17. She presents for the above procedure.    LDA: Right hand 22G PIV; Right femoral HD catheter    Prev airway: Placement Date: 04/27/17; Placement Time: 1240; Method of Intubation: Direct laryngoscopy; Inserted by: CRNA; Airway Device: Endotracheal Tube; Mask Ventilation: Easy - oral; Intubated: Postinduction; Blade: Ceja #2; Airway Device Size: 7.5; Style: Cuffed; Cuff Inflation: Minimal occlusive pressure; Inflation Amount: 6; Placement Verified By: Auscultation, Capnometry; Grade: Grade II; Complicating Factors: Poor neck/head extension; Intubation Findings: Positive EtCO2, Bilateral breath sounds, Atraumatic/Condition of teeth unchanged;  Depth of Insertion: 21; Securment: Lips; Complications: None; Breath Sounds: Equal Bilateral; Insertion Attempts: 1; Removal Date: 04/27/17;  Removal Time: 1524    Drips:   none    Patient Active Problem List   Diagnosis    ESRD 2/2 HTN on HD since 11/12/09    Anemia of chronic renal failure    Secondary hyperparathyroidism of renal origin    Hypertension, renal    DR (diabetic retinopathy)    Diabetic neuropathy    Thrombus due to any device, implant or graft    Aphasia due to TBI (traumatic brain injury), closed    Acute blood loss anemia    Hemodialysis status    Late effect of ischemic cerebral stroke    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    Acute gastrointestinal hemorrhage    DVT (deep venous thrombosis), left    Screen for colon cancer    Breast cancer screening, high risk patient    Bleeding due to dialysis catheter  placement    Anticoagulant long-term use    Cardiomyopathy    Pleural effusion on right    History of DVT (deep vein thrombosis)    Diverticulosis of large intestine with hemorrhage    Malfunction of peripheral inserted central catheter    Permanent central venous catheter in place    Leg pain, bilateral    Peripheral vascular disease, unspecified    CKD (chronic kidney disease)    Cardiac arrest    Pleural effusion, right    Numbness or tingling    ESRD (end stage renal disease)    Pleural effusion    (HFpEF) heart failure with preserved ejection fraction    Chronic respiratory failure with hypoxia    Type 2 diabetes mellitus with left diabetic foot ulcer    Left foot infection       Review of patient's allergies indicates:  No Known Allergies     No current facility-administered medications on file prior to encounter.      Current Outpatient Prescriptions on File Prior to Encounter   Medication Sig Dispense Refill    amlodipine (NORVASC) 10 MG tablet TAKE ONE TABLET BY MOUTH ONCE DAILY 90 tablet 0    aspirin 81 mg Tab Take 1 tablet by mouth once daily.       epoetin batsheva (PROCRIT) 10,000 unit/mL injection Inject 0.74 mLs (7,400 Units total) into the skin every Mon, Wed, Fri.      hydrocodone-acetaminophen 5-325mg (NORCO) 5-325 mg per tablet Take 1 tablet by mouth every 6 (six) hours as needed for Pain.       insulin aspart (NOVOLOG FLEXPEN) 100 unit/mL InPn as dir Insulin Pen Subcutaneous Before meals and at bedtime.  If blood sugar is 151-200 1 units SQif blood sugar is 201-250 2 units SQIf blood sugar is 251-300 3 units SQIf blood sugar is 301-350 4 units SQIf blood sugar is > or = 351 5 units SQand call MD;  Dispense with needles      insulin detemir (LEVEMIR FLEXPEN) 100 unit/mL (3 mL) SubQ InPn pen Inject 8 Units into the skin 2 (two) times daily. (Patient taking differently: Inject 4 Units into the skin every evening. )      inulin (FIBER CHOICE, INULIN,) 1.5 gram Chew Take 2  tablets by mouth 2 (two) times daily. Or as directed on product packaging.  11    lidocaine-prilocaine (EMLA) cream APPLY SMALL AMOUNT TO ACCESS SITE 1 TO 2 HOURS BEFORE TREATMENT. COVER AREA WITH AN OCCLUSIVE DRESSING.  3    lisinopril (PRINIVIL,ZESTRIL) 40 MG tablet Take 40 mg by mouth once daily.       metoprolol tartrate (LOPRESSOR) 100 MG tablet TAKE 1 TABLET BY MOUTH TWICE DAILY 180 tablet 3    oxycodone (ROXICODONE) 5 MG immediate release tablet Take 1-2 tablets (5-10 mg total) by mouth every 4 to 6 hours as needed for Pain. 31 tablet 0    polyethylene glycol (GLYCOLAX) 17 gram/dose powder Take 17 g by mouth once daily. May take up to 3 times per day as needed for constipation. 510 g 11    sevelamer carbonate (RENVELA) 800 mg Tab Take 2,400 mg by mouth 3 (three) times daily with meals.      calcitRIOL (ROCALTROL) 0.5 MCG Cap Take 1 capsule (0.5 mcg total) by mouth once daily. 30 capsule 1       Past Surgical History:   Procedure Laterality Date    AV FISTULA PLACEMENT      CATARACT SURGERY       SECTION, LOW TRANSVERSE      x 3    COLONOSCOPY N/A 2016    Procedure: COLONOSCOPY;  Surgeon: Roverto Patel MD;  Location: [facility] ([address] [place] [address]);  Service: Endoscopy;  Laterality: N/A;    EYE SURGERY      HYSTERECTOMY      Endometriosis    TONSILLECTOMY      VASCULAR SURGERY         Social History     Social History    Marital status:      Spouse name: N/A    Number of children: N/A    Years of education: N/A     Occupational History          Social History Main Topics    Smoking status: Former Smoker     Packs/day: 0.25     Years: 0.50     Types: Cigarettes     Quit date: 1975    Smokeless tobacco: Never Used      Comment: quit smoking cigarettes 40+ years ago    Alcohol use No    Drug use: No    Sexual activity: Yes     Other Topics Concern    Not on file     Social History Narrative    2015        The patient is  she lives with her ,  eldest son, middle son Christopher age 26 who accompanies her to this visit     and her youngest child who is her daughter.      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    She had been a  in [place] but retired in May 2009 when she required dialysis        This summer she had a stroke which affected her left side.  She had a dense left hemiparesis which quickly resolved.  Now she is able to walk a bit at home without a walker.  She is able to wash dishes, do the laundry and cook.  She sometimes goes grocery shopping.  She was recently placed on the renal transplant list.         Vital Signs Range (Last 24H):  Temp:  [37 °C (98.6 °F)-37.5 °C (99.5 °F)]   Pulse:  []   Resp:  [16-20]   BP: (105-147)/(52-77)   SpO2:  [90 %-100 %]       CBC:   Recent Labs      17   0434  17   0508   WBC  19.18*  18.58*   RBC  2.75*  2.81*   HGB  7.6*  7.8*   HCT  24.5*  24.7*   PLT  354*  350   MCV  89  88   MCH  27.6  27.8   MCHC  31.0*  31.6*       CMP:   Recent Labs      17   0434  17   0508   NA  134*  135*   K  4.2  5.0   CL  103  103   CO2  22*  23   BUN  21  32*   CREATININE  3.7*  5.2*   GLU  119*  70   PHOS   --   4.8*   CALCIUM  8.1*  8.4*       INR  No results for input(s): INR, PROTIME, APTT in the last 72 hours.    Invalid input(s): PT        Diagnostic Studies:  CXR 17 Stable cardiomegaly and small bilateral effusions, RIGHT larger the LEFT. RIGHT effusion appears to be partially loculated but unchanged. Mild edema in the lungs, stable.    EK17  Normal sinus rhythm  Possible Left atrial enlargement  Right superior axis deviation  Pulmonary disease pattern  Nonspecific ST and T wave abnormality  Abnormal ECG    2D Echo:  16  CONCLUSIONS     1 - Normal left ventricular systolic function (EF 60-65%).     2 -  Concentric hypertrophy.     3 - Biatrial enlargement.     4 - Left ventricular diastolic dysfunction.     5 - Normal right ventricular systolic function .     6 - Pulmonary hypertension. The estimated PA systolic pressure is 57 mmHg.     7 - Increased central venous pressure.       Anesthesia Evaluation    I have reviewed the Patient Summary Reports.    I have reviewed the Nursing Notes.   I have reviewed the Medications.     Review of Systems  Anesthesia Hx:  No problems with previous Anesthesia  History of prior surgery of interest to airway management or planning: Previous anesthesia: General Airway issues documented on chart review include mask, easy, easy direct laryngoscopy , view on direct laryngoscopy Grade II  Denies Family Hx of Anesthesia complications.   Denies Personal Hx of Anesthesia complications.   Hematology/Oncology:     Oncology Normal    -- Anemia:   EENT/Dental:EENT/Dental Normal   Cardiovascular:   Hypertension CAD   CHF ECG has been reviewed.    Pulmonary:   Shortness of breath Pulmonary effusions, on home O2   Renal/:   Chronic Renal Disease, ESRD, Dialysis, renal hypertension    Neurological:   CVA, no residual symptoms   Peripheral Neuropathy    Endocrine:   Diabetes, type 2        Physical Exam  General:  Well nourished    Airway/Jaw/Neck:  Airway Findings: Mouth Opening: Normal Tongue: Normal  General Airway Assessment: Adult, Good  Mallampati: III  Improves to II with phonation.  Jaw/Neck Findings:  Neck ROM: Normal ROM     Eyes/Ears/Nose:  Eyes/Ears/Nose Findings:    Dental:  Dental Findings: In tact   Chest/Lungs:  Chest/Lungs Clear    Heart/Vascular:  Heart Findings: Normal Heart murmur: negative    Abdomen:  Abdomen Findings: Normal    Musculoskeletal:  Musculoskeletal Findings: Normal   Skin:  Skin Findings: Normal    Mental Status:  Mental Status Findings:  Cooperative, Alert and Oriented         Anesthesia Plan  Type of Anesthesia, risks & benefits discussed:  Anesthesia Type:   MAC, general  Patient's Preference:   Intra-op Monitoring Plan: standard ASA monitors  Intra-op Monitoring Plan Comments:   Post Op Pain Control Plan: per primary service following discharge from PACU and IV/PO Opioids PRN  Post Op Pain Control Plan Comments:   Induction:   IV  Beta Blocker:  Patient is on a Beta-Blocker and has received one dose within the past 24 hours (No further documentation required).       Informed Consent: Patient understands risks and agrees with Anesthesia plan.  Questions answered. Anesthesia consent signed with patient.  ASA Score: 3     Day of Surgery Review of History & Physical:  There are no significant changes.          Ready For Surgery From Anesthesia Perspective.

## 2017-07-27 NOTE — ASSESSMENT & PLAN NOTE
Presenting to the ED w/ new bleeding from L leg ulcer along with fevers, chills and new leukocytosis.   - Received vancomycin , flagyl and rocephin in ED. Fever resolved with Tylenol in ED. Dc'd vancomycin and flagyl on the floor.   - CXR no significant change from prior study (5/3/17) small bilateral effusions, R> L.  R effusion appears to be partially loculated but unchanged. Mild edema in the lungs, stable.  - US with doppler b/l LE negative for DVT  - L foot x-ray showed soft tissue swelling, no acute fx  - MRI LLE showed no osteomyelitis, no drainable collections.  - Blood cx 1/4 +gram positive cocci in clusters resembling staph   - Blood cx 1/4 +gram positive rods, likely contaminant   - Wound cx growing proteus and GNR, lactose   - WBC continuing downward trend  - Pt spiked fever this afternoon to 101.1F, responded to tylenol    Plan  - Continuing rocephin at this time, may broaden coverage if fevers persist   - Repeat blood cx x2 given fever today.   - F/u on podiatry recs

## 2017-07-27 NOTE — SUBJECTIVE & OBJECTIVE
Interval History: Spoke to pt while receiving dialysis. She reports some left foot pain that responds well to prn pain medications.  Plans for angiogram tomorrow with Vascular Surgery team.     Review of Systems   Constitutional: Negative for activity change, appetite change, chills and fever.   Respiratory: Negative for cough and shortness of breath.    Cardiovascular: Negative for chest pain and palpitations.   Gastrointestinal: Negative for abdominal pain, diarrhea, nausea and vomiting.   Genitourinary:        Does not make urine   Musculoskeletal: Negative for arthralgias, joint swelling (left foot) and myalgias (left foot).   Skin: Positive for wound (s/p debridement).   Neurological: Negative for dizziness, light-headedness and headaches.   All other systems reviewed and are negative.    Objective:     Vital Signs (Most Recent):  Temp: 99 °F (37.2 °C) (07/27/17 1500)  Pulse: 96 (07/27/17 1500)  Resp: 20 (07/27/17 1334)  BP: 136/66 (07/27/17 1334)  SpO2: (!) 92 % (07/27/17 1334) Vital Signs (24h Range):  Temp:  [98.8 °F (37.1 °C)-101.1 °F (38.4 °C)] 99 °F (37.2 °C)  Pulse:  [] 96  Resp:  [18-20] 20  SpO2:  [91 %-94 %] 92 %  BP: (100-136)/(45-66) 136/66     Weight: 63.5 kg (139 lb 15.9 oz)  Body mass index is 28.27 kg/m².    Intake/Output Summary (Last 24 hours) at 07/27/17 1628  Last data filed at 07/27/17 1400   Gross per 24 hour   Intake             1560 ml   Output             2600 ml   Net            -1040 ml      Physical Exam   Constitutional: She is oriented to person, place, and time. She appears well-developed and well-nourished.   HENT:   Head: Normocephalic and atraumatic.   Eyes: EOM are normal. Pupils are equal, round, and reactive to light.   Cardiovascular: Normal rate and regular rhythm.    Pulmonary/Chest: Effort normal. No respiratory distress.   Abdominal: She exhibits no distension. There is no tenderness. A hernia (soft, non-tender) is present.   Musculoskeletal:   Left foot with  loosely wrapped dressing, which is clean, dry, and intact.  No surrounding tenderness, edema, or erythema.    Neurological: She is alert and oriented to person, place, and time.   Skin: Skin is warm and dry.   Psychiatric: She has a normal mood and affect.       Significant Labs:   Blood Culture:   Recent Labs  Lab 07/26/17  0800   LABBLOO No Growth to date  No Growth to date  No Growth to date  No Growth to date     BMP:   Recent Labs  Lab 07/27/17  0433   GLU 75   *   K 5.4*      CO2 19*   BUN 40*   CREATININE 6.5*   CALCIUM 8.4*     CBC:   Recent Labs  Lab 07/26/17  0508 07/27/17  0433   WBC 18.58* 17.04*   HGB 7.8* 7.4*   HCT 24.7* 23.4*    376*     Wound Cx growing presumptive proteus species and gram negative marisol, lactose        Significant Imaging: No new imaging.

## 2017-07-27 NOTE — PROGRESS NOTES
[facility] H[facility]ODIAL[facility] NOTE    Aleta Herrera is a 62 y.o. female currently on hemodialysis for removal of uremic toxins and volume.    Pt was seen during hemodialysis today, dialysis flowsheet, labs, vitals were reviewed and d/w staff.      Labs have been reviewed and the dialysate bath has been adjusted.    There are no symptoms of hypotension, chest pain, dyspnea, nausea or vomiting.    Exam:  Vitals noted  Alert and oriented x 3  Respiration regular  Lungs no crackles  CV S1S2+    Labs:        Recent Labs  Lab 07/23/17  1946  07/25/17  0434 07/26/17  0508 07/27/17  0433   *  < > 134* 135* 134*   K 3.7  < > 4.2 5.0 5.4*   CL 98  < > 103 103 102   CO2 20*  < > 22* 23 19*   BUN 25*  < > 21 32* 40*   CREATININE 4.7*  < > 3.7* 5.2* 6.5*   CALCIUM 8.8  < > 8.1* 8.4* 8.4*   PHOS 4.4  --   --  4.8*  --    < > = values in this interval not displayed.      Recent Labs  Lab 07/25/17  0434 07/26/17  0508 07/27/17  0433   WBC 19.18* 18.58* 17.04*   HGB 7.6* 7.8* 7.4*   HCT 24.5* 24.7* 23.4*   * 350 376*       Assessment/Plan:    ESRD  Metabolic acidosis  HD today for removal of uremic toxins and volume.  Dialysate bath adjusted per labs    Hyperkalemia  Should be cleared with HD with lower K bath  Pt to follow strict low K diet    CKD MBD  Continue phos binders with meals, continue low phos diet  Continue calcitriol    Anemia of chronic illness and anemia of ESRD  Slow worsening could be due to her ongoing infection  Continue Epogen with HD for now

## 2017-07-27 NOTE — SUBJECTIVE & OBJECTIVE
Subjective:     Interval History: Pt seen with  at bedside. Pt  relates he has been frustrated with staff. No other pedal complaints at this time.     Follow-up For: Procedure(s) (LRB):  ANGIOGRAM-EXTREMITY-LOWER (Left)  [id number] (Left)  INSERTION-CATHETER-HEMODIALYSIS (N/A)    Post-Operative Day:      Scheduled Meds:   amlodipine  10 mg Oral Daily    aspirin  81 mg Oral Daily    bacitracin        calcitRIOL  0.5 mcg Oral Daily    cefTRIAXone (ROCEPHIN) IVPB  1 g Intravenous Q24H    epoetin batsheva (PROCRIT) injection  100 Units/kg Intravenous Every Mon, Wed, Fri    heparin (porcine)  5,000 Units Subcutaneous Q12H    insulin detemir  5 Units Subcutaneous BID    pantoprazole  40 mg Oral Daily    sevelamer carbonate  2,400 mg Oral TID WM     Continuous Infusions:   PRN Meds:sodium chloride 0.9%, dextrose 50%, dextrose 50%, glucagon (human recombinant), glucose, glucose, heparin (porcine), insulin aspart, midazolam (PF), oxycodone, polyethylene glycol    Review of Systems   Skin: Positive for color change and wound.   All other systems reviewed and are negative.    Objective:     Vital Signs (Most Recent):  Temp: 99.3 °F (37.4 °C) (07/26/17 1600)  Pulse: 90 (07/26/17 1900)  Resp: 16 (07/26/17 1455)  BP: 128/60 (07/26/17 1600)  SpO2: 96 % (07/26/17 1600) Vital Signs (24h Range):  Temp:  [98.2 °F (36.8 °C)-99.3 °F (37.4 °C)] 99.3 °F (37.4 °C)  Pulse:  [] 90  Resp:  [16-18] 16  SpO2:  [90 %-100 %] 96 %  BP: (105-147)/(54-77) 128/60     Weight: 63.5 kg (139 lb 15.9 oz)  Body mass index is 28.27 kg/m².    Foot Exam    General  General Appearance: appears stated age and healthy   Orientation: alert and oriented to person, place, and time   Affect: appropriate       Right Foot/Ankle     Inspection and Palpation  Ecchymosis: none  Tenderness: none   Swelling: none   Skin Exam: skin intact;     Neurovascular  Dorsalis pedis: absent  Posterior tibial: absent  Saphenous nerve sensation:  diminished  Tibial nerve sensation: diminished  Superficial peroneal nerve sensation: diminished  Deep peroneal nerve sensation: diminished  Sural nerve sensation: diminished      Left Foot/Ankle      Inspection and Palpation  Ecchymosis: dorsum  Tenderness: calcaneus tenderness   Swelling: dorsum   Skin Exam: ulcer;     Neurovascular  Dorsalis pedis: absent  Posterior tibial: absent  Saphenous nerve sensation: diminished  Tibial nerve sensation: diminished  Superficial peroneal nerve sensation: diminished  Deep peroneal nerve sensation: diminished  Sural nerve sensation: diminished                    Laboratory:  Blood Cultures:     Recent Labs  Lab 07/26/17  0800   LABBLOO No Growth to date  No Growth to date     BMP:   Recent Labs  Lab 07/23/17 1946 07/26/17  0508   *  < > 70   *  < > 135*   K 3.7  < > 5.0   CL 98  < > 103   CO2 20*  < > 23   BUN 25*  < > 32*   CREATININE 4.7*  < > 5.2*   CALCIUM 8.8  < > 8.4*   MG 2.0  --   --    < > = values in this interval not displayed.  CBC:     Recent Labs  Lab 07/26/17  0508   WBC 18.58*   RBC 2.81*   HGB 7.8*   HCT 24.7*      MCV 88   MCH 27.8   MCHC 31.6*     CMP:   Recent Labs  Lab 07/24/17  0558  07/26/17  0508   *  < > 70   CALCIUM 8.2*  < > 8.4*   ALBUMIN 2.2*  --   --    PROT 7.6  --   --    *  < > 135*   K 3.3*  < > 5.0   CO2 23  < > 23   CL 98  < > 103   BUN 31*  < > 32*   CREATININE 5.0*  < > 5.2*   ALKPHOS 128  --   --    ALT 7*  --   --    AST 19  --   --    BILITOT 0.3  --   --    < > = values in this interval not displayed.  Coagulation:     Recent Labs  Lab 07/23/17 1946   INR 1.0   APTT 25.4     CRP:     Recent Labs  Lab 07/24/17 0558   .3*     ESR:     Recent Labs  Lab 07/24/17 0558   SEDRATE 100*     Prealbumin: No results for input(s): PREALBUMIN in the last 48 hours.  Wound Cultures:     Recent Labs  Lab 04/28/17 2037 07/25/17  1000   LABAERO No growth ESCHERICHIA COLIModerateSusceptibility pending   PROTEUS MIRABILISModerateSusceptibility pending       Diagnostic Results:  I have reviewed all pertinent imaging results/findings within the past 24 hours.    Clinical Findings:  Gangrenous changes noted to plantar arch of left foot with superficial blister noted, draining serosanguinous drainage. Mild erythema and edema noted to dorsum of foot and periwound ischemic changes noted. No acute SOI noted, stable for now.

## 2017-07-27 NOTE — PROGRESS NOTES
Received report from Hanna GIL in Dialysis.  Patient was dialyzed for 3 hours.  2Liters were taken off.  She tolerated well.  Vital signs 99.2 temperature, 108/56, Blood glucose 100, pulse 100.

## 2017-07-27 NOTE — ASSESSMENT & PLAN NOTE
63 yo F with h/o HTN, HLD, DM2 (Hgb A1c 6.3),HFpEF (EF 60%), COPD (home O2, 2.5 L), ESRD on HD (MWF) via L femoral AVG presenting to hospital with left foot ischemic ulcer, wet gangrene s/p I&D by podiatry 07/26    Large plantar wound to bone on left foot. Podiatry following for wound care and s/p I&D  Recent blood cx NGTD. F/u wound cultures. Currently on Rocephin. abx per primary  Wbc decreasing. Afebrile.   Anemia present. Please transfuse for hb <7   Left femoral AVG with possible vascular steal; will perform LLE angiogram tomorrow. NPO at midnight. if no arterial stenosis identified will plan for femoral graft ligation and permacath placement. Consent in chart.   Plan for HD through left femoral AVG today.   Please call with questions or concerns; vascular surgery to follow

## 2017-07-27 NOTE — HOSPITAL COURSE
7/26: Podiatry performed I&D of Left foot, necrotic tissue tracking to bone. Extreme malodor was noted with grayish/clear fluid. Left open - packed.   7/28: LLE angiogram with extensive tibial disease  7/31/17 L AKA

## 2017-07-27 NOTE — OP NOTE
Operative Note       Surgery Date: 7/26/2017     Surgeon(s) and Role:     * Margaret Huertas DPM - Primary     * Marlyn Frank MD - Resident - Assisting    Pre-op Diagnosis:  PVD (peripheral vascular disease) [I73.9]  Type 2 diabetes mellitus with left diabetic foot ulcer [E11.621, L97.529]  Left foot infection [L08.9]  Sepsis, due to unspecified organism [A41.9]  Pain and swelling of toe of left foot [M79.675, M79.89]    Post-op Diagnosis: Post-Op Diagnosis Codes:     * PVD (peripheral vascular disease) [I73.9]     * Type 2 diabetes mellitus with left diabetic foot ulcer [E11.621, L97.529]     * Left foot infection [L08.9]     * Sepsis, due to unspecified organism [A41.9]     * Pain and swelling of toe of left foot [M79.675, M79.89]    Procedure(s) (LRB):  INCISION AND DRAINAGE (I&D), FOOT (Left)  DEBRIDEMENT-FOOT    Anesthesia: Regional    Procedure in Detail/Findings:  The patient was brought to the operating room on a stretcher and remained there for the duration of the procedure. Following the successful induction of MAC anesthesia, and a regional block per anesthesia, the LEFT foot was scrubbed, prepped and draped in the usual aseptic manner. A marking pen was utilized to create a incision guide in a linear fashion at the left heel over the area of fluctuance. A time out was performed.    Using a sterile #15 blade, a horizontal incision was created on the plantar lateral calcaneous of the left foot over the fluctuant area of the heel.  This was deepened to subcutaneous tissue and deepened to bone. Extreme malodor was noted with grayish/clear fluid with bubbles noted.  No bleeding was noted. The tissue appeared necrotic and tracking was noted distally by 7 cm in the plantar fascial layer. Cultures were obtained.  The wound was then copiously flushed with 3 L of sterile saline with the Pulsavac.   The wound was left open due the infection.  It was measured to be 4.5cm x 3.0cm x 1.0cm.  It was packed  with betadine soaked packing strips and dressed with 4x4, kerlix, ABD pad, and loose ACE.      The patient tolerated the procedure and anesthesia well. She was transferred to the recovery room with vital signs stable.   She will continue to be monitored and followed while inpatient.     Estimated Blood Loss: No bleeding noted.     Specimens     Start     Ordered    07/26/17 1415  Specimen to Pathology - Surgery  Once      07/26/17 1414        Implants: * No implants in log *           Disposition: PACU - hemodynamically stable.           Condition: Good    Attestation:  I was present and scrubbed for the entire procedure.  Margaret Huertas DPM

## 2017-07-28 ENCOUNTER — ANESTHESIA EVENT (OUTPATIENT)
Dept: SURGERY | Facility: HOSPITAL | Age: 62
DRG: 853 | End: 2017-07-28
Payer: MEDICARE

## 2017-07-28 ENCOUNTER — ANESTHESIA (OUTPATIENT)
Dept: SURGERY | Facility: HOSPITAL | Age: 62
DRG: 853 | End: 2017-07-28
Payer: MEDICARE

## 2017-07-28 LAB
ANION GAP SERPL CALC-SCNC: 10 MMOL/L
BACTERIA BLD CULT: NORMAL
BACTERIA SPEC AEROBE CULT: NORMAL
BACTERIA SPEC AEROBE CULT: NORMAL
BASOPHILS # BLD AUTO: 0.02 K/UL
BASOPHILS NFR BLD: 0.1 %
BUN SERPL-MCNC: 26 MG/DL
CALCIUM SERPL-MCNC: 8.5 MG/DL
CHLORIDE SERPL-SCNC: 104 MMOL/L
CO2 SERPL-SCNC: 22 MMOL/L
CREAT SERPL-MCNC: 4.6 MG/DL
DIFFERENTIAL METHOD: ABNORMAL
EOSINOPHIL # BLD AUTO: 0.1 K/UL
EOSINOPHIL NFR BLD: 0.6 %
ERYTHROCYTE [DISTWIDTH] IN BLOOD BY AUTOMATED COUNT: 18.7 %
EST. GFR  (AFRICAN AMERICAN): 11.1 ML/MIN/1.73 M^2
EST. GFR  (NON AFRICAN AMERICAN): 9.6 ML/MIN/1.73 M^2
GLUCOSE SERPL-MCNC: 84 MG/DL
HCT VFR BLD AUTO: 25.3 %
HGB BLD-MCNC: 8 G/DL
LYMPHOCYTES # BLD AUTO: 0.8 K/UL
LYMPHOCYTES NFR BLD: 3.7 %
MCH RBC QN AUTO: 27.7 PG
MCHC RBC AUTO-ENTMCNC: 31.6 G/DL
MCV RBC AUTO: 88 FL
MONOCYTES # BLD AUTO: 1.2 K/UL
MONOCYTES NFR BLD: 5.8 %
NEUTROPHILS # BLD AUTO: 18.1 K/UL
NEUTROPHILS NFR BLD: 89.8 %
PLATELET # BLD AUTO: 369 K/UL
PMV BLD AUTO: 8.9 FL
POCT GLUCOSE: 104 MG/DL (ref 70–110)
POCT GLUCOSE: 109 MG/DL (ref 70–110)
POCT GLUCOSE: 66 MG/DL (ref 70–110)
POCT GLUCOSE: 67 MG/DL (ref 70–110)
POCT GLUCOSE: 68 MG/DL (ref 70–110)
POCT GLUCOSE: 88 MG/DL (ref 70–110)
POCT GLUCOSE: 90 MG/DL (ref 70–110)
POCT GLUCOSE: 93 MG/DL (ref 70–110)
POTASSIUM SERPL-SCNC: 4.1 MMOL/L
RBC # BLD AUTO: 2.89 M/UL
SODIUM SERPL-SCNC: 136 MMOL/L
WBC # BLD AUTO: 20.28 K/UL

## 2017-07-28 PROCEDURE — 80048 BASIC METABOLIC PNL TOTAL CA: CPT

## 2017-07-28 PROCEDURE — 37000008 HC ANESTHESIA 1ST 15 MINUTES: Performed by: SURGERY

## 2017-07-28 PROCEDURE — C1760 CLOSURE DEV, VASC: HCPCS | Performed by: SURGERY

## 2017-07-28 PROCEDURE — 75710 ARTERY X-RAYS ARM/LEG: CPT | Mod: 26,,, | Performed by: SURGERY

## 2017-07-28 PROCEDURE — 27201423 OPTIME MED/SURG SUP & DEVICES STERILE SUPPLY: Performed by: SURGERY

## 2017-07-28 PROCEDURE — 25000003 PHARM REV CODE 250: Performed by: INTERNAL MEDICINE

## 2017-07-28 PROCEDURE — 85025 COMPLETE CBC W/AUTO DIFF WBC: CPT

## 2017-07-28 PROCEDURE — 36000707: Performed by: SURGERY

## 2017-07-28 PROCEDURE — 82962 GLUCOSE BLOOD TEST: CPT | Performed by: SURGERY

## 2017-07-28 PROCEDURE — C1769 GUIDE WIRE: HCPCS | Performed by: SURGERY

## 2017-07-28 PROCEDURE — 25000003 PHARM REV CODE 250: Performed by: NURSE ANESTHETIST, CERTIFIED REGISTERED

## 2017-07-28 PROCEDURE — B41G1ZZ FLUOROSCOPY OF LEFT LOWER EXTREMITY ARTERIES USING LOW OSMOLAR CONTRAST: ICD-10-PCS | Performed by: SURGERY

## 2017-07-28 PROCEDURE — B4101ZZ FLUOROSCOPY OF ABDOMINAL AORTA USING LOW OSMOLAR CONTRAST: ICD-10-PCS | Performed by: SURGERY

## 2017-07-28 PROCEDURE — 11000001 HC ACUTE MED/SURG PRIVATE ROOM

## 2017-07-28 PROCEDURE — D9220A PRA ANESTHESIA: Mod: CRNA,,, | Performed by: NURSE ANESTHETIST, CERTIFIED REGISTERED

## 2017-07-28 PROCEDURE — 71000015 HC POSTOP RECOV 1ST HR: Performed by: SURGERY

## 2017-07-28 PROCEDURE — 99232 SBSQ HOSP IP/OBS MODERATE 35: CPT | Mod: GC,,, | Performed by: HOSPITALIST

## 2017-07-28 PROCEDURE — C1887 CATHETER, GUIDING: HCPCS | Performed by: SURGERY

## 2017-07-28 PROCEDURE — 71000044 HC DOSC ROUTINE RECOVERY FIRST HOUR: Performed by: SURGERY

## 2017-07-28 PROCEDURE — C1894 INTRO/SHEATH, NON-LASER: HCPCS | Performed by: SURGERY

## 2017-07-28 PROCEDURE — 36000706: Performed by: SURGERY

## 2017-07-28 PROCEDURE — 37000009 HC ANESTHESIA EA ADD 15 MINS: Performed by: SURGERY

## 2017-07-28 PROCEDURE — 36415 COLL VENOUS BLD VENIPUNCTURE: CPT

## 2017-07-28 PROCEDURE — 25000003 PHARM REV CODE 250: Performed by: SURGERY

## 2017-07-28 PROCEDURE — 25000003 PHARM REV CODE 250: Performed by: STUDENT IN AN ORGANIZED HEALTH CARE EDUCATION/TRAINING PROGRAM

## 2017-07-28 PROCEDURE — 63600175 PHARM REV CODE 636 W HCPCS: Performed by: INTERNAL MEDICINE

## 2017-07-28 PROCEDURE — 36246 INS CATH ABD/L-EXT ART 2ND: CPT | Mod: 79,,, | Performed by: SURGERY

## 2017-07-28 PROCEDURE — 63600175 PHARM REV CODE 636 W HCPCS: Performed by: SURGERY

## 2017-07-28 PROCEDURE — D9220A PRA ANESTHESIA: Mod: ANES,,, | Performed by: ANESTHESIOLOGY

## 2017-07-28 PROCEDURE — 71000016 HC POSTOP RECOV ADDL HR: Performed by: SURGERY

## 2017-07-28 PROCEDURE — 25500020 PHARM REV CODE 255: Performed by: SURGERY

## 2017-07-28 PROCEDURE — 63600175 PHARM REV CODE 636 W HCPCS: Performed by: NURSE ANESTHETIST, CERTIFIED REGISTERED

## 2017-07-28 RX ORDER — MIDAZOLAM HYDROCHLORIDE 1 MG/ML
INJECTION, SOLUTION INTRAMUSCULAR; INTRAVENOUS
Status: DISCONTINUED | OUTPATIENT
Start: 2017-07-28 | End: 2017-07-28

## 2017-07-28 RX ORDER — LIDOCAINE HYDROCHLORIDE 10 MG/ML
INJECTION, SOLUTION EPIDURAL; INFILTRATION; INTRACAUDAL; PERINEURAL
Status: DISCONTINUED | OUTPATIENT
Start: 2017-07-28 | End: 2017-07-28 | Stop reason: HOSPADM

## 2017-07-28 RX ORDER — PROPOFOL 10 MG/ML
VIAL (ML) INTRAVENOUS CONTINUOUS PRN
Status: DISCONTINUED | OUTPATIENT
Start: 2017-07-28 | End: 2017-07-28

## 2017-07-28 RX ORDER — SODIUM CHLORIDE 9 MG/ML
INJECTION, SOLUTION INTRAVENOUS
Status: DISCONTINUED | OUTPATIENT
Start: 2017-07-28 | End: 2017-08-07 | Stop reason: HOSPADM

## 2017-07-28 RX ORDER — HEPARIN 100 UNIT/ML
SYRINGE INTRAVENOUS
Status: DISCONTINUED | OUTPATIENT
Start: 2017-07-28 | End: 2017-07-28 | Stop reason: HOSPADM

## 2017-07-28 RX ORDER — SODIUM CHLORIDE 9 MG/ML
INJECTION, SOLUTION INTRAVENOUS CONTINUOUS PRN
Status: DISCONTINUED | OUTPATIENT
Start: 2017-07-28 | End: 2017-07-28

## 2017-07-28 RX ORDER — FENTANYL CITRATE 50 UG/ML
INJECTION, SOLUTION INTRAMUSCULAR; INTRAVENOUS
Status: DISCONTINUED | OUTPATIENT
Start: 2017-07-28 | End: 2017-07-28

## 2017-07-28 RX ORDER — HEPARIN SODIUM 1000 [USP'U]/ML
INJECTION, SOLUTION INTRAVENOUS; SUBCUTANEOUS
Status: DISCONTINUED | OUTPATIENT
Start: 2017-07-28 | End: 2017-07-28

## 2017-07-28 RX ORDER — SODIUM CHLORIDE 9 MG/ML
INJECTION, SOLUTION INTRAVENOUS ONCE
Status: COMPLETED | OUTPATIENT
Start: 2017-07-28 | End: 2017-07-29

## 2017-07-28 RX ORDER — LIDOCAINE HCL/PF 100 MG/5ML
SYRINGE (ML) INTRAVENOUS
Status: DISCONTINUED | OUTPATIENT
Start: 2017-07-28 | End: 2017-07-28

## 2017-07-28 RX ORDER — IODIXANOL 320 MG/ML
INJECTION, SOLUTION INTRAVASCULAR
Status: DISCONTINUED | OUTPATIENT
Start: 2017-07-28 | End: 2017-07-28 | Stop reason: HOSPADM

## 2017-07-28 RX ADMIN — OXYCODONE HYDROCHLORIDE 5 MG: 5 TABLET ORAL at 07:07

## 2017-07-28 RX ADMIN — PROPOFOL 50 MCG/KG/MIN: 10 INJECTION, EMULSION INTRAVENOUS at 11:07

## 2017-07-28 RX ADMIN — OXYCODONE HYDROCHLORIDE 5 MG: 5 TABLET ORAL at 03:07

## 2017-07-28 RX ADMIN — FENTANYL CITRATE 25 MCG: 50 INJECTION, SOLUTION INTRAMUSCULAR; INTRAVENOUS at 11:07

## 2017-07-28 RX ADMIN — MIDAZOLAM HYDROCHLORIDE 2 MG: 1 INJECTION, SOLUTION INTRAMUSCULAR; INTRAVENOUS at 11:07

## 2017-07-28 RX ADMIN — LIDOCAINE HYDROCHLORIDE 50 MG: 20 INJECTION, SOLUTION INTRAVENOUS at 11:07

## 2017-07-28 RX ADMIN — SEVELAMER CARBONATE 2400 MG: 800 TABLET, FILM COATED ORAL at 06:07

## 2017-07-28 RX ADMIN — ASPIRIN 81 MG CHEWABLE TABLET 81 MG: 81 TABLET CHEWABLE at 08:07

## 2017-07-28 RX ADMIN — SODIUM CHLORIDE: 0.9 INJECTION, SOLUTION INTRAVENOUS at 11:07

## 2017-07-28 RX ADMIN — OXYCODONE HYDROCHLORIDE 5 MG: 5 TABLET ORAL at 04:07

## 2017-07-28 RX ADMIN — HEPARIN SODIUM 5000 UNITS: 1000 INJECTION, SOLUTION INTRAVENOUS; SUBCUTANEOUS at 12:07

## 2017-07-28 RX ADMIN — CEFTRIAXONE 1 G: 1 INJECTION, SOLUTION INTRAVENOUS at 04:07

## 2017-07-28 RX ADMIN — CALCITRIOL 0.5 MCG: 0.25 CAPSULE, LIQUID FILLED ORAL at 08:07

## 2017-07-28 RX ADMIN — INSULIN DETEMIR 5 UNITS: 100 INJECTION, SOLUTION SUBCUTANEOUS at 08:07

## 2017-07-28 RX ADMIN — INSULIN DETEMIR 5 UNITS: 100 INJECTION, SOLUTION SUBCUTANEOUS at 09:07

## 2017-07-28 RX ADMIN — ACETAMINOPHEN 650 MG: 325 TABLET ORAL at 03:07

## 2017-07-28 RX ADMIN — AMLODIPINE BESYLATE 10 MG: 10 TABLET ORAL at 08:07

## 2017-07-28 RX ADMIN — PANTOPRAZOLE SODIUM 40 MG: 40 TABLET, DELAYED RELEASE ORAL at 08:07

## 2017-07-28 NOTE — PROGRESS NOTES
[facility]  Podiatry  Progress Note    Patient Name: Aleta Herrera  MRN: [id number]  Admission Date: 7/23/2017  Hospital Length of Stay: 5 days  Attending Physician: Malick Mahajan MD  Primary Care Provider: Radha Lao MD     Subjective:     Interval History: Pt seen with  at bedside. Just got back from Angiogram. No other pedal complaints at this time.     Follow-up For: Procedure(s) (LRB):  ANGIOGRAM-EXTREMITY-LOWER (Left)  VSLADZEH-YXTIDSA-QT (Left)  INSERTION-CATHETER-HEMODIALYSIS (N/A)    Post-Operative Day:      Scheduled Meds:   amlodipine  10 mg Oral Daily    aspirin  81 mg Oral Daily    calcitRIOL  0.5 mcg Oral Daily    cefTRIAXone (ROCEPHIN) IVPB  1 g Intravenous Q24H    epoetin batsheva (PROCRIT) injection  100 Units/kg Intravenous Every Mon, Wed, Fri    insulin detemir  5 Units Subcutaneous BID    pantoprazole  40 mg Oral Daily    sevelamer carbonate  2,400 mg Oral TID WM     Continuous Infusions:   PRN Meds:sodium chloride 0.9%, acetaminophen, dextrose 50%, dextrose 50%, glucagon (human recombinant), glucose, glucose, heparin (porcine), insulin aspart, midazolam (PF), oxycodone, polyethylene glycol    Review of Systems   Skin: Positive for color change and wound.   All other systems reviewed and are negative.    Objective:     Vital Signs (Most Recent):  Temp: 98.3 °F (36.8 °C) (07/28/17 1622)  Pulse: 84 (07/28/17 1622)  Resp: 18 (07/28/17 1622)  BP: 127/60 (07/28/17 1622)  SpO2: (!) 94 % (07/28/17 1622) Vital Signs (24h Range):  Temp:  [98.2 °F (36.8 °C)-99.9 °F (37.7 °C)] 98.3 °F (36.8 °C)  Pulse:  [80-92] 84  Resp:  [16-20] 18  SpO2:  [93 %-100 %] 94 %  BP: (103-166)/(54-61) 127/60     Weight: 67 kg (147 lb 11.3 oz)  Body mass index is 29.83 kg/m².    Foot Exam    General  General Appearance: appears stated age and healthy   Orientation: alert and oriented to person, place, and time   Affect: appropriate       Right Foot/Ankle     Inspection and  Palpation  Ecchymosis: none  Tenderness: none   Swelling: none   Skin Exam: skin intact;     Neurovascular  Dorsalis pedis: absent  Posterior tibial: absent  Saphenous nerve sensation: diminished  Tibial nerve sensation: diminished  Superficial peroneal nerve sensation: diminished  Deep peroneal nerve sensation: diminished  Sural nerve sensation: diminished      Left Foot/Ankle      Inspection and Palpation  Ecchymosis: dorsum  Tenderness: calcaneus tenderness   Swelling: dorsum   Skin Exam: ulcer;     Neurovascular  Dorsalis pedis: absent  Posterior tibial: absent  Saphenous nerve sensation: diminished  Tibial nerve sensation: diminished  Superficial peroneal nerve sensation: diminished  Deep peroneal nerve sensation: diminished  Sural nerve sensation: diminished                    Laboratory:  Blood Cultures:     Recent Labs  Lab 07/27/17  1400   LABBLOO No Growth to date  No Growth to date     BMP:   Recent Labs  Lab 07/23/17 1946 07/28/17 0410   *  < > 84   *  < > 136   K 3.7  < > 4.1   CL 98  < > 104   CO2 20*  < > 22*   BUN 25*  < > 26*   CREATININE 4.7*  < > 4.6*   CALCIUM 8.8  < > 8.5*   MG 2.0  --   --    < > = values in this interval not displayed.  CBC:     Recent Labs  Lab 07/28/17 0410   WBC 20.28*   RBC 2.89*   HGB 8.0*   HCT 25.3*   *   MCV 88   MCH 27.7   MCHC 31.6*     CMP:   Recent Labs  Lab 07/24/17 0558 07/28/17 0410   *  < > 84   CALCIUM 8.2*  < > 8.5*   ALBUMIN 2.2*  --   --    PROT 7.6  --   --    *  < > 136   K 3.3*  < > 4.1   CO2 23  < > 22*   CL 98  < > 104   BUN 31*  < > 26*   CREATININE 5.0*  < > 4.6*   ALKPHOS 128  --   --    ALT 7*  --   --    AST 19  --   --    BILITOT 0.3  --   --    < > = values in this interval not displayed.  Coagulation:     Recent Labs  Lab 07/23/17 1946   INR 1.0   APTT 25.4     CRP:     Recent Labs  Lab 07/24/17 0558   .3*     ESR:     Recent Labs  Lab 07/24/17 0558   SEDRATE 100*     Prealbumin: No results for  input(s): PREALBUMIN in the last 48 hours.  Wound Cultures:     Recent Labs  Lab 04/28/17 2037 07/25/17  1000 07/26/17  1414   LABAERO No growth ESCHERICHIA COLIModerate  PROTEUS MIRABILISModerateSkin wu also present PROTEUS MIRABILISModerate  ESCHERICHIA COLIModerateSusceptibility pendingSkin wu also present       Diagnostic Results:  MRI 7/24/17  No MR imaging finding to suggest osteomyelitis.    Diffuse heterogeneous marrow signal intensity throughout the visualized osseous structures, a nonspecific finding that may relate to prolonged immobilization or atypical red marrow reconversion. Early osteonecrosis is possible but felt to be less likely.    Extensive hyperintensity throughout visualized musculature, likely related to diabetic neuropathy or chronic disuse. No focal intramuscular drainable collections there    Extensive subcutaneous signal hyperintensity suggesting noninfectious edema versus cellulitis.  No definite focal drainable collections.    JULIUS 7/26/17  The presence of artifactually elevated pressures in arteries of the lower extremity renders all pressure measurements unreliable due to suspected medial calcinosis.    Report Summary:  Impression:    Right leg:The peripheral arteries are non-compressable. The PVR waveforms suggest moderate peripheral arterial occlusive disease.     Left leg:The peripheral arteries are non-compressable.The PVR waveforms suggest moderate  to severe peripheral arterial occlusive disease.    Clinical Findings:  Gangrenous changes noted to plantar arch of left foot with superficial blister noted, draining serosanguinous drainage. Mild erythema and edema noted to dorsum of foot and periwound ischemic changes noted. No acute SOI noted, stable for now.      Assessment/Plan:     Type 2 diabetes mellitus with left diabetic foot ulcer    -Gangrenous changes noted, to plantar arch of left foot, stable at this time, no surgical intervention planned at this time   -If  gangrene progresses with demarcatation in the future, pt may need a BKA, high risk for limb loss   -MRI reviewed with no drainable collections of fluid  -Superficial bullae noted which was drained and cultured, orders placed  -Continue current abx, tailor abx to culture results  -Wound painted with betadine and wrapped with a loose kerlix, secured with tape  -Nursing to follow with daily dressing changes, orders placed        ESRD (end stage renal disease)    Per primary        Peripheral vascular disease, unspecified    -Vascular sx following, appreciate recs  -Angio taken today, awaiting results  -Podiatry will follow        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    Per primary        * Left foot infection    As above            Luis Carlos Bright MD  Podiatry  [facility]

## 2017-07-28 NOTE — ANESTHESIA PREPROCEDURE EVALUATION
Pre-operative evaluation for Procedure(s) (LRB):  AMPUTATION-ABOVE KNEE (Left)    ADDENDUM: Spoke to patient and family at bedside (7/28/2017 at 3PM), they have just been told the news that she needs an AKA and they are undecided about whether to proceed with surgery and would like more time to come to a decision. At that time they were not willing to give consent for anesthesia, and I was not given the chance to discuss anesthesia with the patient for the surgery and would like us to come back once the decision is made to proceed.  Went back at 6pm, and patient and family again refused to sign.   -Patient consented 7/29 with  in room. Patient's family very involved in care and would like to be updated with any changes to plan.     Aleta Herrera is a 62 y.o. female PMH ESRD on HD, HRpEF, CAD, HTN, DM2 with associated retinopathy and neuropathy, pleural effusion on home O2, and PVD who presented with sepsis due to left foot infection (cultures growing staph) 2/2 ulcer s/p I/D in the OR 7/26/17. She presents for the above procedure.    LDA: Right hand 22G PIV;       Prev airway: Placement Date: 04/27/17; Placement Time: 1240; Method of Intubation: Direct laryngoscopy; Inserted by: CRNA; Airway Device: Endotracheal Tube; Mask Ventilation: Easy - oral; Intubated: Postinduction; Blade: Ceja #2; Airway Device Size: 7.5; Style: Cuffed; Cuff Inflation: Minimal occlusive pressure; Inflation Amount: 6; Placement Verified By: Auscultation, Capnometry; Grade: Grade II; Complicating Factors: Poor neck/head extension; Intubation Findings: Positive EtCO2, Bilateral breath sounds, Atraumatic/Condition of teeth unchanged;  Depth of Insertion: 21; Securment: Lips; Complications: None; Breath Sounds: Equal Bilateral; Insertion Attempts: 1; Removal Date: 04/27/17;  Removal Time: 1524    Drips:  none    Patient Active Problem List   Diagnosis    ESRD 2/2 HTN on HD since 11/12/09    Anemia of chronic renal failure     Secondary hyperparathyroidism of renal origin    Hypertension, renal    DR (diabetic retinopathy)    Diabetic neuropathy    Thrombus due to any device, implant or graft    Aphasia due to TBI (traumatic brain injury), closed    Acute blood loss anemia    Hemodialysis status    Late effect of ischemic cerebral stroke    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    Acute gastrointestinal hemorrhage    DVT (deep venous thrombosis), left    Screen for colon cancer    Breast cancer screening, high risk patient    Bleeding due to dialysis catheter placement    Anticoagulant long-term use    Cardiomyopathy    Pleural effusion on right    History of DVT (deep vein thrombosis)    Diverticulosis of large intestine with hemorrhage    Malfunction of peripheral inserted central catheter    Permanent central venous catheter in place    Leg pain, bilateral    Peripheral vascular disease, unspecified    CKD (chronic kidney disease)    Cardiac arrest    Pleural effusion, right    Numbness or tingling    ESRD (end stage renal disease)    Pleural effusion    (HFpEF) heart failure with preserved ejection fraction    Chronic respiratory failure with hypoxia    Type 2 diabetes mellitus with left diabetic foot ulcer    Left foot infection       Review of patient's allergies indicates:  No Known Allergies     Current Facility-Administered Medications on File Prior to Visit   Medication Dose Route Frequency Provider Last Rate Last Dose    0.9%  NaCl infusion   Intravenous PRN Ivonne Huertas  mL/hr at 07/27/17 0821 1,000 mL at 07/27/17 0821    acetaminophen tablet 650 mg  650 mg Oral Q8H PRN Estrella Ramos MD   650 mg at 07/27/17 1405    amlodipine tablet 10 mg  10 mg Oral Daily Carson Marcus, DO   10 mg at 07/28/17 0814    aspirin chewable tablet 81 mg  81 mg Oral Daily Carson Marcus DO   81 mg at 07/28/17 0814    calcitRIOL capsule 0.5 mcg  0.5 mcg Oral Daily  Carson Marcus, DO   0.5 mcg at 07/28/17 0814    cefTRIAXone (ROCEPHIN) 1 g in dextrose 5 % 50 mL IVPB  1 g Intravenous Q24H Carson Marcus DO   1 g at 07/28/17 0400    dextrose 50% injection 12.5 g  12.5 g Intravenous PRN Carson Marcus DO        dextrose 50% injection 25 g  25 g Intravenous PRN Carson Marcus DO        epoetin batsheva injection 6,400 Units  100 Units/kg Intravenous Every Mon, Wed, Fri Ivonne Heber Huertas, NP   6,400 Units at 07/24/17 1756    glucagon (human recombinant) injection 1 mg  1 mg Intramuscular PRN Carson Marcus, DO        glucose chewable tablet 16 g  16 g Oral PRN Carson Marcus, DO        glucose chewable tablet 24 g  24 g Oral PRN Carson Marcus DO        heparin (porcine) injection 2,000 Units  2,000 Units Intravenous PRN Adriana Singh MD        insulin aspart pen 0-5 Units  0-5 Units Subcutaneous QID (AC + HS) PRN Carson Marcus DO   1 Units at 07/27/17 2137    insulin detemir pen 5 Units  5 Units Subcutaneous BID Carson Marcus DO   5 Units at 07/28/17 0815    midazolam injection 1 mg  1 mg Intravenous Q5 Min PRN Jesus Sarkar MD        oxycodone immediate release tablet 5 mg  5 mg Oral Q6H PRN Carson Marcus DO   5 mg at 07/28/17 0452    pantoprazole EC tablet 40 mg  40 mg Oral Daily Estrella Ramos MD   40 mg at 07/28/17 0814    polyethylene glycol packet 17 g  17 g Oral BID PRN Carson Marcus DO        sevelamer carbonate tablet 2,400 mg  2,400 mg Oral TID WM Carson Marcus DO   2,400 mg at 07/26/17 1700     Current Outpatient Prescriptions on File Prior to Visit   Medication Sig Dispense Refill    amlodipine (NORVASC) 10 MG tablet TAKE ONE TABLET BY MOUTH ONCE DAILY 90 tablet 0    aspirin 81 mg Tab Take 1 tablet by mouth once daily.       calcitRIOL (ROCALTROL) 0.5 MCG Cap Take 1 capsule (0.5 mcg total) by mouth once daily. 30 capsule 1    epoetin batsheva (PROCRIT) 10,000  unit/mL injection Inject 0.74 mLs (7,400 Units total) into the skin every Mon, Wed, Fri.      hydrocodone-acetaminophen 5-325mg (NORCO) 5-325 mg per tablet Take 1 tablet by mouth every 6 (six) hours as needed for Pain.       insulin aspart (NOVOLOG FLEXPEN) 100 unit/mL InPn as dir Insulin Pen Subcutaneous Before meals and at bedtime.  If blood sugar is 151-200 1 units SQif blood sugar is 201-250 2 units SQIf blood sugar is 251-300 3 units SQIf blood sugar is 301-350 4 units SQIf blood sugar is > or = 351 5 units SQand call MD;  Dispense with needles      insulin detemir (LEVEMIR FLEXPEN) 100 unit/mL (3 mL) SubQ InPn pen Inject 8 Units into the skin 2 (two) times daily. (Patient taking differently: Inject 4 Units into the skin every evening. )      inulin (FIBER CHOICE, INULIN,) 1.5 gram Chew Take 2 tablets by mouth 2 (two) times daily. Or as directed on product packaging.  11    lidocaine-prilocaine (EMLA) cream APPLY SMALL AMOUNT TO ACCESS SITE 1 TO 2 HOURS BEFORE TREATMENT. COVER AREA WITH AN OCCLUSIVE DRESSING.  3    lisinopril (PRINIVIL,ZESTRIL) 40 MG tablet Take 40 mg by mouth once daily.       metoprolol tartrate (LOPRESSOR) 100 MG tablet TAKE 1 TABLET BY MOUTH TWICE DAILY 180 tablet 3    oxycodone (ROXICODONE) 5 MG immediate release tablet Take 1-2 tablets (5-10 mg total) by mouth every 4 to 6 hours as needed for Pain. 31 tablet 0    polyethylene glycol (GLYCOLAX) 17 gram/dose powder Take 17 g by mouth once daily. May take up to 3 times per day as needed for constipation. 510 g 11    sevelamer carbonate (RENVELA) 800 mg Tab Take 2,400 mg by mouth 3 (three) times daily with meals.         Past Surgical History:   Procedure Laterality Date    AV FISTULA PLACEMENT      CATARACT SURGERY       SECTION, LOW TRANSVERSE      x 3    COLONOSCOPY N/A 2016    Procedure: COLONOSCOPY;  Surgeon: Roverto Patel MD;  Location: [address]);  Service: Endoscopy;  Laterality: N/A;    EYE  SURGERY      HYSTERECTOMY  2003    Endometriosis    TONSILLECTOMY      VASCULAR SURGERY         Social History     Social History    Marital status:      Spouse name: N/A    Number of children: N/A    Years of education: N/A     Occupational History     2009     Social History Main Topics    Smoking status: Former Smoker     Packs/day: 0.25     Years: 0.50     Types: Cigarettes     Quit date: 9/5/1975    Smokeless tobacco: Never Used      Comment: quit smoking cigarettes 40+ years ago    Alcohol use No    Drug use: No    Sexual activity: Yes     Other Topics Concern    Not on file     Social History Narrative    December 2015        The patient is  she lives with her , eldest son, middle son Christopher age 26 who accompanies her to this visit     and her youngest child who is her daughter.      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    She had been a  in [place] but retired in May 2009 when she required dialysis        This summer she had a stroke which affected her left side.  She had a dense left hemiparesis which quickly resolved.  Now she is able to walk a bit at home without a walker.  She is able to wash dishes, do the laundry and cook.  She sometimes goes grocery shopping.  She was recently placed on the renal transplant list.         Vital Signs Range (Last 24H):  Temp:  [36.9 °C (98.4 °F)-38.4 °C (101.1 °F)]   Pulse:  []   Resp:  [16-20]   BP: (100-136)/(45-66)   SpO2:  [92 %-94 %]       CBC:   Recent Labs      07/26/17   0508  07/27/17   0433  07/28/17   0410   WBC  18.58*  17.04*   --    RBC  2.81*  2.67*  2.89*   HGB  7.8*  7.4*  8.0*   HCT  24.7*  23.4*  25.3*   PLT  350  376*  369*   MCV  88  88  88   MCH  27.8  27.7  27.7   MCHC  31.6*  31.6*  31.6*       CMP:   Recent Labs      07/26/17   0509   07/27/17   0433  07/28/17   0410   NA  135*  134*  136   K  5.0  5.4*  4.1   CL  103  102  104   CO2  23  19*  22*   BUN  32*  40*  26*   CREATININE  5.2*  6.5*  4.6*   GLU  70  75  84   PHOS  4.8*   --    --    CALCIUM  8.4*  8.4*  8.5*       INR  No results for input(s): INR, PROTIME, APTT in the last 72 hours.    Invalid input(s): PT        Diagnostic Studies:  MRI lower extremity 7/24/2017  No MR imaging finding to suggest osteomyelitis.    Diffuse heterogeneous marrow signal intensity throughout the visualized osseous structures, a nonspecific finding that may relate to prolonged immobilization or atypical red marrow reconversion. Early osteonecrosis is possible but felt to be less likely.    Extensive hyperintensity throughout visualized musculature, likely related to diabetic neuropathy or chronic disuse. No focal intramuscular drainable collections there    Extensive subcutaneous signal hyperintensity suggesting noninfectious edema versus cellulitis.  No definite focal drainable collections.      Electronically signed by: SCOTTY HANKS MD  Date: 07/24/17  Time: 22:30     EKG:  Vent. Rate : 087 BPM     Atrial Rate : 087 BPM     P-R Int : 160 ms          QRS Dur : 078 ms      QT Int : 370 ms       P-R-T Axes : 048 218 015 degrees     QTc Int : 445 ms    Normal sinus rhythm  Possible Left atrial enlargement  Right superior axis deviation  Pulmonary disease pattern  Nonspecific ST and T wave abnormality  Abnormal ECG  When compared with ECG of 27-APR-2017 19:29,  Criteria for Septal infarct are no longer Present    Nonspecific T wave abnormality, worse in Anterior-lateral leads  Confirmed by AKASH LOMBARDI MD ([id number]) on 7/24/2017 9:38:51 AM    2D Echo:  CONCLUSIONS     1 - Normal left ventricular systolic function (EF 60-65%).     2 - Concentric hypertrophy.     3 - Biatrial enlargement.     4 - Left ventricular diastolic dysfunction.     5 - Normal right ventricular systolic function .     6 - Pulmonary  hypertension. The estimated PA systolic pressure is 57 mmHg.     7 - Increased central venous pressure.             This document has been electronically    SIGNED BY: Drea Barrios MD On: 06/21/2016 14:31      Anesthesia Evaluation    I have reviewed the Patient Summary Reports.    I have reviewed the Nursing Notes.   I have reviewed the Medications.     Review of Systems  Anesthesia Hx:  No problems with previous Anesthesia Hx of Anesthetic complications Hypotension post-op History of prior surgery of interest to airway management or planning: Previous anesthesia: General Airway issues documented on chart review include mask, easy, easy direct laryngoscopy , view on direct laryngoscopy Grade II  Denies Family Hx of Anesthesia complications.  Personal Hx of Anesthesia complications   Hematology/Oncology:     Oncology Normal    -- Anemia:   EENT/Dental:EENT/Dental Normal   Cardiovascular:   Hypertension CAD   CHF ECG has been reviewed.    Pulmonary:   Shortness of breath Pulmonary effusions, on home O2   Renal/:   Chronic Renal Disease, ESRD, Dialysis, renal hypertension    Neurological:   CVA, no residual symptoms   Peripheral Neuropathy    Endocrine:   Diabetes, type 2        Physical Exam  General:  Well nourished    Airway/Jaw/Neck:  Airway Findings: Mouth Opening: Normal Tongue: Normal  General Airway Assessment: Adult, Good  Mallampati: III  Improves to II with phonation.  Jaw/Neck Findings:  Neck ROM: Normal ROM     Eyes/Ears/Nose:  Eyes/Ears/Nose Findings:    Dental:  Dental Findings: In tact   Chest/Lungs:  Chest/Lungs Clear    Heart/Vascular:  Heart Findings: Normal Heart murmur: negative    Abdomen:  Abdomen Findings: Normal    Musculoskeletal:  Musculoskeletal Findings: Normal   Skin:  Skin Findings: Normal    Mental Status:  Mental Status Findings:  Cooperative, Alert and Oriented         Anesthesia Plan  Type of Anesthesia, risks & benefits discussed:  Anesthesia Type:  general, MAC,  regional  Patient's Preference: regional  Intra-op Monitoring Plan: standard ASA monitors  Intra-op Monitoring Plan Comments:   Post Op Pain Control Plan: per primary service following discharge from PACU and multimodal analgesia  Post Op Pain Control Plan Comments:   Induction:   IV  Beta Blocker:  Patient is on a Beta-Blocker and has received one dose within the past 24 hours (No further documentation required).       Informed Consent: Patient understands risks and agrees with Anesthesia plan.  Questions answered. Anesthesia consent signed with patient.  ASA Score: 4     Day of Surgery Review of History & Physical:  There are no significant changes.          Ready For Surgery From Anesthesia Perspective.

## 2017-07-28 NOTE — ASSESSMENT & PLAN NOTE
-Vascular sx following, appreciate recs  -Angio taken today, awaiting results  -Podiatry will follow

## 2017-07-28 NOTE — BRIEF OP NOTE
[facility]  Brief Operative Note    SUMMARY     Surgery Date: 7/28/2017     Surgeon(s) and Role:     * Melissa Graham MD - Fellow     * Nathalie Madera MD - Primary    Assisting Surgeon: None    Pre-op Diagnosis:  Left foot gangrene [L08.9]    Post-op Diagnosis:  Same    Procedure:  Aortogram + Left LE angiogram    Anesthesia: Local MAC    Description of Procedure: Diffuse tibial disease, patent SFA and Pop    Description of the findings of the procedure: Significant steal from left thigh AVG. Left foot not salvageable given extent of infection. Plan left AKA.  Discussed this plan with Ms Herrera pre-op and with her  post-op.    Estimated Blood Loss: 5mL         Specimens:   Specimen (12h ago through future)    None

## 2017-07-28 NOTE — ASSESSMENT & PLAN NOTE
Presenting to the ED w/ new bleeding from L leg ulcer along with fevers, chills and new leukocytosis.   - Received vancomycin , flagyl and rocephin in ED. Fever resolved with Tylenol in ED. Dc'd vancomycin and flagyl on the floor.   - CXR no significant change from prior study (5/3/17) small bilateral effusions, R> L.  R effusion appears to be partially loculated but unchanged. Mild edema in the lungs, stable.  - US with doppler b/l LE negative for DVT  - L foot x-ray showed soft tissue swelling, no acute fx  - MRI LLE showed no osteomyelitis, no drainable collections.  - Blood cx 1/4 +gram positive cocci in clusters resembling staph   - Blood cx 1/4 +gram positive rods, likely contaminant   - Wound cx growing proteus and GNR, lactose   - WBC continuing downward trend      Plan  - Continuing rocephin at this time, may broaden coverage if fevers persist   - f/u blood cx x2  - F/u on podiatry recs  - f/u vascular surgery recs

## 2017-07-28 NOTE — PLAN OF CARE
Patient not medically stable for discharge at this time. Ms. Herrera is scheduled for a left AKA on Monday.       07/28/17 1353   Discharge Reassessment   Assessment Type Discharge Planning Reassessment   Can the patient answer the patient profile reliably? Yes, cognitively intact   How does the patient rate their overall health at the present time? Fair   Describe the patient's ability to walk at the present time. Walks with the help of equipment   How often would a person be available to care for the patient? Whenever needed   Number of comorbid conditions (as recorded on the chart) Five or more   During the past month, has the patient often been bothered by little interest or pleasure in doing things? No   Discharge plan remains the same: No   Provided patient/caregiver education on the expected discharge date and the discharge plan Yes   Discharge Plan A Skilled Nursing Facility   Change in patient condition or support system Yes

## 2017-07-28 NOTE — PROGRESS NOTES
[facility]  Podiatry  Progress Note    Patient Name: Aleta Herrera  MRN: [id number]  Admission Date: 7/23/2017  Hospital Length of Stay: 4 days  Attending Physician: Malick Mahajan MD  Primary Care Provider: Radha Lao MD     Subjective:     Interval History: Pt seen at bedside s/p day 1 I&D of left heel (DOS 7/26/17 per Dr. Huertas). No complaints at this time.     Follow-up For: Procedure(s) (LRB):  ANGIOGRAM-EXTREMITY-LOWER (Left)  ETHZLMUU-ITYDENE-MB (Left)  INSERTION-CATHETER-HEMODIALYSIS (N/A)    Post-Operative Day:      Scheduled Meds:   amlodipine  10 mg Oral Daily    aspirin  81 mg Oral Daily    calcitRIOL  0.5 mcg Oral Daily    cefTRIAXone (ROCEPHIN) IVPB  1 g Intravenous Q24H    epoetin batsheva (PROCRIT) injection  100 Units/kg Intravenous Every Mon, Wed, Fri    insulin detemir  5 Units Subcutaneous BID    pantoprazole  40 mg Oral Daily    pantoprazole  40 mg Oral Once    sevelamer carbonate  2,400 mg Oral TID WM     Continuous Infusions:   PRN Meds:sodium chloride 0.9%, acetaminophen, dextrose 50%, dextrose 50%, glucagon (human recombinant), glucose, glucose, heparin (porcine), insulin aspart, midazolam (PF), oxycodone, polyethylene glycol    Review of Systems   Skin: Positive for color change and wound.   All other systems reviewed and are negative.    Objective:     Vital Signs (Most Recent):  Temp: 99.6 °F (37.6 °C) (07/27/17 1641)  Pulse: 85 (07/27/17 1900)  Resp: 18 (07/27/17 1641)  BP: (!) 124/57 (07/27/17 1641)  SpO2: (!) 93 % (07/27/17 1641) Vital Signs (24h Range):  Temp:  [98.8 °F (37.1 °C)-101.1 °F (38.4 °C)] 99.6 °F (37.6 °C)  Pulse:  [] 85  Resp:  [18-20] 18  SpO2:  [91 %-94 %] 93 %  BP: (100-136)/(45-66) 124/57     Weight: 63.5 kg (139 lb 15.9 oz)  Body mass index is 28.27 kg/m².    Foot Exam    General  General Appearance: appears stated age and healthy   Orientation: alert and oriented to person, place, and time   Affect: appropriate        Right Foot/Ankle     Inspection and Palpation  Ecchymosis: none  Tenderness: none   Swelling: none   Skin Exam: skin intact;     Neurovascular  Dorsalis pedis: absent  Posterior tibial: absent  Saphenous nerve sensation: diminished  Tibial nerve sensation: diminished  Superficial peroneal nerve sensation: diminished  Deep peroneal nerve sensation: diminished  Sural nerve sensation: diminished      Left Foot/Ankle      Inspection and Palpation  Ecchymosis: dorsum  Tenderness: calcaneus tenderness   Swelling: dorsum   Skin Exam: ulcer;     Neurovascular  Dorsalis pedis: absent  Posterior tibial: absent  Saphenous nerve sensation: diminished  Tibial nerve sensation: diminished  Superficial peroneal nerve sensation: diminished  Deep peroneal nerve sensation: diminished  Sural nerve sensation: diminished                        Laboratory:  Blood Cultures:     Recent Labs  Lab 07/26/17  0800   LABBLOO No Growth to date  No Growth to date  No Growth to date  No Growth to date     BMP:   Recent Labs  Lab 07/23/17 1946 07/27/17 0433   *  < > 75   *  < > 134*   K 3.7  < > 5.4*   CL 98  < > 102   CO2 20*  < > 19*   BUN 25*  < > 40*   CREATININE 4.7*  < > 6.5*   CALCIUM 8.8  < > 8.4*   MG 2.0  --   --    < > = values in this interval not displayed.  CBC:     Recent Labs  Lab 07/27/17 0433   WBC 17.04*   RBC 2.67*   HGB 7.4*   HCT 23.4*   *   MCV 88   MCH 27.7   MCHC 31.6*     CMP:   Recent Labs  Lab 07/24/17 0558 07/27/17 0433   *  < > 75   CALCIUM 8.2*  < > 8.4*   ALBUMIN 2.2*  --   --    PROT 7.6  --   --    *  < > 134*   K 3.3*  < > 5.4*   CO2 23  < > 19*   CL 98  < > 102   BUN 31*  < > 40*   CREATININE 5.0*  < > 6.5*   ALKPHOS 128  --   --    ALT 7*  --   --    AST 19  --   --    BILITOT 0.3  --   --    < > = values in this interval not displayed.  Coagulation:     Recent Labs  Lab 07/23/17 1946   INR 1.0   APTT 25.4     CRP:     Recent Labs  Lab 07/24/17 0558   .3*      ESR:     Recent Labs  Lab 07/24/17  0558   SEDRATE 100*     Prealbumin: No results for input(s): PREALBUMIN in the last 48 hours.  Wound Cultures:     Recent Labs  Lab 04/28/17  2037 07/25/17  1000 07/26/17  1414   LABAERO No growth ESCHERICHIA COLIModerateSusceptibility pending  PROTEUS MIRABILISModerateSusceptibility pendingSkin wu also present PRESUMPTIVE PROTEUS SPECIESModerateIdentification and susceptibility pending  GRAM NEGATIVE DREW, LACTOSE FERMENTERModerateIdentification and susceptibility pending       Diagnostic Results:  I have reviewed all pertinent imaging results/findings within the past 24 hours.    Clinical Findings:  Wet gangrenous changes noted to plantar arch of left foot. Sx wound to heel, left open, measures 4.5x3.0x1.0cm. Tracks distally by 7 cm. With macerated borders and necrotic tissues, Malodorous serous drainage noted and with grayish/discolored calcaneous exposed.  Mild erythema and edema noted to dorsum of foot and periwound ischemic changes noted.     Assessment/Plan:     Type 2 diabetes mellitus with left diabetic foot ulcer    -Wet gangrenous changes noted to plantar arch and heel of left foot, may need a BKA, high risk for limb loss   -Cultures taken in OR, podiatry will follow  -Continue current abx, tailor abx to culture results  -Wound painted with betadine and wrapped with a loose kerlix, secured with tape  -Nursing to follow with daily dressing changes, orders placed        ESRD (end stage renal disease)    Per primary        Peripheral vascular disease, unspecified    -Vascular sx following, appreciate recs  -Planning angiogram on Friday  -Podiatry will follow        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    Per primary        * Left foot infection    As above            Marlyn Frank MD  Podiatry  [facility]

## 2017-07-28 NOTE — PROGRESS NOTES
Pt transferred via bed from post-op to 9th floor.  Transported by: FIDEL Vazquez and RN Marlyn Ga  Report given to JEFFERY Oconnell per Handoff on Doc Flowsheet  VSS per Doc Flowsheet  Medicines sent: No  On telemetry: yes  Chart sent with patient: Yes  What caregiver / guardian was Notified of transfer: son & daughter met pt in room

## 2017-07-28 NOTE — TRANSFER OF CARE
"Anesthesia Transfer of Care Note    Patient: Aleta Herrera    Procedure(s) Performed: Procedure(s) (LRB):  ANGIOGRAM-EXTREMITY-LOWER (Left)    Patient location: [facility]    Anesthesia Type: MAC    Transport from OR: Transported from OR on 6-10 L/min O2 by face mask with adequate spontaneous ventilation    Post pain: adequate analgesia    Post assessment: no apparent anesthetic complications and tolerated procedure well    Post vital signs: stable    Level of consciousness: awake    Nausea/Vomiting: no nausea/vomiting    Complications: none    Transfer of care protocol was followed      Last vitals:   Visit Vitals  BP (!) 118/55   Pulse 92   Temp 37 °C (98.6 °F)   Resp 18   Ht 4' 11" (1.499 m)   Wt 67 kg (147 lb 11.3 oz)   SpO2 96%   Breastfeeding? No   BMI 29.83 kg/m²     "

## 2017-07-28 NOTE — SUBJECTIVE & OBJECTIVE
Subjective:     Interval History: Pt seen with  at bedside. Just got back from Angiogram. No other pedal complaints at this time.     Follow-up For: Procedure(s) (LRB):  ANGIOGRAM-EXTREMITY-LOWER (Left)  FEXWUJOA-KDTISEK-TC (Left)  INSERTION-CATHETER-HEMODIALYSIS (N/A)    Post-Operative Day:      Scheduled Meds:   amlodipine  10 mg Oral Daily    aspirin  81 mg Oral Daily    calcitRIOL  0.5 mcg Oral Daily    cefTRIAXone (ROCEPHIN) IVPB  1 g Intravenous Q24H    epoetin batsheva (PROCRIT) injection  100 Units/kg Intravenous Every Mon, Wed, Fri    insulin detemir  5 Units Subcutaneous BID    pantoprazole  40 mg Oral Daily    sevelamer carbonate  2,400 mg Oral TID WM     Continuous Infusions:   PRN Meds:sodium chloride 0.9%, acetaminophen, dextrose 50%, dextrose 50%, glucagon (human recombinant), glucose, glucose, heparin (porcine), insulin aspart, midazolam (PF), oxycodone, polyethylene glycol    Review of Systems   Skin: Positive for color change and wound.   All other systems reviewed and are negative.    Objective:     Vital Signs (Most Recent):  Temp: 98.3 °F (36.8 °C) (07/28/17 1622)  Pulse: 84 (07/28/17 1622)  Resp: 18 (07/28/17 1622)  BP: 127/60 (07/28/17 1622)  SpO2: (!) 94 % (07/28/17 1622) Vital Signs (24h Range):  Temp:  [98.2 °F (36.8 °C)-99.9 °F (37.7 °C)] 98.3 °F (36.8 °C)  Pulse:  [80-92] 84  Resp:  [16-20] 18  SpO2:  [93 %-100 %] 94 %  BP: (103-166)/(54-61) 127/60     Weight: 67 kg (147 lb 11.3 oz)  Body mass index is 29.83 kg/m².    Foot Exam    General  General Appearance: appears stated age and healthy   Orientation: alert and oriented to person, place, and time   Affect: appropriate       Right Foot/Ankle     Inspection and Palpation  Ecchymosis: none  Tenderness: none   Swelling: none   Skin Exam: skin intact;     Neurovascular  Dorsalis pedis: absent  Posterior tibial: absent  Saphenous nerve sensation: diminished  Tibial nerve sensation: diminished  Superficial peroneal nerve  sensation: diminished  Deep peroneal nerve sensation: diminished  Sural nerve sensation: diminished      Left Foot/Ankle      Inspection and Palpation  Ecchymosis: dorsum  Tenderness: calcaneus tenderness   Swelling: dorsum   Skin Exam: ulcer;     Neurovascular  Dorsalis pedis: absent  Posterior tibial: absent  Saphenous nerve sensation: diminished  Tibial nerve sensation: diminished  Superficial peroneal nerve sensation: diminished  Deep peroneal nerve sensation: diminished  Sural nerve sensation: diminished                    Laboratory:  Blood Cultures:     Recent Labs  Lab 07/27/17  1400   LABBLOO No Growth to date  No Growth to date     BMP:   Recent Labs  Lab 07/23/17 1946 07/28/17  0410   *  < > 84   *  < > 136   K 3.7  < > 4.1   CL 98  < > 104   CO2 20*  < > 22*   BUN 25*  < > 26*   CREATININE 4.7*  < > 4.6*   CALCIUM 8.8  < > 8.5*   MG 2.0  --   --    < > = values in this interval not displayed.  CBC:     Recent Labs  Lab 07/28/17 0410   WBC 20.28*   RBC 2.89*   HGB 8.0*   HCT 25.3*   *   MCV 88   MCH 27.7   MCHC 31.6*     CMP:   Recent Labs  Lab 07/24/17  0558  07/28/17  0410   *  < > 84   CALCIUM 8.2*  < > 8.5*   ALBUMIN 2.2*  --   --    PROT 7.6  --   --    *  < > 136   K 3.3*  < > 4.1   CO2 23  < > 22*   CL 98  < > 104   BUN 31*  < > 26*   CREATININE 5.0*  < > 4.6*   ALKPHOS 128  --   --    ALT 7*  --   --    AST 19  --   --    BILITOT 0.3  --   --    < > = values in this interval not displayed.  Coagulation:     Recent Labs  Lab 07/23/17 1946   INR 1.0   APTT 25.4     CRP:     Recent Labs  Lab 07/24/17 0558   .3*     ESR:     Recent Labs  Lab 07/24/17 0558   SEDRATE 100*     Prealbumin: No results for input(s): PREALBUMIN in the last 48 hours.  Wound Cultures:     Recent Labs  Lab 04/28/17 2037 07/25/17  1000 07/26/17  1414   LABAERO No growth ESCHERICHIA COLIModerate  PROTEUS MIRABILISModerateSkin wu also present PROTEUS MIRABILISModerate   ESCHERICHIA COLIModerateSusceptibility [facility] wu also present       Diagnostic Results:  MRI 7/24/17  No MR imaging finding to suggest osteomyelitis.    Diffuse heterogeneous marrow signal intensity throughout the visualized osseous structures, a nonspecific finding that may relate to prolonged immobilization or atypical red marrow reconversion. Early osteonecrosis is possible but felt to be less likely.    Extensive hyperintensity throughout visualized musculature, likely related to diabetic neuropathy or chronic disuse. No focal intramuscular drainable collections there    Extensive subcutaneous signal hyperintensity suggesting noninfectious edema versus cellulitis.  No definite focal drainable collections.    JULIUS 7/26/17  The presence of artifactually elevated pressures in arteries of the lower extremity renders all pressure measurements unreliable due to suspected medial calcinosis.    Report Summary:  Impression:    Right leg:The peripheral arteries are non-compressable. The PVR waveforms suggest moderate peripheral arterial occlusive disease.     Left leg:The peripheral arteries are non-compressable.The PVR waveforms suggest moderate  to severe peripheral arterial occlusive disease.    Clinical Findings:  Gangrenous changes noted to plantar arch of left foot with superficial blister noted, draining serosanguinous drainage. Mild erythema and edema noted to dorsum of foot and periwound ischemic changes noted. No acute SOI noted, stable for now.

## 2017-07-28 NOTE — SUBJECTIVE & OBJECTIVE
Interval History: had Aortogram + Left LE angiogram today by vascular surgery     Review of Systems   Constitutional: Negative for activity change, appetite change, chills and fever.   Respiratory: Negative for cough and shortness of breath.    Cardiovascular: Negative for chest pain and palpitations.   Gastrointestinal: Negative for abdominal pain, diarrhea, nausea and vomiting.   Genitourinary:        Does not make urine   Musculoskeletal: Negative for arthralgias, joint swelling (left foot) and myalgias (left foot).   Skin: Positive for wound (s/p debridement).   Neurological: Negative for dizziness, light-headedness and headaches.   All other systems reviewed and are negative.    Objective:     Vital Signs (Most Recent):  Temp: 98.3 °F (36.8 °C) (07/28/17 1545)  Pulse: 85 (07/28/17 1545)  Resp: 18 (07/28/17 1545)  BP: (!) 111/56 (07/28/17 1545)  SpO2: (!) 94 % (07/28/17 1545) Vital Signs (24h Range):  Temp:  [98.2 °F (36.8 °C)-99.9 °F (37.7 °C)] 98.3 °F (36.8 °C)  Pulse:  [80-92] 85  Resp:  [16-20] 18  SpO2:  [93 %-100 %] 94 %  BP: (103-166)/(54-61) 111/56     Weight: 67 kg (147 lb 11.3 oz)  Body mass index is 29.83 kg/m².    Intake/Output Summary (Last 24 hours) at 07/28/17 1622  Last data filed at 07/28/17 1241   Gross per 24 hour   Intake              150 ml   Output                0 ml   Net              150 ml      Physical Exam   Constitutional: She is oriented to person, place, and time. She appears well-developed and well-nourished.   HENT:   Head: Normocephalic and atraumatic.   Eyes: EOM are normal. Pupils are equal, round, and reactive to light.   Cardiovascular: Normal rate and regular rhythm.    Pulmonary/Chest: Effort normal. No respiratory distress.   Abdominal: She exhibits no distension. There is no tenderness. A hernia (soft, non-tender) is present.   Musculoskeletal:   Left foot with loosely wrapped dressing, which is clean, dry, and intact.  No surrounding tenderness, edema, or erythema.     Neurological: She is alert and oriented to person, place, and time.   Skin: Skin is warm and dry.   Psychiatric: She has a normal mood and affect.       Significant Labs:   Blood Culture:     Recent Labs  Lab 07/27/17  1400   LABBLOO No Growth to date  No Growth to date     BMP:     Recent Labs  Lab 07/28/17  0410   GLU 84      K 4.1      CO2 22*   BUN 26*   CREATININE 4.6*   CALCIUM 8.5*     CBC:     Recent Labs  Lab 07/27/17  0433 07/28/17  0410   WBC 17.04* 20.28*   HGB 7.4* 8.0*   HCT 23.4* 25.3*   * 369*     Wound Cx growing presumptive proteus species and gram negative marisol, lactose        Significant Imaging: No new imaging.

## 2017-07-28 NOTE — ANESTHESIA POSTPROCEDURE EVALUATION
"Anesthesia Post Evaluation    Patient: Aleta Herrera    Procedure(s) Performed: Procedure(s) (LRB):  ANGIOGRAM-EXTREMITY-LOWER (Left)    Final Anesthesia Type: general  Patient location during evaluation: [facility]  Patient participation: Yes- Able to Participate  Level of consciousness: awake and alert  Post-procedure vital signs: reviewed and stable  Pain management: adequate  Airway patency: patent  PONV status at discharge: No PONV  Anesthetic complications: no      Cardiovascular status: blood pressure returned to baseline  Respiratory status: unassisted, spontaneous ventilation and room air  Hydration status: euvolemic  Follow-up not needed.        Visit Vitals  BP (!) 112/54   Pulse 80   Temp 36.8 °C (98.2 °F) (Oral)   Resp 18   Ht 4' 11" (1.499 m)   Wt 67 kg (147 lb 11.3 oz)   SpO2 100%   Breastfeeding? No   BMI 29.83 kg/m²       Pain/Hector Score: Pain Assessment Performed: Yes (7/28/2017  1:42 PM)  Presence of Pain: denies (7/28/2017  1:42 PM)  Pain Rating Prior to Med Admin: 8 (7/28/2017  4:51 AM)  Pain Rating Post Med Admin: 0 (7/27/2017  3:05 PM)  Hector Score: 9 (7/28/2017  1:42 PM)  Modified Hector Score: 19 (7/28/2017  1:42 PM)      "

## 2017-07-28 NOTE — SUBJECTIVE & OBJECTIVE
Medications:  Continuous Infusions:   Scheduled Meds:   amlodipine  10 mg Oral Daily    aspirin  81 mg Oral Daily    calcitRIOL  0.5 mcg Oral Daily    cefTRIAXone (ROCEPHIN) IVPB  1 g Intravenous Q24H    epoetin batsheva (PROCRIT) injection  100 Units/kg Intravenous Every Mon, Wed, Fri    insulin detemir  5 Units Subcutaneous BID    pantoprazole  40 mg Oral Daily    sevelamer carbonate  2,400 mg Oral TID WM     PRN Meds:sodium chloride 0.9%, acetaminophen, dextrose 50%, dextrose 50%, glucagon (human recombinant), glucose, glucose, heparin (porcine), insulin aspart, midazolam (PF), oxycodone, polyethylene glycol     Objective:     Vital Signs (Most Recent):  Temp: 99.9 °F (37.7 °C) (07/28/17 0737)  Pulse: 91 (07/28/17 0737)  Resp: 16 (07/28/17 0737)  BP: (!) 116/57 (07/28/17 0737)  SpO2: (!) 93 % (07/28/17 0737) Vital Signs (24h Range):  Temp:  [98.4 °F (36.9 °C)-101.1 °F (38.4 °C)] 99.9 °F (37.7 °C)  Pulse:  [] 91  Resp:  [16-20] 16  SpO2:  [92 %-94 %] 93 %  BP: (100-136)/(45-66) 116/57          Physical Exam     Constitutional: She is oriented to person, place, and time. She appears well-developed and well-nourished.   HENT:   Head: Normocephalic and atraumatic.   Eyes: EOM are normal. Pupils are equal, round, and reactive to light.   Cardiovascular: Normal rate and regular rhythm.    Pulses:       Femoral pulses are 1+ on the right side, and 1+ on the left side.  Left DP monophasic; no PT  Right DP biphasic; monophasic PT  Thrill in L femoral AVG   Pulmonary/Chest: Effort normal. No respiratory distress.   Abdominal: She exhibits no distension. There is no tenderness.   Musculoskeletal:   No evidence of infection of left femoral AVG;  No erythema, no fluctuance  Left plantar foot from metatarsal heads to heel with area of compromised ischemic tissue. Epidermis removed, incision to calcaneus, necrotic tissue extending to bone. nontender, no evidence of ascending infection, no crepitus - decreased  sensation over dorsum and plantar foot  Neurological: She is alert and oriented to person, place, and time.   Skin: Skin is warm and dry.   Psychiatric: She has a normal mood and affect.     Significant Labs:  CBC:     Recent Labs  Lab 07/27/17  0433 07/28/17  0410   WBC 17.04*  --    RBC 2.67* 2.89*   HGB 7.4* 8.0*   HCT 23.4* 25.3*   * 369*   MCV 88 88   MCH 27.7 27.7   MCHC 31.6* 31.6*     CMP:     Recent Labs  Lab 07/28/17  0410   GLU 84   CALCIUM 8.5*      K 4.1   CO2 22*      BUN 26*   CREATININE 4.6*     Coagulation: No results for input(s): LABPROT, INR, APTT in the last 48 hours.    Significant Diagnostics:  Microbiology Results (last 7 days)     Procedure Component Value Units Date/Time    Blood culture [[id number]] Collected:  07/27/17 1400    Order Status:  Completed Specimen:  Blood Updated:  07/28/17 0115     Blood Culture, Routine No Growth to date    Blood culture [[id number]] Collected:  07/27/17 1400    Order Status:  Completed Specimen:  Blood Updated:  07/28/17 0115     Blood Culture, Routine No Growth to date    AFB Culture & Smear [[id number]] Collected:  07/26/17 1414    Order Status:  Completed Specimen:  Wound from Foot, Left Updated:  07/27/17 2127     AFB Culture & Smear Culture in progress     AFB CULTURE STAIN No acid fast bacilli seen.    Narrative:       1.  Left heel    Culture, Anaerobe [[id number]] Collected:  07/25/17 1000    Order Status:  Completed Specimen:  Wound from Foot, Left Updated:  07/27/17 1230     Anaerobic Culture --     BACTEROIDES FRAGILIS  Many      Aerobic culture [[id number]] Collected:  07/25/17 1000    Order Status:  Completed Specimen:  Wound from Foot, Left Updated:  07/27/17 1117     Aerobic Bacterial Culture --     ESCHERICHIA COLI  Moderate  Susceptibility pending       Aerobic Bacterial Culture --     PROTEUS MIRABILIS  Moderate  Susceptibility pending  Skin wu also present      Blood culture x two cultures. Draw prior to antibiotics.  [[id number]] Collected:  07/23/17 1947    Order Status:  Completed Specimen:  Blood from Peripheral, Hand, Right Updated:  07/27/17 1054     Blood Culture, Routine Gram stain aer bottle: Gram positive cocci in clusters resembling Staph     Blood Culture, Routine Results called to and read back by:Andres Echeverria RN 07/25/2017  08:11     Blood Culture, Routine --     COAGULASE-NEGATIVE STAPHYLOCOCCUS SPECIES  Organism is a probable contaminant      Narrative:       Aerobic and anaerobic    Blood culture [[id number]] Collected:  07/26/17 0800    Order Status:  Completed Specimen:  Blood Updated:  07/27/17 1022     Blood Culture, Routine No Growth to date     Blood Culture, Routine No Growth to date    Blood culture [[id number]] Collected:  07/26/17 0800    Order Status:  Completed Specimen:  Blood Updated:  07/27/17 1022     Blood Culture, Routine No Growth to date     Blood Culture, Routine No Growth to date    Aerobic culture [[id number]] Collected:  07/26/17 1414    Order Status:  Completed Specimen:  Wound from Foot, Left Updated:  07/27/17 0908     Aerobic Bacterial Culture --     PRESUMPTIVE PROTEUS SPECIES  Moderate  Identification and susceptibility pending       Aerobic Bacterial Culture --     GRAM NEGATIVE DREW, LACTOSE   Moderate  Identification and susceptibility pending      Narrative:       1.  Left heel    Blood culture x two cultures. Draw prior to antibiotics. [[id number]] Collected:  07/23/17 1947    Order Status:  Completed Specimen:  Blood from Peripheral, Wrist, Right Updated:  07/27/17 0859     Blood Culture, Routine Gram stain aer bottle: Gram positive rods      Blood Culture, Routine Results called to and read back by: Candy La RN  07/26/2017       Blood Culture, Routine 06:13    Narrative:       Aerobic and anaerobic    Culture, Anaerobe [[id number]] Collected:  07/26/17 1414    Order Status:  Completed Specimen:  Wound from Foot, Left Updated:  07/27/17 0742     Anaerobic Culture  Culture in progress    Narrative:       1.  Left heel    Gram stain [[id number]] Collected:  07/26/17 1414    Order Status:  Completed Specimen:  Wound from Foot, Left Updated:  07/26/17 1805     Gram Stain Result Rare WBC's      Few Gram negative rods      Rare Gram positive cocci    Narrative:       1.  Left heel    Fungus culture [[id number]] Collected:  07/26/17 1414    Order Status:  Sent Specimen:  Wound from Foot, Left Updated:  07/26/17 5668

## 2017-07-28 NOTE — PROGRESS NOTES
[facility] Hospital Medicine  Progress Note    Patient Name: Aleta Herrera  MRN: [id number]  Patient Class: IP- Inpatient   Admission Date: 7/23/2017  Length of Stay: 5 days  Attending Physician: Malick Mahajan MD  Primary Care Provider: Radha Lao MD    [facility] Medicine Team: [facility] HOSP MED 2 Damari Slater MD    Subjective:     Principal Problem:Left foot infection    HPI:  Aleta Herrera is a 62-year-old female with HTN, HFpEF (EF 60%; home O2 2.5 liters), anemia 2/2 CKD and IDDM c/b retinopathy, neuropathy and ESRD (HD MWF). She presents today with her  to the ED because of new bleeding from the left foot ulcer. She's had on/off fevers and chills for past week or so. Apparently blood cultures were drawn at dialysis (7/19). She was seen by vascular surgery the next day where her right femoral permacath was pulled. She has a working left femoral AVG. Her upper extremity access has failed over the years since being placed on dialysis in 2009. Besides left leg pain, she denies any SOB, CP, cough, congestion, abdominal pain, n/v or diarrhea. Not received any recent antibiotics.   Complaining of foot pain in the ED, otherwise has no complaints. CXR showed mild b/l effusions. Left foot x-ray soft tissue swelling, but no gas formation. Labs significant for WBC count of 24k. Procalcitonin 1.64.       Hospital Course:  7/24: VSS. WBC 22.58 down from 24.04 in ED. Sed rate 100. .3. CXR stable. L Foot xray soft tissue swelling. MRI L foot no osteomylitis, no drainable collections. JULIUS L with mod-severe occlusive disease, R with mod. Continue ceftriaxone. HD today.  7/25: VSS. WBC 19.18. Vasc Surg consulted, L femoral AVG w/ possible vascular steal; will perform LLE angiogram on 7/28, if no arterial stenosis will plan for femoral graft ligation & permacath placement. Podiatry consulted, extensive stable left plantar arch necrosis, no acute surgical intervention;  will f/u after angiogram.  Wound cx pending. 1/4 Bcx +GPC in clusters. Continue ceftriaxone.   7/26: I&D and debridement with Podiatry today.  Continue Ceftriaxone.   7/27: Spiked fever to 101.1F, improved with tylenol. Repeated Blood cx x2. HD today. NPO at midnight.  7/28 : had Aortogram + Left LE angiogram by vascular surgery         Interval History: had Aortogram + Left LE angiogram today by vascular surgery     Review of Systems   Constitutional: Negative for activity change, appetite change, chills and fever.   Respiratory: Negative for cough and shortness of breath.    Cardiovascular: Negative for chest pain and palpitations.   Gastrointestinal: Negative for abdominal pain, diarrhea, nausea and vomiting.   Genitourinary:        Does not make urine   Musculoskeletal: Negative for arthralgias, joint swelling (left foot) and myalgias (left foot).   Skin: Positive for wound (s/p debridement).   Neurological: Negative for dizziness, light-headedness and headaches.   All other systems reviewed and are negative.    Objective:     Vital Signs (Most Recent):  Temp: 98.3 °F (36.8 °C) (07/28/17 1545)  Pulse: 85 (07/28/17 1545)  Resp: 18 (07/28/17 1545)  BP: (!) 111/56 (07/28/17 1545)  SpO2: (!) 94 % (07/28/17 1545) Vital Signs (24h Range):  Temp:  [98.2 °F (36.8 °C)-99.9 °F (37.7 °C)] 98.3 °F (36.8 °C)  Pulse:  [80-92] 85  Resp:  [16-20] 18  SpO2:  [93 %-100 %] 94 %  BP: (103-166)/(54-61) 111/56     Weight: 67 kg (147 lb 11.3 oz)  Body mass index is 29.83 kg/m².    Intake/Output Summary (Last 24 hours) at 07/28/17 1622  Last data filed at 07/28/17 1241   Gross per 24 hour   Intake              150 ml   Output                0 ml   Net              150 ml      Physical Exam   Constitutional: She is oriented to person, place, and time. She appears well-developed and well-nourished.   HENT:   Head: Normocephalic and atraumatic.   Eyes: EOM are normal. Pupils are equal, round, and reactive to light.   Cardiovascular:  Normal rate and regular rhythm.    Pulmonary/Chest: Effort normal. No respiratory distress.   Abdominal: She exhibits no distension. There is no tenderness. A hernia (soft, non-tender) is present.   Musculoskeletal:   Left foot with loosely wrapped dressing, which is clean, dry, and intact.  No surrounding tenderness, edema, or erythema.    Neurological: She is alert and oriented to person, place, and time.   Skin: Skin is warm and dry.   Psychiatric: She has a normal mood and affect.       Significant Labs:   Blood Culture:     Recent Labs  Lab 07/27/17  1400   LABBLOO No Growth to date  No Growth to date     BMP:     Recent Labs  Lab 07/28/17  0410   GLU 84      K 4.1      CO2 22*   BUN 26*   CREATININE 4.6*   CALCIUM 8.5*     CBC:     Recent Labs  Lab 07/27/17  0433 07/28/17  0410   WBC 17.04* 20.28*   HGB 7.4* 8.0*   HCT 23.4* 25.3*   * 369*     Wound Cx growing presumptive proteus species and gram negative marisol, lactose        Significant Imaging: No new imaging.     Assessment/Plan:      Type 2 diabetes mellitus with left diabetic foot ulcer    As above           (HFpEF) heart failure with preserved ejection fraction    - BNP 2835 on admission   - Pt with ESRD on HD            Pleural effusion    - Seen on prior CXR (5/3/17). Unclear etiology. Stable.        ESRD (end stage renal disease)    - Consulted nephrology.   Plan  - Dialysis inpatient  - Will monitor electrolytes        Peripheral vascular disease, unspecified     JULIUS showed  Right leg:The peripheral arteries are non-compressable. The PVR waveforms suggest moderate peripheral arterial occlusive disease. Left leg:The peripheral arteries are non-compressable.The PVR waveforms suggest moderate  to severe peripheral arterial occlusive disease. Vascular surgery consulted. Left femoral AVG with possible vascular steal    Plan   - had Aortogram + Left LE angiogram today 07/28  Which showed Significant steal from left thigh AVG.  Left foot not salvageable given extent of infection. Plan left AKA. Likely on Monday           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    - Resume Norvasc  - Diabetic diet inpatient  - SSI        Late effect of ischemic cerebral stroke    - Continued ASA 81 mg.           Hypertension, renal    - Continue home amlodipine 10 mg QD        Anemia of chronic renal failure    - Procrit w/ dialysis.   - Continue to monitor H/H        * Left foot infection    Presenting to the ED w/ new bleeding from L leg ulcer along with fevers, chills and new leukocytosis.   - Received vancomycin , flagyl and rocephin in ED. Fever resolved with Tylenol in ED. Dc'd vancomycin and flagyl on the floor.   - CXR no significant change from prior study (5/3/17) small bilateral effusions, R> L.  R effusion appears to be partially loculated but unchanged. Mild edema in the lungs, stable.  - US with doppler b/l LE negative for DVT  - L foot x-ray showed soft tissue swelling, no acute fx  - MRI LLE showed no osteomyelitis, no drainable collections.  - Blood cx 1/4 +gram positive cocci in clusters resembling staph   - Blood cx 1/4 +gram positive rods, likely contaminant   - Wound cx growing proteus and GNR, lactose   - WBC continuing downward trend      Plan  - Continuing rocephin at this time, may broaden coverage if fevers persist   - f/u blood cx x2  - F/u on podiatry recs  - f/u vascular surgery recs           VTE Risk Mitigation         Ordered     Medium Risk of VTE  Once      07/28/17 1244     Place RICKEY hose  Until discontinued      07/28/17 1244          Dmaari Slater MD  Department of Hospital Medicine   [facility]

## 2017-07-28 NOTE — SUBJECTIVE & OBJECTIVE
Subjective:     Interval History: Pt seen with  at bedside. Pt  relates he has been frustrated with staff. No other pedal complaints at this time.     Follow-up For: Procedure(s) (LRB):  ANGIOGRAM-EXTREMITY-LOWER (Left)  [id number] (Left)  INSERTION-CATHETER-HEMODIALYSIS (N/A)    Post-Operative Day:      Scheduled Meds:   amlodipine  10 mg Oral Daily    aspirin  81 mg Oral Daily    calcitRIOL  0.5 mcg Oral Daily    cefTRIAXone (ROCEPHIN) IVPB  1 g Intravenous Q24H    epoetin batsheva (PROCRIT) injection  100 Units/kg Intravenous Every Mon, Wed, Fri    insulin detemir  5 Units Subcutaneous BID    pantoprazole  40 mg Oral Daily    pantoprazole  40 mg Oral Once    sevelamer carbonate  2,400 mg Oral TID WM     Continuous Infusions:   PRN Meds:sodium chloride 0.9%, acetaminophen, dextrose 50%, dextrose 50%, glucagon (human recombinant), glucose, glucose, heparin (porcine), insulin aspart, midazolam (PF), oxycodone, polyethylene glycol    Review of Systems   Skin: Positive for color change and wound.   All other systems reviewed and are negative.    Objective:     Vital Signs (Most Recent):  Temp: 99.6 °F (37.6 °C) (07/27/17 1641)  Pulse: 85 (07/27/17 1900)  Resp: 18 (07/27/17 1641)  BP: (!) 124/57 (07/27/17 1641)  SpO2: (!) 93 % (07/27/17 1641) Vital Signs (24h Range):  Temp:  [98.8 °F (37.1 °C)-101.1 °F (38.4 °C)] 99.6 °F (37.6 °C)  Pulse:  [] 85  Resp:  [18-20] 18  SpO2:  [91 %-94 %] 93 %  BP: (100-136)/(45-66) 124/57     Weight: 63.5 kg (139 lb 15.9 oz)  Body mass index is 28.27 kg/m².    Foot Exam    General  General Appearance: appears stated age and healthy   Orientation: alert and oriented to person, place, and time   Affect: appropriate       Right Foot/Ankle     Inspection and Palpation  Ecchymosis: none  Tenderness: none   Swelling: none   Skin Exam: skin intact;     Neurovascular  Dorsalis pedis: absent  Posterior tibial: absent  Saphenous nerve sensation: diminished  Tibial  nerve sensation: diminished  Superficial peroneal nerve sensation: diminished  Deep peroneal nerve sensation: diminished  Sural nerve sensation: diminished      Left Foot/Ankle      Inspection and Palpation  Ecchymosis: dorsum  Tenderness: calcaneus tenderness   Swelling: dorsum   Skin Exam: ulcer;     Neurovascular  Dorsalis pedis: absent  Posterior tibial: absent  Saphenous nerve sensation: diminished  Tibial nerve sensation: diminished  Superficial peroneal nerve sensation: diminished  Deep peroneal nerve sensation: diminished  Sural nerve sensation: diminished                    Laboratory:  Blood Cultures:     Recent Labs  Lab 07/26/17  0800   LABBLOO No Growth to date  No Growth to date  No Growth to date  No Growth to date     BMP:   Recent Labs  Lab 07/23/17 1946 07/27/17 0433   *  < > 75   *  < > 134*   K 3.7  < > 5.4*   CL 98  < > 102   CO2 20*  < > 19*   BUN 25*  < > 40*   CREATININE 4.7*  < > 6.5*   CALCIUM 8.8  < > 8.4*   MG 2.0  --   --    < > = values in this interval not displayed.  CBC:     Recent Labs  Lab 07/27/17 0433   WBC 17.04*   RBC 2.67*   HGB 7.4*   HCT 23.4*   *   MCV 88   MCH 27.7   MCHC 31.6*     CMP:   Recent Labs  Lab 07/24/17 0558  07/27/17 0433   *  < > 75   CALCIUM 8.2*  < > 8.4*   ALBUMIN 2.2*  --   --    PROT 7.6  --   --    *  < > 134*   K 3.3*  < > 5.4*   CO2 23  < > 19*   CL 98  < > 102   BUN 31*  < > 40*   CREATININE 5.0*  < > 6.5*   ALKPHOS 128  --   --    ALT 7*  --   --    AST 19  --   --    BILITOT 0.3  --   --    < > = values in this interval not displayed.  Coagulation:     Recent Labs  Lab 07/23/17 1946   INR 1.0   APTT 25.4     CRP:     Recent Labs  Lab 07/24/17 0558   .3*     ESR:     Recent Labs  Lab 07/24/17 0558   SEDRATE 100*     Prealbumin: No results for input(s): PREALBUMIN in the last 48 hours.  Wound Cultures:     Recent Labs  Lab 04/28/17 2037 07/25/17  1000 07/26/17  1414   LABAERO No growth ESCHERICHIA  COLIModerateSusceptibility pending  PROTEUS MIRABILISModerateSusceptibility pendingSkin wu also present PRESUMPTIVE PROTEUS SPECIESModerateIdentification and susceptibility pending  GRAM NEGATIVE DREW, LACTOSE FERMENTERModerateIdentification and susceptibility pending       Diagnostic Results:  I have reviewed all pertinent imaging results/findings within the past 24 hours.    Clinical Findings:  Gangrenous changes noted to plantar arch of left foot with superficial blister noted, draining serosanguinous drainage. Mild erythema and edema noted to dorsum of foot and periwound ischemic changes noted. No acute SOI noted, stable for now.

## 2017-07-28 NOTE — ASSESSMENT & PLAN NOTE
61 yo F with h/o HTN, HLD, DM2 (Hgb A1c 6.3),HFpEF (EF 60%), COPD (home O2, 2.5 L), ESRD on HD (MWF) via L femoral AVG presenting to hospital with left foot ischemic ulcer, wet gangrene s/p I&D by podiatry 07/26    Large plantar wound to bone on left foot. Podiatry following for wound care and s/p I&D  Blood cx NGTD.   Wound cx with presumed proteus- Currently on Rocephin. abx per primary  Anemia present. Please transfuse for hb <7   Left femoral AVG with possible vascular steal; LLE angiogram today.  Please call with questions or concerns; vascular surgery to follow

## 2017-07-28 NOTE — PLAN OF CARE
Problem: Patient Care Overview  Goal: Plan of Care Review  Outcome: Ongoing (interventions implemented as appropriate)  POC reviewed with pt, verbalized understanding. AAOx4. VSS. Tele monitoring. No complaints of pain or distress noted overnight. Pt on 3L NC, sats 92-94%. Pt up to bedside commode with assistance. Remains free from falls and injuries. BG monitored, scheduled insulin and PRN insulin administered per sliding scale order. Pt NPO at midnight for scheduled procedures. Pt resting with call light in reach, will continue to monitor.

## 2017-07-28 NOTE — ASSESSMENT & PLAN NOTE
JULIUS showed  Right leg:The peripheral arteries are non-compressable. The PVR waveforms suggest moderate peripheral arterial occlusive disease. Left leg:The peripheral arteries are non-compressable.The PVR waveforms suggest moderate  to severe peripheral arterial occlusive disease. Vascular surgery consulted. Left femoral AVG with possible vascular steal    Plan   - had Aortogram + Left LE angiogram today 07/28  Which showed Significant steal from left thigh AVG. Left foot not salvageable given extent of infection. Plan left AKA. Likely on Monday

## 2017-07-28 NOTE — PROGRESS NOTES
Patient remained laying flat from 12:45 - 1545 in post-op and was transferred to 9 th floor, pt to remain laying flat on bedrest until 1645.

## 2017-07-28 NOTE — PROGRESS NOTES
[facility]  Vascular Surgery  Progress Note    Patient Name: Aleta Herrera  MRN: [id number]  Admission Date: 7/23/2017  Primary Care Provider: Radha Lao MD    Subjective:     Interval History:   NAEON. No complaints this am. NPO since midnight for angio today.     Post-Op Info:  Procedure(s) (LRB):  INCISION AND DRAINAGE (I&D), FOOT (Left)  DEBRIDEMENT-FOOT   2 Days Post-Op       Medications:  Continuous Infusions:   Scheduled Meds:   amlodipine  10 mg Oral Daily    aspirin  81 mg Oral Daily    calcitRIOL  0.5 mcg Oral Daily    cefTRIAXone (ROCEPHIN) IVPB  1 g Intravenous Q24H    epoetin batsheva (PROCRIT) injection  100 Units/kg Intravenous Every Mon, Wed, Fri    insulin detemir  5 Units Subcutaneous BID    pantoprazole  40 mg Oral Daily    sevelamer carbonate  2,400 mg Oral TID WM     PRN Meds:sodium chloride 0.9%, acetaminophen, dextrose 50%, dextrose 50%, glucagon (human recombinant), glucose, glucose, heparin (porcine), insulin aspart, midazolam (PF), oxycodone, polyethylene glycol     Objective:     Vital Signs (Most Recent):  Temp: 99.9 °F (37.7 °C) (07/28/17 0737)  Pulse: 91 (07/28/17 0737)  Resp: 16 (07/28/17 0737)  BP: (!) 116/57 (07/28/17 0737)  SpO2: (!) 93 % (07/28/17 0737) Vital Signs (24h Range):  Temp:  [98.4 °F (36.9 °C)-101.1 °F (38.4 °C)] 99.9 °F (37.7 °C)  Pulse:  [] 91  Resp:  [16-20] 16  SpO2:  [92 %-94 %] 93 %  BP: (100-136)/(45-66) 116/57          Physical Exam     Constitutional: She is oriented to person, place, and time. She appears well-developed and well-nourished.   HENT:   Head: Normocephalic and atraumatic.   Eyes: EOM are normal. Pupils are equal, round, and reactive to light.   Cardiovascular: Normal rate and regular rhythm.    Pulses:       Femoral pulses are 1+ on the right side, and 1+ on the left side.  Left DP monophasic; no PT  Right DP biphasic; monophasic PT  Thrill in L femoral AVG   Pulmonary/Chest: Effort normal. No  respiratory distress.   Abdominal: She exhibits no distension. There is no tenderness.   Musculoskeletal:   No evidence of infection of left femoral AVG;  No erythema, no fluctuance  Left plantar foot from metatarsal heads to heel with area of compromised ischemic tissue. Epidermis removed, incision to calcaneus, necrotic tissue extending to bone. nontender, no evidence of ascending infection, no crepitus - decreased sensation over dorsum and plantar foot  Neurological: She is alert and oriented to person, place, and time.   Skin: Skin is warm and dry.   Psychiatric: She has a normal mood and affect.     Significant Labs:  CBC:     Recent Labs  Lab 07/27/17  0433 07/28/17  0410   WBC 17.04*  --    RBC 2.67* 2.89*   HGB 7.4* 8.0*   HCT 23.4* 25.3*   * 369*   MCV 88 88   MCH 27.7 27.7   MCHC 31.6* 31.6*     CMP:     Recent Labs  Lab 07/28/17 0410   GLU 84   CALCIUM 8.5*      K 4.1   CO2 22*      BUN 26*   CREATININE 4.6*     Coagulation: No results for input(s): LABPROT, INR, APTT in the last 48 hours.    Significant Diagnostics:  Microbiology Results (last 7 days)     Procedure Component Value Units Date/Time    Blood culture [[id number]] Collected:  07/27/17 1400    Order Status:  Completed Specimen:  Blood Updated:  07/28/17 0115     Blood Culture, Routine No Growth to date    Blood culture [[id number]] Collected:  07/27/17 1400    Order Status:  Completed Specimen:  Blood Updated:  07/28/17 0115     Blood Culture, Routine No Growth to date    AFB Culture & Smear [[id number]] Collected:  07/26/17 1414    Order Status:  Completed Specimen:  Wound from Foot, Left Updated:  07/27/17 2127     AFB Culture & Smear Culture in progress     AFB CULTURE STAIN No acid fast bacilli seen.    Narrative:       1.  Left heel    Culture, Anaerobe [[id number]] Collected:  07/25/17 1000    Order Status:  Completed Specimen:  Wound from Foot, Left Updated:  07/27/17 1230     Anaerobic Culture --     BACTEROIDES  FRAGILIS  Many      Aerobic culture [[id number]] Collected:  07/25/17 1000    Order Status:  Completed Specimen:  Wound from Foot, Left Updated:  07/27/17 1117     Aerobic Bacterial Culture --     ESCHERICHIA COLI  Moderate  Susceptibility pending       Aerobic Bacterial Culture --     PROTEUS MIRABILIS  Moderate  Susceptibility pending  Skin wu also present      Blood culture x two cultures. Draw prior to antibiotics. [[id number]] Collected:  07/23/17 1947    Order Status:  Completed Specimen:  Blood from Peripheral, Hand, Right Updated:  07/27/17 1054     Blood Culture, Routine Gram stain aer bottle: Gram positive cocci in clusters resembling Staph     Blood Culture, Routine Results called to and read back by:Andres Echeverria RN 07/25/2017  08:11     Blood Culture, Routine --     COAGULASE-NEGATIVE STAPHYLOCOCCUS SPECIES  Organism is a probable contaminant      Narrative:       Aerobic and anaerobic    Blood culture [[id number]] Collected:  07/26/17 0800    Order Status:  Completed Specimen:  Blood Updated:  07/27/17 1022     Blood Culture, Routine No Growth to date     Blood Culture, Routine No Growth to date    Blood culture [[id number]] Collected:  07/26/17 0800    Order Status:  Completed Specimen:  Blood Updated:  07/27/17 1022     Blood Culture, Routine No Growth to date     Blood Culture, Routine No Growth to date    Aerobic culture [[id number]] Collected:  07/26/17 1414    Order Status:  Completed Specimen:  Wound from Foot, Left Updated:  07/27/17 0908     Aerobic Bacterial Culture --     PRESUMPTIVE PROTEUS SPECIES  Moderate  Identification and susceptibility pending       Aerobic Bacterial Culture --     GRAM NEGATIVE DREW, LACTOSE   Moderate  Identification and susceptibility pending      Narrative:       1.  Left heel    Blood culture x two cultures. Draw prior to antibiotics. [[id number]] Collected:  07/23/17 1947    Order Status:  Completed Specimen:  Blood from Peripheral, Wrist, Right Updated:   07/27/17 0859     Blood Culture, Routine Gram stain aer bottle: Gram positive rods      Blood Culture, Routine Results called to and read back by: Candy La RN  07/26/2017       Blood Culture, Routine 06:13    Narrative:       Aerobic and anaerobic    Culture, Anaerobe [[id number]] Collected:  07/26/17 1414    Order Status:  Completed Specimen:  Wound from Foot, Left Updated:  07/27/17 0742     Anaerobic Culture Culture in progress    Narrative:       1.  Left heel    Gram stain [[id number]] Collected:  07/26/17 1414    Order Status:  Completed Specimen:  Wound from Foot, Left Updated:  07/26/17 1809     Gram Stain Result Rare WBC's      Few Gram negative rods      Rare Gram positive cocci    Narrative:       1.  Left heel    Fungus culture [[id number]] Collected:  07/26/17 1414    Order Status:  Sent Specimen:  Wound from Foot, Left Updated:  07/26/17 1538            Assessment/Plan:     Type 2 diabetes mellitus with left diabetic foot ulcer    61 yo F with h/o HTN, HLD, DM2 (Hgb A1c 6.3),HFpEF (EF 60%), COPD (home O2, 2.5 L), ESRD on HD (MWF) via L femoral AVG presenting to hospital with left foot bleeding.      Left dorsum foot with fluctuant foul smelling drainage; podiatry following; recommend drainage/debridement  Left femoral AVG with possible vascular steal; will perform LLE angiogram on Friday, if no arterial stenosis identified will plan for femoral graft ligation and permacath placement  Please call with questions or concerns; vascular surgery to follow        * Left foot infection    61 yo F with h/o HTN, HLD, DM2 (Hgb A1c 6.3),HFpEF (EF 60%), COPD (home O2, 2.5 L), ESRD on HD (MWF) via L femoral AVG presenting to hospital with left foot ischemic ulcer, wet gangrene s/p I&D by podiatry 07/26    Large plantar wound to bone on left foot. Podiatry following for wound care and s/p I&D  Blood cx NGTD.   Wound cx with presumed proteus- Currently on Rocephin. abx per primary  Anemia present. Please  transfuse for hb <7   Left femoral AVG with possible vascular steal; LLE angiogram today.  Please call with questions or concerns; vascular surgery to follow            Lori Winkler MD  Vascular Surgery  [facility]

## 2017-07-29 LAB
ANION GAP SERPL CALC-SCNC: 13 MMOL/L
BACTERIA SPEC AEROBE CULT: NORMAL
BACTERIA SPEC AEROBE CULT: NORMAL
BASOPHILS # BLD AUTO: 0.02 K/UL
BASOPHILS NFR BLD: 0.1 %
BUN SERPL-MCNC: 32 MG/DL
CALCIUM SERPL-MCNC: 8.5 MG/DL
CHLORIDE SERPL-SCNC: 102 MMOL/L
CO2 SERPL-SCNC: 20 MMOL/L
CREAT SERPL-MCNC: 5.7 MG/DL
DIFFERENTIAL METHOD: ABNORMAL
EOSINOPHIL # BLD AUTO: 0.2 K/UL
EOSINOPHIL NFR BLD: 1.2 %
ERYTHROCYTE [DISTWIDTH] IN BLOOD BY AUTOMATED COUNT: 18.9 %
EST. GFR  (AFRICAN AMERICAN): 8.6 ML/MIN/1.73 M^2
EST. GFR  (NON AFRICAN AMERICAN): 7.4 ML/MIN/1.73 M^2
GLUCOSE SERPL-MCNC: 62 MG/DL
HCT VFR BLD AUTO: 25.2 %
HGB BLD-MCNC: 7.9 G/DL
LYMPHOCYTES # BLD AUTO: 0.7 K/UL
LYMPHOCYTES NFR BLD: 3.7 %
MCH RBC QN AUTO: 27.4 PG
MCHC RBC AUTO-ENTMCNC: 31.3 G/DL
MCV RBC AUTO: 88 FL
MONOCYTES # BLD AUTO: 1.1 K/UL
MONOCYTES NFR BLD: 5.6 %
NEUTROPHILS # BLD AUTO: 17.5 K/UL
NEUTROPHILS NFR BLD: 89 %
PLATELET # BLD AUTO: 387 K/UL
PMV BLD AUTO: 9.2 FL
POCT GLUCOSE: 180 MG/DL (ref 70–110)
POCT GLUCOSE: 228 MG/DL (ref 70–110)
POCT GLUCOSE: 80 MG/DL (ref 70–110)
POCT GLUCOSE: 82 MG/DL (ref 70–110)
POCT GLUCOSE: 84 MG/DL (ref 70–110)
POTASSIUM SERPL-SCNC: 4.7 MMOL/L
RBC # BLD AUTO: 2.88 M/UL
SODIUM SERPL-SCNC: 135 MMOL/L
WBC # BLD AUTO: 19.62 K/UL

## 2017-07-29 PROCEDURE — 63600175 PHARM REV CODE 636 W HCPCS: Performed by: INTERNAL MEDICINE

## 2017-07-29 PROCEDURE — 90935 HEMODIALYSIS ONE EVALUATION: CPT | Mod: ,,, | Performed by: INTERNAL MEDICINE

## 2017-07-29 PROCEDURE — 80100016 HC MAINTENANCE HEMODIALYSIS

## 2017-07-29 PROCEDURE — 96372 THER/PROPH/DIAG INJ SC/IM: CPT

## 2017-07-29 PROCEDURE — 25000003 PHARM REV CODE 250: Performed by: NURSE PRACTITIONER

## 2017-07-29 PROCEDURE — 11000001 HC ACUTE MED/SURG PRIVATE ROOM

## 2017-07-29 PROCEDURE — 63600175 PHARM REV CODE 636 W HCPCS: Performed by: NURSE PRACTITIONER

## 2017-07-29 PROCEDURE — 25000003 PHARM REV CODE 250: Performed by: STUDENT IN AN ORGANIZED HEALTH CARE EDUCATION/TRAINING PROGRAM

## 2017-07-29 PROCEDURE — 36415 COLL VENOUS BLD VENIPUNCTURE: CPT

## 2017-07-29 PROCEDURE — 25000003 PHARM REV CODE 250: Performed by: INTERNAL MEDICINE

## 2017-07-29 PROCEDURE — 80048 BASIC METABOLIC PNL TOTAL CA: CPT

## 2017-07-29 PROCEDURE — 99232 SBSQ HOSP IP/OBS MODERATE 35: CPT | Mod: GC,,, | Performed by: HOSPITALIST

## 2017-07-29 PROCEDURE — 85025 COMPLETE CBC W/AUTO DIFF WBC: CPT

## 2017-07-29 RX ORDER — ACETAMINOPHEN 325 MG/1
650 TABLET ORAL ONCE
Status: DISCONTINUED | OUTPATIENT
Start: 2017-07-29 | End: 2017-07-29

## 2017-07-29 RX ORDER — OXYCODONE HYDROCHLORIDE 5 MG/1
5 TABLET ORAL EVERY 6 HOURS PRN
Status: DISCONTINUED | OUTPATIENT
Start: 2017-07-29 | End: 2017-07-31

## 2017-07-29 RX ORDER — ONDANSETRON 4 MG/1
8 TABLET, FILM COATED ORAL EVERY 12 HOURS PRN
Status: DISCONTINUED | OUTPATIENT
Start: 2017-07-29 | End: 2017-07-31

## 2017-07-29 RX ADMIN — OXYCODONE HYDROCHLORIDE 5 MG: 5 TABLET ORAL at 09:07

## 2017-07-29 RX ADMIN — OXYCODONE HYDROCHLORIDE 5 MG: 5 TABLET ORAL at 03:07

## 2017-07-29 RX ADMIN — SODIUM CHLORIDE 300 ML: 0.9 INJECTION, SOLUTION INTRAVENOUS at 10:07

## 2017-07-29 RX ADMIN — SEVELAMER CARBONATE 2400 MG: 800 TABLET, FILM COATED ORAL at 05:07

## 2017-07-29 RX ADMIN — PANTOPRAZOLE SODIUM 40 MG: 40 TABLET, DELAYED RELEASE ORAL at 02:07

## 2017-07-29 RX ADMIN — AMLODIPINE BESYLATE 10 MG: 10 TABLET ORAL at 02:07

## 2017-07-29 RX ADMIN — ERYTHROPOIETIN 6400 UNITS: 10000 INJECTION, SOLUTION INTRAVENOUS; SUBCUTANEOUS at 10:07

## 2017-07-29 RX ADMIN — CEFTRIAXONE 1 G: 1 INJECTION, SOLUTION INTRAVENOUS at 03:07

## 2017-07-29 RX ADMIN — CALCITRIOL 0.5 MCG: 0.25 CAPSULE, LIQUID FILLED ORAL at 02:07

## 2017-07-29 RX ADMIN — ASPIRIN 81 MG CHEWABLE TABLET 81 MG: 81 TABLET CHEWABLE at 02:07

## 2017-07-29 RX ADMIN — ONDANSETRON 8 MG: 4 TABLET, FILM COATED ORAL at 03:07

## 2017-07-29 RX ADMIN — SEVELAMER CARBONATE 2400 MG: 800 TABLET, FILM COATED ORAL at 02:07

## 2017-07-29 RX ADMIN — INSULIN DETEMIR 5 UNITS: 100 INJECTION, SOLUTION SUBCUTANEOUS at 09:07

## 2017-07-29 RX ADMIN — ACETAMINOPHEN 650 MG: 325 TABLET ORAL at 05:07

## 2017-07-29 NOTE — ASSESSMENT & PLAN NOTE
Presenting to the ED w/ new bleeding from L leg ulcer along with fevers, chills and new leukocytosis.   - Received vancomycin , flagyl and rocephin in ED. Fever resolved with Tylenol in ED. Dc'd vancomycin and flagyl on the floor.   - Blood cx 1/4 +gram positive cocci in clusters resembling staph   - Blood cx 1/4 +gram positive rods  - Wound cx growing proteus and GNR, lactose   - WBC continuing downward trend  - Repeat blood cx show NGTD  - S/p Aortogram + Left LE angiogram   - Significant steal from left thigh AVG. Left foot not salvageable given extent of infection. Plan left AKA.  Plan  - Continuing rocephin at this time, may switch to po cipro pending wound cx sensitivities   - Vascular Surgery to perform L AKA. Scheduled for 7/31/17

## 2017-07-29 NOTE — PROGRESS NOTES
[facility] Hospital Medicine  Progress Note    Patient Name: Aleta Herrera  MRN: [id number]  Patient Class: IP- Inpatient   Admission Date: 7/23/2017  Length of Stay: 6 days  Attending Physician: Malick Mahajan MD  Primary Care Provider: Radha Lao MD    [facility] Medicine Team: [facility] MED 2 Estrella Ramos MD    Subjective:     Principal Problem:Left foot infection    HPI:  Aleta Herrera is a 62-year-old female with HTN, HFpEF (EF 60%; home O2 2.5 liters), anemia 2/2 CKD and IDDM c/b retinopathy, neuropathy and ESRD (HD MWF). She presents today with her  to the ED because of new bleeding from the left foot ulcer. She's had on/off fevers and chills for past week or so. Apparently blood cultures were drawn at dialysis (7/19). She was seen by vascular surgery the next day where her right femoral permacath was pulled. She has a working left femoral AVG. Her upper extremity access has failed over the years since being placed on dialysis in 2009. Besides left leg pain, she denies any SOB, CP, cough, congestion, abdominal pain, n/v or diarrhea. Not received any recent antibiotics.   Complaining of foot pain in the ED, otherwise has no complaints. CXR showed mild b/l effusions. Left foot x-ray soft tissue swelling, but no gas formation. Labs significant for WBC count of 24k. Procalcitonin 1.64.       Hospital Course:  7/24: VSS. WBC 22.58 down from 24.04 in ED. Sed rate 100. .3. CXR stable. L Foot xray soft tissue swelling. MRI L foot no osteomylitis, no drainable collections. JULIUS L with mod-severe occlusive disease, R with mod. Continue ceftriaxone. HD today.  7/25: VSS. WBC 19.18. Vasc Surg consulted, L femoral AVG w/ possible vascular steal; will perform LLE angiogram on 7/28, if no arterial stenosis will plan for femoral graft ligation & permacath placement. Podiatry consulted, extensive stable left plantar arch necrosis, no acute surgical  intervention; will f/u after angiogram.  Wound cx pending. 1/4 Bcx +GPC in clusters. Continue ceftriaxone.   7/26: I&D and debridement with Podiatry today.  Continue Ceftriaxone.   7/27: Spiked fever to 101.1F, improved with tylenol. Repeated Blood cx x2. HD today. NPO at midnight.  7/28: Had Aortogram + Left LE angiogram by vascular surgery; significant steal from AVG, left foot not salvagable. Plan for Left AKA.   7/29: VSS. HD today. Continue abx.     No new subjective & objective note has been filed under this hospital service since the last note was generated.    Assessment/Plan:      * Left foot infection    Presenting to the ED w/ new bleeding from L leg ulcer along with fevers, chills and new leukocytosis.   - Received vancomycin , flagyl and rocephin in ED. Fever resolved with Tylenol in ED. Dc'd vancomycin and flagyl on the floor.   - Blood cx 1/4 +gram positive cocci in clusters resembling staph   - Blood cx 1/4 +gram positive rods  - Wound cx growing proteus and GNR, lactose   - WBC continuing downward trend  - Repeat blood cx show NGTD  - S/p Aortogram + Left LE angiogram   - Significant steal from left thigh AVG. Left foot not salvageable given extent of infection. Plan left AKA.  Plan  - Continuing rocephin at this time, may switch to po cipro pending wound cx sensitivities   - Vascular Surgery to perform L AKA. Scheduled for 7/31/17        Type 2 diabetes mellitus with left diabetic foot ulcer    As above        Peripheral vascular disease, unspecified     JULIUS showed  Right leg:The peripheral arteries are non-compressable. The PVR waveforms suggest moderate peripheral arterial occlusive disease. Left leg:The peripheral arteries are non-compressable.The PVR waveforms suggest moderate  to severe peripheral arterial occlusive disease. Vascular surgery consulted. Left femoral AVG with possible vascular steal    Plan   - had Aortogram + Left LE angiogram today 07/28  Which showed Significant  steal from left thigh AVG. Left foot not salvageable given extent of infection. Plan left AKA. Likely on Monday         Anemia of chronic renal failure    - Procrit w/ dialysis.   - Continue to monitor H/H      ESRD (end stage renal disease)    - Consulted nephrology.   Plan  - Dialysis inpatient  - Will monitor electrolytes      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    - Resume Norvasc  - Diabetic diet inpatient  - SSI      Hypertension, renal    - Continue home amlodipine 10 mg QD        VTE Risk Mitigation         Ordered     Medium Risk of VTE  Once      07/28/17 1244     Place RICKEY hose  Until discontinued      07/28/17 1244          Estrella Ramos MD  Department of Hospital Medicine   [facility]

## 2017-07-29 NOTE — ASSESSMENT & PLAN NOTE
61 yo F with h/o HTN, HLD, DM2 (Hgb A1c 6.3),HFpEF (EF 60%), COPD (home O2, 2.5 L), ESRD on HD (MWF) via L femoral AVG presenting to hospital with left foot ischemic ulcer, wet gangrene s/p I&D by podiatry 07/26    Recommending L AKA with no ligation of L AVG as L foot not salvageable; discussed with patient would like to think about it and d/w family  Currently on the schedule for Monday  Wet to dry bid dressing changes with Dakin's solution  Vascular surgery to follow

## 2017-07-29 NOTE — PROGRESS NOTES
[facility]    The note from fellow/resident was reviewed and  patient was seen and discussed.  Seen on IHD.  Bp's stable. UF of 2 L as iman.

## 2017-07-29 NOTE — PROGRESS NOTES
[facility]  Vascular Surgery  Progress Note    Patient Name: Aleta Herrera  MRN: [id number]  Admission Date: 7/23/2017  Primary Care Provider: Radha Lao MD    Subjective:     Interval History: no acute events overnight; seen while on HD.    Post-Op Info:  Procedure(s) (LRB):  ANGIOGRAM-EXTREMITY-LOWER (Left)   1 Day Post-Op       Medications:  Continuous Infusions:   Scheduled Meds:   sodium chloride 0.9%   Intravenous Once    acetaminophen  650 mg Oral Once    amlodipine  10 mg Oral Daily    aspirin  81 mg Oral Daily    calcitRIOL  0.5 mcg Oral Daily    cefTRIAXone (ROCEPHIN) IVPB  1 g Intravenous Q24H    epoetin batsheva (PROCRIT) injection  100 Units/kg Intravenous Every Mon, Wed, Fri    insulin detemir  5 Units Subcutaneous BID    pantoprazole  40 mg Oral Daily    sevelamer carbonate  2,400 mg Oral TID WM    sodium hypochlorite   Irrigation Once     PRN Meds:sodium chloride 0.9%, sodium chloride 0.9%, acetaminophen, dextrose 50%, dextrose 50%, glucagon (human recombinant), glucose, glucose, heparin (porcine), insulin aspart, midazolam (PF), oxycodone, polyethylene glycol     Objective:     Vital Signs (Most Recent):  Temp: 98.8 °F (37.1 °C) (07/29/17 0840)  Pulse: 97 (07/29/17 1100)  Resp: 18 (07/29/17 1100)  BP: (!) 116/59 (07/29/17 1100)  SpO2: (!) 94 % (07/29/17 1000) Vital Signs (24h Range):  Temp:  [98 °F (36.7 °C)-98.8 °F (37.1 °C)] 98.8 °F (37.1 °C)  Pulse:  [77-99] 97  Resp:  [17-20] 18  SpO2:  [90 %-100 %] 94 %  BP: (103-166)/(52-68) 116/59          Physical Exam   Constitutional: She is oriented to person, place, and time. She appears well-developed and well-nourished.   Cardiovascular: Normal rate and regular rhythm.    Pulmonary/Chest: Effort normal. No respiratory distress.   Musculoskeletal:   Left foot with large ischemic ulcer and necrotic muscle   Neurological: She is alert and oriented to person, place, and time.       Significant Labs:  CBC:   Recent  Labs  Lab 07/29/17  0434   WBC 19.62*   RBC 2.88*   HGB 7.9*   HCT 25.2*   *   MCV 88   MCH 27.4   MCHC 31.3*     CMP:   Recent Labs  Lab 07/29/17  0434   GLU 62*   CALCIUM 8.5*   *   K 4.7   CO2 20*      BUN 32*   CREATININE 5.7*           Assessment/Plan:     Type 2 diabetes mellitus with left diabetic foot ulcer    61 yo F with h/o HTN, HLD, DM2 (Hgb A1c 6.3),HFpEF (EF 60%), COPD (home O2, 2.5 L), ESRD on HD (MWF) via L femoral AVG presenting to hospital with left foot bleeding.      Left dorsum foot with fluctuant foul smelling drainage; podiatry following; recommend drainage/debridement  Left femoral AVG with possible vascular steal; will perform LLE angiogram on Friday, if no arterial stenosis identified will plan for femoral graft ligation and permacath placement  Please call with questions or concerns; vascular surgery to follow        * Left foot infection    61 yo F with h/o HTN, HLD, DM2 (Hgb A1c 6.3),HFpEF (EF 60%), COPD (home O2, 2.5 L), ESRD on HD (MWF) via L femoral AVG presenting to hospital with left foot ischemic ulcer, wet gangrene s/p I&D by podiatry 07/26    Recommending L AKA with no ligation of L AVG as L foot not salvageable; discussed with patient would like to think about it and d/w family  Currently on the schedule for Monday  Wet to dry bid dressing changes with Dakin's solution  Vascular surgery to follow            Melissa Graham MD  Vascular Surgery  [facility]

## 2017-07-29 NOTE — PROGRESS NOTES
Patient received from floor with report from Lien Garcia.  Maintenance dialysis began per orders via 15 gauge fistula needles to left thigh graft.

## 2017-07-29 NOTE — OP NOTE
[facility]  Vascular Surgery  Operative Note    SUMMARY     Date of Procedure: 7/28/2017     Procedure: Left lower extremity angiogram                       Aortogram                       US guided access of right CFA    Surgeon(s) and Role:     * Melissa Graham MD - Fellow     * Nathalie Madera MD - Primary    Assisting Surgeon: None    Pre-Operative Diagnosis: Left foot infection [L08.9]    Post-Operative Diagnosis: Post-Op Diagnosis Codes:     * Left foot infection [L08.9]; vascular steal     Anesthesia: Local MAC    Indication for operation: 61 yo F with ESRD on HD with left femoral AVG with extensive left foot gangrene.    Description of the Findings of the Procedure: steal from femoral AVG, improved with compression of AVG; extensive tibial disease        Complications: No    Estimated Blood Loss (EBL): 15 mL           Implants: none  Specimens:   Specimen (12h ago through future)    None                  Condition: Good    Disposition: PACU - hemodynamically stable.    Operation in detail: Informed consent obtained and patient was brought to operating room and placed in supine position.  Local monitored anesthesia care was begun.  Right CFA was visualized with US and noted to be patent; it was accessed with micropuncture needle and wire with wire placement confirmed by fluoroscopy.  5 [place] sheath was introduced.  Patient was given 5000 U of IV heparin.  Angled glide catheter and Omniflush catheter was introduced into distal aorta and aortogram was performed demonstrating bilateral patent common and external iliac arteries.  Left common iliac artery was selected with wire and catheter advancement into left external iliac artery using [place] catheter.  Angiogram images demonstrated patent left common femoral artery and patent SFA with patent AVG.  There was minimal flow in SFA distal to AVG.  [place] catheter was advanced into distal SFA again with minimal flow noted distally.   Distal angiogram images were performed with AVG compression.  This noted patent popliteal artery and diffuse tibial disease with proximal occlusion of AT, peroneal and PT.  Decision made to conclude procedure; endovascular revascularization for left foot salvage not an option; based on degree of gangrene of left foot wound patient would require a higher amputation, with AKA best option for graft salvage and wound healing.  5 Spanish mynx device was deployed with good hemostasis noted.  Sterile dressing was applied and patient was taken to PACU in stable condition.

## 2017-07-29 NOTE — PLAN OF CARE
Problem: Patient Care Overview  Goal: Plan of Care Review  Outcome: Ongoing (interventions implemented as appropriate)  POC reviewed with pt, pt verbalized understanding. VSS. AAOx4.Pt on 3L NC, sats >90% Pt complains of pain overnight. Controlled with PRN pain meds. IV abx administered. BG monitored. Pt up with assist. Remains free from falls and injuries. Pt resting with call light in reach. Will continue to monitor.

## 2017-07-29 NOTE — SUBJECTIVE & OBJECTIVE
Medications:  Continuous Infusions:   Scheduled Meds:   sodium chloride 0.9%   Intravenous Once    acetaminophen  650 mg Oral Once    amlodipine  10 mg Oral Daily    aspirin  81 mg Oral Daily    calcitRIOL  0.5 mcg Oral Daily    cefTRIAXone (ROCEPHIN) IVPB  1 g Intravenous Q24H    epoetin batsheva (PROCRIT) injection  100 Units/kg Intravenous Every Mon, Wed, Fri    insulin detemir  5 Units Subcutaneous BID    pantoprazole  40 mg Oral Daily    sevelamer carbonate  2,400 mg Oral TID WM    sodium hypochlorite   Irrigation Once     PRN Meds:sodium chloride 0.9%, sodium chloride 0.9%, acetaminophen, dextrose 50%, dextrose 50%, glucagon (human recombinant), glucose, glucose, heparin (porcine), insulin aspart, midazolam (PF), oxycodone, polyethylene glycol     Objective:     Vital Signs (Most Recent):  Temp: 98.8 °F (37.1 °C) (07/29/17 0840)  Pulse: 97 (07/29/17 1100)  Resp: 18 (07/29/17 1100)  BP: (!) 116/59 (07/29/17 1100)  SpO2: (!) 94 % (07/29/17 1000) Vital Signs (24h Range):  Temp:  [98 °F (36.7 °C)-98.8 °F (37.1 °C)] 98.8 °F (37.1 °C)  Pulse:  [77-99] 97  Resp:  [17-20] 18  SpO2:  [90 %-100 %] 94 %  BP: (103-166)/(52-68) 116/59          Physical Exam   Constitutional: She is oriented to person, place, and time. She appears well-developed and well-nourished.   Cardiovascular: Normal rate and regular rhythm.    Pulmonary/Chest: Effort normal. No respiratory distress.   Musculoskeletal:   Left foot with large ischemic ulcer and necrotic muscle   Neurological: She is alert and oriented to person, place, and time.       Significant Labs:  CBC:   Recent Labs  Lab 07/29/17  0434   WBC 19.62*   RBC 2.88*   HGB 7.9*   HCT 25.2*   *   MCV 88   MCH 27.4   MCHC 31.3*     CMP:   Recent Labs  Lab 07/29/17  0434   GLU 62*   CALCIUM 8.5*   *   K 4.7   CO2 20*      BUN 32*   CREATININE 5.7*

## 2017-07-30 LAB
ABO + RH BLD: NORMAL
ANION GAP SERPL CALC-SCNC: 11 MMOL/L
ANISOCYTOSIS BLD QL SMEAR: SLIGHT
BASOPHILS # BLD AUTO: 0.03 K/UL
BASOPHILS NFR BLD: 0.1 %
BLD GP AB SCN CELLS X3 SERPL QL: NORMAL
BUN SERPL-MCNC: 20 MG/DL
CALCIUM SERPL-MCNC: 8.3 MG/DL
CHLORIDE SERPL-SCNC: 100 MMOL/L
CO2 SERPL-SCNC: 24 MMOL/L
CREAT SERPL-MCNC: 4.1 MG/DL
DIFFERENTIAL METHOD: ABNORMAL
EOSINOPHIL # BLD AUTO: 0.3 K/UL
EOSINOPHIL NFR BLD: 1.2 %
ERYTHROCYTE [DISTWIDTH] IN BLOOD BY AUTOMATED COUNT: 18.9 %
EST. GFR  (AFRICAN AMERICAN): 12.8 ML/MIN/1.73 M^2
EST. GFR  (NON AFRICAN AMERICAN): 11.1 ML/MIN/1.73 M^2
GLUCOSE SERPL-MCNC: 173 MG/DL
HCT VFR BLD AUTO: 24.9 %
HGB BLD-MCNC: 7.7 G/DL
HYPOCHROMIA BLD QL SMEAR: ABNORMAL
INR PPP: 1
LYMPHOCYTES # BLD AUTO: 1 K/UL
LYMPHOCYTES NFR BLD: 4.7 %
MCH RBC QN AUTO: 26.8 PG
MCHC RBC AUTO-ENTMCNC: 30.9 G/DL
MCV RBC AUTO: 87 FL
MONOCYTES # BLD AUTO: 1.1 K/UL
MONOCYTES NFR BLD: 5.4 %
NEUTROPHILS # BLD AUTO: 18.7 K/UL
NEUTROPHILS NFR BLD: 88.6 %
OVALOCYTES BLD QL SMEAR: ABNORMAL
PLATELET # BLD AUTO: 345 K/UL
PMV BLD AUTO: 9.3 FL
POCT GLUCOSE: 102 MG/DL (ref 70–110)
POCT GLUCOSE: 131 MG/DL (ref 70–110)
POCT GLUCOSE: 145 MG/DL (ref 70–110)
POCT GLUCOSE: 96 MG/DL (ref 70–110)
POIKILOCYTOSIS BLD QL SMEAR: SLIGHT
POLYCHROMASIA BLD QL SMEAR: ABNORMAL
POTASSIUM SERPL-SCNC: 3.9 MMOL/L
PROTHROMBIN TIME: 10.5 SEC
RBC # BLD AUTO: 2.87 M/UL
SODIUM SERPL-SCNC: 135 MMOL/L
WBC # BLD AUTO: 21.19 K/UL

## 2017-07-30 PROCEDURE — 86850 RBC ANTIBODY SCREEN: CPT

## 2017-07-30 PROCEDURE — 63600175 PHARM REV CODE 636 W HCPCS: Performed by: INTERNAL MEDICINE

## 2017-07-30 PROCEDURE — 25000003 PHARM REV CODE 250: Performed by: STUDENT IN AN ORGANIZED HEALTH CARE EDUCATION/TRAINING PROGRAM

## 2017-07-30 PROCEDURE — 25000003 PHARM REV CODE 250: Performed by: INTERNAL MEDICINE

## 2017-07-30 PROCEDURE — 99232 SBSQ HOSP IP/OBS MODERATE 35: CPT | Mod: GC,,, | Performed by: HOSPITALIST

## 2017-07-30 PROCEDURE — 86900 BLOOD TYPING SEROLOGIC ABO: CPT

## 2017-07-30 PROCEDURE — 80048 BASIC METABOLIC PNL TOTAL CA: CPT

## 2017-07-30 PROCEDURE — 11000001 HC ACUTE MED/SURG PRIVATE ROOM

## 2017-07-30 PROCEDURE — 85025 COMPLETE CBC W/AUTO DIFF WBC: CPT

## 2017-07-30 PROCEDURE — 85610 PROTHROMBIN TIME: CPT

## 2017-07-30 PROCEDURE — 36415 COLL VENOUS BLD VENIPUNCTURE: CPT

## 2017-07-30 PROCEDURE — 86920 COMPATIBILITY TEST SPIN: CPT

## 2017-07-30 RX ORDER — CIPROFLOXACIN 500 MG/1
500 TABLET ORAL EVERY 24 HOURS
Status: DISCONTINUED | OUTPATIENT
Start: 2017-07-31 | End: 2017-08-02

## 2017-07-30 RX ADMIN — SEVELAMER CARBONATE 2400 MG: 800 TABLET, FILM COATED ORAL at 04:07

## 2017-07-30 RX ADMIN — INSULIN DETEMIR 5 UNITS: 100 INJECTION, SOLUTION SUBCUTANEOUS at 08:07

## 2017-07-30 RX ADMIN — OXYCODONE HYDROCHLORIDE 5 MG: 5 TABLET ORAL at 08:07

## 2017-07-30 RX ADMIN — ACETAMINOPHEN 650 MG: 325 TABLET ORAL at 04:07

## 2017-07-30 RX ADMIN — CEFTRIAXONE 1 G: 1 INJECTION, SOLUTION INTRAVENOUS at 03:07

## 2017-07-30 RX ADMIN — AMLODIPINE BESYLATE 10 MG: 10 TABLET ORAL at 09:07

## 2017-07-30 RX ADMIN — PANTOPRAZOLE SODIUM 40 MG: 40 TABLET, DELAYED RELEASE ORAL at 09:07

## 2017-07-30 RX ADMIN — CALCITRIOL 0.5 MCG: 0.25 CAPSULE, LIQUID FILLED ORAL at 09:07

## 2017-07-30 RX ADMIN — SEVELAMER CARBONATE 2400 MG: 800 TABLET, FILM COATED ORAL at 12:07

## 2017-07-30 RX ADMIN — ASPIRIN 81 MG CHEWABLE TABLET 81 MG: 81 TABLET CHEWABLE at 09:07

## 2017-07-30 RX ADMIN — INSULIN DETEMIR 5 UNITS: 100 INJECTION, SOLUTION SUBCUTANEOUS at 09:07

## 2017-07-30 RX ADMIN — OXYCODONE HYDROCHLORIDE 5 MG: 5 TABLET ORAL at 01:07

## 2017-07-30 NOTE — SUBJECTIVE & OBJECTIVE
Interval History: Pt reports episode nausea and vomiting yesterday afternoon that has since resolved with Zofran. Pt's pain is currently well controlled prn pain meds. Pt is very upset with the news that she needs AKA, but seems to understand that the procedure is necessary.  Presently denies fever, chills, abdominal pain, n/v/d.     Review of Systems   Constitutional: Negative for chills and fever.   Respiratory: Negative for cough and shortness of breath.    Cardiovascular: Negative for chest pain.   Gastrointestinal: Positive for nausea and vomiting. Negative for abdominal pain and diarrhea.   Genitourinary:        Does not make urine   Musculoskeletal: Positive for myalgias (left foot).   Skin: Positive for wound (s/p debridement).   Neurological: Positive for light-headedness. Negative for dizziness and headaches.   All other systems reviewed and are negative.    Objective:     Vital Signs (Most Recent):  Temp: 97.5 °F (36.4 °C) (07/30/17 0400)  Pulse: 80 (07/30/17 0743)  Resp: 16 (07/30/17 0400)  BP: 128/64 (07/30/17 0400)  SpO2: 96 % (07/30/17 0400) Vital Signs (24h Range):  Temp:  [97.5 °F (36.4 °C)-99.5 °F (37.5 °C)] 97.5 °F (36.4 °C)  Pulse:  [] 80  Resp:  [16-18] 16  SpO2:  [91 %-96 %] 96 %  BP: (101-128)/(51-68) 128/64     Weight: 67 kg (147 lb 11.3 oz)  Body mass index is 29.83 kg/m².    Intake/Output Summary (Last 24 hours) at 07/30/17 0755  Last data filed at 07/29/17 1151   Gross per 24 hour   Intake              600 ml   Output             3000 ml   Net            -2400 ml      Physical Exam   Constitutional: She appears well-developed and well-nourished.   HENT:   Head: Normocephalic and atraumatic.   Eyes: EOM are normal. Pupils are equal, round, and reactive to light.   Cardiovascular: Normal rate and regular rhythm.    Pulmonary/Chest: Effort normal. No respiratory distress.   Abdominal: She exhibits no distension. There is no tenderness. A hernia (soft, non-tender) is present.    Musculoskeletal:   Left foot with loosely wrapped dressing, which is clean, dry, and intact.  No surrounding tenderness, edema, or erythema.    Neurological: She is alert.   Skin: Skin is warm and dry.     Significant Labs:     BMP:   Recent Labs  Lab 07/30/17  0445   *   *   K 3.9      CO2 24   BUN 20   CREATININE 4.1*   CALCIUM 8.3*     CBC:   Recent Labs  Lab 07/29/17  0434 07/30/17  0445   WBC 19.62*  --    HGB 7.9* 7.7*   HCT 25.2* 24.9*   * 345     Significant Imaging: No new imaging.

## 2017-07-30 NOTE — PROGRESS NOTES
[facility] Hospital Medicine  Progress Note    Patient Name: Aleta Herrera  MRN: [id number]  Patient Class: IP- Inpatient   Admission Date: 7/23/2017  Length of Stay: 7 days  Attending Physician: Malick Mahajan MD  Primary Care Provider: Radha Lao MD    [facility] Medicine Team: [facility] HOSP MED 2 Estrella Ramos MD    Subjective:     Principal Problem:Left foot infection    HPI:  Aleta Herrera is a 62-year-old female with HTN, HFpEF (EF 60%; home O2 2.5 liters), anemia 2/2 CKD and IDDM c/b retinopathy, neuropathy and ESRD (HD MWF). She presents today with her  to the ED because of new bleeding from the left foot ulcer. She's had on/off fevers and chills for past week or so. Apparently blood cultures were drawn at dialysis (7/19). She was seen by vascular surgery the next day where her right femoral permacath was pulled. She has a working left femoral AVG. Her upper extremity access has failed over the years since being placed on dialysis in 2009. Besides left leg pain, she denies any SOB, CP, cough, congestion, abdominal pain, n/v or diarrhea. Not received any recent antibiotics.   Complaining of foot pain in the ED, otherwise has no complaints. CXR showed mild b/l effusions. Left foot x-ray soft tissue swelling, but no gas formation. Labs significant for WBC count of 24k. Procalcitonin 1.64.       Hospital Course:  7/24: VSS. WBC 22.58 down from 24.04 in ED. Sed rate 100. .3. CXR stable. L Foot xray soft tissue swelling. MRI L foot no osteomylitis, no drainable collections. JULIUS L with mod-severe occlusive disease, R with mod. Continue ceftriaxone. HD today.  7/25: VSS. WBC 19.18. Vasc Surg consulted, L femoral AVG w/ possible vascular steal; will perform LLE angiogram on 7/28, if no arterial stenosis will plan for femoral graft ligation & permacath placement. Podiatry consulted, extensive stable left plantar arch necrosis, no acute surgical  intervention; will f/u after angiogram.  Wound cx pending. 1/4 Bcx +GPC in clusters. Continue ceftriaxone.   7/26: I&D and debridement with Podiatry today.  Continue Ceftriaxone.   7/27: Spiked fever to 101.1F, improved with tylenol. Repeated Blood cx x2. HD today. NPO at midnight.  7/28: Had Aortogram + Left LE angiogram by vascular surgery; significant steal from AVG, left foot not salvagable. Plan for Left AKA.   7/29: VSS. HD today. Continue abx.     Interval History: Pt reports episode nausea and vomiting yesterday afternoon that has since resolved with Zofran. Pt's pain is currently well controlled prn pain meds. Pt is very upset with the news that she needs AKA, but seems to understand that the procedure is necessary.  Presently denies fever, chills, abdominal pain, n/v/d.     Review of Systems   Constitutional: Negative for chills and fever.   Respiratory: Negative for cough and shortness of breath.    Cardiovascular: Negative for chest pain.   Gastrointestinal: Positive for nausea and vomiting. Negative for abdominal pain and diarrhea.   Genitourinary:        Does not make urine   Musculoskeletal: Positive for myalgias (left foot).   Skin: Positive for wound (s/p debridement).   Neurological: Positive for light-headedness. Negative for dizziness and headaches.   All other systems reviewed and are negative.    Objective:     Vital Signs (Most Recent):  Temp: 97.5 °F (36.4 °C) (07/30/17 0400)  Pulse: 80 (07/30/17 0743)  Resp: 16 (07/30/17 0400)  BP: 128/64 (07/30/17 0400)  SpO2: 96 % (07/30/17 0400) Vital Signs (24h Range):  Temp:  [97.5 °F (36.4 °C)-99.5 °F (37.5 °C)] 97.5 °F (36.4 °C)  Pulse:  [] 80  Resp:  [16-18] 16  SpO2:  [91 %-96 %] 96 %  BP: (101-128)/(51-68) 128/64     Weight: 67 kg (147 lb 11.3 oz)  Body mass index is 29.83 kg/m².    Intake/Output Summary (Last 24 hours) at 07/30/17 3585  Last data filed at 07/29/17 1151   Gross per 24 hour   Intake              600 ml   Output              3000 ml   Net            -2400 ml      Physical Exam   Constitutional: She appears well-developed and well-nourished.   HENT:   Head: Normocephalic and atraumatic.   Eyes: EOM are normal. Pupils are equal, round, and reactive to light.   Cardiovascular: Normal rate and regular rhythm.    Pulmonary/Chest: Effort normal. No respiratory distress.   Abdominal: She exhibits no distension. There is no tenderness. A hernia (soft, non-tender) is present.   Musculoskeletal:   Left foot with loosely wrapped dressing, which is clean, dry, and intact.  No surrounding tenderness, edema, or erythema.    Neurological: She is alert.   Skin: Skin is warm and dry.     Significant Labs:     BMP:   Recent Labs  Lab 07/30/17  0445   *   *   K 3.9      CO2 24   BUN 20   CREATININE 4.1*   CALCIUM 8.3*     CBC:   Recent Labs  Lab 07/29/17  0434 07/30/17  0445   WBC 19.62*  --    HGB 7.9* 7.7*   HCT 25.2* 24.9*   * 345     Significant Imaging: No new imaging.     Assessment/Plan:      * Left foot infection    Presenting to the ED w/ new bleeding from L leg ulcer along with fevers, chills and new leukocytosis.   - Received vancomycin , flagyl and rocephin in ED. Fever resolved with Tylenol in ED. Dc'd vancomycin and flagyl on the floor.   - Blood cx 1/4 +gram positive cocci in clusters resembling staph   - Blood cx 1/4 +gram positive rods  - Wound cx growing proteus and GNR, lactose   - Repeat blood cx x2 show NGTD  - S/p Aortogram + Left LE angiogram with Vascular surgery   - Significant steal from L thigh AVG. L foot not salvageable given extent of infection. Plan for L AKA.  Plan  - Continuing rocephin at this time, may switch to PO Ciprofloxacin per wound cx sensitivities   - Vascular Surgery to perform L AKA. Scheduled for 7/31/17        Peripheral vascular disease, unspecified     JULIUS showed  Right leg:The peripheral arteries are non-compressable. The PVR waveforms suggest moderate peripheral arterial  occlusive disease. Left leg:The peripheral arteries are non-compressable.The PVR waveforms suggest moderate  to severe peripheral arterial occlusive disease. Vascular surgery consulted. Left femoral AVG with possible vascular steal    Plan   - had Aortogram + Left LE angiogram 07/28 which showed Significant steal from left thigh AVG. Left foot not salvageable given extent of infection. Plan left AKA.         Anemia of chronic renal failure    - Procrit w/ dialysis.   - Continue to monitor H/H        ESRD (end stage renal disease)    - Consulted nephrology.   Plan  - Dialysis inpatient  - Will continue to monitor electrolytes        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    - Resume Norvasc 10 mg QD  - Diabetic diet inpatient  - Sliding scale insulin       VTE Risk Mitigation         Ordered     Medium Risk of VTE  Once      07/28/17 1244     Place RICKEY hose  Until discontinued      07/28/17 1244          Estrella Ramos MD  Department of Hospital Medicine   [facility]

## 2017-07-30 NOTE — ASSESSMENT & PLAN NOTE
Presenting to the ED w/ new bleeding from L leg ulcer along with fevers, chills and new leukocytosis.   - Received vancomycin , flagyl and rocephin in ED. Fever resolved with Tylenol in ED. Dc'd vancomycin and flagyl on the floor.   - Blood cx 1/4 +gram positive cocci in clusters resembling staph   - Blood cx 1/4 +gram positive rods  - Wound cx growing proteus and GNR, lactose   - Repeat blood cx x2 show NGTD  - S/p Aortogram + Left LE angiogram with Vascular surgery   - Significant steal from L thigh AVG. L foot not salvageable given extent of infection. Plan for L AKA.  Plan  - Continuing rocephin at this time, may switch to PO Ciprofloxacin per wound cx sensitivities   - Vascular Surgery to perform L AKA. Scheduled for 7/31/17

## 2017-07-30 NOTE — PROGRESS NOTES
[facility]  Vascular Surgery  Progress Note    Patient Name: Aleta Herrera  MRN: [id number]  Admission Date: 7/23/2017  Primary Care Provider: Radha Lao MD    Subjective:     Interval History: no acute events.    Post-Op Info:  Procedure(s) (LRB):  ANGIOGRAM-EXTREMITY-LOWER (Left)   2 Days Post-Op       Medications:  Continuous Infusions:   Scheduled Meds:   amlodipine  10 mg Oral Daily    aspirin  81 mg Oral Daily    calcitRIOL  0.5 mcg Oral Daily    cefTRIAXone (ROCEPHIN) IVPB  1 g Intravenous Q24H    epoetin batsheva (PROCRIT) injection  100 Units/kg Intravenous Every Mon, Wed, Fri    insulin detemir  5 Units Subcutaneous BID    pantoprazole  40 mg Oral Daily    sevelamer carbonate  2,400 mg Oral TID WM    sodium hypochlorite   Irrigation Once     PRN Meds:sodium chloride 0.9%, sodium chloride 0.9%, acetaminophen, dextrose 50%, dextrose 50%, glucagon (human recombinant), glucose, glucose, heparin (porcine), insulin aspart, midazolam (PF), ondansetron, oxycodone, polyethylene glycol     Objective:     Vital Signs (Most Recent):  Temp: 97.5 °F (36.4 °C) (07/30/17 0400)  Pulse: 80 (07/30/17 0743)  Resp: 16 (07/30/17 0400)  BP: 128/64 (07/30/17 0400)  SpO2: 96 % (07/30/17 0400) Vital Signs (24h Range):  Temp:  [97.5 °F (36.4 °C)-99.5 °F (37.5 °C)] 97.5 °F (36.4 °C)  Pulse:  [] 80  Resp:  [16-18] 16  SpO2:  [93 %-96 %] 96 %  BP: (101-128)/(51-68) 128/64          Physical Exam   Constitutional: She is oriented to person, place, and time. She appears well-developed and well-nourished.   Cardiovascular: Normal rate and regular rhythm.    Left femoral AVG with thrill   Musculoskeletal:   Left foot with extensive tissue necrosis, no purulence but foul smell   Neurological: She is alert and oriented to person, place, and time.       Significant Labs:  ABGs: No results for input(s): PH, PCO2, PO2, HCO3, POCSATURATED, BE in the last 48 hours.  CBC:   Recent Labs  Lab  07/29/17  0434 07/30/17  0445   WBC 19.62*  --    RBC 2.88* 2.87*   HGB 7.9* 7.7*   HCT 25.2* 24.9*   * 345   MCV 88 87   MCH 27.4 26.8*   MCHC 31.3* 30.9*     CMP:   Recent Labs  Lab 07/30/17  0445   *   CALCIUM 8.3*   *   K 3.9   CO2 24      BUN 20   CREATININE 4.1*         Assessment/Plan:     Type 2 diabetes mellitus with left diabetic foot ulcer                * Left foot infection    61 yo F with h/o HTN, HLD, DM2 (Hgb A1c 6.3),HFpEF (EF 60%), COPD (home O2, 2.5 L), ESRD on HD (MWF) via L femoral AVG presenting to hospital with left foot ischemic ulcer, wet gangrene s/p I&D by podiatry 07/26    Plan for L AKA tomorrow; consent obtained  Type and screen  NPO at [place]  Continue bid wet to dry dressing changes with dakins Esther P Mihindu, MD  Vascular Surgery  [facility]

## 2017-07-30 NOTE — PROGRESS NOTES
Dialysis completed. Needles removed from left thigh graft with pressure held to sites for 10 minutes each with hemostasis achieved. Gauze and tape to sites. Patient dialyzed for 3 hours with fluid removal of 2.4 liters. Tolerated well with stable vital signs. Report called to floor to Lien Garcia.

## 2017-07-30 NOTE — PLAN OF CARE
Problem: Patient Care Overview  Goal: Plan of Care Review  Outcome: Ongoing (interventions implemented as appropriate)  Updated on plan of care.  All questions and concerns addressed.  Telemetry monitor maintained.  Pain controlled with PO pain meds, developed temp of 100.7 this afternoon, medicated with tylenol, will monitor

## 2017-07-30 NOTE — PROGRESS NOTES
Wet to Dry Dressing change to left foot with Dakins solution completed, pt tolerated without difficulty.

## 2017-07-30 NOTE — ASSESSMENT & PLAN NOTE
JULIUS showed  Right leg:The peripheral arteries are non-compressable. The PVR waveforms suggest moderate peripheral arterial occlusive disease. Left leg:The peripheral arteries are non-compressable.The PVR waveforms suggest moderate  to severe peripheral arterial occlusive disease. Vascular surgery consulted. Left femoral AVG with possible vascular steal    Plan   - had Aortogram + Left LE angiogram 07/28 which showed Significant steal from left thigh AVG. Left foot not salvageable given extent of infection. Plan left AKA.

## 2017-07-30 NOTE — SUBJECTIVE & OBJECTIVE
Medications:  Continuous Infusions:   Scheduled Meds:   amlodipine  10 mg Oral Daily    aspirin  81 mg Oral Daily    calcitRIOL  0.5 mcg Oral Daily    cefTRIAXone (ROCEPHIN) IVPB  1 g Intravenous Q24H    epoetin batsheva (PROCRIT) injection  100 Units/kg Intravenous Every Mon, Wed, Fri    insulin detemir  5 Units Subcutaneous BID    pantoprazole  40 mg Oral Daily    sevelamer carbonate  2,400 mg Oral TID WM    sodium hypochlorite   Irrigation Once     PRN Meds:sodium chloride 0.9%, sodium chloride 0.9%, acetaminophen, dextrose 50%, dextrose 50%, glucagon (human recombinant), glucose, glucose, heparin (porcine), insulin aspart, midazolam (PF), ondansetron, oxycodone, polyethylene glycol     Objective:     Vital Signs (Most Recent):  Temp: 97.5 °F (36.4 °C) (07/30/17 0400)  Pulse: 80 (07/30/17 0743)  Resp: 16 (07/30/17 0400)  BP: 128/64 (07/30/17 0400)  SpO2: 96 % (07/30/17 0400) Vital Signs (24h Range):  Temp:  [97.5 °F (36.4 °C)-99.5 °F (37.5 °C)] 97.5 °F (36.4 °C)  Pulse:  [] 80  Resp:  [16-18] 16  SpO2:  [93 %-96 %] 96 %  BP: (101-128)/(51-68) 128/64          Physical Exam   Constitutional: She is oriented to person, place, and time. She appears well-developed and well-nourished.   Cardiovascular: Normal rate and regular rhythm.    Left femoral AVG with thrill   Musculoskeletal:   Left foot with extensive tissue necrosis, no purulence but foul smell   Neurological: She is alert and oriented to person, place, and time.       Significant Labs:  ABGs: No results for input(s): PH, PCO2, PO2, HCO3, POCSATURATED, BE in the last 48 hours.  CBC:   Recent Labs  Lab 07/29/17  0434 07/30/17  0445   WBC 19.62*  --    RBC 2.88* 2.87*   HGB 7.9* 7.7*   HCT 25.2* 24.9*   * 345   MCV 88 87   MCH 27.4 26.8*   MCHC 31.3* 30.9*     CMP:   Recent Labs  Lab 07/30/17  0445   *   CALCIUM 8.3*   *   K 3.9   CO2 24      BUN 20   CREATININE 4.1*

## 2017-07-30 NOTE — ASSESSMENT & PLAN NOTE
61 yo F with h/o HTN, HLD, DM2 (Hgb A1c 6.3),HFpEF (EF 60%), COPD (home O2, 2.5 L), ESRD on HD (MWF) via L femoral AVG presenting to hospital with left foot ischemic ulcer, wet gangrene s/p I&D by podiatry 07/26    Plan for L AKA tomorrow; consent obtained  Type and screen  NPO at [place]  Continue bid wet to dry dressing changes with dakins

## 2017-07-31 ENCOUNTER — ANESTHESIA (OUTPATIENT)
Dept: SURGERY | Facility: HOSPITAL | Age: 62
DRG: 853 | End: 2017-07-31
Payer: MEDICARE

## 2017-07-31 PROBLEM — A41.9 SEPSIS: Status: ACTIVE | Noted: 2017-07-31

## 2017-07-31 LAB
ANION GAP SERPL CALC-SCNC: 12 MMOL/L
BACTERIA BLD CULT: NORMAL
BACTERIA BLD CULT: NORMAL
BACTERIA SPEC ANAEROBE CULT: NORMAL
BASOPHILS # BLD AUTO: 0.02 K/UL
BASOPHILS NFR BLD: 0.1 %
BUN SERPL-MCNC: 26 MG/DL
CALCIUM SERPL-MCNC: 8.4 MG/DL
CHLORIDE SERPL-SCNC: 99 MMOL/L
CO2 SERPL-SCNC: 24 MMOL/L
CREAT SERPL-MCNC: 5.2 MG/DL
DIFFERENTIAL METHOD: ABNORMAL
EOSINOPHIL # BLD AUTO: 0.3 K/UL
EOSINOPHIL NFR BLD: 1.7 %
ERYTHROCYTE [DISTWIDTH] IN BLOOD BY AUTOMATED COUNT: 19.3 %
EST. GFR  (AFRICAN AMERICAN): 9.6 ML/MIN/1.73 M^2
EST. GFR  (NON AFRICAN AMERICAN): 8.3 ML/MIN/1.73 M^2
GLUCOSE SERPL-MCNC: 50 MG/DL
HCT VFR BLD AUTO: 24.8 %
HGB BLD-MCNC: 7.7 G/DL
LYMPHOCYTES # BLD AUTO: 0.9 K/UL
LYMPHOCYTES NFR BLD: 4.8 %
MCH RBC QN AUTO: 27.3 PG
MCHC RBC AUTO-ENTMCNC: 31 G/DL
MCV RBC AUTO: 88 FL
MONOCYTES # BLD AUTO: 1.1 K/UL
MONOCYTES NFR BLD: 5.7 %
NEUTROPHILS # BLD AUTO: 16.6 K/UL
NEUTROPHILS NFR BLD: 87.4 %
PLATELET # BLD AUTO: 425 K/UL
PMV BLD AUTO: 9 FL
POCT GLUCOSE: 114 MG/DL (ref 70–110)
POCT GLUCOSE: 151 MG/DL (ref 70–110)
POCT GLUCOSE: 283 MG/DL (ref 70–110)
POCT GLUCOSE: 82 MG/DL (ref 70–110)
POTASSIUM SERPL-SCNC: 4.6 MMOL/L
RBC # BLD AUTO: 2.82 M/UL
SODIUM SERPL-SCNC: 135 MMOL/L
WBC # BLD AUTO: 18.98 K/UL

## 2017-07-31 PROCEDURE — 76942 ECHO GUIDE FOR BIOPSY: CPT | Mod: 26,,, | Performed by: ANESTHESIOLOGY

## 2017-07-31 PROCEDURE — 71000039 HC RECOVERY, EACH ADD'L HOUR: Performed by: SURGERY

## 2017-07-31 PROCEDURE — 25000003 PHARM REV CODE 250: Performed by: INTERNAL MEDICINE

## 2017-07-31 PROCEDURE — 36000710: Performed by: SURGERY

## 2017-07-31 PROCEDURE — 88311 DECALCIFY TISSUE: CPT | Mod: 26,,, | Performed by: PATHOLOGY

## 2017-07-31 PROCEDURE — 0Y6D0Z3 DETACHMENT AT LEFT UPPER LEG, LOW, OPEN APPROACH: ICD-10-PCS | Performed by: SURGERY

## 2017-07-31 PROCEDURE — 27590 AMPUTATE LEG AT THIGH: CPT | Mod: LT,GC,, | Performed by: SURGERY

## 2017-07-31 PROCEDURE — 88307 TISSUE EXAM BY PATHOLOGIST: CPT | Performed by: PATHOLOGY

## 2017-07-31 PROCEDURE — 27200670 HC STIMUCATH COMPLETE KIT: Performed by: ANESTHESIOLOGY

## 2017-07-31 PROCEDURE — 25000003 PHARM REV CODE 250: Performed by: STUDENT IN AN ORGANIZED HEALTH CARE EDUCATION/TRAINING PROGRAM

## 2017-07-31 PROCEDURE — 71000033 HC RECOVERY, INTIAL HOUR: Performed by: SURGERY

## 2017-07-31 PROCEDURE — 27201423 OPTIME MED/SURG SUP & DEVICES STERILE SUPPLY: Performed by: SURGERY

## 2017-07-31 PROCEDURE — 25000003 PHARM REV CODE 250: Performed by: NURSE PRACTITIONER

## 2017-07-31 PROCEDURE — 37000009 HC ANESTHESIA EA ADD 15 MINS: Performed by: SURGERY

## 2017-07-31 PROCEDURE — 36000711: Performed by: SURGERY

## 2017-07-31 PROCEDURE — 94761 N-INVAS EAR/PLS OXIMETRY MLT: CPT

## 2017-07-31 PROCEDURE — 63600175 PHARM REV CODE 636 W HCPCS: Performed by: STUDENT IN AN ORGANIZED HEALTH CARE EDUCATION/TRAINING PROGRAM

## 2017-07-31 PROCEDURE — 63600175 PHARM REV CODE 636 W HCPCS: Performed by: INTERNAL MEDICINE

## 2017-07-31 PROCEDURE — 63600175 PHARM REV CODE 636 W HCPCS: Performed by: ANESTHESIOLOGY

## 2017-07-31 PROCEDURE — D9220A PRA ANESTHESIA: Mod: ,,, | Performed by: ANESTHESIOLOGY

## 2017-07-31 PROCEDURE — 99232 SBSQ HOSP IP/OBS MODERATE 35: CPT | Mod: GC,,, | Performed by: HOSPITALIST

## 2017-07-31 PROCEDURE — 88307 TISSUE EXAM BY PATHOLOGIST: CPT | Mod: 26,,, | Performed by: PATHOLOGY

## 2017-07-31 PROCEDURE — 82962 GLUCOSE BLOOD TEST: CPT | Performed by: SURGERY

## 2017-07-31 PROCEDURE — 80048 BASIC METABOLIC PNL TOTAL CA: CPT

## 2017-07-31 PROCEDURE — 64446 NJX AA&/STRD SC NRV NFS IMG: CPT | Performed by: ANESTHESIOLOGY

## 2017-07-31 PROCEDURE — 36415 COLL VENOUS BLD VENIPUNCTURE: CPT

## 2017-07-31 PROCEDURE — 27000221 HC OXYGEN, UP TO 24 HOURS

## 2017-07-31 PROCEDURE — 85025 COMPLETE CBC W/AUTO DIFF WBC: CPT

## 2017-07-31 PROCEDURE — 64448 NJX AA&/STRD FEM NRV NFS IMG: CPT | Mod: 59,LT,, | Performed by: ANESTHESIOLOGY

## 2017-07-31 PROCEDURE — 27800517 HC TRAY,EPIDURAL-CONTINUOUS: Performed by: ANESTHESIOLOGY

## 2017-07-31 PROCEDURE — 12000002 HC ACUTE/MED SURGE SEMI-PRIVATE ROOM

## 2017-07-31 PROCEDURE — 37000008 HC ANESTHESIA 1ST 15 MINUTES: Performed by: SURGERY

## 2017-07-31 PROCEDURE — 25000003 PHARM REV CODE 250: Performed by: ANESTHESIOLOGY

## 2017-07-31 RX ORDER — FENTANYL CITRATE 50 UG/ML
INJECTION, SOLUTION INTRAMUSCULAR; INTRAVENOUS
Status: DISCONTINUED | OUTPATIENT
Start: 2017-07-31 | End: 2017-07-31

## 2017-07-31 RX ORDER — ROPIVACAINE HYDROCHLORIDE 2 MG/ML
6 INJECTION, SOLUTION EPIDURAL; INFILTRATION; PERINEURAL CONTINUOUS
Status: DISCONTINUED | OUTPATIENT
Start: 2017-07-31 | End: 2017-08-07

## 2017-07-31 RX ORDER — PREGABALIN 50 MG/1
150 CAPSULE ORAL NIGHTLY
Status: DISCONTINUED | OUTPATIENT
Start: 2017-07-31 | End: 2017-07-31

## 2017-07-31 RX ORDER — MIDAZOLAM HYDROCHLORIDE 1 MG/ML
0.5 INJECTION INTRAMUSCULAR; INTRAVENOUS EVERY 5 MIN PRN
Status: DISCONTINUED | OUTPATIENT
Start: 2017-07-31 | End: 2017-07-31

## 2017-07-31 RX ORDER — MIDAZOLAM HYDROCHLORIDE 1 MG/ML
INJECTION, SOLUTION INTRAMUSCULAR; INTRAVENOUS
Status: DISCONTINUED | OUTPATIENT
Start: 2017-07-31 | End: 2017-07-31

## 2017-07-31 RX ORDER — FENTANYL CITRATE 50 UG/ML
25 INJECTION, SOLUTION INTRAMUSCULAR; INTRAVENOUS EVERY 5 MIN PRN
Status: DISCONTINUED | OUTPATIENT
Start: 2017-07-31 | End: 2017-07-31

## 2017-07-31 RX ORDER — HYDROMORPHONE HYDROCHLORIDE 1 MG/ML
0.2 INJECTION, SOLUTION INTRAMUSCULAR; INTRAVENOUS; SUBCUTANEOUS EVERY 5 MIN PRN
Status: DISCONTINUED | OUTPATIENT
Start: 2017-07-31 | End: 2017-07-31

## 2017-07-31 RX ORDER — ACETAMINOPHEN 325 MG/1
650 TABLET ORAL EVERY 6 HOURS
Status: COMPLETED | OUTPATIENT
Start: 2017-07-31 | End: 2017-08-01

## 2017-07-31 RX ORDER — MIDAZOLAM HYDROCHLORIDE 1 MG/ML
1 INJECTION INTRAMUSCULAR; INTRAVENOUS EVERY 5 MIN PRN
Status: DISCONTINUED | OUTPATIENT
Start: 2017-07-31 | End: 2017-07-31

## 2017-07-31 RX ORDER — HYDROMORPHONE HYDROCHLORIDE 1 MG/ML
0.5 INJECTION, SOLUTION INTRAMUSCULAR; INTRAVENOUS; SUBCUTANEOUS EVERY 6 HOURS PRN
Status: DISCONTINUED | OUTPATIENT
Start: 2017-07-31 | End: 2017-08-07 | Stop reason: HOSPADM

## 2017-07-31 RX ORDER — LIDOCAINE HCL/PF 100 MG/5ML
SYRINGE (ML) INTRAVENOUS
Status: DISCONTINUED | OUTPATIENT
Start: 2017-07-31 | End: 2017-07-31

## 2017-07-31 RX ORDER — ROCURONIUM BROMIDE 10 MG/ML
INJECTION, SOLUTION INTRAVENOUS
Status: DISCONTINUED | OUTPATIENT
Start: 2017-07-31 | End: 2017-07-31

## 2017-07-31 RX ORDER — KETAMINE HYDROCHLORIDE 100 MG/ML
INJECTION, SOLUTION INTRAMUSCULAR; INTRAVENOUS
Status: DISCONTINUED | OUTPATIENT
Start: 2017-07-31 | End: 2017-07-31

## 2017-07-31 RX ORDER — ONDANSETRON 2 MG/ML
INJECTION INTRAMUSCULAR; INTRAVENOUS
Status: DISCONTINUED | OUTPATIENT
Start: 2017-07-31 | End: 2017-07-31

## 2017-07-31 RX ORDER — PREGABALIN 75 MG/1
75 CAPSULE ORAL NIGHTLY
Status: DISCONTINUED | OUTPATIENT
Start: 2017-07-31 | End: 2017-08-07 | Stop reason: HOSPADM

## 2017-07-31 RX ORDER — SODIUM CHLORIDE 0.9 % (FLUSH) 0.9 %
3 SYRINGE (ML) INJECTION
Status: DISCONTINUED | OUTPATIENT
Start: 2017-07-31 | End: 2017-07-31

## 2017-07-31 RX ORDER — PROPOFOL 10 MG/ML
VIAL (ML) INTRAVENOUS
Status: DISCONTINUED | OUTPATIENT
Start: 2017-07-31 | End: 2017-07-31

## 2017-07-31 RX ORDER — PHENYLEPHRINE HYDROCHLORIDE 10 MG/ML
INJECTION INTRAVENOUS
Status: DISCONTINUED | OUTPATIENT
Start: 2017-07-31 | End: 2017-07-31

## 2017-07-31 RX ORDER — NALOXONE HCL 0.4 MG/ML
VIAL (ML) INJECTION
Status: DISCONTINUED | OUTPATIENT
Start: 2017-07-31 | End: 2017-07-31

## 2017-07-31 RX ORDER — DEXAMETHASONE SODIUM PHOSPHATE 4 MG/ML
INJECTION, SOLUTION INTRA-ARTICULAR; INTRALESIONAL; INTRAMUSCULAR; INTRAVENOUS; SOFT TISSUE
Status: DISCONTINUED | OUTPATIENT
Start: 2017-07-31 | End: 2017-07-31

## 2017-07-31 RX ORDER — SODIUM CHLORIDE 9 MG/ML
INJECTION, SOLUTION INTRAVENOUS
Status: DISCONTINUED | OUTPATIENT
Start: 2017-07-31 | End: 2017-08-07 | Stop reason: HOSPADM

## 2017-07-31 RX ORDER — OXYCODONE HYDROCHLORIDE 5 MG/1
5 TABLET ORAL EVERY 6 HOURS PRN
Status: DISCONTINUED | OUTPATIENT
Start: 2017-07-31 | End: 2017-08-07 | Stop reason: HOSPADM

## 2017-07-31 RX ORDER — ONDANSETRON 4 MG/1
8 TABLET, FILM COATED ORAL EVERY 8 HOURS PRN
Status: DISCONTINUED | OUTPATIENT
Start: 2017-07-31 | End: 2017-08-07 | Stop reason: HOSPADM

## 2017-07-31 RX ORDER — FENTANYL CITRATE 50 UG/ML
50 INJECTION, SOLUTION INTRAMUSCULAR; INTRAVENOUS EVERY 5 MIN PRN
Status: DISCONTINUED | OUTPATIENT
Start: 2017-07-31 | End: 2017-07-31

## 2017-07-31 RX ORDER — SODIUM CHLORIDE 9 MG/ML
INJECTION, SOLUTION INTRAVENOUS ONCE
Status: DISCONTINUED | OUTPATIENT
Start: 2017-07-31 | End: 2017-08-07 | Stop reason: HOSPADM

## 2017-07-31 RX ORDER — SUCCINYLCHOLINE CHLORIDE 20 MG/ML
INJECTION INTRAMUSCULAR; INTRAVENOUS
Status: DISCONTINUED | OUTPATIENT
Start: 2017-07-31 | End: 2017-07-31

## 2017-07-31 RX ORDER — ROPIVACAINE HYDROCHLORIDE 2 MG/ML
8 INJECTION, SOLUTION EPIDURAL; INFILTRATION; PERINEURAL CONTINUOUS
Status: DISCONTINUED | OUTPATIENT
Start: 2017-07-31 | End: 2017-08-07

## 2017-07-31 RX ADMIN — LIDOCAINE HYDROCHLORIDE 60 MG: 20 INJECTION, SOLUTION INTRAVENOUS at 01:07

## 2017-07-31 RX ADMIN — CIPROFLOXACIN HYDROCHLORIDE 500 MG: 500 TABLET, FILM COATED ORAL at 08:07

## 2017-07-31 RX ADMIN — PHENYLEPHRINE HYDROCHLORIDE 100 MCG: 10 INJECTION INTRAVENOUS at 02:07

## 2017-07-31 RX ADMIN — EPHEDRINE SULFATE 5 MG: 50 INJECTION, SOLUTION INTRAMUSCULAR; INTRAVENOUS; SUBCUTANEOUS at 01:07

## 2017-07-31 RX ADMIN — DEXAMETHASONE SODIUM PHOSPHATE 8 MG: 4 INJECTION, SOLUTION INTRAMUSCULAR; INTRAVENOUS at 01:07

## 2017-07-31 RX ADMIN — NALOXONE HYDROCHLORIDE 0.04 MG: 0.4 INJECTION, SOLUTION INTRAMUSCULAR; INTRAVENOUS; SUBCUTANEOUS at 03:07

## 2017-07-31 RX ADMIN — INSULIN DETEMIR 5 UNITS: 100 INJECTION, SOLUTION SUBCUTANEOUS at 09:07

## 2017-07-31 RX ADMIN — PREGABALIN 75 MG: 75 CAPSULE ORAL at 09:07

## 2017-07-31 RX ADMIN — DEXTROSE 2 G: 50 INJECTION, SOLUTION INTRAVENOUS at 02:07

## 2017-07-31 RX ADMIN — EPHEDRINE SULFATE 5 MG: 50 INJECTION, SOLUTION INTRAMUSCULAR; INTRAVENOUS; SUBCUTANEOUS at 02:07

## 2017-07-31 RX ADMIN — FENTANYL CITRATE 25 MCG: 50 INJECTION INTRAMUSCULAR; INTRAVENOUS at 12:07

## 2017-07-31 RX ADMIN — ACETAMINOPHEN 650 MG: 325 TABLET ORAL at 11:07

## 2017-07-31 RX ADMIN — SODIUM CHLORIDE: 0.9 INJECTION, SOLUTION INTRAVENOUS at 01:07

## 2017-07-31 RX ADMIN — PANTOPRAZOLE SODIUM 40 MG: 40 TABLET, DELAYED RELEASE ORAL at 08:07

## 2017-07-31 RX ADMIN — ASPIRIN 81 MG CHEWABLE TABLET 81 MG: 81 TABLET CHEWABLE at 08:07

## 2017-07-31 RX ADMIN — INSULIN ASPART 1 UNITS: 100 INJECTION, SOLUTION INTRAVENOUS; SUBCUTANEOUS at 11:07

## 2017-07-31 RX ADMIN — FENTANYL CITRATE 50 MCG: 50 INJECTION INTRAMUSCULAR; INTRAVENOUS at 12:07

## 2017-07-31 RX ADMIN — CALCITRIOL 0.5 MCG: 0.25 CAPSULE, LIQUID FILLED ORAL at 08:07

## 2017-07-31 RX ADMIN — SUCCINYLCHOLINE CHLORIDE 100 MG: 20 INJECTION, SOLUTION INTRAMUSCULAR; INTRAVENOUS at 01:07

## 2017-07-31 RX ADMIN — ROPIVACAINE HYDROCHLORIDE 8 ML/HR: 2 INJECTION, SOLUTION EPIDURAL; INFILTRATION at 04:07

## 2017-07-31 RX ADMIN — ROPIVACAINE HYDROCHLORIDE 8 ML/HR: 2 INJECTION, SOLUTION EPIDURAL; INFILTRATION at 03:07

## 2017-07-31 RX ADMIN — NALOXONE HYDROCHLORIDE 0.04 MG: 0.4 INJECTION, SOLUTION INTRAMUSCULAR; INTRAVENOUS; SUBCUTANEOUS at 02:07

## 2017-07-31 RX ADMIN — MIDAZOLAM HYDROCHLORIDE 2 MG: 1 INJECTION, SOLUTION INTRAMUSCULAR; INTRAVENOUS at 01:07

## 2017-07-31 RX ADMIN — KETAMINE HYDROCHLORIDE 20 MG: 100 INJECTION, SOLUTION, CONCENTRATE INTRAMUSCULAR; INTRAVENOUS at 02:07

## 2017-07-31 RX ADMIN — FENTANYL CITRATE 150 MCG: 50 INJECTION, SOLUTION INTRAMUSCULAR; INTRAVENOUS at 01:07

## 2017-07-31 RX ADMIN — MIDAZOLAM HYDROCHLORIDE 1 MG: 1 INJECTION, SOLUTION INTRAMUSCULAR; INTRAVENOUS at 12:07

## 2017-07-31 RX ADMIN — PROPOFOL 150 MG: 10 INJECTION, EMULSION INTRAVENOUS at 01:07

## 2017-07-31 RX ADMIN — ROPIVACAINE HYDROCHLORIDE 6 ML/HR: 2 INJECTION, SOLUTION EPIDURAL; INFILTRATION at 03:07

## 2017-07-31 RX ADMIN — ONDANSETRON 4 MG: 2 INJECTION INTRAMUSCULAR; INTRAVENOUS at 02:07

## 2017-07-31 RX ADMIN — PHENYLEPHRINE HYDROCHLORIDE 100 MCG: 10 INJECTION INTRAVENOUS at 03:07

## 2017-07-31 RX ADMIN — ACETAMINOPHEN 650 MG: 325 TABLET ORAL at 05:07

## 2017-07-31 RX ADMIN — ROCURONIUM BROMIDE 5 MG: 10 INJECTION, SOLUTION INTRAVENOUS at 01:07

## 2017-07-31 RX ADMIN — SEVELAMER CARBONATE 2400 MG: 800 TABLET, FILM COATED ORAL at 06:07

## 2017-07-31 RX ADMIN — AMLODIPINE BESYLATE 10 MG: 10 TABLET ORAL at 08:07

## 2017-07-31 RX ADMIN — OXYCODONE HYDROCHLORIDE 5 MG: 5 TABLET ORAL at 08:07

## 2017-07-31 NOTE — ASSESSMENT & PLAN NOTE
Presenting to the ED w/ new bleeding from L leg ulcer along with fevers, chills and new leukocytosis.   - Received vancomycin , flagyl and rocephin in ED. Fever resolved with Tylenol in ED. Dc'd vancomycin and flagyl on the floor.   - Blood cx 1/4 +gram positive cocci in clusters resembling staph   - Blood cx 1/4 +gram positive rods  - Wound cx growing proteus and GNR, lactose   - Repeat blood cx x2 show NGTD  - S/p Aortogram + Left LE angiogram with Vascular surgery   - Significant steal from L thigh AVG. L foot not salvageable given extent of infection. Plan for L AKA.  Plan  - Continuinue PO Ciprofloxacin per wound cx sensitivities   - Vascular Surgery to perform L AKA. Scheduled for 7/31/17

## 2017-07-31 NOTE — PROGRESS NOTES
[facility]  Vascular Surgery  Progress Note    Patient Name: Aleta Herrera  MRN: [id number]  Admission Date: 7/23/2017  Primary Care Provider: Radha Lao MD    Subjective:     Interval History: One time temp of 100.7 overnight    Post-Op Info:  Procedure(s) (LRB):  ANGIOGRAM-EXTREMITY-LOWER (Left)   3 Days Post-Op       Medications:  Continuous Infusions:   Scheduled Meds:   amlodipine  10 mg Oral Daily    aspirin  81 mg Oral Daily    calcitRIOL  0.5 mcg Oral Daily    ciprofloxacin HCl  500 mg Oral Daily    epoetin batsheva (PROCRIT) injection  100 Units/kg Intravenous Every Mon, Wed, Fri    insulin detemir  5 Units Subcutaneous BID    pantoprazole  40 mg Oral Daily    sevelamer carbonate  2,400 mg Oral TID WM    sodium hypochlorite   Irrigation Once     PRN Meds:sodium chloride 0.9%, sodium chloride 0.9%, acetaminophen, dextrose 50%, dextrose 50%, glucagon (human recombinant), glucose, glucose, heparin (porcine), insulin aspart, midazolam (PF), ondansetron, oxycodone, polyethylene glycol     Objective:     Vital Signs (Most Recent):  Temp: 98.1 °F (36.7 °C) (07/30/17 2356)  Pulse: 86 (07/30/17 2356)  Resp: 20 (07/30/17 1636)  BP: (!) 122/57 (07/30/17 2356)  SpO2: (!) 93 % (07/30/17 2356) Vital Signs (24h Range):  Temp:  [98.1 °F (36.7 °C)-100.7 °F (38.2 °C)] 98.1 °F (36.7 °C)  Pulse:  [77-93] 86  Resp:  [18-20] 20  SpO2:  [93 %-100 %] 93 %  BP: (105-122)/(55-70) 122/57          Physical Exam   Constitutional: She appears well-developed and well-nourished.   HENT:   Head: Normocephalic and atraumatic.   Neck: Neck supple.   Cardiovascular: Normal rate, regular rhythm and normal heart sounds.    Pulmonary/Chest: Effort normal and breath sounds normal.   Abdominal: Soft.   Extensive tissue necrosis of left foot.   Left femoral AVG with thrill    Significant Labs:  CBC:   Recent Labs  Lab 07/31/17  6879   WBC 18.98*   RBC 2.82*   HGB 7.7*   HCT 24.8*   *   MCV 88   MCH  27.3   MCHC 31.0*     CMP:   Recent Labs  Lab 07/30/17  0445   *   CALCIUM 8.3*   *   K 3.9   CO2 24      BUN 20   CREATININE 4.1*       Significant Diagnostics:  I have reviewed all pertinent imaging results/findings within the past 24 hours.    Assessment/Plan:     Type 2 diabetes mellitus with left diabetic foot ulcer                * Left foot infection    63 yo F with h/o HTN, HLD, DM2 (Hgb A1c 6.3),HFpEF (EF 60%), COPD (home O2, 2.5 L), ESRD on HD (MWF) via L femoral AVG presenting to hospital with left foot ischemic ulcer, wet gangrene s/p I&D by podiatry 07/26    To OR for left AKA today   Keep NPO            Colten Johnson MD  Vascular Surgery  [facility]

## 2017-07-31 NOTE — ANESTHESIA PROCEDURE NOTES
Femoral Nerve Catheter    Patient location during procedure: pre-op   Block not for primary anesthetic.  Reason for block: at surgeon's request and post-op pain management   Post-op Pain Location: left leg pain  Start time: 7/31/2017 12:05 PM  Timeout: 7/31/2017 12:05 PM   End time: 7/31/2017 12:18 PM  Staffing  Anesthesiologist: BARBI TAVAREZ  Other anesthesia staff: ENA NEWMAN  Performed: other anesthesia staff   Preanesthetic Checklist  Completed: patient identified, site marked, surgical consent, pre-op evaluation, timeout performed, IV checked, risks and benefits discussed and monitors and equipment checked  Peripheral Block  Patient position: supine  Prep: ChloraPrep and site prepped and draped  Patient monitoring: heart rate, cardiac monitor, continuous pulse ox, continuous capnometry and frequent blood pressure checks  Block type: femoral  Laterality: left  Injection technique: continuous  Needle  Needle type: Tuohy   Needle gauge: 17 G  Needle length: 3.5 in  Needle localization: anatomical landmarks and ultrasound guidance  Catheter type: spring wound  Catheter size: 19 G  Test dose: lidocaine 1.5% with Epi 1-to-200,000 and negative   -ultrasound image captured on disc.  Assessment  Injection assessment: negative aspiration, negative parasthesia and local visualized surrounding nerve  Paresthesia pain: none  Heart rate change: no  Slow fractionated injection: yes  Medications:  Bolus administered: 20 mL of 0.5 bupivacaine  Epinephrine added: 3.75 mcg/mL (1/300,000)  Additional Notes  VSS.  DOS[facility] RN monitoring vitals throughout procedure.  Patient tolerated procedure well.

## 2017-07-31 NOTE — NURSING
Patient arrived to floor. Oriented to room and use of call light. EMA WESTBROOK RN notified of arrival.

## 2017-07-31 NOTE — SUBJECTIVE & OBJECTIVE
Medications:  Continuous Infusions:   Scheduled Meds:   amlodipine  10 mg Oral Daily    aspirin  81 mg Oral Daily    calcitRIOL  0.5 mcg Oral Daily    ciprofloxacin HCl  500 mg Oral Daily    epoetin batsheva (PROCRIT) injection  100 Units/kg Intravenous Every Mon, Wed, Fri    insulin detemir  5 Units Subcutaneous BID    pantoprazole  40 mg Oral Daily    sevelamer carbonate  2,400 mg Oral TID WM    sodium hypochlorite   Irrigation Once     PRN Meds:sodium chloride 0.9%, sodium chloride 0.9%, acetaminophen, dextrose 50%, dextrose 50%, glucagon (human recombinant), glucose, glucose, heparin (porcine), insulin aspart, midazolam (PF), ondansetron, oxycodone, polyethylene glycol     Objective:     Vital Signs (Most Recent):  Temp: 98.1 °F (36.7 °C) (07/30/17 2356)  Pulse: 86 (07/30/17 2356)  Resp: 20 (07/30/17 1636)  BP: (!) 122/57 (07/30/17 2356)  SpO2: (!) 93 % (07/30/17 2356) Vital Signs (24h Range):  Temp:  [98.1 °F (36.7 °C)-100.7 °F (38.2 °C)] 98.1 °F (36.7 °C)  Pulse:  [77-93] 86  Resp:  [18-20] 20  SpO2:  [93 %-100 %] 93 %  BP: (105-122)/(55-70) 122/57          Physical Exam   Constitutional: She appears well-developed and well-nourished.   HENT:   Head: Normocephalic and atraumatic.   Neck: Neck supple.   Cardiovascular: Normal rate, regular rhythm and normal heart sounds.    Pulmonary/Chest: Effort normal and breath sounds normal.   Abdominal: Soft.   Extensive tissue necrosis of left foot.   Left femoral AVG with thrill    Significant Labs:  CBC:   Recent Labs  Lab 07/31/17  0439   WBC 18.98*   RBC 2.82*   HGB 7.7*   HCT 24.8*   *   MCV 88   MCH 27.3   MCHC 31.0*     CMP:   Recent Labs  Lab 07/30/17  0445   *   CALCIUM 8.3*   *   K 3.9   CO2 24      BUN 20   CREATININE 4.1*       Significant Diagnostics:  I have reviewed all pertinent imaging results/findings within the past 24 hours.

## 2017-07-31 NOTE — NURSING TRANSFER
Nursing Transfer Note      7/31/2017     Transfer To: [address]    Transfer via stretcher    Transfer with  O2, cardiac monitoring    Transported by PCT    Medicines sent: Yes    Chart send with patient: Yes    Notified: spouse, daughter, son

## 2017-07-31 NOTE — BRIEF OP NOTE
[facility]  Brief Operative Note    SUMMARY     Surgery Date: 7/31/2017     Surgeon(s) and Role:     * Melissa Graham MD - Fellow     * Nathalie Madera MD - Primary    Assisting Surgeon: None    Pre-op Diagnosis:  Left foot infection [L08.9]    Post-op Diagnosis:  Post-Op Diagnosis Codes:     * Left foot infection [L08.9]    Procedure(s) (LRB):  AMPUTATION-ABOVE KNEE (Left)    Anesthesia: Regional    Description of Procedure: Left above knee amputation    Description of the findings of the procedure: Good blood flow at amputation margin    Estimated Blood Loss: 200cc         Specimens:   Specimen (12h ago through future)    Start     Ordered    07/31/17 1430  Specimen to Pathology - Surgery  Once     Comments:  1. Left Leg - Perm      07/31/17 1430

## 2017-07-31 NOTE — ASSESSMENT & PLAN NOTE
63 yo F with h/o HTN, HLD, DM2 (Hgb A1c 6.3),HFpEF (EF 60%), COPD (home O2, 2.5 L), ESRD on HD (MWF) via L femoral AVG presenting to hospital with left foot ischemic ulcer, wet gangrene s/p I&D by podiatry 07/26    To OR for left AKA today   Keep NPO

## 2017-07-31 NOTE — PROGRESS NOTES
[facility] Hospital Medicine  Progress Note    Patient Name: Aleta Herrera  MRN: [id number]  Patient Class: IP- Inpatient   Admission Date: 7/23/2017  Length of Stay: 8 days  Attending Physician: Malick Mahajan MD  Primary Care Provider: Radha Lao MD    Hospital Medicine Team: [facility] HOSP MED 2 Man Lorenzana MD    Subjective:     Principal Problem:Left foot infection    HPI:  Aleta Herrera is a 62-year-old female with HTN, HFpEF (EF 60%; home O2 2.5 liters), anemia 2/2 CKD and IDDM c/b retinopathy, neuropathy and ESRD (HD MWF). She presents today with her  to the ED because of new bleeding from the left foot ulcer. She's had on/off fevers and chills for past week or so. Apparently blood cultures were drawn at dialysis (7/19). She was seen by vascular surgery the next day where her right femoral permacath was pulled. She has a working left femoral AVG. Her upper extremity access has failed over the years since being placed on dialysis in 2009. Besides left leg pain, she denies any SOB, CP, cough, congestion, abdominal pain, n/v or diarrhea. Not received any recent antibiotics.   Complaining of foot pain in the ED, otherwise has no complaints. CXR showed mild b/l effusions. Left foot x-ray soft tissue swelling, but no gas formation. Labs significant for WBC count of 24k. Procalcitonin 1.64.       Hospital Course:  7/24: VSS. WBC 22.58 down from 24.04 in ED. Sed rate 100. .3. CXR stable. L Foot xray soft tissue swelling. MRI L foot no osteomylitis, no drainable collections. JULIUS L with mod-severe occlusive disease, R with mod. Continue ceftriaxone. HD today.  7/25: VSS. WBC 19.18. Vasc Surg consulted, L femoral AVG w/ possible vascular steal; will perform LLE angiogram on 7/28, if no arterial stenosis will plan for femoral graft ligation & permacath placement. Podiatry consulted, extensive stable left plantar arch necrosis, no acute surgical  intervention; will f/u after angiogram.  Wound cx pending. 1/4 Bcx +GPC in clusters. Continue ceftriaxone.   7/26: I&D and debridement with Podiatry today.  Continue Ceftriaxone.   7/27: Spiked fever to 101.1F, improved with tylenol. Repeated Blood cx x2. HD today. NPO at midnight.  7/28: Had Aortogram + Left LE angiogram by vascular surgery; significant steal from AVG, left foot not salvagable. Plan for Left AKA.   7/29: VSS. HD today. Continue abx.     Interval History: Febrile overnight. Doing ok this morning. Is anxious regarding plan amputation but understands the need.  Is hungry and wishes to eat again soon.  Presently denies fever, chills, abdominal pain, n/v/d.     Review of Systems   Constitutional: Negative for chills and fever.   Respiratory: Negative for cough and shortness of breath.    Cardiovascular: Negative for chest pain.   Gastrointestinal: Positive for nausea and vomiting. Negative for abdominal pain and diarrhea.   Genitourinary:        Does not make urine   Musculoskeletal: Positive for myalgias (left foot).   Skin: Positive for wound (s/p debridement).   Neurological: Positive for light-headedness. Negative for dizziness and headaches.   All other systems reviewed and are negative.    Objective:     Vital Signs (Most Recent):  Temp: 98.1 °F (36.7 °C) (07/31/17 1804)  Pulse: 95 (07/31/17 1804)  Resp: 19 (07/31/17 1804)  BP: (!) 101/59 (07/31/17 1804)  SpO2: (!) 90 % (07/31/17 1804) Vital Signs (24h Range):  Temp:  [97.2 °F (36.2 °C)-98.8 °F (37.1 °C)] 98.1 °F (36.7 °C)  Pulse:  [] 95  Resp:  [11-22] 19  SpO2:  [90 %-100 %] 90 %  BP: ()/(44-71) 101/59     Weight: 67 kg (147 lb 11.3 oz)  Body mass index is 29.83 kg/m².    Intake/Output Summary (Last 24 hours) at 07/31/17 1842  Last data filed at 07/31/17 1712   Gross per 24 hour   Intake              315 ml   Output                0 ml   Net              315 ml      Physical Exam   Constitutional: She appears well-developed and  "well-nourished.   HENT:   Head: Normocephalic and atraumatic.   Eyes: EOM are normal. Pupils are equal, round, and reactive to light.   Cardiovascular: Normal rate and regular rhythm.    Pulmonary/Chest: Effort normal. No respiratory distress.   Abdominal: She exhibits no distension. There is no tenderness. A hernia (soft, non-tender) is present.   Musculoskeletal:   Left foot with loosely wrapped dressing, which is clean, dry, and intact.  No surrounding tenderness, edema, or erythema.    Neurological: She is alert.   Skin: Skin is warm and dry.     Significant Labs:     BMP:     Recent Labs  Lab 07/31/17  0439   GLU 50*   *   K 4.6   CL 99   CO2 24   BUN 26*   CREATININE 5.2*   CALCIUM 8.4*     CBC:     Recent Labs  Lab 07/30/17  0445 07/31/17  0439   WBC 21.19* 18.98*   HGB 7.7* 7.7*   HCT 24.9* 24.8*    425*     Significant Imaging: No new imaging.     Assessment/Plan:      * Left foot infection    Presenting to the ED w/ new bleeding from L leg ulcer along with fevers, chills and new leukocytosis.   - Received vancomycin , flagyl and rocephin in ED. Fever resolved with Tylenol in ED. Dc'd vancomycin and flagyl on the floor.   - Blood cx 1/4 +gram positive cocci in clusters resembling staph   - Blood cx 1/4 +gram positive rods  - Wound cx growing proteus and GNR, lactose   - Repeat blood cx x2 show NGTD  - S/p Aortogram + Left LE angiogram with Vascular surgery   - Significant steal from L thigh AVG. L foot not salvageable given extent of infection. Plan for L AKA.  Plan  - Continuinue PO Ciprofloxacin per wound cx sensitivities   - Vascular Surgery to perform L AKA. Scheduled for 7/31/17            Peripheral vascular disease, unspecified    - had Aortogram + Left LE angiogram 07/28 which showed Significant steal from left thigh AVG. Left foot not salvageable given extent of infection. Plan left AKA.           Type 2 diabetes mellitus with left diabetic foot ulcer    See "left foot " "infection"         (HFpEF) heart failure with preserved ejection fraction    - BNP 2835 on admission   - Pt with ESRD on HD            Pleural effusion    - Seen on prior CXR (5/3/17). Unclear etiology. Stable.        ESRD (end stage renal disease)    - Consulted nephrology.   Plan  - Dialysis inpatient  - Will continue to monitor electrolytes        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    - Resume Norvasc  - Diabetic diet inpatient  - Sliding scale insulin        Late effect of ischemic cerebral stroke    - Continued ASA 81 mg.           Hypertension, renal    - Continue home amlodipine 10 mg QD        Anemia of chronic renal failure    - Procrit w/ dialysis.   - Continue to monitor H/H          VTE Risk Mitigation         Ordered     Medium Risk of VTE  Once      07/28/17 1244        Dispo: To surgery today. Pain management, plan PT/OT.  Monitor for fevers overnight.      Man Lorenzana MD  Department of Hospital Medicine   [facility]  "

## 2017-07-31 NOTE — TRANSFER OF CARE
"Anesthesia Transfer of Care Note    Patient: Aleta Herrera    Procedure(s) Performed: Procedure(s) (LRB):  AMPUTATION-ABOVE KNEE (Left)    Patient location: PACU    Anesthesia Type: general and regional    Transport from OR: Transported from OR on 6-10 L/min O2 by face mask with adequate spontaneous ventilation    Post pain: adequate analgesia    Post assessment: no apparent anesthetic complications    Post vital signs: stable    Level of consciousness: awake and responds to stimulation    Nausea/Vomiting: no nausea/vomiting    Complications: none    Transfer of care protocol was followed      Last vitals:   Visit Vitals  BP (!) 142/68 (BP Location: Left arm, Patient Position: Lying, BP Method: Automatic)   Pulse 103   Temp 36.2 °C (97.2 °F) (Axillary)   Resp 16   Ht 4' 11" (1.499 m)   Wt 67 kg (147 lb 11.3 oz)   SpO2 100%   Breastfeeding? No   BMI 29.83 kg/m²     "

## 2017-07-31 NOTE — ANESTHESIA PROCEDURE NOTES
Sciatic Nerve Catheter    Patient location during procedure: pre-op   Block not for primary anesthetic.  Reason for block: at surgeon's request and post-op pain management   Post-op Pain Location: left leg pain  Start time: 7/31/2017 12:25 PM  Timeout: 7/31/2017 12:25 PM   End time: 7/31/2017 1:00 PM  Staffing  Anesthesiologist: BARBI TAVAREZ  Other anesthesia staff: ENA NEWMAN  Performed: other anesthesia staff   Preanesthetic Checklist  Completed: patient identified, site marked, surgical consent, pre-op evaluation, timeout performed, IV checked, risks and benefits discussed and monitors and equipment checked  Peripheral Block  Prep: ChloraPrep and site prepped and draped  Patient monitoring: heart rate, cardiac monitor, continuous pulse ox, continuous capnometry and frequent blood pressure checks  Block type: sciatic  Laterality: left  Injection technique: continuous  Needle  Needle type: Tuohy   Needle gauge: 17 G  Needle length: 3.5 in  Needle localization: anatomical landmarks and nerve stimulator  Catheter type: stimulating  Catheter size: 19 G  Test dose: lidocaine 1.5% with Epi 1-to-200,000 and negative     Assessment  Injection assessment: negative aspiration and negative parasthesia  Paresthesia pain: none  Heart rate change: no  Slow fractionated injection: yes  Medications:  Bolus administered: 20 mL of 0.5 bupivacaine  Epinephrine added: 3.75 mcg/mL (1/300,000)  Additional Notes  Performed Labat location.  Shailesh test with appropriate loss of twitch with injection of local anesthetic.  VSS.  [facility] RN monitoring vitals throughout procedure.  Patient tolerated procedure well.

## 2017-07-31 NOTE — ASSESSMENT & PLAN NOTE
- had Aortogram + Left LE angiogram 07/28 which showed Significant steal from left thigh AVG. Left foot not salvageable given extent of infection. Plan left AKA.

## 2017-07-31 NOTE — SUBJECTIVE & OBJECTIVE
Interval History: Febrile overnight. Doing ok this morning. Is anxious regarding plan amputation but understands the need.  Is hungry and wishes to eat again soon.  Presently denies fever, chills, abdominal pain, n/v/d.     Review of Systems   Constitutional: Negative for chills and fever.   Respiratory: Negative for cough and shortness of breath.    Cardiovascular: Negative for chest pain.   Gastrointestinal: Positive for nausea and vomiting. Negative for abdominal pain and diarrhea.   Genitourinary:        Does not make urine   Musculoskeletal: Positive for myalgias (left foot).   Skin: Positive for wound (s/p debridement).   Neurological: Positive for light-headedness. Negative for dizziness and headaches.   All other systems reviewed and are negative.    Objective:     Vital Signs (Most Recent):  Temp: 98.1 °F (36.7 °C) (07/31/17 1804)  Pulse: 95 (07/31/17 1804)  Resp: 19 (07/31/17 1804)  BP: (!) 101/59 (07/31/17 1804)  SpO2: (!) 90 % (07/31/17 1804) Vital Signs (24h Range):  Temp:  [97.2 °F (36.2 °C)-98.8 °F (37.1 °C)] 98.1 °F (36.7 °C)  Pulse:  [] 95  Resp:  [11-22] 19  SpO2:  [90 %-100 %] 90 %  BP: ()/(44-71) 101/59     Weight: 67 kg (147 lb 11.3 oz)  Body mass index is 29.83 kg/m².    Intake/Output Summary (Last 24 hours) at 07/31/17 1842  Last data filed at 07/31/17 1712   Gross per 24 hour   Intake              315 ml   Output                0 ml   Net              315 ml      Physical Exam   Constitutional: She appears well-developed and well-nourished.   HENT:   Head: Normocephalic and atraumatic.   Eyes: EOM are normal. Pupils are equal, round, and reactive to light.   Cardiovascular: Normal rate and regular rhythm.    Pulmonary/Chest: Effort normal. No respiratory distress.   Abdominal: She exhibits no distension. There is no tenderness. A hernia (soft, non-tender) is present.   Musculoskeletal:   Left foot with loosely wrapped dressing, which is clean, dry, and intact.  No surrounding  tenderness, edema, or erythema.    Neurological: She is alert.   Skin: Skin is warm and dry.     Significant Labs:     BMP:     Recent Labs  Lab 07/31/17  0439   GLU 50*   *   K 4.6   CL 99   CO2 24   BUN 26*   CREATININE 5.2*   CALCIUM 8.4*     CBC:     Recent Labs  Lab 07/30/17 0445 07/31/17 0439   WBC 21.19* 18.98*   HGB 7.7* 7.7*   HCT 24.9* 24.8*    425*     Significant Imaging: No new imaging.

## 2017-07-31 NOTE — BRIEF OP NOTE
[facility]  Brief Operative Note    SUMMARY     Surgery Date: 7/31/2017     Surgeon(s) and Role:     * Melissa Graham MD - Fellow     * Nathalie Madera MD - Primary    Assisting Surgeon: None    Pre-op Diagnosis:  Left foot infection [L08.9]    Post-op Diagnosis:  Post-Op Diagnosis Codes:     * Left foot infection [L08.9]    Procedure(s) (LRB):  AMPUTATION-ABOVE KNEE (Left)    Anesthesia: Regional    Description of Procedure: Healthy tissue at amputation    Description of the findings of the procedure: return to [place]    Estimated Blood Loss: 50mL         Specimens:   Specimen (12h ago through future)    Start     Ordered    07/31/17 1430  Specimen to Pathology - Surgery  Once     Comments:  1. Left Leg - Perm      07/31/17 1430

## 2017-08-01 LAB
ALBUMIN SERPL BCP-MCNC: 2.1 G/DL
ALP SERPL-CCNC: 157 U/L
ALT SERPL W/O P-5'-P-CCNC: <5 U/L
ANION GAP SERPL CALC-SCNC: 12 MMOL/L
ANION GAP SERPL CALC-SCNC: 12 MMOL/L
AST SERPL-CCNC: 12 U/L
BACTERIA BLD CULT: NORMAL
BACTERIA BLD CULT: NORMAL
BACTERIA SPEC ANAEROBE CULT: NORMAL
BASOPHILS # BLD AUTO: 0 K/UL
BASOPHILS NFR BLD: 0 %
BILIRUB SERPL-MCNC: 0.3 MG/DL
BUN SERPL-MCNC: 38 MG/DL
BUN SERPL-MCNC: 38 MG/DL
CALCIUM SERPL-MCNC: 8.3 MG/DL
CALCIUM SERPL-MCNC: 8.4 MG/DL
CHLORIDE SERPL-SCNC: 95 MMOL/L
CHLORIDE SERPL-SCNC: 96 MMOL/L
CO2 SERPL-SCNC: 23 MMOL/L
CO2 SERPL-SCNC: 23 MMOL/L
CREAT SERPL-MCNC: 6.2 MG/DL
CREAT SERPL-MCNC: 6.3 MG/DL
DIFFERENTIAL METHOD: ABNORMAL
EOSINOPHIL # BLD AUTO: 0 K/UL
EOSINOPHIL NFR BLD: 0.1 %
ERYTHROCYTE [DISTWIDTH] IN BLOOD BY AUTOMATED COUNT: 19.4 %
EST. GFR  (AFRICAN AMERICAN): 7.6 ML/MIN/1.73 M^2
EST. GFR  (AFRICAN AMERICAN): 7.7 ML/MIN/1.73 M^2
EST. GFR  (NON AFRICAN AMERICAN): 6.6 ML/MIN/1.73 M^2
EST. GFR  (NON AFRICAN AMERICAN): 6.7 ML/MIN/1.73 M^2
GLUCOSE SERPL-MCNC: 171 MG/DL
GLUCOSE SERPL-MCNC: 201 MG/DL
HCT VFR BLD AUTO: 25.7 %
HGB BLD-MCNC: 8 G/DL
LYMPHOCYTES # BLD AUTO: 0.7 K/UL
LYMPHOCYTES NFR BLD: 3.8 %
MCH RBC QN AUTO: 27.1 PG
MCHC RBC AUTO-ENTMCNC: 31.1 G/DL
MCV RBC AUTO: 87 FL
MONOCYTES # BLD AUTO: 0.4 K/UL
MONOCYTES NFR BLD: 2.2 %
NEUTROPHILS # BLD AUTO: 17.5 K/UL
NEUTROPHILS NFR BLD: 93.9 %
PHOSPHATE SERPL-MCNC: 5.6 MG/DL
PLATELET # BLD AUTO: 453 K/UL
PLATELET BLD QL SMEAR: ABNORMAL
PMV BLD AUTO: 9 FL
POCT GLUCOSE: 118 MG/DL (ref 70–110)
POCT GLUCOSE: 137 MG/DL (ref 70–110)
POCT GLUCOSE: 145 MG/DL (ref 70–110)
POCT GLUCOSE: 168 MG/DL (ref 70–110)
POTASSIUM SERPL-SCNC: 5.6 MMOL/L
POTASSIUM SERPL-SCNC: 5.8 MMOL/L
PROT SERPL-MCNC: 8.1 G/DL
RBC # BLD AUTO: 2.95 M/UL
SODIUM SERPL-SCNC: 130 MMOL/L
SODIUM SERPL-SCNC: 131 MMOL/L
WBC # BLD AUTO: 18.71 K/UL

## 2017-08-01 PROCEDURE — 25000003 PHARM REV CODE 250: Performed by: STUDENT IN AN ORGANIZED HEALTH CARE EDUCATION/TRAINING PROGRAM

## 2017-08-01 PROCEDURE — 63600175 PHARM REV CODE 636 W HCPCS: Performed by: ANESTHESIOLOGY

## 2017-08-01 PROCEDURE — 12000002 HC ACUTE/MED SURGE SEMI-PRIVATE ROOM

## 2017-08-01 PROCEDURE — 85025 COMPLETE CBC W/AUTO DIFF WBC: CPT

## 2017-08-01 PROCEDURE — 80053 COMPREHEN METABOLIC PANEL: CPT

## 2017-08-01 PROCEDURE — 80100016 HC MAINTENANCE HEMODIALYSIS

## 2017-08-01 PROCEDURE — 90935 HEMODIALYSIS ONE EVALUATION: CPT | Mod: ,,, | Performed by: INTERNAL MEDICINE

## 2017-08-01 PROCEDURE — 84100 ASSAY OF PHOSPHORUS: CPT

## 2017-08-01 PROCEDURE — 25000003 PHARM REV CODE 250: Performed by: NURSE PRACTITIONER

## 2017-08-01 PROCEDURE — 36415 COLL VENOUS BLD VENIPUNCTURE: CPT

## 2017-08-01 PROCEDURE — 99231 SBSQ HOSP IP/OBS SF/LOW 25: CPT | Mod: GC,,, | Performed by: HOSPITALIST

## 2017-08-01 PROCEDURE — 80048 BASIC METABOLIC PNL TOTAL CA: CPT

## 2017-08-01 PROCEDURE — 25000003 PHARM REV CODE 250: Performed by: INTERNAL MEDICINE

## 2017-08-01 RX ADMIN — AMLODIPINE BESYLATE 10 MG: 10 TABLET ORAL at 01:08

## 2017-08-01 RX ADMIN — HYDROMORPHONE HYDROCHLORIDE 0.5 MG: 1 INJECTION, SOLUTION INTRAMUSCULAR; INTRAVENOUS; SUBCUTANEOUS at 04:08

## 2017-08-01 RX ADMIN — ACETAMINOPHEN 650 MG: 325 TABLET ORAL at 05:08

## 2017-08-01 RX ADMIN — ACETAMINOPHEN 650 MG: 325 TABLET ORAL at 01:08

## 2017-08-01 RX ADMIN — INSULIN DETEMIR 5 UNITS: 100 INJECTION, SOLUTION SUBCUTANEOUS at 10:08

## 2017-08-01 RX ADMIN — SEVELAMER CARBONATE 2400 MG: 800 TABLET, FILM COATED ORAL at 01:08

## 2017-08-01 RX ADMIN — SEVELAMER CARBONATE 2400 MG: 800 TABLET, FILM COATED ORAL at 04:08

## 2017-08-01 RX ADMIN — ROPIVACAINE HYDROCHLORIDE 8 ML/HR: 2 INJECTION, SOLUTION EPIDURAL; INFILTRATION at 01:08

## 2017-08-01 RX ADMIN — CIPROFLOXACIN HYDROCHLORIDE 500 MG: 500 TABLET, FILM COATED ORAL at 01:08

## 2017-08-01 RX ADMIN — ROPIVACAINE HYDROCHLORIDE 6 ML/HR: 2 INJECTION, SOLUTION EPIDURAL; INFILTRATION at 01:08

## 2017-08-01 RX ADMIN — ASPIRIN 81 MG CHEWABLE TABLET 81 MG: 81 TABLET CHEWABLE at 01:08

## 2017-08-01 RX ADMIN — OXYCODONE HYDROCHLORIDE 5 MG: 5 TABLET ORAL at 01:08

## 2017-08-01 RX ADMIN — INSULIN DETEMIR 5 UNITS: 100 INJECTION, SOLUTION SUBCUTANEOUS at 09:08

## 2017-08-01 RX ADMIN — PREGABALIN 75 MG: 75 CAPSULE ORAL at 09:08

## 2017-08-01 RX ADMIN — SODIUM CHLORIDE 300 ML: 900 INJECTION, SOLUTION INTRAVENOUS at 10:08

## 2017-08-01 NOTE — PROGRESS NOTES
[facility]ODIAL[facility] NOTE     Patient currently on hemodialysis for removal of uremic toxins and volume.     Patient seen and evaluated on hemodialysis, tolerating treatment, see HD flowsheet for vitals and assessments.      No Hypotension, chest pain, shortness of breath, cramping, nausea or vomiting.     Lt foot gangrene SP Lt AKA on 7/31/17     Recent Labs  Lab 07/30/17  0445 07/31/17  0439 08/01/17  0403   WBC 21.19* 18.98* 18.71*   HGB 7.7* 7.7* 8.0*   HCT 24.9* 24.8* 25.7*    425* 453*       Recent Labs  Lab 07/26/17  0508  07/31/17  0439 08/01/17  0403 08/01/17  0757   *  < > 135* 130* 131*   K 5.0  < > 4.6 5.8* 5.6*     < > 99 95 96   CO2 23  < > 24 23 23   BUN 32*  < > 26* 38* 38*   CREATININE 5.2*  < > 5.2* 6.2* 6.3*   GLU 70  < > 50* 201* 171*   CALCIUM 8.4*  < > 8.4* 8.3* 8.4*   PHOS 4.8*  --   --  5.6*  --    < > = values in this interval not displayed.    Recent Labs  Lab 07/30/17  0950 08/01/17  0403   ALKPHOS  --  157*   ALT  --  <5*   AST  --  12   ALBUMIN  --  2.1*   PROT  --  8.1   BILITOT  --  0.3   INR 1.0  --       Lab Results   Component Value Date    .0 (H) 07/07/2016    CALCIUM 8.4 (L) 08/01/2017    CAION 1.06 11/17/2009    PHOS 5.6 (H) 08/01/2017       Exam  AAOx 3  Lungs clear  Heart S1S2  Extremities  RLE edema, Lt AKA  Access Left thigh graft     ASSESSMENT/PLAN:  ESRD on IHD   - Will provide dialysis for metabolic clearance and volume   - Seen and examined today during hemodialysis; tolerating treatment well without issues. Denied headaches, chest pain, abdominal pain, or muscle cramps   - Elevated potassium   - Target UF  1.5-2 L as tolerated  - Dialysate adjusted to current labs. Renal diet     Anemia of Chronic Kidney Disease   - Stable. No iron in setting of chronic infection. Continue Epogen.      Mineral Bone Disease in CKD   -  renal diet. Corrected Ca 10.8. Hold calcitrol. Continue renvela. Check iPTH.       Ivonne Huertas, THIERRY  Nephrology

## 2017-08-01 NOTE — PROGRESS NOTES
Maintenance HD treatment initiated via Lt Femoral AVG without difficulty, good a/v flows obtained.

## 2017-08-01 NOTE — ANESTHESIA POSTPROCEDURE EVALUATION
"Anesthesia Post Evaluation    Patient: Aleta Herrera    Procedure(s) Performed: Procedure(s) (LRB):  AMPUTATION-ABOVE KNEE (Left)    Final Anesthesia Type: general  Patient location during evaluation: floor  Patient participation: Yes- Able to Participate  Level of consciousness: awake and alert  Post-procedure vital signs: reviewed and stable  Pain management: adequate  Airway patency: patent  PONV status at discharge: No PONV  Anesthetic complications: no      Cardiovascular status: stable  Respiratory status: spontaneous ventilation  Hydration status: euvolemic  Follow-up not needed.        Visit Vitals  /61   Pulse 77   Temp 36.6 °C (97.9 °F) (Oral)   Resp 15   Ht 4' 11" (1.499 m)   Wt 67 kg (147 lb 11.3 oz)   SpO2 98%   Breastfeeding? No   BMI 29.83 kg/m²       Pain/Hector Score: Pain Assessment Performed: Yes (8/1/2017  8:38 AM)  Presence of Pain: denies (8/1/2017  8:38 AM)  Pain Rating Prior to Med Admin: 3 (8/1/2017  5:23 AM)  Hector Score: 9 (7/31/2017  5:12 PM)      "

## 2017-08-01 NOTE — PROGRESS NOTES
[facility]  Vascular Surgery  Progress Note    Patient Name: Aleta Herrera  MRN: [id number]  Admission Date: 7/23/2017  Primary Care Provider: Radha Lao MD    Subjective:     Interval History: Did s/p L AKA yesterday. Pain controlled    Post-Op Info:  Procedure(s) (LRB):  AMPUTATION-ABOVE KNEE (Left)   1 Day Post-Op       Medications:  Continuous Infusions:   ropivacaine (PF) 2 mg/ml (0.2%) 6 mL/hr (07/31/17 1525)    ropivacaine (PF) 2 mg/ml (0.2%) 8 mL/hr (07/31/17 1649)     Scheduled Meds:   sodium chloride 0.9%   Intravenous Once    acetaminophen  650 mg Oral Q6H    amlodipine  10 mg Oral Daily    aspirin  81 mg Oral Daily    calcitRIOL  0.5 mcg Oral Daily    ciprofloxacin HCl  500 mg Oral Daily    epoetin batsheva (PROCRIT) injection  100 Units/kg Intravenous Every Mon, Wed, Fri    insulin detemir  5 Units Subcutaneous BID    pantoprazole  40 mg Oral Daily    pregabalin  75 mg Oral QHS    sevelamer carbonate  2,400 mg Oral TID WM    sodium hypochlorite   Irrigation Once     PRN Meds:sodium chloride 0.9%, sodium chloride 0.9%, sodium chloride 0.9%, dextrose 50%, dextrose 50%, glucagon (human recombinant), glucose, glucose, heparin (porcine), HYDROmorphone, insulin aspart, ondansetron, oxycodone, polyethylene glycol     Objective:     Vital Signs (Most Recent):  Temp: 97.5 °F (36.4 °C) (08/01/17 0500)  Pulse: 83 (08/01/17 0500)  Resp: 18 (08/01/17 0500)  BP: 134/69 (08/01/17 0500)  SpO2: 98 % (08/01/17 0500) Vital Signs (24h Range):  Temp:  [97.2 °F (36.2 °C)-98.8 °F (37.1 °C)] 97.5 °F (36.4 °C)  Pulse:  [] 83  Resp:  [11-22] 18  SpO2:  [90 %-100 %] 98 %  BP: ()/(44-71) 134/69          Physical Exam   Constitutional: She appears well-developed and well-nourished.   HENT:   Head: Normocephalic and atraumatic.   Cardiovascular: Normal rate, regular rhythm and normal heart sounds.    Pulmonary/Chest: Effort normal and breath sounds normal.   LLE stump incision  c/d/i with staples in places    Significant Labs:  CBC:   Recent Labs  Lab 07/31/17  0439 08/01/17  0403   WBC 18.98*  --    RBC 2.82* 2.95*   HGB 7.7* 8.0*   HCT 24.8* 25.7*   * 453*   MCV 88 87   MCH 27.3 27.1   MCHC 31.0* 31.1*     CMP:   Recent Labs  Lab 08/01/17  0403   *   CALCIUM 8.3*   ALBUMIN 2.1*   PROT 8.1   *   K 5.8*   CO2 23   CL 95   BUN 38*   CREATININE 6.2*   ALKPHOS 157*   ALT <5*   AST 12   BILITOT 0.3       Significant Diagnostics:  I have reviewed all pertinent imaging results/findings within the past 24 hours.    Assessment/Plan:     Type 2 diabetes mellitus with left diabetic foot ulcer                * Left foot infection    61 yo F with h/o HTN, HLD, DM2 (Hgb A1c 6.3),HFpEF (EF 60%), COPD (home O2, 2.5 L), ESRD on HD (MWF) via L femoral AVG presenting to hospital with left foot ischemic ulcer, wet gangrene s/p I&D by podiatry 07/26, left AKA on 07/31    POD#1 from left AKA. Doing well  Pain control with PNC per anesthesia  Vascular surgery will change dressings as needed for now   PT to evaluate and treat  Non-weight bearing on AKA stump. Keep elevated  Ok to use L femoral AVG            Colten Johnson MD  Vascular Surgery  [facility]

## 2017-08-01 NOTE — ASSESSMENT & PLAN NOTE
Presenting to the ED w/ new bleeding from L leg ulcer along with fevers, chills and new leukocytosis.   - Received vancomycin , flagyl and rocephin in ED. Fever resolved with Tylenol in ED. Dc'd vancomycin and flagyl on the floor.   - Blood cx 1/4 +gram positive cocci in clusters resembling staph   - Blood cx 1/4 +gram positive rods  - Wound cx growing proteus and GNR, lactose   - Repeat blood cx x2 show NGTD  - S/p Aortogram + Left LE angiogram with Vascular surgery   - Significant steal from L thigh AVG. L foot not salvageable given extent of infection.   - S/p left AKA 7/31    Plan  - Continuinue PO Ciprofloxacin per wound cx sensitivities   - Further management per Vascular surgery

## 2017-08-01 NOTE — PROGRESS NOTES
-Left foot ulcer resolved, pt S/P L AKA  -Podiatry will sign off for now, please call with questions or concerns    Thank you,     Marlyn Frank  DPM PGY-1  [phone]

## 2017-08-01 NOTE — SUBJECTIVE & OBJECTIVE
Interval History: Patient afebrile, no complaints at this time. Tolerating PO intake. No BM since surgery. Denies CP or SOB.     Review of Systems   Constitutional: Negative for chills and fever.   Respiratory: Negative for cough and shortness of breath.    Cardiovascular: Negative for chest pain.   Gastrointestinal: Positive for nausea and vomiting. Negative for abdominal pain and diarrhea.   Genitourinary:        Does not make urine   Musculoskeletal: Myalgias: left foot.   Skin: Wound: s/p debridement.   Neurological: Positive for light-headedness. Negative for dizziness and headaches.   All other systems reviewed and are negative.    Objective:     Vital Signs (Most Recent):  Temp: 97.5 °F (36.4 °C) (08/01/17 0500)  Pulse: 80 (08/01/17 0700)  Resp: 18 (08/01/17 0500)  BP: 134/69 (08/01/17 0500)  SpO2: 98 % (08/01/17 0500) Vital Signs (24h Range):  Temp:  [97.2 °F (36.2 °C)-98.8 °F (37.1 °C)] 97.5 °F (36.4 °C)  Pulse:  [] 80  Resp:  [11-22] 18  SpO2:  [90 %-100 %] 98 %  BP: ()/(44-71) 134/69     Weight: 67 kg (147 lb 11.3 oz)  Body mass index is 29.83 kg/m².    Intake/Output Summary (Last 24 hours) at 08/01/17 0822  Last data filed at 07/31/17 1712   Gross per 24 hour   Intake              315 ml   Output                0 ml   Net              315 ml      Physical Exam   Constitutional: She appears well-developed and well-nourished.   HENT:   Head: Normocephalic and atraumatic.   Eyes: EOM are normal. Pupils are equal, round, and reactive to light.   Cardiovascular: Normal rate and regular rhythm.    Pulmonary/Chest: Effort normal. No respiratory distress.   Abdominal: She exhibits no distension. There is no tenderness. A hernia (soft, non-tender) is present.   Musculoskeletal:   Left AKA, dressing in place. No visible erythema, no warmth.    Neurological: She is alert.   Skin: Skin is warm and dry.     Significant Labs:     BMP:     Recent Labs  Lab 08/01/17  0403   *   *   K 5.8*   CL  95   CO2 23   BUN 38*   CREATININE 6.2*   CALCIUM 8.3*     CBC:     Recent Labs  Lab 07/31/17  0439 08/01/17  0403   WBC 18.98*  --    HGB 7.7* 8.0*   HCT 24.8* 25.7*   * 453*     Significant Imaging: No new imaging.

## 2017-08-01 NOTE — ASSESSMENT & PLAN NOTE
- had Aortogram + Left LE angiogram 07/28 which showed Significant steal from left thigh AVG. Left foot not salvageable given extent of infection. S/p left AKA on 7/31

## 2017-08-01 NOTE — PROGRESS NOTES
[facility] Hospital Medicine  Progress Note    Patient Name: Aleta Herrera  MRN: [id number]  Patient Class: IP- Inpatient   Admission Date: 7/23/2017  Length of Stay: 9 days  Attending Physician: Carson Morgan, *  Primary Care Provider: Radha Lao MD    [facility] Medicine Team: [facility] MED 2 Jillian Junior DO    Subjective:     Principal Problem:Left foot infection    HPI:  Aleta Herrera is a 62-year-old female with HTN, HFpEF (EF 60%; home O2 2.5 liters), anemia 2/2 CKD and IDDM c/b retinopathy, neuropathy and ESRD (HD MWF). She presents today with her  to the ED because of new bleeding from the left foot ulcer. She's had on/off fevers and chills for past week or so. Apparently blood cultures were drawn at dialysis (7/19). She was seen by vascular surgery the next day where her right femoral permacath was pulled. She has a working left femoral AVG. Her upper extremity access has failed over the years since being placed on dialysis in 2009. Besides left leg pain, she denies any SOB, CP, cough, congestion, abdominal pain, n/v or diarrhea. Not received any recent antibiotics.   Complaining of foot pain in the ED, otherwise has no complaints. CXR showed mild b/l effusions. Left foot x-ray soft tissue swelling, but no gas formation. Labs significant for WBC count of 24k. Procalcitonin 1.64.       Hospital Course:  7/24: VSS. WBC 22.58 down from 24.04 in ED. Sed rate 100. .3. CXR stable. L Foot xray soft tissue swelling. MRI L foot no osteomylitis, no drainable collections. JULIUS L with mod-severe occlusive disease, R with mod. Continue ceftriaxone. HD today.  7/25: VSS. WBC 19.18. Vasc Surg consulted, L femoral AVG w/ possible vascular steal; will perform LLE angiogram on 7/28, if no arterial stenosis will plan for femoral graft ligation & permacath placement. Podiatry consulted, extensive stable left plantar arch necrosis, no acute surgical  intervention; will f/u after angiogram.  Wound cx pending. 1/4 Bcx +GPC in clusters. Continue ceftriaxone.   7/26: I&D and debridement with Podiatry today.  Continue Ceftriaxone.   7/27: Spiked fever to 101.1F, improved with tylenol. Repeated Blood cx x2. HD today. NPO at midnight.  7/28: Had Aortogram + Left LE angiogram by vascular surgery; significant steal from AVG, left foot not salvagable. Plan for Left AKA.   7/29: VSS. HD today. Continue abx.   7/31 L AKA   8/01 HD today    Interval History: Patient afebrile, no complaints at this time. Tolerating PO intake. No BM since surgery. Denies CP or SOB.     Review of Systems   Constitutional: Negative for chills and fever.   Respiratory: Negative for cough and shortness of breath.    Cardiovascular: Negative for chest pain.   Gastrointestinal: Positive for nausea and vomiting. Negative for abdominal pain and diarrhea.   Genitourinary:        Does not make urine   Musculoskeletal: Myalgias: left foot.   Skin: Wound: s/p debridement.   Neurological: Positive for light-headedness. Negative for dizziness and headaches.   All other systems reviewed and are negative.    Objective:     Vital Signs (Most Recent):  Temp: 97.5 °F (36.4 °C) (08/01/17 0500)  Pulse: 80 (08/01/17 0700)  Resp: 18 (08/01/17 0500)  BP: 134/69 (08/01/17 0500)  SpO2: 98 % (08/01/17 0500) Vital Signs (24h Range):  Temp:  [97.2 °F (36.2 °C)-98.8 °F (37.1 °C)] 97.5 °F (36.4 °C)  Pulse:  [] 80  Resp:  [11-22] 18  SpO2:  [90 %-100 %] 98 %  BP: ()/(44-71) 134/69     Weight: 67 kg (147 lb 11.3 oz)  Body mass index is 29.83 kg/m².    Intake/Output Summary (Last 24 hours) at 08/01/17 0822  Last data filed at 07/31/17 1712   Gross per 24 hour   Intake              315 ml   Output                0 ml   Net              315 ml      Physical Exam   Constitutional: She appears well-developed and well-nourished.   HENT:   Head: Normocephalic and atraumatic.   Eyes: EOM are normal. Pupils are equal,  round, and reactive to light.   Cardiovascular: Normal rate and regular rhythm.    Pulmonary/Chest: Effort normal. No respiratory distress.   Abdominal: She exhibits no distension. There is no tenderness. A hernia (soft, non-tender) is present.   Musculoskeletal:   Left AKA, dressing in place. No visible erythema, no warmth.    Neurological: She is alert.   Skin: Skin is warm and dry.     Significant Labs:     BMP:     Recent Labs  Lab 08/01/17  0403   *   *   K 5.8*   CL 95   CO2 23   BUN 38*   CREATININE 6.2*   CALCIUM 8.3*     CBC:     Recent Labs  Lab 07/31/17  0439 08/01/17  0403   WBC 18.98*  --    HGB 7.7* 8.0*   HCT 24.8* 25.7*   * 453*     Significant Imaging: No new imaging.     Assessment/Plan:      * Left foot infection    Presenting to the ED w/ new bleeding from L leg ulcer along with fevers, chills and new leukocytosis.   - Received vancomycin , flagyl and rocephin in ED. Fever resolved with Tylenol in ED. Dc'd vancomycin and flagyl on the floor.   - Blood cx 1/4 +gram positive cocci in clusters resembling staph   - Blood cx 1/4 +gram positive rods  - Wound cx growing proteus and GNR, lactose   - Repeat blood cx x2 show NGTD  - S/p Aortogram + Left LE angiogram with Vascular surgery   - Significant steal from L thigh AVG. L foot not salvageable given extent of infection.   - S/p left AKA 7/31    Plan  - Continuinue PO Ciprofloxacin per wound cx sensitivities   - Further management per Vascular surgery            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    - Continue Norvasc  - Diabetic diet inpatient  - Sliding scale insulin        ESRD (end stage renal disease)    - Consulted nephrology.   Plan  - Dialysis inpatient  - Will continue to monitor electrolytes        Peripheral vascular disease, unspecified    - had Aortogram + Left LE angiogram 07/28 which showed Significant steal from left thigh AVG. Left foot not salvageable given extent of infection. S/p left AKA  "on 7/31          Anemia of chronic renal failure    - Procrit w/ dialysis.   - Continue to monitor H/H        Type 2 diabetes mellitus with left diabetic foot ulcer    See "left foot infection"         (HFpEF) heart failure with preserved ejection fraction    - BNP 2835 on admission   - Pt with ESRD on HD            Pleural effusion    - Seen on prior CXR (5/3/17). Unclear etiology. Stable.        Late effect of ischemic cerebral stroke    - Continued ASA 81 mg.           Hypertension, renal    - Continue home amlodipine 10 mg QD          VTE Risk Mitigation         Ordered     Medium Risk of VTE  Once      07/28/17 1244        Dispo: Continue Cipro for now, monitor for path report to evaluate for extent of infection. PT / OT    Jillian Junior,   Department of Hospital Medicine   [facility]  "

## 2017-08-01 NOTE — SUBJECTIVE & OBJECTIVE
Medications:  Continuous Infusions:   ropivacaine (PF) 2 mg/ml (0.2%) 6 mL/hr (07/31/17 1525)    ropivacaine (PF) 2 mg/ml (0.2%) 8 mL/hr (07/31/17 1649)     Scheduled Meds:   sodium chloride 0.9%   Intravenous Once    acetaminophen  650 mg Oral Q6H    amlodipine  10 mg Oral Daily    aspirin  81 mg Oral Daily    calcitRIOL  0.5 mcg Oral Daily    ciprofloxacin HCl  500 mg Oral Daily    epoetin batsheva (PROCRIT) injection  100 Units/kg Intravenous Every Mon, Wed, Fri    insulin detemir  5 Units Subcutaneous BID    pantoprazole  40 mg Oral Daily    pregabalin  75 mg Oral QHS    sevelamer carbonate  2,400 mg Oral TID WM    sodium hypochlorite   Irrigation Once     PRN Meds:sodium chloride 0.9%, sodium chloride 0.9%, sodium chloride 0.9%, dextrose 50%, dextrose 50%, glucagon (human recombinant), glucose, glucose, heparin (porcine), HYDROmorphone, insulin aspart, ondansetron, oxycodone, polyethylene glycol     Objective:     Vital Signs (Most Recent):  Temp: 97.5 °F (36.4 °C) (08/01/17 0500)  Pulse: 83 (08/01/17 0500)  Resp: 18 (08/01/17 0500)  BP: 134/69 (08/01/17 0500)  SpO2: 98 % (08/01/17 0500) Vital Signs (24h Range):  Temp:  [97.2 °F (36.2 °C)-98.8 °F (37.1 °C)] 97.5 °F (36.4 °C)  Pulse:  [] 83  Resp:  [11-22] 18  SpO2:  [90 %-100 %] 98 %  BP: ()/(44-71) 134/69          Physical Exam   Constitutional: She appears well-developed and well-nourished.   HENT:   Head: Normocephalic and atraumatic.   Cardiovascular: Normal rate, regular rhythm and normal heart sounds.    Pulmonary/Chest: Effort normal and breath sounds normal.   LLE stump incision c/d/i with staples in places    Significant Labs:  CBC:   Recent Labs  Lab 07/31/17  0439 08/01/17  0403   WBC 18.98*  --    RBC 2.82* 2.95*   HGB 7.7* 8.0*   HCT 24.8* 25.7*   * 453*   MCV 88 87   MCH 27.3 27.1   MCHC 31.0* 31.1*     CMP:   Recent Labs  Lab 08/01/17  0403   *   CALCIUM 8.3*   ALBUMIN 2.1*   PROT 8.1   *   K 5.8*   CO2  23   CL 95   BUN 38*   CREATININE 6.2*   ALKPHOS 157*   ALT <5*   AST 12   BILITOT 0.3       Significant Diagnostics:  I have reviewed all pertinent imaging results/findings within the past 24 hours.

## 2017-08-01 NOTE — OP NOTE
[facility]  Vascular Surgery  Operative Note    SUMMARY     Date of Procedure: 7/31/2017     Procedure: Left above knee amputation    Surgeon(s) and Role:     * Melissa Graham MD - Fellow     * Nathalie Madera MD - Primary    Assisting Surgeon: None    Pre-Operative Diagnosis: Left foot infection [L08.9]    Post-Operative Diagnosis: Post-Op Diagnosis Codes:     * Left foot infection [L08.9]    Anesthesia: Regional    Indication for operation:  63 yo F with extensive left plantar tissue necrosis; nonsalveagable, presenting for L AKA.    Description of the Findings of the Procedure: healthy muscle bed      Complications: No    Estimated Blood Loss (EBL): 50 mL           Implants: none  Specimens:   Specimen (12h ago through future)    Start     Ordered    07/31/17 1430  Specimen to Pathology - Surgery  Once     Comments:  1. Left Leg - Perm      07/31/17 1430                  Condition: Good    Disposition: PACU - hemodynamically stable.    Operation in detail: After appropriate informed consent obtained, the patient was taken to the operating room and placed in the supine position. A block with perineural catheters was performed by anesthesia in the preoperative period. The right lower extremity was prepped and draped in usual sterile fashion.  The anterior and posterior skin flaps were outlined using a marking pen. A fish-mouth incision was made at the level of distal femur, just proximal to the patella, and a longer posterior flap was created. Careful attention was paid to avoid femoral AVG. Incision was deepened through the subcutaneous tissues and anterior muscle groups were divided at the skin level using sharp dissection. The SFA and femoral vein were isolated and controlled. The posterior muscle compartment was sharply divided. The periosteum of the femur was elevated proximally and circumferentially along with the femoral shaft. The bone was transected using an bone saw. The leg  was then passed off of the field. The SFA and femoral vein were suture ligated using prolene suture.  The sciatic nerve was identified and divided with silk suture and allowed to retract into tissue. Hemostasis was obtained with electrocautery. The deep investing fascia of the anterior and posterior muscle flaps were approximated using PDS sutures to cover vessels and femur. The fascia was then approximated using interrupted vicryl suture. The skin was approximated with staples. The surgical wound was dressed with 4x4 and tegaderm followed by gauze fluffs, kerlex and an ace bandage. The patient was transferred to the recovery room in stable condition.

## 2017-08-01 NOTE — ANESTHESIA POST-OP PAIN MANAGEMENT
Acute Pain Service Progress Note    Aleta Herrera is a 62 y.o., female, [id number].    Surgery:  Amputation above knee    Post Op Day #: 1    Catheter type: perineural  femoral and sciatic    Infusion type: Ropivacaine 0.2%  8 basal    Problem List:    Active Hospital Problems    Diagnosis  POA    *Left foot infection [L08.9]  Yes    Sepsis [A41.9]  Yes    Type 2 diabetes mellitus with left diabetic foot ulcer [E11.621, L97.529]  Yes    ESRD (end stage renal disease) [N18.6]  Yes    Peripheral vascular disease, unspecified [I73.9]  Yes    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 [E11.22, I12.0, Z99.2, N18.6]  Not Applicable    Anemia of chronic renal failure [N18.9, D63.1]  Yes     Chronic      Resolved Hospital Problems    Diagnosis Date Resolved POA   No resolved problems to display.       Subjective:     General appearance of alert, oriented, no complaints   Pain with rest: 0   Pain with movement: 0   Side Effects    1. Pruritis No    2. Nausea No    3. Motor Blockade No, 0=Ability to raise lower extremities off bed    4. Sedation No, 1=awake and alert    Objective:        Catheter site clean, dry, intact         Vitals   Vitals:    08/01/17 0500   BP: 134/69   Pulse: 83   Resp: 18   Temp: 36.4 °C (97.5 °F)        Labs    Admission on 07/23/2017   No results displayed because visit has over 200 results.           Meds   Current Facility-Administered Medications   Medication Dose Route Frequency Provider Last Rate Last Dose    0.9%  NaCl infusion   Intravenous PRN Ivonne Huertas  mL/hr at 07/27/17 0821 1,000 mL at 07/27/17 0821    0.9%  NaCl infusion   Intravenous PRN Ivonne Huertas NP        0.9%  NaCl infusion   Intravenous PRN Ivonne Huertas NP        0.9%  NaCl infusion   Intravenous Once Ivonne Huertas NP        acetaminophen tablet 650 mg  650 mg Oral Q6H Carson Mabry MD   650 mg at 08/01/17 0523    amlodipine tablet 10 mg  10 mg Oral Daily Carson  JINA Marcus, DO   10 mg at 07/31/17 0810    aspirin chewable tablet 81 mg  81 mg Oral Daily Carson Marcus DO   81 mg at 07/31/17 0807    calcitRIOL capsule 0.5 mcg  0.5 mcg Oral Daily Carson Marcus, DO   0.5 mcg at 07/31/17 0807    ciprofloxacin HCl tablet 500 mg  500 mg Oral Daily Estrella Ramos MD   500 mg at 07/31/17 0807    dextrose 50% injection 12.5 g  12.5 g Intravenous PRN Carson Marcus, DO        dextrose 50% injection 25 g  25 g Intravenous PRN Carson Marcus DO        epoetin batsheva injection 6,400 Units  100 Units/kg Intravenous Every Mon, Wed, Fri Ivonne Heber Huertas NP   Stopped at 07/31/17 0900    glucagon (human recombinant) injection 1 mg  1 mg Intramuscular PRN Carson Marcus DO        glucose chewable tablet 16 g  16 g Oral PRN Carson Marcus DO        glucose chewable tablet 24 g  24 g Oral PRN Carson Marcus DO        heparin (porcine) injection 2,000 Units  2,000 Units Intravenous PRN Adriana Singh MD        hydromorphone injection 0.5 mg  0.5 mg Intravenous Q6H PRN Man Lorenzana MD        insulin aspart pen 0-5 Units  0-5 Units Subcutaneous QID (AC + HS) PRN Carson Marcus, DO   1 Units at 07/31/17 2317    insulin detemir pen 5 Units  5 Units Subcutaneous BID Carson Marcus DO   5 Units at 07/31/17 2130    ondansetron tablet 8 mg  8 mg Oral Q8H PRN Carson Mabry MD        oxycodone immediate release tablet 5 mg  5 mg Oral Q6H PRN Man Lorenzana MD        pantoprazole EC tablet 40 mg  40 mg Oral Daily Estrella Ramos MD   40 mg at 07/31/17 0807    polyethylene glycol packet 17 g  17 g Oral BID PRN Carson Marcus DO        pregabalin capsule 75 mg  75 mg Oral QHS Carson Mabry MD   75 mg at 07/31/17 2145    ropivacaine (PF) 2 mg/ml (0.2%) infusion  6 mL/hr Perineural Continuous Lexis Medel MD 6 mL/hr at 07/31/17 1525 6 mL/hr at 07/31/17 1525    ropivacaine (PF) 2  mg/ml (0.2%) infusion  8 mL/hr Perineural Continuous Lexis Medel MD 8 mL/hr at 07/31/17 1649 8 mL/hr at 07/31/17 1649    sevelamer carbonate tablet 2,400 mg  2,400 mg Oral TID  Carson Marcus DO   2,400 mg at 07/31/17 1836    sodium hypochlorite (DAKIN'S) external solution 0.25%   Irrigation Once Melissa Shanti Graham MD               Assessment:     Pain control adequate    Plan:     Patient doing well, continue present treatment.    Evaluator Aylin Wiggins

## 2017-08-01 NOTE — PLAN OF CARE
Patient not medically stable for discharge at this time. POD#1 Left AKA. Waiting on PT/OT recommendations.       08/01/17 1504   Discharge Reassessment   Assessment Type Discharge Planning Reassessment   Can the patient answer the patient profile reliably? Yes, cognitively intact   How does the patient rate their overall health at the present time? Fair   Describe the patient's ability to walk at the present time. Major restrictions/daily assistance from another person   How often would a person be available to care for the patient? Often   Number of comorbid conditions (as recorded on the chart) Five or more   During the past month, has the patient often been bothered by feeling down, depressed or hopeless? No   During the past month, has the patient often been bothered by little interest or pleasure in doing things? No   Discharge plan remains the same: Yes   Provided patient/caregiver education on the expected discharge date and the discharge plan Yes   Discharge Plan A Skilled Nursing Facility   Change in patient condition or support system Yes

## 2017-08-01 NOTE — PLAN OF CARE
Problem: Patient Care Overview  Goal: Plan of Care Review  Outcome: Ongoing (interventions implemented as appropriate)  3 hr hd completed, net fluid [id number], target goal achieved, pt tolerated well. Report given to Julio GIL

## 2017-08-01 NOTE — ASSESSMENT & PLAN NOTE
63 yo F with h/o HTN, HLD, DM2 (Hgb A1c 6.3),HFpEF (EF 60%), COPD (home O2, 2.5 L), ESRD on HD (MWF) via L femoral AVG presenting to hospital with left foot ischemic ulcer, wet gangrene s/p I&D by podiatry 07/26, left AKA on 07/31    POD#1 from left AKA. Doing well  Pain control with PNC per anesthesia  Vascular surgery will change dressings as needed for now   PT to evaluate and treat  Non-weight bearing on AKA stump. Keep elevated  Ok to use L femoral AVG

## 2017-08-01 NOTE — PLAN OF CARE
Problem: Patient Care Overview  Goal: Plan of Care Review  Outcome: Ongoing (interventions implemented as appropriate)  Pt is AAO x3.  VSS.  No injury/falls this shift.  Pt calls for assistance when needed and is on bedrest.  Pain being monitored and controlled via PRN and scheduled meds.  No s/s of infection noted.  No s/s of skin breakdown noted.  Bed lowest position, call light within reach, siderails up x2.

## 2017-08-02 LAB
ALBUMIN SERPL BCP-MCNC: 2 G/DL
ANION GAP SERPL CALC-SCNC: 9 MMOL/L
BASOPHILS # BLD AUTO: 0.01 K/UL
BASOPHILS NFR BLD: 0.1 %
BUN SERPL-MCNC: 24 MG/DL
CALCIUM SERPL-MCNC: 8.5 MG/DL
CHLORIDE SERPL-SCNC: 102 MMOL/L
CO2 SERPL-SCNC: 25 MMOL/L
CREAT SERPL-MCNC: 4.5 MG/DL
DIFFERENTIAL METHOD: ABNORMAL
EOSINOPHIL # BLD AUTO: 0.3 K/UL
EOSINOPHIL NFR BLD: 2 %
ERYTHROCYTE [DISTWIDTH] IN BLOOD BY AUTOMATED COUNT: 19.7 %
EST. GFR  (AFRICAN AMERICAN): 11.4 ML/MIN/1.73 M^2
EST. GFR  (NON AFRICAN AMERICAN): 9.9 ML/MIN/1.73 M^2
GLUCOSE SERPL-MCNC: 86 MG/DL
HCT VFR BLD AUTO: 25.9 %
HGB BLD-MCNC: 8 G/DL
LYMPHOCYTES # BLD AUTO: 1.1 K/UL
LYMPHOCYTES NFR BLD: 7.4 %
MCH RBC QN AUTO: 27.3 PG
MCHC RBC AUTO-ENTMCNC: 30.9 G/DL
MCV RBC AUTO: 88 FL
MONOCYTES # BLD AUTO: 0.8 K/UL
MONOCYTES NFR BLD: 5.7 %
NEUTROPHILS # BLD AUTO: 12.1 K/UL
NEUTROPHILS NFR BLD: 84.5 %
PHOSPHATE SERPL-MCNC: 4.3 MG/DL
PLATELET # BLD AUTO: 466 K/UL
PMV BLD AUTO: 8.6 FL
POCT GLUCOSE: 115 MG/DL (ref 70–110)
POCT GLUCOSE: 139 MG/DL (ref 70–110)
POCT GLUCOSE: 85 MG/DL (ref 70–110)
POCT GLUCOSE: 98 MG/DL (ref 70–110)
POTASSIUM SERPL-SCNC: 4.1 MMOL/L
PTH-INTACT SERPL-MCNC: 152 PG/ML
RBC # BLD AUTO: 2.93 M/UL
SODIUM SERPL-SCNC: 136 MMOL/L
WBC # BLD AUTO: 14.37 K/UL

## 2017-08-02 PROCEDURE — 97161 PT EVAL LOW COMPLEX 20 MIN: CPT

## 2017-08-02 PROCEDURE — 99231 SBSQ HOSP IP/OBS SF/LOW 25: CPT | Mod: ,,, | Performed by: ANESTHESIOLOGY

## 2017-08-02 PROCEDURE — 63600175 PHARM REV CODE 636 W HCPCS: Performed by: ANESTHESIOLOGY

## 2017-08-02 PROCEDURE — 80069 RENAL FUNCTION PANEL: CPT

## 2017-08-02 PROCEDURE — 36415 COLL VENOUS BLD VENIPUNCTURE: CPT

## 2017-08-02 PROCEDURE — 25000003 PHARM REV CODE 250: Performed by: ANESTHESIOLOGY

## 2017-08-02 PROCEDURE — G8978 MOBILITY CURRENT STATUS: HCPCS | Mod: CL

## 2017-08-02 PROCEDURE — 85025 COMPLETE CBC W/AUTO DIFF WBC: CPT

## 2017-08-02 PROCEDURE — 25000003 PHARM REV CODE 250: Performed by: STUDENT IN AN ORGANIZED HEALTH CARE EDUCATION/TRAINING PROGRAM

## 2017-08-02 PROCEDURE — 12000002 HC ACUTE/MED SURGE SEMI-PRIVATE ROOM

## 2017-08-02 PROCEDURE — 97530 THERAPEUTIC ACTIVITIES: CPT

## 2017-08-02 PROCEDURE — 97535 SELF CARE MNGMENT TRAINING: CPT

## 2017-08-02 PROCEDURE — G8979 MOBILITY GOAL STATUS: HCPCS | Mod: CL

## 2017-08-02 PROCEDURE — 25000003 PHARM REV CODE 250: Performed by: INTERNAL MEDICINE

## 2017-08-02 PROCEDURE — 97165 OT EVAL LOW COMPLEX 30 MIN: CPT

## 2017-08-02 PROCEDURE — 63600175 PHARM REV CODE 636 W HCPCS: Performed by: STUDENT IN AN ORGANIZED HEALTH CARE EDUCATION/TRAINING PROGRAM

## 2017-08-02 PROCEDURE — 83970 ASSAY OF PARATHORMONE: CPT

## 2017-08-02 PROCEDURE — 99231 SBSQ HOSP IP/OBS SF/LOW 25: CPT | Mod: GC,,, | Performed by: HOSPITALIST

## 2017-08-02 RX ORDER — SODIUM CHLORIDE 9 MG/ML
INJECTION, SOLUTION INTRAVENOUS
Status: DISCONTINUED | OUTPATIENT
Start: 2017-08-02 | End: 2017-08-07 | Stop reason: HOSPADM

## 2017-08-02 RX ORDER — HEPARIN SODIUM 5000 [USP'U]/ML
5000 INJECTION, SOLUTION INTRAVENOUS; SUBCUTANEOUS EVERY 8 HOURS
Status: DISCONTINUED | OUTPATIENT
Start: 2017-08-02 | End: 2017-08-07 | Stop reason: HOSPADM

## 2017-08-02 RX ORDER — SODIUM CHLORIDE 9 MG/ML
INJECTION, SOLUTION INTRAVENOUS ONCE
Status: COMPLETED | OUTPATIENT
Start: 2017-08-02 | End: 2017-08-03

## 2017-08-02 RX ORDER — METHOCARBAMOL 500 MG/1
500 TABLET, FILM COATED ORAL 3 TIMES DAILY PRN
Status: DISCONTINUED | OUTPATIENT
Start: 2017-08-02 | End: 2017-08-07 | Stop reason: HOSPADM

## 2017-08-02 RX ADMIN — OXYCODONE HYDROCHLORIDE 5 MG: 5 TABLET ORAL at 05:08

## 2017-08-02 RX ADMIN — ASPIRIN 81 MG CHEWABLE TABLET 81 MG: 81 TABLET CHEWABLE at 09:08

## 2017-08-02 RX ADMIN — ROPIVACAINE HYDROCHLORIDE 6 ML/HR: 2 INJECTION, SOLUTION EPIDURAL; INFILTRATION at 06:08

## 2017-08-02 RX ADMIN — AMLODIPINE BESYLATE 10 MG: 10 TABLET ORAL at 09:08

## 2017-08-02 RX ADMIN — SEVELAMER CARBONATE 2400 MG: 800 TABLET, FILM COATED ORAL at 05:08

## 2017-08-02 RX ADMIN — INSULIN DETEMIR 5 UNITS: 100 INJECTION, SOLUTION SUBCUTANEOUS at 09:08

## 2017-08-02 RX ADMIN — OXYCODONE HYDROCHLORIDE 5 MG: 5 TABLET ORAL at 04:08

## 2017-08-02 RX ADMIN — HEPARIN SODIUM 5000 UNITS: 5000 INJECTION, SOLUTION INTRAVENOUS; SUBCUTANEOUS at 10:08

## 2017-08-02 RX ADMIN — OXYCODONE HYDROCHLORIDE 5 MG: 5 TABLET ORAL at 10:08

## 2017-08-02 RX ADMIN — HEPARIN SODIUM 5000 UNITS: 5000 INJECTION, SOLUTION INTRAVENOUS; SUBCUTANEOUS at 01:08

## 2017-08-02 RX ADMIN — PREGABALIN 75 MG: 75 CAPSULE ORAL at 09:08

## 2017-08-02 RX ADMIN — OXYCODONE HYDROCHLORIDE 5 MG: 5 TABLET ORAL at 11:08

## 2017-08-02 RX ADMIN — SEVELAMER CARBONATE 2400 MG: 800 TABLET, FILM COATED ORAL at 12:08

## 2017-08-02 RX ADMIN — ROPIVACAINE HYDROCHLORIDE 8 ML/HR: 2 INJECTION, SOLUTION EPIDURAL; INFILTRATION at 01:08

## 2017-08-02 RX ADMIN — CIPROFLOXACIN HYDROCHLORIDE 500 MG: 500 TABLET, FILM COATED ORAL at 09:08

## 2017-08-02 RX ADMIN — METHOCARBAMOL 500 MG: 500 TABLET ORAL at 10:08

## 2017-08-02 NOTE — PLAN OF CARE
Problem: Physical Therapy Goal  Goal: Physical Therapy Goal  Goals to be met by: 2017     Patient will increase functional independence with mobility by performin. Supine to sit with Set-up [place]  2. Sit to supine with Set-up [place]  3. Sit to stand transfer with Minimal Assistance  4. Bed to chair transfer with Minimal Assistance using slide board or SW  5. Gait  x 5 feet with Minimal Assistance using Standard Walker.   6. Wheelchair propulsion x100 feet with Minimal Assistance using bilateral upper extremities  7. Lower extremity exercise program x15 reps per handout, with supervision    Outcome: Ongoing (interventions implemented as appropriate)  Goals set

## 2017-08-02 NOTE — PT/OT/SLP EVAL
Physical Therapy  Evaluation    Aleta Herrera   MRN: [id number]   Admitting Diagnosis: Left foot infection    PT Received On: 17  PT Start Time: 1032     PT Stop Time: 1110    PT Total Time (min): 38 min       Billable Minutes:  Evaluation 28 and Therapeutic Activity 10    Diagnosis: Left foot infection  s/p (L) AKA    Past Medical History:   Diagnosis Date    Anemia of chronic renal failure 2013    Anticoagulant long-term use 2015    CHF (congestive heart failure)     Coronary artery disease     Diabetes mellitus     Diabetic neuropathy 2013    DR (diabetic retinopathy) 2013    End stage renal disease on dialysis     Fever 2017    Hypertension     Hypertension, renal 2013    Kidney transplant candidate 2013    Secondary hyperparathyroidism of renal origin 2013    Stroke 2015    Type 2 diabetes mellitus since 2013      Past Surgical History:   Procedure Laterality Date    AV FISTULA PLACEMENT      CATARACT SURGERY       SECTION, LOW TRANSVERSE      x 3    COLONOSCOPY N/A 2016    Procedure: COLONOSCOPY;  Surgeon: Roverto Patel MD;  Location: [facility] ([address] [place] [address]);  Service: Endoscopy;  Laterality: N/A;    EYE SURGERY      HYSTERECTOMY      Endometriosis    TONSILLECTOMY      VASCULAR SURGERY         Referring physician: Colten Johnson MD  Date referred to PT: 2017    General Precautions: Standard, fall  Orthopedic Precautions:  ((L) AKA)   Braces:              Patient History:  Lives With: spouse, child(maryana), adult (daughter)  Living Arrangements: house  Home Accessibility:  (ramp access)  Home Layout: Able to live on 1st floor  Living Environment Comment: Pt. spouse and daughter can be available to assist, as needed.  Equipment Currently Used at Home: rollator (transport chair)      Previous Level of Function:  Ambulation Skills: needs device  Transfer Skills: needs device  ADL Skills: independent  Work/Leisure  Activity: independent    Subjective:  Communicated with nursing prior to session.    Chief Complaint: (L) LE pain  Patient goals: to go home    Pain/Comfort  Pain Rating 1: 9/10  Location - Side 1: Left  Location 1: leg  Pain Addressed 1: Pre-medicate for activity, Reposition, Cessation of Activity, Nurse notified  Pain Rating Post-Intervention 1: 9/10      Objective:   Patient found with: perineural catheter, peripheral IV, oxygen     Cognitive Exam:  Oriented to: Person, Place, Time and Situation    Follows Commands/attention: Follows multistep  commands  Communication: clear/fluent  Safety awareness/insight to disability: intact    Physical Exam:  Postural examination/scapula alignment: (L) AKA    Skin integrity: Visible skin intact  Edema: None noted     Sensation:   Intact    Upper Extremity Range of Motion:  Right Upper Extremity: WFL  Left Upper Extremity: WFL    Upper Extremity Strength:  Right Upper Extremity: WFL  Left Upper Extremity: WFL    Lower Extremity Range of Motion:  Right Lower Extremity: WFL  Left Lower Extremity: WFL    Lower Extremity Strength:  Right Lower Extremity: WFL  Left Lower Extremity: WFL     Fine motor coordination:  Intact    Gross motor coordination: WFL    Functional Mobility:  Bed Mobility:  Rolling/Turning to Left: Stand by assistance  Scooting/Bridging: Minimum Assistance  Supine to Sit: Stand by Assistance (required extra time)    Transfers:  Sit <> Stand Assistance: Maximum Assistance (x2 trials with RW and x1 trial with support of PT)  Sit <> Stand Assistive Device: No Assistive Device, Rolling Walker  Bed <> Chair Technique: Stand Pivot  Bed <> Chair Assistance: Total Assistance  Bed <> Chair Assistive Device: No Assistive Device (support of PT)    Gait:   Gait Distance: unable to perform    Stairs:      Balance:   Static Sit: FAIR+: Able to take MINIMAL challenges from all directions  Dynamic Sit: FAIR+: Maintains balance through MINIMAL excursions of active trunk  motion  Static Stand: 0: Needs MAXIMAL assist to maintain   Dynamic stand: 0: N/A    Therapeutic Activities and Exercises:  Discussed PT POC.    AM-PAC 6 CLICK MOBILITY  How much help from another person does this patient currently need?   1 = Unable, Total/Dependent Assistance  2 = A lot, Maximum/Moderate Assistance  3 = A little, Minimum/Contact Guard/Supervision  4 = None, Modified [place]/Independent    Turning over in bed (including adjusting bedclothes, sheets and blankets)?: 3  Sitting down on and standing up from a chair with arms (e.g., wheelchair, bedside commode, etc.): 2  Moving from lying on back to sitting on the side of the bed?: 3  Moving to and from a bed to a chair (including a wheelchair)?: 2  Need to walk in hospital room?: 1  Climbing 3-5 steps with a railing?: 1  Total Score: 12     AM-PAC Raw Score CMS G-Code Modifier Level of Impairment Assistance   6 % Total / Unable   7 - 9 CM 80 - 100% Maximal Assist   10 - 14 CL 60 - 80% Moderate Assist   15 - 19 CK 40 - 60% Moderate Assist   20 - 22 CJ 20 - 40% Minimal Assist   23 CI 1-20% SBA / CGA   24 CH 0% Independent/ Mod I     Patient left up in chair with all lines intact and call button in reach.    Assessment:   Aleta Herrera is a 62 y.o. female with a medical diagnosis of Left foot infection and presents with (L) AKA. Pt. cooperative and tolerated treatment well, but appears to be fearful of falling. Pt. would benefit from continued PT to increase strength/endurance and improve functional mobility.    Rehab identified problem list/impairments: Rehab identified problem list/impairments: weakness, impaired endurance, impaired self care skills, impaired functional mobilty, gait instability, impaired balance, decreased lower extremity function, pain, impaired skin    Rehab potential is fair.    Activity tolerance: Fair    Discharge recommendations: Discharge Facility/Level Of Care Needs: rehabilitation facility     Barriers  to discharge: Barriers to Discharge: None    Equipment recommendations: Equipment Needed After Discharge: wheelchair, walker, standard (drop-arm BSC, possibly slide board)     GOALS:    Physical Therapy Goals        Problem: Physical Therapy Goal    Goal Priority Disciplines Outcome Goal Variances Interventions   Physical Therapy Goal     PT/OT, PT Ongoing (interventions implemented as appropriate)     Description:  Goals to be met by: 2017     Patient will increase functional independence with mobility by performin. Supine to sit with Set-up [place]  2. Sit to supine with Set-up [place]  3. Sit to stand transfer with Minimal Assistance  4. Bed to chair transfer with Minimal Assistance using slide board or SW  5. Gait  x 5 feet with Minimal Assistance using Standard Walker.   6. Wheelchair propulsion x100 feet with Minimal Assistance using bilateral upper extremities  7. Lower extremity exercise program x15 reps per handout, with supervision                      PLAN:    Patient to be seen 5 x/week to address the above listed problems via therapeutic activities, therapeutic exercises, neuromuscular re-education, wheelchair management/training, gait training  Plan of Care expires: 17  Plan of Care reviewed with: patient    Functional Assessment Tool Used: AM-PAC  Score: 12  Functional Limitation: Mobility: Walking and moving around  Mobility: Walking and Moving Around Current Status (): CL  Mobility: Walking and Moving Around Goal Status (): CL     Simon Vazquez, PT  2017

## 2017-08-02 NOTE — PROGRESS NOTES
[facility]  Vascular Surgery  Progress Note    Patient Name: Aleta Herrera  MRN: [id number]  Admission Date: 7/23/2017  Primary Care Provider: Radha Lao MD    Subjective:     Interval History: no acute events overight    Post-Op Info:  Procedure(s) (LRB):  AMPUTATION-ABOVE KNEE (Left)   2 Days Post-Op       Medications:  Continuous Infusions:   ropivacaine (PF) 2 mg/ml (0.2%) 6 mL/hr (08/01/17 1325)    ropivacaine (PF) 2 mg/ml (0.2%) 8 mL/hr (08/01/17 1325)     Scheduled Meds:   sodium chloride 0.9%   Intravenous Once    amlodipine  10 mg Oral Daily    aspirin  81 mg Oral Daily    ciprofloxacin HCl  500 mg Oral Daily    epoetin batsheva (PROCRIT) injection  100 Units/kg Intravenous Every Mon, Wed, Fri    insulin detemir  5 Units Subcutaneous BID    pregabalin  75 mg Oral QHS    sevelamer carbonate  2,400 mg Oral TID WM    sodium hypochlorite   Irrigation Once     PRN Meds:sodium chloride 0.9%, sodium chloride 0.9%, sodium chloride 0.9%, dextrose 50%, dextrose 50%, glucagon (human recombinant), glucose, glucose, heparin (porcine), HYDROmorphone, insulin aspart, ondansetron, oxycodone, polyethylene glycol     Objective:     Vital Signs (Most Recent):  Temp: 97.6 °F (36.4 °C) (08/02/17 0400)  Pulse: 78 (08/02/17 0600)  Resp: 17 (08/02/17 0400)  BP: (!) 99/59 (08/02/17 0400)  SpO2: 95 % (08/02/17 0400) Vital Signs (24h Range):  Temp:  [97 °F (36.1 °C)-98.3 °F (36.8 °C)] 97.6 °F (36.4 °C)  Pulse:  [74-88] 78  Resp:  [14-18] 17  SpO2:  [93 %-100 %] 95 %  BP: ()/(53-69) 99/59          Physical Exam   Constitutional: She appears well-developed and well-nourished.   Cardiovascular: Normal rate and regular rhythm.    Left femoral AVG with good thrill   Pulmonary/Chest: Effort normal. No respiratory distress.   Abdominal: Soft. She exhibits no distension.   Musculoskeletal:   L AKA incision clean,dry and intact     Neurological: She is alert.   Skin: Skin is warm and dry.    Psychiatric: She has a normal mood and affect.       Significant Labs:  CBC:   Recent Labs  Lab 08/02/17  0453   WBC 14.37*   RBC 2.93*   HGB 8.0*   HCT 25.9*   *   MCV 88   MCH 27.3   MCHC 30.9*     CMP:   Recent Labs  Lab 08/01/17  0403  08/02/17  0453   *  < > 86   CALCIUM 8.3*  < > 8.5*   ALBUMIN 2.1*  --  2.0*   PROT 8.1  --   --    *  < > 136   K 5.8*  < > 4.1   CO2 23  < > 25   CL 95  < > 102   BUN 38*  < > 24*   CREATININE 6.2*  < > 4.5*   ALKPHOS 157*  --   --    ALT <5*  --   --    AST 12  --   --    BILITOT 0.3  --   --    < > = values in this interval not displayed.  Coagulation: No results for input(s): LABPROT, INR, APTT in the last 48 hours.      Significant Diagnostics:      Assessment/Plan:     Type 2 diabetes mellitus with left diabetic foot ulcer                * Left foot infection    61 yo F with h/o HTN, HLD, DM2 (Hgb A1c 6.3),HFpEF (EF 60%), COPD (home O2, 2.5 L), ESRD on HD (MWF) via L femoral AVG presenting to hospital with left foot ischemic ulcer, wet gangrene s/p I&D by podiatry 07/26, left AKA on 07/31    POD#2 from left AKA. Doing well  Would continue perineural catheters until first PT/OT session (ordered)  Non-weight bearing on AKA stump. Keep elevated  Ok to use L femoral AVG  From vascular surgery standpoint no antibiotics needed for prior left foot infection as source control has been obtained  Vascular surgery to follow            Melissa Graham MD  Vascular Surgery  [facility]

## 2017-08-02 NOTE — PT/OT/SLP EVAL
Occupational Therapy  Co-Evaluation & Treatment    Aleta Herrera   MRN: [id number]   Admitting Diagnosis: Left foot infection    OT Date of Treatment: 17   OT Start Time: 1033  OT Stop Time: 1111  OT Total Time (min): 38 min    Billable Minutes:  Evaluation 28  Self Care/Home Management 10    Diagnosis: Left foot infection   S/p L AKA    Past Medical History:   Diagnosis Date    Anemia of chronic renal failure 2013    Anticoagulant long-term use 2015    CHF (congestive heart failure)     Coronary artery disease     Diabetes mellitus     Diabetic neuropathy 2013    DR (diabetic retinopathy) 2013    End stage renal disease on dialysis     Fever 2017    Hypertension     Hypertension, renal 2013    Kidney transplant candidate 2013    Secondary hyperparathyroidism of renal origin 2013    Stroke 2015    Type 2 diabetes mellitus since 2013      Past Surgical History:   Procedure Laterality Date    AV FISTULA PLACEMENT      CATARACT SURGERY       SECTION, LOW TRANSVERSE      x 3    COLONOSCOPY N/A 2016    Procedure: COLONOSCOPY;  Surgeon: Roverto Patel MD;  Location: [facility] ([address] [place] [address]);  Service: Endoscopy;  Laterality: N/A;    EYE SURGERY      HYSTERECTOMY      Endometriosis    TONSILLECTOMY      VASCULAR SURGERY         Referring physician: Carson Morgan MD  Date referred to OT: 2017    General Precautions: Standard, fall    Patient History:  Living Environment  Lives With: spouse  Living Arrangements: house  Home Layout: Able to live on 1st floor  Transportation Available: family or friend will provide  Living Environment Comment: Pt lives with  and adult daughter in a 2SH, however she does not need to access the 2nd floor. There is a small ramp outside the house to enter. Pt's bedroom and bathroom are on the 1st floor. Pt reports she was not requiring any assistance for ADLs until admission. She was  "using a rollator for functional mobility within the home and would intermittently use the transport WC during functioanl mobility. She was on 2.5 O2 at home. Pt's  will be retiring from his job to assist her.   Equipment Currently Used at Home: bedside commode, rollator (transfer wc)    Prior level of function:   Bed Mobility/Transfers: independent  Grooming: independent  Bathing: independent  Upper Body Dressing: independent  Lower Body Dressing: independent  Toileting: independent  Mode of Transportation: Family     Subjective:  Communicated with nursing prior to session. As per nursing, pt ok to be seen for OT tx session.   "I am scared of falling"  Chief Complaint: pain and fear of falling  Patient/Family stated goals: return home and increase mobility    Pain/Comfort  Pain Rating 1: 9/10  Location - Side 1: Left  Location 1: leg  Pain Addressed 1: Pre-medicate for activity, Nurse notified  Pain Rating Post-Intervention 1:  (did not obtain)    Objective:  Patient found with: peripheral IV, perineural catheter, oxygen    Cognitive Exam:  Oriented to: Person, Place, Time and Situation  Follows Commands/attention: Follows two-step commands  Communication: clear/fluent  Memory:  No Deficits noted  Safety awareness/insight to disability: intact  Coping skills/emotional control: Appropriate to situation    Physical Exam:  Postural examination/scapula alignment: No postural abnormalities identified  Skin integrity: staples and bandage on LLE incision site  Edema: None noted     Sensation:   Intact; pt reporting no numbness or tingling    Upper Extremity Range of Motion:  Right Upper Extremity: WFL  Left Upper Extremity: WFL    Upper Extremity Strength:  Right Upper Extremity: 3/5  Left Upper Extremity: 3/5   Strength: WFL    Fine motor coordination:   Intact    Gross motor coordination: WFL    Functional Mobility:  Bed Mobility:  Supine to Sit: Supervision (with HOB elevated and to EOB)    Transfers:  Sit <> " "Stand Assistance: Maximum Assistance (x3 trials from bed in lowest position; pt noted not bearing weight through RLE and keeping heel raised off the floor; pt reporting having fear of falling )  Sit <> Stand Assistive Device: Rolling Walker  Bed <> Chair Technique: Stand Pivot  Bed <> Chair Transfer Assistance: Total Assistance  Bed <> Chair Assistive Device: No Assistive Device    Functional Ambulation: Unable to assess as req total A for stand pivot transfer to bedside chair    Activities of Daily Living:  Feeding Level of Assistance: Independent (noted with ability to bring hand to face in prep for feeding )  UE Dressing Level of Assistance: Independent (to don front opening gown seated EOB)  LE Dressing Level of Assistance: Maximum assistance (unable to access RLE to don/doff socks)  Grooming Position: Seated, EOB  Grooming Level of Assistance: Supervision (for maintaining balance at EOB; wiping face with towel unsupported)    Balance:   Static Sit: GOOD: Takes MODERATE challenges from all directions  Dynamic Sit: FAIR+: Maintains balance through MINIMAL excursions of active trunk motion    Additioanl:  Pt educated on technique for maintaining balance on RLE in standing  Fall prevention education  Communication Board updated  Alternate transfer method education  Educated on OT POC, role of OT    AM-PAC 6 CLICK ADL  How much help from another person does this patient currently need?  1 = Unable, Total/Dependent Assistance  2 = A lot, Maximum/Moderate Assistance  3 = A little, Minimum/Contact Guard/Supervision  4 = None, Modified [place]/Independent    Putting on and taking off regular lower body clothing? : 2  Bathing (including washing, rinsing, drying)?: 2  Toileting, which includes using toilet, bedpan, or urinal? : 2  Putting on and taking off regular upper body clothing?: 4  Taking care of personal grooming such as brushing teeth?: 4  Eating meals?: 4  Total Score: 18    AM-PAC Raw Score CMS "G-Code " Modifier Level of Impairment Assistance   6 % Total / Unable   7 - 9 CM 80 - 100% Maximal Assist   10-14 CL 60 - 80% Moderate Assist   15 - 19 CK 40 - 60% Moderate Assist   20 - 22 CJ 20 - 40% Minimal Assist   23 CI 1-20% SBA / CGA   24 CH 0% Independent/ Mod I       Patient left up in chair with all lines intact, call button in reach and nursing notified    Assessment:  Aleta Herrera is a 62 y.o. female with a medical diagnosis of Left foot infection s/p L AKA. Pt noted with good insight into condition and good motivation to engage in therapy session. Pt p/w decreased independence with self-care, increased fear of falling, decreased independence with functional transfers, impaired strength, and decreased functional activity tolerance. Pt would continue to benefit from skilled OT services at this time to maximize independence with ADLs/transfers.    Rehab identified problem list/impairments: Rehab identified problem list/impairments: weakness, impaired endurance, impaired functional mobilty, impaired self care skills, impaired balance    Rehab potential is good.    Activity tolerance: Good    Discharge recommendations: Discharge Facility/Level Of Care Needs: rehabilitation facility     Barriers to discharge: Barriers to Discharge: None    Equipment recommendations: wheelchair     GOALS:    Occupational Therapy Goals        Problem: Occupational Therapy Goal    Goal Priority Disciplines Outcome Interventions   Occupational Therapy Goal     OT, PT/OT Ongoing (interventions implemented as appropriate)    Description:  Pt will complete grooming seated EOB with sup and without UE support to maintain dynamic sitting balance.  Pt will complete perpendicular transfer to BSC with min A.  Pt will complete UB dressing seated at EOB with sup.   Pt will complete sit to stand with mod A in prep for functional transfers.                     PLAN:  Patient to be seen 4 x/week to address the above listed problems  via self-care/home management, therapeutic activities, therapeutic exercises  Plan of Care expires: 09/01/17  Plan of Care reviewed with: patient         Norah Wallace, OT  08/02/2017

## 2017-08-02 NOTE — PLAN OF CARE
Problem: Occupational Therapy Goal  Goal: Occupational Therapy Goal  Pt will complete grooming seated EOB with sup and without UE support to maintain dynamic sitting balance.  Pt will complete perpendicular transfer to BSC with min A.  Pt will complete UB dressing seated at EOB with sup.   Pt will complete sit to stand with mod A in prep for functional transfers.   Outcome: Ongoing (interventions implemented as appropriate)  OT Evaluation completed 8/2/2017.

## 2017-08-02 NOTE — PLAN OF CARE
Sw to meet with family and provide information on hh services per family request and see about Pt's d.c plan after she is stable to d/c.

## 2017-08-02 NOTE — ASSESSMENT & PLAN NOTE
63 yo F with h/o HTN, HLD, DM2 (Hgb A1c 6.3),HFpEF (EF 60%), COPD (home O2, 2.5 L), ESRD on HD (MWF) via L femoral AVG presenting to hospital with left foot ischemic ulcer, wet gangrene s/p I&D by podiatry 07/26, left AKA on 07/31    POD#2 from left AKA. Doing well  Would continue perineural catheters until first PT/OT session (ordered)  Non-weight bearing on AKA stump. Keep elevated  Ok to use L femoral AVG  From vascular surgery standpoint no antibiotics needed for prior left foot infection as source control has been obtained  Vascular surgery to follow

## 2017-08-02 NOTE — ADDENDUM NOTE
Addendum  created 08/02/17 1040 by Daquan Kelley MD    Charge Capture section accepted, Sign clinical note

## 2017-08-02 NOTE — PLAN OF CARE
Sw did speak with spouse outside room, provided some guidance regarding d/c plans for Pt and also the need for therapy recommendations. Sw will provide a list of SNF/rehab facilities and sitter list at home as spouse will consider leaving his work to care for his wife at home. Sw to follow.

## 2017-08-02 NOTE — PLAN OF CARE
Problem: Pain, Acute (Adult)  Goal: Acceptable Pain Control/Comfort Level  Patient will demonstrate the desired outcomes by discharge/transition of care.  Outcome: Ongoing (interventions implemented as appropriate)   08/02/17 6317   Pain, Acute (Adult)   Acceptable Pain Control/Comfort Level making progress toward outcome   Frequent rounds to assess pain and medicate for pain as needed. Safety precautions maintained. Encouraged use of IS. Call bell in reach.

## 2017-08-02 NOTE — PHARMACY MED REC
"Admission Medication Reconciliation - Pharmacy Consult Note    The home medication history was taken by Kourtney Bolanos, Pharmacy Technician.  Based on information gathered and subsequent review by the clinical pharmacist, the items below may need attention.     You may go to "Admission" then "Reconcile Home Medications" tabs to review and/or act upon these items. Based on information gathered and subsequent review by the clinical pharmacist, the items below may need attention.    Potentially problematic discrepancies with current MAR  o Patient IS taking the following which was not ordered upon admit  o Metoprolol tartrate 100 mg PO BID  o Lisinopril 40 mg PO once daily    Please address this information as you see fit.  Feel free to contact us if you have any questions or require assistance.    Cindy Aguirre, PharmD  Ext [phone]    Patient's prior to admission medication regimen was as follows:  Prescriptions Prior to Admission   Medication Sig Dispense Refill Last Dose    amlodipine (NORVASC) 10 MG tablet TAKE ONE TABLET BY MOUTH ONCE DAILY 90 tablet 0 Taking    aspirin 81 mg Tab Take 1 tablet by mouth once daily.    Taking    epoetin batsheva (PROCRIT) 10,000 unit/mL injection Inject 0.74 mLs (7,400 Units total) into the skin every Mon, Wed, Fri.   Taking    hydrocodone-acetaminophen 5-325mg (NORCO) 5-325 mg per tablet Take 1 tablet by mouth every 6 (six) hours as needed for Pain.    Taking    insulin aspart (NOVOLOG FLEXPEN) 100 unit/mL InPn as dir Insulin Pen Subcutaneous Before meals and at bedtime.  If blood sugar is 151-200 1 units SQif blood sugar is 201-250 2 units SQIf blood sugar is 251-300 3 units SQIf blood sugar is 301-350 4 units SQIf blood sugar is > or = 351 5 units SQand call MD;  Dispense with needles   Taking    insulin detemir (LEVEMIR FLEXPEN) 100 unit/mL (3 mL) SubQ InPn pen Inject 8 Units into the skin 2 (two) times daily. (Patient taking differently: Inject 4 Units into the skin every " evening. )   Taking    inulin (FIBER CHOICE, INULIN,) 1.5 gram Chew Take 2 tablets by mouth 2 (two) times daily. Or as directed on product packaging.  11 Taking    lidocaine-prilocaine (EMLA) cream APPLY SMALL AMOUNT TO ACCESS SITE 1 TO 2 HOURS BEFORE TREATMENT. COVER AREA WITH AN OCCLUSIVE DRESSING.  3     lisinopril (PRINIVIL,ZESTRIL) 40 MG tablet Take 40 mg by mouth once daily.    Taking    metoprolol tartrate (LOPRESSOR) 100 MG tablet TAKE 1 TABLET BY MOUTH TWICE DAILY 180 tablet 3 7/23/2017 at 0900    oxycodone (ROXICODONE) 5 MG immediate release tablet Take 1-2 tablets (5-10 mg total) by mouth every 4 to 6 hours as needed for Pain. 31 tablet 0 Taking    polyethylene glycol (GLYCOLAX) 17 gram/dose powder Take 17 g by mouth once daily. May take up to 3 times per day as needed for constipation. 510 g 11 Taking    sevelamer carbonate (RENVELA) 800 mg Tab Take 2,400 mg by mouth 3 (three) times daily with meals.   Taking    calcitRIOL (ROCALTROL) 0.5 MCG Cap Take 1 capsule (0.5 mcg total) by mouth once daily. 30 capsule 1 Taking         Please add appropriate    SmartPhrase below:

## 2017-08-02 NOTE — SUBJECTIVE & OBJECTIVE
Medications:  Continuous Infusions:   ropivacaine (PF) 2 mg/ml (0.2%) 6 mL/hr (08/01/17 1325)    ropivacaine (PF) 2 mg/ml (0.2%) 8 mL/hr (08/01/17 1325)     Scheduled Meds:   sodium chloride 0.9%   Intravenous Once    amlodipine  10 mg Oral Daily    aspirin  81 mg Oral Daily    ciprofloxacin HCl  500 mg Oral Daily    epoetin batsheva (PROCRIT) injection  100 Units/kg Intravenous Every Mon, Wed, Fri    insulin detemir  5 Units Subcutaneous BID    pregabalin  75 mg Oral QHS    sevelamer carbonate  2,400 mg Oral TID WM    sodium hypochlorite   Irrigation Once     PRN Meds:sodium chloride 0.9%, sodium chloride 0.9%, sodium chloride 0.9%, dextrose 50%, dextrose 50%, glucagon (human recombinant), glucose, glucose, heparin (porcine), HYDROmorphone, insulin aspart, ondansetron, oxycodone, polyethylene glycol     Objective:     Vital Signs (Most Recent):  Temp: 97.6 °F (36.4 °C) (08/02/17 0400)  Pulse: 78 (08/02/17 0600)  Resp: 17 (08/02/17 0400)  BP: (!) 99/59 (08/02/17 0400)  SpO2: 95 % (08/02/17 0400) Vital Signs (24h Range):  Temp:  [97 °F (36.1 °C)-98.3 °F (36.8 °C)] 97.6 °F (36.4 °C)  Pulse:  [74-88] 78  Resp:  [14-18] 17  SpO2:  [93 %-100 %] 95 %  BP: ()/(53-69) 99/59          Physical Exam   Constitutional: She appears well-developed and well-nourished.   Cardiovascular: Normal rate and regular rhythm.    Left femoral AVG with good thrill   Pulmonary/Chest: Effort normal. No respiratory distress.   Abdominal: Soft. She exhibits no distension.   Musculoskeletal:   L AKA incision clean,dry and intact     Neurological: She is alert.   Skin: Skin is warm and dry.   Psychiatric: She has a normal mood and affect.       Significant Labs:  CBC:   Recent Labs  Lab 08/02/17  0453   WBC 14.37*   RBC 2.93*   HGB 8.0*   HCT 25.9*   *   MCV 88   MCH 27.3   MCHC 30.9*     CMP:   Recent Labs  Lab 08/01/17  0403  08/02/17  0453   *  < > 86   CALCIUM 8.3*  < > 8.5*   ALBUMIN 2.1*  --  2.0*   PROT 8.1  --    --    *  < > 136   K 5.8*  < > 4.1   CO2 23  < > 25   CL 95  < > 102   BUN 38*  < > 24*   CREATININE 6.2*  < > 4.5*   ALKPHOS 157*  --   --    ALT <5*  --   --    AST 12  --   --    BILITOT 0.3  --   --    < > = values in this interval not displayed.  Coagulation: No results for input(s): LABPROT, INR, APTT in the last 48 hours.      Significant Diagnostics:

## 2017-08-02 NOTE — PROGRESS NOTES
[facility] Hospital Medicine  Progress Note    Patient Name: Aleta Herrera  MRN: [id number]  Patient Class: IP- Inpatient   Admission Date: 7/23/2017  Length of Stay: 10 days  Attending Physician: Carson Morgan, *  Primary Care Provider: Radha Lao MD    [facility] Medicine Team: [facility] HOSP MED 2 Jillian Junior DO    Subjective:     Principal Problem:Left foot infection    HPI:  Aleta Herrera is a 62-year-old female with HTN, HFpEF (EF 60%; home O2 2.5 liters), anemia 2/2 CKD and IDDM c/b retinopathy, neuropathy and ESRD (HD MWF). She presents today with her  to the ED because of new bleeding from the left foot ulcer. She's had on/off fevers and chills for past week or so. Apparently blood cultures were drawn at dialysis (7/19). She was seen by vascular surgery the next day where her right femoral permacath was pulled. She has a working left femoral AVG. Her upper extremity access has failed over the years since being placed on dialysis in 2009. Besides left leg pain, she denies any SOB, CP, cough, congestion, abdominal pain, n/v or diarrhea. Not received any recent antibiotics.   Complaining of foot pain in the ED, otherwise has no complaints. CXR showed mild b/l effusions. Left foot x-ray soft tissue swelling, but no gas formation. Labs significant for WBC count of 24k. Procalcitonin 1.64.       Hospital Course:  7/24: VSS. WBC 22.58 down from 24.04 in ED. Sed rate 100. .3. CXR stable. L Foot xray soft tissue swelling. MRI L foot no osteomylitis, no drainable collections. JULIUS L with mod-severe occlusive disease, R with mod. Continue ceftriaxone. HD today.  7/25: VSS. WBC 19.18. Vasc Surg consulted, L femoral AVG w/ possible vascular steal; will perform LLE angiogram on 7/28, if no arterial stenosis will plan for femoral graft ligation & permacath placement. Podiatry consulted, extensive stable left plantar arch necrosis, no acute surgical  intervention; will f/u after angiogram.  Wound cx pending. 1/4 Bcx +GPC in clusters. Continue ceftriaxone.   7/26: I&D and debridement with Podiatry today.  Continue Ceftriaxone.   7/27: Spiked fever to 101.1F, improved with tylenol. Repeated Blood cx x2. HD today. NPO at midnight.  7/28: Had Aortogram + Left LE angiogram by vascular surgery; significant steal from AVG, left foot not salvagable. Plan for Left AKA.   7/29: VSS. HD today. Continue abx.   7/31 L AKA   8/01 HD today    No new subjective & objective note has been filed under this hospital service since the last note was generated.    Assessment/Plan:      * Left foot infection    Presenting to the ED w/ new bleeding from L leg ulcer along with fevers, chills and new leukocytosis.   - Received vancomycin , flagyl and rocephin in ED. Fever resolved with Tylenol in ED. Dc'd vancomycin and flagyl on the floor.   - Blood cx 1/4 +gram positive cocci in clusters resembling staph   - Blood cx 1/4 +gram positive rods  - Wound cx growing proteus and GNR, lactose   - Repeat blood cx x2 show NGTD  - S/p Aortogram + Left LE angiogram with Vascular surgery   - Significant steal from L thigh AVG. L foot not salvageable given extent of infection.   - S/p left AKA 7/31    Plan  - Continuinue PO Ciprofloxacin per wound cx sensitivities   - Further management per Vascular surgery            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    - Continue Norvasc  - Diabetic diet inpatient  - Sliding scale insulin        ESRD (end stage renal disease)    - Consulted nephrology.   Plan  - Dialysis inpatient  - Will continue to monitor electrolytes        Peripheral vascular disease, unspecified    - had Aortogram + Left LE angiogram 07/28 which showed Significant steal from left thigh AVG. Left foot not salvageable given extent of infection. S/p left AKA on 7/31          Anemia of chronic renal failure    - Procrit w/ dialysis.   - Continue to monitor H/H        Type  "2 diabetes mellitus with left diabetic foot ulcer    See "left foot infection"         (HFpEF) heart failure with preserved ejection fraction    - BNP 2835 on admission   - Pt with ESRD on HD            Pleural effusion    - Seen on prior CXR (5/3/17). Unclear etiology. Stable.        Late effect of ischemic cerebral stroke    - Continued ASA 81 mg.           Hypertension, renal    - Continue home amlodipine 10 mg QD          VTE Risk Mitigation         Ordered     Medium Risk of VTE  Once      07/28/17 1244        Dispo: still has perineural catheter, will need this removed before discharge. PT/OT ordered.     Jillian Junior,   Department of Hospital Medicine   [facility]  "

## 2017-08-02 NOTE — ASSESSMENT & PLAN NOTE
Presenting to the ED w/ new bleeding from L leg ulcer along with fevers, chills and new leukocytosis.   - Received vancomycin , flagyl and rocephin in ED. Fever resolved with Tylenol in ED. Dc'd vancomycin and flagyl on the floor.   - Blood cx 1/4 +gram positive cocci in clusters resembling staph   - Blood cx 1/4 +gram positive rods  - Wound cx growing proteus and GNR, lactose   - Repeat blood cx x2 show NGTD  - S/p Aortogram + Left LE angiogram with Vascular surgery   - Significant steal from L thigh AVG. L foot not salvageable given extent of infection.   - S/p left AKA 7/31, healing well     Plan  - OK to d/c abx per vascular surgery  - Further management per Vascular surgery

## 2017-08-02 NOTE — ANESTHESIA POST-OP PAIN MANAGEMENT
Acute Pain Service Progress Note    Aleta Herrera is a 62 y.o., female, [id number].    No acute events overnight.     Surgery: amputation above knee (left upper leg)     Post Op Day #: 2    Catheter type: perineural  femoral and sciatic    Infusion type: Ropivacaine 0.2%  8 basal    Problem List:    Active Hospital Problems    Diagnosis  POA    *Left foot infection [L08.9]  Yes    Sepsis [A41.9]  Yes    Type 2 diabetes mellitus with left diabetic foot ulcer [E11.621, L97.529]  Yes    ESRD (end stage renal disease) [N18.6]  Yes    Peripheral vascular disease, unspecified [I73.9]  Yes    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 [E11.22, I12.0, Z99.2, N18.6]  Not Applicable    Anemia of chronic renal failure [N18.9, D63.1]  Yes     Chronic      Resolved Hospital Problems    Diagnosis Date Resolved POA   No resolved problems to display.       Subjective:     General appearance of alert, oriented, no complaints   Pain with rest: 8    Numbers   Pain with movement: 8    Numbers   Side Effects    1. Pruritis No    2. Nausea No    3. Motor Blockade No, 0=Ability to raise lower extremities off bed    4. Sedation No, 1=awake and alert    Objective:   Catheter site clean, dry, intact        Vitals   Vitals:    08/02/17 0803   BP: 119/66   Pulse: 79   Resp: 16   Temp: 35.6 °C (96 °F)        Labs    Admission on 07/23/2017   No results displayed because visit has over 200 results.           Meds   Current Facility-Administered Medications   Medication Dose Route Frequency Provider Last Rate Last Dose    0.9%  NaCl infusion   Intravenous PRN Ivonne Huertas  mL/hr at 07/27/17 0821 1,000 mL at 07/27/17 0821    0.9%  NaCl infusion   Intravenous PRN Ivonne Huertas NP        0.9%  NaCl infusion   Intravenous PRN Ivonne Huertas  mL/hr at 08/01/17 1017 300 mL at 08/01/17 1017    0.9%  NaCl infusion   Intravenous Once Ivonne Huertas NP        amlodipine tablet 10 mg  10 mg Oral Daily  Carson Marcus, DO   10 mg at 08/01/17 1318    aspirin chewable tablet 81 mg  81 mg Oral Daily Carson Marcus DO   81 mg at 08/01/17 1318    ciprofloxacin HCl tablet 500 mg  500 mg Oral Daily Estrella Ramos MD   500 mg at 08/01/17 1318    dextrose 50% injection 12.5 g  12.5 g Intravenous PRN Carson Marcus, DO        dextrose 50% injection 25 g  25 g Intravenous PRN Carson Marcus DO        epoetin batsheva injection 6,400 Units  100 Units/kg Intravenous Every Mon, Wed, Fri Ivonne Heber Huertas, THIERRY   Stopped at 07/31/17 0900    glucagon (human recombinant) injection 1 mg  1 mg Intramuscular PRN Carson Marcus DO        glucose chewable tablet 16 g  16 g Oral PRN Carson Marcus, DO        glucose chewable tablet 24 g  24 g Oral PRN Carson Marcus DO        heparin (porcine) injection 2,000 Units  2,000 Units Intravenous PRN Adriana Singh MD        hydromorphone injection 0.5 mg  0.5 mg Intravenous Q6H PRN Man Lorenzana MD   0.5 mg at 08/01/17 1621    insulin aspart pen 0-5 Units  0-5 Units Subcutaneous QID (AC + HS) PRN Carson Marcus DO   1 Units at 07/31/17 2317    insulin detemir pen 5 Units  5 Units Subcutaneous BID Carson Marcus DO   5 Units at 08/01/17 2155    ondansetron tablet 8 mg  8 mg Oral Q8H PRN Carson Mabry MD        oxycodone immediate release tablet 5 mg  5 mg Oral Q6H PRN Man Lorenzana MD   5 mg at 08/02/17 0454    polyethylene glycol packet 17 g  17 g Oral BID PRN Carson Marcus DO        pregabalin capsule 75 mg  75 mg Oral QHS Carson Mabry MD   75 mg at 08/01/17 2155    ropivacaine (PF) 2 mg/ml (0.2%) infusion  6 mL/hr Perineural Continuous Lexis Medel MD 6 mL/hr at 08/01/17 1325 6 mL/hr at 08/01/17 1325    ropivacaine (PF) 2 mg/ml (0.2%) infusion  8 mL/hr Perineural Continuous Lexis Medel MD 8 mL/hr at 08/01/17 1325 8 mL/hr at 08/01/17 1325    sevelamer carbonate  tablet 2,400 mg  2,400 mg Oral TID  Carson Marcus, DO   2,400 mg at 08/01/17 1620    sodium hypochlorite (DAKIN'S) external solution 0.25%   Irrigation Once Melissa Graham MD             Assessment:     Pain control adequate    Plan:     Patient doing well, continue present treatment. Bolus dose given and recommended patient take PRN dose of medication. Will reassess this afternoon.     Evaluator Aylin Wiggins          I have seen the patient, reviewed the Resident's assessment and plan. I have personally interviewed and examined the patient at bedside and: agree with the findings.

## 2017-08-02 NOTE — SUBJECTIVE & OBJECTIVE
Interval History: Patient reports pain has been controlled. No complaints. No nausea, chest pain, or SOB . Afebrile.     Review of Systems   Constitutional: Negative for chills and fever.   Respiratory: Negative for cough and shortness of breath.    Cardiovascular: Negative for chest pain.   Gastrointestinal: Negative for abdominal pain, diarrhea and vomiting.   Genitourinary:        Does not make urine   Musculoskeletal: Myalgias: left foot.   Skin: Wound: s/p debridement.   Neurological: Negative for dizziness, light-headedness and headaches.   All other systems reviewed and are negative.    Objective:     Vital Signs (Most Recent):  Temp: 97.8 °F (36.6 °C) (08/02/17 1623)  Pulse: 86 (08/02/17 1623)  Resp: 16 (08/02/17 1623)  BP: 101/64 (08/02/17 1623)  SpO2: 95 % (08/02/17 1623) Vital Signs (24h Range):  Temp:  [96 °F (35.6 °C)-98.3 °F (36.8 °C)] 97.8 °F (36.6 °C)  Pulse:  [74-87] 86  Resp:  [16-17] 16  SpO2:  [95 %-100 %] 95 %  BP: ()/(57-66) 101/64     Weight: 67 kg (147 lb 11.3 oz)  Body mass index is 29.83 kg/m².  No intake or output data in the 24 hours ending 08/02/17 1801   Physical Exam   Constitutional: She appears well-developed and well-nourished.   HENT:   Head: Normocephalic and atraumatic.   Eyes: EOM are normal. Pupils are equal, round, and reactive to light.   Cardiovascular: Normal rate and regular rhythm.    Pulmonary/Chest: Effort normal. No respiratory distress.   Abdominal: She exhibits no distension. There is no tenderness. Hernia: soft, non-tender.   Musculoskeletal:   Left AKA, dressing in place. No visible erythema, no warmth.    Neurological: She is alert.   Skin: Skin is warm and dry.       Significant Labs:   BMP:   Recent Labs  Lab 08/02/17  0453   GLU 86      K 4.1      CO2 25   BUN 24*   CREATININE 4.5*   CALCIUM 8.5*     CBC:   Recent Labs  Lab 08/01/17  0403 08/02/17  0453   WBC 18.71* 14.37*   HGB 8.0* 8.0*   HCT 25.7* 25.9*   * 466*     CMP:   Recent  Labs  Lab 08/01/17  0403 08/01/17  0757 08/02/17  0453   * 131* 136   K 5.8* 5.6* 4.1   CL 95 96 102   CO2 23 23 25   * 171* 86   BUN 38* 38* 24*   CREATININE 6.2* 6.3* 4.5*   CALCIUM 8.3* 8.4* 8.5*   PROT 8.1  --   --    ALBUMIN 2.1*  --  2.0*   BILITOT 0.3  --   --    ALKPHOS 157*  --   --    AST 12  --   --    ALT <5*  --   --    ANIONGAP 12 12 9   EGFRNONAA 6.7* 6.6* 9.9*       Significant Imaging: no significant imaging

## 2017-08-03 PROBLEM — S88.919A AMPUTATION OF LEG: Status: ACTIVE | Noted: 2017-08-03

## 2017-08-03 LAB
ALBUMIN SERPL BCP-MCNC: 2.2 G/DL
ANION GAP SERPL CALC-SCNC: 11 MMOL/L
BASOPHILS # BLD AUTO: 0.02 K/UL
BASOPHILS NFR BLD: 0.1 %
BLD PROD TYP BPU: NORMAL
BLD PROD TYP BPU: NORMAL
BLOOD UNIT EXPIRATION DATE: NORMAL
BLOOD UNIT EXPIRATION DATE: NORMAL
BLOOD UNIT TYPE CODE: 6200
BLOOD UNIT TYPE CODE: 6200
BLOOD UNIT TYPE: NORMAL
BLOOD UNIT TYPE: NORMAL
BUN SERPL-MCNC: 34 MG/DL
CALCIUM SERPL-MCNC: 8.3 MG/DL
CHLORIDE SERPL-SCNC: 101 MMOL/L
CO2 SERPL-SCNC: 24 MMOL/L
CODING SYSTEM: NORMAL
CODING SYSTEM: NORMAL
CREAT SERPL-MCNC: 5.5 MG/DL
DIFFERENTIAL METHOD: ABNORMAL
DISPENSE STATUS: NORMAL
DISPENSE STATUS: NORMAL
EOSINOPHIL # BLD AUTO: 0.4 K/UL
EOSINOPHIL NFR BLD: 2.9 %
ERYTHROCYTE [DISTWIDTH] IN BLOOD BY AUTOMATED COUNT: 19.7 %
EST. GFR  (AFRICAN AMERICAN): 8.9 ML/MIN/1.73 M^2
EST. GFR  (NON AFRICAN AMERICAN): 7.8 ML/MIN/1.73 M^2
GLUCOSE SERPL-MCNC: 98 MG/DL
HCT VFR BLD AUTO: 27.4 %
HGB BLD-MCNC: 8 G/DL
LYMPHOCYTES # BLD AUTO: 1.1 K/UL
LYMPHOCYTES NFR BLD: 7.5 %
MCH RBC QN AUTO: 26.5 PG
MCHC RBC AUTO-ENTMCNC: 29.2 G/DL
MCV RBC AUTO: 91 FL
MONOCYTES # BLD AUTO: 0.8 K/UL
MONOCYTES NFR BLD: 5.6 %
NEUTROPHILS # BLD AUTO: 11.8 K/UL
NEUTROPHILS NFR BLD: 83.5 %
NUM UNITS TRANS PACKED RBC: NORMAL
NUM UNITS TRANS PACKED RBC: NORMAL
PHOSPHATE SERPL-MCNC: 4.8 MG/DL
PHOSPHATE SERPL-MCNC: 4.8 MG/DL
PLATELET # BLD AUTO: 510 K/UL
PMV BLD AUTO: 9.5 FL
POCT GLUCOSE: 102 MG/DL (ref 70–110)
POCT GLUCOSE: 111 MG/DL (ref 70–110)
POCT GLUCOSE: 113 MG/DL (ref 70–110)
POTASSIUM SERPL-SCNC: 4.5 MMOL/L
RBC # BLD AUTO: 3.02 M/UL
SODIUM SERPL-SCNC: 136 MMOL/L
WBC # BLD AUTO: 14.07 K/UL

## 2017-08-03 PROCEDURE — 97802 MEDICAL NUTRITION INDIV IN: CPT

## 2017-08-03 PROCEDURE — 63600175 PHARM REV CODE 636 W HCPCS: Performed by: STUDENT IN AN ORGANIZED HEALTH CARE EDUCATION/TRAINING PROGRAM

## 2017-08-03 PROCEDURE — 80069 RENAL FUNCTION PANEL: CPT

## 2017-08-03 PROCEDURE — 80100016 HC MAINTENANCE HEMODIALYSIS

## 2017-08-03 PROCEDURE — 25000003 PHARM REV CODE 250: Performed by: STUDENT IN AN ORGANIZED HEALTH CARE EDUCATION/TRAINING PROGRAM

## 2017-08-03 PROCEDURE — 25000003 PHARM REV CODE 250: Performed by: NURSE PRACTITIONER

## 2017-08-03 PROCEDURE — 36415 COLL VENOUS BLD VENIPUNCTURE: CPT

## 2017-08-03 PROCEDURE — 63600175 PHARM REV CODE 636 W HCPCS: Performed by: ANESTHESIOLOGY

## 2017-08-03 PROCEDURE — 63600175 PHARM REV CODE 636 W HCPCS: Performed by: INTERNAL MEDICINE

## 2017-08-03 PROCEDURE — 12000002 HC ACUTE/MED SURGE SEMI-PRIVATE ROOM

## 2017-08-03 PROCEDURE — 85025 COMPLETE CBC W/AUTO DIFF WBC: CPT

## 2017-08-03 PROCEDURE — 96372 THER/PROPH/DIAG INJ SC/IM: CPT

## 2017-08-03 PROCEDURE — 63600175 PHARM REV CODE 636 W HCPCS: Performed by: HOSPITALIST

## 2017-08-03 PROCEDURE — 86580 TB INTRADERMAL TEST: CPT | Performed by: HOSPITALIST

## 2017-08-03 PROCEDURE — 90935 HEMODIALYSIS ONE EVALUATION: CPT | Mod: ,,, | Performed by: INTERNAL MEDICINE

## 2017-08-03 PROCEDURE — 99231 SBSQ HOSP IP/OBS SF/LOW 25: CPT | Mod: GC,,, | Performed by: HOSPITALIST

## 2017-08-03 PROCEDURE — 25000003 PHARM REV CODE 250: Performed by: INTERNAL MEDICINE

## 2017-08-03 RX ORDER — CALCITRIOL 0.25 UG/1
0.5 CAPSULE ORAL DAILY
Status: DISCONTINUED | OUTPATIENT
Start: 2017-08-03 | End: 2017-08-07 | Stop reason: HOSPADM

## 2017-08-03 RX ORDER — CYCLOBENZAPRINE HCL 5 MG
5 TABLET ORAL 3 TIMES DAILY PRN
Status: DISCONTINUED | OUTPATIENT
Start: 2017-08-03 | End: 2017-08-03

## 2017-08-03 RX ADMIN — HEPARIN SODIUM 5000 UNITS: 5000 INJECTION, SOLUTION INTRAVENOUS; SUBCUTANEOUS at 09:08

## 2017-08-03 RX ADMIN — HEPARIN SODIUM 5000 UNITS: 5000 INJECTION, SOLUTION INTRAVENOUS; SUBCUTANEOUS at 06:08

## 2017-08-03 RX ADMIN — ASPIRIN 81 MG CHEWABLE TABLET 81 MG: 81 TABLET CHEWABLE at 01:08

## 2017-08-03 RX ADMIN — Medication 5 UNITS: at 01:08

## 2017-08-03 RX ADMIN — SEVELAMER CARBONATE 2400 MG: 800 TABLET, FILM COATED ORAL at 09:08

## 2017-08-03 RX ADMIN — HEPARIN SODIUM 2000 UNITS: 1000 INJECTION, SOLUTION INTRAVENOUS; SUBCUTANEOUS at 12:08

## 2017-08-03 RX ADMIN — PREGABALIN 75 MG: 75 CAPSULE ORAL at 09:08

## 2017-08-03 RX ADMIN — ROPIVACAINE HYDROCHLORIDE 8 ML/HR: 2 INJECTION, SOLUTION EPIDURAL; INFILTRATION at 01:08

## 2017-08-03 RX ADMIN — HEPARIN SODIUM 5000 UNITS: 5000 INJECTION, SOLUTION INTRAVENOUS; SUBCUTANEOUS at 01:08

## 2017-08-03 RX ADMIN — SODIUM CHLORIDE: 0.9 INJECTION, SOLUTION INTRAVENOUS at 08:08

## 2017-08-03 RX ADMIN — AMLODIPINE BESYLATE 10 MG: 10 TABLET ORAL at 01:08

## 2017-08-03 RX ADMIN — CALCITRIOL 0.5 MCG: 0.25 CAPSULE, LIQUID FILLED ORAL at 01:08

## 2017-08-03 RX ADMIN — HEPARIN SODIUM 2000 UNITS: 1000 INJECTION, SOLUTION INTRAVENOUS; SUBCUTANEOUS at 10:08

## 2017-08-03 RX ADMIN — ONDANSETRON 8 MG: 4 TABLET, FILM COATED ORAL at 09:08

## 2017-08-03 RX ADMIN — OXYCODONE HYDROCHLORIDE 5 MG: 5 TABLET ORAL at 05:08

## 2017-08-03 NOTE — ASSESSMENT & PLAN NOTE
-leukocytosis in ED (24k) with L foot ulcer   -Completed Ceftriaxone  --s/p I & D and debridement of L plantar necrotic tissue per podiatry on 7/26  -s/p L AKA on 7/31 2/2 gangrenous unsalvageable L foot

## 2017-08-03 NOTE — PROGRESS NOTES
[facility] Hospital Medicine  Progress Note    Patient Name: Aleta Herrera  MRN: [id number]  Patient Class: IP- Inpatient   Admission Date: 7/23/2017  Length of Stay: 11 days  Attending Physician: Carson Morgan, *  Primary Care Provider: Radha Lao MD    [facility] Medicine Team: [facility] 2 Jillian Junior DO    Subjective:     Principal Problem:Left foot infection    HPI:  Aleta Herrera is a 62-year-old female with HTN, HFpEF (EF 60%; home O2 2.5 liters), anemia 2/2 CKD and IDDM c/b retinopathy, neuropathy and ESRD (HD MWF). She presents today with her  to the ED because of new bleeding from the left foot ulcer. She's had on/off fevers and chills for past week or so. Apparently blood cultures were drawn at dialysis (7/19). She was seen by vascular surgery the next day where her right femoral permacath was pulled. She has a working left femoral AVG. Her upper extremity access has failed over the years since being placed on dialysis in 2009. Besides left leg pain, she denies any SOB, CP, cough, congestion, abdominal pain, n/v or diarrhea. Not received any recent antibiotics.   Complaining of foot pain in the ED, otherwise has no complaints. CXR showed mild b/l effusions. Left foot x-ray soft tissue swelling, but no gas formation. Labs significant for WBC count of 24k. Procalcitonin 1.64.       Hospital Course:  7/24: VSS. WBC 22.58 down from 24.04 in ED. Sed rate 100. .3. CXR stable. L Foot xray soft tissue swelling. MRI L foot no osteomylitis, no drainable collections. JULIUS L with mod-severe occlusive disease, R with mod. Continue ceftriaxone. HD today.  7/25: VSS. WBC 19.18. Vasc Surg consulted, L femoral AVG w/ possible vascular steal; will perform LLE angiogram on 7/28, if no arterial stenosis will plan for femoral graft ligation & permacath placement. Podiatry consulted, extensive stable left plantar arch necrosis, no acute surgical  intervention; will f/u after angiogram.  Wound cx pending. 1/4 Bcx +GPC in clusters. Continue ceftriaxone.   7/26: I&D and debridement with Podiatry today.  Continue Ceftriaxone.   7/27: Spiked fever to 101.1F, improved with tylenol. Repeated Blood cx x2. HD today. NPO at midnight.  7/28: Had Aortogram + Left LE angiogram by vascular surgery; significant steal from AVG, left foot not salvagable. Plan for Left AKA.   7/29: VSS. HD today. Continue abx.   7/31 L AKA   8/01 HD today  8/02 D/C oral cipro    Interval History: Pain is controlled. No n/v. Tolerating PO intake. Patient dialyzed this morning. No new complaints.     Review of Systems   Constitutional: Negative for chills and fever.   Respiratory: Negative for cough and shortness of breath.    Cardiovascular: Negative for chest pain.   Gastrointestinal: Negative for abdominal pain, diarrhea and vomiting.   Genitourinary:        Does not make urine   Neurological: Negative for dizziness, light-headedness and headaches.   All other systems reviewed and are negative.    Objective:     Vital Signs (Most Recent):  Temp: 98.2 °F (36.8 °C) (08/03/17 1327)  Pulse: 103 (08/03/17 1517)  Resp: 18 (08/03/17 1130)  BP: 114/63 (08/03/17 1327)  SpO2: 97 % (08/03/17 1327) Vital Signs (24h Range):  Temp:  [95.8 °F (35.4 °C)-98.2 °F (36.8 °C)] 98.2 °F (36.8 °C)  Pulse:  [] 103  Resp:  [16-18] 18  SpO2:  [95 %-97 %] 97 %  BP: ()/(28-70) 114/63     Weight: 67 kg (147 lb 11.3 oz)  Body mass index is 29.83 kg/m².    Intake/Output Summary (Last 24 hours) at 08/03/17 1542  Last data filed at 08/03/17 0400   Gross per 24 hour   Intake              820 ml   Output                0 ml   Net              820 ml      Physical Exam   Constitutional: She appears well-developed and well-nourished.   HENT:   Head: Normocephalic and atraumatic.   Eyes: EOM are normal. Pupils are equal, round, and reactive to light.   Cardiovascular: Normal rate and regular rhythm.     Pulmonary/Chest: Effort normal. No respiratory distress.   Abdominal: She exhibits no distension. There is no tenderness. Hernia: soft, non-tender.   Musculoskeletal:   Left AKA, dressing in place. No visible erythema, no warmth.    Neurological: She is alert.   Skin: Skin is warm and dry.       Significant Labs:   BMP:   Recent Labs  Lab 08/03/17  0448   GLU 98      K 4.5      CO2 24   BUN 34*   CREATININE 5.5*   CALCIUM 8.3*     CBC:   Recent Labs  Lab 08/02/17  0453 08/03/17  0448   WBC 14.37* 14.07*   HGB 8.0* 8.0*   HCT 25.9* 27.4*   * 510*       Significant Imaging: no significant imaging    Assessment/Plan:      Amputation of leg    PM&R Consulted  PT/OT Consulted  Will require rehab          * Left foot infection    Presenting to the ED w/ new bleeding from L leg ulcer along with fevers, chills and new leukocytosis.   - Received vancomycin , flagyl and rocephin in ED. Fever resolved with Tylenol in ED. Dc'd vancomycin and flagyl on the floor.   - Blood cx 1/4 +gram positive cocci in clusters resembling staph   - Blood cx 1/4 +gram positive rods  - Wound cx growing proteus and GNR, lactose   - Repeat blood cx x2 show NGTD  - S/p Aortogram + Left LE angiogram with Vascular surgery   - Significant steal from L thigh AVG. L foot not salvageable given extent of infection.   - S/p left AKA 7/31, healing well     Plan  - Abx d/c'ed. No evidence of continued infection.  - healing well s/p AKA            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    - Continue Norvasc  - Diabetic diet inpatient  - Sliding scale insulin        ESRD (end stage renal disease)    - Dialysis inpatient  - Will continue to monitor electrolytes        Peripheral vascular disease, unspecified    - had Aortogram + Left LE angiogram 07/28 which showed Significant steal from left thigh AVG. Left foot not salvageable given extent of infection. S/p left AKA on 7/31          Anemia of chronic renal failure    -  "Procrit w/ dialysis.   - Continue to monitor H/H        Type 2 diabetes mellitus with left diabetic foot ulcer    See "left foot infection"           VTE Risk Mitigation         Ordered     heparin (porcine) injection 5,000 Units  Every 8 hours     Route:  Subcutaneous        08/02/17 1119     Medium Risk of VTE  Once      07/28/17 1244        Dispo: Likely discharge to rehab tomorrow     Jillian Junior,   Department of Hospital Medicine   [facility]  "

## 2017-08-03 NOTE — HOSPITAL COURSE
8/2/17:  Evaluated by therapy.  Bed mobility SV-Suzie.  Sit to stand Max & RW and transfers totalA.  UBD Independent and LBD MaxA.  8/6/17: Participating with therapy.  Bed mobility ModA.  Sit to stand totalA & RW and transfers totalA-MaxA.      AM-PAC 6 CLICK MOBILITY (PT) - Total score: 12 (8/2), 11 (8/6)  AM-PAC 6 CLICK ADL (OT) - Total score: 18 (8/2)    AM-PAC Raw Score Level of Impairment Assistance   6 100% Total / Unable   7 - 8 80 - 100% Maximal Assist   9-13 60 - 80% Moderate Assist   14 - 19 40 - 60% Moderate Assist   20 - 22 20 - 40% Minimal Assist   23 1-20% SBA / CGA   24 0% Independent/ Mod I

## 2017-08-03 NOTE — PROGRESS NOTES
"[facility]  Adult Nutrition  Progress Note    SUMMARY     Recommendations  Recommendation/Intervention:   1. Encourage increased PO intake.   2. Recommend adding Novasource Renal OS to all meals.   RD to monitor.    Goals: Patient to consume > 75% of meals  Nutrition Goal Status: new  Communication of RD Recs: reviewed with RN    Reason for Assessment  Reason for Assessment: length of stay  Diagnosis:  (L foot infection)  Relevent Medical History: HTN, HLD, DM 2, COPD, ESRD on HD, PVD, CAD   General Information Comments: POD#3 s/p L AKA. Patient with fair appetite and PO intake.  Nutrition Discharge Planning: Adequate nutrition via PO intake.    Nutrition Prescription Ordered  Current Diet Order: Diabetic , Renal    Evaluation of Received Nutrients/Fluid Intake  % Intake of Estimated Energy Needs: 50 - 75 %  % Meal Intake: 50%     Nutrition Risk Screen   Nutrition Risk Screen: no indicators present    Nutrition/Diet History  Patient Reported Diet/Restrictions/Preferences: general  Food Preferences: No Alevism or cultural needs identified at this time.  Factors Affecting Nutritional Intake:  (none)    Labs/Tests/Procedures/Meds   Pertinent Labs Reviewed: reviewed  Pertinent Labs Comments: Ca 8.3, Phos 4.8, Alb 2.2, POCT Glu , HgbA1c 6.3  Pertinent Medications Reviewed: reviewed  Pertinent Medications Comments: insulin, renvela    Physical Findings  Overall Physical Appearance: nourished  Tubes:  (none)  Oral/Mouth Cavity: WDL  Skin: incision    Anthropometrics  Height: 4' 11" (149.9 cm)  Weight: 67 kg (147 lb 11.3 oz)  Ideal Body Weight (IBW), Female: 95 lb  % Ideal Body Weight, Female (lb): 155.48 lb  BMI (Calculated): 29.9  BMI Grade: 25 - 29.9 - overweight  Usual Body Weight (UBW), k kg  Weight Change Amount: 15 lb 6.9 oz  % Usual Body Weight: 90.73  % Weight Change From Usual Weight: 9.27 %  Total Amputation %: 16  Amputee BMI (kg/m2): 35.61 kg/m2     Estimated/Assessed " Needs  Weight Used For Calorie Calculations: 67 kg (147 lb 11.3 oz)   Energy Calorie Requirements (kcal): 1426 kcal/day  Energy Need Method: [place]-St Jeor (x 1.25)  RMR ([place]-St. Jeor Equation): 1140.62  Weight Used For Protein Calculations: 67 kg (147 lb 11.3 oz)  Protein Requirements: 81-94 g/day (1.2-1.4 g/kg)  RDA Method (mL): 1426    Assessment and Plan  Nutrition Problem  Increased nutrient needs    Related to (etiology):   Increased demand for protein    Signs and Symptoms (as evidenced by):   S/p L AKA    Interventions/Recommendations (treatment strategy):  See RD recs above.    Nutrition Diagnosis Status:   New    Monitor and Evaluation  Food and Nutrient Intake: energy intake, food and beverage intake  Food and Nutrient Adminstration: diet order  Physical Activity and Function: nutrition-related ADLs and IADLs  Anthropometric Measurements: weight, weight change  Biochemical Data, Medical Tests and Procedures: electrolyte and renal panel, gastrointestinal profile, inflammatory profile  Nutrition-Focused Physical Findings: overall appearance    Nutrition Risk  Level of Risk:  (1x/week)    Nutrition Follow-Up  RD Follow-up?: Yes

## 2017-08-03 NOTE — PROGRESS NOTES
[facility]  Vascular Surgery  Progress Note    Patient Name: Aleta Herrera  MRN: [id number]  Admission Date: 7/23/2017  Primary Care Provider: Radha Lao MD    Subjective:     Interval History: No issues overnight. Pt complain of some back pain this AM    Post-Op Info:  Procedure(s) (LRB):  AMPUTATION-ABOVE KNEE (Left)   3 Days Post-Op       Medications:  Continuous Infusions:   ropivacaine (PF) 2 mg/ml (0.2%) 6 mL/hr (08/02/17 1847)    ropivacaine (PF) 2 mg/ml (0.2%) 8 mL/hr (08/02/17 1343)     Scheduled Meds:   sodium chloride 0.9%   Intravenous Once    sodium chloride 0.9%   Intravenous Once    amlodipine  10 mg Oral Daily    aspirin  81 mg Oral Daily    epoetin batsheva (PROCRIT) injection  100 Units/kg Intravenous Every Mon, Wed, Fri    heparin (porcine)  5,000 Units Subcutaneous Q8H    insulin detemir  4 Units Subcutaneous BID    pregabalin  75 mg Oral QHS    sevelamer carbonate  2,400 mg Oral TID WM    sodium hypochlorite   Irrigation Once     PRN Meds:sodium chloride 0.9%, sodium chloride 0.9%, sodium chloride 0.9%, sodium chloride 0.9%, cyclobenzaprine, dextrose 50%, dextrose 50%, glucagon (human recombinant), glucose, glucose, heparin (porcine), HYDROmorphone, insulin aspart, methocarbamol, ondansetron, oxycodone, polyethylene glycol     Objective:     Vital Signs (Most Recent):  Temp: 97.4 °F (36.3 °C) (08/03/17 0400)  Pulse: 84 (08/03/17 0400)  Resp: 16 (08/03/17 0400)  BP: 114/62 (08/03/17 0400)  SpO2: 97 % (08/03/17 0400) Vital Signs (24h Range):  Temp:  [96 °F (35.6 °C)-98.1 °F (36.7 °C)] 97.4 °F (36.3 °C)  Pulse:  [79-99] 84  Resp:  [16] 16  SpO2:  [95 %-100 %] 97 %  BP: (101-119)/(57-70) 114/62          Physical Exam   Constitutional: She appears well-developed and well-nourished.   HENT:   Head: Normocephalic and atraumatic.   Neck: Neck supple.   Cardiovascular: Normal rate, regular rhythm and normal heart sounds.    Pulmonary/Chest: Effort normal and  breath sounds normal.   Abdominal: Soft.   L AKA stump healing well. Incision c/d/i with staples in place    Significant Labs:  CBC:   Recent Labs  Lab 08/03/17  0448   WBC 14.07*   RBC 3.02*   HGB 8.0*   HCT 27.4*   *   MCV 91   MCH 26.5*   MCHC 29.2*     CMP:   Recent Labs  Lab 08/03/17  0448   GLU 98   CALCIUM 8.3*   ALBUMIN 2.2*      K 4.5   CO2 24      BUN 34*   CREATININE 5.5*       Significant Diagnostics:  I have reviewed all pertinent imaging results/findings within the past 24 hours.    Assessment/Plan:     Type 2 diabetes mellitus with left diabetic foot ulcer                * Left foot infection    61 yo F with h/o HTN, HLD, DM2 (Hgb A1c 6.3),HFpEF (EF 60%), COPD (home O2, 2.5 L), ESRD on HD (MWF) via L femoral AVG presenting to hospital with left foot ischemic ulcer, wet gangrene s/p I&D by podiatry 07/26, left AKA on 07/31    POD#3 from left AKA. Doing well  Added Flexeril for pain  Non-weight bearing on AKA stump. Keep elevated. Daily dressing changes  Ok to use L femoral AVG  From vascular surgery standpoint no antibiotics needed for prior left foot infection as source control has been obtained  F/u with Dr. Madera in clinic in 4 weeks  Please call with any questions            Colten Johnson MD  Vascular Surgery  [facility]

## 2017-08-03 NOTE — PT/OT/SLP PROGRESS
Physical Therapy      Aleta Herrera  MRN: [id number]    Patient not seen today secondary to  (pt. away at dialysis). Will follow-up tomorrow.    Simon Vazquez, PT   8/3/2017

## 2017-08-03 NOTE — ANESTHESIA POST-OP PAIN MANAGEMENT
Acute Pain Service Progress Note    Aleta Herrera is a 62 y.o., female, [id number].    No acute events overnight. Pt resting and doing well this morning.     Surgery:  AKA (left upper leg)    Post Op Day #: 3    Catheter type: perineural  femoral and sciatic    Infusion type: Ropivacaine 0.2%  8 basal    Problem List:    Active Hospital Problems    Diagnosis  POA    *Left foot infection [L08.9]  Yes    Sepsis [A41.9]  Yes    Type 2 diabetes mellitus with left diabetic foot ulcer [E11.621, L97.529]  Yes    ESRD (end stage renal disease) [N18.6]  Yes    Peripheral vascular disease, unspecified [I73.9]  Yes    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 [E11.22, I12.0, Z99.2, N18.6]  Not Applicable    Anemia of chronic renal failure [N18.9, D63.1]  Yes     Chronic      Resolved Hospital Problems    Diagnosis Date Resolved POA   No resolved problems to display.       Subjective:     General appearance of alert, oriented, no complaints   Pain with rest: 6    Numbers   Pain with movement: 6    Numbers   Side Effects    1. Pruritis No    2. Nausea No    3. Motor Blockade No, 0=Ability to raise lower extremities off bed    4. Sedation No, S=sleep, easy to arouse    Objective:   Catheter site clean, dry, intact        Vitals   Vitals:    08/03/17 0400   BP: 114/62   Pulse: 84   Resp: 16   Temp: 36.3 °C (97.4 °F)        Labs    Admission on 07/23/2017   No results displayed because visit has over 200 results.           Meds   Current Facility-Administered Medications   Medication Dose Route Frequency Provider Last Rate Last Dose    0.9%  NaCl infusion   Intravenous PRN Ivonne Heber Huertas,  mL/hr at 07/27/17 0821 1,000 mL at 07/27/17 0821    0.9%  NaCl infusion   Intravenous PRN Ivonne Heber Huertas, NP        0.9%  NaCl infusion   Intravenous PRN Ivonne Heber Huertas,  mL/hr at 08/01/17 1017 300 mL at 08/01/17 1017    0.9%  NaCl infusion   Intravenous Once Ivonne Heber Huertas, NP        0.9%  NaCl  infusion   Intravenous PRN Ivonne Heber Huertas NP        0.9%  NaCl infusion   Intravenous Once Ivonne Heber Huertas, THIERRY        amlodipine tablet 10 mg  10 mg Oral Daily Carson Marcus, DO   10 mg at 08/02/17 0928    aspirin chewable tablet 81 mg  81 mg Oral Daily Carson Marcus, DO   81 mg at 08/02/17 0928    dextrose 50% injection 12.5 g  12.5 g Intravenous PRN Carson Marcus, DO        dextrose 50% injection 25 g  25 g Intravenous PRN Carson Marcus DO        epoetin batsheva injection 6,400 Units  100 Units/kg Intravenous Every Mon, Wed, Fri Ivonne Heber Huertas NP   Stopped at 07/31/17 0900    glucagon (human recombinant) injection 1 mg  1 mg Intramuscular PRN Carson Marcus,         glucose chewable tablet 16 g  16 g Oral PRN Carson Marcus, DO        glucose chewable tablet 24 g  24 g Oral PRN Carson Marcus DO        heparin (porcine) injection 2,000 Units  2,000 Units Intravenous PRN Adriana Singh MD        heparin (porcine) injection 5,000 Units  5,000 Units Subcutaneous Q8H Jillian Junior, DO   5,000 Units at 08/02/17 2200    hydromorphone injection 0.5 mg  0.5 mg Intravenous Q6H PRN Man Lorenzana MD   0.5 mg at 08/01/17 1621    insulin aspart pen 0-5 Units  0-5 Units Subcutaneous QID (AC + HS) PRN Carson Marcus DO   1 Units at 07/31/17 2317    insulin detemir pen 4 Units  4 Units Subcutaneous BID Jillian Junior, DO   4 Units at 08/02/17 2100    methocarbamol tablet 500 mg  500 mg Oral TID PRN Aylin Wiggins MD   500 mg at 08/02/17 1028    ondansetron tablet 8 mg  8 mg Oral Q8H PRN Carson Mabry MD        oxycodone immediate release tablet 5 mg  5 mg Oral Q6H PRN Man Lorenzana MD   5 mg at 08/03/17 0527    polyethylene glycol packet 17 g  17 g Oral BID PRN Carson Marcus DO        pregabalin capsule 75 mg  75 mg Oral QHS Carson Mabry MD   75 mg at 08/02/17 2100    ropivacaine  (PF) 2 mg/ml (0.2%) infusion  6 mL/hr Perineural Continuous Lexis Medel MD 6 mL/hr at 08/02/17 1847 6 mL/hr at 08/02/17 1847    ropivacaine (PF) 2 mg/ml (0.2%) infusion  8 mL/hr Perineural Continuous Lexis Medel MD 8 mL/hr at 08/02/17 1343 8 mL/hr at 08/02/17 1343    sevelamer carbonate tablet 2,400 mg  2,400 mg Oral TID  Carson Marcus DO   2,400 mg at 08/02/17 1755    sodium hypochlorite (DAKIN'S) external solution 0.25%   Irrigation Once Melissa Shanti Graham MD               Assessment:     Pain control adequate    Plan:     Patient doing well, continue present treatment.     Perineural catheters in place. Cont robaxin for complaints of  muscle tightness     Evaluator Aylin Wiggins

## 2017-08-03 NOTE — SUBJECTIVE & OBJECTIVE
Interval History: Pain is controlled. No n/v. Tolerating PO intake. Patient dialyzed this morning. No new complaints.     Review of Systems   Constitutional: Negative for chills and fever.   Respiratory: Negative for cough and shortness of breath.    Cardiovascular: Negative for chest pain.   Gastrointestinal: Negative for abdominal pain, diarrhea and vomiting.   Genitourinary:        Does not make urine   Neurological: Negative for dizziness, light-headedness and headaches.   All other systems reviewed and are negative.    Objective:     Vital Signs (Most Recent):  Temp: 98.2 °F (36.8 °C) (08/03/17 1327)  Pulse: 103 (08/03/17 1517)  Resp: 18 (08/03/17 1130)  BP: 114/63 (08/03/17 1327)  SpO2: 97 % (08/03/17 1327) Vital Signs (24h Range):  Temp:  [95.8 °F (35.4 °C)-98.2 °F (36.8 °C)] 98.2 °F (36.8 °C)  Pulse:  [] 103  Resp:  [16-18] 18  SpO2:  [95 %-97 %] 97 %  BP: ()/(28-70) 114/63     Weight: 67 kg (147 lb 11.3 oz)  Body mass index is 29.83 kg/m².    Intake/Output Summary (Last 24 hours) at 08/03/17 1542  Last data filed at 08/03/17 0400   Gross per 24 hour   Intake              820 ml   Output                0 ml   Net              820 ml      Physical Exam   Constitutional: She appears well-developed and well-nourished.   HENT:   Head: Normocephalic and atraumatic.   Eyes: EOM are normal. Pupils are equal, round, and reactive to light.   Cardiovascular: Normal rate and regular rhythm.    Pulmonary/Chest: Effort normal. No respiratory distress.   Abdominal: She exhibits no distension. There is no tenderness. Hernia: soft, non-tender.   Musculoskeletal:   Left AKA, dressing in place. No visible erythema, no warmth.    Neurological: She is alert.   Skin: Skin is warm and dry.       Significant Labs:   BMP:   Recent Labs  Lab 08/03/17  0448   GLU 98      K 4.5      CO2 24   BUN 34*   CREATININE 5.5*   CALCIUM 8.3*     CBC:   Recent Labs  Lab 08/02/17  0453 08/03/17  0448   WBC 14.37* 14.07*    HGB 8.0* 8.0*   HCT 25.9* 27.4*   * 510*       Significant Imaging: no significant imaging

## 2017-08-03 NOTE — PLAN OF CARE
Problem: Patient Care Overview  Goal: Plan of Care Review  Outcome: Ongoing (interventions implemented as appropriate)  Recommendations  1. Encourage increased PO intake.   2. Recommend adding Novasource Renal OS to all meals.     RD to monitor. Full assessment completed. See RD Progress Note 8/3/17.

## 2017-08-03 NOTE — PROGRESS NOTES
Pt arrived via stretcher.  Placed on cardiac monitor and b/p check every 15 minutes.  Left thigh Dialysis access prep with prevantic swab.  maintenace   Hemodialysis started per orders.

## 2017-08-03 NOTE — SUBJECTIVE & OBJECTIVE
Medications:  Continuous Infusions:   ropivacaine (PF) 2 mg/ml (0.2%) 6 mL/hr (08/02/17 1847)    ropivacaine (PF) 2 mg/ml (0.2%) 8 mL/hr (08/02/17 1343)     Scheduled Meds:   sodium chloride 0.9%   Intravenous Once    sodium chloride 0.9%   Intravenous Once    amlodipine  10 mg Oral Daily    aspirin  81 mg Oral Daily    epoetin batsheva (PROCRIT) injection  100 Units/kg Intravenous Every Mon, Wed, Fri    heparin (porcine)  5,000 Units Subcutaneous Q8H    insulin detemir  4 Units Subcutaneous BID    pregabalin  75 mg Oral QHS    sevelamer carbonate  2,400 mg Oral TID WM    sodium hypochlorite   Irrigation Once     PRN Meds:sodium chloride 0.9%, sodium chloride 0.9%, sodium chloride 0.9%, sodium chloride 0.9%, cyclobenzaprine, dextrose 50%, dextrose 50%, glucagon (human recombinant), glucose, glucose, heparin (porcine), HYDROmorphone, insulin aspart, methocarbamol, ondansetron, oxycodone, polyethylene glycol     Objective:     Vital Signs (Most Recent):  Temp: 97.4 °F (36.3 °C) (08/03/17 0400)  Pulse: 84 (08/03/17 0400)  Resp: 16 (08/03/17 0400)  BP: 114/62 (08/03/17 0400)  SpO2: 97 % (08/03/17 0400) Vital Signs (24h Range):  Temp:  [96 °F (35.6 °C)-98.1 °F (36.7 °C)] 97.4 °F (36.3 °C)  Pulse:  [79-99] 84  Resp:  [16] 16  SpO2:  [95 %-100 %] 97 %  BP: (101-119)/(57-70) 114/62          Physical Exam   Constitutional: She appears well-developed and well-nourished.   HENT:   Head: Normocephalic and atraumatic.   Neck: Neck supple.   Cardiovascular: Normal rate, regular rhythm and normal heart sounds.    Pulmonary/Chest: Effort normal and breath sounds normal.   Abdominal: Soft.   L AKA stump healing well. Incision c/d/i with staples in place    Significant Labs:  CBC:   Recent Labs  Lab 08/03/17 0448   WBC 14.07*   RBC 3.02*   HGB 8.0*   HCT 27.4*   *   MCV 91   MCH 26.5*   MCHC 29.2*     CMP:   Recent Labs  Lab 08/03/17 0448   GLU 98   CALCIUM 8.3*   ALBUMIN 2.2*      K 4.5   CO2 24       BUN 34*   CREATININE 5.5*       Significant Diagnostics:  I have reviewed all pertinent imaging results/findings within the past 24 hours.

## 2017-08-03 NOTE — PROGRESS NOTES
[facility] H[facility]ODIAL[facility] NOTE     Patient currently on hemodialysis for removal of uremic toxins and volume.     Patient seen and evaluated on hemodialysis, tolerating treatment, see HD flowsheet for vitals and assessments.      No chest pain, shortness of breath, cramping, nausea or vomiting.     Lt foot gangrene SP Lt AKA on 7/31/17     Recent Labs  Lab 08/01/17  0403 08/02/17 0453 08/03/17  0448   WBC 18.71* 14.37* 14.07*   HGB 8.0* 8.0* 8.0*   HCT 25.7* 25.9* 27.4*   * 466* 510*       Recent Labs  Lab 08/01/17  0403 08/01/17  0757 08/02/17 0453 08/03/17 0448   * 131* 136 136   K 5.8* 5.6* 4.1 4.5   CL 95 96 102 101   CO2 23 23 25 24   BUN 38* 38* 24* 34*   CREATININE 6.2* 6.3* 4.5* 5.5*   * 171* 86 98   CALCIUM 8.3* 8.4* 8.5* 8.3*   PHOS 5.6*  --  4.3 4.8*  4.8*       Recent Labs  Lab 07/30/17  0950 08/01/17 0403 08/02/17 0453 08/03/17  0448   ALKPHOS  --  157*  --   --    ALT  --  <5*  --   --    AST  --  12  --   --    ALBUMIN  --  2.1* 2.0* 2.2*   PROT  --  8.1  --   --    BILITOT  --  0.3  --   --    INR 1.0  --   --   --       Lab Results   Component Value Date    .0 (H) 08/02/2017    CALCIUM 8.3 (L) 08/03/2017    CAION 1.06 11/17/2009    PHOS 4.8 (H) 08/03/2017    PHOS 4.8 (H) 08/03/2017       Exam  AAOx 3  Lungs clear  Heart S1S2  Extremities  Lt AKA  Access Left thigh graft     ASSESSMENT/PLAN:  ESRD on IHD   - Will provide dialysis for metabolic clearance and volume   - Seen and examined today during hemodialysis; tolerating treatment well without issues. Denied headaches, chest pain, abdominal pain, or muscle cramps   - Hypotension. IO reviewed. Patient on dry side. No further UF today (total 200 ml net UF today).   - Dialysate adjusted to current labs. Renal diet     Anemia of Chronic Kidney Disease   - Stable. No iron in setting of chronic infection. Continue Epogen.      Mineral Bone Disease in CKD   - Renal diet. Continue renvela and calcitriol. iPTH 152.       Ivonne Huertas, THIERRY  Nephrology

## 2017-08-03 NOTE — SUBJECTIVE & OBJECTIVE
Medications:  Continuous Infusions:   ropivacaine (PF) 2 mg/ml (0.2%) 6 mL/hr (08/02/17 1847)    ropivacaine (PF) 2 mg/ml (0.2%) 8 mL/hr (08/02/17 1343)     Scheduled Meds:   sodium chloride 0.9%   Intravenous Once    sodium chloride 0.9%   Intravenous Once    amlodipine  10 mg Oral Daily    aspirin  81 mg Oral Daily    epoetin batsheva (PROCRIT) injection  100 Units/kg Intravenous Every Mon, Wed, Fri    heparin (porcine)  5,000 Units Subcutaneous Q8H    insulin detemir  4 Units Subcutaneous BID    pregabalin  75 mg Oral QHS    sevelamer carbonate  2,400 mg Oral TID WM    sodium hypochlorite   Irrigation Once     PRN Meds:sodium chloride 0.9%, sodium chloride 0.9%, sodium chloride 0.9%, sodium chloride 0.9%, cyclobenzaprine, dextrose 50%, dextrose 50%, glucagon (human recombinant), glucose, glucose, heparin (porcine), HYDROmorphone, insulin aspart, methocarbamol, ondansetron, oxycodone, polyethylene glycol     Objective:     Vital Signs (Most Recent):  Temp: 97.4 °F (36.3 °C) (08/03/17 0400)  Pulse: 84 (08/03/17 0400)  Resp: 16 (08/03/17 0400)  BP: 114/62 (08/03/17 0400)  SpO2: 97 % (08/03/17 0400) Vital Signs (24h Range):  Temp:  [96 °F (35.6 °C)-98.1 °F (36.7 °C)] 97.4 °F (36.3 °C)  Pulse:  [79-99] 84  Resp:  [16] 16  SpO2:  [95 %-100 %] 97 %  BP: (101-119)/(57-70) 114/62          Physical Exam   Constitutional: She appears well-developed and well-nourished.   HENT:   Head: Normocephalic and atraumatic.   Cardiovascular: Normal rate, regular rhythm and normal heart sounds.    Pulmonary/Chest: Effort normal and breath sounds normal.   Musculoskeletal:   L AKA stump with sutures in place. Incision c/d/i     Significant Labs:  Anti-XA: No results for input(s): LABHEPA in the last 48 hours.  CBC:   Recent Labs  Lab 08/03/17  0448   WBC 14.07*   RBC 3.02*   HGB 8.0*   HCT 27.4*   *   MCV 91   MCH 26.5*   MCHC 29.2*

## 2017-08-03 NOTE — ASSESSMENT & PLAN NOTE
-L foot ulcer upon arrival on 7/23  -s/p I & D and debridement of L plantar necrotic tissue per podiatry on 7/26  -s/p L AKA on 7/31 per vascular surgery  -NWB on AKA stump

## 2017-08-03 NOTE — ASSESSMENT & PLAN NOTE
Presenting to the ED w/ new bleeding from L leg ulcer along with fevers, chills and new leukocytosis.   - Received vancomycin , flagyl and rocephin in ED. Fever resolved with Tylenol in ED. Dc'd vancomycin and flagyl on the floor.   - Blood cx 1/4 +gram positive cocci in clusters resembling staph   - Blood cx 1/4 +gram positive rods  - Wound cx growing proteus and GNR, lactose   - Repeat blood cx x2 show NGTD  - S/p Aortogram + Left LE angiogram with Vascular surgery   - Significant steal from L thigh AVG. L foot not salvageable given extent of infection.   - S/p left AKA 7/31, healing well     Plan  - Abx d/c'ed. No evidence of continued infection.  - healing well s/p AKA

## 2017-08-03 NOTE — PROGRESS NOTES
Dialysis complete.  Blood returned.   Hemostasis complete.   Held pressure to    Left thigh  A/v site for   10 Minutes to each site.   Guaze and tape applied to needle site.   No active bleeding noted.   + thrill and bruit.    Pt tolerated hemodialysis without diff.   Pt ran 3.5   Hrs on hemodialysis machine.   Took off     0 Net volume.   Used   [id number]    Dialyzer.

## 2017-08-03 NOTE — CONSULTS
PM&R consult follow up.      Patient off the floor for dialysis. Will attempt to see patient tomorrow for full rehab consultation.         YVETTE Aquino, FNP-C  Physical Medicine & Rehabilitation   08/03/2017  Spectralink: [id number]

## 2017-08-03 NOTE — HPI
Aleta Herrera is a 62-year-old female with PMHx of stroke, HTN, CHF (EF 60%; on home O2 2.5 liters), anemia 2/2 CKD and DM2 with retinopathy, neuropathy and ESRD (HD MWF and L AVG placed 4/2017).   [place] [facility] [place] with painful, bleeding L foot ulcer and intermittent fever/chills x 1 week.   In the ED, L foot x-ray revealed soft tissue swelling.  CXR showed mild bilateral effusions with leukocytosis (24K) and started on Ceftriaxone.  MRI L foot did not demonstrate osteomyelitis or drainable fluid collections.   JULIUS L showed mod-severe occlusive disease, R with moderate occlusive disease. Blood cultures were obtained in the ED and resulted as clusters and gram positive rods.  On 7/25, Vascular Surgery and Podiatry were consulted. On 7/26, Podiatry performed I & D and debridement of left foot for extensive left plantar tissue necrosis with pending cultures. On 7/27, podiatry noted she had wet gangrenous changes noted to plantar arch of left foot. She became febrile that same day and repeat blood cultures resulted as NGTD.   On 7/28, LLE angiogram per vascular surgery revealed significant AVG steal and her left foot was deemed unsalvageable.  She underwent a successful L AKA on 7/31.  She is currently finished her abx treatment.     Functional History: Per chart review, patient lives in [place] with spouse in a two story home with a ramp to enter.  She is able to live on the first floor.   Prior to admission, she was independent with ALDs.  She used a rollator for functional mobility in the home and intermittently used a transport WC for functional mobility.   DME: rollator, transfer WC, & BSC.

## 2017-08-03 NOTE — CONSULTS
Inpatient consult to Physical Medicine Rehab  Consult performed by: KANNAN FRANKLIN  Consult ordered by: JENNIE MARTINEZ  Reason for consult: Assess rehab needs        Reviewed patient history and current admission.  Rehab team following.  Full consult to follow.    YVETTE Aquino, FNP-C  Physical Medicine & Rehabilitation   08/03/2017  Spectralink: [phone]

## 2017-08-03 NOTE — PT/OT/SLP PROGRESS
Occupational Therapy      Aleta Herrera  MRN: [id number]    Patient not seen today secondary to Dialysis. Will follow-up as time allows and pt is available and willing.    Norah Wallace OT  8/3/2017

## 2017-08-03 NOTE — PLAN OF CARE
Problem: Patient Care Overview  Goal: Plan of Care Review  Pt AAOx4, calm, vs stable, No Falls noted as fall precautions are active, Pain controlled with PRN meds, Blood sugar monitoring active, no signs or symptoms of skin breakdown noted, No Family at bedside, No complaints at this time, will continue to monitor

## 2017-08-03 NOTE — PLAN OF CARE
Sw met with Pt and spouse late yesterday, provide SNF list of facilities for Pt to transfer to while spouse get his job and home situation together. Sw also provided list of sitters and broucher on caregiver assistance to spouse, spouse asked about [facility] on [address] as [facility]. Sw did upload SNF and rehab consult to [facility]. Sw to follow and obtain 2 more SNF choices from spouse.

## 2017-08-03 NOTE — ASSESSMENT & PLAN NOTE
61 yo F with h/o HTN, HLD, DM2 (Hgb A1c 6.3),HFpEF (EF 60%), COPD (home O2, 2.5 L), ESRD on HD (MWF) via L femoral AVG presenting to hospital with left foot ischemic ulcer, wet gangrene s/p I&D by podiatry 07/26, left AKA on 07/31    POD#3 from left AKA. Doing well  Added Flexeril for pain  Non-weight bearing on AKA stump. Keep elevated. Daily dressing changes  Ok to use L femoral AVG  From vascular surgery standpoint no antibiotics needed for prior left foot infection as source control has been obtained  F/u with Dr. Madera in clinic in 4 weeks  Please call with any questions

## 2017-08-04 PROBLEM — L89.329 PRESSURE ULCER OF LEFT BUTTOCK: Status: ACTIVE | Noted: 2017-08-04

## 2017-08-04 PROBLEM — R50.9 FEVER: Status: ACTIVE | Noted: 2017-08-04

## 2017-08-04 PROBLEM — L89.313 STAGE III PRESSURE ULCER OF RIGHT BUTTOCK: Status: ACTIVE | Noted: 2017-08-04

## 2017-08-04 LAB
ALBUMIN SERPL BCP-MCNC: 2.1 G/DL
ALBUMIN SERPL BCP-MCNC: 2.2 G/DL
ALP SERPL-CCNC: 88 U/L
ALT SERPL W/O P-5'-P-CCNC: <5 U/L
ANION GAP SERPL CALC-SCNC: 12 MMOL/L
AST SERPL-CCNC: 23 U/L
BASOPHILS # BLD AUTO: 0.02 K/UL
BASOPHILS NFR BLD: 0.1 %
BILIRUB DIRECT SERPL-MCNC: 0.2 MG/DL
BILIRUB SERPL-MCNC: 0.3 MG/DL
BUN SERPL-MCNC: 19 MG/DL
CALCIUM SERPL-MCNC: 8.2 MG/DL
CHLORIDE SERPL-SCNC: 103 MMOL/L
CO2 SERPL-SCNC: 23 MMOL/L
CREAT SERPL-MCNC: 3.5 MG/DL
DIFFERENTIAL METHOD: ABNORMAL
EOSINOPHIL # BLD AUTO: 0.1 K/UL
EOSINOPHIL NFR BLD: 0.8 %
ERYTHROCYTE [DISTWIDTH] IN BLOOD BY AUTOMATED COUNT: 19.6 %
EST. GFR  (AFRICAN AMERICAN): 15.5 ML/MIN/1.73 M^2
EST. GFR  (NON AFRICAN AMERICAN): 13.4 ML/MIN/1.73 M^2
GLUCOSE SERPL-MCNC: 78 MG/DL
HCT VFR BLD AUTO: 24 %
HGB BLD-MCNC: 7.1 G/DL
LYMPHOCYTES # BLD AUTO: 1.1 K/UL
LYMPHOCYTES NFR BLD: 6.9 %
MCH RBC QN AUTO: 26.3 PG
MCHC RBC AUTO-ENTMCNC: 29.6 G/DL
MCV RBC AUTO: 89 FL
MONOCYTES # BLD AUTO: 1 K/UL
MONOCYTES NFR BLD: 6.5 %
NEUTROPHILS # BLD AUTO: 13.3 K/UL
NEUTROPHILS NFR BLD: 85.7 %
PHOSPHATE SERPL-MCNC: 3.4 MG/DL
PLATELET # BLD AUTO: 420 K/UL
PLATELET BLD QL SMEAR: ABNORMAL
PMV BLD AUTO: 9.2 FL
POCT GLUCOSE: 132 MG/DL (ref 70–110)
POCT GLUCOSE: 93 MG/DL (ref 70–110)
POCT GLUCOSE: 96 MG/DL (ref 70–110)
POTASSIUM SERPL-SCNC: 4.3 MMOL/L
PROCALCITONIN SERPL IA-MCNC: 1.99 NG/ML
PROT SERPL-MCNC: 7.4 G/DL
RBC # BLD AUTO: 2.7 M/UL
SODIUM SERPL-SCNC: 138 MMOL/L
VANCOMYCIN SERPL-MCNC: 2.8 UG/ML
WBC # BLD AUTO: 15.58 K/UL

## 2017-08-04 PROCEDURE — 25000003 PHARM REV CODE 250: Performed by: INTERNAL MEDICINE

## 2017-08-04 PROCEDURE — 63600175 PHARM REV CODE 636 W HCPCS: Performed by: ANESTHESIOLOGY

## 2017-08-04 PROCEDURE — 80202 ASSAY OF VANCOMYCIN: CPT

## 2017-08-04 PROCEDURE — 25000003 PHARM REV CODE 250: Performed by: STUDENT IN AN ORGANIZED HEALTH CARE EDUCATION/TRAINING PROGRAM

## 2017-08-04 PROCEDURE — 84145 PROCALCITONIN (PCT): CPT

## 2017-08-04 PROCEDURE — 99222 1ST HOSP IP/OBS MODERATE 55: CPT | Mod: ,,, | Performed by: NURSE PRACTITIONER

## 2017-08-04 PROCEDURE — 36415 COLL VENOUS BLD VENIPUNCTURE: CPT

## 2017-08-04 PROCEDURE — 80069 RENAL FUNCTION PANEL: CPT

## 2017-08-04 PROCEDURE — 99233 SBSQ HOSP IP/OBS HIGH 50: CPT | Mod: GC,,, | Performed by: HOSPITALIST

## 2017-08-04 PROCEDURE — 80076 HEPATIC FUNCTION PANEL: CPT

## 2017-08-04 PROCEDURE — 63600175 PHARM REV CODE 636 W HCPCS: Performed by: STUDENT IN AN ORGANIZED HEALTH CARE EDUCATION/TRAINING PROGRAM

## 2017-08-04 PROCEDURE — 12000002 HC ACUTE/MED SURGE SEMI-PRIVATE ROOM

## 2017-08-04 PROCEDURE — 87040 BLOOD CULTURE FOR BACTERIA: CPT

## 2017-08-04 PROCEDURE — 93971 EXTREMITY STUDY: CPT | Performed by: SURGERY

## 2017-08-04 PROCEDURE — 99231 SBSQ HOSP IP/OBS SF/LOW 25: CPT | Mod: ,,, | Performed by: ANESTHESIOLOGY

## 2017-08-04 PROCEDURE — 85025 COMPLETE CBC W/AUTO DIFF WBC: CPT

## 2017-08-04 PROCEDURE — 25000003 PHARM REV CODE 250: Performed by: NURSE PRACTITIONER

## 2017-08-04 RX ORDER — ALBUMIN HUMAN 250 G/1000ML
12.5 SOLUTION INTRAVENOUS
Status: DISCONTINUED | OUTPATIENT
Start: 2017-08-04 | End: 2017-08-07 | Stop reason: HOSPADM

## 2017-08-04 RX ORDER — SODIUM CHLORIDE 9 MG/ML
INJECTION, SOLUTION INTRAVENOUS ONCE
Status: COMPLETED | OUTPATIENT
Start: 2017-08-04 | End: 2017-08-05

## 2017-08-04 RX ORDER — SODIUM CHLORIDE 9 MG/ML
INJECTION, SOLUTION INTRAVENOUS
Status: DISCONTINUED | OUTPATIENT
Start: 2017-08-04 | End: 2017-08-07 | Stop reason: HOSPADM

## 2017-08-04 RX ADMIN — SODIUM CHLORIDE 500 ML: 0.9 INJECTION, SOLUTION INTRAVENOUS at 11:08

## 2017-08-04 RX ADMIN — PIPERACILLIN AND TAZOBACTAM 4.5 G: 4; .5 INJECTION, POWDER, LYOPHILIZED, FOR SOLUTION INTRAVENOUS; PARENTERAL at 01:08

## 2017-08-04 RX ADMIN — OXYCODONE HYDROCHLORIDE 5 MG: 5 TABLET ORAL at 06:08

## 2017-08-04 RX ADMIN — HEPARIN SODIUM 5000 UNITS: 5000 INJECTION, SOLUTION INTRAVENOUS; SUBCUTANEOUS at 05:08

## 2017-08-04 RX ADMIN — VANCOMYCIN HYDROCHLORIDE 1000 MG: 1 INJECTION, POWDER, LYOPHILIZED, FOR SOLUTION INTRAVENOUS at 12:08

## 2017-08-04 RX ADMIN — ASPIRIN 81 MG CHEWABLE TABLET 81 MG: 81 TABLET CHEWABLE at 08:08

## 2017-08-04 RX ADMIN — ROPIVACAINE HYDROCHLORIDE 6 ML/HR: 2 INJECTION, SOLUTION EPIDURAL; INFILTRATION at 03:08

## 2017-08-04 RX ADMIN — HEPARIN SODIUM 5000 UNITS: 5000 INJECTION, SOLUTION INTRAVENOUS; SUBCUTANEOUS at 09:08

## 2017-08-04 RX ADMIN — PREGABALIN 75 MG: 75 CAPSULE ORAL at 09:08

## 2017-08-04 RX ADMIN — HEPARIN SODIUM 5000 UNITS: 5000 INJECTION, SOLUTION INTRAVENOUS; SUBCUTANEOUS at 01:08

## 2017-08-04 RX ADMIN — CALCITRIOL 0.5 MCG: 0.25 CAPSULE, LIQUID FILLED ORAL at 08:08

## 2017-08-04 RX ADMIN — SEVELAMER CARBONATE 2400 MG: 800 TABLET, FILM COATED ORAL at 06:08

## 2017-08-04 RX ADMIN — SEVELAMER CARBONATE 2400 MG: 800 TABLET, FILM COATED ORAL at 01:08

## 2017-08-04 RX ADMIN — OXYCODONE HYDROCHLORIDE 5 MG: 5 TABLET ORAL at 11:08

## 2017-08-04 NOTE — SUBJECTIVE & OBJECTIVE
Interval History: Patient was found to be more confused, sleepy, and had fever of 101.6 this morning. She denies any specific complaints but is noticeably sleepier than usual. No cough, no chest pain, no SOB, no n/v.     Review of Systems   Constitutional: Positive for fever. Negative for chills.   Respiratory: Negative for cough and shortness of breath.    Cardiovascular: Negative for chest pain.   Gastrointestinal: Negative for abdominal pain, diarrhea and vomiting.   Genitourinary:        Does not make urine   Neurological:        Somnolence   All other systems reviewed and are negative.    Objective:     Vital Signs (Most Recent):  Temp: 99.5 °F (37.5 °C) (08/04/17 1200)  Pulse: 92 (08/04/17 1500)  Resp: 16 (08/04/17 1200)  BP: 129/69 (08/04/17 1200)  SpO2: (!) 94 % (08/04/17 1200) Vital Signs (24h Range):  Temp:  [97.6 °F (36.4 °C)-101.6 °F (38.7 °C)] 99.5 °F (37.5 °C)  Pulse:  [] 92  Resp:  [14-18] 16  SpO2:  [91 %-95 %] 94 %  BP: ()/(45-69) 129/69     Weight: 67 kg (147 lb 11.3 oz)  Body mass index is 29.83 kg/m².    Intake/Output Summary (Last 24 hours) at 08/04/17 1601  Last data filed at 08/04/17 1300   Gross per 24 hour   Intake              360 ml   Output                0 ml   Net              360 ml      Physical Exam   Constitutional: She appears well-developed and well-nourished.   HENT:   Head: Normocephalic and atraumatic.   Eyes: EOM are normal. Pupils are equal, round, and reactive to light.   Cardiovascular: Normal rate and regular rhythm.    Pulmonary/Chest: Effort normal. No respiratory distress.   Increased oxygen requirement, ot sat 90% on 3l. Decreased breath sounds with few rhonchi right base.    Abdominal: She exhibits no distension. There is no tenderness. Hernia: soft, non-tender.   Musculoskeletal:   Left AKA, no dressing. Staples in place. No surrounding erythema or warmth. Small serosanguinous drainage.    Neurological:   Somnolent but arousable to voice. Oriented to  person, place, and time. With further questioning, patient becomes confused.    Skin: Skin is warm and dry.       Significant Labs:   BMP:     Recent Labs  Lab 08/04/17  0432   GLU 78      K 4.3      CO2 23   BUN 19   CREATININE 3.5*   CALCIUM 8.2*     WBC 15.5  Hgb 7.1    Significant Imaging: I have reviewed all pertinent imaging results/findings within the past 24 hours.

## 2017-08-04 NOTE — PROGRESS NOTES
Patient appears to be oriented to self this morning. Paged IM2 and notified last set of vitals and mental status change

## 2017-08-04 NOTE — SUBJECTIVE & OBJECTIVE
Past Medical History:   Diagnosis Date    Anemia of chronic renal failure 2013    Anticoagulant long-term use 2015    CHF (congestive heart failure)     Coronary artery disease     Diabetes mellitus     Diabetic neuropathy 2013    DR (diabetic retinopathy) 2013    End stage renal disease on dialysis     Fever 2017    Hypertension     Hypertension, renal 2013    Kidney transplant candidate 2013    Secondary hyperparathyroidism of renal origin 2013    Stroke 2015    Type 2 diabetes mellitus since 2013     Past Surgical History:   Procedure Laterality Date    AV FISTULA PLACEMENT      CATARACT SURGERY       SECTION, LOW TRANSVERSE      x 3    COLONOSCOPY N/A 2016    Procedure: COLONOSCOPY;  Surgeon: Roverto Patel MD;  Location: [facility] ([address] [place] [address]);  Service: Endoscopy;  Laterality: N/A;    EYE SURGERY      HYSTERECTOMY      Endometriosis    TONSILLECTOMY      VASCULAR SURGERY       Review of patient's allergies indicates:  No Known Allergies    Scheduled Medications:    sodium chloride 0.9%   Intravenous Once    amlodipine  10 mg Oral Daily    aspirin  81 mg Oral Daily    calcitRIOL  0.5 mcg Oral Daily    epoetin batsheva (PROCRIT) injection  100 Units/kg Intravenous Every Mon, Wed, Fri    heparin (porcine)  5,000 Units Subcutaneous Q8H    insulin detemir  4 Units Subcutaneous BID    pregabalin  75 mg Oral QHS    sevelamer carbonate  2,400 mg Oral TID WM    sodium hypochlorite   Irrigation Once       PRN Medications: sodium chloride 0.9%, sodium chloride 0.9%, sodium chloride 0.9%, sodium chloride 0.9%, dextrose 50%, dextrose 50%, glucagon (human recombinant), glucose, glucose, heparin (porcine), HYDROmorphone, insulin aspart, methocarbamol, ondansetron, oxycodone, polyethylene glycol    Family History     Problem Relation (Age of Onset)    Cancer Mother (70)    Heart disease Mother    Hypertension Mother        Social  History Main Topics    Smoking status: Former Smoker     Packs/day: 0.25     Years: 0.50     Types: Cigarettes     Quit date: 1975    Smokeless tobacco: Never Used      Comment: quit smoking cigarettes 40+ years ago    Alcohol use No    Drug use: No    Sexual activity: Yes     Review of Systems   Reason unable to perform ROS: mental status.     Objective:     Vital Signs (Most Recent):  Temp: (!) 101.1 °F (38.4 °C) (17 0835)  Pulse: 97 (17 0835)  Resp: 14 (17 0801)  BP: (!) 96/51 (17 0835)  SpO2: (!) 94 % (17 0801)    Vital Signs (24h Range):  Temp:  [97.6 °F (36.4 °C)-101.6 °F (38.7 °C)] 101.1 °F (38.4 °C)  Pulse:  [] 97  Resp:  [14-18] 14  SpO2:  [91 %-97 %] 94 %  BP: ()/(35-63) 96/51     Body mass index is 29.83 kg/m².    Physical Exam   Constitutional: She appears well-developed and well-nourished.   HENT:   Head: Normocephalic and atraumatic.   Eyes: EOM are normal. Pupils are equal, round, and reactive to light.   Neck: Neck supple.   Cardiovascular: Normal rate and regular rhythm.    L AVG noted    Pulmonary/Chest: Effort normal. No respiratory distress.   Abdominal: Soft. There is no tenderness.   Musculoskeletal:   L AKA with staples noted to be dry and nonerythematous   Neurological:   -  Mental Status:  Awake and Alert Ox3.  Follows commands, but is confused during conversation about functional history and pain assessment.  Answers correct age, , and current president.      -  Motor:  RUE: .  LUE: .  RLE: 3/5 (2/2 L aka).  LLE: 3/ (limited 2/2 participation).   -  Tone:  normal   Skin: Skin is warm and dry.   Psychiatric:   Confused   Vitals reviewed.    NEUROLOGICAL EXAMINATION:     CRANIAL NERVES     CN III, IV, VI   Pupils are equal, round, and reactive to light.  Extraocular motions are normal.       Diagnostic Results:   Labs: Reviewed  X-Ray: Reviewed  US: Reviewed  MRI: Reviewed

## 2017-08-04 NOTE — PROGRESS NOTES
[facility] Hospital Medicine  Progress Note    Patient Name: Aleta Herrera  MRN: [id number]  Patient Class: IP- Inpatient   Admission Date: 7/23/2017  Length of Stay: 12 days  Attending Physician: Carson Morgan, *  Primary Care Provider: Radha Lao MD    [facility] Hospital Medicine Team: [facility] [place], [place] HOSP MED 2 Jillian Junior DO    Subjective:     Principal Problem:Left foot infection    HPI:  Aleta Herrera is a 62-year-old female with HTN, HFpEF (EF 60%; home O2 2.5 liters), anemia 2/2 CKD and IDDM c/b retinopathy, neuropathy and ESRD (HD MWF). She presents today with her  to the ED because of new bleeding from the left foot ulcer. She's had on/off fevers and chills for past week or so. Apparently blood cultures were drawn at dialysis (7/19). She was seen by vascular surgery the next day where her right femoral permacath was pulled. She has a working left femoral AVG. Her upper extremity access has failed over the years since being placed on dialysis in 2009. Besides left leg pain, she denies any SOB, CP, cough, congestion, abdominal pain, n/v or diarrhea. Not received any recent antibiotics.   Complaining of foot pain in the ED, otherwise has no complaints. CXR showed mild b/l effusions. Left foot x-ray soft tissue swelling, but no gas formation. Labs significant for WBC count of 24k. Procalcitonin 1.64.       Hospital Course:  7/24: VSS. WBC 22.58 down from 24.04 in ED. Sed rate 100. .3. CXR stable. L Foot xray soft tissue swelling. MRI L foot no osteomylitis, no drainable collections. JULIUS L with mod-severe occlusive disease, R with mod. Continue ceftriaxone. HD today.  7/25: VSS. WBC 19.18. Vasc Surg consulted, L femoral AVG w/ possible vascular steal; will perform LLE angiogram on 7/28, if no arterial stenosis will plan for femoral graft ligation & permacath placement. Podiatry consulted, extensive stable left plantar arch necrosis, no acute surgical  intervention; will f/u after angiogram.  Wound cx pending. 1/4 Bcx +GPC in clusters. Continue ceftriaxone.   7/26: I&D and debridement with Podiatry today.  Continue Ceftriaxone.   7/27: Spiked fever to 101.1F, improved with tylenol. Repeated Blood cx x2. HD today. NPO at midnight.  7/28: Had Aortogram + Left LE angiogram by vascular surgery; significant steal from AVG, left foot not salvagable. Plan for Left AKA.   7/29: VSS. HD today. Continue abx.   7/31 L AKA   8/01 HD today  8/02 D/C oral cipro  8/04 Patient developed fever of 101.6. Ordered fever workup. Started IV Zosyn and Vancomycin. Ordered US RLE to eval for DVT.     Interval History: Patient was found to be more confused, sleepy, and had fever of 101.6 this morning. She denies any specific complaints but is noticeably sleepier than usual. No cough, no chest pain, no SOB, no n/v.     Review of Systems   Constitutional: Positive for fever. Negative for chills.   Respiratory: Negative for cough and shortness of breath.    Cardiovascular: Negative for chest pain.   Gastrointestinal: Negative for abdominal pain, diarrhea and vomiting.   Genitourinary:        Does not make urine   Neurological:        Somnolence   All other systems reviewed and are negative.    Objective:     Vital Signs (Most Recent):  Temp: 99.5 °F (37.5 °C) (08/04/17 1200)  Pulse: 92 (08/04/17 1500)  Resp: 16 (08/04/17 1200)  BP: 129/69 (08/04/17 1200)  SpO2: (!) 94 % (08/04/17 1200) Vital Signs (24h Range):  Temp:  [97.6 °F (36.4 °C)-101.6 °F (38.7 °C)] 99.5 °F (37.5 °C)  Pulse:  [] 92  Resp:  [14-18] 16  SpO2:  [91 %-95 %] 94 %  BP: ()/(45-69) 129/69     Weight: 67 kg (147 lb 11.3 oz)  Body mass index is 29.83 kg/m².    Intake/Output Summary (Last 24 hours) at 08/04/17 1601  Last data filed at 08/04/17 1300   Gross per 24 hour   Intake              360 ml   Output                0 ml   Net              360 ml      Physical Exam   Constitutional: She appears well-developed and  well-nourished.   HENT:   Head: Normocephalic and atraumatic.   Eyes: EOM are normal. Pupils are equal, round, and reactive to light.   Cardiovascular: Normal rate and regular rhythm.    Pulmonary/Chest: Effort normal. No respiratory distress.   Increased oxygen requirement, ot sat 90% on 3l. Decreased breath sounds with few rhonchi right base.    Abdominal: She exhibits no distension. There is no tenderness. Hernia: soft, non-tender.   Musculoskeletal:   Left AKA, no dressing. Staples in place. No surrounding erythema or warmth. Small serosanguinous drainage.    Neurological:   Somnolent but arousable to voice. Oriented to person, place, and time. With further questioning, patient becomes confused.    Skin: Skin is warm and dry.       Significant Labs:   BMP:     Recent Labs  Lab 08/04/17  0432   GLU 78      K 4.3      CO2 23   BUN 19   CREATININE 3.5*   CALCIUM 8.2*     WBC 15.5  Hgb 7.1    Significant Imaging: I have reviewed all pertinent imaging results/findings within the past 24 hours.    Assessment/Plan:      Fever    Fever of 101.6 this am  Differential includes hospital-acquired aspiration pneumonia, DVT, wound infection. Wound does not clinically appear infected. Drug effect unlikely as patient has not had any new medications. Post-operative fever secondary to cytokine release is possible, but unlikely given several days since surgery. Pneumonia thought to be most likely given increased oxygen requirement  - Ordered CXR, Blood cultures  - Ordered US right lower extremity to evaluate for DVT  - Started IV Zosyn and Vancomycin          Amputation of leg    PM&R Consulted  PT/OT Consulted  Will require rehab          * Left foot infection    Presenting to the ED w/ new bleeding from L leg ulcer along with fevers, chills and new leukocytosis.   - Received vancomycin , flagyl and rocephin in ED. Fever resolved with Tylenol in ED. Dc'd vancomycin and flagyl on the floor.   - Blood cx 1/4 +gram  "positive cocci in clusters resembling staph   - Blood cx 1/4 +gram positive rods  - Wound cx growing proteus and GNR, lactose   - Repeat blood cx x2 show NGTD  - S/p Aortogram + Left LE angiogram with Vascular surgery   - Significant steal from L thigh AVG. L foot not salvageable given extent of infection.   - S/p left AKA 7/31, healing well     Plan  - Abx d/c'ed. No evidence of continued infection.  - healing well s/p AKA            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    - Continue Norvasc  - Diabetic diet inpatient  - Sliding scale insulin        ESRD (end stage renal disease)    - Dialysis inpatient  - Will continue to monitor electrolytes        Peripheral vascular disease, unspecified    - had Aortogram + Left LE angiogram 07/28 which showed Significant steal from left thigh AVG. Left foot not salvageable given extent of infection. S/p left AKA on 7/31          Anemia of chronic renal failure    - Procrit w/ dialysis.   - Continue to monitor H/H        Pressure ulcer of left buttock    Noted by wound care 8/04  - care per wound care recommendations  - does not appear to be the source of infection          Type 2 diabetes mellitus with left diabetic foot ulcer    See "left foot infection"         (HFpEF) heart failure with preserved ejection fraction    - BNP 2835 on admission   - Pt with ESRD on HD                                Hypertension, renal    - Continue home amlodipine 10 mg QD          VTE Risk Mitigation         Ordered     heparin (porcine) injection 5,000 Units  Every 8 hours     Route:  Subcutaneous        08/02/17 1119     Medium Risk of VTE  Once      07/28/17 1244        Dispo: Will need further hospitalization to determine and treat source of fever.     Jillian Junior,   Department of Hospital Medicine   [facility]  "

## 2017-08-04 NOTE — PLAN OF CARE
Problem: Patient Care Overview  Goal: Plan of Care Review  Pt AAO x 3 AKA on left dressing intact with small amount of drainage. , PNC intact . SCD on right leg on. Pt free from falls, Whiteboard updated, Q 2 hour rounding protocol followed. No issues during the night. Will continue to monitor.

## 2017-08-04 NOTE — ASSESSMENT & PLAN NOTE
-s/p L AKA on 7/31 per Vascular surgery  -NWB to L AKA    Recommendations  -  Early mobility, hip AROM, and OOB in chair at least 3 hours per day  -  PT/OT evaluate and treat  -  Monitor for phantom limb pain and/or sensation  -  Pain management  -  Wound care and edema control  -  Monitor for and prevent skin breakdown and pressure ulcers  · Early mobility, repositioning/weight shifting every 20-30 minutes when sitting, turn patient every 2 hours, proper mattress/overlay and chair cushioning, pressure relief/heel protector boots  -  Anticontracture management  · Firm mattress, lying prone 15 minutes TID, and knee extension while resting  -  Reviewed discharge options (IP rehab, SNF, HH therapy, and OP therapy)    -  Follow up in PM&R clinic 2-4 weeks post-op for postamputation and preprosthetic care

## 2017-08-04 NOTE — PROGRESS NOTES
[facility]  Vascular Surgery  Progress Note    Patient Name: Aleta Herrera  MRN: [id number]  Admission Date: 7/23/2017  Primary Care Provider: Radha Lao MD    Subjective:     Interval History: No issues overnight. Pain controlled    Post-Op Info:  Procedure(s) (LRB):  AMPUTATION-ABOVE KNEE (Left)   4 Days Post-Op       Medications:  Continuous Infusions:   ropivacaine (PF) 2 mg/ml (0.2%) 6 mL/hr (08/02/17 1847)    ropivacaine (PF) 2 mg/ml (0.2%) 8 mL/hr (08/02/17 1343)     Scheduled Meds:   sodium chloride 0.9%   Intravenous Once    sodium chloride 0.9%   Intravenous Once    amlodipine  10 mg Oral Daily    aspirin  81 mg Oral Daily    epoetin batsheva (PROCRIT) injection  100 Units/kg Intravenous Every Mon, Wed, Fri    heparin (porcine)  5,000 Units Subcutaneous Q8H    insulin detemir  4 Units Subcutaneous BID    pregabalin  75 mg Oral QHS    sevelamer carbonate  2,400 mg Oral TID WM    sodium hypochlorite   Irrigation Once     PRN Meds:sodium chloride 0.9%, sodium chloride 0.9%, sodium chloride 0.9%, sodium chloride 0.9%, cyclobenzaprine, dextrose 50%, dextrose 50%, glucagon (human recombinant), glucose, glucose, heparin (porcine), HYDROmorphone, insulin aspart, methocarbamol, ondansetron, oxycodone, polyethylene glycol     Objective:     Vital Signs (Most Recent):  Temp: 97.4 °F (36.3 °C) (08/03/17 0400)  Pulse: 84 (08/03/17 0400)  Resp: 16 (08/03/17 0400)  BP: 114/62 (08/03/17 0400)  SpO2: 97 % (08/03/17 0400) Vital Signs (24h Range):  Temp:  [96 °F (35.6 °C)-98.1 °F (36.7 °C)] 97.4 °F (36.3 °C)  Pulse:  [79-99] 84  Resp:  [16] 16  SpO2:  [95 %-100 %] 97 %  BP: (101-119)/(57-70) 114/62          Physical Exam   Constitutional: She appears well-developed and well-nourished.   HENT:   Head: Normocephalic and atraumatic.   Cardiovascular: Normal rate, regular rhythm and normal heart sounds.    Pulmonary/Chest: Effort normal and breath sounds normal.   Musculoskeletal:   L  AKA stump with sutures in place. Incision c/d/i     Significant Labs:  Anti-XA: No results for input(s): LABHEPA in the last 48 hours.  CBC:   Recent Labs  Lab 08/03/17  0448   WBC 14.07*   RBC 3.02*   HGB 8.0*   HCT 27.4*   *   MCV 91   MCH 26.5*   MCHC 29.2*         Assessment/Plan:     Type 2 diabetes mellitus with left diabetic foot ulcer                * Left foot infection    63 yo F with h/o HTN, HLD, DM2 (Hgb A1c 6.3),HFpEF (EF 60%), COPD (home O2, 2.5 L), ESRD on HD (MWF) via L femoral AVG presenting to hospital with left foot ischemic ulcer, wet gangrene s/p I&D by podiatry 07/26, left AKA on 07/31    POD#4 from left AKA. Doing well  Non-weight bearing on AKA stump. Keep elevated. No dressings needed  Cont PT/OT  Ok to use L femoral AVG  From vascular surgery standpoint no antibiotics needed for prior left foot infection as source control has been obtained  F/u with Dr. Madera in clinic in 4 weeks  Please call with any questions            Colten Johnson MD  Vascular Surgery  [facility]

## 2017-08-04 NOTE — PT/OT/SLP PROGRESS
Physical Therapy      Aleta Herrera  MRN: [id number]    Patient not seen today secondary to   Pt resting and then pt. away at testing). Will follow-up next scheduled treatment.    Rehan Holt, PTA   8/4/2017

## 2017-08-04 NOTE — ASSESSMENT & PLAN NOTE
Fever of 101.6 this am  Differential includes hospital-acquired aspiration pneumonia, DVT, wound infection. Wound does not clinically appear infected. Drug effect unlikely as patient has not had any new medications. Post-operative fever secondary to cytokine release is possible, but unlikely given several days since surgery. Pneumonia thought to be most likely given increased oxygen requirement  - Ordered CXR, Blood cultures  - Ordered US right lower extremity to evaluate for DVT  - Started IV Zosyn and Vancomycin

## 2017-08-04 NOTE — ANESTHESIA POST-OP PAIN MANAGEMENT
Acute Pain Service Progress Note    Aleta Herrera is a 62 y.o., female, [id number].    Surgery: AKA (left leg)    Post Op Day #: 4    Catheter type: perineural  femoral and sciatic    Infusion type: Ropivacaine 0.2%  8 basal    Problem List:    Active Hospital Problems    Diagnosis  POA    *Left foot infection [L08.9]  Yes    Amputation of leg [Z89.619]  Not Applicable    Sepsis [A41.9]  Yes    Type 2 diabetes mellitus with left diabetic foot ulcer [E11.621, L97.529]  Yes    ESRD (end stage renal disease) [N18.6]  Yes    Peripheral vascular disease, unspecified [I73.9]  Yes    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 [E11.22, I12.0, Z99.2, N18.6]  Not Applicable    Anemia of chronic renal failure [N18.9, D63.1]  Yes     Chronic      Resolved Hospital Problems    Diagnosis Date Resolved POA   No resolved problems to display.       Subjective:     General appearance of alert, oriented, no complaints   Pain with rest: 0   Pain with movement: 0   Side Effects    1. Pruritis No    2. Nausea No    3. Motor Blockade No, 0=Ability to raise lower extremities off bed    4. Sedation No, 2=slightly drowsy, easily aroused    Objective:     Catheters removed this morning. Tips intact. Sight clean and  dry.    Vitals   Vitals:    08/04/17 0700   BP:    Pulse: 96   Resp:    Temp:         Labs    Admission on 07/23/2017   No results displayed because visit has over 200 results.           Meds   Current Facility-Administered Medications   Medication Dose Route Frequency Provider Last Rate Last Dose    0.9%  NaCl infusion   Intravenous PRN Ivonne Heber Huertas,  mL/hr at 07/27/17 0821 1,000 mL at 07/27/17 0821    0.9%  NaCl infusion   Intravenous PRN Ivonne Heber Huertas, NP        0.9%  NaCl infusion   Intravenous PRN Ivonne Heber Huertas,  mL/hr at 08/01/17 1017 300 mL at 08/01/17 1017    0.9%  NaCl infusion   Intravenous Once Ivonne Heber Huertas, NP        0.9%  NaCl infusion   Intravenous PRN Ivonne  Heber Huertas, NP        amlodipine tablet 10 mg  10 mg Oral Daily Carson Marcus, DO   10 mg at 08/03/17 1337    aspirin chewable tablet 81 mg  81 mg Oral Daily Carson Marcus, DO   81 mg at 08/03/17 1337    calcitRIOL capsule 0.5 mcg  0.5 mcg Oral Daily Ivonne Huertas, NP   0.5 mcg at 08/03/17 1336    dextrose 50% injection 12.5 g  12.5 g Intravenous PRN Carson Marcus, DO        dextrose 50% injection 25 g  25 g Intravenous PRN Carson Marcus, DO        epoetin batsheva injection 6,400 Units  100 Units/kg Intravenous Every Mon, Wed, Fri Carson Morgan MD   Stopped at 07/31/17 0900    glucagon (human recombinant) injection 1 mg  1 mg Intramuscular PRN Carson Marcus, DO        glucose chewable tablet 16 g  16 g Oral PRN Carson Marcus, DO        glucose chewable tablet 24 g  24 g Oral PRN Carson Marcus, DO        heparin (porcine) injection 2,000 Units  2,000 Units Intravenous PRN Adriana Singh MD   2,000 Units at 08/03/17 1215    heparin (porcine) injection 5,000 Units  5,000 Units Subcutaneous Q8H Jillian Junior DO   5,000 Units at 08/04/17 0544    hydromorphone injection 0.5 mg  0.5 mg Intravenous Q6H PRN Man Lorenzana MD   0.5 mg at 08/01/17 1621    insulin aspart pen 0-5 Units  0-5 Units Subcutaneous QID (AC + HS) PRN Carson Marcus DO   1 Units at 07/31/17 2317    insulin detemir pen 4 Units  4 Units Subcutaneous BID Jillian Junior DO   4 Units at 08/03/17 2121    methocarbamol tablet 500 mg  500 mg Oral TID PRN Aylin Wiggins MD   500 mg at 08/02/17 1028    ondansetron tablet 8 mg  8 mg Oral Q8H PRN Carson Mabry MD   8 mg at 08/03/17 2133    oxycodone immediate release tablet 5 mg  5 mg Oral Q6H PRN Man Lorenzana MD   5 mg at 08/03/17 0527    polyethylene glycol packet 17 g  17 g Oral BID PRN Carson Marcus DO        pregabalin capsule 75 mg  75 mg Oral QHS Carson  MD Clotilde   75 mg at 08/03/17 2130    ropivacaine (PF) 2 mg/ml (0.2%) infusion  6 mL/hr Perineural Continuous Lexis Medel MD 6 mL/hr at 08/04/17 0357 6 mL/hr at 08/04/17 0357    ropivacaine (PF) 2 mg/ml (0.2%) infusion  8 mL/hr Perineural Continuous Lexis Medel MD 8 mL/hr at 08/03/17 1323 8 mL/hr at 08/03/17 1323    sevelamer carbonate tablet 2,400 mg  2,400 mg Oral TID  Carson Marcus DO   2,400 mg at 08/03/17 2123    sodium hypochlorite (DAKIN'S) external solution 0.25%   Irrigation Once Melissa Shanti Graham MD               Assessment:     Pain control adequate    Plan:     Discontinue present therapy. Analgesia to be provided by the primary team, Vascular surgery.     Perineural catheters removed ~ 7AM.    Evaluator Aylin Wiggins          I have seen the patient, reviewed the Resident's assessment and plan. I have personally interviewed and examined the patient at bedside and: agree with the findings.

## 2017-08-04 NOTE — ASSESSMENT & PLAN NOTE
63 yo F with h/o HTN, HLD, DM2 (Hgb A1c 6.3),HFpEF (EF 60%), COPD (home O2, 2.5 L), ESRD on HD (MWF) via L femoral AVG presenting to hospital with left foot ischemic ulcer, wet gangrene s/p I&D by podiatry 07/26, left AKA on 07/31    POD#4 from left AKA. Doing well  Non-weight bearing on AKA stump. Keep elevated.  Cont PT/OT  Ok to use L femoral AVG  From vascular surgery standpoint no antibiotics needed for prior left foot infection as source control has been obtained  F/u with Dr. Madera in clinic in 4 weeks  Please call with any questions

## 2017-08-04 NOTE — PT/OT/SLP PROGRESS
Occupational Therapy      Aleta Herrera  MRN: [id number]    Patient not seen today secondary to  (pt at diagnostic radiology). Will follow-up as time allows and pt is available.    Norah Wallace OT  8/4/2017

## 2017-08-04 NOTE — PLAN OF CARE
Problem: Patient Care Overview  Goal: Plan of Care Review  Outcome: Ongoing (interventions implemented as appropriate)  Pt AAOx2, calm, vs stable, No Falls noted as fall precautions are active, Pain controlled with PRN meds, Blood sugar monitoring active, Signs of infection managed with iv abx and fluids, Pressure ulcers cleaned and protected with mepilex, Family at bedside, No complaints at this time, will continue to monitor

## 2017-08-04 NOTE — PROGRESS NOTES
Consulted to see for left lower buttock and right upper buttock     08/04/17 1500   ECG   Pulse 92   Rhythm normal sinus rhythm   [REMOVED]      Wound 08/03/17 2008 Skin tear upper gluteal   Final Assessment Date/Final Assessment Time: 08/04/17 1230  Date First Assessed/Time First Assessed: 08/03/17 2008   Pre-existing: Yes  Wound Type: Skin tear  Side: Right  Orientation: upper  Location: gluteal  Removal Indication and Assessment: (c)    Wound WDL ex   Dressing Appearance no dressing   Drainage Amount none   Wound Base blistered;purple   Periwound Area redness;blistered   Wound Edges open   Wound Length (cm) 1   Wound Width (cm) 5   Depth (cm) 0.1       Pressure Ulcer 08/03/17 1230 Left lower buttocks   Date First Assessed/Time First Assessed: 08/03/17 1230   Pressure Ulcer Present on Admission: no  Side: Left  Orientation: lower  Location: buttocks   Wound Image    Staging suspected deep tissue injury   Healing Pressure Ulcer no   Pressure Ulcer Risk Factors activity;mobility;nutrition;shear/friction;moisture   Dressing Appearance no dressing   Drainage Amount none   Appearance blistered;purple   Periwound Area normal skin tone   Wound Edges open   Wound Length (cm) 2   Wound Width (cm) 5   Depth (cm) 0.1   Cleansed W/ sterile normal saline   Dressing foam       Pressure Ulcer 08/04/17 1230 Right upper buttocks Stage II   Date First Assessed/Time First Assessed: 08/04/17 1230   Pressure Ulcer Present on Admission: no  Side: Right  Orientation: upper  Location: buttocks  Staging: Stage II  Wound Length (cm): 2  Wound Width (cm): 1  Depth (cm): 0.1   Wound Image    Staging Stage II   Healing Pressure Ulcer no   Pressure Ulcer Risk Factors activity;mobility;nutrition;shear/friction;moisture   Dressing Appearance no dressing   Drainage Amount none   Appearance moist;pink   Periwound Area normal skin tone   Wound Edges open   Wound Length (cm) 2   Wound Width (cm) 1   Depth (cm) 0.1   Cleansed W/ sterile normal saline    Dressing foam     Upon assessment noted an area which appears to be a deep tissue injury to the left lower buttock .  Right upper buttock with a stage II pressure injury. Wound care will follow.   1. Will order a low air loss bed and place on a turning schedule.   2. Mepilex border applied to both wounds   3. Nutritional consult  4. Chair cushion      Edward Jerry RN CWON  [id number]

## 2017-08-04 NOTE — CONSULTS
[facility]  Physical Medicine & Rehab  Consult Note    Patient Name: Aleta Herrera  MRN: [id number]  Admission Date: 7/23/2017  Hospital Length of Stay: 12 days  Attending Physician: Carson Morgan, *   Collaborating Physician: Nic Gray MD    Inpatient consult to Physical Medicine & Rehabilitation  Consult performed by: Nirali Choi NP  Consult requested by:  Carson Morgan, *    Reason for Consult:  assess rehabilitation needs    Consults  Subjective:     Principal Problem: Left foot infection    HPI: Aleta Herrera is a 62-year-old female with PMHx of stroke, HTN, CHF (EF 60%; on home O2 2.5 liters), anemia 2/2 CKD and DM2 with retinopathy, neuropathy and ESRD (HD MWF and L AVG placed 4/2017).   [place] [facility] [place] with painful, bleeding L foot ulcer and intermittent fever/chills x 1 week.   In the ED, L foot x-ray revealed soft tissue swelling.  CXR showed mild bilateral effusions with leukocytosis (24K) and started on Ceftriaxone.  MRI L foot did not demonstrate osteomyelitis or drainable fluid collections.   JULIUS L showed mod-severe occlusive disease, R with moderate occlusive disease. Blood cultures were obtained in the ED and resulted as clusters and gram positive rods.  On 7/25, Vascular Surgery and Podiatry were consulted. On 7/26, Podiatry performed I & D and debridement of left foot for extensive left plantar tissue necrosis with pending cultures. On 7/27, podiatry noted she had wet gangrenous changes noted to plantar arch of left foot. She became febrile that same day and repeat blood cultures resulted as NGTD.   On 7/28, LLE angiogram per vascular surgery revealed significant AVG steal and her left foot was deemed unsalvageable.  She underwent a successful L AKA on 7/31.  She is currently finished her abx treatment.     Functional History: Per chart review, patient lives in [place] with spouse in a two story home with a ramp to  enter.  She is able to live on the first floor.   Prior to admission, she was independent with ALDs.  She used a rollator for functional mobility in the home and intermittently used a transport WC for functional mobility.   DME: rollator, transfer WC, & BSC.      Hospital Course:   17:  Evaluated by therapy.  Bed mobility SV-Suzie.  Sit to stand Max & RW and transfers totalA.  UBD Independent and LBD MaxA.    AM-PAC 6 CLICK MOBILITY (PT) - Total score: 12 ()  AM-PAC 6 CLICK ADL (OT) - Total score: 18 ()    AM-PAC Raw Score Level of Impairment Assistance   6 100% Total / Unable   7 - 8 80 - 100% Maximal Assist   9-13 60 - 80% Moderate Assist   14 - 19 40 - 60% Moderate Assist   20 - 22 20 - 40% Minimal Assist   23 1-20% SBA / CGA   24 0% Independent/ Mod I     Past Medical History:   Diagnosis Date    Anemia of chronic renal failure 2013    Anticoagulant long-term use 2015    CHF (congestive heart failure)     Coronary artery disease     Diabetes mellitus     Diabetic neuropathy 2013    DR (diabetic retinopathy) 2013    End stage renal disease on dialysis     Fever 2017    Hypertension     Hypertension, renal 2013    Kidney transplant candidate 2013    Secondary hyperparathyroidism of renal origin 2013    Stroke 2015    Type 2 diabetes mellitus since 2013     Past Surgical History:   Procedure Laterality Date    AV FISTULA PLACEMENT      CATARACT SURGERY       SECTION, LOW TRANSVERSE      x 3    COLONOSCOPY N/A 2016    Procedure: COLONOSCOPY;  Surgeon: Roverto Patel MD;  Location: [address];  Service: Endoscopy;  Laterality: N/A;    EYE SURGERY      HYSTERECTOMY      Endometriosis    TONSILLECTOMY      VASCULAR SURGERY       Review of patient's allergies indicates:  No Known Allergies    Scheduled Medications:    sodium chloride 0.9%   Intravenous Once    amlodipine  10 mg Oral Daily    aspirin  81 mg Oral  Daily    calcitRIOL  0.5 mcg Oral Daily    epoetin batsheva (PROCRIT) injection  100 Units/kg Intravenous Every Mon, Wed, Fri    heparin (porcine)  5,000 Units Subcutaneous Q8H    insulin detemir  4 Units Subcutaneous BID    pregabalin  75 mg Oral QHS    sevelamer carbonate  2,400 mg Oral TID WM    sodium hypochlorite   Irrigation Once       PRN Medications: sodium chloride 0.9%, sodium chloride 0.9%, sodium chloride 0.9%, sodium chloride 0.9%, dextrose 50%, dextrose 50%, glucagon (human recombinant), glucose, glucose, heparin (porcine), HYDROmorphone, insulin aspart, methocarbamol, ondansetron, oxycodone, polyethylene glycol    Family History     Problem Relation (Age of Onset)    Cancer Mother (70)    Heart disease Mother    Hypertension Mother        Social History Main Topics    Smoking status: Former Smoker     Packs/day: 0.25     Years: 0.50     Types: Cigarettes     Quit date: 9/5/1975    Smokeless tobacco: Never Used      Comment: quit smoking cigarettes 40+ years ago    Alcohol use No    Drug use: No    Sexual activity: Yes     Review of Systems   Reason unable to perform ROS: mental status.     Objective:     Vital Signs (Most Recent):  Temp: (!) 101.1 °F (38.4 °C) (08/04/17 0835)  Pulse: 97 (08/04/17 0835)  Resp: 14 (08/04/17 0801)  BP: (!) 96/51 (08/04/17 0835)  SpO2: (!) 94 % (08/04/17 0801)    Vital Signs (24h Range):  Temp:  [97.6 °F (36.4 °C)-101.6 °F (38.7 °C)] 101.1 °F (38.4 °C)  Pulse:  [] 97  Resp:  [14-18] 14  SpO2:  [91 %-97 %] 94 %  BP: ()/(35-63) 96/51     Body mass index is 29.83 kg/m².    Physical Exam   Constitutional: She appears well-developed and well-nourished.   HENT:   Head: Normocephalic and atraumatic.   Eyes: EOM are normal. Pupils are equal, round, and reactive to light.   Neck: Neck supple.   Cardiovascular: Normal rate and regular rhythm.    L AVG noted    Pulmonary/Chest: Effort normal. No respiratory distress.   Abdominal: Soft. There is no tenderness.    Musculoskeletal:   L AKA with staples noted to be dry and nonerythematous   Neurological:   -  Mental Status:  Awake and Alert Ox3.  Follows commands, but is confused during conversation about functional history and pain assessment.  Answers correct age, , and current president.      -  Motor:  RUE: .  LUE: .  RLE: 3/5 (2/2 L aka).  LLE: 3/5 (limited 2/2 participation).   -  Tone:  normal   Skin: Skin is warm and dry.   Psychiatric:   Confused   Vitals reviewed.    NEUROLOGICAL EXAMINATION:     CRANIAL NERVES     CN III, IV, VI   Pupils are equal, round, and reactive to light.  Extraocular motions are normal.       Diagnostic Results:   Labs: Reviewed  X-Ray: Reviewed  US: Reviewed  MRI: Reviewed    Assessment/Plan:     Amputation of leg    -s/p L AKA on  per Vascular surgery  -NWB to L AKA    Recommendations  -  Early mobility, hip AROM, and OOB in chair at least 3 hours per day  -  PT/OT evaluate and treat  -  Monitor for phantom limb pain and/or sensation  -  Pain management  -  Wound care and edema control  -  Monitor for and prevent skin breakdown and pressure ulcers  · Early mobility, repositioning/weight shifting every 20-30 minutes when sitting, turn patient every 2 hours, proper mattress/overlay and chair cushioning, pressure relief/heel protector boots  -  Anticontracture management  · Firm mattress, lying prone 15 minutes TID, and knee extension while resting  -  Reviewed discharge options (IP rehab, SNF, HH therapy, and OP therapy)    -  Follow up in PM&R clinic 2-4 weeks post-op for postamputation and preprosthetic care          Sepsis    -leukocytosis in ED (24k) with L foot ulcer   -Completed Ceftriaxone  --s/p I & D and debridement of L plantar necrotic tissue per podiatry on   -s/p L AKA on  2/2 gangrenous unsalvageable L foot         Peripheral vascular disease, unspecified    -JULIUS on  revealed LLE mod-severe occlusive disease, RLE with moderate occlusive disease         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    -on HD        * Left foot infection    -L foot ulcer upon arrival on 7/23  -s/p I & D and debridement of L plantar necrotic tissue per podiatry on 7/26  -s/p L AKA on 7/31 per vascular surgery  -NWB on AKA stump          Patient approved for [facility].  Transfer to rehab when she is medically ready for discharge.      Thank you for your consult.     Nirali Choi NP  Department of Physical Medicine & Rehab  [facility]

## 2017-08-04 NOTE — ASSESSMENT & PLAN NOTE
Noted by wound care 8/04  - care per wound care recommendations  - does not appear to be the source of infection

## 2017-08-04 NOTE — PLAN OF CARE
Pt is accepted to [facility] per Scott with admissions. Sw informed by IM team that Pt is not medically stable for d.c, Sw did inform Scott. Sw to follow Monday, informed Pt of the above. Spouse not in the room, Sw to inform and follow with stability and therapy progress.

## 2017-08-04 NOTE — ADDENDUM NOTE
Addendum  created 08/04/17 1145 by Daquan Kelley MD    Charge Capture section accepted, Sign clinical note

## 2017-08-05 DIAGNOSIS — N18.6 ESRD (END STAGE RENAL DISEASE): Primary | ICD-10-CM

## 2017-08-05 DIAGNOSIS — I73.9 PERIPHERAL ARTERIAL DISEASE: ICD-10-CM

## 2017-08-05 LAB
ABO + RH BLD: NORMAL
ALBUMIN SERPL BCP-MCNC: 1.8 G/DL
ANION GAP SERPL CALC-SCNC: 9 MMOL/L
BASOPHILS # BLD AUTO: 0.01 K/UL
BASOPHILS # BLD AUTO: 0.02 K/UL
BASOPHILS NFR BLD: 0 %
BASOPHILS NFR BLD: 0.1 %
BLD GP AB SCN CELLS X3 SERPL QL: NORMAL
BLD PROD TYP BPU: NORMAL
BLOOD UNIT EXPIRATION DATE: NORMAL
BLOOD UNIT TYPE CODE: 8400
BLOOD UNIT TYPE: NORMAL
BUN SERPL-MCNC: 27 MG/DL
CALCIUM SERPL-MCNC: 8 MG/DL
CHLORIDE SERPL-SCNC: 102 MMOL/L
CO2 SERPL-SCNC: 23 MMOL/L
CODING SYSTEM: NORMAL
CREAT SERPL-MCNC: 4.4 MG/DL
DIFFERENTIAL METHOD: ABNORMAL
DIFFERENTIAL METHOD: ABNORMAL
DISPENSE STATUS: NORMAL
EOSINOPHIL # BLD AUTO: 0.1 K/UL
EOSINOPHIL # BLD AUTO: 0.2 K/UL
EOSINOPHIL NFR BLD: 0.5 %
EOSINOPHIL NFR BLD: 1.1 %
ERYTHROCYTE [DISTWIDTH] IN BLOOD BY AUTOMATED COUNT: 19.8 %
ERYTHROCYTE [DISTWIDTH] IN BLOOD BY AUTOMATED COUNT: 19.9 %
EST. GFR  (AFRICAN AMERICAN): 11.7 ML/MIN/1.73 M^2
EST. GFR  (NON AFRICAN AMERICAN): 10.2 ML/MIN/1.73 M^2
GLUCOSE SERPL-MCNC: 85 MG/DL
HCT VFR BLD AUTO: 20.4 %
HCT VFR BLD AUTO: 21.9 %
HGB BLD-MCNC: 6.5 G/DL
HGB BLD-MCNC: 6.7 G/DL
LYMPHOCYTES # BLD AUTO: 1 K/UL
LYMPHOCYTES # BLD AUTO: 1.1 K/UL
LYMPHOCYTES NFR BLD: 4.2 %
LYMPHOCYTES NFR BLD: 5.6 %
MCH RBC QN AUTO: 26.4 PG
MCH RBC QN AUTO: 27.4 PG
MCHC RBC AUTO-ENTMCNC: 30.6 G/DL
MCHC RBC AUTO-ENTMCNC: 31.9 G/DL
MCV RBC AUTO: 86 FL
MCV RBC AUTO: 86 FL
MONOCYTES # BLD AUTO: 1.1 K/UL
MONOCYTES # BLD AUTO: 1.1 K/UL
MONOCYTES NFR BLD: 4.7 %
MONOCYTES NFR BLD: 5.5 %
NEUTROPHILS # BLD AUTO: 17.7 K/UL
NEUTROPHILS # BLD AUTO: 21.5 K/UL
NEUTROPHILS NFR BLD: 87.8 %
NEUTROPHILS NFR BLD: 90.5 %
PHOSPHATE SERPL-MCNC: 3.5 MG/DL
PLATELET # BLD AUTO: 411 K/UL
PLATELET # BLD AUTO: 415 K/UL
PMV BLD AUTO: 9.1 FL
PMV BLD AUTO: 9.4 FL
POCT GLUCOSE: 122 MG/DL (ref 70–110)
POCT GLUCOSE: 150 MG/DL (ref 70–110)
POTASSIUM SERPL-SCNC: 4.3 MMOL/L
RBC # BLD AUTO: 2.37 M/UL
RBC # BLD AUTO: 2.54 M/UL
SODIUM SERPL-SCNC: 134 MMOL/L
TRANS ERYTHROCYTES VOL PATIENT: NORMAL ML
WBC # BLD AUTO: 20.32 K/UL
WBC # BLD AUTO: 23.85 K/UL

## 2017-08-05 PROCEDURE — 86901 BLOOD TYPING SEROLOGIC RH(D): CPT

## 2017-08-05 PROCEDURE — 25000003 PHARM REV CODE 250: Performed by: NURSE PRACTITIONER

## 2017-08-05 PROCEDURE — 25000003 PHARM REV CODE 250: Performed by: INTERNAL MEDICINE

## 2017-08-05 PROCEDURE — 27201040 HC RC 50 FILTER

## 2017-08-05 PROCEDURE — 86900 BLOOD TYPING SEROLOGIC ABO: CPT

## 2017-08-05 PROCEDURE — 80069 RENAL FUNCTION PANEL: CPT

## 2017-08-05 PROCEDURE — 63600175 PHARM REV CODE 636 W HCPCS: Performed by: INTERNAL MEDICINE

## 2017-08-05 PROCEDURE — 25000003 PHARM REV CODE 250: Performed by: STUDENT IN AN ORGANIZED HEALTH CARE EDUCATION/TRAINING PROGRAM

## 2017-08-05 PROCEDURE — 36415 COLL VENOUS BLD VENIPUNCTURE: CPT

## 2017-08-05 PROCEDURE — 85025 COMPLETE CBC W/AUTO DIFF WBC: CPT

## 2017-08-05 PROCEDURE — 63600175 PHARM REV CODE 636 W HCPCS: Performed by: STUDENT IN AN ORGANIZED HEALTH CARE EDUCATION/TRAINING PROGRAM

## 2017-08-05 PROCEDURE — 86920 COMPATIBILITY TEST SPIN: CPT

## 2017-08-05 PROCEDURE — P9021 RED BLOOD CELLS UNIT: HCPCS

## 2017-08-05 PROCEDURE — 80100016 HC MAINTENANCE HEMODIALYSIS

## 2017-08-05 PROCEDURE — 99232 SBSQ HOSP IP/OBS MODERATE 35: CPT | Mod: GC,,, | Performed by: HOSPITALIST

## 2017-08-05 PROCEDURE — 27000221 HC OXYGEN, UP TO 24 HOURS

## 2017-08-05 PROCEDURE — 12000002 HC ACUTE/MED SURGE SEMI-PRIVATE ROOM

## 2017-08-05 RX ORDER — ACETAMINOPHEN 500 MG
500 TABLET ORAL EVERY 6 HOURS PRN
Status: DISCONTINUED | OUTPATIENT
Start: 2017-08-05 | End: 2017-08-07 | Stop reason: HOSPADM

## 2017-08-05 RX ORDER — AMOXICILLIN 250 MG
1 CAPSULE ORAL DAILY PRN
Status: DISCONTINUED | OUTPATIENT
Start: 2017-08-05 | End: 2017-08-07 | Stop reason: HOSPADM

## 2017-08-05 RX ORDER — HYDROCODONE BITARTRATE AND ACETAMINOPHEN 500; 5 MG/1; MG/1
TABLET ORAL
Status: DISCONTINUED | OUTPATIENT
Start: 2017-08-05 | End: 2017-08-07 | Stop reason: HOSPADM

## 2017-08-05 RX ADMIN — ACETAMINOPHEN 500 MG: 500 TABLET ORAL at 08:08

## 2017-08-05 RX ADMIN — VANCOMYCIN HYDROCHLORIDE 500 MG: 500 INJECTION, POWDER, LYOPHILIZED, FOR SOLUTION INTRAVENOUS at 07:08

## 2017-08-05 RX ADMIN — SEVELAMER CARBONATE 2400 MG: 800 TABLET, FILM COATED ORAL at 05:08

## 2017-08-05 RX ADMIN — POLYETHYLENE GLYCOL 3350 17 G: 17 POWDER, FOR SOLUTION ORAL at 08:08

## 2017-08-05 RX ADMIN — PIPERACILLIN AND TAZOBACTAM 4.5 G: 4; .5 INJECTION, POWDER, LYOPHILIZED, FOR SOLUTION INTRAVENOUS; PARENTERAL at 12:08

## 2017-08-05 RX ADMIN — ASPIRIN 81 MG CHEWABLE TABLET 81 MG: 81 TABLET CHEWABLE at 03:08

## 2017-08-05 RX ADMIN — SODIUM CHLORIDE: 0.9 INJECTION, SOLUTION INTRAVENOUS at 08:08

## 2017-08-05 RX ADMIN — HEPARIN SODIUM 5000 UNITS: 5000 INJECTION, SOLUTION INTRAVENOUS; SUBCUTANEOUS at 05:08

## 2017-08-05 RX ADMIN — HEPARIN SODIUM 5000 UNITS: 5000 INJECTION, SOLUTION INTRAVENOUS; SUBCUTANEOUS at 02:08

## 2017-08-05 RX ADMIN — OXYCODONE HYDROCHLORIDE 5 MG: 5 TABLET ORAL at 02:08

## 2017-08-05 RX ADMIN — HEPARIN SODIUM 2000 UNITS: 1000 INJECTION, SOLUTION INTRAVENOUS; SUBCUTANEOUS at 08:08

## 2017-08-05 RX ADMIN — PREGABALIN 75 MG: 75 CAPSULE ORAL at 08:08

## 2017-08-05 RX ADMIN — CALCITRIOL 0.5 MCG: 0.25 CAPSULE, LIQUID FILLED ORAL at 03:08

## 2017-08-05 RX ADMIN — OXYCODONE HYDROCHLORIDE 5 MG: 5 TABLET ORAL at 08:08

## 2017-08-05 NOTE — ASSESSMENT & PLAN NOTE
Fever of 101.6 yesterday. No fever today.   Differential includes hospital-acquired aspiration pneumonia, DVT, wound infection. Wound does not clinically appear infected. Drug effect unlikely as patient has not had any new medications. Post-operative fever secondary to cytokine release is possible, but unlikely given several days since surgery. Pneumonia thought to be most likely given increased oxygen requirement  CXR with questionable RLL infiltrate, not significantly changed from previous CXR  Blood cultures pending  US RLE neg for DVT  - IV Vancomycin/Zosyn

## 2017-08-05 NOTE — PT/OT/SLP PROGRESS
Occupational Therapy      Aleta Herrera  MRN: [id number]    Pt attempted to be seen in AM but pt away at dialysis. Therapist unable to return in PM. Will follow-up at next scheduled OT session.    Radha Mar, OT  8/5/2017

## 2017-08-05 NOTE — SUBJECTIVE & OBJECTIVE
Interval History: Patient is more awake today, reports left stump pain. Denies hematochezia, melena, shortness of breath, chest pain, or any other complaints. Nurse reports soft brown stool with no blood. No further fevers.     Review of Systems   Constitutional: Negative for chills and fever.   Respiratory: Negative for cough and shortness of breath.    Cardiovascular: Negative for chest pain.   Gastrointestinal: Negative for abdominal pain, diarrhea and vomiting.   Genitourinary:        Does not make urine   Neurological: Negative for light-headedness and headaches.   All other systems reviewed and are negative.    Objective:     Vital Signs (Most Recent):  Temp: 97.8 °F (36.6 °C) (08/05/17 0454)  Pulse: 84 (08/05/17 0700)  Resp: 16 (08/05/17 0454)  BP: 90/60 (08/05/17 0454)  SpO2: 97 % (08/05/17 0454) Vital Signs (24h Range):  Temp:  [97.8 °F (36.6 °C)-101.1 °F (38.4 °C)] 97.8 °F (36.6 °C)  Pulse:  [] 84  Resp:  [16] 16  SpO2:  [92 %-100 %] 97 %  BP: ()/(51-69) 90/60     Weight: 67 kg (147 lb 11.3 oz)  Body mass index is 29.83 kg/m².    Intake/Output Summary (Last 24 hours) at 08/05/17 0805  Last data filed at 08/04/17 2200   Gross per 24 hour   Intake              510 ml   Output                0 ml   Net              510 ml      Physical Exam   Constitutional: She appears well-developed and well-nourished.   HENT:   Head: Normocephalic and atraumatic.   Eyes: EOM are normal. Pupils are equal, round, and reactive to light.   Cardiovascular: Normal rate and regular rhythm.    Pulmonary/Chest: Effort normal. No respiratory distress.    Decreased breath sounds with few rhonchi right base.    Abdominal: She exhibits no distension. There is no tenderness. Hernia: soft, non-tender.   Musculoskeletal:   Left AKA, no dressing. Staples in place. No surrounding erythema or warmth. Small serosanguinous drainage.    Neurological:   More awake than yesterday, sleepy but responds appropriately to questions.  Oriented x 3, understands why she is in the hospital.    Skin: Skin is warm and dry.       Significant Labs:   BMP:   Recent Labs  Lab 08/05/17  0509   GLU 85   *   K 4.3      CO2 23   BUN 27*   CREATININE 4.4*   CALCIUM 8.0*     CBC:   Recent Labs  Lab 08/04/17  0432 08/05/17  0509   WBC 15.58*  --    HGB 7.1* 6.7*   HCT 24.0* 21.9*   * 411*     CMP:   Recent Labs  Lab 08/04/17  0432 08/04/17  1202 08/05/17  0509     --  134*   K 4.3  --  4.3     --  102   CO2 23  --  23   GLU 78  --  85   BUN 19  --  27*   CREATININE 3.5*  --  4.4*   CALCIUM 8.2*  --  8.0*   PROT  --  7.4  --    ALBUMIN 2.1* 2.2* 1.8*   BILITOT  --  0.3  --    ALKPHOS  --  88  --    AST  --  23  --    ALT  --  <5*  --    ANIONGAP 12  --  9   EGFRNONAA 13.4*  --  10.2*       Significant Imaging: no significant imaging

## 2017-08-05 NOTE — PROGRESS NOTES
[facility] Hospital Medicine  Progress Note    Patient Name: Aleta Herrera  MRN: [id number]  Patient Class: IP- Inpatient   Admission Date: 7/23/2017  Length of Stay: 13 days  Attending Physician: Carson Morgan, *  Primary Care Provider: Radha Lao MD    [facility] Medicine Team: [facility] MED 2 Jillian Junior DO    Subjective:     Principal Problem:Left foot infection    HPI:  Aleta Herrera is a 62-year-old female with HTN, HFpEF (EF 60%; home O2 2.5 liters), anemia 2/2 CKD and IDDM c/b retinopathy, neuropathy and ESRD (HD MWF). She presents today with her  to the ED because of new bleeding from the left foot ulcer. She's had on/off fevers and chills for past week or so. Apparently blood cultures were drawn at dialysis (7/19). She was seen by vascular surgery the next day where her right femoral permacath was pulled. She has a working left femoral AVG. Her upper extremity access has failed over the years since being placed on dialysis in 2009. Besides left leg pain, she denies any SOB, CP, cough, congestion, abdominal pain, n/v or diarrhea. Not received any recent antibiotics.   Complaining of foot pain in the ED, otherwise has no complaints. CXR showed mild b/l effusions. Left foot x-ray soft tissue swelling, but no gas formation. Labs significant for WBC count of 24k. Procalcitonin 1.64.       Hospital Course:  7/24: VSS. WBC 22.58 down from 24.04 in ED. Sed rate 100. .3. CXR stable. L Foot xray soft tissue swelling. MRI L foot no osteomylitis, no drainable collections. JULIUS L with mod-severe occlusive disease, R with mod. Continue ceftriaxone. HD today.  7/25: VSS. WBC 19.18. Vasc Surg consulted, L femoral AVG w/ possible vascular steal; will perform LLE angiogram on 7/28, if no arterial stenosis will plan for femoral graft ligation & permacath placement. Podiatry consulted, extensive stable left plantar arch necrosis, no acute surgical  intervention; will f/u after angiogram.  Wound cx pending. 1/4 Bcx +GPC in clusters. Continue ceftriaxone.   7/26: I&D and debridement with Podiatry today.  Continue Ceftriaxone.   7/27: Spiked fever to 101.1F, improved with tylenol. Repeated Blood cx x2. HD today. NPO at midnight.  7/28: Had Aortogram + Left LE angiogram by vascular surgery; significant steal from AVG, left foot not salvagable. Plan for Left AKA.   7/29: VSS. HD today. Continue abx.   7/31 L AKA   8/01 HD today  8/02 D/C oral cipro  8/04 Patient developed fever of 101.6. Ordered fever workup. Started IV Zosyn and Vancomycin. Ordered US RLE to eval for DVT.     Interval History: Patient is more awake today, reports left stump pain. Denies hematochezia, melena, shortness of breath, chest pain, or any other complaints. Nurse reports soft brown stool with no blood. No further fevers.     Review of Systems   Constitutional: Negative for chills and fever.   Respiratory: Negative for cough and shortness of breath.    Cardiovascular: Negative for chest pain.   Gastrointestinal: Negative for abdominal pain, diarrhea and vomiting.   Genitourinary:        Does not make urine   Neurological: Negative for light-headedness and headaches.   All other systems reviewed and are negative.    Objective:     Vital Signs (Most Recent):  Temp: 97.8 °F (36.6 °C) (08/05/17 0454)  Pulse: 84 (08/05/17 0700)  Resp: 16 (08/05/17 0454)  BP: 90/60 (08/05/17 0454)  SpO2: 97 % (08/05/17 0454) Vital Signs (24h Range):  Temp:  [97.8 °F (36.6 °C)-101.1 °F (38.4 °C)] 97.8 °F (36.6 °C)  Pulse:  [] 84  Resp:  [16] 16  SpO2:  [92 %-100 %] 97 %  BP: ()/(51-69) 90/60     Weight: 67 kg (147 lb 11.3 oz)  Body mass index is 29.83 kg/m².    Intake/Output Summary (Last 24 hours) at 08/05/17 0805  Last data filed at 08/04/17 2200   Gross per 24 hour   Intake              510 ml   Output                0 ml   Net              510 ml      Physical Exam   Constitutional: She appears  well-developed and well-nourished.   HENT:   Head: Normocephalic and atraumatic.   Eyes: EOM are normal. Pupils are equal, round, and reactive to light.   Cardiovascular: Normal rate and regular rhythm.    Pulmonary/Chest: Effort normal. No respiratory distress.    Decreased breath sounds with few rhonchi right base.    Abdominal: She exhibits no distension. There is no tenderness. Hernia: soft, non-tender.   Musculoskeletal:   Left AKA, no dressing. Staples in place. No surrounding erythema or warmth. Small serosanguinous drainage.    Neurological:   More awake than yesterday, sleepy but responds appropriately to questions. Oriented x 3, understands why she is in the hospital.    Skin: Skin is warm and dry.       Significant Labs:   BMP:   Recent Labs  Lab 08/05/17  0509   GLU 85   *   K 4.3      CO2 23   BUN 27*   CREATININE 4.4*   CALCIUM 8.0*     CBC:   Recent Labs  Lab 08/04/17  0432 08/05/17  0509   WBC 15.58*  --    HGB 7.1* 6.7*   HCT 24.0* 21.9*   * 411*     CMP:   Recent Labs  Lab 08/04/17  0432 08/04/17  1202 08/05/17  0509     --  134*   K 4.3  --  4.3     --  102   CO2 23  --  23   GLU 78  --  85   BUN 19  --  27*   CREATININE 3.5*  --  4.4*   CALCIUM 8.2*  --  8.0*   PROT  --  7.4  --    ALBUMIN 2.1* 2.2* 1.8*   BILITOT  --  0.3  --    ALKPHOS  --  88  --    AST  --  23  --    ALT  --  <5*  --    ANIONGAP 12  --  9   EGFRNONAA 13.4*  --  10.2*       Significant Imaging: no significant imaging    Assessment/Plan:      Fever    Fever of 101.6 yesterday. No fever today.   Differential includes hospital-acquired aspiration pneumonia, DVT, wound infection. Wound does not clinically appear infected. Drug effect unlikely as patient has not had any new medications. Post-operative fever secondary to cytokine release is possible, but unlikely given several days since surgery. Pneumonia thought to be most likely given increased oxygen requirement  CXR with questionable RLL  "infiltrate, not significantly changed from previous CXR  Blood cultures pending  US RLE with no evidence of DVT  - IV Vancomycin/Zosyn            * Left foot infection    Presenting to the ED w/ new bleeding from L leg ulcer along with fevers, chills and new leukocytosis.   - Received vancomycin , flagyl and rocephin in ED. Fever resolved with Tylenol in ED. Dc'd vancomycin and flagyl on the floor.   - Blood cx 1/4 +gram positive cocci in clusters resembling staph   - Blood cx 1/4 +gram positive rods  - Wound cx growing proteus and GNR, lactose   - Repeat blood cx x2 show NGTD  - S/p Aortogram + Left LE angiogram with Vascular surgery   - Significant steal from L thigh AVG. L foot not salvageable given extent of infection.   - S/p left AKA 7/31, healing well     Plan  - healing well s/p AKA            Amputation of leg    PM&R Consulted  PT/OT Consulted  Will require rehab          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    - Continue Norvasc  - Diabetic diet inpatient  - Sliding scale insulin        Anemia of chronic renal failure    Hgb has dropped in the past two days from 8.0 to 6.7  No obvious cause of bleeding. Stool is soft and brown without blood per nurse  Patient receiving Procrit w/ dialysis.   - Check type and screen  - Transfuse 1 U pRBCs          ESRD (end stage renal disease)    - Dialysis inpatient  - Will continue to monitor electrolytes        Peripheral vascular disease, unspecified    - had Aortogram + Left LE angiogram 07/28 which showed Significant steal from left thigh AVG. Left foot not salvageable given extent of infection. S/p left AKA on 7/31          Pressure ulcer of left buttock    Noted by wound care 8/04  - care per wound care recommendations  - does not appear to be the source of infection          Type 2 diabetes mellitus with left diabetic foot ulcer    See "left foot infection"         Hypertension, renal    - Continue home amlodipine 10 mg QD          VTE Risk " Mitigation         Ordered     heparin (porcine) injection 5,000 Units  Every 8 hours     Route:  Subcutaneous        08/02/17 1119     Medium Risk of VTE  Once      07/28/17 1244        Dispo: Will need to stay for evaluation and treatment of acute febrile illness, and evaluation of acute on chronic anemia    Jillian Junior, DO  Department of Hospital Medicine   [facility]

## 2017-08-05 NOTE — PLAN OF CARE
Problem: Patient Care Overview  Goal: Plan of Care Review  Outcome: Ongoing (interventions implemented as appropriate)  Patient is awake, alert and oriented. Patient remained free from complaints this shift. Patient is on bedrest and slept for the shift. Remained free from injury and falls this shift. Bed is in the low and locked position with side rail up x 2. Call light is within reach. Informed to call if need assistance. Will continue to monitor.

## 2017-08-05 NOTE — ASSESSMENT & PLAN NOTE
Presenting to the ED w/ new bleeding from L leg ulcer along with fevers, chills and new leukocytosis.   - Received vancomycin , flagyl and rocephin in ED. Fever resolved with Tylenol in ED. Dc'd vancomycin and flagyl on the floor.   - Blood cx 1/4 +gram positive cocci in clusters resembling staph   - Blood cx 1/4 +gram positive rods  - Wound cx growing proteus and GNR, lactose   - Repeat blood cx x2 show NGTD  - S/p Aortogram + Left LE angiogram with Vascular surgery   - Significant steal from L thigh AVG. L foot not salvageable given extent of infection.   - S/p left AKA 7/31, healing well     Plan  - healing well s/p AKA

## 2017-08-05 NOTE — PLAN OF CARE
Problem: Patient Care Overview  Goal: Plan of Care Review  Outcome: Ongoing (interventions implemented as appropriate)  Pt alert and oriented to name, , place and situation. Pt bed in lowest position with call light within reach. Safety precautions maintained. No falls observed or reported, at this time. Pt pain assessed and managed. SCDs in place. Pt assisted with toileting. Pt requests brief. Pt educated on risk for further skin breakdown with brief. Pt verbalized understanding and insists on use of brief. Will continue to monitor

## 2017-08-05 NOTE — ASSESSMENT & PLAN NOTE
Hgb has dropped in the past two days from 8.0 to 6.7  No obvious cause of bleeding. Stool is soft and brown without blood per nurse  Patient receiving Procrit w/ dialysis.   - Check type and screen  - Transfuse 1 U pRBCs

## 2017-08-06 PROBLEM — D72.829 LEUKOCYTOSIS: Status: ACTIVE | Noted: 2017-08-06

## 2017-08-06 LAB
ALBUMIN SERPL BCP-MCNC: 1.8 G/DL
ANION GAP SERPL CALC-SCNC: 9 MMOL/L
BASOPHILS # BLD AUTO: 0.03 K/UL
BASOPHILS NFR BLD: 0.1 %
BUN SERPL-MCNC: 19 MG/DL
CALCIUM SERPL-MCNC: 8 MG/DL
CHLORIDE SERPL-SCNC: 103 MMOL/L
CO2 SERPL-SCNC: 23 MMOL/L
CREAT SERPL-MCNC: 3.1 MG/DL
DIFFERENTIAL METHOD: ABNORMAL
EOSINOPHIL # BLD AUTO: 0.3 K/UL
EOSINOPHIL NFR BLD: 1.5 %
ERYTHROCYTE [DISTWIDTH] IN BLOOD BY AUTOMATED COUNT: 19.2 %
EST. GFR  (AFRICAN AMERICAN): 17.9 ML/MIN/1.73 M^2
EST. GFR  (NON AFRICAN AMERICAN): 15.5 ML/MIN/1.73 M^2
GLUCOSE SERPL-MCNC: 115 MG/DL
HCT VFR BLD AUTO: 25.1 %
HGB BLD-MCNC: 7.8 G/DL
LYMPHOCYTES # BLD AUTO: 1.1 K/UL
LYMPHOCYTES NFR BLD: 5 %
MCH RBC QN AUTO: 26.6 PG
MCHC RBC AUTO-ENTMCNC: 31.1 G/DL
MCV RBC AUTO: 86 FL
MONOCYTES # BLD AUTO: 1 K/UL
MONOCYTES NFR BLD: 4.4 %
NEUTROPHILS # BLD AUTO: 19.4 K/UL
NEUTROPHILS NFR BLD: 88.7 %
PHOSPHATE SERPL-MCNC: 2.5 MG/DL
PLATELET # BLD AUTO: 414 K/UL
PMV BLD AUTO: 9.2 FL
POCT GLUCOSE: 102 MG/DL (ref 70–110)
POCT GLUCOSE: 111 MG/DL (ref 70–110)
POCT GLUCOSE: 122 MG/DL (ref 70–110)
POCT GLUCOSE: 135 MG/DL (ref 70–110)
POCT GLUCOSE: 140 MG/DL (ref 70–110)
POCT GLUCOSE: 83 MG/DL (ref 70–110)
POTASSIUM SERPL-SCNC: 3.4 MMOL/L
RBC # BLD AUTO: 2.93 M/UL
SODIUM SERPL-SCNC: 135 MMOL/L
TB INDURATION 48 - 72 HR READ: 0 MM
WBC # BLD AUTO: 21.92 K/UL

## 2017-08-06 PROCEDURE — 25000003 PHARM REV CODE 250: Performed by: NURSE PRACTITIONER

## 2017-08-06 PROCEDURE — 63600175 PHARM REV CODE 636 W HCPCS: Performed by: STUDENT IN AN ORGANIZED HEALTH CARE EDUCATION/TRAINING PROGRAM

## 2017-08-06 PROCEDURE — 12000002 HC ACUTE/MED SURGE SEMI-PRIVATE ROOM

## 2017-08-06 PROCEDURE — 25000003 PHARM REV CODE 250: Performed by: STUDENT IN AN ORGANIZED HEALTH CARE EDUCATION/TRAINING PROGRAM

## 2017-08-06 PROCEDURE — 25000003 PHARM REV CODE 250: Performed by: INTERNAL MEDICINE

## 2017-08-06 PROCEDURE — 80069 RENAL FUNCTION PANEL: CPT

## 2017-08-06 PROCEDURE — 99231 SBSQ HOSP IP/OBS SF/LOW 25: CPT | Mod: GC,,, | Performed by: HOSPITALIST

## 2017-08-06 PROCEDURE — 97530 THERAPEUTIC ACTIVITIES: CPT

## 2017-08-06 PROCEDURE — 85025 COMPLETE CBC W/AUTO DIFF WBC: CPT

## 2017-08-06 PROCEDURE — 97110 THERAPEUTIC EXERCISES: CPT

## 2017-08-06 PROCEDURE — 36415 COLL VENOUS BLD VENIPUNCTURE: CPT

## 2017-08-06 RX ADMIN — PREGABALIN 75 MG: 75 CAPSULE ORAL at 09:08

## 2017-08-06 RX ADMIN — HEPARIN SODIUM 5000 UNITS: 5000 INJECTION, SOLUTION INTRAVENOUS; SUBCUTANEOUS at 02:08

## 2017-08-06 RX ADMIN — OXYCODONE HYDROCHLORIDE 5 MG: 5 TABLET ORAL at 03:08

## 2017-08-06 RX ADMIN — OXYCODONE HYDROCHLORIDE 5 MG: 5 TABLET ORAL at 09:08

## 2017-08-06 RX ADMIN — AMLODIPINE BESYLATE 10 MG: 10 TABLET ORAL at 08:08

## 2017-08-06 RX ADMIN — HEPARIN SODIUM 5000 UNITS: 5000 INJECTION, SOLUTION INTRAVENOUS; SUBCUTANEOUS at 09:08

## 2017-08-06 RX ADMIN — HEPARIN SODIUM 5000 UNITS: 5000 INJECTION, SOLUTION INTRAVENOUS; SUBCUTANEOUS at 12:08

## 2017-08-06 RX ADMIN — PIPERACILLIN AND TAZOBACTAM 4.5 G: 4; .5 INJECTION, POWDER, LYOPHILIZED, FOR SOLUTION INTRAVENOUS; PARENTERAL at 03:08

## 2017-08-06 RX ADMIN — ASPIRIN 81 MG CHEWABLE TABLET 81 MG: 81 TABLET CHEWABLE at 08:08

## 2017-08-06 RX ADMIN — SEVELAMER CARBONATE 2400 MG: 800 TABLET, FILM COATED ORAL at 06:08

## 2017-08-06 RX ADMIN — CALCITRIOL 0.5 MCG: 0.25 CAPSULE, LIQUID FILLED ORAL at 08:08

## 2017-08-06 RX ADMIN — HEPARIN SODIUM 5000 UNITS: 5000 INJECTION, SOLUTION INTRAVENOUS; SUBCUTANEOUS at 06:08

## 2017-08-06 RX ADMIN — OXYCODONE HYDROCHLORIDE 5 MG: 5 TABLET ORAL at 12:08

## 2017-08-06 RX ADMIN — SEVELAMER CARBONATE 2400 MG: 800 TABLET, FILM COATED ORAL at 01:08

## 2017-08-06 NOTE — PLAN OF CARE
Problem: Patient Care Overview  Goal: Plan of Care Review  Patient is awake, alert and oriented. Patient remained free from complaints this shift. Patient is on bedrest. Remained free from injury and falls this shift. Bed is in the low and locked position with side rail up x 2. Call light is within reach. Informed to call if need assistance. Will continue to monitor.

## 2017-08-06 NOTE — SUBJECTIVE & OBJECTIVE
Medications:  Continuous Infusions:   ropivacaine (PF) 2 mg/ml (0.2%) 6 mL/hr (08/04/17 0357)    ropivacaine (PF) 2 mg/ml (0.2%) 8 mL/hr (08/03/17 1323)     Scheduled Meds:   sodium chloride 0.9%   Intravenous Once    amlodipine  10 mg Oral Daily    aspirin  81 mg Oral Daily    calcitRIOL  0.5 mcg Oral Daily    epoetin batsheva (PROCRIT) injection  100 Units/kg Intravenous Every Mon, Wed, Fri    heparin (porcine)  5,000 Units Subcutaneous Q8H    insulin detemir  4 Units Subcutaneous BID    piperacillin-tazobactam 4.5 g in dextrose 5 % 100 mL IVPB (ready to mix system)  4.5 g Intravenous Q12H    pregabalin  75 mg Oral QHS    sevelamer carbonate  2,400 mg Oral TID WM    sodium hypochlorite   Irrigation Once    vancomycin (VANCOCIN) IVPB  500 mg Intravenous Every Tues, Thurs, Sat     PRN Meds:sodium chloride, sodium chloride 0.9%, sodium chloride 0.9%, sodium chloride 0.9%, sodium chloride 0.9%, sodium chloride 0.9%, acetaminophen, albumin human 25%, dextrose 50%, dextrose 50%, glucagon (human recombinant), glucose, glucose, heparin (porcine), HYDROmorphone, insulin aspart, methocarbamol, ondansetron, oxycodone, polyethylene glycol, senna-docusate 8.6-50 mg     Objective:     Vital Signs (Most Recent):  Temp: 99.3 °F (37.4 °C) (08/06/17 0350)  Pulse: 77 (08/06/17 0700)  Resp: 18 (08/06/17 0350)  BP: (!) 105/59 (08/06/17 0350)  SpO2: 99 % (08/06/17 0350) Vital Signs (24h Range):  Temp:  [98 °F (36.7 °C)-99.6 °F (37.6 °C)] 99.3 °F (37.4 °C)  Pulse:  [77-97] 77  Resp:  [16-18] 18  SpO2:  [92 %-100 %] 99 %  BP: (102-131)/(52-74) 105/59          Physical Exam   Constitutional: She is oriented to person, place, and time. She appears well-developed and well-nourished.   HENT:   Head: Normocephalic and atraumatic.   Eyes: EOM are normal. Pupils are equal, round, and reactive to light.   Cardiovascular: Normal rate, regular rhythm and normal heart sounds.    Pulmonary/Chest: Effort normal.   Musculoskeletal:   LLE  AKA stump incision c/d/i. Staples in place  No surrounding erythema, edema   Neurological: She is alert and oriented to person, place, and time.       Significant Labs:  CBC:   Recent Labs  Lab 08/06/17  0413   WBC 21.92*   RBC 2.93*   HGB 7.8*   HCT 25.1*   *   MCV 86   MCH 26.6*   MCHC 31.1*     CMP:   Recent Labs  Lab 08/04/17  1202  08/06/17  0413   GLU  --   < > 115*   CALCIUM  --   < > 8.0*   ALBUMIN 2.2*  < > 1.8*   PROT 7.4  --   --    NA  --   < > 135*   K  --   < > 3.4*   CO2  --   < > 23   CL  --   < > 103   BUN  --   < > 19   CREATININE  --   < > 3.1*   ALKPHOS 88  --   --    ALT <5*  --   --    AST 23  --   --    BILITOT 0.3  --   --    < > = values in this interval not displayed.    Significant Diagnostics:  I have reviewed all pertinent imaging results/findings within the past 24 hours.

## 2017-08-06 NOTE — ASSESSMENT & PLAN NOTE
61 yo F with h/o HTN, HLD, DM2 (Hgb A1c 6.3),HFpEF (EF 60%), COPD (home O2, 2.5 L), ESRD on HD (MWF) via L femoral AVG presenting to hospital with left foot ischemic ulcer, wet gangrene s/p I&D by podiatry 07/26, left AKA on 07/31    Incision healing well without signs of wound infection. Not likely the cause of fever and elevated WBC  Non-weight bearing on AKA stump. Keep elevated.  Cont PT/OT    F/u with Dr. Madera in clinic in 4 weeks  Please call with any questions

## 2017-08-06 NOTE — SUBJECTIVE & OBJECTIVE
Interval History: Patient is awake, alert, and has no complaints. She has been afebrile. Tolerating PO . No bloody bowel movements, no diarrhea.     Review of Systems   Constitutional: Negative for chills and fever.   Respiratory: Negative for cough and shortness of breath.    Cardiovascular: Negative for chest pain.   Gastrointestinal: Negative for abdominal pain, diarrhea and vomiting.   Genitourinary:        Does not make urine   Neurological: Negative for light-headedness and headaches.   All other systems reviewed and are negative.    Objective:     Vital Signs (Most Recent):  Temp: 97.4 °F (36.3 °C) (08/06/17 1205)  Pulse: 85 (08/06/17 1205)  Resp: 16 (08/06/17 1205)  BP: (!) 107/55 (08/06/17 1205)  SpO2: (!) 93 % (08/06/17 1205) Vital Signs (24h Range):  Temp:  [97.4 °F (36.3 °C)-99.6 °F (37.6 °C)] 97.4 °F (36.3 °C)  Pulse:  [77-97] 85  Resp:  [16-18] 16  SpO2:  [93 %-100 %] 93 %  BP: (105-120)/(54-62) 107/55     Weight: 67 kg (147 lb 11.3 oz)  Body mass index is 29.83 kg/m².    Intake/Output Summary (Last 24 hours) at 08/06/17 1421  Last data filed at 08/06/17 0930   Gross per 24 hour   Intake              100 ml   Output                0 ml   Net              100 ml      Physical Exam   Constitutional: She is oriented to person, place, and time. She appears well-developed and well-nourished.   HENT:   Head: Normocephalic and atraumatic.   Eyes: EOM are normal. Pupils are equal, round, and reactive to light.   Cardiovascular: Normal rate and regular rhythm.    Pulmonary/Chest: Effort normal. No respiratory distress.   Abdominal: She exhibits no distension. There is no tenderness. Hernia: soft, non-tender.   Musculoskeletal:   Left AKA, no dressing. Staples in place. No surrounding erythema or warmth.    Neurological: She is alert and oriented to person, place, and time.   Awake, alert, answers all questions appropriately.     Skin: Skin is warm and dry.       Significant Labs:   BMP:   Recent Labs  Lab  08/06/17 0413   *   *   K 3.4*      CO2 23   BUN 19   CREATININE 3.1*   CALCIUM 8.0*     CBC:   Recent Labs  Lab 08/05/17  0509 08/05/17  0805 08/06/17 0413   WBC 20.32* 23.85* 21.92*   HGB 6.7* 6.5* 7.8*   HCT 21.9* 20.4* 25.1*   * 415* 414*       Significant Imaging: no significant imaging

## 2017-08-06 NOTE — PT/OT/SLP PROGRESS
"Physical Therapy  Treatment    Aleta Herrera   MRN: [id number]   Admitting Diagnosis: Left foot infection    PT Received On: 08/06/17  PT Start Time: 0850     PT Stop Time: 0928    PT Total Time (min): 38 min       Billable Minutes:  Therapeutic Activity 28 and Therapeutic Exercise 10    Treatment Type: Treatment  PT/PTA: PT             General Precautions: Standard, fall  Orthopedic Precautions:  (L AKA)   Braces: N/A         Subjective:  Communicated with nurse prior to session.  "My arms are weak, I need to work them more"  Pain/Comfort  Pain Rating 1: 8/10  Location - Side 1: Left  Location 1: leg (residual limb)  Pain Addressed 1: Pre-medicate for activity, Reposition, Nurse notified  Pain Rating Post-Intervention 1:  (pt states her pain is improved at rest but did not grade)    Objective:   Patient found with: oxygen    Functional Mobility:  Bed Mobility:   Supine to Sit: Moderate Assistance  Sit to Supine: Moderate Assistance    Transfers:  Sit <> Stand Assistance: Total Assistance (max assist of 2 from bedside chair x 3 trials with RW in front)  Sit <> Stand Assistive Device: Rolling Walker  Bed <> Chair Technique: Squat Pivot (bed to/from bedside chair )  Bed <> Chair Assistance: Total Assistance, Maximum Assistance (max assist bed to bedside chair; total assist bedside chair to bed )  Bed <> Chair Assistive Device: No Assistive Device    Gait:   Gait Distance: unable to perform    Balance:   Static Sit: FAIR: Maintains without assist, but unable to take any challenges   Dynamic Sit: FAIR: Cannot move trunk without losing balance  Static Stand: 0: Needs MAXIMAL assist to maintain     Therapeutic Activities and Exercises:  Pt stood x 3 trials with max assist of 2 with RW in front for ~ 25-30 sec each trial with rest periods in sitting in between. Pt performed B UE exs in sitting with CGA x 15 reps: sh flex and push outs. Pt performed R LE exs in sitting x 15 reps: hip flex, knee ext, and ankle pumps. " Pt noted to be soiled with BM and rolled to L and R with SBA and bed rails to be cleaned and diaper replaced, nurse notified.    AM-PAC 6 CLICK MOBILITY  How much help from another person does this patient currently need?   1 = Unable, Total/Dependent Assistance  2 = A lot, Maximum/Moderate Assistance  3 = A little, Minimum/Contact Guard/Supervision  4 = None, Modified [place]/Independent    Turning over in bed (including adjusting bedclothes, sheets and blankets)?: 3  Sitting down on and standing up from a chair with arms (e.g., wheelchair, bedside commode, etc.): 2  Moving from lying on back to sitting on the side of the bed?: 2  Moving to and from a bed to a chair (including a wheelchair)?: 2  Need to walk in hospital room?: 1  Climbing 3-5 steps with a railing?: 1  Total Score: 11    AM-PAC Raw Score CMS G-Code Modifier Level of Impairment Assistance   6 % Total / Unable   7 - 9 CM 80 - 100% Maximal Assist   10 - 14 CL 60 - 80% Moderate Assist   15 - 19 CK 40 - 60% Moderate Assist   20 - 22 CJ 20 - 40% Minimal Assist   23 CI 1-20% SBA / CGA   24 CH 0% Independent/ Mod I     Patient left supine with all lines intact and call button in reach.    Assessment:  Aleta Herrera is a 62 y.o. female with a medical diagnosis of Left foot infection and presents with difficulty with functional mobility due to weakness, instability, and pain. Pt is motivated to progress with mobility.    Rehab identified problem list/impairments: Rehab identified problem list/impairments: weakness, impaired endurance, impaired functional mobilty, impaired balance, impaired self care skills, decreased lower extremity function, pain, impaired skin    Rehab potential is good.    Activity tolerance: Good    Discharge recommendations: Discharge Facility/Level Of Care Needs: rehabilitation facility     Barriers to discharge: Barriers to Discharge: Decreased caregiver support (pt requires significant assist for mobility at  this time)    Equipment recommendations: Equipment Needed After Discharge: wheelchair, walker, standard (drop arm commode; possibly slide board)     GOALS:    Physical Therapy Goals        Problem: Physical Therapy Goal    Goal Priority Disciplines Outcome Goal Variances Interventions   Physical Therapy Goal     PT/OT, PT Ongoing (interventions implemented as appropriate)     Description:  Goals to be met by: 2017     Patient will increase functional independence with mobility by performin. Supine to sit with Set-up [place]  2. Sit to supine with Set-up [place]  3. Sit to stand transfer with Minimal Assistance  4. Bed to chair transfer with Minimal Assistance using slide board or SW  5. Gait  x 5 feet with Minimal Assistance using Standard Walker.   6. Wheelchair propulsion x100 feet with Minimal Assistance using bilateral upper extremities  7. Lower extremity exercise program x15 reps per handout, with supervision                      PLAN:    Patient to be seen 5 x/week  to address the above listed problems via gait training, therapeutic activities, therapeutic exercises, wheelchair management/training  Plan of Care expires: 17  Plan of Care reviewed with: patient         Monica Hernandez, PT  2017

## 2017-08-06 NOTE — ASSESSMENT & PLAN NOTE
No clinically evident infection  No fever since 8/4  Possibly secondary to post-surgical inflammation  - Continue to trend

## 2017-08-06 NOTE — PROGRESS NOTES
[facility] Hospital Medicine  Progress Note    Patient Name: Aleta Herrera  MRN: [id number]  Patient Class: IP- Inpatient   Admission Date: 7/23/2017  Length of Stay: 14 days  Attending Physician: Carson Morgan, *  Primary Care Provider: Radha Lao MD    [facility] Medicine Team: [facility] MED 2 Jillian Junior DO    Subjective:     Principal Problem:Left foot infection    HPI:  Aleta Herrera is a 62-year-old female with HTN, HFpEF (EF 60%; home O2 2.5 liters), anemia 2/2 CKD and IDDM c/b retinopathy, neuropathy and ESRD (HD MWF). She presents today with her  to the ED because of new bleeding from the left foot ulcer. She's had on/off fevers and chills for past week or so. Apparently blood cultures were drawn at dialysis (7/19). She was seen by vascular surgery the next day where her right femoral permacath was pulled. She has a working left femoral AVG. Her upper extremity access has failed over the years since being placed on dialysis in 2009. Besides left leg pain, she denies any SOB, CP, cough, congestion, abdominal pain, n/v or diarrhea. Not received any recent antibiotics.   Complaining of foot pain in the ED, otherwise has no complaints. CXR showed mild b/l effusions. Left foot x-ray soft tissue swelling, but no gas formation. Labs significant for WBC count of 24k. Procalcitonin 1.64.       Hospital Course:  7/24: VSS. WBC 22.58 down from 24.04 in ED. Sed rate 100. .3. CXR stable. L Foot xray soft tissue swelling. MRI L foot no osteomylitis, no drainable collections. JULIUS L with mod-severe occlusive disease, R with mod. Continue ceftriaxone. HD today.  7/25: VSS. WBC 19.18. Vasc Surg consulted, L femoral AVG w/ possible vascular steal; will perform LLE angiogram on 7/28, if no arterial stenosis will plan for femoral graft ligation & permacath placement. Podiatry consulted, extensive stable left plantar arch necrosis, no acute surgical  intervention; will f/u after angiogram.  Wound cx pending. 1/4 Bcx +GPC in clusters. Continue ceftriaxone.   7/26: I&D and debridement with Podiatry today.  Continue Ceftriaxone.   7/27: Spiked fever to 101.1F, improved with tylenol. Repeated Blood cx x2. HD today. NPO at midnight.  7/28: Had Aortogram + Left LE angiogram by vascular surgery; significant steal from AVG, left foot not salvagable. Plan for Left AKA.   7/29: VSS. HD today. Continue abx.   7/31 L AKA   8/01 HD today  8/02 D/C oral cipro  8/04 Patient developed fever of 101.6. Ordered fever workup. Started IV Zosyn and Vancomycin. Ordered US RLE to eval for DVT.  8/05 Hgb 6.7. Transfused 1 U pRBCs  8/06 Patient clinically improved, no sign of infection. D/C antibiotics.     Interval History: Patient is awake, alert, and has no complaints. She has been afebrile. Tolerating PO . No bloody bowel movements, no diarrhea.     Review of Systems   Constitutional: Negative for chills and fever.   Respiratory: Negative for cough and shortness of breath.    Cardiovascular: Negative for chest pain.   Gastrointestinal: Negative for abdominal pain, diarrhea and vomiting.   Genitourinary:        Does not make urine   Neurological: Negative for light-headedness and headaches.   All other systems reviewed and are negative.    Objective:     Vital Signs (Most Recent):  Temp: 97.4 °F (36.3 °C) (08/06/17 1205)  Pulse: 85 (08/06/17 1205)  Resp: 16 (08/06/17 1205)  BP: (!) 107/55 (08/06/17 1205)  SpO2: (!) 93 % (08/06/17 1205) Vital Signs (24h Range):  Temp:  [97.4 °F (36.3 °C)-99.6 °F (37.6 °C)] 97.4 °F (36.3 °C)  Pulse:  [77-97] 85  Resp:  [16-18] 16  SpO2:  [93 %-100 %] 93 %  BP: (105-120)/(54-62) 107/55     Weight: 67 kg (147 lb 11.3 oz)  Body mass index is 29.83 kg/m².    Intake/Output Summary (Last 24 hours) at 08/06/17 1421  Last data filed at 08/06/17 0930   Gross per 24 hour   Intake              100 ml   Output                0 ml   Net              100 ml       Physical Exam   Constitutional: She is oriented to person, place, and time. She appears well-developed and well-nourished.   HENT:   Head: Normocephalic and atraumatic.   Eyes: EOM are normal. Pupils are equal, round, and reactive to light.   Cardiovascular: Normal rate and regular rhythm.    Pulmonary/Chest: Effort normal. No respiratory distress.   Abdominal: She exhibits no distension. There is no tenderness. Hernia: soft, non-tender.   Musculoskeletal:   Left AKA, no dressing. Staples in place. No surrounding erythema or warmth.    Neurological: She is alert and oriented to person, place, and time.   Awake, alert, answers all questions appropriately.     Skin: Skin is warm and dry.       Significant Labs:   BMP:   Recent Labs  Lab 08/06/17  0413   *   *   K 3.4*      CO2 23   BUN 19   CREATININE 3.1*   CALCIUM 8.0*     CBC:   Recent Labs  Lab 08/05/17  0509 08/05/17  0805 08/06/17  0413   WBC 20.32* 23.85* 21.92*   HGB 6.7* 6.5* 7.8*   HCT 21.9* 20.4* 25.1*   * 415* 414*       Significant Imaging: no significant imaging    Assessment/Plan:      Fever    Fever of 101.6 on 8/4  Patient empirically treated for hospital-acquired pneumonia with IV Vancomycin/Zosyn  No further fevers. WBC still elevated, but patient clinically improved with no signs or symptoms of infection  Blood cultures NGTD  US RLE neg for DVT  - D/C antibiotics and monitor      * Left foot infection    Presenting to the ED w/ new bleeding from L leg ulcer along with fevers, chills and new leukocytosis.   - Received vancomycin , flagyl and rocephin in ED. Fever resolved with Tylenol in ED. Dc'd vancomycin and flagyl on the floor.   - Blood cx 1/4 +gram positive cocci in clusters resembling staph   - Blood cx 1/4 +gram positive rods  - Wound cx growing proteus and GNR, lactose   - Repeat blood cx x2 show NGTD  - S/p Aortogram + Left LE angiogram with Vascular surgery   - Significant steal from L thigh AVG. L  "foot not salvageable given extent of infection.   - S/p left AKA 7/31, healing well     Plan  - healing well s/p AKA      Amputation of leg    PM&R Consulted  PT/OT Consulted  Will require rehab      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    - Continue Norvasc  - Diabetic diet inpatient  - Detemir 4 U qhs  - Sliding scale insulin      Anemia of chronic renal failure    Hgb dropped on 8/5 to 6.7. No obvious cause of bleeding. Stool is soft and brown without blood per nurse  Transfused 1 U pRBCs  Patient receiving Procrit w/ dialysis.       ESRD (end stage renal disease)    - Dialysis inpatient  - Will continue to monitor electrolytes      Peripheral vascular disease, unspecified    - had Aortogram + Left LE angiogram 07/28 which showed Significant steal from left thigh AVG. Left foot not salvageable given extent of infection. S/p left AKA on 7/31      Pressure ulcer of left buttock    Noted by wound care 8/04  - care per wound care recommendations  - does not appear to be the source of infection      Type 2 diabetes mellitus with left diabetic foot ulcer    See "left foot infection"       Hypertension, renal    - Continue home amlodipine 10 mg QD        VTE Risk Mitigation         Ordered     heparin (porcine) injection 5,000 Units  Every 8 hours     Route:  Subcutaneous        08/02/17 1119     Medium Risk of VTE  Once      07/28/17 1244        Dispo: Consider d/c to rehab tomorrow if patient continues to be afebrile with no symptoms of infection    Jillian Junior,   Department of Hospital Medicine   [facility]  "

## 2017-08-06 NOTE — ASSESSMENT & PLAN NOTE
Hgb dropped on 8/5 to 6.7. No obvious cause of bleeding. Stool is soft and brown without blood per nurse  Transfused 1 U pRBCs  Patient receiving Procrit w/ dialysis.

## 2017-08-06 NOTE — PROGRESS NOTES
HD treatment complete. Duration of treatment 3.5 hours and 1L removed. Treatment was tolerated well and no complications with access to left thigh. Needles removed and hemostasis achieved.

## 2017-08-06 NOTE — ASSESSMENT & PLAN NOTE
Fever of 101.6 on 8/4  Patient empirically treated for hospital-acquired pneumonia with IV Vancomycin/Zosyn  No further fevers. WBC still elevated, but patient clinically improved with no signs or symptoms of infection  Blood cultures NGTD  US RLE neg for DVT  - D/C antibiotics and monitor

## 2017-08-06 NOTE — PLAN OF CARE
Problem: Patient Care Overview  Goal: Plan of Care Review  Outcome: Ongoing (interventions implemented as appropriate)  Pt alert and oriented to name, , place and situation. Pt bed in lowest position with call light within reach. Safety precautions maintained. No falls observed or reported, at this time. Pt pain assessed and managed. SCD in place and Pharmacological DVT prophylaxis maintained. Pt brief changed promptly when soiled. Will continue to monitor

## 2017-08-07 ENCOUNTER — HOSPITAL ENCOUNTER (INPATIENT)
Facility: HOSPITAL | Age: 62
LOS: 17 days | Discharge: HOME HEALTH | DRG: 559 | End: 2017-08-24
Attending: PHYSICAL MEDICINE & REHABILITATION | Admitting: PHYSICAL MEDICINE & REHABILITATION
Payer: MEDICARE

## 2017-08-07 VITALS
WEIGHT: 147.69 LBS | TEMPERATURE: 98 F | HEART RATE: 83 BPM | SYSTOLIC BLOOD PRESSURE: 119 MMHG | BODY MASS INDEX: 29.77 KG/M2 | HEIGHT: 59 IN | RESPIRATION RATE: 18 BRPM | OXYGEN SATURATION: 93 % | DIASTOLIC BLOOD PRESSURE: 64 MMHG

## 2017-08-07 DIAGNOSIS — N18.6 ESRD (END STAGE RENAL DISEASE): ICD-10-CM

## 2017-08-07 DIAGNOSIS — L89.329: ICD-10-CM

## 2017-08-07 DIAGNOSIS — S88.919A AMPUTATION OF LEG: ICD-10-CM

## 2017-08-07 DIAGNOSIS — I12.9 HYPERTENSION, RENAL: Chronic | ICD-10-CM

## 2017-08-07 DIAGNOSIS — S78.112A UNILATERAL AKA, LEFT: ICD-10-CM

## 2017-08-07 PROBLEM — Z78.9 IMPAIRED MOBILITY AND ADLS: Status: ACTIVE | Noted: 2017-08-07

## 2017-08-07 PROBLEM — S78.119A UNILATERAL AKA: Status: ACTIVE | Noted: 2017-08-07

## 2017-08-07 PROBLEM — Z74.09 IMPAIRED MOBILITY AND ADLS: Status: ACTIVE | Noted: 2017-08-07

## 2017-08-07 LAB
ALBUMIN SERPL BCP-MCNC: 1.7 G/DL
ANION GAP SERPL CALC-SCNC: 10 MMOL/L
BASOPHILS # BLD AUTO: 0.03 K/UL
BASOPHILS NFR BLD: 0.2 %
BUN SERPL-MCNC: 28 MG/DL
CALCIUM SERPL-MCNC: 8 MG/DL
CHLORIDE SERPL-SCNC: 101 MMOL/L
CO2 SERPL-SCNC: 24 MMOL/L
CREAT SERPL-MCNC: 4.2 MG/DL
DIFFERENTIAL METHOD: ABNORMAL
EOSINOPHIL # BLD AUTO: 0.5 K/UL
EOSINOPHIL NFR BLD: 3 %
ERYTHROCYTE [DISTWIDTH] IN BLOOD BY AUTOMATED COUNT: 19.6 %
EST. GFR  (AFRICAN AMERICAN): 12.4 ML/MIN/1.73 M^2
EST. GFR  (NON AFRICAN AMERICAN): 10.8 ML/MIN/1.73 M^2
GLUCOSE SERPL-MCNC: 89 MG/DL
HCT VFR BLD AUTO: 26.3 %
HGB BLD-MCNC: 7.9 G/DL
LYMPHOCYTES # BLD AUTO: 1.3 K/UL
LYMPHOCYTES NFR BLD: 8.3 %
MCH RBC QN AUTO: 26.6 PG
MCHC RBC AUTO-ENTMCNC: 30 G/DL
MCV RBC AUTO: 89 FL
MONOCYTES # BLD AUTO: 0.9 K/UL
MONOCYTES NFR BLD: 5.6 %
NEUTROPHILS # BLD AUTO: 12.9 K/UL
NEUTROPHILS NFR BLD: 82.6 %
PHOSPHATE SERPL-MCNC: 3.4 MG/DL
PLATELET # BLD AUTO: 423 K/UL
PMV BLD AUTO: 9.3 FL
POCT GLUCOSE: 101 MG/DL (ref 70–110)
POCT GLUCOSE: 67 MG/DL (ref 70–110)
POCT GLUCOSE: 74 MG/DL (ref 70–110)
POCT GLUCOSE: 79 MG/DL (ref 70–110)
POCT GLUCOSE: 91 MG/DL (ref 70–110)
POCT GLUCOSE: 91 MG/DL (ref 70–110)
POTASSIUM SERPL-SCNC: 4.1 MMOL/L
RBC # BLD AUTO: 2.97 M/UL
SODIUM SERPL-SCNC: 135 MMOL/L
WBC # BLD AUTO: 15.58 K/UL

## 2017-08-07 PROCEDURE — 36415 COLL VENOUS BLD VENIPUNCTURE: CPT

## 2017-08-07 PROCEDURE — 25000003 PHARM REV CODE 250: Performed by: NURSE PRACTITIONER

## 2017-08-07 PROCEDURE — 99238 HOSP IP/OBS DSCHRG MGMT 30/<: CPT | Mod: GC,,, | Performed by: HOSPITALIST

## 2017-08-07 PROCEDURE — 25000003 PHARM REV CODE 250: Performed by: INTERNAL MEDICINE

## 2017-08-07 PROCEDURE — 25000003 PHARM REV CODE 250: Performed by: ANESTHESIOLOGY

## 2017-08-07 PROCEDURE — 99223 1ST HOSP IP/OBS HIGH 75: CPT | Mod: ,,, | Performed by: PHYSICAL MEDICINE & REHABILITATION

## 2017-08-07 PROCEDURE — 25000003 PHARM REV CODE 250: Performed by: PHYSICAL MEDICINE & REHABILITATION

## 2017-08-07 PROCEDURE — 80069 RENAL FUNCTION PANEL: CPT

## 2017-08-07 PROCEDURE — 25000003 PHARM REV CODE 250: Performed by: STUDENT IN AN ORGANIZED HEALTH CARE EDUCATION/TRAINING PROGRAM

## 2017-08-07 PROCEDURE — 99232 SBSQ HOSP IP/OBS MODERATE 35: CPT | Mod: ,,, | Performed by: NURSE PRACTITIONER

## 2017-08-07 PROCEDURE — 85025 COMPLETE CBC W/AUTO DIFF WBC: CPT

## 2017-08-07 PROCEDURE — 63600175 PHARM REV CODE 636 W HCPCS: Performed by: STUDENT IN AN ORGANIZED HEALTH CARE EDUCATION/TRAINING PROGRAM

## 2017-08-07 PROCEDURE — 12800000 HC REHAB SEMI-PRIVATE ROOM

## 2017-08-07 PROCEDURE — 97530 THERAPEUTIC ACTIVITIES: CPT

## 2017-08-07 RX ORDER — ONDANSETRON 4 MG/1
8 TABLET, FILM COATED ORAL EVERY 8 HOURS PRN
Status: CANCELLED | OUTPATIENT
Start: 2017-08-07

## 2017-08-07 RX ORDER — INSULIN ASPART 100 [IU]/ML
0-5 INJECTION, SOLUTION INTRAVENOUS; SUBCUTANEOUS
Status: CANCELLED | OUTPATIENT
Start: 2017-08-07

## 2017-08-07 RX ORDER — PREGABALIN 75 MG/1
75 CAPSULE ORAL NIGHTLY
Status: CANCELLED | OUTPATIENT
Start: 2017-08-07

## 2017-08-07 RX ORDER — HYDROMORPHONE HYDROCHLORIDE 1 MG/ML
0.5 INJECTION, SOLUTION INTRAMUSCULAR; INTRAVENOUS; SUBCUTANEOUS EVERY 6 HOURS PRN
Status: CANCELLED | OUTPATIENT
Start: 2017-08-07

## 2017-08-07 RX ORDER — SODIUM CHLORIDE 9 MG/ML
INJECTION, SOLUTION INTRAVENOUS
Status: CANCELLED | OUTPATIENT
Start: 2017-08-07

## 2017-08-07 RX ORDER — IBUPROFEN 200 MG
24 TABLET ORAL
Status: CANCELLED | OUTPATIENT
Start: 2017-08-07

## 2017-08-07 RX ORDER — NAPROXEN SODIUM 220 MG/1
81 TABLET, FILM COATED ORAL DAILY
Status: CANCELLED | OUTPATIENT
Start: 2017-08-08

## 2017-08-07 RX ORDER — BISACODYL 10 MG
10 SUPPOSITORY, RECTAL RECTAL DAILY PRN
Status: DISCONTINUED | OUTPATIENT
Start: 2017-08-07 | End: 2017-08-24 | Stop reason: HOSPADM

## 2017-08-07 RX ORDER — NAPROXEN SODIUM 220 MG/1
81 TABLET, FILM COATED ORAL DAILY
Status: DISCONTINUED | OUTPATIENT
Start: 2017-08-08 | End: 2017-08-24 | Stop reason: HOSPADM

## 2017-08-07 RX ORDER — ALBUMIN HUMAN 250 G/1000ML
12.5 SOLUTION INTRAVENOUS
Status: CANCELLED | OUTPATIENT
Start: 2017-08-07

## 2017-08-07 RX ORDER — AMOXICILLIN 250 MG
1 CAPSULE ORAL DAILY PRN
Status: DISCONTINUED | OUTPATIENT
Start: 2017-08-07 | End: 2017-08-24 | Stop reason: HOSPADM

## 2017-08-07 RX ORDER — OXYCODONE HYDROCHLORIDE 5 MG/1
5 TABLET ORAL EVERY 6 HOURS PRN
Status: DISCONTINUED | OUTPATIENT
Start: 2017-08-07 | End: 2017-08-24 | Stop reason: HOSPADM

## 2017-08-07 RX ORDER — HEPARIN SODIUM 5000 [USP'U]/ML
5000 INJECTION, SOLUTION INTRAVENOUS; SUBCUTANEOUS EVERY 8 HOURS
Status: CANCELLED | OUTPATIENT
Start: 2017-08-07

## 2017-08-07 RX ORDER — CALCITRIOL 0.25 UG/1
0.5 CAPSULE ORAL DAILY
Status: CANCELLED | OUTPATIENT
Start: 2017-08-08

## 2017-08-07 RX ORDER — SEVELAMER CARBONATE 800 MG/1
2400 TABLET, FILM COATED ORAL
Status: CANCELLED | OUTPATIENT
Start: 2017-08-07

## 2017-08-07 RX ORDER — IBUPROFEN 200 MG
16 TABLET ORAL
Status: CANCELLED | OUTPATIENT
Start: 2017-08-07

## 2017-08-07 RX ORDER — SODIUM CHLORIDE 9 MG/ML
INJECTION, SOLUTION INTRAVENOUS ONCE
Status: CANCELLED | OUTPATIENT
Start: 2017-08-07 | End: 2017-08-07

## 2017-08-07 RX ORDER — AMOXICILLIN 250 MG
1 CAPSULE ORAL DAILY PRN
Status: CANCELLED | OUTPATIENT
Start: 2017-08-07

## 2017-08-07 RX ORDER — ACETAMINOPHEN 500 MG
500 TABLET ORAL EVERY 6 HOURS PRN
Status: CANCELLED | OUTPATIENT
Start: 2017-08-07

## 2017-08-07 RX ORDER — ACETAMINOPHEN 500 MG
500 TABLET ORAL EVERY 6 HOURS PRN
Status: DISCONTINUED | OUTPATIENT
Start: 2017-08-07 | End: 2017-08-07

## 2017-08-07 RX ORDER — HEPARIN SODIUM 1000 [USP'U]/ML
2000 INJECTION, SOLUTION INTRAVENOUS; SUBCUTANEOUS
Status: DISCONTINUED | OUTPATIENT
Start: 2017-08-07 | End: 2017-08-24 | Stop reason: HOSPADM

## 2017-08-07 RX ORDER — METHOCARBAMOL 500 MG/1
500 TABLET, FILM COATED ORAL 3 TIMES DAILY PRN
Status: CANCELLED | OUTPATIENT
Start: 2017-08-07

## 2017-08-07 RX ORDER — AMLODIPINE BESYLATE 10 MG/1
10 TABLET ORAL DAILY
Status: CANCELLED | OUTPATIENT
Start: 2017-08-08

## 2017-08-07 RX ORDER — GLUCAGON 1 MG
1 KIT INJECTION
Status: CANCELLED | OUTPATIENT
Start: 2017-08-07

## 2017-08-07 RX ORDER — AMLODIPINE BESYLATE 10 MG/1
10 TABLET ORAL DAILY
Status: DISCONTINUED | OUTPATIENT
Start: 2017-08-08 | End: 2017-08-24 | Stop reason: HOSPADM

## 2017-08-07 RX ORDER — OXYCODONE HYDROCHLORIDE 5 MG/1
5 TABLET ORAL EVERY 6 HOURS PRN
Status: CANCELLED | OUTPATIENT
Start: 2017-08-07

## 2017-08-07 RX ORDER — ONDANSETRON 8 MG/1
8 TABLET, ORALLY DISINTEGRATING ORAL EVERY 8 HOURS PRN
Status: DISCONTINUED | OUTPATIENT
Start: 2017-08-07 | End: 2017-08-24 | Stop reason: HOSPADM

## 2017-08-07 RX ORDER — POLYETHYLENE GLYCOL 3350 17 G/17G
17 POWDER, FOR SOLUTION ORAL 2 TIMES DAILY PRN
Status: DISCONTINUED | OUTPATIENT
Start: 2017-08-07 | End: 2017-08-13

## 2017-08-07 RX ORDER — METHOCARBAMOL 500 MG/1
500 TABLET, FILM COATED ORAL 3 TIMES DAILY PRN
Status: DISCONTINUED | OUTPATIENT
Start: 2017-08-07 | End: 2017-08-24 | Stop reason: HOSPADM

## 2017-08-07 RX ORDER — PREGABALIN 75 MG/1
75 CAPSULE ORAL NIGHTLY
Status: DISCONTINUED | OUTPATIENT
Start: 2017-08-07 | End: 2017-08-21

## 2017-08-07 RX ORDER — IBUPROFEN 200 MG
16 TABLET ORAL
Status: DISCONTINUED | OUTPATIENT
Start: 2017-08-07 | End: 2017-08-24 | Stop reason: HOSPADM

## 2017-08-07 RX ORDER — CALCITRIOL 0.5 UG/1
0.5 CAPSULE ORAL DAILY
Status: DISCONTINUED | OUTPATIENT
Start: 2017-08-08 | End: 2017-08-24 | Stop reason: HOSPADM

## 2017-08-07 RX ORDER — ACETAMINOPHEN 325 MG/1
650 TABLET ORAL EVERY 6 HOURS PRN
Status: DISCONTINUED | OUTPATIENT
Start: 2017-08-07 | End: 2017-08-24 | Stop reason: HOSPADM

## 2017-08-07 RX ORDER — GLUCAGON 1 MG
1 KIT INJECTION
Status: DISCONTINUED | OUTPATIENT
Start: 2017-08-07 | End: 2017-08-24 | Stop reason: HOSPADM

## 2017-08-07 RX ORDER — POLYETHYLENE GLYCOL 3350 17 G/17G
17 POWDER, FOR SOLUTION ORAL 2 TIMES DAILY PRN
Status: CANCELLED | OUTPATIENT
Start: 2017-08-07

## 2017-08-07 RX ORDER — INSULIN ASPART 100 [IU]/ML
0-5 INJECTION, SOLUTION INTRAVENOUS; SUBCUTANEOUS
Status: DISCONTINUED | OUTPATIENT
Start: 2017-08-07 | End: 2017-08-24 | Stop reason: HOSPADM

## 2017-08-07 RX ORDER — HEPARIN SODIUM 1000 [USP'U]/ML
2000 INJECTION, SOLUTION INTRAVENOUS; SUBCUTANEOUS
Status: CANCELLED | OUTPATIENT
Start: 2017-08-07

## 2017-08-07 RX ORDER — HEPARIN SODIUM 5000 [USP'U]/ML
5000 INJECTION, SOLUTION INTRAVENOUS; SUBCUTANEOUS EVERY 8 HOURS
Status: DISCONTINUED | OUTPATIENT
Start: 2017-08-07 | End: 2017-08-24 | Stop reason: HOSPADM

## 2017-08-07 RX ORDER — DOCUSATE SODIUM 100 MG/1
100 CAPSULE, LIQUID FILLED ORAL 2 TIMES DAILY
Status: DISCONTINUED | OUTPATIENT
Start: 2017-08-07 | End: 2017-08-24 | Stop reason: HOSPADM

## 2017-08-07 RX ORDER — HYDROMORPHONE HYDROCHLORIDE 1 MG/ML
0.5 INJECTION, SOLUTION INTRAMUSCULAR; INTRAVENOUS; SUBCUTANEOUS EVERY 6 HOURS PRN
Status: DISCONTINUED | OUTPATIENT
Start: 2017-08-07 | End: 2017-08-07

## 2017-08-07 RX ORDER — IBUPROFEN 200 MG
24 TABLET ORAL
Status: DISCONTINUED | OUTPATIENT
Start: 2017-08-07 | End: 2017-08-24 | Stop reason: HOSPADM

## 2017-08-07 RX ORDER — ONDANSETRON 4 MG/1
8 TABLET, FILM COATED ORAL EVERY 8 HOURS PRN
Status: DISCONTINUED | OUTPATIENT
Start: 2017-08-07 | End: 2017-08-24 | Stop reason: HOSPADM

## 2017-08-07 RX ORDER — SEVELAMER CARBONATE 800 MG/1
2400 TABLET, FILM COATED ORAL
Status: DISCONTINUED | OUTPATIENT
Start: 2017-08-07 | End: 2017-08-24 | Stop reason: HOSPADM

## 2017-08-07 RX ADMIN — SEVELAMER CARBONATE 2400 MG: 800 TABLET, FILM COATED ORAL at 06:08

## 2017-08-07 RX ADMIN — OXYCODONE HYDROCHLORIDE 5 MG: 5 TABLET ORAL at 07:08

## 2017-08-07 RX ADMIN — DOCUSATE SODIUM 100 MG: 100 CAPSULE, LIQUID FILLED ORAL at 09:08

## 2017-08-07 RX ADMIN — HEPARIN SODIUM 5000 UNITS: 5000 INJECTION, SOLUTION INTRAVENOUS; SUBCUTANEOUS at 09:08

## 2017-08-07 RX ADMIN — ASPIRIN 81 MG CHEWABLE TABLET 81 MG: 81 TABLET CHEWABLE at 08:08

## 2017-08-07 RX ADMIN — SEVELAMER CARBONATE 2400 MG: 800 TABLET, FILM COATED ORAL at 08:08

## 2017-08-07 RX ADMIN — CALCITRIOL 0.5 MCG: 0.25 CAPSULE, LIQUID FILLED ORAL at 08:08

## 2017-08-07 RX ADMIN — PREGABALIN 75 MG: 75 CAPSULE ORAL at 09:08

## 2017-08-07 RX ADMIN — Medication 16 G: at 09:08

## 2017-08-07 RX ADMIN — HEPARIN SODIUM 5000 UNITS: 5000 INJECTION, SOLUTION INTRAVENOUS; SUBCUTANEOUS at 05:08

## 2017-08-07 RX ADMIN — METHOCARBAMOL 500 MG: 500 TABLET ORAL at 11:08

## 2017-08-07 RX ADMIN — METHOCARBAMOL 500 MG: 500 TABLET ORAL at 10:08

## 2017-08-07 RX ADMIN — AMLODIPINE BESYLATE 10 MG: 10 TABLET ORAL at 08:08

## 2017-08-07 NOTE — PT/OT/SLP PROGRESS
Physical Therapy      Aleta Herrera  MRN: [id number]    Patient not seen today secondary to  (pt. discharged from hospital before PT session). Full discharge summary to follow.    Simon Vazquez, PT   8/7/2017

## 2017-08-07 NOTE — NURSING
Received to Rehab via [facility] transportation service. Assisted to bed. AAO X3. Dr. Cano notified of arrival.

## 2017-08-07 NOTE — PLAN OF CARE
"Sw informed by Heavenly with [facility] "that they have a bed and are ready for Pt." CM aware. Sw spoke with spouse Barrera, aware and agrees with transfer. Nurse Chelsie aware of transport time and number for report. Sw setup SPD w/c van with Thu at  for 2pm. Pt ok to d.c, O[facility] aware as well.   "

## 2017-08-07 NOTE — PLAN OF CARE
Problem: Occupational Therapy Goal  Goal: Occupational Therapy Goal  Pt will complete grooming seated EOB with sup and without UE support to maintain dynamic sitting balance. Not met  Pt will complete perpendicular transfer to BSC with min A. Not met   Pt will complete UB dressing seated at EOB with sup. Not met  Pt will complete sit to stand with mod A in prep for functional transfers. Not met  Outcome: Ongoing (interventions implemented as appropriate)  Goals reviewed and remain appropriate.

## 2017-08-07 NOTE — ASSESSMENT & PLAN NOTE
Other Rehab Plan/Continue to monitor:   Nutrition/Swallow Status:    - Prealbumin - pending   - Nutritionist on board   Bladder Management: continent  Bowel Management:  continent  - Colace BID and Suppository PRN   - Will monitor for regularity   Mood: stable   Sleep: monitor; Will consider sleep aid as clinically indicated   Skin: Monitor   DVT ppx: heparin

## 2017-08-07 NOTE — PT/OT/SLP PROGRESS
"Occupational Therapy  Treatment    Aleta Herrera   MRN: [id number]   Admitting Diagnosis: Left foot infection    OT Date of Treatment: 08/07/17   OT Start Time: 0958  OT Stop Time: 1037  OT Total Time (min): 39 min    Billable Minutes:  Therapeutic Activity 39 minutes    General Precautions: Standard, fall  Orthopedic Precautions:  (L AKA)  Braces: N/A    Subjective:  Communicated with nursing prior to session. As per nursing, pt ok to be seen for therapy at this time. Pt agreeable to therapy session.   "Why is it important to get into the chair?"  Pain/Comfort  Pain Rating 1: 0/10    Objective:  Patient found with: oxygen     Functional Mobility:  Bed Mobility:  Scooting/Bridging: Moderate Assistance; pt able to scoot anteriorly initially with sup, however to attain position closer to EOB, pt req mod A.   Supine to Sit: Minimum Assistance; HHA assist to bring trunk upright    Transfers:   Sit <> Stand Assistance: Total Assistance; x2 from EOB; pt noted with posterior lean seated at EOB and fear of leaning forward in prep for functional transfers, limiting ability to participate  Sit <> Stand Assistive Device: No Assistive Device; with hands supported on therapists, elbows for support  Bed <> Chair Technique: Stand Pivot  Bed <> Chair Transfer Assistance: Total Assistance  Bed <> Chair Assistive Device: No Assistive Device    Therapeutic Exercise:  Shoulder flexion 2x10 reps with noted with muscle fatigue detention through the exercise with inability to attain full ROM each time.   Elbow flexion 2x15 reps  Grasp 1x10 reps    Additional:  Pt educated on importance of performing pressure relief to prevent worsening of new sore on bottom. Pt verbalizing understanding on importance of turning to relieve pressure when in bed. Pt unable to attain triceps extension seated in chair in order to clear bottom off chair.   Pt educated on importance of UB strength for assistance on transfers  Pt educated on role of Ot, OT " "POC, and importance of bed > chair  Communication board updated    AM-PAC 6 CLICK ADL   How much help from another person does this patient currently need?   1 = Unable, Total/Dependent Assistance  2 = A lot, Maximum/Moderate Assistance  3 = A little, Minimum/Contact Guard/Supervision  4 = None, Modified Josephine/Independent    Putting on and taking off regular lower body clothing? : 2  Bathing (including washing, rinsing, drying)?: 2  Toileting, which includes using toilet, bedpan, or urinal? : 2  Putting on and taking off regular upper body clothing?: 3  Taking care of personal grooming such as brushing teeth?: 4  Eating meals?: 4  Total Score: 17     AM-PAC Raw Score CMS "G-Code Modifier Level of Impairment Assistance   6 % Total / Unable   7 - 8 CM 80 - 100% Maximal Assist   9-13 CL 60 - 80% Moderate Assist   14 - 19 CK 40 - 60% Moderate Assist   20 - 22 CJ 20 - 40% Minimal Assist   23 CI 1-20% SBA / CGA   24 CH 0% Independent/ Mod I       Patient left up in chair with all lines intact, call button in reach and nursing notified.     ASSESSMENT:  Aleta Herrera is a 62 y.o. female with a medical diagnosis of Left foot infection s/p L AKA. Pt p/w increased fear of falling affecting further mobility and assistance req with transfers, decreased UE strength, decreased independence with ADLs, and decreased functional activity tolerance. Pt would continue to benefit from skilled OT services to maximize independence with ADLs and functional transfers.     Rehab identified problem list/impairments: Rehab identified problem list/impairments: weakness, impaired endurance, impaired self care skills, impaired functional mobilty, decreased safety awareness, impaired balance, impaired skin    Rehab potential is good.    Activity tolerance: Fair    Discharge recommendations: Discharge Facility/Level Of Care Needs: rehabilitation facility     Barriers to discharge: Barriers to Discharge: Decreased caregiver " support (max A-total A for transfers )    Equipment recommendations: walker, standard, wheelchair (drop arm commode)     GOALS:    Occupational Therapy Goals        Problem: Occupational Therapy Goal    Goal Priority Disciplines Outcome Interventions   Occupational Therapy Goal     OT, PT/OT Ongoing (interventions implemented as appropriate)    Description:  Pt will complete grooming seated EOB with sup and without UE support to maintain dynamic sitting balance. Not met  Pt will complete perpendicular transfer to BSC with min A. Not met   Pt will complete UB dressing seated at EOB with sup. Not met  Pt will complete sit to stand with mod A in prep for functional transfers. Not met                    Plan:  Patient to be seen 4 x/week to address the above listed problems via therapeutic activities, therapeutic exercises, self-care/home management  Plan of Care expires: 09/01/17  Plan of Care reviewed with: patient         Norah Rodrigo, OT  08/07/2017

## 2017-08-07 NOTE — PROGRESS NOTES
[facility] Hospital Medicine  Progress Note    Patient Name: Aleta Herrera  MRN: [id number]  Patient Class: IP- Inpatient   Admission Date: 7/23/2017  Length of Stay: 15 days  Attending Physician: No att. providers found  Primary Care Provider: Radha Lao MD    [facility] Medicine Team: [facility] 2 Man Lorenzana MD    Subjective:     Principal Problem:Left foot infection    HPI:  Aleta Herrera is a 62-year-old female with HTN, HFpEF (EF 60%; home O2 2.5 liters), anemia 2/2 CKD and IDDM c/b retinopathy, neuropathy and ESRD (HD MWF). She presents today with her  to the ED because of new bleeding from the left foot ulcer. She's had on/off fevers and chills for past week or so. Apparently blood cultures were drawn at dialysis (7/19). She was seen by vascular surgery the next day where her right femoral permacath was pulled. She has a working left femoral AVG. Her upper extremity access has failed over the years since being placed on dialysis in 2009. Besides left leg pain, she denies any SOB, CP, cough, congestion, abdominal pain, n/v or diarrhea. Not received any recent antibiotics.   Complaining of foot pain in the ED, otherwise has no complaints. CXR showed mild b/l effusions. Left foot x-ray soft tissue swelling, but no gas formation. Labs significant for WBC count of 24k. Procalcitonin 1.64.       Hospital Course:  7/24: VSS. WBC 22.58 down from 24.04 in ED. Sed rate 100. .3. CXR stable. L Foot xray soft tissue swelling. MRI L foot no osteomylitis, no drainable collections. JULIUS L with mod-severe occlusive disease, R with mod. Continue ceftriaxone. HD today.  7/25: VSS. WBC 19.18. Vasc Surg consulted, L femoral AVG w/ possible vascular steal; will perform LLE angiogram on 7/28, if no arterial stenosis will plan for femoral graft ligation & permacath placement. Podiatry consulted, extensive stable left plantar arch necrosis, no acute surgical  intervention; will f/u after angiogram.  Wound cx pending. 1/4 Bcx +GPC in clusters. Continue ceftriaxone.   7/26: I&D and debridement with Podiatry today.  Continue Ceftriaxone.   7/27: Spiked fever to 101.1F, improved with tylenol. Repeated Blood cx x2. HD today. NPO at midnight.  7/28: Had Aortogram + Left LE angiogram by vascular surgery; significant steal from AVG, left foot not salvagable. Plan for Left AKA.   7/29: VSS. HD today. Continue abx.   7/31 L AKA   8/01 HD today  8/02 D/C oral cipro  8/04 Patient developed fever of 101.6. Ordered fever workup. Started IV Zosyn and Vancomycin. Ordered US RLE to eval for DVT.  8/05 Hgb 6.7. Transfused 1 U pRBCs  8/06 Patient clinically improved, no sign of infection. D/C antibiotics.    8/7 Stable for discharge     Interval History: Afebrile. Patient is awake, alert, and has no complaints.  Pleasant this AM and looking forward to rehab/SNF Tolerating PO . No bloody bowel movements, no diarrhea.     Review of Systems   Constitutional: Negative for chills and fever.   Respiratory: Negative for cough and shortness of breath.    Cardiovascular: Negative for chest pain.   Gastrointestinal: Negative for abdominal pain, diarrhea and vomiting.   Genitourinary:        Does not make urine   Neurological: Negative for light-headedness and headaches.   All other systems reviewed and are negative.    Objective:     Vital Signs (Most Recent):  Temp: 97.5 °F (36.4 °C) (08/07/17 1224)  Pulse: 83 (08/07/17 1224)  Resp: 18 (08/07/17 1224)  BP: 119/64 (08/07/17 1224)  SpO2: (!) 93 % (08/07/17 1224) Vital Signs (24h Range):  Temp:  [97.3 °F (36.3 °C)-98.5 °F (36.9 °C)] 98.2 °F (36.8 °C)  Pulse:  [75-87] 87  Resp:  [16-18] 18  SpO2:  [93 %-98 %] 93 %  BP: (103-126)/(57-74) 115/63     Weight: 67 kg (147 lb 11.3 oz)  Body mass index is 29.83 kg/m².  No intake or output data in the 24 hours ending 08/07/17 2014   Physical Exam   Constitutional: She is oriented to person, place, and time. She  appears well-developed and well-nourished.   HENT:   Head: Normocephalic and atraumatic.   Eyes: EOM are normal. Pupils are equal, round, and reactive to light.   Cardiovascular: Normal rate and regular rhythm.    Pulmonary/Chest: Effort normal. No respiratory distress.   Abdominal: She exhibits no distension. There is no tenderness. Hernia: soft, non-tender.   Musculoskeletal:   Left AKA, no dressing. Staples in place. No surrounding erythema or warmth.    Neurological: She is alert and oriented to person, place, and time.   Awake, alert, answers all questions appropriately.     Skin: Skin is warm and dry.       Significant Labs:   BMP:     Recent Labs  Lab 08/07/17  0503   GLU 89   *   K 4.1      CO2 24   BUN 28*   CREATININE 4.2*   CALCIUM 8.0*     CBC:     Recent Labs  Lab 08/06/17  0413 08/07/17  0503   WBC 21.92* 15.58*   HGB 7.8* 7.9*   HCT 25.1* 26.3*   * 423*       Significant Imaging: no significant imaging    Assessment/Plan:      Peripheral vascular disease, unspecified    - had Aortogram + Left LE angiogram 07/28 which showed Significant steal from left thigh AVG. Left foot not salvageable given extent of infection. S/p left AKA on 7/31          Leukocytosis    No clinically evident infection  No fever since 8/4  Possibly secondary to post-surgical inflammation  - Continue to trend          Pressure ulcer of left buttock    Noted by wound care 8/04  - care per wound care recommendations  - does not appear to be the source of infection          Fever    Fever of 101.6 on 8/4  Patient empirically treated for hospital-acquired pneumonia with IV Vancomycin/Zosyn  No further fevers. WBC still elevated, but patient clinically improved with no signs or symptoms of infection  Blood cultures NGTD  US RLE neg for DVT  - D/C antibiotics and monitor            Amputation of leg    PM&R Consulted  PT/OT Consulted  Will require rehab          Type 2 diabetes mellitus with left diabetic foot ulcer  "   See "left foot infection"         (HFpEF) heart failure with preserved ejection fraction    - BNP 2835 on admission   - Pt with ESRD on HD            Pleural effusion    - Seen on prior CXR (5/3/17). Unclear etiology. Stable.        ESRD (end stage renal disease)    - Dialysis inpatient  - Will continue to monitor electrolytes        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    - Continue Norvasc  - Diabetic diet inpatient  - Detemir 4 U qhs  - Sliding scale insulin        Late effect of ischemic cerebral stroke    - Continued ASA 81 mg.           Hypertension, renal    - Continue home amlodipine 10 mg QD        Anemia of chronic renal failure    Hgb dropped on 8/5 to 6.7. No obvious cause of bleeding. Stool is soft and brown without blood per nurse  Transfused 1 U pRBCs on 8/5  Patient receiving Procrit w/ dialysis.               VTE Risk Mitigation     None          Dispo: Discharge to SNF, H/H stable and patient afebrile.     Man Lorenzana MD  Department of Hospital Medicine   [facility]  "

## 2017-08-07 NOTE — DISCHARGE SUMMARY
[facility] Hospital Medicine  Discharge Summary      Patient Name: Aleta Herrera  MRN: [id number]  Admission Date: 7/23/2017  Hospital Length of Stay: 15 days  Discharge Date and Time: 8/7/2017  2:10 PM  Attending Physician: Carson Morgan, *   Discharging Provider: Man Lorenzana MD  Primary Care Provider: Radha Lao MD  [facility]ospital Medicine [place] [facility] 2 Man Lorenzana MD    HPI:   Aleta Herrera is a 62-year-old female with HTN, HFpEF (EF 60%; home O2 2.5 liters), anemia 2/2 CKD and IDDM c/b retinopathy, neuropathy and ESRD (HD MWF). She presents today with her  to the ED because of new bleeding from the left foot ulcer. She's had on/off fevers and chills for past week or so. Apparently blood cultures were drawn at dialysis (7/19). She was seen by vascular surgery the next day where her right femoral permacath was pulled. She has a working left femoral AVG. Her upper extremity access has failed over the years since being placed on dialysis in 2009. Besides left leg pain, she denies any SOB, CP, cough, congestion, abdominal pain, n/v or diarrhea. Not received any recent antibiotics.   Complaining of foot pain in the ED, otherwise has no complaints. CXR showed mild b/l effusions. Left foot x-ray soft tissue swelling, but no gas formation. Labs significant for WBC count of 24k. Procalcitonin 1.64.       Procedure(s) (LRB):  AMPUTATION-ABOVE KNEE (Left)      Indwelling Lines/Drains at time of discharge:   Lines/Drains/Airways     Drain                 Hemodialysis AV Graft Left thigh 70259 days          Pressure Ulcer                 Pressure Ulcer 08/03/17 1230 Left lower buttocks 3 days         Pressure Ulcer 08/04/17 1230 Right upper buttocks Stage II 2 days              Hospital Course:   Patient was admitted to Hospital medicine on 7/24 for left diabetic foot infection and patient was started on empiric ceftriaxone.  Podiatry was consulted  and recommended MRI to rule out osteomyelitis and Vascular surgery consult given concern for poor perfusion.  MRI showed no concern for osteomyelitis and I and D with debridement was performed on 7/26.  Patient underwent an aortogram + Left LE angiogram which showed significant steal from AVG.  It was deemed that the left foot was likely not salvageable, and she underwent an left AKA on 7/31.  Patient tolerated the procedure well and was transitioned to PO cipro, vascular surgery wished to discontinue abx on 8/02 given source control.  She was prepped for SNF however developed a fever on 8/04 fever. Workup was negative, including blood cultures, UA, and CXR.  Patient was deemed stable for discharge to SNF on 8/07.  On day of discharge patient was VSS, tolerating PO, afebrile and able to participate with therapy.               Consults:   Consults         Status Ordering Provider     Inpatient consult to Nephrology  Once     Provider:  (Not yet assigned)    Completed ELSA JONES     Inpatient consult to Physical Medicine Rehab  Once     Provider:  (Not yet assigned)    Completed JENNIE MARTINEZ     Inpatient consult to Podiatry  Once     Provider:  (Not yet assigned)    Completed MINNA SANDHU     Inpatient consult to [facility]  Once     Provider:  (Not yet assigned)    Acknowledged JENNIE MARTINEZ     Inpatient consult to Vascular Surgery  Once     Provider:  (Not yet assigned)    Completed MINNA SANDHU          Significant Diagnostic Studies: Microbiology:   Blood Culture   Lab Results   Component Value Date    LABBLOO No growth after 5 days. 08/04/2017       Pending Diagnostic Studies:     None        Final Active Diagnoses:    Diagnosis Date Noted POA    Peripheral vascular disease, unspecified [I73.9]  Yes    Leukocytosis [D72.829] 08/06/2017 Yes    Fever [R50.9] 08/04/2017 No    Pressure ulcer of left buttock [L89.329] 08/04/2017 Yes    Stage III pressure ulcer of right  buttock [L89.313] 08/04/2017 Yes    Amputation of leg [Z89.619] 08/03/2017 Not Applicable    Type 2 diabetes mellitus with left diabetic foot ulcer [E11.621, L97.529] 07/25/2017 Yes    ESRD (end stage renal disease) [N18.6] 07/23/2017 Yes    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 [E11.22, I12.0, Z99.2, N18.6] 08/02/2015 Not Applicable    Anemia of chronic renal failure [N18.9, D63.1] 09/05/2013 Yes     Chronic      Problems Resolved During this Admission:    Diagnosis Date Noted Date Resolved POA    PRINCIPAL PROBLEM:  Left foot infection [L08.9]  08/07/2017 Yes      * Left foot infection-resolved as of 8/7/2017    Presenting to the ED w/ new bleeding from L leg ulcer along with fevers, chills and new leukocytosis.   - Received vancomycin , flagyl and rocephin in ED. Fever resolved with Tylenol in ED. Dc'd vancomycin and flagyl on the floor.   - Blood cx 1/4 +gram positive cocci in clusters resembling staph   - Blood cx 1/4 +gram positive rods  - Wound cx growing proteus and GNR, lactose   - Repeat blood cx x2 show NGTD  - S/p Aortogram + Left LE angiogram with Vascular surgery   - Significant steal from L thigh AVG. L foot not salvageable given extent of infection.   - S/p left AKA 7/31, healing well   - Cipro stopped 8/02 given source control     Plan  - healing well s/p AKA            Peripheral vascular disease, unspecified    - had Aortogram + Left LE angiogram 07/28 which showed Significant steal from left thigh AVG. Left foot not salvageable given extent of infection. S/p left AKA on 7/31          Leukocytosis    No clinically evident infection  No fever since 8/4  Possibly secondary to post-surgical inflammation  - Continue to trend          Pressure ulcer of left buttock    Noted by wound care 8/04  - care per wound care recommendations  - does not appear to be the source of infection          Fever    Fever of 101.6 on 8/4  Patient empirically treated for hospital-acquired  "pneumonia with IV Vancomycin/Zosyn  No further fevers. WBC still elevated, but patient clinically improved with no signs or symptoms of infection  Blood cultures NGTD  US RLE neg for DVT  - D/C antibiotics and monitor            Amputation of leg    PM&R Consulted  PT/OT Consulted  Will require rehab          Type 2 diabetes mellitus with left diabetic foot ulcer    See "left foot infection"         ESRD (end stage renal disease)    - Dialysis inpatient  - Will continue to monitor electrolytes        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    - Continue Norvasc  - Diabetic diet inpatient  - Detemir 4 U qhs  - Sliding scale insulin        Anemia of chronic renal failure    Hgb dropped on 8/5 to 6.7. No obvious cause of bleeding. Stool is soft and brown without blood per nurse  Transfused 1 U pRBCs  Patient receiving Procrit w/ dialysis.  Stable H/H prior to discharge                Discharged Condition: good    Disposition:     Follow Up:  Follow-up Information     Nathalie Madera MD In 4 weeks.    Specialty:  Vascular Surgery  Contact information:  [address] [place] [id number] [phone]                 Patient Instructions:     Ambulatory referral to Outpatient Case Management   Referral Priority: Routine Referral Type: Consultation   Referral Reason: Specialty Services Required    Number of Visits Requested: 1        Medications:  Transfer Medications (for Discharge Readmit only):   No current facility-administered medications for this encounter.      No current outpatient prescriptions on file.     Facility-Administered Medications Ordered in Other Encounters   Medication Dose Route Frequency Provider Last Rate Last Dose    acetaminophen tablet 650 mg  650 mg Oral Q6H PRN Amirah Cano MD        amlodipine tablet 10 mg  10 mg Oral Daily Man Lorenzana MD   10 mg at 08/08/17 0747    aspirin chewable tablet 81 mg  81 mg Oral Daily Man Lorenzana MD   81 mg at 08/09/17 " 0805    bisacodyl suppository 10 mg  10 mg Rectal Daily PRN Amirah Cano MD        calcitRIOL capsule 0.5 mcg  0.5 mcg Oral Daily Man Lorenzana MD   0.5 mcg at 08/09/17 0805    dextrose 50% injection 12.5 g  12.5 g Intravenous PRN Man Lorenzana MD        dextrose 50% injection 25 g  25 g Intravenous PRN Man Lorenzana MD        docusate sodium capsule 100 mg  100 mg Oral BID Amirah Cano MD   100 mg at 08/09/17 0805    glucagon (human recombinant) injection 1 mg  1 mg Intramuscular PRN Man Lorenzana MD        glucose chewable tablet 16 g  16 g Oral PRN Man Lorenzana MD   16 g at 08/07/17 2119    glucose chewable tablet 24 g  24 g Oral PRN Man Lorenzana MD        heparin (porcine) injection 2,000 Units  2,000 Units Intravenous PRN Man Lorenzana MD        heparin (porcine) injection 5,000 Units  5,000 Units Subcutaneous Q8H Man Lorenzana MD   5,000 Units at 08/09/17 0515    insulin aspart pen 0-5 Units  0-5 Units Subcutaneous QID (AC + HS) PRN Man Lorenzana MD        insulin detemir pen 4 Units  4 Units Subcutaneous BID Man Lorenzana MD   4 Units at 08/09/17 0805    methocarbamol tablet 500 mg  500 mg Oral TID PRN Man Lorenzana MD   500 mg at 08/09/17 1020    ondansetron disintegrating tablet 8 mg  8 mg Oral Q8H PRN Amirah Cano MD        ondansetron tablet 8 mg  8 mg Oral Q8H PRN Man Lorenzana MD        oxycodone immediate release tablet 5 mg  5 mg Oral Q6H PRN Man Lorenzana MD   5 mg at 08/08/17 1855    pneumoc 13-pramod conj-dip cr(PF) 0.5 mL  0.5 mL Intramuscular vaccine x 1 dose Amirah Cano MD        polyethylene glycol packet 17 g  17 g Oral BID PRN Man Lorenzana MD        pregabalin capsule 75 mg  75 mg Oral QHS Man Lorenzana MD   75 mg at 08/08/17 2101    senna-docusate 8.6-50 mg per tablet 1 tablet  1 tablet Oral Daily MEL Lorenzana MD         sevelamer carbonate tablet 2,400 mg  2,400 mg Oral TID  Man Lorenzana MD   2,400 mg at 08/09/17 1211     Time spent on the discharge of patient: 30 minutes    HOS POC IP DISCHARGE SUMMARY    Man Lorenzana MD  Department of Hospital Medicine  [facility]

## 2017-08-07 NOTE — H&P
[facility]  Physical Medicine & Rehab  History & Physical    Patient Name: Aleta Herrera  MRN: [id number]  Admission Date: 8/7/2017  Attending Physician: Amirah Cano MD  Primary Care Provider: Radha Lao MD    Subjective:     Principal Problem: Amputation of leg    HPI: 62-year-old female with PMHx of stroke, HTN, CHF (EF 60%; on home O2 2.5 liters), anemia 2/2 CKD and DM2 with retinopathy, neuropathy and ESRD (HD MWF and L AVG placed 4/2017) presented to [facility] on 7/23/17 with painful, bleeding L foot ulcer and intermittent fever/chills x 1 week.   In the ED, L foot x-ray revealed soft tissue swelling.  CXR showed mild bilateral effusions with leukocytosis (24K) and started on Ceftriaxone.  MRI L foot did not demonstrate osteomyelitis or drainable fluid collections.   JULIUS L showed mod-severe occlusive disease, R with moderate occlusive disease. Blood cultures were obtained in the ED and resulted as clusters and gram positive rods.  On 7/25, Vascular Surgery and Podiatry were consulted. On 7/26, Podiatry performed I & D and debridement of left foot for extensive left plantar tissue necrosis with pending cultures. On 7/27, podiatry noted she had wet gangrenous changes noted to plantar arch of left foot. She became febrile that same day and repeat blood cultures resulted as NGTD.   On 7/28, LLE angiogram per vascular surgery revealed significant AVG steal and her left foot was deemed unsalvageable.  She underwent a successful L AKA on 7/31.  She is currently finished her abx treatment.     Current Functional Status:  Participating with therapy.  Bed mobility ModA.  Sit to stand totalA & RW and transfers totalA-MaxA. UBD Independent and LBD MaxA.    Functional History: Per chart review, patient lives in [place] with spouse in a two story home with a ramp to enter.  She is able to live on the first floor.   Prior to admission, she was independent with ALDs.  She used a rollator for  functional mobility in the home and intermittently used a transport WC for functional mobility.   DME: rollator, transfer WC, & BSC.    Past Medical History:   Diagnosis Date    Anemia of chronic renal failure 2013    Anticoagulant long-term use 2015    CHF (congestive heart failure)     Coronary artery disease     Diabetes mellitus     Diabetic neuropathy 2013    DR (diabetic retinopathy) 2013    Encounter for blood transfusion     End stage renal disease on dialysis     Fever 2017    Hypertension     Hypertension, renal 2013    Kidney transplant candidate 2013    Secondary hyperparathyroidism of renal origin 2013    Stroke 2015    Type 2 diabetes mellitus since 2013     Past Surgical History:   Procedure Laterality Date    AV FISTULA PLACEMENT      CATARACT SURGERY       SECTION, LOW TRANSVERSE      x 3    COLONOSCOPY N/A 2016    Procedure: COLONOSCOPY;  Surgeon: Roverto Patel MD;  Location: [facility] ([address] [place] [address]);  Service: Endoscopy;  Laterality: N/A;    EYE SURGERY      HYSTERECTOMY      Endometriosis    LEG AMPUTATION THROUGH KNEE Left 2017    TONSILLECTOMY      VASCULAR SURGERY       Review of patient's allergies indicates:  No Known Allergies    Scheduled Medications:    [START ON 2017] amlodipine  10 mg Oral Daily    [START ON 2017] aspirin  81 mg Oral Daily    [START ON 2017] calcitRIOL  0.5 mcg Oral Daily    docusate sodium  100 mg Oral BID    [START ON 2017] epoetin batsheva (PROCRIT) injection  100 Units/kg Intravenous Every Mon, Wed, Fri    heparin (porcine)  5,000 Units Subcutaneous Q8H    insulin detemir  4 Units Subcutaneous BID    pregabalin  75 mg Oral QHS    sevelamer carbonate  2,400 mg Oral TID WM       PRN Medications: acetaminophen, acetaminophen, bisacodyl, dextrose 50%, dextrose 50%, glucagon (human recombinant), glucose, glucose, heparin (porcine), HYDROmorphone, insulin aspart,  methocarbamol, ondansetron, ondansetron, oxycodone, pneumoc 13-pramod conj-dip cr(PF), polyethylene glycol, senna-docusate 8.6-50 mg    Family History     Problem Relation (Age of Onset)    Cancer Mother (70)    Heart disease Mother    Hypertension Mother        Social History Main Topics    Smoking status: Former Smoker     Packs/day: 0.25     Years: 0.50     Types: Cigarettes     Quit date: 9/5/1975    Smokeless tobacco: Never Used      Comment: quit smoking cigarettes 40+ years ago    Alcohol use No    Drug use: No    Sexual activity: Yes     Review of Systems   Constitutional: Negative for unexpected weight change.   HENT: Negative for congestion and ear pain.    Eyes: Negative for discharge.   Respiratory: Negative for shortness of breath.    Cardiovascular: Negative for chest pain.   Gastrointestinal: Negative for abdominal pain and vomiting.   Endocrine: Negative for cold intolerance.   Genitourinary: Negative for flank pain.   Neurological: Positive for weakness.   Psychiatric/Behavioral: Negative for hallucinations.     Objective:     Vital Signs (Most Recent):  Temp: 98.5 °F (36.9 °C) (08/07/17 1507)  Pulse: 85 (08/07/17 1507)  Resp: 16 (08/07/17 1507)  BP: 115/63 (08/07/17 1507)  SpO2: (!) 94 % (08/07/17 1507)    Vital Signs (24h Range):  Temp:  [97.3 °F (36.3 °C)-98.5 °F (36.9 °C)] 98.5 °F (36.9 °C)  Pulse:  [75-88] 85  Resp:  [16-18] 16  SpO2:  [93 %-98 %] 94 %  BP: (103-127)/(55-74) 115/63     Body mass index is 22.02 kg/m².    Physical Exam   Constitutional: She is oriented to person, place, and time. She appears well-developed and well-nourished.   HENT:   Head: Normocephalic and atraumatic.   Eyes: EOM are normal.   Cardiovascular: Normal rate.    Pulmonary/Chest: Effort normal. No respiratory distress.   Abdominal: Soft. She exhibits no distension. There is no tenderness.   Musculoskeletal:   UE: RUE: SA 4/5, EF/EE 3/5,  4/5  LUE: SA 4/5, EF/EE 4/5, 3/5,  4/5  RLE HF 3/5, KE 4/5, DF/PF  "4/5  AKA site C/D, closed w staples    Neurological: She is alert and oriented to person, place, and time.   Followed commands  Memory: 3/3 immediate, 1/3 delayed    Skin: Skin is warm.   Psychiatric: She has a normal mood and affect.   Vitals reviewed.    NEUROLOGICAL EXAMINATION:     MENTAL STATUS   Oriented to person, place, and time.     CRANIAL NERVES     CN III, IV, VI   Extraocular motions are normal.       Diagnostic Results: Labs: Reviewed    Assessment/Plan:     Aleta Herrera is a 62 y.o. female being admitted to inpatient rehabilitation on 8/7/2017 for Amputation of leg with impaired mobility and ADLs.     * Amputation of leg    s/p L AKA on 7/31 2/2 gangrenous unsalvageable L foot         ESRD (end stage renal disease)    ESRD, home HD MWF and L AVG placed 4/2017          Hypertension, renal    Vitals:    08/07/17 1507   BP: 115/63   BP Location: Left arm   Patient Position: Lying   Pulse: 85   Resp: 16   Temp: 98.5 °F (36.9 °C)   TempSrc: Oral   SpO2: (!) 94%   Weight: 49.4 kg (109 lb)   Height: 4' 11" (1.499 m)           Stage III pressure ulcer of right buttock    - regular wound care, low air los mattress         Pressure ulcer of left buttock    - regular wound care, low air los mattress         Impaired mobility and ADLs    Other Rehab Plan/Continue to monitor:   Nutrition/Swallow Status:    - Prealbumin - will order on admission    - Nutritionist on board   Bladder Management: continent  Bowel Management:  continent  - Colace BID and Suppository PRN   - Will monitor for regularity   Mood: stable   Sleep: monitor; Will consider sleep aid as clinically indicated   Skin: Monitor   DVT ppx: Lovenox 40mg daily               Amirah Cano MD  Department of Physical Medicine & Rehab   [facility]    Post Admission Assessment:    Aleta Herrera is a 62 y.o. female being admitted to inpatient rehabilitation on 8/7/2017 for AKA with impaired mobility and ADLs. "   Patient is appropriate for inpatient rehabilitation and is expected to tolerate 3 hours of therapy a day or 15 hours of therapy a week.  Patient is expected to benefit from the following therapy services: Physical Therapy, Occupational Therapy,  Speech Therapy, as well as Recreational Therapy  Impairments include:   Impaired balance, Decreased Endurance, Impaired activity tolerance  Goals:  Supervision to Mod I with Mobility, ADLs, Transfers using LRAD   Precautions:  Fall, Aspiration    ELOS: 10-14 days   Disposition: Home with family     I have had the opportunity to examine the patient within 24 hours of admission and have reviewed the Pre-Admission Assessment and find it consistent with my examination and evaluation of the patient. I confirm that this patient is appropriate for admission and treatment in this inpatient rehabilitation hospital, needs intense interdisciplinary rehabilitation care under my direction and is expected to achieve meaningful goals within a reasonable period of time that are consistent with the planned discharge disposition.     The patient will be admitted for inpatient comprehensive interdisciplinary rehabilitation to address the impairments and medical conditions listed above while assessing equipment needs and compensatory strategies, with coordinated interdisciplinary services that will include physical therapy, occupational therapy, and close monitoring and treatment with 24-hour rehabilitative nursing. This interdisciplinary program will be performed under the direction of a physiatrist.

## 2017-08-07 NOTE — SUBJECTIVE & OBJECTIVE
Interval History: Interval History (08/07/2017): Patient is seen for follow-up rehab evaluation and recommendations.  No acute events over night.  Particpating with therapy.  Barriers for discharge/rehab admission: bed availability pending.     HPI, Past Medical, Surgical, Family, and Social History remains the same as documented in the initial encounter.      Scheduled Medications:    sodium chloride 0.9%   Intravenous Once    amlodipine  10 mg Oral Daily    aspirin  81 mg Oral Daily    calcitRIOL  0.5 mcg Oral Daily    epoetin batsheva (PROCRIT) injection  100 Units/kg Intravenous Every Mon, Wed, Fri    heparin (porcine)  5,000 Units Subcutaneous Q8H    insulin detemir  4 Units Subcutaneous BID    pregabalin  75 mg Oral QHS    sevelamer carbonate  2,400 mg Oral TID WM    sodium hypochlorite   Irrigation Once       PRN Medications: sodium chloride, sodium chloride 0.9%, sodium chloride 0.9%, sodium chloride 0.9%, sodium chloride 0.9%, sodium chloride 0.9%, acetaminophen, albumin human 25%, dextrose 50%, dextrose 50%, glucagon (human recombinant), glucose, glucose, heparin (porcine), HYDROmorphone, insulin aspart, methocarbamol, ondansetron, oxycodone, polyethylene glycol, senna-docusate 8.6-50 mg    Review of Systems   Constitutional: Negative for chills, fatigue and fever.   HENT: Negative for trouble swallowing and voice change.    Eyes: Negative for photophobia and visual disturbance.   Respiratory: Negative for cough, shortness of breath and wheezing.    Cardiovascular: Negative for chest pain and palpitations.   Gastrointestinal: Negative for abdominal distention and nausea.   Genitourinary: Negative for difficulty urinating and flank pain.   Musculoskeletal: Positive for arthralgias (L aka) and gait problem.   Skin: Negative for color change and rash.   Neurological: Positive for weakness. Negative for numbness.   Psychiatric/Behavioral: Negative for agitation and confusion.     Objective:     Vital  Signs (Most Recent):  Temp: 97.8 °F (36.6 °C) (17 0735)  Pulse: 83 (17 1038)  Resp: 18 (17 0735)  BP: 122/63 (17 0735)  SpO2: (!) 93 % (17 0735)    Vital Signs (24h Range):  Temp:  [97.3 °F (36.3 °C)-98.3 °F (36.8 °C)] 97.8 °F (36.6 °C)  Pulse:  [75-88] 83  Resp:  [16-18] 18  SpO2:  [93 %-97 %] 93 %  BP: (103-127)/(55-74) 122/63     Physical Exam   Constitutional: She appears well-developed and well-nourished.   HENT:   Head: Normocephalic and atraumatic.   Eyes: EOM are normal. Pupils are equal, round, and reactive to light.   Neck: Neck supple.   Cardiovascular: Normal rate and regular rhythm.    L AVG noted    Pulmonary/Chest: Effort normal. No respiratory distress.   Abdominal: Soft. There is no tenderness.   Musculoskeletal:   L AKA with staples noted to be dry and nonerythematous   Neurological:   -  Mental Status:  -  Mental Status:  AAOx3.  Follows commands.  Answers correct age and .  Recent and remote memory intact.     -  Motor:  RUE: 5/5, 5/5 .  LUE: 5/5, 5/5 .  LLE: 4/5, RLE: 5/5, DF 5/5, PF 5/5.   -  Tone:  normal  -  Sensory:  Intact to light touch and pin prick   Skin: Skin is warm and dry.   Psychiatric: She has a normal mood and affect. Her behavior is normal.   Vitals reviewed.    NEUROLOGICAL EXAMINATION:     CRANIAL NERVES     CN III, IV, VI   Pupils are equal, round, and reactive to light.  Extraocular motions are normal.

## 2017-08-07 NOTE — PLAN OF CARE
Sw did leave message with Heavenly with [facility] at [phone] to see if bed is available as Pt is stable to transfer per CM per team. Sw to follow.

## 2017-08-07 NOTE — PROGRESS NOTES
[facility]  Physical Medicine & Rehab  Progress Note    Patient Name: Aleta Herrera  MRN: [id number]  Admission Date: 7/23/2017  Length of Stay: 15 days  Attending Physician: Carson Morgan, *  Collaborating Physician: Nic Gray MD    Subjective:     Principal Problem:Left foot infection    Hospital Course:   8/2/17:  Evaluated by therapy.  Bed mobility SV-Suzie.  Sit to stand Max & RW and transfers totalA.  UBD Independent and LBD MaxA.  8/6/17: Participating with therapy.  Bed mobility ModA.  Sit to stand totalA & RW and transfers totalA-MaxA.      AM-PAC 6 CLICK MOBILITY (PT) - Total score: 12 (8/2), 11 (8/6)  AM-PAC 6 CLICK ADL (OT) - Total score: 18 (8/2)    AM-PAC Raw Score Level of Impairment Assistance   6 100% Total / Unable   7 - 8 80 - 100% Maximal Assist   9-13 60 - 80% Moderate Assist   14 - 19 40 - 60% Moderate Assist   20 - 22 20 - 40% Minimal Assist   23 1-20% SBA / CGA   24 0% Independent/ Mod I     Interval History: Interval History (08/07/2017): Patient is seen for follow-up rehab evaluation and recommendations.  Febrile over weekend with negative workup   Transfused with 1 unit PRBCs.  US negative for DVT. Clinically improved and antibiotics have been d/c'd.  Particpating with therapy.  Barriers for discharge/rehab admission: bed availability pending.     HPI, Past Medical, Surgical, Family, and Social History remains the same as documented in the initial encounter.      Scheduled Medications:    sodium chloride 0.9%   Intravenous Once    amlodipine  10 mg Oral Daily    aspirin  81 mg Oral Daily    calcitRIOL  0.5 mcg Oral Daily    epoetin batsheva (PROCRIT) injection  100 Units/kg Intravenous Every Mon, Wed, Fri    heparin (porcine)  5,000 Units Subcutaneous Q8H    insulin detemir  4 Units Subcutaneous BID    pregabalin  75 mg Oral QHS    sevelamer carbonate  2,400 mg Oral TID WM    sodium hypochlorite   Irrigation Once       PRN Medications:  sodium chloride, sodium chloride 0.9%, sodium chloride 0.9%, sodium chloride 0.9%, sodium chloride 0.9%, sodium chloride 0.9%, acetaminophen, albumin human 25%, dextrose 50%, dextrose 50%, glucagon (human recombinant), glucose, glucose, heparin (porcine), HYDROmorphone, insulin aspart, methocarbamol, ondansetron, oxycodone, polyethylene glycol, senna-docusate 8.6-50 mg    Review of Systems   Constitutional: Negative for chills, fatigue and fever.   HENT: Negative for trouble swallowing and voice change.    Eyes: Negative for photophobia and visual disturbance.   Respiratory: Negative for cough, shortness of breath and wheezing.    Cardiovascular: Negative for chest pain and palpitations.   Gastrointestinal: Negative for abdominal distention and nausea.   Genitourinary: Negative for difficulty urinating and flank pain.   Musculoskeletal: Positive for arthralgias (L aka) and gait problem.   Skin: Negative for color change and rash.   Neurological: Positive for weakness. Negative for numbness.   Psychiatric/Behavioral: Negative for agitation and confusion.     Objective:     Vital Signs (Most Recent):  Temp: 97.8 °F (36.6 °C) (08/07/17 0735)  Pulse: 83 (08/07/17 1038)  Resp: 18 (08/07/17 0735)  BP: 122/63 (08/07/17 0735)  SpO2: (!) 93 % (08/07/17 0735)    Vital Signs (24h Range):  Temp:  [97.3 °F (36.3 °C)-98.3 °F (36.8 °C)] 97.8 °F (36.6 °C)  Pulse:  [75-88] 83  Resp:  [16-18] 18  SpO2:  [93 %-97 %] 93 %  BP: (103-127)/(55-74) 122/63     Physical Exam   Constitutional: She appears well-developed and well-nourished.   HENT:   Head: Normocephalic and atraumatic.   Eyes: EOM are normal. Pupils are equal, round, and reactive to light.   Neck: Neck supple.   Cardiovascular: Normal rate and regular rhythm.    L AVG noted    Pulmonary/Chest: Effort normal. No respiratory distress.   Abdominal: Soft. There is no tenderness.   Musculoskeletal:   L AKA with staples noted to be dry and nonerythematous   Neurological:   -  Mental  Status:  -  Mental Status:  AAOx3.  Follows commands.  Answers correct age and .  Recent and remote memory intact.     -  Motor:  RUE: 5/5, 5/5 .  LUE: 5/5, 5/5 .  LLE: 4/5, RLE: 5/5, DF 5/5, PF 5/5.   -  Tone:  normal  -  Sensory:  Intact to light touch and pin prick   Skin: Skin is warm and dry.   Psychiatric: She has a normal mood and affect. Her behavior is normal.   Vitals reviewed.          Diagnostic Results:   Labs: Reviewed  X-Ray: Reviewed  US: Reviewed  MRI: Reviewed  JULIUS: Reviewed  Assessment/Plan:      Amputation of leg    -s/p L AKA on  per Vascular surgery  -NWB to L AKA    Recommendations  -  Early mobility, hip AROM, and OOB in chair at least 3 hours per day  -  PT/OT evaluate and treat  -  Monitor for phantom limb pain and/or sensation  -  Pain management  -  Wound care and edema control  -  Monitor for and prevent skin breakdown and pressure ulcers  · Early mobility, repositioning/weight shifting every 20-30 minutes when sitting, turn patient every 2 hours, proper mattress/overlay and chair cushioning, pressure relief/heel protector boots  -  Anticontracture management  · Firm mattress, lying prone 15 minutes TID, and knee extension while resting  -  Reviewed discharge options (IP rehab, SNF, HH therapy, and OP therapy)    -  Follow up in PM&R clinic 2-4 weeks post-op for postamputation and preprosthetic care          Sepsis    -leukocytosis in ED (24k) with L foot ulcer   -Completed Ceftriaxone  --s/p I & D and debridement of L plantar necrotic tissue per podiatry on   -s/p L AKA on  2/2 gangrenous unsalvageable L foot         Peripheral vascular disease, unspecified    -JULIUS on  revealed LLE mod-severe occlusive disease, RLE with moderate occlusive disease        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    -on HD        Patient approved for [facility].  Transfer to rehab when insurance clears and she is medically ready for discharge.        Nirali  ANGELINE Choi NP  Department of Physical Medicine & Rehab   [facility]

## 2017-08-07 NOTE — ASSESSMENT & PLAN NOTE
"Vitals:    08/07/17 1507   BP: 115/63   BP Location: Left arm   Patient Position: Lying   Pulse: 85   Resp: 16   Temp: 98.5 °F (36.9 °C)   TempSrc: Oral   SpO2: (!) 94%   Weight: 49.4 kg (109 lb)   Height: 4' 11" (1.499 m)     "

## 2017-08-07 NOTE — PLAN OF CARE
Future Appointments  Date Time Provider Department Center   8/10/2017 9:45 AM Nathalie Madera MD [facility] ANAIS James [facility]   9/6/2017 12:00 PM VASCULAR, LAB [facility] JUSTINAB Ramirez [facility]   9/6/2017 12:30 PM VASCULAR, LAB [facility] CAMILLETIFFANY Ramirez [facility]   9/6/2017 1:00 PM Nathalie Madera MD [facility] KAMININEISHA Ramirez [facility]   9/12/2017 9:40 AM Radha Cortez MD [facility]        08/07/17 1311   Final Note   Assessment Type Final Discharge Note   Discharge Disposition Rehab  ([facility])   Hospital Follow Up  Appt(s) scheduled? Yes   Offered [facility] -- Bedside Delivery? n/a   Discharge/Hospital Encounter Summary to ([facility]) PCP n/a   Referral to Outpatient Case Management complete? n/a   Referral to / orders for Home Health Complete? n/a   30 day supply of medicines given at discharge, if documented non-compliance / non-adherence? n/a   Any social issues identified prior to discharge? No   Did you assess the readiness or willingness of the family or caregiver to support self management of care? Yes   Right Care Referral Info   Post Acute Recommendation IRF   Facility Name [facility]

## 2017-08-08 ENCOUNTER — OUTPATIENT CASE MANAGEMENT (OUTPATIENT)
Dept: ADMINISTRATIVE | Facility: OTHER | Age: 62
End: 2017-08-08

## 2017-08-08 PROBLEM — L89.312 STAGE II PRESSURE ULCER OF RIGHT BUTTOCK: Status: ACTIVE | Noted: 2017-08-08

## 2017-08-08 LAB
POCT GLUCOSE: 125 MG/DL (ref 70–110)
POCT GLUCOSE: 131 MG/DL (ref 70–110)
POCT GLUCOSE: 191 MG/DL (ref 70–110)
POCT GLUCOSE: 74 MG/DL (ref 70–110)
POCT GLUCOSE: 77 MG/DL (ref 70–110)

## 2017-08-08 PROCEDURE — 97163 PT EVAL HIGH COMPLEX 45 MIN: CPT

## 2017-08-08 PROCEDURE — 97110 THERAPEUTIC EXERCISES: CPT

## 2017-08-08 PROCEDURE — 25000003 PHARM REV CODE 250: Performed by: PHYSICAL MEDICINE & REHABILITATION

## 2017-08-08 PROCEDURE — 97167 OT EVAL HIGH COMPLEX 60 MIN: CPT

## 2017-08-08 PROCEDURE — 97802 MEDICAL NUTRITION INDIV IN: CPT

## 2017-08-08 PROCEDURE — 99232 SBSQ HOSP IP/OBS MODERATE 35: CPT | Mod: ,,, | Performed by: PHYSICAL MEDICINE & REHABILITATION

## 2017-08-08 PROCEDURE — 27000221 HC OXYGEN, UP TO 24 HOURS

## 2017-08-08 PROCEDURE — 25000003 PHARM REV CODE 250: Performed by: STUDENT IN AN ORGANIZED HEALTH CARE EDUCATION/TRAINING PROGRAM

## 2017-08-08 PROCEDURE — 97535 SELF CARE MNGMENT TRAINING: CPT

## 2017-08-08 PROCEDURE — 63600175 PHARM REV CODE 636 W HCPCS: Performed by: STUDENT IN AN ORGANIZED HEALTH CARE EDUCATION/TRAINING PROGRAM

## 2017-08-08 PROCEDURE — 94799 UNLISTED PULMONARY SVC/PX: CPT

## 2017-08-08 PROCEDURE — 12800000 HC REHAB SEMI-PRIVATE ROOM

## 2017-08-08 PROCEDURE — 92523 SPEECH SOUND LANG COMPREHEN: CPT

## 2017-08-08 RX ADMIN — HEPARIN SODIUM 5000 UNITS: 5000 INJECTION, SOLUTION INTRAVENOUS; SUBCUTANEOUS at 02:08

## 2017-08-08 RX ADMIN — SEVELAMER CARBONATE 2400 MG: 800 TABLET, FILM COATED ORAL at 11:08

## 2017-08-08 RX ADMIN — METHOCARBAMOL 500 MG: 500 TABLET ORAL at 09:08

## 2017-08-08 RX ADMIN — ASPIRIN 81 MG CHEWABLE TABLET 81 MG: 81 TABLET CHEWABLE at 07:08

## 2017-08-08 RX ADMIN — PREGABALIN 75 MG: 75 CAPSULE ORAL at 09:08

## 2017-08-08 RX ADMIN — SEVELAMER CARBONATE 2400 MG: 800 TABLET, FILM COATED ORAL at 07:08

## 2017-08-08 RX ADMIN — INSULIN DETEMIR 4 UNITS: 100 INJECTION, SOLUTION SUBCUTANEOUS at 09:08

## 2017-08-08 RX ADMIN — OXYCODONE HYDROCHLORIDE 5 MG: 5 TABLET ORAL at 05:08

## 2017-08-08 RX ADMIN — SEVELAMER CARBONATE 2400 MG: 800 TABLET, FILM COATED ORAL at 05:08

## 2017-08-08 RX ADMIN — DOCUSATE SODIUM 100 MG: 100 CAPSULE, LIQUID FILLED ORAL at 07:08

## 2017-08-08 RX ADMIN — CALCITRIOL 0.5 MCG: 0.5 CAPSULE, LIQUID FILLED ORAL at 07:08

## 2017-08-08 RX ADMIN — HEPARIN SODIUM 5000 UNITS: 5000 INJECTION, SOLUTION INTRAVENOUS; SUBCUTANEOUS at 05:08

## 2017-08-08 RX ADMIN — AMLODIPINE BESYLATE 10 MG: 10 TABLET ORAL at 07:08

## 2017-08-08 RX ADMIN — HEPARIN SODIUM 5000 UNITS: 5000 INJECTION, SOLUTION INTRAVENOUS; SUBCUTANEOUS at 09:08

## 2017-08-08 RX ADMIN — OXYCODONE HYDROCHLORIDE 5 MG: 5 TABLET ORAL at 06:08

## 2017-08-08 NOTE — PROGRESS NOTES
Physical Therapy   Admit Physical Therapy FIM Scores    Aleta Herrera   MRN: [id number]        08/08/17 1700   Transfers   Bed/Chair/WC 1   Toilet 1   Tub 1   Locomotion   Distance Wheelchair 1   Wheelchair 1   Mode C     Netta Barragan, PT  8/8/2017

## 2017-08-08 NOTE — SUBJECTIVE & OBJECTIVE
Interval History: No acute events over night. Patient is tolerating therapy evaluations. Pain controlled.    Scheduled Medications:    amlodipine  10 mg Oral Daily    aspirin  81 mg Oral Daily    calcitRIOL  0.5 mcg Oral Daily    docusate sodium  100 mg Oral BID    heparin (porcine)  5,000 Units Subcutaneous Q8H    insulin detemir  4 Units Subcutaneous BID    pregabalin  75 mg Oral QHS    sevelamer carbonate  2,400 mg Oral TID WM       PRN Medications: acetaminophen, bisacodyl, dextrose 50%, dextrose 50%, glucagon (human recombinant), glucose, glucose, heparin (porcine), insulin aspart, methocarbamol, ondansetron, ondansetron, oxycodone, pneumoc 13-pramod conj-dip cr(PF), polyethylene glycol, senna-docusate 8.6-50 mg    Review of Systems   Constitutional: Negative for appetite change and unexpected weight change.   HENT: Negative for congestion and ear pain.    Eyes: Negative for discharge.   Respiratory: Negative for shortness of breath.    Cardiovascular: Negative for chest pain.   Gastrointestinal: Negative for abdominal pain and vomiting.   Endocrine: Negative for cold intolerance.   Genitourinary: Negative for flank pain.   Neurological: Positive for weakness.   Psychiatric/Behavioral: Negative for hallucinations.     Objective:     Vital Signs (Most Recent):  Temp: 98.1 °F (36.7 °C) (08/08/17 0659)  Pulse: 80 (08/08/17 0659)  Resp: 18 (08/08/17 0659)  BP: (!) 140/68 (08/08/17 0659)  SpO2: (!) 93 % (08/08/17 0659)    Vital Signs (24h Range):  Temp:  [98.1 °F (36.7 °C)-98.2 °F (36.8 °C)] 98.1 °F (36.7 °C)  Pulse:  [80-87] 80  Resp:  [18] 18  SpO2:  [93 %] 93 %  BP: (122-140)/(64-68) 140/68     Physical Exam   Constitutional: She appears well-developed and well-nourished.   HENT:   Head: Normocephalic and atraumatic.   Eyes: EOM are normal.   Cardiovascular: Normal rate.    Pulmonary/Chest: Effort normal. No respiratory distress.   Abdominal: Soft. She exhibits no distension. There is no tenderness.    Musculoskeletal:   UE: RUE: SA 4/5, EF/EE 3/5,  4/5  LUE: SA 4/5, EF/EE 4/5, 3/5,  4/5  RLE HF 3/5, KE 4/5, DF/PF 4/5  AKA site C/D, closed w staples     Neurological: She is alert.   Followed commands  Memory: 3/3 immediate, 1/3 delayed    Skin: Skin is warm.   Psychiatric: She has a normal mood and affect.   Vitals reviewed.    NEUROLOGICAL EXAMINATION:     CRANIAL NERVES     CN III, IV, VI   Extraocular motions are normal.

## 2017-08-08 NOTE — PROGRESS NOTES
Dr. Cano notified of asymptomatic hypoglycemia treated with dextrose tabs as ordered.  VORB to hold insulin detemir this evening.

## 2017-08-08 NOTE — PROGRESS NOTES
Please note that this patient was not enrolled in Outpatient Case Management at this time due to being transferred to a facility.     Please contact [facility] at Ext. [phone] with questions.    Thank you,  Dipika Wing

## 2017-08-08 NOTE — PROGRESS NOTES
"Speech Therapy   Cognitive-linguistic Evaluation    Aleta Herrera   MRN: [id number]            08/08/17 1240   Speech Time Calculation   Speech Start Time 1240   Speech Stop Time 1325   Speech Total (min) 45 min   General Information   SLP Treatment Date 08/08/17   Referring Physician Dr. Cano   General Observations Patient seen while sitting upright in wheelchair in patient's room.   Pertinent History of Current Problem Past Medical History:   Diagnosis Date    Anemia of chronic renal failure 9/5/2013    Anticoagulant long-term use 11/23/2015    CHF (congestive heart failure)     Coronary artery disease     Diabetes mellitus     Diabetic neuropathy 9/5/2013     (diabetic retinopathy) 9/5/2013    Encounter for blood transfusion     End stage renal disease on dialysis     Fever 7/24/2017    Hypertension     Hypertension, renal 9/5/2013    Kidney transplant candidate 9/5/2013    Secondary hyperparathyroidism of renal origin 9/5/2013    Stroke 7/31/2015    Type 2 diabetes mellitus since 1995 9/5/2013       General Precautions fall   Visual/Auditory Vision impaired   Subjective   Patient states "I know my memory is not what it should be."   Pain/Comfort   Pain Rating no pain   Oral Musculature Evaluation   Oral Musculature WFL   Dentition scattered dentition   Mandibular Strength and Mobility WFL   Oral Labial Strength and Mobility WFL   Lingual Strength and Mobility WFL   Buccal Strength and Mobility WFL   Volitional Swallow (decreased hyolaryngeal elevation/excursion)   Assessment   SLP Diagnosis mild-moderate cognitive deficits   Prognosis Good   Assessment (Aleta Herrera is a 62 y.o. female being admitted to inpatient rehabilitation on 8/7/2017 for AKA with impaired mobility and ADLs. Patient reported that she lives at home with her  and her daughter in [place]. Patient stated that she and her  both manage their finances. 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 Pt's daughter and  work full-time.    Patient presented with mild-moderate cognitive deficits, characterized by delayed and short term memory deficits and decreased math/time/money management skills.     The following information was obtained from informal observation and formal assessment measures.   Complex YNQ task completed with 100% accuracy, following directions task completed with 100% acccuracy, and reading comprehension task completed with 100% accuracy. Pt completed the following automatic speech tasks: counting 1-10, Bandar, and KATELIN indly with 100% accuracy. Labeled common objects in the room with no difficulty. In a concrete divergent naming task, patient listed x15 animals in 1 minute. In a concrete convergent naming task, patient identified category with 100% accuracy. Sentence completion task completed with 100% accuracy, and responsive naming task completed with 100% accuracy. Immediate memory task completed with 100% acccuracy and delayed recall task completed with 33% accuracy independently, 66% accuracy given verbal cues. STM task completed with minimal difficulty - patient able to recall recent information regarding hospital stay at [facility] and prior level of assist. Patient recalled long term information with no difficulty, however, patient reported, "I can't remember stuff that I used to do many many years ago." Simple reasoning task completed verbally with 100% acccuracy, sequencing task completed with 100% acccuracy, and safety awareness problem solving task completed with 100% acccuracy. Patient answered math/time/money management questions with 70% accuracy independently. Patient stated, "that's embarrassing because I was a !" Visualspatial skills assessed to be WFL, as evidenced by clock drawing task. Patient demonstrated poor visual acuity of large print reading material, however, when visual " "stimulus positioned directly in front of patient, patient completed task WNL. Writing skills assessed to be WNL - patient signed name legibly.    Patient with clear vocal quality t/o session, however, when asked if patient' experienced any s/s dysphagia, pt reported she "sometimes coughs when eating and drinking." ST to d/w MD possible bedside swallow evaluation to rule out risk of aspiration.        Pt to benefit from skilled ST services targeting mild-moderate cognitive deficits for return to baseline status.  Education provided to pt regarding ST role, and plan of care. Patient verbalized understanding.  )   Level of Motivation/Participation Good   Short Term Goals   Goal 1 Patient will complete functional recall task with 75% accuracy given min A.   Goal 2 Patient will complete math/time/money management task with 70% accuracy given mod A.   Goal 3 Patient will complete delayed recall task with 70% accuracy given min A.   Goal 4 Patient will complete bedside swallow evaluation to rule our risk of aspiration with PO intake.   Long Term Goals   Goal 1 Patient will complete functional recall task with 85% accuracy given min A.   Goal 2 Patient will complete math/time/money management task with 75% accuracy given min A.   Goal 3 Patient will complete delayed recall task with 80% accuracy given min A.   Discharge Recommendations   Discharge Facility/Level Of Care Needs (to be determined pending patient's progress)   Plan   Plan Plan of care intiated;Speech Language/Cognitive Therapy;Patient Education;Family Education   Therapy Frequency Monday-Friday   Treatment/Billable Minutes   Speech Therapy Individual 45   Total Time 45       The patient's spiritual, cultural, social, and educational needs were considered with no evidence of barriers noted, and the patient is agreeable to plan of care.    FIM Scores  Auditory Comprehension:6  Expression: 6  Social Interaction:6  Problem Solving:3  Memory: 3    Comprehension:  " Complex= humor, finances, rationale for medical treatment(hip precautions, pressure relief)  Basic= pain, hunger, thirst, bathroom needs, cold, nutrition, sleep    · Understands complex/abstract conversation/directions with extra time, assistance device(glasses, if visual mode or both; hearing aide if auditory mode or both) (6)      Expression:  - Expresses complex / abstract ideas with extra time, assistive devic (augmentative communication device    (6)    Social Interaction:  - Interacts appropriately with others with medication or extra time(anti-anxiety, antidepressant)  (6)    .Problem Solving:  - Solves basic problems 50-74% of the time  (3)    Memory:    - Recognizes, recalls, or executes 50-74% of time 2 steps of 2 step request (3)      MIMI Mujica-SLP  8/8/2017

## 2017-08-08 NOTE — PROGRESS NOTES
Physical Therapy   Evaluation    Aleta Herrera   MRN: [id number]      08/08/17 1030   PT Time Calculation   PT Start Time 1030   PT Stop Time 1200   PT Total Time (min) 90 min   Treatment   Treatment Type Evaluation   General   PT Received On 08/08/17   Chart Reviewed Yes   Onset of Illness/Injury or Date of Surgery 07/31/17   Diagnosis s/p L AKA   Additional Pertinent History Past Medical History:   Diagnosis Date    Anemia of chronic renal failure 9/5/2013    Anticoagulant long-term use 11/23/2015    CHF (congestive heart failure)     Coronary artery disease     Diabetes mellitus     Diabetic neuropathy 9/5/2013    DR (diabetic retinopathy) 9/5/2013    Encounter for blood transfusion     End stage renal disease on dialysis     Fever 7/24/2017    Hypertension     Hypertension, renal 9/5/2013    Kidney transplant candidate 9/5/2013    Secondary hyperparathyroidism of renal origin 9/5/2013    Stroke 7/31/2015    Type 2 diabetes mellitus since 1995 9/5/2013        Date Referred to PT 08/07/17   Referring Physician Rachael   Family/Caregiver Present No   Patient Found (position) Seated in wheelchair   Pt found with oxygen  (posey belt donned, 3L O2)   Precautions   General Precautions fall   Visual/Auditory (Diabetic retionapthy)   Previous Level of Function   Ambulation Skills needs device   Transfer Skills needs device   ADL Skills independent   Living Environment   Lives With spouse;child(maryana), adult   Living Arrangements house   Home Accessibility (ramp access)   Home Layout Able to live on 1st floor   Transportation Available family or friend will provide   Living Environment Comment Per pt, pt lives in a 2 story townhouse with ramp access with her . Pt reports that she stays on the first floor and does not need to go to the 2nd floor. Pt reports that she was using a rollator for household mobility and a transport wheelchair for community mobility.    Equipment Currently Used at Home  "rollator;walker, standard;bedside commode  (transport wheelchair )   Subjective   Patient states "I don't have any pain when I am just sitting here"   Pain/Comfort   Pain Rating 1 no pain   Location - Side 1 Right   Location - Orientation 1 generalized   Location 1 (residual limb)   Pain Addressed 1 Reposition;Distraction   Pain Rating Post-Intervention 1 4/10   Cognitive Status   Level of Consciousness (AVPU) alert   Arousal Level opens eyes spontaneously   Orientation oriented x 4   Able to Follow Commands (Communication) WNL   Speech clear/fluent;follows commands   Mood/Behavior calm;cooperative   Sensory Examination   Additional Documentation yes   Light Touch   RLE mild impairment  (mild impairment to foot)   LLE w/i normal limits   Range of Motion (ROM)   Range of Motion Examination RLE ROM was WFL  (R ankle DF to neutral with overpressure from PT. Pt R foot resting in plantarflexion)       MMT: Hip, Rehab Eval   Left Flexion (3/5) fair, left   Right Flexion (3+/5) fair plus, right       MMT: Knee, Rehab Eval   Right Flexion (3+/5) fair plus, right   Right Extension (3+/5) fair plus, right       MMT: Ankle, Rehab Eval   Right Dorsiflexion (3/5) fair, right   Right Plantarflexion (3/5) fair, right   Tone   Right LE normal   Left LE normal   Observation   Appearance Female sitting upright in wheelchair, staples intact to L residual limb   Posture Rounded shoulders;Laterally flexed at trunk to right   Skin intact  (skin visible to PT intact)   Bed Mobility   Bed Mobility yes   Rolling/Turning to Left Supervision  (x 1 trial on mat )   Rolling/Turning Right Minimum assistance  (x 1 trial on mat, assist for management of trunk)   Supine to Sit Moderate Assistance   Supine to Sit Comments x 1 trial on mat, assistance required for pt to elevate trunk off of mat   Sit to Supine Contact Guard Assistance  (x 1 trial on mat)   Transfers   Transfer yes   Sit to Stand   Sit <> Stand Assistance Activity did not occur   Sit " <> Stand Assistive Device Other (see comments)  (parallel bars)   Trials/Comments Pt was unable to complete sit to stand transfer in parallel bars with total assist of 2    Chair to Mat   Chair<> Mat Technique Slide Board   Chair<>Mat Assistance Total Assistance  (assist of 2)   Chair <> Mat Assistive Device Slide Board   Trials/Comments x 1 trial, pt required assistance from PT and Rehab Tech. Pt required verbal and manual cues to flex trunk forward to complete transfer. Pt with tendency to lean posteriorly throughout transfer    Mat to Chair   Technique Slide Board   Assistance Total Assistance  (assist of 2 )   Assistive Device Slide Board   Trials/Comments x 1 trial, assistance from PT and Rehab Tech to complete transfer. Verbal and manual cues required for pt to flex trunk forward to complete transfer    Tub Bench Transfer   Trials/Comments Pt was stepping into and out of tub to complete this transfer prior to admission. Due to pt's recent L AKA this is currently not safe to perform. PT deferred performing this transfer due to safety concerns. PT will attempt this transfer when pt is appropriate    Toilet Transfer   Toilet Transfer Technique Slide Board   Toilet Transfer Assistance Total Assistance  (assist of 2 )   Toilet Transfer Assistive Device Slide Board;drop arm commode   Trials/Comments x 1 trial, pt unable to fully complete transfer. Pt required assistance from PT and Rehab Tech to complete transfer. FIM: 1   Car Transfer   Trials/Comments PT deferred performing this transfer due to safety concerns. PT will attempt to perform this transfer when pt is appropriate   Wheelchair Activities   Propulsion Yes   Propulsion Type 1 Manual   Level 1 Level tile   Method 1 Right upper extremity;Left upper extremity   Level of Assistance 1 Minimum assistance   Description/ Details 1 Pt propelled self in wheelchair with B UE: 1st trial: 32 feet, 2nd trial: 27 feet. Pt required rest breaks due to fatigue. PT following  with portable O2 tank   Gait   Gait No  (PT deferred performing ambulation due to safety conerns)   Stairs   Stairs No  (PT deferred performing ambulation due to safety concerns)   Endurance Deficit   Endurance Deficit Yes   Endurance Deficit Description Pt required frequent seated rest breaks throughout PT treatment session.    Balance   Balance Yes   Static Sitting Balance   Static Sitting-Balance Support Right upper extremity supported;Left upper extremity supported  (seated at EOM)   Static Sitting-Level of Assistance Stand by Assistance   Static Sitting-Comment/# of Minutes Pt tolerated static sitting at EOM for approximately 3 minutes while PT observed pt's static sitting posture, no episodes of LOB occurred   Activity Tolerance   Activity Tolerance Patient limited by fatigue;Patient tolerated treatment well   After Treatment   Patient Position After Treatment Seated in wheelchair   Patient after treatment left call button in reach  (posey belt donned, oxygen connected to wall)   Treatment   Treatment Patient performed supine exercises 2 x 15 reps to R LE: AP, QS, GS, SAQ, to L LE: GS, SLR, and hip ABD    Patient performed seated dowel exercises with 2# dowel in wheelchair including: bicep curls and chest press 3 x 15 reps    Patient performed seated exercises to R LE 3 x 15 reps including: LAQ, hip flexion    PT educated pt on appropriate pressure relief techniques to perform while sitting up in wheelchair including: lateral weight shifts. PT educated pt that these should be performed every 10-15 minutes while she is up in the wheelchair.    PT deferred having pt perform picking object up off of floor and ambulation over uneven surface due to safety concerns.    Assessment   Prognosis Fair   Problem List Decreased strength;Decreased range of motion;Decreased endurance;Impaired balance;Decreased mobility;Pain   Session Assessment other (see comments)  (PT evaluation completed)   Assessment Pt is a 63 y/o  female who presents to [facility] following a recent hospitalization due to L AKA. Prior to admission pt was intermittently using rollator and transport chair for mobility. Pt presents to PT with decreased B UE and R LE strength, L residual phantom limb pain, decreased endurance for activity, and overall impaired functional mobility. Pt requires significant assistance for all mobility into and out of wheelchair at this time. Pt will benefit from further skilled PT in order to improve pt's functional independence and to decrease pt's caregiver burden upon D/C. Pt's decreased B UE strength limits pt's ability to fully assist with slide board transfers at this time.    Level of Motivation/Participation good   Barriers to Discharge Decreased caregiver support   Barriers to Discharge Comments At this time pt requires signficant 24 hour care, PT is unsure if pt's family will be able to provide this level of assistance   Short Term Goals   Additional Documentation yes   Time For Goal Achievement 7 days   Pt Will Perform Supine To Sit New goal;With minimal assist   Supine to Sit-Met/Not Met Not Met   Pt Will Perform Sit to Supine New goal;With stand by assist   Sit to Supine - Met/Not Met Not Met   Pt Will Transfer Bed/Chair New goal;Slide Board;With maximum assist;With total assistance  (assist of 1)   Transfer Bed/Chair - Met/Not Met Not Met   Pt Will Sit Edge of Bed New goal;Dynamic;6-10 min;With minimal challenges;With Supervision   Sit Edge Of Bed - Met Not Met   Pt Will Tolerate Exercise New goal;15-20 reps;With verbal cues required/provided   Tolerate Exercise - Met/Not Met Not met   Pt Will Propel Wheelchair New goal; feet;With (B) UE;With stand by assist   Propel Wheelchair - Met/Not Met Not met   Pt Will Demo / Request Pressure Relief New goal;With minimal assist  (in wheelchair)   Demo Pressure Relief - Met/Not met Not met   Other Goal Patient will perform slide board transfer from wheelchair to  bedside commode with maximum to total assistance of 1    Other Goal - Progress Not met    Long Term Goals   Additional Documentation yes   Time For Goal Achievement 2 weeks   Pt Will Perform Supine To Sit New goal;With stand by assist   Supine to Sit-Met/Not Met Not Met   Pt Will Perform Sit to Supine New goal;Supervision   Sit to Supine - Met/Not Met Not Met   Pt Will Logroll New goal;Supervision   Logroll - Met/Not Met Not Met   Pt Will Transfer Bed/Chair New goal;Slide Board;With maximum assist   Transfer Bed/Chair - Met/Not Met Not Met   Pt Will Propel Wheelchair New goal;> 150 feet;Supervision;With (B) UE   Propel Wheelchair - Met/Not Met Not met   Pt Will Demo / Request Pressure Relief New goal;With Supervision  (while seated in wheelchair )   Demo Pressure Relief - Met/Not met Not met   Other Goal Patient will perform slide board transfer from wheelchair to bedside commode with maximum assistance of 1    Other Goal - Progress Not met    Other Goal 2 Patient's caregivers will be independent in assisting pt with slide board transfers   Other Goal - Progress 2 Not met    Discharge Recommendations   Equipment Needed After Discharge slide board  (drop arm commode)   Discharge Facility/Level Of Care Needs home health PT   Plan   Planned Therapy Intervention Plan of care initiated;Bed mobility training;Transfer training;Balance Training;ROM;Stretching;Strengthening;Wheelchair Management/Propulsion   Therapy Frequency 2 times/day;daily;Monday-Friday;Saturday or Sunday   Physical Therapy Follow-up   PT Follow-up? Yes   PT - Next Visit Date 08/09/17   Treatment/Billable Minutes   Evaluation 60   Therapeutic Exercise 30   Total Time 90       Netta Barragan, PT  8/8/2017

## 2017-08-08 NOTE — PROGRESS NOTES
Occupational Therapy   FIM Scores    Aleta Herrera   MRN: [id number]      08/08/17 0730   Transfers   Bed/Chair/WC 1   Toilet 1   Tub 1   Self Care   Eating 7   Grooming 5   Bathing 3   Dressing-Upper 4   Dressing-Lower 1   Toileting 1   Communication   Comprehension 6   Mode B   Expression 6   Mode B   Social Cognition   Social Interaction 6   Problem Solving 4   Memory 4     ROGELIO Munoz   8/8/2017

## 2017-08-08 NOTE — PT/OT/SLP DISCHARGE
Occupational Therapy Discharge Summary    Aleta Herrera  MRN: [id number]   Left foot infection   Patient Discharged from acute Occupational Therapy on 8/8.  Please refer to prior OT note dated on 8/7 for functional status.     Assessment:   Patient appropriate for care in another setting.  GOALS:    Occupational Therapy Goals        Problem: Occupational Therapy Goal    Goal Priority Disciplines Outcome Interventions   Occupational Therapy Goal     OT, PT/OT Ongoing (interventions implemented as appropriate)    Description:  Pt will complete grooming seated EOB with sup and without UE support to maintain dynamic sitting balance. Not met  Pt will complete perpendicular transfer to BSC with min A. Not met   Pt will complete UB dressing seated at EOB with sup. Not met  Pt will complete sit to stand with mod A in prep for functional transfers. Not met                  Reasons for Discontinuation of Therapy Services  Transfer to alternate level of care.      Plan:  Patient Discharged to: [facility].

## 2017-08-08 NOTE — PHYSICIAN QUERY
PT Name: Aleta Herrera  MR #: [id number]     Physician Query Form - Documentation Clarification      CDS/: Georgina Estrada               Contact information: [phone]    This form is a permanent document in the medical record.     Query Date: August 8, 2017    By submitting this query, we are merely seeking further clarification of documentation. Please utilize your independent clinical judgment when addressing the question(s) below.    The Medical record reflects the following:    Supporting Clinical Findings Location in Medical Record   Using a sterile #15 blade, a horizontal incision was created on the plantar lateral calcaneous of the left foot over the fluctuant area of the heel.  This was deepened to subcutaneous tissue and deepened to bone. Extreme malodor was noted with grayish/clear fluid with bubbles noted.  No bleeding was noted. The tissue appeared necrotic and tracking was noted distally by 7 cm in the plantar fascial layer. Cultures were obtained.  The wound was then copiously flushed with 3 L of sterile saline with the Pulsavac           Op rpt 7/26                                                                          Doctor, Please specify diagnosis or diagnoses associated with above clinical findings.    DOCTOR, PLEASE CLARIFY THE DEPTH OF EXCISIONAL DEBRIDEMENT, OR  IF ONLY CULTURES WERE COLLECTED DURING THE ABOVE PROCEDURE    Provider Use Only    Excision of:  Skin______________                        Subcu tissue__________________                        Fascia___X____________________                        Bone_________________________      Wound was opened and only cultures collected_____________________________                                                                                                             [  ] Clinically undetermined

## 2017-08-08 NOTE — CONSULTS
Wound care consulted for stage 2 to the buttocks.    The left lower buttock and right upper buttock have healing stage 2 pressure injuries.  mepilex dressings removed, skin cleaned with soap and water then critic aid barrier cream applied to areas.  Wounds to right groin and left upper leg resolved, skin intact without erythema/drainage noted. Plan of care discussed with patient/family who verbalized understanding.     08/08/17 0850       Incision/Site 07/28/17 1236 Right groin   Date First Assessed/Time First Assessed: 07/28/17 1236   Side: Right  Location: groin   Incision WDL WDL   Dressing Appearance no dressing   Appearance closed/resurfaced;edges approximated   Periwound Area normal skin tone   Drainage Characteristics/Odor no odor   Drainage Amount none   Dressing none       Incision/Site 07/31/17 1512 Left leg upper   Date First Assessed/Time First Assessed: 07/31/17 1512   Present Prior to Hospital Arrival?: No  Side: Left  Location: leg  Orientation: upper   Incision WDL WDL   Dressing Appearance intact;dry   Appearance edges approximated;closed/resurfaced   Periwound Area normal skin tone   Drainage Characteristics/Odor no odor   Drainage Amount none   Dressing Dressing removed       Pressure Ulcer 08/03/17 1230 Left lower buttocks   Date First Assessed/Time First Assessed: 08/03/17 1230   Pressure Ulcer Present on Admission: no  Side: Left  Orientation: lower  Location: buttocks   Staging Stage II   Healing Pressure Ulcer yes   Pressure Ulcer Risk Factors moisture;shear/friction;mobility;nutrition   Dressing Appearance intact;moist drainage  (urine/feces)   Drainage Amount none   Drainage Characteristics/Odor no odor   Appearance pink;moist   Periwound Area moist   Wound Edges open   Wound Length (cm) 1.5   Wound Width (cm) 3   Depth (cm) 0.1   Cleansed W/ soap and water   Interventions barrier applied       Pressure Ulcer 08/04/17 1230 Right upper buttocks Stage II   Date First Assessed/Time First  Assessed: 08/04/17 1230   Pressure Ulcer Present on Admission: no  Side: Right  Orientation: upper  Location: buttocks  Staging: Stage II  Wound Length (cm): 2  Wound Width (cm): 1  Depth (cm): 0.1   Staging Stage II   Healing Pressure Ulcer yes   Pressure Ulcer Risk Factors moisture;shear/friction;nutrition;mobility   Dressing Appearance intact;moist drainage  (urine/feces)   Drainage Amount none   Drainage Characteristics/Odor no odor   Appearance pink;moist   Periwound Area moist   Wound Edges open   Wound Length (cm) 2   Wound Width (cm) 1   Depth (cm) 0.1   Cleansed W/ soap and water   Interventions barrier applied   Skin Interventions   Pressure Reduction Devices pressure-redistributing mattress utilized;chair cushion utilized;heel offloading device utilized;positioning supports utilized   Pressure Reduction Techniques frequent weight shift encouraged;heels elevated off bed;positioned off wounds     Recommendations  Critic aid barrier cream BID and prn cleaning  No diapers while in bed.

## 2017-08-08 NOTE — SUBJECTIVE & OBJECTIVE
Interval History: Afebrile. Patient is awake, alert, and has no complaints.  Pleasant this AM and looking forward to rehab/SNF Tolerating PO . No bloody bowel movements, no diarrhea.     Review of Systems   Constitutional: Negative for chills and fever.   Respiratory: Negative for cough and shortness of breath.    Cardiovascular: Negative for chest pain.   Gastrointestinal: Negative for abdominal pain, diarrhea and vomiting.   Genitourinary:        Does not make urine   Neurological: Negative for light-headedness and headaches.   All other systems reviewed and are negative.    Objective:     Vital Signs (Most Recent):  Temp: 97.5 °F (36.4 °C) (08/07/17 1224)  Pulse: 83 (08/07/17 1224)  Resp: 18 (08/07/17 1224)  BP: 119/64 (08/07/17 1224)  SpO2: (!) 93 % (08/07/17 1224) Vital Signs (24h Range):  Temp:  [97.3 °F (36.3 °C)-98.5 °F (36.9 °C)] 98.2 °F (36.8 °C)  Pulse:  [75-87] 87  Resp:  [16-18] 18  SpO2:  [93 %-98 %] 93 %  BP: (103-126)/(57-74) 115/63     Weight: 67 kg (147 lb 11.3 oz)  Body mass index is 29.83 kg/m².  No intake or output data in the 24 hours ending 08/07/17 2014   Physical Exam   Constitutional: She is oriented to person, place, and time. She appears well-developed and well-nourished.   HENT:   Head: Normocephalic and atraumatic.   Eyes: EOM are normal. Pupils are equal, round, and reactive to light.   Cardiovascular: Normal rate and regular rhythm.    Pulmonary/Chest: Effort normal. No respiratory distress.   Abdominal: She exhibits no distension. There is no tenderness. Hernia: soft, non-tender.   Musculoskeletal:   Left AKA, no dressing. Staples in place. No surrounding erythema or warmth.    Neurological: She is alert and oriented to person, place, and time.   Awake, alert, answers all questions appropriately.     Skin: Skin is warm and dry.       Significant Labs:   BMP:     Recent Labs  Lab 08/07/17  0503   GLU 89   *   K 4.1      CO2 24   BUN 28*   CREATININE 4.2*   CALCIUM 8.0*      CBC:     Recent Labs  Lab 08/06/17  0413 08/07/17  0503   WBC 21.92* 15.58*   HGB 7.8* 7.9*   HCT 25.1* 26.3*   * 423*       Significant Imaging: no significant imaging

## 2017-08-08 NOTE — PROGRESS NOTES
BLADDER  [] Pt. uses toilet (7)  [x] Pt. is on dialysis - does not urinate (7)  [] Pt. uses bedside commode (5)  []Pt. uses bedpan - staff does____% (includes rolling on/off, holding bedpan in place                                                                   and emptying pan)   [] Pt. uses urinal - staff empties                          []   Pt. needs help with positioning the urinal (4)                          []   Pt. needs help holding the urinal (2)  []  Pt. has solitario catheter/suprapubic catheter - staff empties (1)  []  Pt. Is on ICP program:                           []  Pt. does catheterization (6)                           [] Staff does catheterization (1)  [] Pt. Is incontinent - staff does _______% of tasks  Number of accidents during this shift ___0___    BOWEL  [] Pt. uses toilet (7)  [] Pt. uses bedside commode (5)  [] Pt. uses bedpan - staff does _______% of task  [] Pt. is on bowel program::                         []   Pt. inserts suppository (6)                         []   Nurse inserts suppository (4)                         []  Nurse does dig. stim. (1)  [] Pt. has colostomy - staff maintains care and empties (1)                       Pt. does _____% of care  [] Pt. is incontinent - staff does ______% of tasks  [x] No bowel movement during this shift  [] Number of accidents during this shift ___0___    EATING  [x] Pt. opens packages, cuts food, pours liquids, and feeds self, regular consistency (7)  [] Pt. needs dentures, swallow technique, special foods or drink consistency (6)  [] Pt. needs supervision (cueing), setup (open containers, cut food, etc.) (5)  []Pt. needs assist with occasional scooping, or checking for food pocketing (75-90%) (4)  []Pt. needs assist to lead each bite on utensils, patient brings food to mouth (50-74%)(3)  []Pt. needs assist to scoop, bring food to mouth (hand over hand) (25-49%) (2)  []AxillaryPt. Is receiving IV fluids for hydration or tube feeding  (1)    GROOMING  []Pt. performs all tasks independently and safely (7)  []Pt. obtains all articles, needs adaptive/assistive device (wash mitt, etc.) (6)  []Pt. needs supervision (cueing), setup (5)  []Pt. doing 3 of 4 or 4 of 5 tasks, needs assist to put in or remove dentures, steadying (Patient doing 75-90%) (4)   []Pt. doing 2 of 4 or 3 of 5 tasks (3)  []Pt. doing 1 of 4 of 2 of 5 tasks (25-49%) (2)  []Pt. doing 0 of tasks, needs assistance of 2 helpers (1)  []Activity occurred with OT    BATHING  []Pt. safety bathes whole body (10 parts of 10) (7)  []Pt. doing 10 of 10 body parts, uses adaptive devices (wash mitt, etc.) (6)  []Pt. doing 10 of 10 body parts or needs supervision or setup (5)  []Pt. doing 8-9 of 10 body parts (75-99%) (4)  []Pt. doing 5-7 of 10 body parts (50-74%) (3)   []Pt. doing 3-4 of 10 body parts (25-49%( (2)  []Pt. doing 0-2 of 10 body parts (0-24%0 (1)  []Activity occurred with OT    DRESSING UPPER BODY  []Pt. dresses or undresses independently, obtaining clothes from storage area (7)  []Pt. needs adaptive devices (6)  []Pt. needs supervision (cueing), setup (5)  []Pt. doing 75-99% (4)  []Pt. doing 50-74% (3)  []Pt. doing 25-49% (2)  []Pt. doing 0-24% or needs assist of 2 helpers (1)  []Activity occurred with OT    DRESSING LOWER BODY  []Pt. dresses or undresses independently (7)   []Pt. needs adaptive devices (6)  []Pt. needs supervision (cueing), set up helper applies RICKEY hose or AFO (5)  []Pt. doing 75-99%, needs steadying assist, fastening a belt, etc.) (4)  []Pt. doing 50-74% (3)  []Pt. doing 25-49% (2)  []Pt. doing 0-24% or needs assist of 2 helpers (1)    TOILETING  []Pt. doing 3 of 3 tasks (pulling down pants, cleaning brittney area, pulling up pants) (7)  []Pt. doing all 3 parts but needs assistive device (grab bar) (6)  []Pt. needs supervision or setup (5)  []Pt. doing 3 of 3 parts (75-99%) (4)  []Pt. doing 2 of 3 parts (50-74%) (3)  []Pt. doing 1 of 3 parts (25.49%) (2)  []Pt. needs  assist (more than steadying) with all 3 parts or needs assist of 2 helpers,  Incontinent of B/B today (0-24%) (1)    BED/ CHAIR/WHEELCHAIR TRANSFER  []Pt. transfers without assistive device into and out of wheelchair/bed/chair (7)  []Pt. uses assistive/adaptive devices (grab bars, sliding board, walker, bed rails,   wheelchair arm rests, etc.) (6)  []Pt. needs supervision, cues, head of bed raised by staff (5)  []Pt. needs steadying assist with any or all parts of transfer, needs assist with one   leg during transfer ( 4)  []Pt. needs lifting or lowering, needs assist with 2 legs during transfer (3)  []Pt. needs lifting and lowering (2)  []Pt. needs assist of 2 helpers(even if 2nd person is just there for safety) or mechanical lift (1)  [x]Activity did not occur     TOILET TRANSFER  []Pt. transfers from wheelchair or walking without assistive device (7)  []Pt. uses assistive/adaptive device (commode, grab bars, sliding board, walker,   prosthesis, orthotic, raised toilet seat, etc.) (6)  []Pt. needs supervision, cues, or setup (needs assist to lock brakes, remove leg or arm  rest, position sliding board) (5)  []Pt. needs steadying assist with any or all parts of transfer, needs assist with one leg  during transfer (4)  []Pt. needs lifting or lowering, needs assist with two legs during transfer (3)  []Pt. needs lifting and lowering (2)  []Pt. needs assist of 2 helpers or mechanical lift (1)  [x]Activity did not occur    SHOWER TRANSFER  []Pt. transfers without assistive device into and out of shower (7)  []Pt. uses assistive/adaptive device (shower bench, grab bars, etc.) (6)  []Pt. needs supervision, cues, or setup (5)  []Pt. needs steadying assist with any or all parts of transfer, needs assist with one leg  during transfer (4)  []Pt. needs lifting or lowering, needs assist with 2 legs during transfer (3)  []Pt. needs lifting and lowering (2)  []Pt. needs assist of 2 helpers, helper pushes shower chair on wheels in  and out of  shower (1)

## 2017-08-08 NOTE — PHYSICIAN QUERY
PT Name: Aleta Herrera  MR #: [id number]     Physician Query Form - Documentation Clarification      CDS/: Gerogina Estrada               Contact information: [phone]    This form is a permanent document in the medical record.     Query Date: August 8, 2017    By submitting this query, we are merely seeking further clarification of documentation. Please utilize your independent clinical judgment when addressing the question(s) below.    The Medical record reflects the following:    Supporting Clinical Findings Location in Medical Record   Pressure ulcer of left buttock noted by WC 8/4    Press ulcer L buttock, juaquin DTI, not poa  Press ulcer R buttock, stage II, not poa       Multiple physician PN        Multiple Wound Care PN                                                                          Doctor, Please specify diagnosis or diagnoses associated with above clinical findings.    DOCTOR, PLEASE CLARIFY THAT YOU AGREE WITH BOTH R/L PRESSURE  ULCERS, AND THE POA STATUS  DOCUMENTED BY WOULD CARE    Provider Use Only    Press Ulcer L buttock, DTI_______x______  POA: Y_____ N___x____    Press Ulcer R buttock, Stage II _______x_________  POA: Y_____ N__x____    Other________________________________________________________                                                                                                               [  ] Clinically undetermined

## 2017-08-08 NOTE — PLAN OF CARE
Problem: Patient Care Overview  Goal: Plan of Care Review  Outcome: Ongoing (interventions implemented as appropriate)  Nutrition Diagnosis  Inadequate protein intake related to increased needs for wound healing and po intake of 50-75% and refusal of oral nutrition renal supplements to help meet those unmet needs.   If glucose controlled may need to use Boost Breeze if patient accepts or beneprotein tid to meet protein needs for healing  Recommendation/Intervention: Continue current renal diabetic diet, Pt refuses oral supplement, RD to follow to encourage intake of HBV protein  Goals: PO to meet 85% of EEN and EPN by discharge  Nutrition Goal Status: new

## 2017-08-08 NOTE — PROGRESS NOTES
Occupational Therapy   Evaluation + Treatment    Aleta Herrera   MRN: [id number]    17 0830   OT Time Calculation   OT Start Time 0830   OT Stop Time 1000   OT Total Time (min) 90 min   General   OT Date of Treatment 17   Chart Reviewed Yes   Onset of Illness/Injury or Date of Surgery 17   Diagnosis s/p L AKA   Additional Pertinent History Past Medical History:   Diagnosis Date    Anemia of chronic renal failure 2013    Anticoagulant long-term use 2015    CHF (congestive heart failure)     Coronary artery disease     Diabetes mellitus     Diabetic neuropathy 2013     (diabetic retinopathy) 2013    Encounter for blood transfusion     End stage renal disease on dialysis     Fever 2017    Hypertension     Hypertension, renal 2013    Kidney transplant candidate 2013    Secondary hyperparathyroidism of renal origin 2013    Stroke 2015    Type 2 diabetes mellitus since 2013     Past Surgical History:   Procedure Laterality Date    AV FISTULA PLACEMENT      CATARACT SURGERY       SECTION, LOW TRANSVERSE      x 3    COLONOSCOPY N/A 2016    Procedure: COLONOSCOPY;  Surgeon: Roverto Patel MD;  [facility] ([address] [place] [address]);  Service: Endoscopy;  Laterality: N/A;    EYE SURGERY      HYSTERECTOMY      Endometriosis    LEG AMPUTATION THROUGH KNEE Left 2017    TONSILLECTOMY      VASCULAR SURGERY        Date Referred to OT 17   Referring Physician Dr. Cano   Family/Caregiver Present No   Patient Found (position) Supine in bed   Precautions   General Precautions fall   Visual/Auditory Vision impaired  (diabetic retinopathy)   IADL History   Driving License No   Living Environment   Lives With child(maryana), adult;spouse   Living Arrangements house   Home Accessibility (ramp access)   Home Layout Able to live on 1st floor   Living Environment Comment Per pt, prior to admit her  was assisting her with  "transfers into the tub and bathing. Pt also states she was ambulating and "scooting" with rollator in the house. Pt was (I) with dressing and toileting, has not driven in some time due to visual impairments.  will be primary caregiver at discharge. Pt goes to  MWF   Equipment Currently Used at Home rollator;walker, rolling;bedside commode;wheelchair   Subjective   Patient states "My arms are very weak"   Pain/Comfort   Pain Comment Pt reports she has phantom pain at times in LLE   Manual Muscle Testing (MMT)   Manual Muscle Testing Results (LUE grossly 3/5) (R) UE grossly 3+/5   Observation   Appearance pleasant female, L AKA   Posture Rounded shoulders;Posterior pelvic tilt   Skin warm;dry;intact  (skin tears to buttocks)   Bed Mobility   Rolling/Turning to Left Stand by assistance   Rolling/Turning Right Stand by assistance   Supine to Sit Moderate Assistance   Sit to Supine Moderate Assistance   Transfers   Transfer yes   Sit to Stand   Sit <> Stand Assistance Total Assistance   Sit <> Stand Assistive Device No Assistive Device   Stand to Sit   Assistance Total Assistance   Assistive Device No Assistive Device   Bed to Chair   Bed <> Chair Technique Slide Board   Bed <> Chair Transfer Assistance Total Assistance   Bed <> Chair Assistive Device Slide Board   Trials/Comments 2 helpers, 1 helper to steady board, 2nd helper    Tub Bench Transfer   Tub Bench Transfer Assistance Total Assistance   Tub Transfer Assistive Device No Assistive Device   Trials/Comments Prior to admit pt was standing (with assist from ) to get into and out of tub without AD, at this time pt would require x 2 helpers to perform, therefore a FIM score TOTAL A   Toilet Transfer   Toilet Transfer Assistance Total Assistance   Toilet Transfer Assistive Device Slide Board   Trials/Comments x 2 helpers, 1 helper to steady slideboard and 2nd helper to assist in scooting   Feeding   Feeding Level of Assistance Independent   Feeding " Where Assessed Bed level   Feeding Comments HOB elevated   Grooming   Grooming Level of Assistance Stand by assistance   Hand Washing Level of Assistance Stand by assistance   Face Washing Level of Assistance Supervision   Oral Hygeine Level of Assistance Stand by assistance   Grooming Where Assessed Wheelchair;Sitting sinkside   Grooming Comments cues for hand placement of toiletries and water, due to visual impairments   Bathing   Bathing Level of Assistance Moderate assistance   Bathing Where Assessed Bed   Bathing Comments Assist for L residual limb, rear brittney area, frontal brittney area   UE Dressing   UE Dressing Level of Assistance Minimum assistance   UE Dressing Where Assessed Wheelchair   UE Dressing Comments assist to manage down trunk   LE Dressing   LE Dressing Yes   LE Dressing  Level of Assistance Total assistance   LE Dressing Level of Assistance Total assistance   Sock Level of Assistance Total assistance   Adult Briefs Level of Assistance Total assistance   LE Dressing Where Assessed Bed level   Toileting   Toileting Level of Assistance Total assistance   Toileting Where Assessed Bed level   Toileting Comments assist for all aspects   Additional Activities:     Cognition:    Communication   Comprehension · Understands complex/abstract conversation/directions with extra time, assistance device(glasses, if visual mode or both; hearing aide if auditory mode or both) (6)   Mode B   Expression - Expresses complex / abstract ideas with extra time, assistive devic (augmentative communication device    (6)     Mode B   Social Cognition   Social Interaction - Interacts appropriately with others with medication or extra time(anti-anxiety, antidepressant)  (6)     Problem Solving - Recognizes, recalls, or executes 75-89% of time 2 steps of 3 step request  (4)   Memory - Solves basic problems 75-89% of the time  (4)        Activity Tolerance   Activity Tolerance Patient tolerated treatment well   Medical Staff  Made Aware NSG   After Treatment   Patient Position After Treatment Seated in wheelchair  (safety belt in place)   Patient after treatment left call button in reach   Assessment   Prognosis Fair   Problem List Decreased Self Care skills;Decreased upper extremity strength;Decreased safe judgment during ADL;Decreased endurance;Decreased functional mobility;Decreased IADLs;Decreased Function of right upper extremity;Decreased Function of left upper extremity;Decreased trunk control for functional activities;Decreased gross motor control;Decreased upper extremity range of motion   Assessment Pt presents with impaired mobility, decrease self care skills, and overall decreased (I) s/p L AKA. Pt required x 2 helpers for slideboard transfers today, mostly due to impaired balance and weakness (especially in UEs). Pt is motivated to therapy and will likely make some progress during IP rehab stay, however will still require physical assist and supervision at discharge.    Level of Motivation/Participation good   Short Term Goals   Additional Documentation yes   Time For Goal Achievement 7 days   Pt Will Perform Supine To Sit New goal;With minimal assist   Supine to Sit - Met/Not Met Not Met   Pt Will Perform Sit to Supine With minimal assist;New goal   Supine to Sit - Met/Not Met Not Met   Pt Will Transfer Bed/Chair New goal;With maximum assist;Slide Board   Transfer Bed/Chair - Met/Not Met Not Met   Pt Will Transfer To Bedside Commode With total assist  (slideboard)   Pt Will Perform Eating With modified independence;New goal   Eating - Met/Not Met Not Met   Pt Will Perform Grooming With supervision;New goal;In wheelchair   Grooming - Met/Not Met Not Met   Pt Will Perform Bathing With moderate assistance;New goal   Bathing - Met/Not Met Not Met   Pt Will Perform UE Dressing New goal;With stand by assistance   UE Dressing - Met/Not Met Not Met   Pt Will Perform LE Dressing With maximum assistance;New goal   LE Dressing -  Met/Not Met Not Met   Pt Will Perform Toileting With maximum assistance;New goal   Toileting-Met/Not Met Not Met   Long Term Goals   Additional Documentation yes   Time For Goal Achievement 2 weeks   Pt Will Perform Supine To Sit With minimal assist;New goal   Supine to Sit - Met/Not Met Not Met   Pt Will Perform Sit to Supine With minimal assist;New goal   Supine to Sit - Met/Not Met Not Met   Pt Will Transfer Bed/Chair With moderate assist;Slide Board;New goal   Transfer Bed/Chair - Met/Not Met Not Met   Pt Will Transfer To Bedside Commode With moderate assist;New goal  (slideboard)   Transfer Bedside Commode -Met/Not Met Not Met   Pt Will Perform Eating Independently;New goal   Eating - Met/Not Met Not Met   Pt Will Perform Grooming With modified indepdenence;New goal   Grooming - Met/Not Met Not Met   Pt Will Perform Bathing With minimal assistance;New goal   Bathing - Met/Not Met Not Met   Pt Will Perform UE Dressing With modified independence;New goal   UE Dressing - Met/Not Met Not Met   Pt Will Perform LE Dressing With moderate assistance;New goal   LE Dressing - Met/Not Met Not Met   Pt Will Perform Toileting New goal;With maximum assistance   Toileting-Met/Not Met Not Met   Discharge Recommendations   Equipment Needed After Discharge (drop arm commode)   Discharge Facility/Level Of Care Needs home health OT   Plan   Plan Self care retraining;Functional transfer training;UE thereex;Equipment Training;Family training;Safety training;Functional endurance training;Postural control;Community re-training;PROM;Functional Standing Activities;Continue with current plan   Therapy Frequency 2 times/day;Monday-Friday;Saturday or Sunday   Occupational Therapy Follow-up   OT Follow-up? Yes   Treatment/Billable Minutes   Evaluation 60   Self Care/Home Management 30   Total Time 90     ROGELIO Munoz   8/8/2017

## 2017-08-08 NOTE — PLAN OF CARE
Problem: Patient Care Overview  Goal: Plan of Care Review  Outcome: Ongoing (interventions implemented as appropriate)  Pt care plan updated and individualized as needed and progress continues.  See associated flowsheets for relevant documentation. Frequent rounds made per protocol to maintain pt safety and meet pt needs.  Continuing to monitor and follow up as needed.      Problem: Diabetes, Type 2 (Adult)  Goal: Signs and Symptoms of Listed Potential Problems Will be Absent, Minimized or Managed (Diabetes, Type 2)  Signs and symptoms of listed potential problems will be absent, minimized or managed by discharge/transition of care (reference Diabetes, Type 2 (Adult) CPG).   Outcome: Ongoing (interventions implemented as appropriate)  Treated asymptomatic hypoglycemia as ordered and physician notified.  Shall maintain glucose monitoring schedule as previously ordered.    Problem: Amputation, Lower Extremity (Adult)  Goal: Signs and Symptoms of Listed Potential Problems Will be Absent, Minimized or Managed (Amputation, Lower Extremity)  Signs and symptoms of listed potential problems will be absent, minimized or managed by discharge/transition of care (reference Amputation, Lower Extremity (Adult) CPG).   Outcome: Ongoing (interventions implemented as appropriate)  Pain treated as ordered with relief reported.  Pt resting in no apparent distress.    Problem: Skin Integrity Impairment, Risk/Actual (Adult)  Goal: Skin Integrity/Wound Healing  Patient will demonstrate the desired outcomes by discharge/transition of care.   Outcome: Ongoing (interventions implemented as appropriate)  Pt turned and repositioned as needed to maintain skin integrity.  No new breakdown noted.      Problem: Fall Risk (Adult)  Goal: Absence of Falls  Patient will demonstrate the desired outcomes by discharge/transition of care.   Outcome: Ongoing (interventions implemented as appropriate)  Pt remains free from falls or trauma thus far this  shift.

## 2017-08-08 NOTE — NURSING
BLADDER  [] Pt. uses toilet (7)  [x] Pt. is on dialysis - does not urinate (7)  [] Pt. uses bedside commode (5)  []Pt. uses bedpan - staff does__% (includes rolling on/off, holding bedpan in place                                                                   and emptying pan)   [] Pt. uses urinal - staff empties                          []   Pt. needs help with positioning the urinal (4)                          []   Pt. needs help holding the urinal (2)  []  Pt. has solitario catheter/suprapubic catheter - staff empties (1)  []  Pt. Is on ICP program:                           []  Pt. does catheterization (6)                           [] Staff does catheterization (1)  [] Pt. Is incontinent - staff does _____% of tasks  Number of accidents during this shift _____    BOWEL  [] Pt. uses toilet (7)  [] Pt. uses bedside commode (5)  [] Pt. uses bedpan - staff does ______% of task  [] Pt. is on bowel program::                         []   Pt. inserts suppository (6)                         []   Nurse inserts suppository (4)                         []  Nurse does dig. stim. (1)  [] Pt. has colostomy - staff maintains care and empties (1)                       Pt. does ____% of care  [x] Pt. is incontinent - staff does ____% of tasks  [] No bowel movement during this shift  [] Number of accidents during this shift ______    EATING  [x] Pt. opens packages, cuts food, pours liquids, and feeds self, regular consistency (7)  [] Pt. needs dentures, swallow technique, special foods or drink consistency (6)  [] Pt. needs supervision (cueing), setup (open containers, cut food, etc.) (5)  []Pt. needs assist with occasional scooping, or checking for food pocketing (75-90%) (4)  []Pt. needs assist to lead each bite on utensils, patient brings food to mouth (50-74%)(3)  []Pt. needs assist to scoop, bring food to mouth (hand over hand) (25-49%) (2)  []AxillaryPt. Is receiving IV fluids for hydration or tube feeding  (1)    GROOMING  []Pt. performs all tasks independently and safely (7)  []Pt. obtains all articles, needs adaptive/assistive device (wash mitt, etc.) (6)  []Pt. needs supervision (cueing), setup (5)  []Pt. doing 3 of 4 or 4 of 5 tasks, needs assist to put in or remove dentures, steadying (Patient doing 75-90%) (4)   []Pt. doing 2 of 4 or 3 of 5 tasks (3)  []Pt. doing 1 of 4 of 2 of 5 tasks (25-49%) (2)   []Pt. doing 0 of tasks, needs assistance of 2 helpers (1)  [x]Activity occurred with OT    BATHING   []Pt. safety bathes whole body (10 parts of 10) (7)  []Pt. doing 10 of 10 body parts, uses adaptive devices (wash mitt, etc.) (6)  []Pt. doing 10 of 10 body parts or needs supervision or setup (5)  []Pt. doing 8-9 of 10 body parts (75-99%) (4)  []Pt. doing 5-7 of 10 body parts (50-74%) (3)   []Pt. doing 3-4 of 10 body parts (25-49%( (2)  []Pt. doing 0-2 of 10 body parts (0-24%0 (1)  [x]Activity occurred with OT    DRESSING UPPER BODY  []Pt. dresses or undresses independently, obtaining clothes from storage area (7)  []Pt. needs adaptive devices (6)  []Pt. needs supervision (cueing), setup (5)  []Pt. doing 75-99% (4)  []Pt. doing 50-74% (3)  []Pt. doing 25-49% (2)  []Pt. doing 0-24% or needs assist of 2 helpers (1)  [x]Activity occurred with OT    DRESSING LOWER BODY  []Pt. dresses or undresses independently (7)   []Pt. needs adaptive devices (6)  []Pt. needs supervision (cueing), set up helper applies RICKEY hose or AFO (5)  []Pt. doing 75-99%, needs steadying assist, fastening a belt, etc.) (4)  []Pt. doing 50-74% (3)  []Pt. doing 25-49% (2)  []Pt. doing 0-24% or needs assist of 2 helpers (1)  Occurred with OT    TOILETING  []Pt. doing 3 of 3 tasks (pulling down pants, cleaning brittney area, pulling up pants) (7)  []Pt. doing all 3 parts but needs assistive device (grab bar) (6)  []Pt. needs supervision or setup (5)  []Pt. doing 3 of 3 parts (75-99%) (4)  []Pt. doing 2 of 3 parts (50-74%) (3)  []Pt. doing 1 of 3 parts  (25.49%) (2)  []Pt. needs assist (more than steadying) with all 3 parts or needs assist of 2 helpers,  Incontinent of B/B today (0-24%) (1)    BED/ CHAIR/WHEELCHAIR TRANSFER  []Pt. transfers without assistive device into and out of wheelchair/bed/chair (7)  []Pt. uses assistive/adaptive devices (grab bars, sliding board, walker, bed rails,   wheelchair arm rests, etc.) (6)  []Pt. needs supervision, cues, head of bed raised by staff (5)  []Pt. needs steadying assist with any or all parts of transfer, needs assist with one   leg during transfer ( 4)  []Pt. needs lifting or lowering, needs assist with 2 legs during transfer (3)  []Pt. needs lifting and lowering (2)  [x]Pt. needs assist of 2 helpers(even if 2nd person is just there for safety) or mechanical lift (1)  []Activity did not occur     TOILET TRANSFER  []Pt. transfers from wheelchair or walking without assistive device (7)  []Pt. uses assistive/adaptive device (commode, grab bars, sliding board, walker,   prosthesis, orthotic, raised toilet seat, etc.) (6)  []Pt. needs supervision, cues, or setup (needs assist to lock brakes, remove leg or arm  rest, position sliding board) (5)  []Pt. needs steadying assist with any or all parts of transfer, needs assist with one leg  during transfer (4)  []Pt. needs lifting or lowering, needs assist with two legs during transfer (3)  []Pt. needs lifting and lowering (2)  [x]Pt. needs assist of 2 helpers or mechanical lift (1)  []Activity did not occur    SHOWER TRANSFER  []Pt. transfers without assistive device into and out of shower (7)  []Pt. uses assistive/adaptive device (shower bench, grab bars, etc.) (6)  []Pt. needs supervision, cues, or setup (5)  []Pt. needs steadying assist with any or all parts of transfer, needs assist with one leg  during transfer (4)  []Pt. needs lifting or lowering, needs assist with 2 legs during transfer (3)  []Pt. needs lifting and lowering (2)  [x]Pt. needs assist of 2 helpers, helper  pushes shower chair on wheels in and out of  shower (1)

## 2017-08-08 NOTE — PROGRESS NOTES
[facility]  Physical Medicine & Rehab  Progress Note    Patient Name: Aleta Herrera  MRN: [id number]  Patient Class: IP- Rehab   Admission Date: 8/7/2017  Length of Stay: 1 days  Attending Physician: Amirah Cano MD  Primary Care Provider: Radha Lao MD    Subjective:     Principal Problem:Amputation of leg    Interval History: No acute events over night. Patient is tolerating therapy evaluations. Pain controlled.    Scheduled Medications:    amlodipine  10 mg Oral Daily    aspirin  81 mg Oral Daily    calcitRIOL  0.5 mcg Oral Daily    docusate sodium  100 mg Oral BID    heparin (porcine)  5,000 Units Subcutaneous Q8H    insulin detemir  4 Units Subcutaneous BID    pregabalin  75 mg Oral QHS    sevelamer carbonate  2,400 mg Oral TID WM       PRN Medications: acetaminophen, bisacodyl, dextrose 50%, dextrose 50%, glucagon (human recombinant), glucose, glucose, heparin (porcine), insulin aspart, methocarbamol, ondansetron, ondansetron, oxycodone, pneumoc 13-pramod conj-dip cr(PF), polyethylene glycol, senna-docusate 8.6-50 mg    Review of Systems   Constitutional: Negative for appetite change and unexpected weight change.   HENT: Negative for congestion and ear pain.    Eyes: Negative for discharge.   Respiratory: Negative for shortness of breath.    Cardiovascular: Negative for chest pain.   Gastrointestinal: Negative for abdominal pain and vomiting.   Endocrine: Negative for cold intolerance.   Genitourinary: Negative for flank pain.   Neurological: Positive for weakness.   Psychiatric/Behavioral: Negative for hallucinations.     Objective:     Vital Signs (Most Recent):  Temp: 98.1 °F (36.7 °C) (08/08/17 0659)  Pulse: 80 (08/08/17 0659)  Resp: 18 (08/08/17 0659)  BP: (!) 140/68 (08/08/17 0659)  SpO2: (!) 93 % (08/08/17 0659)    Vital Signs (24h Range):  Temp:  [98.1 °F (36.7 °C)-98.2 °F (36.8 °C)] 98.1 °F (36.7 °C)  Pulse:  [80-87] 80  Resp:  [18] 18  SpO2:  [93 %] 93 %  BP:  "(122-140)/(64-68) 140/68     Physical Exam   Constitutional: She appears well-developed and well-nourished.   HENT:   Head: Normocephalic and atraumatic.   Eyes: EOM are normal.   Cardiovascular: Normal rate.    Pulmonary/Chest: Effort normal. No respiratory distress.   Abdominal: Soft. She exhibits no distension. There is no tenderness.   Musculoskeletal:   UE: RUE: SA 4/5, EF/EE 3/5,  4/5  LUE: SA 4/5, EF/EE 4/5, 3/5,  4/5  RLE HF 3/5, KE 4/5, DF/PF 4/5  AKA site C/D, closed w staples     Neurological: She is alert.   Followed commands  Memory: 3/3 immediate, 1/3 delayed    Skin: Skin is warm.   Psychiatric: She has a normal mood and affect.   Vitals reviewed.    NEUROLOGICAL EXAMINATION:     CRANIAL NERVES     CN III, IV, VI   Extraocular motions are normal.       Assessment/Plan:      Aleta Herrera is a 62 y.o. female admitted to inpatient rehabilitation on 8/7/2017 for Amputation of leg with impaired mobility and ADLs. Patient remains appropriate for PT, OT, and as required Speech therapy. Patient continues to require 24 hour nursing care as well as daily Physician assessment.    * Amputation of leg    s/p L AKA on 7/31 2/2 gangrenous unsalvageable L foot     Therapy evals today        ESRD (end stage renal disease)    ESRD, home HD MWF and L AVG placed 4/2017          Hypertension, renal    Vitals:    08/07/17 1507   BP: 115/63   BP Location: Left arm   Patient Position: Lying   Pulse: 85   Resp: 16   Temp: 98.5 °F (36.9 °C)   TempSrc: Oral   SpO2: (!) 94%   Weight: 49.4 kg (109 lb)   Height: 4' 11" (1.499 m)           Stage III pressure ulcer of right buttock    - regular wound care, low air los mattress         Pressure ulcer of left buttock    - regular wound care, low air los mattress         Impaired mobility and ADLs    Other Rehab Plan/Continue to monitor:   Nutrition/Swallow Status:    - Prealbumin - pending   - Nutritionist on board   Bladder Management: continent  Bowel Management: "  continent  - Colace BID and Suppository PRN   - Will monitor for regularity   Mood: stable   Sleep: monitor; Will consider sleep aid as clinically indicated   Skin: Monitor   DVT ppx: heparin              DISCHARGE PLANNING:  Tentative Discharge Date:     Future Appointments  Date Time Provider Department Center   8/10/2017 9:45 AM Nathalie Madera MD [facility] ANAIS Ramirez shayy   9/6/2017 12:00 PM VASCULAR, LAB [facility] MARCIO Ramirez shayy   9/6/2017 12:30 PM VASCULAR, LAB [facility] MARCIO Sanders   9/6/2017 1:00 PM Nathalie Madera MD [facility] ANAIS Ramirez shayy   9/12/2017 9:40 AM Radha Cortez MD [facility]       Amirah Cano MD  Department of Physical Medicine & Rehab   [facility]

## 2017-08-08 NOTE — PLAN OF CARE
Problem: Patient Care Overview  Goal: Plan of Care Review  Outcome: Ongoing (interventions implemented as appropriate)  Patient AAOX4 with no complaints.Vs stable. Call light within reach. Instructed patient to use call light for assistance. Patient incision to left leg looks good. Staples intact no drainage no dressing. Will continue to monitor patient.

## 2017-08-09 LAB
BACTERIA BLD CULT: NORMAL
POCT GLUCOSE: 102 MG/DL (ref 70–110)
POCT GLUCOSE: 105 MG/DL (ref 70–110)
POCT GLUCOSE: 97 MG/DL (ref 70–110)

## 2017-08-09 PROCEDURE — 92507 TX SP LANG VOICE COMM INDIV: CPT

## 2017-08-09 PROCEDURE — 97110 THERAPEUTIC EXERCISES: CPT

## 2017-08-09 PROCEDURE — 63600175 PHARM REV CODE 636 W HCPCS: Performed by: STUDENT IN AN ORGANIZED HEALTH CARE EDUCATION/TRAINING PROGRAM

## 2017-08-09 PROCEDURE — 97542 WHEELCHAIR MNGMENT TRAINING: CPT

## 2017-08-09 PROCEDURE — 97150 GROUP THERAPEUTIC PROCEDURES: CPT

## 2017-08-09 PROCEDURE — 97530 THERAPEUTIC ACTIVITIES: CPT

## 2017-08-09 PROCEDURE — 27000221 HC OXYGEN, UP TO 24 HOURS

## 2017-08-09 PROCEDURE — 99233 SBSQ HOSP IP/OBS HIGH 50: CPT | Mod: ,,, | Performed by: PHYSICAL MEDICINE & REHABILITATION

## 2017-08-09 PROCEDURE — 12800000 HC REHAB SEMI-PRIVATE ROOM

## 2017-08-09 PROCEDURE — 25000003 PHARM REV CODE 250: Performed by: STUDENT IN AN ORGANIZED HEALTH CARE EDUCATION/TRAINING PROGRAM

## 2017-08-09 PROCEDURE — 99900035 HC TECH TIME PER 15 MIN (STAT)

## 2017-08-09 PROCEDURE — 25000003 PHARM REV CODE 250: Performed by: PHYSICAL MEDICINE & REHABILITATION

## 2017-08-09 RX ORDER — CALCIUM CARBONATE 200(500)MG
500 TABLET,CHEWABLE ORAL 2 TIMES DAILY PRN
Status: DISCONTINUED | OUTPATIENT
Start: 2017-08-09 | End: 2017-08-24 | Stop reason: HOSPADM

## 2017-08-09 RX ORDER — PANTOPRAZOLE SODIUM 40 MG/1
40 TABLET, DELAYED RELEASE ORAL DAILY
Status: DISCONTINUED | OUTPATIENT
Start: 2017-08-09 | End: 2017-08-24 | Stop reason: HOSPADM

## 2017-08-09 RX ADMIN — ASPIRIN 81 MG CHEWABLE TABLET 81 MG: 81 TABLET CHEWABLE at 08:08

## 2017-08-09 RX ADMIN — INSULIN DETEMIR 4 UNITS: 100 INJECTION, SOLUTION SUBCUTANEOUS at 08:08

## 2017-08-09 RX ADMIN — HEPARIN SODIUM 5000 UNITS: 5000 INJECTION, SOLUTION INTRAVENOUS; SUBCUTANEOUS at 09:08

## 2017-08-09 RX ADMIN — PANTOPRAZOLE SODIUM 40 MG: 40 TABLET, DELAYED RELEASE ORAL at 12:08

## 2017-08-09 RX ADMIN — METHOCARBAMOL 500 MG: 500 TABLET ORAL at 09:08

## 2017-08-09 RX ADMIN — HEPARIN SODIUM 5000 UNITS: 5000 INJECTION, SOLUTION INTRAVENOUS; SUBCUTANEOUS at 05:08

## 2017-08-09 RX ADMIN — METHOCARBAMOL 500 MG: 500 TABLET ORAL at 10:08

## 2017-08-09 RX ADMIN — SEVELAMER CARBONATE 2400 MG: 800 TABLET, FILM COATED ORAL at 12:08

## 2017-08-09 RX ADMIN — OXYCODONE HYDROCHLORIDE 5 MG: 5 TABLET ORAL at 09:08

## 2017-08-09 RX ADMIN — HEPARIN SODIUM 5000 UNITS: 5000 INJECTION, SOLUTION INTRAVENOUS; SUBCUTANEOUS at 02:08

## 2017-08-09 RX ADMIN — DOCUSATE SODIUM 100 MG: 100 CAPSULE, LIQUID FILLED ORAL at 08:08

## 2017-08-09 RX ADMIN — PREGABALIN 75 MG: 75 CAPSULE ORAL at 09:08

## 2017-08-09 RX ADMIN — CALCITRIOL 0.5 MCG: 0.5 CAPSULE, LIQUID FILLED ORAL at 08:08

## 2017-08-09 RX ADMIN — SEVELAMER CARBONATE 2400 MG: 800 TABLET, FILM COATED ORAL at 08:08

## 2017-08-09 NOTE — ASSESSMENT & PLAN NOTE
s/p L AKA on 7/31 2/2 gangrenous unsalvageable L foot     Functional: Mod A for memory, prob solving, Mod I with expression/Comp  SBA/Mod w bed mobility, TA for sit to stand, UBD Valentina, Mod A for bathing

## 2017-08-09 NOTE — PROGRESS NOTES
[facility]  Physical Medicine & Rehab  Progress Note    Patient Name: Aleta Herrera  MRN: [id number]  Patient Class: IP- Rehab   Admission Date: 8/7/2017  Length of Stay: 2 days  Attending Physician: Amirah Cano MD  Primary Care Provider: Radha Lao MD    Subjective:     Principal Problem:Amputation of leg    Interval History: No acute events over night. Patient is tolerating therapy, pain controlled. Mood stable.     Scheduled Medications:    amlodipine  10 mg Oral Daily    aspirin  81 mg Oral Daily    calcitRIOL  0.5 mcg Oral Daily    docusate sodium  100 mg Oral BID    heparin (porcine)  5,000 Units Subcutaneous Q8H    pantoprazole  40 mg Oral Daily    pregabalin  75 mg Oral QHS    sevelamer carbonate  2,400 mg Oral TID WM       PRN Medications: acetaminophen, bisacodyl, calcium carbonate, dextrose 50%, dextrose 50%, glucagon (human recombinant), glucose, glucose, heparin (porcine), insulin aspart, methocarbamol, ondansetron, ondansetron, oxycodone, pneumoc 13-pramod conj-dip cr(PF), polyethylene glycol, senna-docusate 8.6-50 mg    Review of Systems   Constitutional: Negative for appetite change and unexpected weight change.   HENT: Negative for congestion and ear pain.    Eyes: Negative for discharge.   Respiratory: Negative for shortness of breath.    Cardiovascular: Negative for chest pain.   Gastrointestinal: Negative for abdominal pain and vomiting.   Endocrine: Negative for cold intolerance.   Genitourinary: Negative for flank pain.   Neurological: Positive for weakness.   Psychiatric/Behavioral: Negative for hallucinations.     Objective:     Vital Signs (Most Recent):  Temp: 98.6 °F (37 °C) (08/09/17 0700)  Pulse: 80 (08/09/17 0700)  Resp: 18 (08/09/17 0700)  BP: 127/65 (08/09/17 0700)  SpO2: 97 % (08/09/17 0700)    Vital Signs (24h Range):  Temp:  [98.2 °F (36.8 °C)-98.6 °F (37 °C)] 98.6 °F (37 °C)  Pulse:  [80-85] 80  Resp:  [16-18] 18  SpO2:  [77 %-97 %] 97  %  BP: (118-127)/(55-65) 127/65     Physical Exam   Constitutional: She appears well-developed and well-nourished.   HENT:   Head: Normocephalic and atraumatic.   Eyes: EOM are normal.   Cardiovascular: Normal rate.    Pulmonary/Chest: Effort normal. No respiratory distress.   Abdominal: Soft. She exhibits no distension. There is no tenderness.   Musculoskeletal:   UE: RUE: SA 4/5, EF/EE 3/5,  4/5  LUE: SA 4/5, EF/EE 4/5, 3/5,  4/5  RLE HF 3/5, KE 4/5, DF/PF 4/5  AKA site C/D, closed w staples     Neurological: She is alert.   Followed commands  Memory: 3/3 immediate, 1/3 delayed    Skin: Skin is warm.   Psychiatric: She has a normal mood and affect.   Vitals reviewed.    NEUROLOGICAL EXAMINATION:     CRANIAL NERVES     CN III, IV, VI   Extraocular motions are normal.       Assessment/Plan:      Aleta Herrera is a 62 y.o. female admitted to inpatient rehabilitation on 8/7/2017 for Amputation of leg with impaired mobility and ADLs. Patient remains appropriate for PT, OT, and as required Speech therapy. Patient continues to require 24 hour nursing care as well as daily Physician assessment.    * Amputation of leg    s/p L AKA on 7/31 2/2 gangrenous unsalvageable L foot     Functional: Mod A for memory, prob solving, Mod I with expression/Comp  SBA/Mod w bed mobility, TA for sit to stand, UBD Valentina, Mod A for bathing         Type 2 diabetes mellitus with left diabetic foot ulcer    8/9 PO intake aroudn 50%. Will hold Levemir 4u BID, and monitor trend.         ESRD (end stage renal disease)    ESRD, home HD MWF and L AVG placed 4/2017  - HD planned for today, cont to monitor           Hypertension, renal    Vitals:    08/08/17 1904 08/08/17 2125 08/08/17 2200 08/09/17 0700   BP: (!) 118/55   127/65   BP Location: Right arm   Left arm   Patient Position: Lying   Lying   BP Method: Automatic   Automatic   Pulse: 85 83  80   Resp: 18 16  18   Temp: 98.2 °F (36.8 °C)   98.6 °F (37 °C)   TempSrc: Oral    Oral   SpO2: (!) 92% 97% 96% 97%   Weight:       Height:           VSS, cont w current mgmt         Stage III pressure ulcer of right buttock    - regular wound care, low air los mattress         Pressure ulcer of left buttock    - regular wound care, low air los mattress         Impaired mobility and ADLs    Other Rehab Plan/Continue to monitor:   Nutrition/Swallow Status:    - Prealbumin - pending   - Nutritionist on board   Bladder Management: continent  Bowel Management:  continent  - Colace BID and Suppository PRN   - Will monitor for regularity   Mood: stable   Sleep: monitor; Will consider sleep aid as clinically indicated   Skin: Monitor   DVT ppx: heparin              DISCHARGE PLANNING:  Tentative Discharge Date:     Future Appointments  Date Time Provider Department Center   8/10/2017 9:45 AM Nathalie Madera MD [facility] ANAIS Ramirez shayy   9/6/2017 12:00 PM VASCULAR, LAB [facility] MARCIO Ramirez shayy   9/6/2017 12:30 PM VASCULAR, LAB [facility] MARCIO Ramirez shayy   9/6/2017 1:00 PM Nathalie Madera MD [facility] ANAIS Ramirez [facility]   9/12/2017 9:40 AM Radha Cortez MD [facility]       Amirah Cano MD  Department of Physical Medicine & Rehab   [facility]

## 2017-08-09 NOTE — ASSESSMENT & PLAN NOTE
Presenting to the ED w/ new bleeding from L leg ulcer along with fevers, chills and new leukocytosis.   - Received vancomycin , flagyl and rocephin in ED. Fever resolved with Tylenol in ED. Dc'd vancomycin and flagyl on the floor.   - Blood cx 1/4 +gram positive cocci in clusters resembling staph   - Blood cx 1/4 +gram positive rods  - Wound cx growing proteus and GNR, lactose   - Repeat blood cx x2 show NGTD  - S/p Aortogram + Left LE angiogram with Vascular surgery   - Significant steal from L thigh AVG. L foot not salvageable given extent of infection.   - S/p left AKA 7/31, healing well   - Cipro stopped 8/02 given source control     Plan  - healing well s/p AKA

## 2017-08-09 NOTE — PROGRESS NOTES
Physical Therapy   [facility]    Aleta Herrera   MRN: [id number]   PTA visit       08/09/17 1000   Transfers   Bed/Chair/WC 1   Locomotion   Distance Walked 0   Distance Wheelchair 3   Walk 0   Wheelchair 4   Mode C   Stairs 0         Kevin Washington, PTA  8/9/2017

## 2017-08-09 NOTE — PROGRESS NOTES
Admit Assessment    Patient Identification  Aleta Herrera   :  1955  Admit Date:  2017  Attending Provider:  Amirah Cano MD                EXPECTED DATE OF DISCHARGE:  To be determined at first treatment team staffing.     Pt was admitted to inpatient rehab.   is involved.    NAME OF PERSON PROVIDING INFORMATION FOR ASSESSMENT:  Patient    EMERGENCY CONTACT:  Primary Contact: Barrera Sr Sharon                             Mobile Phone: [phone]                Relation: Spouse    LIVING ARRANGEMENTS:  Gael Roth.  Pt lives with spouse and son in a single story home with ramp access.      LEVEL OF INDEPENDENCE/ASSISTANCE REQUIRED PRIOR TO ADMISSION: Assistance required.      HOME HEALTH OR OUTPATIENT THERAPY USAGE PRIOR TO ADMISSION:  No      DURABLE MEDICAL EQUIPMENT:  Home O2, transport wheelchair, rollator and bedside commode.    Patient Preference of agencies include:  None stated  Patient/Caregiver informed of right to choose providers or agencies.  Patient provides permission to release any necessary information to [facility] and to [facility] agencies as needed to facilitate patient care, treatment planning, and patient discharge planning.  Written and verbal resources provided.      Outpatient Pharmacy:     [facility] [place], [place] - [address]  [address] [place] [address]  Phone: [phone] Fax: [phone]      FAMILY/CAREGIVER SUPPORT:  Spouse and adult children are caregivers and support.  Pt reported that her daughter will be primary caregiver after discharge, as her spouse will be returning to work.       LEVEL OF ORIENTATION: AA&Ox4       ADJUSTMENT TO DIAGNOSIS AND TREATMENT:  Pt adjusting appropriately      EMOTIONAL/BEHAVIORAL STATUS/COGNITIVE ISSUES:  Pt presented with calm mood, good recall, judgement and concentration.  Pt asked and answered questions appropriately.        History/Current  Substance Use:   Social History     Social History Main Topics    Smoking status: Former Smoker     Packs/day: 0.25     Years: 0.50     Types: Cigarettes     Quit date: 1975    Smokeless tobacco: Never Used      Comment: quit smoking cigarettes 40+ years ago    Alcohol use No    Drug use: No    Sexual activity: Yes         Financial:  Payor/Plan Subscr  Sex Relation Sub. Ins. ID Effective Group Num   1. [place]ICARE - [place]* SELMA FANG D* 1955 Female  [id number] 10/1/10                                    [address]   2. [facility]* SELMA FANG D* 1955 Female  [id number]                                   [address] [address]          DISCHARGE PLAN:  Home with family assistance.      PATIENT PREFERENCE OF AGENCIES:  None stated      PATIENT/CAREGIVER CONCERNS EXPRESSED DURING ASSESSMENT:  None       INTERVENTIONS/REFERRALS:  Pt caregiver engaged in treatment planning process. SW will continue to follow for all resources, education, discharge planning and psychosocial needs.  Patient/caregiver engaged in treatment planning process.  SW available as needed.

## 2017-08-09 NOTE — PROGRESS NOTES
Occupational Therapy  [place] [facility]  Aleta Jammie Herrera   MRN: [id number]          08/09/17 1200   Self Care   Dressing-Upper 4       HONEY Mckeon 8/9/2017

## 2017-08-09 NOTE — PROGRESS NOTES
"Physical Therapy   Wheelchair Mobility Group    Aleta Herrera   MRN: [id number]        08/09/17 1116   PT Time Calculation   PT Start Time 1116   PT Stop Time 1201   PT Total Time (min) 45 min   Treatment   Treatment Type Treatment  (Wheelchair Mobility Group)   PT/PTA PT   General   PT Received On 08/09/17   Family/Caregiver Present No   Patient Found (position) Seated in wheelchair   Pt found with oxygen  (3L O2, posey belt donned)   Precautions   General Precautions fall   Visual/Auditory Vision impaired   Subjective   Patient states "I need to get some pain medicine"   Pain/Comfort   Pain Rating 1 10/10   Location - Side 1 Left   Location - Orientation 1 generalized   Location 1 (residual limb)   Pain Addressed 1 Nurse notified   Activity Tolerance   Activity Tolerance Patient tolerated treatment well   Other Comments   Comments Patient participated in a wheelchair management and mobility 45 min group session.  The patients were involved in group activities with emphasis on education, patients propulsion of wheelchair, management of all wheelchair parts and endurance building. The patient performed six minutes of a wheelchair endurance activity with a distance of 100 feet with moderate assist( x100 feet over level tile) . Patient performed donning and doffing of leg rests with maximum assist and arm rests with maximum assist. Patient propelled wheelchair weaving in and out of cones for 60 feet with moderate assist. Patient also practiced parallel parking  while  in the wheelchair with moderate assist. These activities were performed in a group setting to encourage increased participation with peers and social interaction. Patient was educated on appropriate pressure relief techniques to perform while sitting upright in wheelchair including: lateral weight shifts, wheelchair pushups, and tilt back with assistance. Patient performed seated exercises including: 2 x 15 reps: GS, LAQ, hip flexion, AP.   After " Treatment   Patient Position After Treatment Seated in wheelchair   Patient after treatment left with all lines intact  (posey belt donned, in gym with OT)   Treatment/Billable Minutes   Therapeutic Activity Group 45   Total Time 45     Netta Barragan, PT  8/9/2017

## 2017-08-09 NOTE — ASSESSMENT & PLAN NOTE
Hgb dropped on 8/5 to 6.7. No obvious cause of bleeding. Stool is soft and brown without blood per nurse  Transfused 1 U pRBCs  Patient receiving Procrit w/ dialysis.  Stable H/H prior to discharge

## 2017-08-09 NOTE — PROGRESS NOTES
"Speech Therapy   Treatment    Aleta Herrera   MRN: [id number]        08/09/17 0920   Speech Time Calculation   Speech Start Time 0920   Speech Stop Time 0930   Speech Start Time 1345   Speech Stop Time 1420   Speech Total (min) 45 min   General Information   SLP Treatment Date 08/09/17   General Observations Pt seen sitting upright in w/c at bedside individually for first session. Pt then seen individually in ST office for second visit of the day.    General Precautions fall   Visual/Auditory Vision impaired   Subjective   Patient states Pt stated "I didn't have any trouble with my breakfast this morning, but I have heart burn sometimes. "   Pain/Comfort   Pain Rating no pain       SLP Cognitive Treatment   Treatment Detail (SLP Cognitive Treatment) Pt engaged in orientation task to place and time with 100% acc indly. Reasoning task of plan of care and education provided regarding disciplines in rehab and details of rehab program given supervision with 90% acc. Pt denies all difficulties with oral intake at this time, but confirms acid reflux. Pt completed verbal discussion with MD regarding current symptoms, given min verbal cues for report of latest c/o heart burn.     Second visit:  Pt completed verbal reasoning task of plan of care and short term goals for IP rehab stay, given moderate verbal cues for reasonable expectations for independence and mobility following d/c home. Pt completed opposites task and verbalizing similarities/differences task indly with 100% acc targeting processing skills. Pt completed word deduction task with F=3 with 90% acc indly, given repetition of information, pt with increase to 100% acc. Pt then completed mental manipulation task of F=3 according to progress of time with 80% acc indly, given min verbal cues for attention skills to details, pt with increase to 100% acc. Attempted problem solving written task of prescription label; however, pt unable to complete 2' visual " deficits. Reading glasses used, but not beneficial in this situation. Pt requires supervision for safety, financial management, and medication management at this time.    Assessment   SLP Diagnosis mild-moderate cognitive deficits   Prognosis Good   Problem List decreased problem solving and reasoning skills   Session Assessment progressing toward goals   Level of Motivation/Participation good   Discharge Recommendations   Discharge Facility/Level Of Care Needs home health speech therapy   Plan   Plan Continue with current plan   Treatment/Billable Minutes   Speech Therapy Individual 45   Total Time 45     FIM Scores  Auditory Comprehension:6  Expression: 6  Social Interaction:6  Problem Solving:3  Memory: 3     Comprehension:  Complex= humor, finances, rationale for medical treatment(hip precautions, pressure relief)  Basic= pain, hunger, thirst, bathroom needs, cold, nutrition, sleep    Understands complex/abstract conversation/directions with extra time, assistance device(glasses, if visual mode or both; hearing aide if auditory mode or both) (6)        Expression:  Expresses complex / abstract ideas with extra time, assistive devic (augmentative communication device    (6)     Social Interaction:  Interacts appropriately with others with medication or extra time(anti-anxiety, antidepressant)  (6)     .Problem Solving:  Solves basic problems 50-74% of the time  (3)     Memory:  Recognizes, recalls, or executes 50-74% of time 2 steps of 2 step request (3)    Mikki Gonzalez CCC-SLP  8/9/2017

## 2017-08-09 NOTE — PROGRESS NOTES
BLADDER  [] Pt. uses toilet (7)  [x] Pt. is on dialysis - does not urinate (7)  [] Pt. uses bedside commode (5)  []Pt. uses bedpan - staff does____% (includes rolling on/off, holding bedpan in place                                                                   and emptying pan)   [] Pt. uses urinal - staff empties                          []   Pt. needs help with positioning the urinal (4)                          []   Pt. needs help holding the urinal (2)  []  Pt. has solitario catheter/suprapubic catheter - staff empties (1)  []  Pt. Is on ICP program:                           []  Pt. does catheterization (6)                           [] Staff does catheterization (1)  [] Pt. Is incontinent - staff does _______% of tasks  Number of accidents during this shift ______    BOWEL  [] Pt. uses toilet (7)  [] Pt. uses bedside commode (5)  [] Pt. uses bedpan - staff does _______% of task  [] Pt. is on bowel program::                         []   Pt. inserts suppository (6)                         []   Nurse inserts suppository (4)                         []  Nurse does dig. stim. (1)  [] Pt. has colostomy - staff maintains care and empties (1)                       Pt. does _____% of care  [x] Pt. is incontinent - staff does ___100___% of tasks  [] No bowel movement during this shift  [] Number of accidents during this shift ______    EATING  [x] Pt. opens packages, cuts food, pours liquids, and feeds self, regular consistency (7)  [] Pt. needs dentures, swallow technique, special foods or drink consistency (6)  [] Pt. needs supervision (cueing), setup (open containers, cut food, etc.) (5)  []Pt. needs assist with occasional scooping, or checking for food pocketing (75-90%) (4)  []Pt. needs assist to lead each bite on utensils, patient brings food to mouth (50-74%)(3)  []Pt. needs assist to scoop, bring food to mouth (hand over hand) (25-49%) (2)  []AxillaryPt. Is receiving IV fluids for hydration or tube feeding  (1)    GROOMING  []Pt. performs all tasks independently and safely (7)  []Pt. obtains all articles, needs adaptive/assistive device (wash mitt, etc.) (6)  []Pt. needs supervision (cueing), setup (5)  []Pt. doing 3 of 4 or 4 of 5 tasks, needs assist to put in or remove dentures, steadying (Patient doing 75-90%) (4)   []Pt. doing 2 of 4 or 3 of 5 tasks (3)  []Pt. doing 1 of 4 of 2 of 5 tasks (25-49%) (2)  []Pt. doing 0 of tasks, needs assistance of 2 helpers (1)  []Activity occurred with OT    BATHING  []Pt. safety bathes whole body (10 parts of 10) (7)  []Pt. doing 10 of 10 body parts, uses adaptive devices (wash mitt, etc.) (6)  []Pt. doing 10 of 10 body parts or needs supervision or setup (5)  []Pt. doing 8-9 of 10 body parts (75-99%) (4)  []Pt. doing 5-7 of 10 body parts (50-74%) (3)   []Pt. doing 3-4 of 10 body parts (25-49%( (2)  []Pt. doing 0-2 of 10 body parts (0-24%0 (1)  []Activity occurred with OT    DRESSING UPPER BODY  []Pt. dresses or undresses independently, obtaining clothes from storage area (7)  []Pt. needs adaptive devices (6)  []Pt. needs supervision (cueing), setup (5)  []Pt. doing 75-99% (4)  []Pt. doing 50-74% (3)  []Pt. doing 25-49% (2)  []Pt. doing 0-24% or needs assist of 2 helpers (1)  []Activity occurred with OT    DRESSING LOWER BODY  []Pt. dresses or undresses independently (7)   []Pt. needs adaptive devices (6)  []Pt. needs supervision (cueing), set up helper applies RICKEY hose or AFO (5)  []Pt. doing 75-99%, needs steadying assist, fastening a belt, etc.) (4)  []Pt. doing 50-74% (3)  []Pt. doing 25-49% (2)  []Pt. doing 0-24% or needs assist of 2 helpers (1)    TOILETING  []Pt. doing 3 of 3 tasks (pulling down pants, cleaning brittney area, pulling up pants) (7)  []Pt. doing all 3 parts but needs assistive device (grab bar) (6)  []Pt. needs supervision or setup (5)  []Pt. doing 3 of 3 parts (75-99%) (4)  []Pt. doing 2 of 3 parts (50-74%) (3)  []Pt. doing 1 of 3 parts (25.49%) (2)  []Pt. needs  assist (more than steadying) with all 3 parts or needs assist of 2 helpers,  Incontinent of B/B today (0-24%) (1)    BED/ CHAIR/WHEELCHAIR TRANSFER  []Pt. transfers without assistive device into and out of wheelchair/bed/chair (7)  []Pt. uses assistive/adaptive devices (grab bars, sliding board, walker, bed rails,   wheelchair arm rests, etc.) (6)  []Pt. needs supervision, cues, head of bed raised by staff (5)  []Pt. needs steadying assist with any or all parts of transfer, needs assist with one   leg during transfer ( 4)  []Pt. needs lifting or lowering, needs assist with 2 legs during transfer (3)  []Pt. needs lifting and lowering (2)  []Pt. needs assist of 2 helpers(even if 2nd person is just there for safety) or mechanical lift (1)  [x]Activity did not occur     TOILET TRANSFER  []Pt. transfers from wheelchair or walking without assistive device (7)  []Pt. uses assistive/adaptive device (commode, grab bars, sliding board, walker,   prosthesis, orthotic, raised toilet seat, etc.) (6)  []Pt. needs supervision, cues, or setup (needs assist to lock brakes, remove leg or arm  rest, position sliding board) (5)  []Pt. needs steadying assist with any or all parts of transfer, needs assist with one leg  during transfer (4)  []Pt. needs lifting or lowering, needs assist with two legs during transfer (3)  []Pt. needs lifting and lowering (2)  []Pt. needs assist of 2 helpers or mechanical lift (1)  [x]Activity did not occur    SHOWER TRANSFER  []Pt. transfers without assistive device into and out of shower (7)  []Pt. uses assistive/adaptive device (shower bench, grab bars, etc.) (6)  []Pt. needs supervision, cues, or setup (5)  []Pt. needs steadying assist with any or all parts of transfer, needs assist with one leg  during transfer (4)  []Pt. needs lifting or lowering, needs assist with 2 legs during transfer (3)  []Pt. needs lifting and lowering (2)  []Pt. needs assist of 2 helpers, helper pushes shower chair on wheels in  and out of  shower (1)

## 2017-08-09 NOTE — PROGRESS NOTES
"Physical Therapy   Treatment    Aleta Herrera   MRN: [id number]   PTA visit        08/09/17 0930   PT Time Calculation   PT Start Time 0930   PT Stop Time 1015   PT Total Time (min) 45 min   Treatment   Treatment Type Treatment   PT/PTA PTA   General   PT Received On 08/09/17   Patient Found (position) Seated in wheelchair   Pt found with oxygen   Precautions   General Precautions fall   Visual/Auditory Vision impaired   Subjective   Patient states Pt reported discomfort around L limb incision; w/ movement, pt stated "it might be off the charts."   Pain/Comfort   Pain Rating 1 10/10  (w/movement)   Location - Side 1 Left   Location - Orientation 1 generalized   Location 1 leg   Pain Addressed 1 Pre-medicate for activity;Reposition;Distraction;Cessation of Activity;Nurse notified   Pain Rating Post-Intervention 1 8/10   Bed Mobility   Bed Mobility yes   Supine to Sit Minimum Assistance   Supine to Sit Comments mat   Sit to Supine Contact Guard Assistance  (mat)   Transfers   Transfer yes   Chair to Mat   Chair<> Mat Technique Slide Board   Chair<>Mat Assistance Total Assistance  (of two)   Trials/Comments cues for leaning forward, using B arms and R LE, hand placement   Mat to Chair   Technique Slide Board   Assistance Total Assistance  (of two)   Trials/Comments cues for hand placement, using B arms and R LE and leaning forward   Wheelchair Activities   Propulsion Yes   Propulsion Type 1 Manual   Level 1 Level tile   Method 1 Right upper extremity;Left upper extremity   Level of Assistance 1 Minimum assistance   Description/ Details 1 150 feet, w/ 2L O2 in tow   Balance   Balance Yes   Static Sitting Balance   Static Sitting-Balance Support Right upper extremity supported;Left upper extremity supported  (R foot supported)   Static Sitting-Level of Assistance Stand by Assistance   Static Sitting-Comment/# of Minutes sitting edge of mat x 5 min   Dynamic Sitting Balance   Dynamic Sitting-Balance Support " Unilateral upper extremity supported  (R foot supported)   Dynamic Sitting-Balance Lateral lean;Forward lean;Reaching across midline   Dynamic Sitting-Comments sitting edge of mat CGA for safety   Supine   Supine-Exercises Lower extremity   Supine-Exercise Type SLR;ABD/ADD   Supine-Exercise Comments x 10 reps BLEs, pt c/o L limb incision discomfort   Other Activities   Comments pt educated for wc repositioning and for wc pushups; pt was able to perform partial wc pushups (preliminary portion) x 5 reps   Activity Tolerance   Activity Tolerance Patient tolerated treatment well;Patient limited by pain   After Treatment   Patient Position After Treatment Seated in wheelchair  (pt seen in wc group after P.T. session)   Assessment   Prognosis Fair   Problem List Decreased strength;Decreased endurance;Impaired balance;Decreased mobility;Pain   Assessment Pt w/ good motivation and fair endurance and strength; pt requires total A of two for SB transfers wc< >mat and is CGA/min A for bed mobility on mat.   Level of Motivation/Participation good   Plan   Planned Therapy Intervention Continue with current plan   Therapy Frequency 2 times/day;Monday-Friday;Saturday or Sunday   Treatment/Billable Minutes   Therapeutic Activity 20   Therapeutic Exercise 12   Train/Wheelchair Management 13   Total Time 45         Kevin Washington, PTA  8/9/2017

## 2017-08-09 NOTE — SUBJECTIVE & OBJECTIVE
Interval History: No acute events over night. Patient is tolerating therapy, pain controlled. Mood stable.     Scheduled Medications:    amlodipine  10 mg Oral Daily    aspirin  81 mg Oral Daily    calcitRIOL  0.5 mcg Oral Daily    docusate sodium  100 mg Oral BID    heparin (porcine)  5,000 Units Subcutaneous Q8H    pantoprazole  40 mg Oral Daily    pregabalin  75 mg Oral QHS    sevelamer carbonate  2,400 mg Oral TID WM       PRN Medications: acetaminophen, bisacodyl, calcium carbonate, dextrose 50%, dextrose 50%, glucagon (human recombinant), glucose, glucose, heparin (porcine), insulin aspart, methocarbamol, ondansetron, ondansetron, oxycodone, pneumoc 13-pramod conj-dip cr(PF), polyethylene glycol, senna-docusate 8.6-50 mg    Review of Systems   Constitutional: Negative for appetite change and unexpected weight change.   HENT: Negative for congestion and ear pain.    Eyes: Negative for discharge.   Respiratory: Negative for shortness of breath.    Cardiovascular: Negative for chest pain.   Gastrointestinal: Negative for abdominal pain and vomiting.   Endocrine: Negative for cold intolerance.   Genitourinary: Negative for flank pain.   Neurological: Positive for weakness.   Psychiatric/Behavioral: Negative for hallucinations.     Objective:     Vital Signs (Most Recent):  Temp: 98.6 °F (37 °C) (08/09/17 0700)  Pulse: 80 (08/09/17 0700)  Resp: 18 (08/09/17 0700)  BP: 127/65 (08/09/17 0700)  SpO2: 97 % (08/09/17 0700)    Vital Signs (24h Range):  Temp:  [98.2 °F (36.8 °C)-98.6 °F (37 °C)] 98.6 °F (37 °C)  Pulse:  [80-85] 80  Resp:  [16-18] 18  SpO2:  [77 %-97 %] 97 %  BP: (118-127)/(55-65) 127/65     Physical Exam   Constitutional: She appears well-developed and well-nourished.   HENT:   Head: Normocephalic and atraumatic.   Eyes: EOM are normal.   Cardiovascular: Normal rate.    Pulmonary/Chest: Effort normal. No respiratory distress.   Abdominal: Soft. She exhibits no distension. There is no tenderness.    Musculoskeletal:   UE: RUE: SA 4/5, EF/EE 3/5,  4/5  LUE: SA 4/5, EF/EE 4/5, 3/5,  4/5  RLE HF 3/5, KE 4/5, DF/PF 4/5  AKA site C/D, closed w staples     Neurological: She is alert.   Followed commands  Memory: 3/3 immediate, 1/3 delayed    Skin: Skin is warm.   Psychiatric: She has a normal mood and affect.   Vitals reviewed.    NEUROLOGICAL EXAMINATION:     CRANIAL NERVES     CN III, IV, VI   Extraocular motions are normal.

## 2017-08-10 ENCOUNTER — OFFICE VISIT (OUTPATIENT)
Dept: VASCULAR SURGERY | Facility: CLINIC | Age: 62
DRG: 559 | End: 2017-08-10
Attending: PHYSICAL MEDICINE & REHABILITATION
Payer: MEDICARE

## 2017-08-10 VITALS
HEIGHT: 59 IN | HEART RATE: 92 BPM | WEIGHT: 136 LBS | SYSTOLIC BLOOD PRESSURE: 89 MMHG | BODY MASS INDEX: 27.42 KG/M2 | DIASTOLIC BLOOD PRESSURE: 52 MMHG | TEMPERATURE: 100 F

## 2017-08-10 DIAGNOSIS — N18.6 ESRD (END STAGE RENAL DISEASE) ON DIALYSIS: ICD-10-CM

## 2017-08-10 DIAGNOSIS — I87.1 VEIN STENOSIS: ICD-10-CM

## 2017-08-10 DIAGNOSIS — Z99.2 ESRD (END STAGE RENAL DISEASE) ON DIALYSIS: ICD-10-CM

## 2017-08-10 DIAGNOSIS — Z89.612 S/P AKA (ABOVE KNEE AMPUTATION), LEFT: Primary | ICD-10-CM

## 2017-08-10 LAB
ALBUMIN SERPL BCP-MCNC: 1.9 G/DL
ALP SERPL-CCNC: 73 U/L
ALT SERPL W/O P-5'-P-CCNC: <5 U/L
ANION GAP SERPL CALC-SCNC: 11 MMOL/L
AST SERPL-CCNC: 9 U/L
BASOPHILS # BLD AUTO: 0.03 K/UL
BASOPHILS NFR BLD: 0.2 %
BILIRUB SERPL-MCNC: 0.3 MG/DL
BUN SERPL-MCNC: 17 MG/DL
CALCIUM SERPL-MCNC: 8 MG/DL
CHLORIDE SERPL-SCNC: 104 MMOL/L
CO2 SERPL-SCNC: 22 MMOL/L
CREAT SERPL-MCNC: 3.3 MG/DL
DIFFERENTIAL METHOD: ABNORMAL
EOSINOPHIL # BLD AUTO: 0.2 K/UL
EOSINOPHIL NFR BLD: 1.7 %
ERYTHROCYTE [DISTWIDTH] IN BLOOD BY AUTOMATED COUNT: 19.7 %
EST. GFR  (AFRICAN AMERICAN): 16.6 ML/MIN/1.73 M^2
EST. GFR  (NON AFRICAN AMERICAN): 14.4 ML/MIN/1.73 M^2
GLUCOSE SERPL-MCNC: 105 MG/DL
HCT VFR BLD AUTO: 25.4 %
HGB BLD-MCNC: 7.3 G/DL
LYMPHOCYTES # BLD AUTO: 0.9 K/UL
LYMPHOCYTES NFR BLD: 7.1 %
MCH RBC QN AUTO: 26.8 PG
MCHC RBC AUTO-ENTMCNC: 28.7 G/DL
MCV RBC AUTO: 93 FL
MONOCYTES # BLD AUTO: 0.7 K/UL
MONOCYTES NFR BLD: 5.1 %
NEUTROPHILS # BLD AUTO: 11.2 K/UL
NEUTROPHILS NFR BLD: 85.9 %
PLATELET # BLD AUTO: 378 K/UL
PMV BLD AUTO: 9.8 FL
POCT GLUCOSE: 111 MG/DL (ref 70–110)
POCT GLUCOSE: 174 MG/DL (ref 70–110)
POCT GLUCOSE: 174 MG/DL (ref 70–110)
POCT GLUCOSE: 205 MG/DL (ref 70–110)
POTASSIUM SERPL-SCNC: 4 MMOL/L
PREALB SERPL-MCNC: 11 MG/DL
PROT SERPL-MCNC: 6.9 G/DL
RBC # BLD AUTO: 2.72 M/UL
SODIUM SERPL-SCNC: 137 MMOL/L
WBC # BLD AUTO: 13.02 K/UL

## 2017-08-10 PROCEDURE — 97110 THERAPEUTIC EXERCISES: CPT

## 2017-08-10 PROCEDURE — 25000003 PHARM REV CODE 250: Performed by: PHYSICAL MEDICINE & REHABILITATION

## 2017-08-10 PROCEDURE — 36415 COLL VENOUS BLD VENIPUNCTURE: CPT

## 2017-08-10 PROCEDURE — 97530 THERAPEUTIC ACTIVITIES: CPT

## 2017-08-10 PROCEDURE — 97150 GROUP THERAPEUTIC PROCEDURES: CPT

## 2017-08-10 PROCEDURE — 94799 UNLISTED PULMONARY SVC/PX: CPT

## 2017-08-10 PROCEDURE — 12800000 HC REHAB SEMI-PRIVATE ROOM

## 2017-08-10 PROCEDURE — 99900035 HC TECH TIME PER 15 MIN (STAT)

## 2017-08-10 PROCEDURE — 25000003 PHARM REV CODE 250: Performed by: STUDENT IN AN ORGANIZED HEALTH CARE EDUCATION/TRAINING PROGRAM

## 2017-08-10 PROCEDURE — 84134 ASSAY OF PREALBUMIN: CPT

## 2017-08-10 PROCEDURE — 27000221 HC OXYGEN, UP TO 24 HOURS

## 2017-08-10 PROCEDURE — 97542 WHEELCHAIR MNGMENT TRAINING: CPT

## 2017-08-10 PROCEDURE — 99999 PR PBB SHADOW E&M-EST. PATIENT-LVL III: CPT | Mod: PBBFAC,,, | Performed by: SURGERY

## 2017-08-10 PROCEDURE — 99233 SBSQ HOSP IP/OBS HIGH 50: CPT | Mod: ,,, | Performed by: PHYSICAL MEDICINE & REHABILITATION

## 2017-08-10 PROCEDURE — 63600175 PHARM REV CODE 636 W HCPCS: Performed by: STUDENT IN AN ORGANIZED HEALTH CARE EDUCATION/TRAINING PROGRAM

## 2017-08-10 PROCEDURE — 99024 POSTOP FOLLOW-UP VISIT: CPT | Mod: ,,, | Performed by: SURGERY

## 2017-08-10 PROCEDURE — 99213 OFFICE O/P EST LOW 20 MIN: CPT | Mod: PBBFAC,25 | Performed by: SURGERY

## 2017-08-10 PROCEDURE — 85025 COMPLETE CBC W/AUTO DIFF WBC: CPT

## 2017-08-10 PROCEDURE — 94761 N-INVAS EAR/PLS OXIMETRY MLT: CPT

## 2017-08-10 PROCEDURE — 80053 COMPREHEN METABOLIC PANEL: CPT

## 2017-08-10 PROCEDURE — 92507 TX SP LANG VOICE COMM INDIV: CPT

## 2017-08-10 RX ADMIN — PREGABALIN 75 MG: 75 CAPSULE ORAL at 08:08

## 2017-08-10 RX ADMIN — AMLODIPINE BESYLATE 10 MG: 10 TABLET ORAL at 07:08

## 2017-08-10 RX ADMIN — DOCUSATE SODIUM 100 MG: 100 CAPSULE, LIQUID FILLED ORAL at 08:08

## 2017-08-10 RX ADMIN — OXYCODONE HYDROCHLORIDE 5 MG: 5 TABLET ORAL at 05:08

## 2017-08-10 RX ADMIN — SEVELAMER CARBONATE 2400 MG: 800 TABLET, FILM COATED ORAL at 05:08

## 2017-08-10 RX ADMIN — INSULIN ASPART 1 UNITS: 100 INJECTION, SOLUTION INTRAVENOUS; SUBCUTANEOUS at 10:08

## 2017-08-10 RX ADMIN — DOCUSATE SODIUM 100 MG: 100 CAPSULE, LIQUID FILLED ORAL at 07:08

## 2017-08-10 RX ADMIN — SEVELAMER CARBONATE 2400 MG: 800 TABLET, FILM COATED ORAL at 07:08

## 2017-08-10 RX ADMIN — HEPARIN SODIUM 5000 UNITS: 5000 INJECTION, SOLUTION INTRAVENOUS; SUBCUTANEOUS at 01:08

## 2017-08-10 RX ADMIN — CALCITRIOL 0.5 MCG: 0.5 CAPSULE, LIQUID FILLED ORAL at 07:08

## 2017-08-10 RX ADMIN — ASPIRIN 81 MG CHEWABLE TABLET 81 MG: 81 TABLET CHEWABLE at 07:08

## 2017-08-10 RX ADMIN — HEPARIN SODIUM 5000 UNITS: 5000 INJECTION, SOLUTION INTRAVENOUS; SUBCUTANEOUS at 05:08

## 2017-08-10 RX ADMIN — PANTOPRAZOLE SODIUM 40 MG: 40 TABLET, DELAYED RELEASE ORAL at 07:08

## 2017-08-10 RX ADMIN — SEVELAMER CARBONATE 2400 MG: 800 TABLET, FILM COATED ORAL at 12:08

## 2017-08-10 RX ADMIN — ACETAMINOPHEN 650 MG: 325 TABLET ORAL at 07:08

## 2017-08-10 RX ADMIN — HEPARIN SODIUM 5000 UNITS: 5000 INJECTION, SOLUTION INTRAVENOUS; SUBCUTANEOUS at 08:08

## 2017-08-10 NOTE — PROGRESS NOTES
Physical Therapy   FIM    Aleta Herrera   MRN: [id number]   PTA visit       08/10/17 1400   Transfers   Bed/Chair/WC 1   Locomotion   Distance Walked 0   Distance Wheelchair 2   Walk 0   Wheelchair 2   Mode C   Stairs 0         Kevin Washington, PTA  8/10/2017

## 2017-08-10 NOTE — PROGRESS NOTES
Physical Therapy Discharge Summary    Aleta Herrera  MRN: [id number]   Left foot infection   Patient Discharged from acute Physical Therapy on 17.  Please refer to prior PT noted date on 17 for functional status.     Assessment:   Patient appropriate for care in another setting.  GOALS:    Physical Therapy Goals        Problem: Physical Therapy Goal    Goal Priority Disciplines Outcome Goal Variances Interventions   Physical Therapy Goal     PT/OT, PT Ongoing (interventions implemented as appropriate)     Description:  Goals to be met by: 2017     Patient will increase functional independence with mobility by performin. Supine to sit with Set-up [place]  2. Sit to supine with Set-up [place]  3. Sit to stand transfer with Minimal Assistance  4. Bed to chair transfer with Minimal Assistance using slide board or SW  5. Gait  x 5 feet with Minimal Assistance using Standard Walker.   6. Wheelchair propulsion x100 feet with Minimal Assistance using bilateral upper extremities  7. Lower extremity exercise program x15 reps per handout, with supervision                  no goals met, pt transferred to rehab for continued therapy  Reasons for Discontinuation of Therapy Services  Transfer to alternate level of care.      Plan:  Patient Discharged to: Inpatient Rehab.

## 2017-08-10 NOTE — NURSING
Pt awake and alert, able to make needs known. No acute distress noted. Pt is on dialysis, does not urinate. Tolerated medication PO.Pt turn and repositioned q 1-2 hrs this shift. No new skin breakdown noted.Pain and safety monitored q 1-2 hrs. Assist patient with all ambulation. Instruct to use call bed and call for assistance when getting out of bed.Bed locked and in low position. Rails elevated x 2. Call light and personal belongings in reach. Will continue to monitor.

## 2017-08-10 NOTE — PROGRESS NOTES
Occupational Therapy  Patient MERLE Herrera   MRN: [id number]      08/10/17 1415   Transfers   Bed/Chair/WC 1     A client care conference was performed between the [facility] and [facility], prior to treatment by EUFEMIA, to discuss the patient's status, treatment plan and established goals.   GLENN Zavala 8/10/2017

## 2017-08-10 NOTE — ASSESSMENT & PLAN NOTE
8/9 PO intake aroudn 50%. Will hold Levemir 4u BID, and monitor trend.     8/10 -174, cont to hold insulin

## 2017-08-10 NOTE — ASSESSMENT & PLAN NOTE
ESRD, home HD MWF and L AVG placed 4/2017  - HD planned for today, cont to monitor     8/10 Labs stable, w WBC trending down. H/H stable. K WNL.

## 2017-08-10 NOTE — SUBJECTIVE & OBJECTIVE
Interval History: No acute events over night. Patient is tolerating therapy, pain controlled. Mood stable. Tolerated HD last night.     Scheduled Medications:    amlodipine  10 mg Oral Daily    aspirin  81 mg Oral Daily    calcitRIOL  0.5 mcg Oral Daily    docusate sodium  100 mg Oral BID    heparin (porcine)  5,000 Units Subcutaneous Q8H    pantoprazole  40 mg Oral Daily    pregabalin  75 mg Oral QHS    sevelamer carbonate  2,400 mg Oral TID WM       PRN Medications: acetaminophen, bisacodyl, calcium carbonate, dextrose 50%, dextrose 50%, glucagon (human recombinant), glucose, glucose, heparin (porcine), insulin aspart, methocarbamol, ondansetron, ondansetron, oxycodone, pneumoc 13-pramod conj-dip cr(PF), polyethylene glycol, senna-docusate 8.6-50 mg    Review of Systems   Constitutional: Negative for appetite change and unexpected weight change.   HENT: Negative for congestion and ear pain.    Eyes: Negative for discharge.   Respiratory: Negative for shortness of breath.    Cardiovascular: Negative for chest pain.   Gastrointestinal: Negative for abdominal pain and vomiting.   Endocrine: Negative for cold intolerance.   Genitourinary: Negative for flank pain.   Neurological: Positive for weakness.   Psychiatric/Behavioral: Negative for hallucinations.     Objective:     Vital Signs (Most Recent):  Temp: 99.4 °F (37.4 °C) (08/10/17 0700)  Pulse: 90 (08/10/17 0700)  Resp: 18 (08/10/17 0700)  BP: 102/62 (08/10/17 0700)  SpO2: 97 % (08/10/17 1300)    Vital Signs (24h Range):  Temp:  [98.5 °F (36.9 °C)-100.2 °F (37.9 °C)] 100.2 °F (37.9 °C)  Pulse:  [74-92] 92  Resp:  [18] 18  SpO2:  [74 %-97 %] 97 %  BP: ()/(52-65) 89/52     Physical Exam   Constitutional: She appears well-developed and well-nourished.   HENT:   Head: Normocephalic and atraumatic.   Eyes: EOM are normal.   Cardiovascular: Normal rate.    Pulmonary/Chest: Effort normal. No respiratory distress.   Abdominal: Soft. She exhibits no distension.  There is no tenderness.   Musculoskeletal:   UE: RUE: SA 4/5, EF/EE 3/5,  4/5  LUE: SA 4/5, EF/EE 4/5, 3/5,  4/5  RLE HF 3/5, KE 4/5, DF/PF 4/5  AKA site C/D, closed w staples     Neurological: She is alert.   Followed commands  Memory: 3/3 immediate, 1/3 delayed    Skin: Skin is warm.   Psychiatric: She has a normal mood and affect.   Vitals reviewed.    NEUROLOGICAL EXAMINATION:     CRANIAL NERVES     CN III, IV, VI   Extraocular motions are normal.

## 2017-08-10 NOTE — PROGRESS NOTES
"Physical Therapy   Treatment    Aleta Herrera   MRN: [id number]   PTA visit        08/10/17 1325   PT Time Calculation   PT Start Time 1325   PT Stop Time 1410   PT Total Time (min) 45 min   Treatment   Treatment Type Treatment   PT/PTA PTA   General   PT Received On 08/10/17   Patient Found (position) Seated in wheelchair   Pt found with oxygen   Precautions   General Precautions fall   Visual/Auditory Vision impaired   Subjective   Patient states "I just got my lunch," pt stated and nursing confirmed; pt was given additional time to eat lunch before starting P.T. session.   Pain/Comfort   Pain Rating 1 7/10   Location - Side 1 Left   Location - Orientation 1 generalized   Location 1 leg   Pain Addressed 1 Pre-medicate for activity;Reposition;Distraction;Cessation of Activity;Nurse notified   Pain Rating Post-Intervention 1 7/10   Bed Mobility   Bed Mobility yes   Supine to Sit Contact Guard Assistance  (cues for sequence)   Supine to Sit Comments mat   Sit to Supine Minimum Assistance  (mat)   Transfers   Transfer yes   Chair to Mat   Chair<> Mat Technique Slide Board   Chair<>Mat Assistance Total Assistance  (max A of one, CGA of one)   Trials/Comments cues for hand and foot placement, leaning forward   Mat to Chair   Technique Slide Board   Assistance Maximum Assistance   Trials/Comments cues for hand and foot placement, leaning forward   Wheelchair Activities   Pressure Relief Yes   Propulsion Yes   Propulsion Type 1 Manual   Level 1 Level tile   Method 1 Right upper extremity;Left upper extremity   Level of Assistance 1 Minimum assistance   Description/ Details 1 100 feet, O2  tank in tow; pt's O2 sat post activity on 3L O2 = 94-95%   Gait   Gait No   Stairs   Stairs No   Balance   Balance Yes   Static Sitting Balance   Static Sitting-Balance Support Right upper extremity supported;Left upper extremity supported   Static Sitting-Level of Assistance Stand by Assistance   Static Sitting-Comment/# of " Minutes sitting edge of bed   Supine   Supine-Exercises Lower extremity   Supine-Exercise Type Glut sets;SLR;ABD/ADD   Supine-Exercise Comments 2 x 10 reps, NANCY Li   Activity Tolerance   Activity Tolerance Patient tolerated treatment well   After Treatment   Patient Position After Treatment Seated in wheelchair  (pt seen in O.T. after P.T. session)   Assessment   Prognosis Fair   Problem List Decreased strength;Decreased range of motion;Decreased endurance;Impaired balance;Decreased coordination;Pain   Session Assessment progressing toward goals   Assessment Pt is improving minimally w/ SB transfers and scooting, incorporating BUEs and leaning forward better.   Level of Motivation/Participation good   Plan   Planned Therapy Intervention Continue with current plan   Therapy Frequency 2 times/day;Monday-Friday;Saturday or Sunday   Treatment/Billable Minutes   Therapeutic Activity 20   Therapeutic Exercise 15   Train/Wheelchair Management 10   Total Time 45         Kevin Washington, PTA  8/10/2017

## 2017-08-10 NOTE — PROGRESS NOTES
Occupational Therapy   Endurance Group    Aleta Herrera  MRN: [id number]  Room/Bed: E263/E263 B       08/10/17 1554   OT Time Calculation   OT Start Time 1554   OT Stop Time 1639   OT Total Time (min) 45 min   General   OT Date of Treatment 08/10/17   Patient Found (position) Seated in wheelchair   Pt found with oxygen   Precautions   General Precautions fall   Subjective   Patient states no c/o   Pain/Comfort   Pain Rating no pain   OT Therapeutic Groups   OT Therapeutic Yes   Other Patient participated in 45 minute seated high endurance group. The group activity involved bilateral upper extremities in addition to cardiovascular and functional endurance challenges during group exercises.    Patient completed warm-up and cool-down exercises as well as aerobic wheelchair exercise as follows:     - Alternating lateral and overhead punches, side punches, diagonal reaches, ABD, ADD, elbow flex/ext in rapid succession in varied sequences.    Pt required minimal rest breaks during therapeutic exercises. These activities were performed in a group setting to encourage participation with peers and social interaction skills.    Occupational Therapy Follow-up   OT Follow-up? Yes   Treatment/Billable Minutes   Therapeutic Group 45   Total Time 45       ROGELIO Bourne  8/10/2017    LEGEND:   CGA: Contact Guard Assist   EOB: Edge of Bed   HHA: Hand Held Assist   HOB: Head of Bed   (I): Independent-patient performs task in a timely manner   Max (A): Maximal Assist-patient performs 25-49% of task   Min (A): Minimal Assist- patient performs 75% or more of task   Mod (A): Moderate Assist- patient performs 50-74% of task   NA: Not applicable   NT: Not tested   OOB: Out of Bed   PTA: Prior to admit   QC: Quad Cane   RW: Rolling Walker   (S): Supervision- patient requires cues, coaxing, prompting   SBA: Stand By Assist   SC: Straight Cane   SW: Standard Walker   TBA: To be assessed   Total (A): Total Assist- patient  performs less than 25% of task   WC: Wheelchair   WFL: Within Functional Limits   WNL: Within Normal Limits

## 2017-08-10 NOTE — PROGRESS NOTES
Occupational Therapy  Patient PM Treatment Note  Aleta Herrera   MRN: [id number]      08/10/17 1415   OT Time Calculation   OT Start Time 1415   OT Stop Time 1500   OT Total Time (min) 45 min   General   OT Date of Treatment 08/10/17   Family/Caregiver Present No   Patient Found (position) Seated in wheelchair   Pt found with oxygen  (2L)   Precautions   General Precautions fall   Visual/Auditory Vision impaired   Pain/Comfort   Pain Rating no pain   Therapeutic Exercise - Strength   Strength Yes   Exercise Tools   Exercise Tools Yes   Bilateral Manish 2lb on incline 10 x 10   Other Exercise Tool 1 3lb dowel ex 10 x 3 all available planes and joints     A client care conference was performed between the LOTR and FALL, prior to treatment by FALL, to discuss the patient's status, treatment plan and established goals.   EUFEMIA Zavala/GREGORIO 8/10/2017

## 2017-08-10 NOTE — PROGRESS NOTES
Occupational Therapy  Patient PM Treatment Note  Aleta Herrera   MRN: [id number]      08/10/17 1415   OT Time Calculation   OT Start Time 1415   OT Stop Time 1500   OT Total Time (min) 45 min   General   OT Date of Treatment 08/10/17   Family/Caregiver Present No   Patient Found (position) Seated in wheelchair   Pt found with oxygen  (2L)   Precautions   General Precautions fall   Visual/Auditory Vision impaired   Pain/Comfort   Pain Rating no pain   Therapeutic Exercise - Strength   Strength Yes   Exercise Tools   Exercise Tools Yes   Bilateral Manish 2lb on incline 10 x 10   Other Exercise Tool 1 3lb dowel ex 10 x 3 all available planes and joints   Additional Activities   Additional Activities Other (Comment)   Additional Activities Comments wc push up 10 x 2 to increase UB strength and for pressure relief   Coordination   Fine Motor Patient completed nuts/bolts task to increase B FMC.   Activity Tolerance   Activity Tolerance Patient tolerated treatment well   After Treatment   Patient Position After Treatment Seated in wheelchair   Patient after treatment left call button in reach   Assessment   Prognosis Fair   Problem List Decreased Self Care skills;Decreased upper extremity range of motion;Decreased upper extremity strength;Decreased safe judgment during ADL;Decreased endurance;Visual deficit;Decreased functional mobility;Decreased IADLs;Decreased trunk control for functional activities   Assessment Patient agreeable to OT session.  Patient continues to benefit from skillef OT services.   Level of Motivation/Participation Good   Discharge Recommendations   Equipment Needed After Discharge slide board   Discharge Facility/Level Of Care Needs home with home health   Plan   Plan Continue with current plan   Therapy Frequency 2 times/day   Treatment/Billable Minutes   Therapeutic Activity 15   Therapeutic Exercise 30   Total Time 45     A client care conference was performed between the ROGELIO and EUFEMIA  prior to treatment by FALL, to discuss the patient's status, treatment plan and established goals.  EUFEMIA Zavala/GREGORIO 8/10/2017

## 2017-08-10 NOTE — ASSESSMENT & PLAN NOTE
s/p L AKA on 7/31 2/2 gangrenous unsalvageable L foot   8/10 Has Vascular F/U today     Functional: Mod A for memory, prob solving, Mod I with expression/Comp  SBA/Mod w bed mobility, TA for sit to stand, UBD Valentina, Mod A for bathing

## 2017-08-10 NOTE — PROGRESS NOTES
"Speech Therapy   Treatment    Aleta Herrera   MRN: [id number]            08/10/17 0800   Speech Time Calculation   Speech Start Time 0800   Speech Stop Time 0845   Speech Total (min) 45 min   General Information   SLP Treatment Date 08/10/17   General Observations Patient seen while sitting upright in wheelchair in patient's room. Patient with decreased motivation, characterized by decreased alertness at beginning of session. Patient presented with decreased recall/attention/thought organization as compared to previous sessions. Patient demonstrated increased cognitive functioning over the duration of the session.    General Precautions fall   Visual/Auditory Vision impaired   Subjective   Patient states "I am not a morning person."   Pain/Comfort   Pain Rating 7/10   Location - Side Left   Location leg   Pain Addressed Pre-medicate for activity   Pain Comment "it hurts near the incision"       SLP Cognitive Treatment   Treatment Detail (SLP Cognitive Treatment) Patient oriented to person, place, and situation. Disoriented to date by 2 days. When asked to recall recent information from previous day, patient recalled tasks completed in ST evaluation x2 days prior. ST provided max cues to patient to recall details from most recent previous session- patient unable to recall. ST asked patient to recall general details from previous day's schedule - patient unable to recall. Patient reported that she does not typically have difficulty recalling general information from previous day.  ST encouraged patient to write down important events each day to compensate for recall deficits, patient verbalized understanding. In a functional recall task, patient recalled details from brief yon/notices with 40% accuracy, despite max verbal cues. In a paragraph recall task, patient recalled information with 60% accuracy independently, 70% accuracy given moderate verbal cues. When asked to identify patient's main goal at IP " "Rehab, patient stated, "I need to strengthen my upper body so that when I go home, I can help my  help me." Patient reported that her daughter will no longer be working upon patient's d/c so that she will have 24 hour assistance.   Assessment   SLP Diagnosis mild cog deficits   Prognosis Good   Problem List decreased recall; decreased attention; decreased thought organization   Session Assessment progressing toward goals   Level of Motivation/Participation Good   Discharge Recommendations   Discharge Facility/Level Of Care Needs home health speech therapy   Plan   Plan Continue with current plan   Therapy Frequency Monday-Friday   SLP Follow-up   SLP Follow-up? Yes   Treatment/Billable Minutes   Speech Therapy Individual 45   Total Time 45      FIM Scores  Auditory Comprehension:6  Expression: 6  Social Interaction:6  Problem Solving:3  Memory: 3     Comprehension:  Complex= humor, finances, rationale for medical treatment(hip precautions, pressure relief)  Basic= pain, hunger, thirst, bathroom needs, cold, nutrition, sleep     § Understands complex/abstract conversation/directions with extra time, assistance device(glasses, if visual mode or both; hearing aide if auditory mode or both) (6)        Expression:  § Expresses complex / abstract ideas with extra time, assistive devic (augmentative communication device    (6)     Social Interaction:  § Interacts appropriately with others with medication or extra time(anti-anxiety, antidepressant)  (6)     .Problem Solving:  § Solves basic problems 50-74% of the time  (3)     Memory:     Recognizes, recalls, or executes 50-74% of time 2 steps of 2 step request (3)    MIMI Mujica-SLP  8/10/2017    "

## 2017-08-10 NOTE — PROGRESS NOTES
S: Aleta Agudelo Sharon 62 y.o. s/p Left AKA on 7/31/17 admitted to  inpatient rehabilitation at [facility] on 8/7/2017 d/t impaired mobility and ADLs. She is receiving PT, OT, and as required Speech therapy. Pt. Uses O2 at 2.5 liters.  Denies CP, fever, chills, unusual SOB.  Takes oxycodone and muscle relaxers for pain to the left AKA which helps with the pain.        O:   Vitals:    08/10/17 0944   BP: (!) 89/52   Pulse: 92   Temp: 100.2 °F (37.9 °C)     AAOx3; Heart RRR. Breath sounds slightly coarse in the posterior bases.   Left AKA incision well approximated with staples in place.  No drainage. No redness. No swelling.  Left AVG site slightly warm and erythematous with slight tenderness to palpation.       A:  62 y.o. with well healing well approximated left AKA incision.          P:    1. Remove left  AKA staples on 8/21/17 at Rehab Facility    2. F/U in 4 months with HD access US     JAREN Banks, APRN, FNP-BC  Nurse Practitioner  Vascular and Endovascular Surgery

## 2017-08-10 NOTE — LETTER
August 10, 2017      Fred Howard MD  [address]  [facility] [address]           [facility]  [address]  [facility] [address]  Phone: [phone]  Fax: [phone]          Patient: Aleta Herrera   MR Number: [id number]   YOB: 1955   Date of Visit: 8/10/2017       Dear Dr. Fred Howard:    Thank you for referring Aleta Herrera to me for evaluation. Attached you will find relevant portions of my assessment and plan of care.    If you have questions, please do not hesitate to call me. I look forward to following Aleta Herrera along with you.    Sincerely,    Nathalie Madera MD    Enclosure  CC:  No Recipients    If you would like to receive this communication electronically, please contact [email or web address] or [phone] to request more information on [employer] Link access.    For providers and/or their staff who would like to refer a patient to [facility], please contact us through our one-stop-shop provider referral line, [facility], at [phone].    If you feel you have received this communication in error or would no longer like to receive these types of communications, please e-mail [email or web address]

## 2017-08-10 NOTE — PROGRESS NOTES
BLADDER  [] Pt. uses toilet (7)  [x] Pt. is on dialysis - does not urinate (7)  [] Pt. uses bedside commode (5)  []Pt. uses bedpan - staff does____% (includes rolling on/off, holding bedpan in place                                                                   and emptying pan)   [] Pt. uses urinal - staff empties                          []   Pt. needs help with positioning the urinal (4)                          []   Pt. needs help holding the urinal (2)  []  Pt. has solitario catheter/suprapubic catheter - staff empties (1)  []  Pt. Is on ICP program:                           []  Pt. does catheterization (6)                           [] Staff does catheterization (1)  [] Pt. Is incontinent - staff does ______% of tasks  Number of accidents during this shift ______    BOWEL  [] Pt. uses toilet (7)  [] Pt. uses bedside commode (5)  [x] Pt. uses bedpan - staff does ____80___% of task  [] Pt. is on bowel program::                         []   Pt. inserts suppository (6)                         []   Nurse inserts suppository (4)                         []  Nurse does dig. stim. (1)  [] Pt. has colostomy - staff maintains care and empties (1)                       Pt. does _____% of care  [] Pt. is incontinent - staff does ______% of tasks  [] No bowel movement during this shift  [] Number of accidents during this shift ______    EATING  [] Pt. opens packages, cuts food, pours liquids, and feeds self, regular consistency (7)  [] Pt. needs dentures, swallow technique, special foods or drink consistency (6)  [x] Pt. needs supervision (cueing), setup (open containers, cut food, etc.) (5)  []Pt. needs assist with occasional scooping, or checking for food pocketing (75-90%) (4)  []Pt. needs assist to lead each bite on utensils, patient brings food to mouth (50-74%)(3)  []Pt. needs assist to scoop, bring food to mouth (hand over hand) (25-49%) (2)  []AxillaryPt. Is receiving IV fluids for hydration or tube feeding  (1)    GROOMING  []Pt. performs all tasks independently and safely (7)  []Pt. obtains all articles, needs adaptive/assistive device (wash mitt, etc.) (6)  []Pt. needs supervision (cueing), setup (5)  []Pt. doing 3 of 4 or 4 of 5 tasks, needs assist to put in or remove dentures, steadying (Patient doing 75-90%) (4)   []Pt. doing 2 of 4 or 3 of 5 tasks (3)  []Pt. doing 1 of 4 of 2 of 5 tasks (25-49%) (2)  []Pt. doing 0 of tasks, needs assistance of 2 helpers (1)  []Activity occurred with OT    BATHING  []Pt. safety bathes whole body (10 parts of 10) (7)  []Pt. doing 10 of 10 body parts, uses adaptive devices (wash mitt, etc.) (6)  []Pt. doing 10 of 10 body parts or needs supervision or setup (5)  []Pt. doing 8-9 of 10 body parts (75-99%) (4)  []Pt. doing 5-7 of 10 body parts (50-74%) (3)   []Pt. doing 3-4 of 10 body parts (25-49%( (2)  []Pt. doing 0-2 of 10 body parts (0-24%0 (1)  []Activity occurred with OT    DRESSING UPPER BODY  []Pt. dresses or undresses independently, obtaining clothes from storage area (7)  []Pt. needs adaptive devices (6)  []Pt. needs supervision (cueing), setup (5)  []Pt. doing 75-99% (4)  []Pt. doing 50-74% (3)  [x]Pt. doing 25-49% (2)  []Pt. doing 0-24% or needs assist of 2 helpers (1)  []Activity occurred with OT    DRESSING LOWER BODY  []Pt. dresses or undresses independently (7)   []Pt. needs adaptive devices (6)  []Pt. needs supervision (cueing), set up helper applies RICKEY hose or AFO (5)  []Pt. doing 75-99%, needs steadying assist, fastening a belt, etc.) (4)  []Pt. doing 50-74% (3)  []Pt. doing 25-49% (2)  [x]Pt. doing 0-24% or needs assist of 2 helpers (1)    TOILETING  []Pt. doing 3 of 3 tasks (pulling down pants, cleaning brittney area, pulling up pants) (7)  []Pt. doing all 3 parts but needs assistive device (grab bar) (6)  []Pt. needs supervision or setup (5)  []Pt. doing 3 of 3 parts (75-99%) (4)  []Pt. doing 2 of 3 parts (50-74%) (3)  [x]Pt. doing 1 of 3 parts (25.49%) (2)  []Pt.  needs assist (more than steadying) with all 3 parts or needs assist of 2 helpers,  Incontinent of B/B today (0-24%) (1)    BED/ CHAIR/WHEELCHAIR TRANSFER  []Pt. transfers without assistive device into and out of wheelchair/bed/chair (7)  []Pt. uses assistive/adaptive devices (grab bars, sliding board, walker, bed rails,   wheelchair arm rests, etc.) (6)  []Pt. needs supervision, cues, head of bed raised by staff (5)  []Pt. needs steadying assist with any or all parts of transfer, needs assist with one   leg during transfer ( 4)  []Pt. needs lifting or lowering, needs assist with 2 legs during transfer (3)  []Pt. needs lifting and lowering (2)  [x]Pt. needs assist of 2 helpers(even if 2nd person is just there for safety) or mechanical lift (1)  []Activity did not occur     TOILET TRANSFER  []Pt. transfers from wheelchair or walking without assistive device (7)  []Pt. uses assistive/adaptive device (commode, grab bars, sliding board, walker,   prosthesis, orthotic, raised toilet seat, etc.) (6)  []Pt. needs supervision, cues, or setup (needs assist to lock brakes, remove leg or arm  rest, position sliding board) (5)  []Pt. needs steadying assist with any or all parts of transfer, needs assist with one leg  during transfer (4)  []Pt. needs lifting or lowering, needs assist with two legs during transfer (3)  []Pt. needs lifting and lowering (2)  []Pt. needs assist of 2 helpers or mechanical lift (1)  [x]Activity did not occur    SHOWER TRANSFER  []Pt. transfers without assistive device into and out of shower (7)  []Pt. uses assistive/adaptive device (shower bench, grab bars, etc.) (6)  []Pt. needs supervision, cues, or setup (5)  []Pt. needs steadying assist with any or all parts of transfer, needs assist with one leg  during transfer (4)  []Pt. needs lifting or lowering, needs assist with 2 legs during transfer (3)  []Pt. needs lifting and lowering (2)  []Pt. needs assist of 2 helpers, helper pushes shower chair on  wheels in and out of  shower (1)

## 2017-08-11 LAB
POCT GLUCOSE: 118 MG/DL (ref 70–110)
POCT GLUCOSE: 118 MG/DL (ref 70–110)
POCT GLUCOSE: 121 MG/DL (ref 70–110)
POCT GLUCOSE: >400 MG/DL (ref 70–110)

## 2017-08-11 PROCEDURE — 94761 N-INVAS EAR/PLS OXIMETRY MLT: CPT

## 2017-08-11 PROCEDURE — 27000221 HC OXYGEN, UP TO 24 HOURS

## 2017-08-11 PROCEDURE — 25000003 PHARM REV CODE 250: Performed by: PHYSICAL MEDICINE & REHABILITATION

## 2017-08-11 PROCEDURE — 97535 SELF CARE MNGMENT TRAINING: CPT

## 2017-08-11 PROCEDURE — 94799 UNLISTED PULMONARY SVC/PX: CPT

## 2017-08-11 PROCEDURE — 92507 TX SP LANG VOICE COMM INDIV: CPT

## 2017-08-11 PROCEDURE — 63600175 PHARM REV CODE 636 W HCPCS: Performed by: STUDENT IN AN ORGANIZED HEALTH CARE EDUCATION/TRAINING PROGRAM

## 2017-08-11 PROCEDURE — 97530 THERAPEUTIC ACTIVITIES: CPT

## 2017-08-11 PROCEDURE — 97150 GROUP THERAPEUTIC PROCEDURES: CPT

## 2017-08-11 PROCEDURE — 25000003 PHARM REV CODE 250: Performed by: STUDENT IN AN ORGANIZED HEALTH CARE EDUCATION/TRAINING PROGRAM

## 2017-08-11 PROCEDURE — 12800000 HC REHAB SEMI-PRIVATE ROOM

## 2017-08-11 PROCEDURE — 97542 WHEELCHAIR MNGMENT TRAINING: CPT

## 2017-08-11 PROCEDURE — 99233 SBSQ HOSP IP/OBS HIGH 50: CPT | Mod: ,,, | Performed by: PHYSICAL MEDICINE & REHABILITATION

## 2017-08-11 RX ADMIN — PREGABALIN 75 MG: 75 CAPSULE ORAL at 10:08

## 2017-08-11 RX ADMIN — HEPARIN SODIUM 5000 UNITS: 5000 INJECTION, SOLUTION INTRAVENOUS; SUBCUTANEOUS at 05:08

## 2017-08-11 RX ADMIN — CALCITRIOL 0.5 MCG: 0.5 CAPSULE, LIQUID FILLED ORAL at 08:08

## 2017-08-11 RX ADMIN — CALCIUM CARBONATE (ANTACID) CHEW TAB 500 MG 500 MG: 500 CHEW TAB at 08:08

## 2017-08-11 RX ADMIN — DOCUSATE SODIUM 100 MG: 100 CAPSULE, LIQUID FILLED ORAL at 08:08

## 2017-08-11 RX ADMIN — HEPARIN SODIUM 5000 UNITS: 5000 INJECTION, SOLUTION INTRAVENOUS; SUBCUTANEOUS at 02:08

## 2017-08-11 RX ADMIN — PANTOPRAZOLE SODIUM 40 MG: 40 TABLET, DELAYED RELEASE ORAL at 08:08

## 2017-08-11 RX ADMIN — ASPIRIN 81 MG CHEWABLE TABLET 81 MG: 81 TABLET CHEWABLE at 08:08

## 2017-08-11 RX ADMIN — SEVELAMER CARBONATE 2400 MG: 800 TABLET, FILM COATED ORAL at 12:08

## 2017-08-11 RX ADMIN — METHOCARBAMOL 500 MG: 500 TABLET ORAL at 02:08

## 2017-08-11 RX ADMIN — CALCIUM CARBONATE (ANTACID) CHEW TAB 500 MG 500 MG: 500 CHEW TAB at 11:08

## 2017-08-11 RX ADMIN — OXYCODONE HYDROCHLORIDE 5 MG: 5 TABLET ORAL at 10:08

## 2017-08-11 RX ADMIN — HEPARIN SODIUM 5000 UNITS: 5000 INJECTION, SOLUTION INTRAVENOUS; SUBCUTANEOUS at 10:08

## 2017-08-11 RX ADMIN — SEVELAMER CARBONATE 2400 MG: 800 TABLET, FILM COATED ORAL at 08:08

## 2017-08-11 RX ADMIN — AMLODIPINE BESYLATE 10 MG: 10 TABLET ORAL at 08:08

## 2017-08-11 NOTE — PROGRESS NOTES
"Speech Therapy   Treatment/ Weekly Progress Note    Aleta Herrera   MRN: [id number]     Short Term Goals   Goal 1 Patient will complete functional recall task with 75% accuracy given min A. CONTINUE   Goal 2 Patient will complete math/time/money management task with 70% accuracy given mod A. CONTINUE   Goal 3 Patient will complete delayed recall task with 70% accuracy given min A. GOAL MET & CONTINUE   Goal 4 Patient will complete bedside swallow evaluation to rule our risk of aspiration with PO intake. NO LONGER WARRANTED   Long Term Goals   Goal 1 Patient will complete functional recall task with 85% accuracy given min A.   Goal 2 Patient will complete math/time/money management task with 75% accuracy given min A.   Goal 3 Patient will complete delayed recall task with 80% accuracy given min A.          08/11/17 1015   Speech Time Calculation   Speech Start Time 1015   Speech Stop Time 1100   Speech Total (min) 45 min   General Information   SLP Treatment Date 08/11/17   General Observations Patient seen while siting upright in w/c in patient's room.Patient demonstrated increased motivation, attention and recall skills in comparison to previous AM session.    General Precautions fall   Visual/Auditory Vision impaired   Subjective   Patient states "   Pain/Comfort   Pain Rating no pain       SLP Cognitive Treatment   Treatment Detail (SLP Cognitive Treatment) Patient oriented x4 independently. Patient recalled basic, recent information with min difficulty (i.e. Visitors from previous day, therapy sessions in afternoon). Patient unable to recall details from previous day, given moderate verbal cues. In a functional recall task, ST read aloud brief (2-4 sentences) paragraph to patient. Patient recalled information from paragraph with 70% accuracy given min verbal cues. In a delayed recall task, patient recalled x3 unrelated words with 100% accuracy, given min verbal cues. To increase math/time/money " management skills, patient participated in word problem solving task. Patient answered word problems with 40% accuracy independently, 60% accuracy given max verbal cues and max repetitions. ST to continue POC.   Assessment   SLP Diagnosis mild-moderate cognitive deficits   Prognosis Good   Problem List decreased math/time problem solving skills; decreased recall   Session Assessment progressing toward goals   Level of Motivation/Participation Good   Discharge Recommendations   Discharge Facility/Level Of Care Needs home health speech therapy   Plan   Plan Alter current plan;Speech Language/Cognitive Therapy;Patient Education;Family Education   Therapy Frequency Monday-Friday   SLP Follow-up   SLP Follow-up? Yes   Treatment/Billable Minutes   Speech Therapy Individual 45   Total Time 45       FIM Scores  Auditory Comprehension:6  Expression: 6  Social Interaction:6  Problem Solving:3  Memory: 3     Comprehension:  Complex= humor, finances, rationale for medical treatment(hip precautions, pressure relief)  Basic= pain, hunger, thirst, bathroom needs, cold, nutrition, sleep  Understands complex/abstract conversation/directions with extra time, assistance device(glasses, if visual mode or both; hearing aide if auditory mode or both) (6)        Expression:  Expresses complex / abstract ideas with extra time, assistive device (augmentative communication device    (6)     Social Interaction:  Interacts appropriately with others with medication or extra time(anti-anxiety, antidepressant)  (6)     .Problem Solving:  Solves basic problems 50-74% of the time  (3)     Memory:     Recognizes, recalls, or executes 50-74% of time 2 steps of 2 step request (3)     MIMI Mujica-SLP  8/11/2017

## 2017-08-11 NOTE — PROGRESS NOTES
Pt discussed during treatment team staffing.  Expected discharge date is 8/22/17.  Pt and spouse informed and agrees with discharge date.  Discharge plan is home with family assistance.

## 2017-08-11 NOTE — PROGRESS NOTES
Physical Therapy   PT FIM    Aleta Herrera   MRN: [id number]        08/11/17 1400   Transfers   Bed/Chair/WC 1   Locomotion   Distance Wheelchair 3   Wheelchair 2   Mode C     Laura Aguilar, PT  8/11/2017

## 2017-08-11 NOTE — PROGRESS NOTES
Occupational Therapy  AM Group Session  Aleta Herrera   MRN: [id number]     08/11/17 1115   OT Time Calculation   OT Start Time 1115   OT Stop Time 1200   OT Total Time (min) 45 min   General   OT Date of Treatment 08/11/17   Treatment/Billable Minutes   Therapeutic Group 45   Total Time 45   Patient participated in 45-60 minute volleyball group activity.  The group activity challenged dynamic standing/sitting skills, bilateral upper extremity strengthening, cardiovascular and respiratory capacity, hand -eye coordination, visual scanning and social affect/mood.  The patient performed this activity at w/c level with __SBA___assist and or sitting level with _SBA___ assist. .  Patient tolerated sitting activity for __45___ minutes with___SBA_ assist.  The patient required _short__ rest breaks during this activity.  Patient performed activity using bilateral upper extremities to volley the ball, lateral arm movement noted throughout the activity.  Endurance __good___, no pain complaints voiced are observed , social affect good as patient was observed throughout this activity ie., appropriately engaged in social exchange with peers and staff.      The ROGELIO and FALL have collaborated and discussed the patient's status, treatment plan and progress toward established goals.     Cristopher Gutiérrez, HONEY 8/11/2017

## 2017-08-11 NOTE — PROGRESS NOTES
"Physical Therapy   Daily PT Treatment    Aleta Herrera   MRN: [id number]        08/11/17 1300   PT Time Calculation   PT Start Time 1300   PT Stop Time 1345   PT Total Time (min) 45 min   Treatment   Treatment Type Treatment   PT/PTA PT   General   PT Received On 08/11/17   Patient Found (position) Seated in wheelchair   Pt found with (2L O2)   Precautions   General Precautions fall   Subjective   Patient states "I am ready for therapy."   Pain/Comfort   Pain Rating 1 no pain   Chair to Mat   Chair<> Mat Technique Slide Board   Chair<>Mat Assistance Total Assistance  (x2 persons; second person there for safety)   Chair <> Mat Assistive Device Slide Board   Trials/Comments x1 trial; max VC for head hip relationship and hand placement    Mat to Chair   Technique Slide Board   Assistance Maximum Assistance   Assistive Device Slide Board   Trials/Comments x1 trial; less VC than chair > mat, but still required cueing for hand placement and head positioning.    Wheelchair Activities   Pressure Relief Yes   Pressure Relief Type Lateral lean   Level of Assistance for Pressure Relief Activities Stand By Assistance   Propulsion Yes   Propulsion Type 1 Manual   Level 1 Level tile   Method 1 Left upper extremity;Right upper extremity   Level of Assistance 1 Stand by assistsance  (mod VC for scanning environment and negiotiating obstacles )   Description/ Details 1 Pt propelled WC /c BUE for 200 feet with two brief rest breaks at 50ft and 120ft to readjust blanket. PT followed with portable O2 tank.    Propulsion 2   Propulsion Type 2 Manual   Level 2 Ramp   Method 2 Right upper extremity;Left upper extremity   Level of Assistance 2 Moderate assistance   Description/Details 2 Pt propelled WC up ramp requiring mod A to prevent WC from rolling back. Pt is unable to maintain speed on ramp without assistance. PT followed with O2 tank.   Dynamic Sitting Balance   Dynamic Sitting-Balance Forward lean   Dynamic Sitting-Comments " Pt performed 2 x 5 reps of anterior weight shifts in order to increase anterior weight shift during sliding board transfers. Pt would lean forward until her head touched PT's hand for tactile feedback to ensure patient was leaning enough forward. Pt tolerated exercise well.    Activity Tolerance   Activity Tolerance Patient tolerated treatment well   After Treatment   Patient Position After Treatment Seated in wheelchair  (with posey belt (new batteries were given))   Patient after treatment left call button in reach   Assessment   Prognosis Fair   Problem List Decreased strength;Decreased endurance;Decreased mobility;Decreased coordination   Session Assessment progressing toward goals   Assessment Pt is a motivated patient who lacks strength and endurance for functional mobility. Pt requires mod VC in order to perform transfers and WC mobility. Pt would benefit from continued transfer training and WC functional mobility. Pt is appropriate for cont PT treatment.    Level of Motivation/Participation good   Barriers to Discharge Inaccessible home environment   Plan   Planned Therapy Intervention Continue with current plan   Therapy Frequency daily;Monday-Friday;Saturday or Sunday   Physical Therapy Follow-up   PT Follow-up? Yes   PT - Next Visit Date 08/12/17   Treatment/Billable Minutes   Therapeutic Activity 15   Train/Wheelchair Management 30   Total Time 45     Laura Aguilar, PT  8/11/2017

## 2017-08-11 NOTE — PROGRESS NOTES
"Occupational Therapy  AM Treatment Session  Aleta Herrera   MRN: [id number]      08/11/17 0829   OT Time Calculation   OT Start Time 0829   OT Stop Time 0914   OT Total Time (min) 45 min   General   OT Date of Treatment 08/11/17   Family/Caregiver Present No   Patient Found (position) Supine in bed  (HOB elevated x2 bedrails bed alarm ON)   Pt found with oxygen  (2.5%)   Precautions   General Precautions fall   Visual/Auditory Vision impaired   Subjective   Patient states "I didnt know" re: BM   Pain/Comfort   Pain Rating no pain   Bed Mobility   Rolling/Turning to Left Minimum assistance   Rolling/Turning Left Comments bedrail   Rolling/Turning Right Stand by assistance   Rolling/Turning Right Comments cue for hand placement   Scooting/Bridging Moderate Assistance   Scooting/Bridging Comments to EOB   Supine to Sit Moderate Assistance   Supine to Sit Comments for trunk elevation   Bed to Chair   Bed <> Chair Technique Slide Board   Bed <> Chair Transfer Assistance Total Assistance   Trials/Comments x2 assist for safety   Feeding   Feeding Level of Assistance Set-up Assistance   Feeding Where Assessed Bed level   Feeding Comments (A) for managing apple sauce container   Grooming   Grooming Level of Assistance Stand by assistance   Hand Washing Level of Assistance Stand by assistance   Face Washing Level of Assistance Supervision   Oral Hygeine Level of Assistance Stand by assistance   Grooming Where Assessed Sitting sinkside   Bathing   Bathing Level of Assistance Moderate assistance   Bathing Where Assessed Bed   Bathing Comments notified Nursing re: skin breakdown  (barrier cream applied)   UE Dressing   UE Dressing Level of Assistance Minimum assistance   UE Dressing Where Assessed Edge of bed   UE Dressing Comments SBA; good trunk control; (A) managing overhead   LE Dressing   LE Dressing  Level of Assistance Total assistance   LE Dressing Level of Assistance Total assistance   Sock Level of Assistance " Total assistance   Adult Briefs Level of Assistance Total assistance   Toileting   Toileting Where Assessed Bed level   Toileting Comments not rate; incontinent episode for Bowel; Nusing notified   Activity Tolerance   Activity Tolerance Patient tolerated treatment well   After Treatment   Patient Position After Treatment Seated in wheelchair  (safety belt)   Patient after treatment left call button in reach;nursing notified  (Radha notified)   Assessment   Prognosis Fair   Problem List Decreased Self Care skills;Decreased upper extremity range of motion;Decreased upper extremity strength;Decreased safe judgment during ADL;Decreased endurance;Visual deficit;Decreased functional mobility;Decreased IADLs;Decreased trunk control for functional activities   Assessment Pt with good effort for functional ADLs, however still requires significant assistance. Pt educated for pressure relief at bed level and WC. Pt would continue to benefit from OT services to increase functional mobiltiy.   Level of Motivation/Participation Good   Discharge Recommendations   Equipment Needed After Discharge (drop arm commode)   Discharge Facility/Level Of Care Needs home with home health   Plan   Plan Continue with current plan   Therapy Frequency 2 times/day;Monday-Friday;Saturday or Sunday   Treatment/Billable Minutes   Self Care/Home Management 45   Total Time 45       The ROGELIO and EUFEMIA have collaborated and discussed the patient's status, treatment plan and progress toward established goals.     HONEY Mckeon 8/11/2017

## 2017-08-11 NOTE — PROGRESS NOTES
Occupational Therapy  [place] [facility]  Aleta Herrera   MRN: [id number]          08/11/17 1700   Transfers   Bed/Chair/WC 1   Self Care   Eating 5   Grooming 5   Bathing 3   Dressing-Upper 4   Dressing-Lower 2   Toileting 1   .     HONEY Mckeon 8/11/2017

## 2017-08-12 LAB
POCT GLUCOSE: 124 MG/DL (ref 70–110)
POCT GLUCOSE: 167 MG/DL (ref 70–110)
POCT GLUCOSE: 191 MG/DL (ref 70–110)
POCT GLUCOSE: 193 MG/DL (ref 70–110)

## 2017-08-12 PROCEDURE — 97535 SELF CARE MNGMENT TRAINING: CPT

## 2017-08-12 PROCEDURE — 63600175 PHARM REV CODE 636 W HCPCS: Performed by: STUDENT IN AN ORGANIZED HEALTH CARE EDUCATION/TRAINING PROGRAM

## 2017-08-12 PROCEDURE — 94799 UNLISTED PULMONARY SVC/PX: CPT

## 2017-08-12 PROCEDURE — 12800000 HC REHAB SEMI-PRIVATE ROOM

## 2017-08-12 PROCEDURE — 27000221 HC OXYGEN, UP TO 24 HOURS

## 2017-08-12 PROCEDURE — 97110 THERAPEUTIC EXERCISES: CPT

## 2017-08-12 PROCEDURE — 25000003 PHARM REV CODE 250: Performed by: STUDENT IN AN ORGANIZED HEALTH CARE EDUCATION/TRAINING PROGRAM

## 2017-08-12 PROCEDURE — 97150 GROUP THERAPEUTIC PROCEDURES: CPT

## 2017-08-12 PROCEDURE — 97530 THERAPEUTIC ACTIVITIES: CPT

## 2017-08-12 PROCEDURE — 99233 SBSQ HOSP IP/OBS HIGH 50: CPT | Mod: ,,, | Performed by: PHYSICAL MEDICINE & REHABILITATION

## 2017-08-12 PROCEDURE — 25000003 PHARM REV CODE 250: Performed by: PHYSICAL MEDICINE & REHABILITATION

## 2017-08-12 PROCEDURE — 94761 N-INVAS EAR/PLS OXIMETRY MLT: CPT

## 2017-08-12 PROCEDURE — 92507 TX SP LANG VOICE COMM INDIV: CPT

## 2017-08-12 PROCEDURE — 97542 WHEELCHAIR MNGMENT TRAINING: CPT

## 2017-08-12 RX ORDER — LACTULOSE 10 G/15ML
20 SOLUTION ORAL ONCE
Status: DISCONTINUED | OUTPATIENT
Start: 2017-08-12 | End: 2017-08-13

## 2017-08-12 RX ADMIN — OXYCODONE HYDROCHLORIDE 5 MG: 5 TABLET ORAL at 09:08

## 2017-08-12 RX ADMIN — CALCIUM CARBONATE (ANTACID) CHEW TAB 500 MG 500 MG: 500 CHEW TAB at 06:08

## 2017-08-12 RX ADMIN — STANDARDIZED SENNA CONCENTRATE AND DOCUSATE SODIUM 1 TABLET: 8.6; 5 TABLET, FILM COATED ORAL at 02:08

## 2017-08-12 RX ADMIN — SEVELAMER CARBONATE 2400 MG: 800 TABLET, FILM COATED ORAL at 06:08

## 2017-08-12 RX ADMIN — OXYCODONE HYDROCHLORIDE 5 MG: 5 TABLET ORAL at 06:08

## 2017-08-12 RX ADMIN — PANTOPRAZOLE SODIUM 40 MG: 40 TABLET, DELAYED RELEASE ORAL at 09:08

## 2017-08-12 RX ADMIN — METHOCARBAMOL 500 MG: 500 TABLET ORAL at 09:08

## 2017-08-12 RX ADMIN — PREGABALIN 75 MG: 75 CAPSULE ORAL at 09:08

## 2017-08-12 RX ADMIN — SEVELAMER CARBONATE 2400 MG: 800 TABLET, FILM COATED ORAL at 09:08

## 2017-08-12 RX ADMIN — HEPARIN SODIUM 5000 UNITS: 5000 INJECTION, SOLUTION INTRAVENOUS; SUBCUTANEOUS at 05:08

## 2017-08-12 RX ADMIN — ASPIRIN 81 MG CHEWABLE TABLET 81 MG: 81 TABLET CHEWABLE at 09:08

## 2017-08-12 RX ADMIN — SEVELAMER CARBONATE 2400 MG: 800 TABLET, FILM COATED ORAL at 02:08

## 2017-08-12 RX ADMIN — CALCITRIOL 0.5 MCG: 0.5 CAPSULE, LIQUID FILLED ORAL at 09:08

## 2017-08-12 RX ADMIN — HEPARIN SODIUM 5000 UNITS: 5000 INJECTION, SOLUTION INTRAVENOUS; SUBCUTANEOUS at 09:08

## 2017-08-12 RX ADMIN — HEPARIN SODIUM 5000 UNITS: 5000 INJECTION, SOLUTION INTRAVENOUS; SUBCUTANEOUS at 02:08

## 2017-08-12 RX ADMIN — DOCUSATE SODIUM 100 MG: 100 CAPSULE, LIQUID FILLED ORAL at 09:08

## 2017-08-12 RX ADMIN — AMLODIPINE BESYLATE 10 MG: 10 TABLET ORAL at 09:08

## 2017-08-12 NOTE — PROGRESS NOTES
"Speech Therapy   Treatment    Aleta Herrera   MRN: [id number]            08/12/17 0900   Speech Time Calculation   Speech Start Time 0900   Speech Stop Time 0945   Speech Total (min) 45 min   General Information   SLP Treatment Date 08/12/17   General Observations Patient seen while sitting upright in wheelchair in patient's room.   General Precautions fall   Visual/Auditory Vision impaired   Subjective   Patient states I'm not paying attention too much.       SLP Cognitive Treatment   Treatment Detail (SLP Cognitive Treatment) Patient oriented x4 independently. In a functional recall task, patient recalled details from a paragraph with 80% accuracy independently, 85% accuracy given min verbal cues. In a delayed recall task, patient recalled x3 unrelated words given a 3 minute delay with 44% accuracy independently, 77% accuracy given min verbal cues. Patient presented with self-limiting behavior as evidenced by patient initially making no attempt to retain or recall words. However, ST educated patient re word association strategies and pneumonic strategies to increase recall ability, patient's performance increased. In a word retention/mental manipulation task, patient chronologically ranked x3 words with 50% accuracy independently, 80% accuracy given min verbal cues. Patient continued to demonstrate self-limiting behavior when asked to recall events from previous day. Patient responded, "I can't remember all that." Given verbal prompt and encouragement per ST and patient's nurse, patient recalled previous therapy sessions, therapists' names and activities performed. ST to continue POC.   Assessment   SLP Diagnosis mild-mod cognitive deficits   Prognosis Good   Problem List decreased recall; decreased attention   Session Assessment progressing toward goals   Level of Motivation/Participation Good   Discharge Recommendations   Discharge Facility/Level Of Care Needs home health speech therapy   Plan   Therapy " Frequency Monday-Friday   SLP Follow-up   SLP Follow-up? Yes   Treatment/Billable Minutes   Speech Therapy Individual 45   Total Time 45       FIM Scores  Auditory Comprehension:6  Expression: 6  Social Interaction:6  Problem Solving:3  Memory: 3     Comprehension:  Complex= humor, finances, rationale for medical treatment(hip precautions, pressure relief)  Basic= pain, hunger, thirst, bathroom needs, cold, nutrition, sleep  Understands complex/abstract conversation/directions with extra time, assistance device(glasses, if visual mode or both; hearing aide if auditory mode or both) (6)        Expression:  Expresses complex / abstract ideas with extra time, assistive device (augmentative communication device    (6)     Social Interaction:  Interacts appropriately with others with medication or extra time(anti-anxiety, antidepressant)  (6)     .Problem Solving:  Solves basic problems 50-74% of the time  (3)     Memory:     Recognizes, recalls, or executes 50-74% of time 2 steps of 2 step request (3)    MIMI Mujica-SLP  8/12/2017

## 2017-08-12 NOTE — SUBJECTIVE & OBJECTIVE
Interval History: No acute events over night. Patient is tolerating therapy, pain controlled. Mood stable. Reported constipation; will address today.         Scheduled Medications:    amlodipine  10 mg Oral Daily    aspirin  81 mg Oral Daily    calcitRIOL  0.5 mcg Oral Daily    docusate sodium  100 mg Oral BID    heparin (porcine)  5,000 Units Subcutaneous Q8H    lactulose  20 g Oral Once    pantoprazole  40 mg Oral Daily    pregabalin  75 mg Oral QHS    sevelamer carbonate  2,400 mg Oral TID WM       PRN Medications: acetaminophen, bisacodyl, calcium carbonate, dextrose 50%, dextrose 50%, glucagon (human recombinant), glucose, glucose, heparin (porcine), insulin aspart, methocarbamol, ondansetron, ondansetron, oxycodone, pneumoc 13-pramod conj-dip cr(PF), polyethylene glycol, senna-docusate 8.6-50 mg    Review of Systems   Constitutional: Negative for appetite change and unexpected weight change.   HENT: Negative for congestion and ear pain.    Eyes: Negative for discharge.   Respiratory: Negative for shortness of breath.    Cardiovascular: Negative for chest pain.   Gastrointestinal: Negative for abdominal pain and vomiting.   Endocrine: Negative for cold intolerance.   Genitourinary: Negative for flank pain.   Neurological: Positive for weakness.   Psychiatric/Behavioral: Negative for hallucinations.     Objective:     Vital Signs (Most Recent):  Temp: 98.3 °F (36.8 °C) (08/12/17 0828)  Pulse: 101 (08/12/17 1116)  Resp: 18 (08/12/17 0828)  BP: (!) 145/82 (08/12/17 0828)  SpO2: (!) 91 % (08/12/17 1300)    Vital Signs (24h Range):  Temp:  [98.3 °F (36.8 °C)-98.4 °F (36.9 °C)] 98.3 °F (36.8 °C)  Pulse:  [] 101  Resp:  [16-18] 18  SpO2:  [91 %-100 %] 91 %  BP: (115-145)/(57-82) 145/82     Physical Exam   Constitutional: She appears well-developed and well-nourished.   HENT:   Head: Normocephalic and atraumatic.   Eyes: EOM are normal.   Cardiovascular: Normal rate.    Pulmonary/Chest: Effort normal. No  respiratory distress.   Abdominal: Soft. She exhibits no distension. There is no tenderness.   Musculoskeletal:   UE: RUE: SA 4/5, EF/EE 3/5,  4/5  LUE: SA 4/5, EF/EE 4/5, 3/5,  4/5  RLE HF 3/5, KE 4/5, DF/PF 4/5  AKA site C/D, closed w staples     Neurological: She is alert.   Followed commands  Memory: 3/3 immediate, 1/3 delayed    Skin: Skin is warm.   Psychiatric: She has a normal mood and affect.   Vitals reviewed.    NEUROLOGICAL EXAMINATION:     CRANIAL NERVES     CN III, IV, VI   Extraocular motions are normal.

## 2017-08-12 NOTE — PROGRESS NOTES
"Occupational Therapy  AM Treatment Session  Aleta Herrera   MRN: [id number]        08/11/17 0829   OT Time Calculation   OT Start Time 0829   OT Stop Time 0914   OT Total Time (min) 45 min   General   OT Date of Treatment 08/11/17   Family/Caregiver Present No   Patient Found (position) Supine in bed  (HOB elevated x2 bedrails bed alarm ON)   Pt found with oxygen  (2.5%)   Precautions   General Precautions fall   Visual/Auditory Vision impaired   Subjective   Patient states "I didnt know" re: BM   Pain/Comfort   Pain Rating no pain   Bed Mobility   Rolling/Turning to Left Minimum assistance   Rolling/Turning Left Comments bedrail   Rolling/Turning Right Stand by assistance   Rolling/Turning Right Comments cue for hand placement   Scooting/Bridging Moderate Assistance   Scooting/Bridging Comments to EOB   Supine to Sit Moderate Assistance   Supine to Sit Comments for trunk elevation   Bed to Chair   Bed <> Chair Technique Slide Board   Bed <> Chair Transfer Assistance Total Assistance   Trials/Comments x2 assist for safety   Feeding   Feeding Level of Assistance Set-up Assistance   Feeding Where Assessed Bed level   Feeding Comments (A) for managing apple sauce container   Grooming   Grooming Level of Assistance Stand by assistance   Hand Washing Level of Assistance Stand by assistance   Face Washing Level of Assistance Supervision   Oral Hygeine Level of Assistance Stand by assistance   Grooming Where Assessed Sitting sinkside   Bathing   Bathing Level of Assistance Moderate assistance   Bathing Where Assessed Bed   Bathing Comments notified Nursing re: skin breakdown  (barrier cream applied)   UE Dressing   UE Dressing Level of Assistance Minimum assistance   UE Dressing Where Assessed Edge of bed   UE Dressing Comments SBA; good trunk control; (A) managing overhead   LE Dressing   LE Dressing  Level of Assistance Total assistance   LE Dressing Level of Assistance Total assistance   Sock Level of Assistance " Total assistance   Adult Briefs Level of Assistance Total assistance   Toileting   Toileting Where Assessed Bed level   Toileting Comments not rate; incontinent episode for Bowel; Nusing notified   Activity Tolerance   Activity Tolerance Patient tolerated treatment well   After Treatment   Patient Position After Treatment Seated in wheelchair  (safety belt)   Patient after treatment left call button in reach;nursing notified  (Radha notified)   Assessment   Prognosis Fair   Problem List Decreased Self Care skills;Decreased upper extremity range of motion;Decreased upper extremity strength;Decreased safe judgment during ADL;Decreased endurance;Visual deficit;Decreased functional mobility;Decreased IADLs;Decreased trunk control for functional activities   Assessment Pt with good effort for functional ADLs, however still requires significant assistance. Pt educated for pressure relief at bed level and WC. Pt would continue to benefit from OT services to increase functional mobiltiy.   Level of Motivation/Participation Good   Discharge Recommendations   Equipment Needed After Discharge (drop arm commode)   Discharge Facility/Level Of Care Needs home with home health   Plan   Plan Continue with current plan   Therapy Frequency 2 times/day;Monday-Friday;Saturday or Sunday   Treatment/Billable Minutes   Self Care/Home Management 45   Total Time 45       The ROGELIO and EUFEMIA have collaborated and discussed the patient's status, treatment plan and progress toward established goals.     HONEY Mckeon 8/12/2017

## 2017-08-12 NOTE — ASSESSMENT & PLAN NOTE
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 Remove left  AKA staples on 8/21/17 at Rehab Facility.  F/U in 4 months with HD access US "    Patient will be going home w family, tentative DC date 8/22    Functional: Mod A for memory, prob solving, Mod I with expression/Comp  SBA/Mod w bed mobility, TA for sit to stand, UBD Valentina, Mod A for bathing   SBA for WC mobility  "

## 2017-08-12 NOTE — ASSESSMENT & PLAN NOTE
8/9 PO intake aroudn 50%. Will hold Levemir 4u BID, and monitor trend.     8/11 -121,  cont to hold insulin

## 2017-08-12 NOTE — PROGRESS NOTES
Occupational Therapy  AM Group  Session  Aleta Herrera   MRN: [id number]          08/12/17 1045   OT Time Calculation   OT Start Time 1045   OT Stop Time 1115   OT Total Time (min) 30 min   General   OT Date of Treatment 08/12/17   Treatment/Billable Minutes   Therapeutic Group 30   Total Time 30   Patient participated in 30 minute seated high endurance group. The group activity challenged bilateral upper extremity and lower extremity strengthening. In addition, cardiovascular and functional endurance were challenged during this group. Pt utulized 1# dowel during the following exercises.    Patient completed 20 reps x 2 sets bicep curls, side raises, shoulder flexion, shoulder extension (in UE circuit)    Patient completed 20 reps x 2 sets hip flexion, knee flexion, knee extension, toe and heel taps (in LE circuit)    - Alternating punches, side punches, diagonal reaches 2 x 20 reps    Pt required short  rest breaks during therapeutic exercises. These activities were performed in a group setting to encourage participation with peers and social interaction skills.     The CHERIR and EUFEMIA have collaborated and discussed the patient's status, treatment plan and progress toward established goals.     Cristopher Gutiérrez, HONEY 8/12/2017

## 2017-08-12 NOTE — PROGRESS NOTES
[facility]  Physical Medicine & Rehab  Progress Note    Patient Name: Aleta Herrera  MRN: [id number]  Patient Class: IP- Rehab   Admission Date: 8/7/2017  Length of Stay: 5 days  Attending Physician: Amirah Cano MD  Primary Care Provider: Radha Lao MD    Subjective:     Principal Problem:Amputation of leg    Interval History: No acute events over night. Patient is tolerating therapy, pain controlled. Mood stable. Reported constipation; will address today.         Scheduled Medications:    amlodipine  10 mg Oral Daily    aspirin  81 mg Oral Daily    calcitRIOL  0.5 mcg Oral Daily    docusate sodium  100 mg Oral BID    heparin (porcine)  5,000 Units Subcutaneous Q8H    lactulose  20 g Oral Once    pantoprazole  40 mg Oral Daily    pregabalin  75 mg Oral QHS    sevelamer carbonate  2,400 mg Oral TID WM       PRN Medications: acetaminophen, bisacodyl, calcium carbonate, dextrose 50%, dextrose 50%, glucagon (human recombinant), glucose, glucose, heparin (porcine), insulin aspart, methocarbamol, ondansetron, ondansetron, oxycodone, pneumoc 13-pramod conj-dip cr(PF), polyethylene glycol, senna-docusate 8.6-50 mg    Review of Systems   Constitutional: Negative for appetite change and unexpected weight change.   HENT: Negative for congestion and ear pain.    Eyes: Negative for discharge.   Respiratory: Negative for shortness of breath.    Cardiovascular: Negative for chest pain.   Gastrointestinal: Negative for abdominal pain and vomiting.   Endocrine: Negative for cold intolerance.   Genitourinary: Negative for flank pain.   Neurological: Positive for weakness.   Psychiatric/Behavioral: Negative for hallucinations.     Objective:     Vital Signs (Most Recent):  Temp: 98.3 °F (36.8 °C) (08/12/17 0828)  Pulse: 101 (08/12/17 1116)  Resp: 18 (08/12/17 0828)  BP: (!) 145/82 (08/12/17 0828)  SpO2: (!) 91 % (08/12/17 1300)    Vital Signs (24h Range):  Temp:  [98.3 °F (36.8 °C)-98.4 °F  "(36.9 °C)] 98.3 °F (36.8 °C)  Pulse:  [] 101  Resp:  [16-18] 18  SpO2:  [91 %-100 %] 91 %  BP: (115-145)/(57-82) 145/82     Physical Exam   Constitutional: She appears well-developed and well-nourished.   HENT:   Head: Normocephalic and atraumatic.   Eyes: EOM are normal.   Cardiovascular: Normal rate.    Pulmonary/Chest: Effort normal. No respiratory distress.   Abdominal: Soft. She exhibits no distension. There is no tenderness.   Musculoskeletal:   UE: RUE: SA 4/5, EF/EE 3/5,  4/5  LUE: SA 4/5, EF/EE 4/5, 3/5,  4/5  RLE HF 3/5, KE 4/5, DF/PF 4/5  AKA site C/D, closed w staples     Neurological: She is alert.   Followed commands  Memory: 3/3 immediate, 1/3 delayed    Skin: Skin is warm.   Psychiatric: She has a normal mood and affect.   Vitals reviewed.    NEUROLOGICAL EXAMINATION:     CRANIAL NERVES     CN III, IV, VI   Extraocular motions are normal.       Assessment/Plan:      Aleta Herrera is a 62 y.o. female admitted to inpatient rehabilitation on 8/7/2017 for Amputation of leg with impaired mobility and ADLs. Patient remains appropriate for PT, OT, and as required Speech therapy. Patient continues to require 24 hour nursing care as well as daily Physician assessment.    * Amputation of leg    s/p L AKA on 7/31 2/2 gangrenous unsalvageable L foot   8/10 Has Vascular F/U today - Recs included " 1. Remove left  AKA staples on 8/21/17 at Rehab Facility.  F/U in 4 months with HD access US "    Patient will be going home w family, tentative DC date 8/22    Functional: Mod A for memory, prob solving, Mod I with expression/Comp  SBA/Mod w bed mobility, TA for sit to stand, UBD Valentina, Mod A for bathing   SBA for WC mobility        Type 2 diabetes mellitus with left diabetic foot ulcer    8/9 PO intake aroudn 50%. Will hold Levemir 4u BID, and monitor trend.     8/12 -193,  cont to hold insulin         ESRD (end stage renal disease)    ESRD, home HD MWF and L YESSICAG placed 4/2017  - HD " planned for today, cont to monitor     8/10 Labs stable, w WBC trending down. H/H stable. K WNL.            Hypertension, renal    VSS, cont w current mgmt     Vitals:    08/11/17 2200 08/12/17 0828 08/12/17 1116 08/12/17 1300   BP: (!) 115/57 (!) 145/82     BP Location: Left arm Left arm     Patient Position: Lying Sitting     Pulse: 94 102 101    Resp: 16 18     Temp: 98.4 °F (36.9 °C) 98.3 °F (36.8 °C)     TempSrc: Oral Oral     SpO2:  (!) 91% 100%  Comment: on 3 L (!) 91%   Weight:       Height:                 Stage III pressure ulcer of right buttock    - regular wound care, low air los mattress         Pressure ulcer of left buttock    - regular wound care, low air los mattress         Impaired mobility and ADLs    Other Rehab Plan/Continue to monitor:   Nutrition/Swallow Status:    - Prealbumin - pending   - Nutritionist on board   Bladder Management: continent  Bowel Management:  continent  - Colace BID and Suppository PRN   - Will monitor for regularity   Mood: stable   Sleep: monitor; Will consider sleep aid as clinically indicated   Skin: Monitor   DVT ppx: heparin              DISCHARGE PLANNING:  Tentative Discharge Date: 8/22/2017    Future Appointments  Date Time Provider Department Center   9/6/2017 12:00 PM VASCULAR, LAB [facility] M[facility]   9/6/2017 12:30 PM VASCULAR, LAB [facility]   9/6/2017 1:00 PM Nathalie Madera MD [facility]   9/12/2017 9:40 AM Radha Cortez MD [facility]       Amirah Cano MD  Department of Physical Medicine & Rehab   [facility]

## 2017-08-12 NOTE — PROGRESS NOTES
[facility]  Physical Medicine & Rehab  Progress Note    Patient Name: Aleta Herrera  MRN: [id number]  Patient Class: IP- Rehab   Admission Date: 8/7/2017  Length of Stay: 4 days  Attending Physician: Amirah Cano MD  Primary Care Provider: Radha Lao MD    Subjective:     Principal Problem:Amputation of leg    Interval History: No acute events over night. Patient is tolerating therapy, pain controlled. Mood stable.   Discussed in team conference. Please see note for details.       Scheduled Medications:    amlodipine  10 mg Oral Daily    aspirin  81 mg Oral Daily    calcitRIOL  0.5 mcg Oral Daily    docusate sodium  100 mg Oral BID    heparin (porcine)  5,000 Units Subcutaneous Q8H    pantoprazole  40 mg Oral Daily    pregabalin  75 mg Oral QHS    sevelamer carbonate  2,400 mg Oral TID WM       PRN Medications: acetaminophen, bisacodyl, calcium carbonate, dextrose 50%, dextrose 50%, glucagon (human recombinant), glucose, glucose, heparin (porcine), insulin aspart, methocarbamol, ondansetron, ondansetron, oxycodone, pneumoc 13-pramod conj-dip cr(PF), polyethylene glycol, senna-docusate 8.6-50 mg    Review of Systems   Constitutional: Negative for appetite change and unexpected weight change.   HENT: Negative for congestion and ear pain.    Eyes: Negative for discharge.   Respiratory: Negative for shortness of breath.    Cardiovascular: Negative for chest pain.   Gastrointestinal: Negative for abdominal pain and vomiting.   Endocrine: Negative for cold intolerance.   Genitourinary: Negative for flank pain.   Neurological: Positive for weakness.   Psychiatric/Behavioral: Negative for hallucinations.     Objective:     Vital Signs (Most Recent):  Temp: 98.4 °F (36.9 °C) (08/11/17 0700)  Pulse: 90 (08/11/17 0700)  Resp: 16 (08/11/17 0700)  BP: 121/62 (08/11/17 0700)  SpO2: (!) 92 % (08/11/17 0700)    Vital Signs (24h Range):  Temp:  [98.4 °F (36.9 °C)] 98.4 °F (36.9 °C)  Pulse:   "[90] 90  Resp:  [16] 16  SpO2:  [92 %-93 %] 92 %  BP: (121)/(62) 121/62     Physical Exam   Constitutional: She appears well-developed and well-nourished.   HENT:   Head: Normocephalic and atraumatic.   Eyes: EOM are normal.   Cardiovascular: Normal rate.    Pulmonary/Chest: Effort normal. No respiratory distress.   Abdominal: Soft. She exhibits no distension. There is no tenderness.   Musculoskeletal:   UE: RUE: SA 4/5, EF/EE 3/5,  4/5  LUE: SA 4/5, EF/EE 4/5, 3/5,  4/5  RLE HF 3/5, KE 4/5, DF/PF 4/5  AKA site C/D, closed w staples     Neurological: She is alert.   Followed commands  Memory: 3/3 immediate, 1/3 delayed    Skin: Skin is warm.   Psychiatric: She has a normal mood and affect.   Vitals reviewed.    NEUROLOGICAL EXAMINATION:     CRANIAL NERVES     CN III, IV, VI   Extraocular motions are normal.       Assessment/Plan:      Aleta Herrera is a 62 y.o. female admitted to inpatient rehabilitation on 8/7/2017 for Amputation of leg with impaired mobility and ADLs. Patient remains appropriate for PT, OT, and as required Speech therapy. Patient continues to require 24 hour nursing care as well as daily Physician assessment.    * Amputation of leg    s/p L AKA on 7/31 2/2 gangrenous unsalvageable L foot   8/10 Has Vascular F/U today - Recs included " 1. Remove left  AKA staples on 8/21/17 at Rehab Facility.  F/U in 4 months with HD access US "    Patient will be going home w family, tentative DC date 8/22    Functional: Mod A for memory, prob solving, Mod I with expression/Comp  SBA/Mod w bed mobility, TA for sit to stand, UBD Valentina, Mod A for bathing   SBA for WC mobility        Type 2 diabetes mellitus with left diabetic foot ulcer    8/9 PO intake aroudn 50%. Will hold Levemir 4u BID, and monitor trend.     8/11 -121,  cont to hold insulin         ESRD (end stage renal disease)    ESRD, home HD MWF and L AVG placed 4/2017  - HD planned for today, cont to monitor     8/10 Labs stable, w " WBC trending down. H/H stable. K WNL.            Hypertension, renal    VSS, cont w current mgmt     Vitals:    08/10/17 1825 08/10/17 1845 08/11/17 0654 08/11/17 0700   BP: 138/65   121/62   BP Location: Left arm   Left arm   Patient Position: Lying   Lying   Pulse: 89   90   Resp: 18   16   Temp: 98.2 °F (36.8 °C)   98.4 °F (36.9 °C)   TempSrc: Oral   Oral   SpO2: 96% (!) 92% (!) 93% (!) 92%   Weight:       Height:                 Stage III pressure ulcer of right buttock    - regular wound care, low air los mattress         Pressure ulcer of left buttock    - regular wound care, low air los mattress         Impaired mobility and ADLs    Other Rehab Plan/Continue to monitor:   Nutrition/Swallow Status:    - Prealbumin - pending   - Nutritionist on board   Bladder Management: continent  Bowel Management:  continent  - Colace BID and Suppository PRN   - Will monitor for regularity   Mood: stable   Sleep: monitor; Will consider sleep aid as clinically indicated   Skin: Monitor   DVT ppx: heparin              DISCHARGE PLANNING:  Tentative Discharge Date: 8/22/2017    Future Appointments  Date Time Provider Department Center   9/6/2017 12:00 PM VASCULAR, LAB [facility]   9/6/2017 12:30 PM VASCULAR, LAB [facility]   9/6/2017 1:00 PM Nathalie Madera MD [facility]   9/12/2017 9:40 AM Radha Cortez MD [facility]       Amirah Cano MD  Department of Physical Medicine & Rehab   [facility]

## 2017-08-12 NOTE — ASSESSMENT & PLAN NOTE
8/9 PO intake aroudn 50%. Will hold Levemir 4u BID, and monitor trend.     8/12 -193,  cont to hold insulin

## 2017-08-12 NOTE — ASSESSMENT & PLAN NOTE
VSS, cont w current mgmt     Vitals:    08/10/17 1825 08/10/17 1845 08/11/17 0654 08/11/17 0700   BP: 138/65   121/62   BP Location: Left arm   Left arm   Patient Position: Lying   Lying   Pulse: 89   90   Resp: 18   16   Temp: 98.2 °F (36.8 °C)   98.4 °F (36.9 °C)   TempSrc: Oral   Oral   SpO2: 96% (!) 92% (!) 93% (!) 92%   Weight:       Height:

## 2017-08-12 NOTE — PLAN OF CARE
Problem: Patient Care Overview  Goal: Plan of Care Review  Outcome: Ongoing (interventions implemented as appropriate)  Pressure Ulcer Risk: Explained to patient staff will assist her in turning to help minimize  further breakdown of pressure ulcer. Encouraged patient to call for staff assistance if she needs to turn before staff returns. Patient verbalized understanding. Will maintain safety precautions and follow up as needed.

## 2017-08-12 NOTE — SUBJECTIVE & OBJECTIVE
Interval History: No acute events over night. Patient is tolerating therapy, pain controlled. Mood stable.   Discussed in team conference. Please see note for details.       Scheduled Medications:    amlodipine  10 mg Oral Daily    aspirin  81 mg Oral Daily    calcitRIOL  0.5 mcg Oral Daily    docusate sodium  100 mg Oral BID    heparin (porcine)  5,000 Units Subcutaneous Q8H    pantoprazole  40 mg Oral Daily    pregabalin  75 mg Oral QHS    sevelamer carbonate  2,400 mg Oral TID WM       PRN Medications: acetaminophen, bisacodyl, calcium carbonate, dextrose 50%, dextrose 50%, glucagon (human recombinant), glucose, glucose, heparin (porcine), insulin aspart, methocarbamol, ondansetron, ondansetron, oxycodone, pneumoc 13-pramod conj-dip cr(PF), polyethylene glycol, senna-docusate 8.6-50 mg    Review of Systems   Constitutional: Negative for appetite change and unexpected weight change.   HENT: Negative for congestion and ear pain.    Eyes: Negative for discharge.   Respiratory: Negative for shortness of breath.    Cardiovascular: Negative for chest pain.   Gastrointestinal: Negative for abdominal pain and vomiting.   Endocrine: Negative for cold intolerance.   Genitourinary: Negative for flank pain.   Neurological: Positive for weakness.   Psychiatric/Behavioral: Negative for hallucinations.     Objective:     Vital Signs (Most Recent):  Temp: 98.4 °F (36.9 °C) (08/11/17 0700)  Pulse: 90 (08/11/17 0700)  Resp: 16 (08/11/17 0700)  BP: 121/62 (08/11/17 0700)  SpO2: (!) 92 % (08/11/17 0700)    Vital Signs (24h Range):  Temp:  [98.4 °F (36.9 °C)] 98.4 °F (36.9 °C)  Pulse:  [90] 90  Resp:  [16] 16  SpO2:  [92 %-93 %] 92 %  BP: (121)/(62) 121/62     Physical Exam   Constitutional: She appears well-developed and well-nourished.   HENT:   Head: Normocephalic and atraumatic.   Eyes: EOM are normal.   Cardiovascular: Normal rate.    Pulmonary/Chest: Effort normal. No respiratory distress.   Abdominal: Soft. She exhibits  no distension. There is no tenderness.   Musculoskeletal:   UE: RUE: SA 4/5, EF/EE 3/5,  4/5  LUE: SA 4/5, EF/EE 4/5, 3/5,  4/5  RLE HF 3/5, KE 4/5, DF/PF 4/5  AKA site C/D, closed w staples     Neurological: She is alert.   Followed commands  Memory: 3/3 immediate, 1/3 delayed    Skin: Skin is warm.   Psychiatric: She has a normal mood and affect.   Vitals reviewed.    NEUROLOGICAL EXAMINATION:     CRANIAL NERVES     CN III, IV, VI   Extraocular motions are normal.

## 2017-08-12 NOTE — PROGRESS NOTES
"Physical Therapy   Treatment    Aleta Herrera   MRN: [id number]                  08/12/17 1116   PT Time Calculation   PT Start Time 1116   PT Stop Time 1212   PT Total Time (min) 56 min   Treatment   Treatment Type Treatment   PT/PTA PT   General   PT Received On 08/12/17   Family/Caregiver Present No   Patient Found (position) Seated in wheelchair;Other (comment)  (in gym)   Pt found with oxygen  ( @ 3 L; posey belt)   Precautions   General Precautions fall;vision impaired   Visual/Auditory Vision impaired   Subjective   Patient states " I'm alright."   Pain/Comfort   Pain Rating 1 no pain  (pt does report some pain( not rated) during LE ex)   Pain Rating Post-Intervention 1 no pain   Bed Mobility   Bed Mobility yes   Supine to Sit Moderate Assistance   Supine to Sit Comments rising from R side of mat   Sit to Supine Minimum Assistance;Moderate Assistance;Other (see comments)  (lowering to R side of mat)   Transfers   Transfer yes   Sit to Stand   Sit <> Stand Assistance Activity did not occur   Chair to Mat   Chair<> Mat Technique Slide Board   Chair<>Mat Assistance Total Assistance;Other (see comments)  (x 1 helper)   Chair <> Mat Assistive Device Slide Board   Trials/Comments 1 trial~ cues for technique   Mat to Chair   Technique Slide Board   Assistance Maximum Assistance   Assistive Device Slide Board   Trials/Comments 1 trial   Wheelchair Activities   Propulsion Yes   Propulsion Type 1 Manual   Level 1 Level tile   Method 1 Right upper extremity;Left upper extremity   Level of Assistance 1 Stand by assistsance  (cues for obstacle avoidance, slow pace)   Description/ Details 1 pt propels w/c approx. 188 feet   Gait   Gait No   Stairs   Stairs No   Balance   Balance No   Supine   Supine-Exercises Lower extremity;Upper extremity   Supine-Exercise Type SLR;ABD/ADD;Heel slides;Short arc quads;Ankle pumps;Other (Comment)  (hip and knee flex/ext with resistance during ext.)   Supine-Exercise Comments Pt " performs SLR and hip abd/add to L LE( pt can only perform 10 reps SLR due to pain)~ 15 reps abd/add~ both AAROM; R LE AP, SAQ, hip and knee flex/ext with resistance during ext., HS, Hip ABD/ADD all x 15 reps  (shoulder press with 2 # dowel and mild resistance, 2 x 15)   Other Activities   Other Activities Other (Comment)  (PT dropped pt's 02 from 3 L to 2 L during most of session)   Comments pt saturates well on 2 L ~ 94-98 %  (PT also placed # 2 cushion to pt's chair)   Activity Tolerance   Activity Tolerance Patient tolerated treatment well   After Treatment   Patient Position After Treatment Seated in wheelchair;Other (comment)  (posey belt and 02 tank in place)   Patient after treatment left call button in reach  (pt escorted back to her room for lunch)   Assessment   Prognosis Fair   Problem List Decreased strength;Decreased endurance;Impaired balance;Decreased range of motion;Decreased mobility;Impaired vision;Decreased skin integrity;Pain   Session Assessment progressing toward goals   Assessment Pt is slowly improving with w/c skills and slideboard transfers.  Pt currently limited by vision impairment and decreased functional strength to B UE and R LE.  Pt remains pleasant and cooperative and should continue to benefit from additional PT intervention.  Pt likely to require considerable assistance at discharge.   Level of Motivation/Participation good   Barriers to Discharge Decreased caregiver support   Barriers to Discharge Comments unsure if family can provide necessary assistance at discharge   Discharge Recommendations   Equipment Needed After Discharge wheelchair;slide board;other (see comments);bath bench  (drop arm commode)   Discharge Facility/Level Of Care Needs home health PT   Plan   Planned Therapy Intervention Continue with current plan;Bed mobility training;Transfer training;Balance Training;ROM;Stretching;Strengthening;Postural Re-education;Wheelchair Management/Propulsion   Therapy Frequency  2 times/day;daily;Monday-Friday;Saturday or Sunday   Physical Therapy Follow-up   PT Follow-up? Yes   PT - Next Visit Date 08/14/17   Treatment/Billable Minutes   Therapeutic Activity 19   Therapeutic Exercise 25   Train/Wheelchair Management 12   Total Time 56             Simon Mohamud, PT 8/12/2017

## 2017-08-12 NOTE — ASSESSMENT & PLAN NOTE
VSS, cont w current mgmt     Vitals:    08/11/17 2200 08/12/17 0828 08/12/17 1116 08/12/17 1300   BP: (!) 115/57 (!) 145/82     BP Location: Left arm Left arm     Patient Position: Lying Sitting     Pulse: 94 102 101    Resp: 16 18     Temp: 98.4 °F (36.9 °C) 98.3 °F (36.8 °C)     TempSrc: Oral Oral     SpO2:  (!) 91% 100%  Comment: on 3 L (!) 91%   Weight:       Height:

## 2017-08-12 NOTE — PROGRESS NOTES
Physical Therapy  FIM scores    Aleta Herrera   MRN: [id number]                  08/12/17 1226   Transfers   Bed/Chair/WC 1   Locomotion   Distance Walked 0   Distance Wheelchair 3   Walk 0   Wheelchair 5   Mode C   Stairs 0           Simon Mohamud, PT 8/12/2017

## 2017-08-12 NOTE — PROGRESS NOTES
Occupational Therapy  [place] FIM  Aleta Agudelo Sharon   MRN: [id number]          08/12/17 1253   Transfers   Bed/Chair/WC 1   Self Care   Eating 5   Grooming 5   Bathing 3   Dressing-Upper 4   Dressing-Lower 2       HONEY Mckeon 8/12/2017

## 2017-08-13 LAB
POCT GLUCOSE: 127 MG/DL (ref 70–110)
POCT GLUCOSE: 129 MG/DL (ref 70–110)
POCT GLUCOSE: 157 MG/DL (ref 70–110)
POCT GLUCOSE: 189 MG/DL (ref 70–110)

## 2017-08-13 PROCEDURE — 94799 UNLISTED PULMONARY SVC/PX: CPT

## 2017-08-13 PROCEDURE — 99233 SBSQ HOSP IP/OBS HIGH 50: CPT | Mod: ,,, | Performed by: PHYSICAL MEDICINE & REHABILITATION

## 2017-08-13 PROCEDURE — 94761 N-INVAS EAR/PLS OXIMETRY MLT: CPT

## 2017-08-13 PROCEDURE — 25000003 PHARM REV CODE 250: Performed by: PHYSICAL MEDICINE & REHABILITATION

## 2017-08-13 PROCEDURE — 12800000 HC REHAB SEMI-PRIVATE ROOM

## 2017-08-13 PROCEDURE — 63600175 PHARM REV CODE 636 W HCPCS: Performed by: STUDENT IN AN ORGANIZED HEALTH CARE EDUCATION/TRAINING PROGRAM

## 2017-08-13 PROCEDURE — 25000003 PHARM REV CODE 250: Performed by: STUDENT IN AN ORGANIZED HEALTH CARE EDUCATION/TRAINING PROGRAM

## 2017-08-13 PROCEDURE — 27000221 HC OXYGEN, UP TO 24 HOURS

## 2017-08-13 RX ORDER — POLYETHYLENE GLYCOL 3350 17 G/17G
17 POWDER, FOR SOLUTION ORAL DAILY
Status: DISCONTINUED | OUTPATIENT
Start: 2017-08-13 | End: 2017-08-24 | Stop reason: HOSPADM

## 2017-08-13 RX ADMIN — SEVELAMER CARBONATE 2400 MG: 800 TABLET, FILM COATED ORAL at 08:08

## 2017-08-13 RX ADMIN — SEVELAMER CARBONATE 2400 MG: 800 TABLET, FILM COATED ORAL at 05:08

## 2017-08-13 RX ADMIN — OXYCODONE HYDROCHLORIDE 5 MG: 5 TABLET ORAL at 05:08

## 2017-08-13 RX ADMIN — AMLODIPINE BESYLATE 10 MG: 10 TABLET ORAL at 08:08

## 2017-08-13 RX ADMIN — CALCITRIOL 0.5 MCG: 0.5 CAPSULE, LIQUID FILLED ORAL at 09:08

## 2017-08-13 RX ADMIN — ASPIRIN 81 MG CHEWABLE TABLET 81 MG: 81 TABLET CHEWABLE at 08:08

## 2017-08-13 RX ADMIN — HEPARIN SODIUM 5000 UNITS: 5000 INJECTION, SOLUTION INTRAVENOUS; SUBCUTANEOUS at 09:08

## 2017-08-13 RX ADMIN — PANTOPRAZOLE SODIUM 40 MG: 40 TABLET, DELAYED RELEASE ORAL at 08:08

## 2017-08-13 RX ADMIN — CALCIUM CARBONATE (ANTACID) CHEW TAB 500 MG 500 MG: 500 CHEW TAB at 05:08

## 2017-08-13 RX ADMIN — HEPARIN SODIUM 5000 UNITS: 5000 INJECTION, SOLUTION INTRAVENOUS; SUBCUTANEOUS at 05:08

## 2017-08-13 RX ADMIN — SEVELAMER CARBONATE 2400 MG: 800 TABLET, FILM COATED ORAL at 12:08

## 2017-08-13 RX ADMIN — HEPARIN SODIUM 5000 UNITS: 5000 INJECTION, SOLUTION INTRAVENOUS; SUBCUTANEOUS at 01:08

## 2017-08-13 RX ADMIN — OXYCODONE HYDROCHLORIDE 5 MG: 5 TABLET ORAL at 09:08

## 2017-08-13 RX ADMIN — PREGABALIN 75 MG: 75 CAPSULE ORAL at 09:08

## 2017-08-13 NOTE — ASSESSMENT & PLAN NOTE
8/9 PO intake aroudn 50%. Will hold Levemir 4u BID, and monitor trend.     8/13 -189,  cont to hold insulin

## 2017-08-13 NOTE — NURSING
RN spoke with patient in depth regarding turning every two hours while in bed to prevent additional ulcers on her butt. Also patient encouraged to wear heel boot while in bed. RN put wedge in patient's room and will continue to turn patient every two hours.

## 2017-08-13 NOTE — PLAN OF CARE
Problem: Fall Risk (Adult)  Goal: Absence of Falls  Patient will demonstrate the desired outcomes by discharge/transition of care.   Outcome: Ongoing (interventions implemented as appropriate)  No fall or injury noted this shift.  Siderail up x3. Bed alarm activated. Call light in reach.

## 2017-08-13 NOTE — PLAN OF CARE
Problem: Patient Care Overview  Goal: Plan of Care Review  Plan of Care Review  Diabetes -   Goal - Monitor blood sugar levels and administer insulin based on sugar level result  Outcome - Patient blood sugar level was 189 at lunch time. No sliding scale insulin needed since patient's scale starts at 201.    Amputation -   Goal - Assess left AKA daily and make sure no s/sx of infection present  Outcome - NO s/sx of infection noted. Staples intact and open to air.    Pain -   Goal - Monitor patient every two hours for pain level and offer pain meds.  Outcome- patient did not complain of pain during this shift.    Pressure ulcer -Goal - prevent new skin breakdown on butt and heel areas.  Outcome - No new skin breakdown noted. Barrier cream placed on butt area. Wedge placed under patient and changed sides every two hours.

## 2017-08-13 NOTE — ASSESSMENT & PLAN NOTE
VSS, cont w current mgmt     Vitals:    08/12/17 1300 08/12/17 1840 08/12/17 1945 08/13/17 0657   BP:   121/62 135/64   BP Location:   Left arm Left arm   Patient Position:   Lying Lying   Pulse:   98 92   Resp:   18 18   Temp:   98.8 °F (37.1 °C) 98 °F (36.7 °C)   TempSrc:   Oral Oral   SpO2: (!) 91% 95% (!) 92% (!) 92%   Weight:       Height:

## 2017-08-13 NOTE — SUBJECTIVE & OBJECTIVE
Interval History: No acute events over night. Patient is tolerating therapy, pain controlled. Mood stable. Had a result w laxatives.         Scheduled Medications:    amlodipine  10 mg Oral Daily    aspirin  81 mg Oral Daily    calcitRIOL  0.5 mcg Oral Daily    docusate sodium  100 mg Oral BID    heparin (porcine)  5,000 Units Subcutaneous Q8H    lactulose  20 g Oral Once    pantoprazole  40 mg Oral Daily    pregabalin  75 mg Oral QHS    sevelamer carbonate  2,400 mg Oral TID WM       PRN Medications: acetaminophen, bisacodyl, calcium carbonate, dextrose 50%, dextrose 50%, glucagon (human recombinant), glucose, glucose, heparin (porcine), insulin aspart, methocarbamol, ondansetron, ondansetron, oxycodone, pneumoc 13-pramod conj-dip cr(PF), polyethylene glycol, senna-docusate 8.6-50 mg    Review of Systems   Constitutional: Negative for appetite change and unexpected weight change.   HENT: Negative for congestion and ear pain.    Eyes: Negative for discharge.   Respiratory: Negative for shortness of breath.    Cardiovascular: Negative for chest pain.   Gastrointestinal: Negative for abdominal pain and vomiting.   Endocrine: Negative for cold intolerance.   Genitourinary: Negative for flank pain.   Neurological: Positive for weakness.   Psychiatric/Behavioral: Negative for hallucinations.     Objective:     Vital Signs (Most Recent):  Temp: 98 °F (36.7 °C) (08/13/17 0657)  Pulse: 92 (08/13/17 0657)  Resp: 18 (08/13/17 0657)  BP: 135/64 (08/13/17 0657)  SpO2: (!) 92 % (08/13/17 0657)    Vital Signs (24h Range):  Temp:  [98 °F (36.7 °C)-98.8 °F (37.1 °C)] 98 °F (36.7 °C)  Pulse:  [92-98] 92  Resp:  [18] 18  SpO2:  [92 %-95 %] 92 %  BP: (121-135)/(62-64) 135/64     Physical Exam   Constitutional: She appears well-developed and well-nourished.   HENT:   Head: Normocephalic and atraumatic.   Eyes: EOM are normal.   Cardiovascular: Normal rate.    Pulmonary/Chest: Effort normal. No respiratory distress.   Abdominal:  Soft. She exhibits no distension. There is no tenderness.   Musculoskeletal:   UE: RUE: SA 4/5, EF/EE 3/5,  4/5  LUE: SA 4/5, EF/EE 4/5, 3/5,  4/5  RLE HF 3/5, KE 4/5, DF/PF 4/5  AKA site C/D, closed w staples     Neurological: She is alert.   Followed commands  Memory: 3/3 immediate, 1/3 delayed    Skin: Skin is warm.   Psychiatric: She has a normal mood and affect.   Vitals reviewed.    NEUROLOGICAL EXAMINATION:     CRANIAL NERVES     CN III, IV, VI   Extraocular motions are normal.

## 2017-08-13 NOTE — PLAN OF CARE
Problem: Pressure Ulcer Risk (Bg Scale) (Adult,Obstetrics,Pediatric)  Goal: Skin Integrity  Patient will demonstrate the desired outcomes by discharge/transition of care.   Outcome: Ongoing (interventions implemented as appropriate)  Patient assist with turning q2h to prevent further skin break down. No acute distress noted. Call light in reach. Bed alarm activated.

## 2017-08-13 NOTE — PROGRESS NOTES
[facility]  Physical Medicine & Rehab  Progress Note    Patient Name: Aleta Herrera  MRN: [id number]  Patient Class: IP- Rehab   Admission Date: 8/7/2017  Length of Stay: 6 days  Attending Physician: Amirah Cano MD  Primary Care Provider: Radha Lao MD    Subjective:     Principal Problem:Amputation of leg    Interval History: No acute events over night. Patient is tolerating therapy, pain controlled. Mood stable. Had a result w laxatives.         Scheduled Medications:    amlodipine  10 mg Oral Daily    aspirin  81 mg Oral Daily    calcitRIOL  0.5 mcg Oral Daily    docusate sodium  100 mg Oral BID    heparin (porcine)  5,000 Units Subcutaneous Q8H    lactulose  20 g Oral Once    pantoprazole  40 mg Oral Daily    pregabalin  75 mg Oral QHS    sevelamer carbonate  2,400 mg Oral TID WM       PRN Medications: acetaminophen, bisacodyl, calcium carbonate, dextrose 50%, dextrose 50%, glucagon (human recombinant), glucose, glucose, heparin (porcine), insulin aspart, methocarbamol, ondansetron, ondansetron, oxycodone, pneumoc 13-pramod conj-dip cr(PF), polyethylene glycol, senna-docusate 8.6-50 mg    Review of Systems   Constitutional: Negative for appetite change and unexpected weight change.   HENT: Negative for congestion and ear pain.    Eyes: Negative for discharge.   Respiratory: Negative for shortness of breath.    Cardiovascular: Negative for chest pain.   Gastrointestinal: Negative for abdominal pain and vomiting.   Endocrine: Negative for cold intolerance.   Genitourinary: Negative for flank pain.   Neurological: Positive for weakness.   Psychiatric/Behavioral: Negative for hallucinations.     Objective:     Vital Signs (Most Recent):  Temp: 98 °F (36.7 °C) (08/13/17 0657)  Pulse: 92 (08/13/17 0657)  Resp: 18 (08/13/17 0657)  BP: 135/64 (08/13/17 0657)  SpO2: (!) 92 % (08/13/17 0657)    Vital Signs (24h Range):  Temp:  [98 °F (36.7 °C)-98.8 °F (37.1 °C)] 98 °F (36.7  "°C)  Pulse:  [92-98] 92  Resp:  [18] 18  SpO2:  [92 %-95 %] 92 %  BP: (121-135)/(62-64) 135/64     Physical Exam   Constitutional: She appears well-developed and well-nourished.   HENT:   Head: Normocephalic and atraumatic.   Eyes: EOM are normal.   Cardiovascular: Normal rate.    Pulmonary/Chest: Effort normal. No respiratory distress.   Abdominal: Soft. She exhibits no distension. There is no tenderness.   Musculoskeletal:   UE: RUE: SA 4/5, EF/EE 3/5,  4/5  LUE: SA 4/5, EF/EE 4/5, 3/5,  4/5  RLE HF 3/5, KE 4/5, DF/PF 4/5  AKA site C/D, closed w staples     Neurological: She is alert.   Followed commands  Memory: 3/3 immediate, 1/3 delayed    Skin: Skin is warm.   Psychiatric: She has a normal mood and affect.   Vitals reviewed.    NEUROLOGICAL EXAMINATION:     CRANIAL NERVES     CN III, IV, VI   Extraocular motions are normal.       Assessment/Plan:      Aleta Herrera is a 62 y.o. female admitted to inpatient rehabilitation on 8/7/2017 for Amputation of leg with impaired mobility and ADLs. Patient remains appropriate for PT, OT, and as required Speech therapy. Patient continues to require 24 hour nursing care as well as daily Physician assessment.    * Amputation of leg    s/p L AKA on 7/31 2/2 gangrenous unsalvageable L foot   8/10 Has Vascular F/U today - Recs included " 1. Remove left  AKA staples on 8/21/17 at Rehab Facility.  F/U in 4 months with HD access US "    Patient will be going home w family, tentative DC date 8/22    Functional: Mod A for memory, prob solving, Mod I with expression/Comp  SBA/Mod w bed mobility, TA for sit to stand, UBD Valentina, Mod A for bathing   SBA for WC mobility        Type 2 diabetes mellitus with left diabetic foot ulcer    8/9 PO intake aroudn 50%. Will hold Levemir 4u BID, and monitor trend.     8/13 -189,  cont to hold insulin         ESRD (end stage renal disease)    ESRD, home HD MWF and L AVG placed 4/2017  - HD planned for today, cont to monitor "     8/10 Labs stable, w WBC trending down. H/H stable. K WNL.            Hypertension, renal    VSS, cont w current mgmt     Vitals:    08/12/17 1300 08/12/17 1840 08/12/17 1945 08/13/17 0657   BP:   121/62 135/64   BP Location:   Left arm Left arm   Patient Position:   Lying Lying   Pulse:   98 92   Resp:   18 18   Temp:   98.8 °F (37.1 °C) 98 °F (36.7 °C)   TempSrc:   Oral Oral   SpO2: (!) 91% 95% (!) 92% (!) 92%   Weight:       Height:                 Stage III pressure ulcer of right buttock    - regular wound care, low air los mattress         Pressure ulcer of left buttock    - regular wound care, low air los mattress         Impaired mobility and ADLs    Other Rehab Plan/Continue to monitor:   Nutrition/Swallow Status:    - Prealbumin - pending   - Nutritionist on board   Bladder Management: continent  Bowel Management:  continent  - Colace BID and Suppository PRN   - Will monitor for regularity   Mood: stable   Sleep: monitor; Will consider sleep aid as clinically indicated   Skin: Monitor   DVT ppx: heparin              DISCHARGE PLANNING:  Tentative Discharge Date: 8/22/2017    Future Appointments  Date Time Provider Department Center   9/6/2017 12:00 PM VASCULAR, LAB [facility] MARCIO Sanders   9/6/2017 12:30 PM VASCULAR, LAB [facility] MARCIO Sanders   9/6/2017 1:00 PM Nathalie Madera MD [facility] ANAIS Sanders   9/12/2017 9:40 AM Radha Cortez MD [facility]       Amirah Cano MD  Department of Physical Medicine & Rehab   [facility]

## 2017-08-14 LAB
POCT GLUCOSE: 123 MG/DL (ref 70–110)
POCT GLUCOSE: 150 MG/DL (ref 70–110)
POCT GLUCOSE: 181 MG/DL (ref 70–110)

## 2017-08-14 PROCEDURE — 94799 UNLISTED PULMONARY SVC/PX: CPT

## 2017-08-14 PROCEDURE — 25000003 PHARM REV CODE 250: Performed by: PHYSICAL MEDICINE & REHABILITATION

## 2017-08-14 PROCEDURE — 12800000 HC REHAB SEMI-PRIVATE ROOM

## 2017-08-14 PROCEDURE — 97530 THERAPEUTIC ACTIVITIES: CPT

## 2017-08-14 PROCEDURE — 25000003 PHARM REV CODE 250: Performed by: STUDENT IN AN ORGANIZED HEALTH CARE EDUCATION/TRAINING PROGRAM

## 2017-08-14 PROCEDURE — 97110 THERAPEUTIC EXERCISES: CPT

## 2017-08-14 PROCEDURE — 27000221 HC OXYGEN, UP TO 24 HOURS

## 2017-08-14 PROCEDURE — 97542 WHEELCHAIR MNGMENT TRAINING: CPT

## 2017-08-14 PROCEDURE — 92508 TX SP LANG VOICE COMM GROUP: CPT

## 2017-08-14 PROCEDURE — 97802 MEDICAL NUTRITION INDIV IN: CPT

## 2017-08-14 PROCEDURE — 63600175 PHARM REV CODE 636 W HCPCS: Performed by: STUDENT IN AN ORGANIZED HEALTH CARE EDUCATION/TRAINING PROGRAM

## 2017-08-14 PROCEDURE — 99233 SBSQ HOSP IP/OBS HIGH 50: CPT | Mod: ,,, | Performed by: PHYSICAL MEDICINE & REHABILITATION

## 2017-08-14 PROCEDURE — 94761 N-INVAS EAR/PLS OXIMETRY MLT: CPT

## 2017-08-14 RX ADMIN — OXYCODONE HYDROCHLORIDE 5 MG: 5 TABLET ORAL at 12:08

## 2017-08-14 RX ADMIN — PANTOPRAZOLE SODIUM 40 MG: 40 TABLET, DELAYED RELEASE ORAL at 07:08

## 2017-08-14 RX ADMIN — OXYCODONE HYDROCHLORIDE 5 MG: 5 TABLET ORAL at 10:08

## 2017-08-14 RX ADMIN — HEPARIN SODIUM 5000 UNITS: 5000 INJECTION, SOLUTION INTRAVENOUS; SUBCUTANEOUS at 05:08

## 2017-08-14 RX ADMIN — CALCIUM CARBONATE (ANTACID) CHEW TAB 500 MG 500 MG: 500 CHEW TAB at 10:08

## 2017-08-14 RX ADMIN — HEPARIN SODIUM 5000 UNITS: 5000 INJECTION, SOLUTION INTRAVENOUS; SUBCUTANEOUS at 10:08

## 2017-08-14 RX ADMIN — SEVELAMER CARBONATE 2400 MG: 800 TABLET, FILM COATED ORAL at 07:08

## 2017-08-14 RX ADMIN — ASPIRIN 81 MG CHEWABLE TABLET 81 MG: 81 TABLET CHEWABLE at 07:08

## 2017-08-14 RX ADMIN — CALCITRIOL 0.5 MCG: 0.5 CAPSULE, LIQUID FILLED ORAL at 07:08

## 2017-08-14 RX ADMIN — HEPARIN SODIUM 5000 UNITS: 5000 INJECTION, SOLUTION INTRAVENOUS; SUBCUTANEOUS at 01:08

## 2017-08-14 RX ADMIN — DOCUSATE SODIUM 100 MG: 100 CAPSULE, LIQUID FILLED ORAL at 07:08

## 2017-08-14 RX ADMIN — PREGABALIN 75 MG: 75 CAPSULE ORAL at 10:08

## 2017-08-14 RX ADMIN — SEVELAMER CARBONATE 2400 MG: 800 TABLET, FILM COATED ORAL at 12:08

## 2017-08-14 RX ADMIN — AMLODIPINE BESYLATE 10 MG: 10 TABLET ORAL at 07:08

## 2017-08-14 RX ADMIN — POLYETHYLENE GLYCOL 3350 17 G: 17 POWDER, FOR SOLUTION ORAL at 07:08

## 2017-08-14 NOTE — PROGRESS NOTES
Occupational Therapy  [facility]    Aleta Herrera  MRN: [id number]  Room/Bed: E263/E263 B       08/14/17 1600   Transfers   Bed/Chair/WC 1   Self Care   Eating 5   Grooming 5   Bathing 3   Dressing-Upper 4   Dressing-Lower 2   Toileting 1       ROGELIO Bourne  8/14/2017

## 2017-08-14 NOTE — NURSING
Pt is leaving the building in stable condition. In route to Dialysis. Transportation Service SPD one staff member.  Full Oxygen Tank @ 2.5 liter via N/C. No acute distress noted.

## 2017-08-14 NOTE — ASSESSMENT & PLAN NOTE
8/9 PO intake aroudn 50%. Will hold Levemir 4u BID, and monitor trend.     8/14 -157,  cont to hold insulin

## 2017-08-14 NOTE — ASSESSMENT & PLAN NOTE
Other Rehab Plan/Continue to monitor:   Nutrition/Swallow Status:    - Prealbumin - 11, cont with supplement    - Nutritionist on board   Bladder Management: continent  Bowel Management:  continent  - Colace BID and Suppository PRN   - Will monitor for regularity   Mood: stable   Sleep: monitor; Will consider sleep aid as clinically indicated   Skin: Monitor   DVT ppx: heparin

## 2017-08-14 NOTE — PROGRESS NOTES
"Physical Therapy   PT Daily Treatment     Aleta Herrera   MRN: [id number]        08/14/17 1300   PT Time Calculation   PT Start Time 1300   PT Stop Time 1345   PT Total Time (min) 45 min   Treatment   Treatment Type Treatment   PT/PTA PT   General   PT Received On 08/14/17   Family/Caregiver Present No   Patient Found (position) Seated in wheelchair  (with posey belt donned)   Precautions   General Precautions fall   Subjective   Patient states "I am feeling better today"   Pain/Comfort   Pain Rating 1 no pain   Sit to Stand   Sit <> Stand Assistance Maximum Assistance   Sit <> Stand Assistive Device No Assistive Device   Trials/Comments x5 trials; maximum assistance to stand with mod A to maintain standing for 3 seconds prior to returning to sit on EOM. Mat was elevated.    Chair to Mat   Chair<> Mat Technique Slide Board   Chair<>Mat Assistance Maximum Assistance   Chair <> Mat Assistive Device Slide Board   Trials/Comments x1 trial; pt performed small scoots with mod VC for head and hand placement to assist in transfer.    Mat to Chair   Technique Slide Board   Assistance Maximum Assistance   Assistive Device Slide Board   Trials/Comments x1 trial; pt performed small scoots with mod VC for head and hand placement to assist in transfer.    Wheelchair Activities   Propulsion Type 1 Manual   Level 1 Level tile   Method 1 Right upper extremity;Left upper extremity   Level of Assistance 1 Stand by assistsance   Description/ Details 1 Pt propelled self in WC with BUE for 105 feet. PT followed with portable O2 tank. Pt required increased time to perform task.    Dynamic Sitting Balance   Dynamic Sitting-Comments Pt performed the following exercises seated at ROM with foot placed on floor:     x10 trials of weight shift to elbows in order to promote weight shift to assist in sliding board placement     2 x 5 reps of anterior weight shifts in which the patient would reach for a target anteriorly to promote " anterior weight shift to assist in transfer training     5 scoots x 2 (L & R) using the head hip relationship to promote mobility for scooting for slide board transfer. Pt was able to move hips approximately 2 inches with five attempts.      Other Activities   Comments Pt was able to stand with maximum assistance between trials of sit to stands for approximately 3 seconds before requiring a sitting rest break. Pt had a right trunk lean and was unable to stand up straight while in standing possible due to weakness.    Activity Tolerance   Activity Tolerance Patient tolerated treatment well   Other Comments   Comments During the sit to stands, the patient began to bleed from incision. Blood was properly cleaned and pt's nurse (Tavia) was notified of incident. Pt did not report pain.    After Treatment   Patient Position After Treatment Seated in wheelchair  (with posey belt donned)   Patient after treatment left call button in reach   Assessment   Prognosis Fair   Problem List Decreased strength;Decreased endurance;Impaired balance;Decreased mobility   Session Assessment progressing toward goals   Assessment Pt demonstrated carry over evident by being able to answer set up questions for sliding board transfer (i.e. which way to lean, hand placement), but still required min-mod VC to complete the transfer. Pt is limited by generalized weakness in LE and BUE to assist in the transfer. Pt is appropriate and would continue to benefit from PT interventions to address these deficits.    Level of Motivation/Participation good   Barriers to Discharge Decreased caregiver support   Plan   Therapy Frequency daily;Monday-Friday;Saturday or Sunday   Physical Therapy Follow-up   PT Follow-up? Yes   PT - Next Visit Date 08/15/17   Treatment/Billable Minutes   Train/Wheelchair Management 10   Therapeutic Activity 35   Total Time 45     Laura Aguilar, PT  8/14/2017

## 2017-08-14 NOTE — SUBJECTIVE & OBJECTIVE
Interval History: No acute events over night. Patient is tolerating therapy, pain controlled. Mood stable.       Scheduled Medications:    amlodipine  10 mg Oral Daily    aspirin  81 mg Oral Daily    calcitRIOL  0.5 mcg Oral Daily    docusate sodium  100 mg Oral BID    heparin (porcine)  5,000 Units Subcutaneous Q8H    pantoprazole  40 mg Oral Daily    polyethylene glycol  17 g Oral Daily    pregabalin  75 mg Oral QHS    sevelamer carbonate  2,400 mg Oral TID WM       PRN Medications: acetaminophen, bisacodyl, calcium carbonate, dextrose 50%, dextrose 50%, glucagon (human recombinant), glucose, glucose, heparin (porcine), insulin aspart, methocarbamol, ondansetron, ondansetron, oxycodone, pneumoc 13-pramod conj-dip cr(PF), senna-docusate 8.6-50 mg    Review of Systems   Constitutional: Negative for appetite change and unexpected weight change.   HENT: Negative for congestion and ear pain.    Eyes: Negative for discharge.   Respiratory: Negative for shortness of breath.    Cardiovascular: Negative for chest pain.   Gastrointestinal: Negative for abdominal pain and vomiting.   Endocrine: Negative for cold intolerance.   Genitourinary: Negative for flank pain.   Neurological: Positive for weakness.   Psychiatric/Behavioral: Negative for hallucinations.     Objective:     Vital Signs (Most Recent):  Temp: 98.6 °F (37 °C) (08/14/17 0700)  Pulse: 92 (08/14/17 0700)  Resp: 16 (08/14/17 0700)  BP: 127/61 (08/14/17 0700)  SpO2: (!) 94 % (08/14/17 0700)    Vital Signs (24h Range):  Temp:  [98.6 °F (37 °C)-98.8 °F (37.1 °C)] 98.6 °F (37 °C)  Pulse:  [92-94] 92  Resp:  [16-18] 16  SpO2:  [93 %-94 %] 94 %  BP: (120-127)/(60-61) 127/61     Physical Exam   Constitutional: She appears well-developed and well-nourished.   HENT:   Head: Normocephalic and atraumatic.   Eyes: EOM are normal.   Cardiovascular: Normal rate.    Pulmonary/Chest: Effort normal. No respiratory distress.   Abdominal: Soft. She exhibits no distension.  There is no tenderness.   Musculoskeletal:   UE: RUE: SA 4/5, EF/EE 3/5,  4/5  LUE: SA 4/5, EF/EE 4/5, 3/5,  4/5  RLE HF 3/5, KE 4/5, DF/PF 4/5  AKA site C/D, closed w staples     Neurological: She is alert.   Followed commands  Memory: 3/3 immediate, 1/3 delayed    Skin: Skin is warm.   Psychiatric: She has a normal mood and affect.   Vitals reviewed.    NEUROLOGICAL EXAMINATION:     CRANIAL NERVES     CN III, IV, VI   Extraocular motions are normal.

## 2017-08-14 NOTE — PLAN OF CARE
Problem: Skin Integrity Impairment, Risk/Actual (Adult)  Goal: Skin Integrity/Wound Healing  Patient will demonstrate the desired outcomes by discharge/transition of care.   Outcome: Ongoing (interventions implemented as appropriate)  Patient turned q2h for pressure relief and to prevent further skin breakdown. Barrier cream applied to buttocks. No acute distress noted. Call light in reach

## 2017-08-14 NOTE — PROGRESS NOTES
"Occupational Therapy  AM Treatment  Aleta Herrera   MRN: [id number]   Room/Bed: E263/E263 B       08/14/17 0927   OT Time Calculation   OT Start Time 0927   OT Stop Time 1101   OT Total Time (min) 94 min   General   OT Date of Treatment 08/14/17   Family/Caregiver Present No   Patient Found (position) Seated in wheelchair   Pt found with oxygen  (3L)   Precautions   General Precautions fall   Visual/Auditory Vision impaired   Subjective   Patient states "I've been wanting one of these reachers."   Pain/Comfort   Pain Rating no pain   Bed to Chair   Bed <> Chair Technique Slide Board   Bed <> Chair Transfer Assistance Total Assistance   Trials/Comments from <>EOM   Exercise Tools   Rickshaw 15# x 100 reps   Bilateral Manish 2# x 100 reps on incline   Other Exercise Tool 1 unigym: 5# chest  press x 100 reps   Balance   Balance Yes   Dynamic Sitting Balance   Dynamic Sitting-Balance Support No upper extremity supported   Dynamic Sitting-Balance Reaching for objects;Reaching across midline   Dynamic Sitting-Comments Pt completed dynamic sitting activity with reacher picking up bright builders and placing into bucket sitting on her opposite side. Pt completed but required multi vc to complete.   Activity Tolerance   Activity Tolerance Patient tolerated treatment well   After Treatment   Patient Position After Treatment Seated in wheelchair   Patient after treatment left call button in reach  (posey belt intact)   Assessment   Prognosis Fair   Problem List Decreased Self Care skills;Decreased upper extremity range of motion;Decreased upper extremity strength;Decreased safe judgment during ADL;Decreased endurance;Visual deficit;Decreased functional mobility;Decreased IADLs;Decreased trunk control for functional activities   Assessment Pt participated well in tx session but remains with decreased (I) with sitting balance, ADLs and t/fs. Pt would continue to benefit from skilled OT services.   Level of " Motivation/Participation Good   Discharge Recommendations   Equipment Needed After Discharge wheelchair   Discharge Facility/Level Of Care Needs home with home health   Plan   Plan Continue with current plan   Therapy Frequency 2 times/day   Occupational Therapy Follow-up   OT Follow-up? Yes   Treatment/Billable Minutes   Therapeutic Activity 42   Therapeutic Exercise 52   Total Time 94       ROGLEIO Bourne  8/14/2017    LEGEND:   CGA: Contact Guard Assist   EOB: Edge[place] Bed   HHA: Hand Held Assist   HOB: Head of Bed   (I): Independent-patient performs task in a timely manner   Max (A): Maximal Assist-patient performs 25-49% of task   Min (A): Minimal Assist- patient performs 75% or more of task   Mod (A): Moderate Assist- patient performs 50-74% of task   NA: Not applicable   NT: Not tested   OOB: Out of Bed   PTA: Prior to admit   QC: Quad Cane   RW: Rolling Walker   (S): Supervision- patient requires cues, coaxing, prompting   SBA: Stand By Assist   SC: Straight Cane   SW: Standard Walker   TBA: To be assessed   Total (A): Total Assist- patient performs less than 25% of task   WC: Wheelchair   WFL: Within Functional Limits   WNL: Within Normal Limits

## 2017-08-14 NOTE — ASSESSMENT & PLAN NOTE
"VSS, cont w current mgmt     Vitals:    08/13/17 1900 08/14/17 0654 08/14/17 0700 08/14/17 1228   BP: 120/60  127/61    BP Location: Right arm  Right arm    Patient Position: Lying  Lying    Pulse: 94  92    Resp: 18  16    Temp: 98.8 °F (37.1 °C)  98.6 °F (37 °C)    TempSrc: Oral  Oral    SpO2: (!) 93% (!) 93% (!) 94%    Weight:    49.4 kg (108 lb 14.5 oz)   Height:    4' 11.02" (1.499 m)       "

## 2017-08-14 NOTE — PROGRESS NOTES
[facility]  Adult Nutrition  Progress Note    SUMMARY     Recommendations    Recommendation/Intervention:   Continue current renal diabetic diet  RD to follow to encourage intake of HBV protein  Goals: PO to meet 85% of EEN and EPN by discharge  Nutrition Goal Status: new  Communication of RD Recs: discussed on rounds    Reason for Assessment    Reason for Assessment: RD follow-up  Diagnosis:  (sp AKA)  Relevent Medical History: HTN,HLD, DM2< ESRD on dialysis PVD, CAD ,recent infection leg   Interdisciplinary Rounds: attended     General Information Comments: Pt reports improved appetite. Intake 100% at dinner yesterday.    Nutrition Discharge Planning: DC on Renal diet Diabetic diet 2000 calories    Nutrition Prescription Ordered    Current Diet Order: Diabetic 2200 renal  Nutrition Order Comments: pt refuses renal nutrition supplement, states she gets phosphorus and potassium mixed up  Oral Nutrition Supplement: none     Evaluation of Received Nutrients/Fluid Intake    Energy Calories Required: meeting needs  Protein Required: not meeting needs  Fluid Required: meeting needs  Comments: updated preferences she has a good appetite  Tolerance: tolerating  % Intake of Estimated Energy Needs: 75 - 100 %  % Meal Intake: 100%     Nutrition Risk Screen     Nutrition Risk Screen: no indicators present    Nutrition/Diet History    Patient Reported Diet/Restrictions/Preferences: diabetic diet, renal  Typical Food/Fluid Intake: eats regular meals, test glucose few times per week.  cooks  Food Preferences: No cultural or Restorationism food preferences, no coffe no tea no fish, no milk no Nepro     Labs/Tests/Procedures/Meds    Pertinent Labs Reviewed: reviewed, pertinent  Pertinent Labs Comments: Crea 3.3L, GFR 16.6L, Roger 8.0L, Alb 1.9L  Pertinent Medications Reviewed: reviewed, pertinent  Pertinent Medications Comments: insulin, renvela    Physical Findings    Overall Physical Appearance: weak,  "nourished  Oral/Mouth Cavity: WDL  Skin: incision    Anthropometrics    Temp: 98.6 °F (37 °C)     Height: 4' 11.02" (149.9 cm)  Weight Method: Bed Scale  Weight: 49.4 kg (108 lb 14.5 oz)     Ideal Body Weight (IBW), Female: 95.1 lb     % Ideal Body Weight, Female (lb): 114.52 lb  BMI (Calculated): 22  Total Amputation %: 5.9  Amputee BMI (kg/m2): 23.44 kg/m2     Estimated/Assessed Needs    Weight Used For Calorie Calculations: 49.4 kg (108 lb 14.5 oz) (after amputation still weight decrepancy)   Height (cm): 149.9 cm  Energy Calorie Requirements (kcal): 9037-5212 (30-35 kg/kcal)  Energy Need Method: Kcal/kg  RMR (Gridley-St. Jeor Equation): 964.63  Weight Used For Protein Calculations: 49.4 kg (108 lb 14.5 oz)  Protein Requirements: 60-70 g/d  Fluid Requirements (mL): 1200 or per MD  Fluid Need Method: other (see comments)  RDA Method (mL): 1482  CHO Requirement: 50% of calories     Assessment and Plan    Nutrition Problem  Inadequate protein intake    Related to (etiology):   Increased needs for wound healing    Signs and Symptoms (as evidenced by):   Foot amputation    Interventions/Recommendations (treatment strategy):  Beneprotein supplementation    Nutrition Diagnosis Status:   Continues    Monitor and Evaluation    Food and Nutrient Intake: energy intake  Food and Nutrient Adminstration: diet order  Knowledge/Beliefs/Attitudes: food and nutrition knowledge/skill  Physical Activity and Function: nutrition-related ADLs and IADLs  Anthropometric Measurements: weight change  Biochemical Data, Medical Tests and Procedures: electrolyte and renal panel, glucose/endocrine profile  Nutrition-Focused Physical Findings: overall appearance    Nutrition Risk    Level of Risk:  (follow weekly)    Nutrition Follow-Up    RD Follow-up?: Yes    "

## 2017-08-14 NOTE — PROGRESS NOTES
[facility]  Physical Medicine & Rehab  Progress Note    Patient Name: Aleta Herrera  MRN: [id number]  Patient Class: IP- Rehab   Admission Date: 8/7/2017  Length of Stay: 7 days  Attending Physician: Amirah Cano MD  Primary Care Provider: Radha Lao MD    Subjective:     Principal Problem:Amputation of leg    Interval History: No acute events over night. Patient is tolerating therapy, pain controlled. Mood stable.       Scheduled Medications:    amlodipine  10 mg Oral Daily    aspirin  81 mg Oral Daily    calcitRIOL  0.5 mcg Oral Daily    docusate sodium  100 mg Oral BID    heparin (porcine)  5,000 Units Subcutaneous Q8H    pantoprazole  40 mg Oral Daily    polyethylene glycol  17 g Oral Daily    pregabalin  75 mg Oral QHS    sevelamer carbonate  2,400 mg Oral TID WM       PRN Medications: acetaminophen, bisacodyl, calcium carbonate, dextrose 50%, dextrose 50%, glucagon (human recombinant), glucose, glucose, heparin (porcine), insulin aspart, methocarbamol, ondansetron, ondansetron, oxycodone, pneumoc 13-pramod conj-dip cr(PF), senna-docusate 8.6-50 mg    Review of Systems   Constitutional: Negative for appetite change and unexpected weight change.   HENT: Negative for congestion and ear pain.    Eyes: Negative for discharge.   Respiratory: Negative for shortness of breath.    Cardiovascular: Negative for chest pain.   Gastrointestinal: Negative for abdominal pain and vomiting.   Endocrine: Negative for cold intolerance.   Genitourinary: Negative for flank pain.   Neurological: Positive for weakness.   Psychiatric/Behavioral: Negative for hallucinations.     Objective:     Vital Signs (Most Recent):  Temp: 98.6 °F (37 °C) (08/14/17 0700)  Pulse: 92 (08/14/17 0700)  Resp: 16 (08/14/17 0700)  BP: 127/61 (08/14/17 0700)  SpO2: (!) 94 % (08/14/17 0700)    Vital Signs (24h Range):  Temp:  [98.6 °F (37 °C)-98.8 °F (37.1 °C)] 98.6 °F (37 °C)  Pulse:  [92-94] 92  Resp:  [16-18]  "16  SpO2:  [93 %-94 %] 94 %  BP: (120-127)/(60-61) 127/61     Physical Exam   Constitutional: She appears well-developed and well-nourished.   HENT:   Head: Normocephalic and atraumatic.   Eyes: EOM are normal.   Cardiovascular: Normal rate.    Pulmonary/Chest: Effort normal. No respiratory distress.   Abdominal: Soft. She exhibits no distension. There is no tenderness.   Musculoskeletal:   UE: RUE: SA 4/5, EF/EE 3/5,  4/5  LUE: SA 4/5, EF/EE 4/5, 3/5,  4/5  RLE HF 3/5, KE 4/5, DF/PF 4/5  AKA site C/D, closed w staples     Neurological: She is alert.   Followed commands  Memory: 3/3 immediate, 1/3 delayed    Skin: Skin is warm.   Psychiatric: She has a normal mood and affect.   Vitals reviewed.    NEUROLOGICAL EXAMINATION:     CRANIAL NERVES     CN III, IV, VI   Extraocular motions are normal.       Assessment/Plan:      Aleta Herrera is a 62 y.o. female admitted to inpatient rehabilitation on 8/7/2017 for Amputation of leg with impaired mobility and ADLs. Patient remains appropriate for PT, OT, and as required Speech therapy. Patient continues to require 24 hour nursing care as well as daily Physician assessment.    * Amputation of leg    s/p L AKA on 7/31 2/2 gangrenous unsalvageable L foot   8/10 Has Vascular F/U today - Recs included " 1. Remove left  AKA staples on 8/21/17 at Rehab Facility.  F/U in 4 months with HD access US "    Patient will be going home w family, tentative DC date 8/22    Functional: Mod A for memory, prob solving, Mod I with expression/Comp  SBA/Mod w bed mobility, Max A for sit to stand, Max A for transfers   UBD Valentina, Mod A for bathing   SBA for WC mobility        Type 2 diabetes mellitus with left diabetic foot ulcer    8/9 PO intake aroudn 50%. Will hold Levemir 4u BID, and monitor trend.     8/14 -157,  cont to hold insulin         ESRD (end stage renal disease)    ESRD, home HD MWF and L AVG placed 4/2017  - HD planned for today, cont to monitor     8/10 Labs " "stable, w WBC trending down. H/H stable. K WNL.            Hypertension, renal    VSS, cont w current mgmt     Vitals:    08/13/17 1900 08/14/17 0654 08/14/17 0700 08/14/17 1228   BP: 120/60  127/61    BP Location: Right arm  Right arm    Patient Position: Lying  Lying    Pulse: 94  92    Resp: 18  16    Temp: 98.8 °F (37.1 °C)  98.6 °F (37 °C)    TempSrc: Oral  Oral    SpO2: (!) 93% (!) 93% (!) 94%    Weight:    49.4 kg (108 lb 14.5 oz)   Height:    4' 11.02" (1.499 m)             Stage III pressure ulcer of right buttock    - regular wound care, low air los mattress         Pressure ulcer of left buttock    - regular wound care, low air los mattress         Impaired mobility and ADLs    Other Rehab Plan/Continue to monitor:   Nutrition/Swallow Status:    - Prealbumin - 11, cont with supplement    - Nutritionist on board   Bladder Management: continent  Bowel Management:  continent  - Colace BID and Suppository PRN   - Will monitor for regularity   Mood: stable   Sleep: monitor; Will consider sleep aid as clinically indicated   Skin: Monitor   DVT ppx: heparin              DISCHARGE PLANNING:  Tentative Discharge Date: 8/22/2017    Future Appointments  Date Time Provider Department Center   9/6/2017 12:00 PM VASCULAR, LAB [facility] MARCIO [facility]   9/6/2017 12:30 PM VASCULAR, LAB [facility] M[facility]   9/6/2017 1:00 PM Nathalie Madera MD [facility]   9/12/2017 9:40 AM Radha Cortez MD [facility] GABRIEL Cano MD  Department of Physical Medicine & Rehab   [facility]  "

## 2017-08-14 NOTE — ASSESSMENT & PLAN NOTE
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 Remove left  AKA staples on 8/21/17 at Rehab Facility.  F/U in 4 months with HD access US "    Patient will be going home w family, tentative DC date 8/22    Functional: Mod A for memory, prob solving, Mod I with expression/Comp  SBA/Mod w bed mobility, Max A for sit to stand, Max A for transfers   UBD Valentina, Mod A for bathing   SBA for WC mobility  "

## 2017-08-14 NOTE — PROGRESS NOTES
Speech Therapy  Group Treatment    Aleta Herrera   MRN: [id number]        08/14/17 0830   Speech Time Calculation   Speech Start Time 0830   Speech Stop Time 0915   Speech Total (min) 45 min   General Information   SLP Treatment Date 08/14/17   General Observations Pt seen in neuro gym for cognitive group. Pt alert and motivated t/o session.    General Precautions fall   Pain/Comfort   Pain Rating no pain       SLP Cognitive Treatment   Treatment Detail (SLP Cognitive Treatment) Patient participated in a 45 minute functional cognitive task. The group activity focused on attention, reasoning, short term memory, sequencing, and auditory comprehension. Additionally, group discussion (when participating allows for functional carryover and self-monitoring of memory strategies/skills, reasoning, attention, and sequencing. The patient was able to use short term/cognitive strategies with min assistance. Pt was able to attend to tasks within a group setting for 45       minutes with minassistance. These activities were performed in a group setting to encourage increased participation with peers and social interaction.    Assessment   SLP Diagnosis mod cog-ling deficits   Prognosis Good   Session Assessment progressing toward goals   Level of Motivation/Participation good   Plan   Plan Continue with current plan   Therapy Frequency Monday-Friday   SLP Follow-up   SLP Follow-up? Yes   Treatment/Billable Minutes   Speech Therapy Individual 45   Total Time 45     FIM Scores  Auditory Comprehension:6  Expression: 6  Social Interaction:6  Problem Solving:3  Memory: 3     Comprehension:  Complex= humor, finances, rationale for medical treatment(hip precautions, pressure relief)  Basic= pain, hunger, thirst, bathroom needs, cold, nutrition, sleep  Understands complex/abstract conversation/directions with extra time, assistance device(glasses, if visual mode or both; hearing aide if auditory mode or both)  (6)        Expression:  Expresses complex / abstract ideas with extra time, assistive device (augmentative communication device    (6)     Social Interaction:  Interacts appropriately with others with medication or extra time(anti-anxiety, antidepressant)  (6)     .Problem Solving:  Solves basic problems 50-74% of the time  (3)     Memory:     Recognizes, recalls, or executes 50-74% of time 2 steps of 2 step request (3)     MIMI Velez-SLP  8/14/2017

## 2017-08-14 NOTE — PROGRESS NOTES
Physical Therapy   PT FIM    Aleta Penalozajosue Landryrichardson   MRN: [id number]        08/14/17 1300   Transfers   Bed/Chair/WC 2   Locomotion   Distance Wheelchair 2   Wheelchair 2   Mode C   Laura Aguilar, PT  8/14/2017

## 2017-08-15 LAB
POCT GLUCOSE: 143 MG/DL (ref 70–110)
POCT GLUCOSE: 144 MG/DL (ref 70–110)
POCT GLUCOSE: 145 MG/DL (ref 70–110)
POCT GLUCOSE: 191 MG/DL (ref 70–110)

## 2017-08-15 PROCEDURE — 63600175 PHARM REV CODE 636 W HCPCS: Performed by: STUDENT IN AN ORGANIZED HEALTH CARE EDUCATION/TRAINING PROGRAM

## 2017-08-15 PROCEDURE — 97542 WHEELCHAIR MNGMENT TRAINING: CPT

## 2017-08-15 PROCEDURE — 25000003 PHARM REV CODE 250: Performed by: STUDENT IN AN ORGANIZED HEALTH CARE EDUCATION/TRAINING PROGRAM

## 2017-08-15 PROCEDURE — 97530 THERAPEUTIC ACTIVITIES: CPT

## 2017-08-15 PROCEDURE — 97110 THERAPEUTIC EXERCISES: CPT

## 2017-08-15 PROCEDURE — 99900035 HC TECH TIME PER 15 MIN (STAT)

## 2017-08-15 PROCEDURE — 25000003 PHARM REV CODE 250: Performed by: PHYSICAL MEDICINE & REHABILITATION

## 2017-08-15 PROCEDURE — 27000221 HC OXYGEN, UP TO 24 HOURS

## 2017-08-15 PROCEDURE — 97150 GROUP THERAPEUTIC PROCEDURES: CPT

## 2017-08-15 PROCEDURE — 94761 N-INVAS EAR/PLS OXIMETRY MLT: CPT

## 2017-08-15 PROCEDURE — 99233 SBSQ HOSP IP/OBS HIGH 50: CPT | Mod: ,,, | Performed by: PHYSICAL MEDICINE & REHABILITATION

## 2017-08-15 PROCEDURE — 92507 TX SP LANG VOICE COMM INDIV: CPT

## 2017-08-15 PROCEDURE — 12800000 HC REHAB SEMI-PRIVATE ROOM

## 2017-08-15 PROCEDURE — 94799 UNLISTED PULMONARY SVC/PX: CPT

## 2017-08-15 RX ADMIN — HEPARIN SODIUM 5000 UNITS: 5000 INJECTION, SOLUTION INTRAVENOUS; SUBCUTANEOUS at 09:08

## 2017-08-15 RX ADMIN — SEVELAMER CARBONATE 2400 MG: 800 TABLET, FILM COATED ORAL at 12:08

## 2017-08-15 RX ADMIN — DOCUSATE SODIUM 100 MG: 100 CAPSULE, LIQUID FILLED ORAL at 07:08

## 2017-08-15 RX ADMIN — INSULIN DETEMIR 4 UNITS: 100 INJECTION, SOLUTION SUBCUTANEOUS at 09:08

## 2017-08-15 RX ADMIN — PANTOPRAZOLE SODIUM 40 MG: 40 TABLET, DELAYED RELEASE ORAL at 07:08

## 2017-08-15 RX ADMIN — HEPARIN SODIUM 5000 UNITS: 5000 INJECTION, SOLUTION INTRAVENOUS; SUBCUTANEOUS at 05:08

## 2017-08-15 RX ADMIN — SEVELAMER CARBONATE 2400 MG: 800 TABLET, FILM COATED ORAL at 04:08

## 2017-08-15 RX ADMIN — OXYCODONE HYDROCHLORIDE 5 MG: 5 TABLET ORAL at 07:08

## 2017-08-15 RX ADMIN — POLYETHYLENE GLYCOL 3350 17 G: 17 POWDER, FOR SOLUTION ORAL at 07:08

## 2017-08-15 RX ADMIN — HEPARIN SODIUM 5000 UNITS: 5000 INJECTION, SOLUTION INTRAVENOUS; SUBCUTANEOUS at 02:08

## 2017-08-15 RX ADMIN — CALCITRIOL 0.5 MCG: 0.5 CAPSULE, LIQUID FILLED ORAL at 07:08

## 2017-08-15 RX ADMIN — ASPIRIN 81 MG CHEWABLE TABLET 81 MG: 81 TABLET CHEWABLE at 07:08

## 2017-08-15 RX ADMIN — PREGABALIN 75 MG: 75 CAPSULE ORAL at 09:08

## 2017-08-15 RX ADMIN — SEVELAMER CARBONATE 2400 MG: 800 TABLET, FILM COATED ORAL at 07:08

## 2017-08-15 RX ADMIN — AMLODIPINE BESYLATE 10 MG: 10 TABLET ORAL at 07:08

## 2017-08-15 NOTE — PLAN OF CARE
Problem: Patient Care Overview  Goal: Plan of Care Review  Outcome: Ongoing (interventions implemented as appropriate)  Pt care plan updated and individualized as needed and progress continues.  See associated flowsheets for relevant documentation. Frequent rounds made per protocol to maintain pt safety and meet pt needs.  Continuing to monitor and follow up as needed.      Problem: Diabetes, Type 2 (Adult)  Goal: Signs and Symptoms of Listed Potential Problems Will be Absent, Minimized or Managed (Diabetes, Type 2)  Signs and symptoms of listed potential problems will be absent, minimized or managed by discharge/transition of care (reference Diabetes, Type 2 (Adult) CPG).   Outcome: Ongoing (interventions implemented as appropriate)  No new signs or symptoms noted.      Problem: Amputation, Lower Extremity (Adult)  Goal: Signs and Symptoms of Listed Potential Problems Will be Absent, Minimized or Managed (Amputation, Lower Extremity)  Signs and symptoms of listed potential problems will be absent, minimized or managed by discharge/transition of care (reference Amputation, Lower Extremity (Adult) CPG).   Outcome: Ongoing (interventions implemented as appropriate)  Treated for pain as indicated with relief obtained and assisted with mobility as needed.    Problem: Mobility, Physical Impaired (Adult)  Goal: Enhanced Mobility Skills  Patient will demonstrate the desired outcomes by discharge/transition of care.   Outcome: Ongoing (interventions implemented as appropriate)  Pt cooperative and compliant with mobility safety instructions and participates as able with activities as directed.  Transfer from w/c with slide board and mod assist.    Problem: Skin Integrity Impairment, Risk/Actual (Adult)  Goal: Skin Integrity/Wound Healing  Patient will demonstrate the desired outcomes by discharge/transition of care.   Outcome: Ongoing (interventions implemented as appropriate)  Barrier cream in use to breakdown on buttocks  and pt repositioned with assist.    Problem: Pressure Ulcer Risk (Bg Scale) (Adult,Obstetrics,Pediatric)  Goal: Skin Integrity  Patient will demonstrate the desired outcomes by discharge/transition of care.   Outcome: Ongoing (interventions implemented as appropriate)  Pt turned and repositioned as needed to maintain skin integrity.  No new breakdown noted.

## 2017-08-15 NOTE — PROGRESS NOTES
Physical Therapy   PT WC Group    Aleta Herrera   MRN: [id number]        08/15/17 1510   PT Time Calculation   PT Start Time 1510   PT Stop Time 1555   PT Total Time (min) 45 min   Treatment   Treatment Type Treatment  (WC Group)   PT/PTA PT   General   PT Received On 08/15/17   Patient Found (position) Seated in wheelchair  (with posey belt donned)   Pt found with oxygen   Precautions   General Precautions fall   Visual/Auditory Vision impaired   Subjective   Patient states Pt is agreeable to participate in WC group   Pain/Comfort   Pain Rating 1 no pain   Other Activities   Comments Patient participated in a wheelchair management and mobility 45 min group session.  The patients were involved in group activities with emphasis on education, patients propulsion of wheelchair, management of all wheelchair parts and endurance building. The patient performed a wheelchair endurance activity with a distance of 200 feet with supervision.  Patient performed lateral weight shifts and WC push ups for pressure relief. Patient demonstrated a good understanding of when to perform pressure relief during the day to reduce the risk of skin breakdown.     Patient propelled wheelchair weaving in and out of cones for 40 feet with supervision.  Patient also practiced parallel parking  while  in the wheelchair with supervision assist x2 trials. These activities were performed in a group setting to encourage increased participation with peers and social interaction.   After Treatment   Patient Position After Treatment Seated in wheelchair  (with posey belt donned)   Patient after treatment left call button in reach   Physical Therapy Follow-up   PT Follow-up? Yes   PT - Next Visit Date 08/16/17   Treatment/Billable Minutes   Therapeutic Activity Group 45   Total Time 45   Laura Aguilar, PT  8/15/2017

## 2017-08-15 NOTE — PROGRESS NOTES
Occupational Therapy  FIM SCORES    Aleta Herrera  MRN: [id number]  Room/Bed: E263/E263 B       08/15/17 1600   Transfers   Bed/Chair/WC 1   Self Care   Eating 5   Grooming 5   Bathing 3   Dressing-Upper 4   Dressing-Lower 2   Toileting 1       ROGELIO Bourne  8/15/2017

## 2017-08-15 NOTE — PROGRESS NOTES
[facility]  Physical Medicine & Rehab  Progress Note    Patient Name: Aleta Herrera  MRN: [id number]  Patient Class: IP- Rehab   Admission Date: 8/7/2017  Length of Stay: 8 days  Attending Physician: Amirah Cano MD  Primary Care Provider: Radha Lao MD    Subjective:     Principal Problem:Amputation of leg    Interval History: Patient had spontaneous, minimal bleeding from left leg amputation operation site yesterday during dialysis; was quickly & easily controlled and it has not recurred since then. Pt denies any other signs of bleeding. Pain remains well controlled. She notes a strong appetite, suggesting improved PO appetite. Slept well overnight. Last bowel movement yesterday. Remains motivated with therapy; had some hesitation today during an exercise that required her to roll over in bed, as she was afraid she might fall off.    Scheduled Medications:    amlodipine  10 mg Oral Daily    aspirin  81 mg Oral Daily    calcitRIOL  0.5 mcg Oral Daily    docusate sodium  100 mg Oral BID    heparin (porcine)  5,000 Units Subcutaneous Q8H    pantoprazole  40 mg Oral Daily    polyethylene glycol  17 g Oral Daily    pregabalin  75 mg Oral QHS    sevelamer carbonate  2,400 mg Oral TID WM       PRN Medications: acetaminophen, bisacodyl, calcium carbonate, dextrose 50%, dextrose 50%, glucagon (human recombinant), glucose, glucose, heparin (porcine), insulin aspart, methocarbamol, ondansetron, ondansetron, oxycodone, pneumoc 13-pramod conj-dip cr(PF), senna-docusate 8.6-50 mg    Review of Systems   Constitutional: Negative for chills and fever.   Eyes: Negative for visual disturbance.   Respiratory: Negative for cough and shortness of breath.    Cardiovascular: Negative for chest pain, palpitations and leg swelling.   Gastrointestinal: Negative for abdominal pain, blood in stool, constipation, diarrhea, nausea and vomiting.   Genitourinary: Negative for flank pain.    Musculoskeletal: Negative for back pain.   Skin: Positive for wound (2 wounds on buttocks).   Neurological: Positive for weakness. Negative for dizziness and headaches.   Hematological:        Bleeding from left leg surgical site; controlled     Objective:     Vital Signs (Most Recent):  Temp: 99.6 °F (37.6 °C) (08/15/17 0700)  Pulse: 94 (08/15/17 0700)  Resp: 16 (08/15/17 0700)  BP: 136/64 (08/15/17 0700)  SpO2: (!) 94 % (08/15/17 0700)    Vital Signs (24h Range):  Temp:  [98 °F (36.7 °C)-99.6 °F (37.6 °C)] 99.6 °F (37.6 °C)  Pulse:  [94-95] 94  Resp:  [14-16] 16  SpO2:  [94 %] 94 %  BP: (108-136)/(63-64) 136/64     Physical Exam   Constitutional: She appears well-developed and well-nourished.   HENT:   Head: Normocephalic and atraumatic.   Eyes: EOM are normal. Pupils are equal, round, and reactive to light.   Neck: Normal range of motion. Neck supple.   Cardiovascular: Normal rate, regular rhythm, normal heart sounds and intact distal pulses.    Pulmonary/Chest: Effort normal and breath sounds normal.   Abdominal: Soft. Bowel sounds are normal. There is no tenderness.   Musculoskeletal: She exhibits no edema or tenderness.   UE: RUE: SA 4/5, EF/EE 3/5,  4/5  LUE: SA 4/5, EF/EE 4/5, 3/5,  4/5  RLE HF 3/5, KE 4/5, DF/PF 4/5  AKA site C/D, closed w staples        Neurological: She is alert.   Followed commands  Memory: 3/3 immediate, 1/3 delayed     Skin: Skin is warm and dry.   Psychiatric: She has a normal mood and affect. Her behavior is normal.     NEUROLOGICAL EXAMINATION:     CRANIAL NERVES     CN III, IV, VI   Pupils are equal, round, and reactive to light.  Extraocular motions are normal.       Assessment/Plan:      Aleta Herrera is a 62 y.o. female admitted to inpatient rehabilitation on 8/7/2017 for Amputation of leg with impaired mobility and ADLs. Patient remains appropriate for PT, OT, and as required Speech therapy. Patient continues to require 24 hour nursing care as well as daily  "Physician assessment.    * Amputation of leg    s/p L AKA on 7/31 2/2 gangrenous unsalvageable L foot   8/10 Has Vascular F/U today - Recs included " 1. Remove left  AKA staples on 8/21/17 at Rehab Facility.  F/U in 4 months with HD access US "    Patient will be going home w family, tentative DC date 8/22    Functional: Mod A for memory, prob solving, Mod I with expression/Comp  SBA/Mod w bed mobility, Min A for sit to stand (improved), Max A for transfers   UBD Valentina, Mod A for bathing   SBA for WC mobility        Type 2 diabetes mellitus with left diabetic foot ulcer    8/9 PO intake aroudn 50%. Will hold Levemir 4u BID, and monitor trend.     8/14 -157,  cont to hold insulin   8/15 , restart insulin 4U nightly       ESRD (end stage renal disease)    ESRD, home HD MWF and L AVG placed 4/2017  - HD planned for tomorrow, cont to monitor     8/10 Labs stable, w WBC trending down. H/H stable. K WNL.            Hypertension, renal    -VSS, cont w current mgmt     Vitals:    08/14/17 0700 08/14/17 1228 08/14/17 2230 08/15/17 0700   BP: 127/61  108/63 136/64   BP Location: Right arm  Right arm Left arm   Patient Position: Lying  Sitting Lying   Pulse: 92  95 94   Resp: 16  14 16   Temp: 98.6 °F (37 °C)  98 °F (36.7 °C) 99.6 °F (37.6 °C)   TempSrc: Oral  Oral Oral   SpO2: (!) 94%   (!) 94%   Weight:  49.4 kg (108 lb 14.5 oz)     Height:  4' 11.02" (1.499 m)        Stage III pressure ulcer of right buttock    - regular wound care, low air los mattress         Pressure ulcer of left buttock    - regular wound care, low air los mattress        Impaired mobility and ADLs    Other Rehab Plan/Continue to monitor:   Nutrition/Swallow Status:    - Prealbumin 08/10 - 11, cont with supplement    - Nutritionist on board   Bladder Management: continent  Bowel Management:  continent  - scheduled polyethylete glycol and docusate  - Colace BID and Suppository PRN   - Will monitor for regularity   Mood: stable   Sleep: " monitor; Will consider sleep aid as clinically indicated   Skin: Monitor   DVT ppx: heparin                 DISCHARGE PLANNING:  Tentative Discharge Date: 8/22/2017    Future Appointments  Date Time Provider Department Center   9/6/2017 12:00 PM VASCULAR, LAB [facility]   9/6/2017 12:30 PM VASCULAR, LAB [facility]   9/6/2017 1:00 PM Nathalie Madera MD [facility]   9/12/2017 9:40 AM Radha Cortez MD [facility]       Dahlia Weaver MD  Department of Physical Medicine & Rehab   [facility]

## 2017-08-15 NOTE — ASSESSMENT & PLAN NOTE
Other Rehab Plan/Continue to monitor:   Nutrition/Swallow Status:    - Prealbumin 08/10 - 11, cont with supplement    - Nutritionist on board   Bladder Management: continent  Bowel Management:  continent  - scheduled polyethylete glycol and docusate  - Colace BID and Suppository PRN   - Will monitor for regularity   Mood: stable   Sleep: monitor; Will consider sleep aid as clinically indicated   Skin: Monitor   DVT ppx: heparin

## 2017-08-15 NOTE — ASSESSMENT & PLAN NOTE
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 Remove left  AKA staples on 8/21/17 at Rehab Facility.  F/U in 4 months with HD access US "    Patient will be going home w family, tentative DC date 8/22    Functional: Mod A for memory, prob solving, Mod I with expression/Comp  SBA/Mod w bed mobility, Min A for sit to stand (improved), Max A for transfers   UBD Valentina, Mod A for bathing   SBA for WC mobility  "

## 2017-08-15 NOTE — ASSESSMENT & PLAN NOTE
8/9 PO intake aroudn 50%. Will hold Levemir 4u BID, and monitor trend.     8/14 -157,  cont to hold insulin   8/15 , restart insulin 4U nightly

## 2017-08-15 NOTE — PROGRESS NOTES
Physical Therapy   Treatment    Aleta Herrera   MRN: [id number]   PTA visit #: 1     08/15/17 1015   PT Time Calculation   PT Start Time 1015   PT Stop Time 1100   PT Total Time (min) 45 min   Treatment   Treatment Type Treatment   PT/PTA PTA   PTA Visit Number 1   General   PT Received On 08/15/17   Family/Caregiver Present No   Patient Found (position) Seated in wheelchair   Pt found with oxygen  (3L)   Precautions   General Precautions fall   Visual/Auditory Vision impaired   Subjective   Patient states Pt agreeable to tx   Pain/Comfort   Pain Rating 1 no pain   Bed Mobility   Bed Mobility yes   Supine to Sit Maximum Assistance   Supine to Sit Comments mat   Sit to Supine Minimum Assistance   Transfers   Transfer yes   Chair to Mat   Chair<> Mat Technique Slide Board   Chair<>Mat Assistance Maximum Assistance   Chair <> Mat Assistive Device Slide Board   Mat to Chair   Technique Slide Board   Assistance Maximum Assistance  (x 2)   Assistive Device Slide Board   Wheelchair Activities   Propulsion Yes   Propulsion Type 1 Manual   Level 1 Level tile   Method 1 Right upper extremity;Left upper extremity   Level of Assistance 1 Stand by assistsance  (vc)   Description/ Details 1 110' X 1   Supine   Supine-Exercises Lower extremity;Specific exercises   Supine-Exercise Type Ankle pumps;Glut sets;SLR;ABD/ADD;Heel slides   Supine-Exercise Comments R LE x 15 reps, L LE abd/add, hip flex   Side Lying   Side Lying-Exercises Lower extremity;Specific exercises   Side Lying-Exercise Type Hip extension;Hip ABduction   Side Lying-Exercise Comments L LE x 15 reps   Activity Tolerance   Activity Tolerance Patient tolerated treatment well   After Treatment   Patient Position After Treatment Seated in wheelchair  (seat belt)   Patient after treatment left call button in reach   Assessment   Prognosis Fair   Problem List Decreased strength;Decreased endurance;Impaired balance;Decreased mobility;Decreased skin integrity    Session Assessment progressing toward goals   Assessment Pt iman tx well but needing max A for slide board tnfs.  cont POC   Level of Motivation/Participation good   Barriers to Discharge Decreased caregiver support   Discharge Recommendations   Equipment Needed After Discharge wheelchair   Discharge Facility/Level Of Care Needs home health PT   Plan   Planned Therapy Intervention Continue with current plan   Therapy Frequency 2 times/day;Monday-Friday;Saturday or Sunday   Physical Therapy Follow-up   PT Follow-up? Yes   Treatment/Billable Minutes   Therapeutic Activity 15   Therapeutic Exercise 20   Train/Wheelchair Management 10   Total Time 45       Letty Hickey, PTA  8/15/2017

## 2017-08-15 NOTE — ASSESSMENT & PLAN NOTE
ESRD, home HD MWF and L AVG placed 4/2017  - HD planned for tomorrow, cont to monitor     8/10 Labs stable, w WBC trending down. H/H stable. K WNL.

## 2017-08-15 NOTE — PROGRESS NOTES
"Speech Therapy   Treatment    Aleta Herrera   MRN: [id number]            08/15/17 0900   Speech Time Calculation   Speech Start Time 0900   Speech Stop Time 0945   Speech Total (min) 45 min   General Information   SLP Treatment Date 08/15/17   General Observations Patient seen while sitting upright in bed.   General Precautions fall   Visual/Auditory Vision impaired   Subjective   Patient states "At home, I always write things down and make lists to help remember."       SLP Cognitive Treatment   Treatment Detail (SLP Cognitive Treatment) Patient oriented to person, place, date, and situation. Disoriented to time, given moderate verbal cues. Patient reported that she cannot read the clock 2/2 poor vision. ST encouraged patient to ask questions regarding time as staff/visitors enter/exit room, patient verbalized understanding. At end of session, patient recalled time, however, continued to demonstrate poor temporal orientation, as evidenced by stating, "I thought my lunch should be here by now." Patient recalled information from the previous day with no difficulty, including therapy tasks, therapists' names, and strategies learned in previous therapy sessions. Patient unable to recall information from x2 days prior. ST provided patient with recall strategy handout and  encouraged patient to write down activities performed each day, patient verbalized understanding. In a functional recall task, patient recalled information from paragraph with 65% accuracy given min verbal cues. To increase math/money management skills, patient participated in math/money word problems. Patient answered simple math problems with 90% accuracy independently, 100% accuracy given min verbal cues. Patient participated in a similar math task with increased difficulty level. Pt answered moderately difficult math problems with 20% accuracy independently, 60% accuracy given moderate verbal cues. In a simple money management task " involving coin counting, patient answered questions with 60% accuracy independently, 100% accuracy given min verbal cues. Noted that patient continues to present with self-limiting behavior, characterized by hesitating to provide responses.    Assessment   SLP Diagnosis mod cognitive deficits   Prognosis Good   Session Assessment progressing toward goals   Level of Motivation/Participation Good   Discharge Recommendations   Discharge Facility/Level Of Care Needs home health speech therapy   Plan   Plan Continue with current plan   Therapy Frequency Monday-Friday   Treatment/Billable Minutes   Speech Therapy Individual 45   Total Time 45     FIM Scores  Auditory Comprehension:6  Expression: 6  Social Interaction:6  Problem Solving:3  Memory: 3     Comprehension:  Complex= humor, finances, rationale for medical treatment(hip precautions, pressure relief)  Basic= pain, hunger, thirst, bathroom needs, cold, nutrition, sleep  Understands complex/abstract conversation/directions with extra time, assistance device(glasses, if visual mode or both; hearing aide if auditory mode or both) (6)     Expression:  Expresses complex / abstract ideas with extra time, assistive device (augmentative communication device    (6)     Social Interaction:  Interacts appropriately with others with medication or extra time(anti-anxiety, antidepressant)  (6)     .Problem Solving:  Solves basic problems 50-74% of the time  (3)     Memory:   Recognizes, recalls, or executes 50-74% of time 2 steps of 2 step request (3)         MIMI Mujica-SLP  8/15/2017

## 2017-08-15 NOTE — PROGRESS NOTES
Physical Therapy   Daily FIM    Aleta Alatorrerichardson   MRN: [id number]      08/15/17 1015   Transfers   Bed/Chair/WC 1   Locomotion   Distance Wheelchair 2   Wheelchair 2   Mode C         Letty Hickey, PTA  8/15/2017

## 2017-08-15 NOTE — PROGRESS NOTES
Pt returned from dialysis in no apparent distress with no needs verbalized.  Pt sitting in wheelchair and supper heated up for her.  See assessment from this time for details of physical exam.

## 2017-08-15 NOTE — PROGRESS NOTES
"Occupational Therapy  PM Treatment  Aleta Herrera   MRN: [id number]   Room/Bed: E263/E263 B       08/15/17 1306   OT Time Calculation   OT Start Time 1306   OT Stop Time 1354   OT Total Time (min) 48 min   General   OT Date of Treatment 08/15/17   Family/Caregiver Present No   Patient Found (position) Seated in wheelchair   Pt found with oxygen  (3L)   Precautions   General Precautions fall   Subjective   Patient states "I'm almost finished my lunch."   Pain/Comfort   Pain Rating no pain   Exercise Tools   Exercise Tools Yes   Rickshaw 15# x 100 reps   Bilateral Manish 2# x 100 reps on incline   Theraputty Red: putty and pegs to increase B hand strength in prep for ADLs.   Activity Tolerance   Activity Tolerance Patient tolerated treatment well   After Treatment   Patient Position After Treatment Seated in wheelchair   Patient after treatment left call button in reach  (posey belt intact)   Assessment   Prognosis Fair   Problem List Decreased Self Care skills;Decreased upper extremity range of motion;Decreased upper extremity strength;Decreased safe judgment during ADL;Decreased endurance;Visual deficit;Decreased functional mobility;Decreased IADLs;Decreased trunk control for functional activities   Assessment Pt participated well in tx session but remains with decreased (I) with ADLs and t/fs. Pt also deconditioned which is contributing to decreased (I). Pt would continue to benefit from skilled OT services.   Level of Motivation/Participation Good   Discharge Recommendations   Equipment Needed After Discharge wheelchair   Discharge Facility/Level Of Care Needs home with home health   Plan   Plan Continue with current plan   Therapy Frequency 2 times/day   Occupational Therapy Follow-up   OT Follow-up? Yes   Treatment/Billable Minutes   Therapeutic Exercise 48   Total Time 48       ROGELIO Bourne  8/15/2017    LEGEND:   CGA: Contact Guard Assist   EOB: Edge[place] Bed   HHA: Hand Held Assist   HOB: Head of " Bed   (I): Independent-patient performs task in a timely manner   Max (A): Maximal Assist-patient performs 25-49% of task   Min (A): Minimal Assist- patient performs 75% or more of task   Mod (A): Moderate Assist- patient performs 50-74% of task   NA: Not applicable   NT: Not tested   OOB: Out of Bed   PTA: Prior to admit   QC: Quad Cane   RW: Rolling Walker   (S): Supervision- patient requires cues, coaxing, prompting   SBA: Stand By Assist   SC: Straight Cane   SW: Standard Walker   TBA: To be assessed   Total (A): Total Assist- patient performs less than 25% of task   WC: Wheelchair   WFL: Within Functional Limits   WNL: Within Normal Limits

## 2017-08-15 NOTE — PROGRESS NOTES
Recreational Therapy   Evaluation    Aleta Herrera  MRN: [id number]         08/15/17 0000   Rec Therapy Time Calculation   Rec Start Time 0225   Rec Stop Time 0313   Rec Total Time (min) 48 min   General   Primary Diagnosis leg Amupation   Hoahaoism Cath   Number of Children 3   Occupation Retired Teacher   Do you feel like you have enough to keep you busy now? Yes   Do you believe that you have the opportunity for physical activity? Yes   Activity Capabilities Moderate   Subjective   Patient states I am during ok   Precautions   General Precautions fall   Visual/Auditory Vision impaired   Assessment   Mobility manual wheelchair   Musculoskeletal impaired balance;impaired strength   Hearing normal   Speech/Communication normal   Cognitive Concerns oriented x4   Emotional Concerns (pt denies depression/anxiety)   Leisure Interest Survey   Leisure Interest Survey Yes   Social/Group Activities   Jew/Sikh Current Interest   Solitary Activities   Watching TV Current Interest   Word Search Puzzles Current Interest   Cross Word Puzzles Current Interest   Music Listening Current Interest   Reading Current Interest   Physical Activities   Dancing Past Interest   Creative Activities   Cooking Current Interest   Spectator Events   Concerts Past Interest   Passive Games   Bingo Past Interest   Therapeutic Recreation   Stress Management/Relaxation Training Able to identify stress of pain levels   Social Skills Does not initiate conversation or social interaction   Leisure Education  Pt  Need to learn strategies to overcome barriers to leisure 2* new disability.   Leisure Resources Identifies knowledge of how to locate leisure resources   Leisure Attitude/Participation With coaxing, participates in group activity and identifies benefits of involvement   Rec Therapy Group Assistance Supervision;Stand by Assistance   Problem Solving Activity Assistance Supervision   Assessment   Assessment pleasant, cooperative, no  pain complaints, barriers: decrease motoric skills.   Goals   Additional Documentation yes   Goal Formulation With patient   Time For Goal Achievement (10-12 days)   Goal 1 Pt will be educated on various adaptive leisure activities that she can do during her free time.   Plan   Planned Therapy Intervention Plan of care initiated   PT Frequency Minimum of 1 visit per week   Time   Treatment time 3 units     Rehab orientation and unit safety rules reviewed with pt. Pt stated that he understood.    Ana Roberts, CTRS, 8/15/2017

## 2017-08-15 NOTE — ASSESSMENT & PLAN NOTE
"-VSS, cont w current mgmt     Vitals:    08/14/17 0700 08/14/17 1228 08/14/17 2230 08/15/17 0700   BP: 127/61  108/63 136/64   BP Location: Right arm  Right arm Left arm   Patient Position: Lying  Sitting Lying   Pulse: 92  95 94   Resp: 16 14 16   Temp: 98.6 °F (37 °C)  98 °F (36.7 °C) 99.6 °F (37.6 °C)   TempSrc: Oral  Oral Oral   SpO2: (!) 94%   (!) 94%   Weight:  49.4 kg (108 lb 14.5 oz)     Height:  4' 11.02" (1.499 m)         "

## 2017-08-16 LAB
POCT GLUCOSE: 138 MG/DL (ref 70–110)
POCT GLUCOSE: 143 MG/DL (ref 70–110)
POCT GLUCOSE: 145 MG/DL (ref 70–110)

## 2017-08-16 PROCEDURE — 27000221 HC OXYGEN, UP TO 24 HOURS

## 2017-08-16 PROCEDURE — 97110 THERAPEUTIC EXERCISES: CPT

## 2017-08-16 PROCEDURE — 25000003 PHARM REV CODE 250: Performed by: STUDENT IN AN ORGANIZED HEALTH CARE EDUCATION/TRAINING PROGRAM

## 2017-08-16 PROCEDURE — 97530 THERAPEUTIC ACTIVITIES: CPT

## 2017-08-16 PROCEDURE — 25000003 PHARM REV CODE 250: Performed by: PHYSICAL MEDICINE & REHABILITATION

## 2017-08-16 PROCEDURE — 97150 GROUP THERAPEUTIC PROCEDURES: CPT

## 2017-08-16 PROCEDURE — 97542 WHEELCHAIR MNGMENT TRAINING: CPT

## 2017-08-16 PROCEDURE — 99233 SBSQ HOSP IP/OBS HIGH 50: CPT | Mod: ,,, | Performed by: PHYSICAL MEDICINE & REHABILITATION

## 2017-08-16 PROCEDURE — 63600175 PHARM REV CODE 636 W HCPCS: Performed by: STUDENT IN AN ORGANIZED HEALTH CARE EDUCATION/TRAINING PROGRAM

## 2017-08-16 PROCEDURE — 12800000 HC REHAB SEMI-PRIVATE ROOM

## 2017-08-16 PROCEDURE — 92507 TX SP LANG VOICE COMM INDIV: CPT

## 2017-08-16 RX ADMIN — HEPARIN SODIUM 5000 UNITS: 5000 INJECTION, SOLUTION INTRAVENOUS; SUBCUTANEOUS at 05:08

## 2017-08-16 RX ADMIN — AMLODIPINE BESYLATE 10 MG: 10 TABLET ORAL at 08:08

## 2017-08-16 RX ADMIN — HEPARIN SODIUM 5000 UNITS: 5000 INJECTION, SOLUTION INTRAVENOUS; SUBCUTANEOUS at 01:08

## 2017-08-16 RX ADMIN — ASPIRIN 81 MG CHEWABLE TABLET 81 MG: 81 TABLET CHEWABLE at 08:08

## 2017-08-16 RX ADMIN — SEVELAMER CARBONATE 2400 MG: 800 TABLET, FILM COATED ORAL at 08:08

## 2017-08-16 RX ADMIN — PANTOPRAZOLE SODIUM 40 MG: 40 TABLET, DELAYED RELEASE ORAL at 08:08

## 2017-08-16 RX ADMIN — INSULIN DETEMIR 4 UNITS: 100 INJECTION, SOLUTION SUBCUTANEOUS at 09:08

## 2017-08-16 RX ADMIN — POLYETHYLENE GLYCOL 3350 17 G: 17 POWDER, FOR SOLUTION ORAL at 08:08

## 2017-08-16 RX ADMIN — CALCITRIOL 0.5 MCG: 0.5 CAPSULE, LIQUID FILLED ORAL at 08:08

## 2017-08-16 RX ADMIN — HEPARIN SODIUM 5000 UNITS: 5000 INJECTION, SOLUTION INTRAVENOUS; SUBCUTANEOUS at 09:08

## 2017-08-16 RX ADMIN — PREGABALIN 75 MG: 75 CAPSULE ORAL at 09:08

## 2017-08-16 RX ADMIN — OXYCODONE HYDROCHLORIDE 5 MG: 5 TABLET ORAL at 09:08

## 2017-08-16 RX ADMIN — SEVELAMER CARBONATE 2400 MG: 800 TABLET, FILM COATED ORAL at 11:08

## 2017-08-16 RX ADMIN — CALCIUM CARBONATE (ANTACID) CHEW TAB 500 MG 500 MG: 500 CHEW TAB at 09:08

## 2017-08-16 NOTE — ASSESSMENT & PLAN NOTE
-VSS, cont w current mgmt     Vitals:    08/15/17 0900 08/15/17 1525 08/15/17 1900 08/16/17 0658   BP:  (!) 140/64 (!) 114/56 118/64   BP Location:   Left arm Left arm   Patient Position:   Lying Lying   Pulse:  88 87 88   Resp:  16 16 16   Temp:  98 °F (36.7 °C) 97.8 °F (36.6 °C) 97.8 °F (36.6 °C)   TempSrc:  Oral Oral Oral   SpO2: (!) 94% 95% 97%    Weight:       Height:

## 2017-08-16 NOTE — SUBJECTIVE & OBJECTIVE
Interval History: No further bleed from post-op AKA site. Pain remains well controlled, although continues to have phantom pain (stable.) She notes a strong appetite, suggesting improved PO intake. Slept well overnight. Last bowel movement yesterday.     Remains motivated with therapy; improved in sit to stand with min A (previously maxA).    Scheduled Medications:    amlodipine  10 mg Oral Daily    aspirin  81 mg Oral Daily    calcitRIOL  0.5 mcg Oral Daily    docusate sodium  100 mg Oral BID    heparin (porcine)  5,000 Units Subcutaneous Q8H    insulin detemir  4 Units Subcutaneous QHS    pantoprazole  40 mg Oral Daily    polyethylene glycol  17 g Oral Daily    pregabalin  75 mg Oral QHS    sevelamer carbonate  2,400 mg Oral TID WM       PRN Medications: acetaminophen, bisacodyl, calcium carbonate, dextrose 50%, dextrose 50%, glucagon (human recombinant), glucose, glucose, heparin (porcine), insulin aspart, methocarbamol, ondansetron, ondansetron, oxycodone, pneumoc 13-pramod conj-dip cr(PF), senna-docusate 8.6-50 mg    Review of Systems   Constitutional: Negative for chills and fever.   Eyes: Negative for visual disturbance.   Respiratory: Negative for cough and shortness of breath.    Cardiovascular: Negative for chest pain, palpitations and leg swelling.   Gastrointestinal: Negative for abdominal pain, blood in stool, constipation, diarrhea, nausea and vomiting.   Genitourinary: Negative for flank pain.   Musculoskeletal: Negative for back pain.   Skin: Positive for wound (2 wounds on buttocks).   Neurological: Positive for weakness. Negative for dizziness and headaches.   Hematological:        Bleeding from left leg surgical site; controlled     Objective:     Vital Signs (Most Recent):  Temp: 97.8 °F (36.6 °C) (08/16/17 0658)  Pulse: 88 (08/16/17 0658)  Resp: 16 (08/16/17 0658)  BP: 118/64 (08/16/17 0658)  SpO2: 97 % (08/15/17 1900)    Vital Signs (24h Range):  Temp:  [97.8 °F (36.6 °C)-98 °F (36.7  °C)] 97.8 °F (36.6 °C)  Pulse:  [87-88] 88  Resp:  [16] 16  SpO2:  [95 %-97 %] 97 %  BP: (114-140)/(56-64) 118/64     Physical Exam   Constitutional: She appears well-developed and well-nourished.   HENT:   Head: Normocephalic and atraumatic.   Eyes: EOM are normal. Pupils are equal, round, and reactive to light.   Neck: Normal range of motion. Neck supple.   Cardiovascular: Normal rate, regular rhythm, normal heart sounds and intact distal pulses.    Pulmonary/Chest: Effort normal and breath sounds normal.   Abdominal: Soft. Bowel sounds are normal. There is no tenderness.   Musculoskeletal: She exhibits no edema or tenderness.   UE: RUE: SA 4/5, EF/EE 3/5,  4/5  LUE: SA 4/5, EF/EE 4/5, 3/5,  4/5  RLE HF 3/5, KE 4/5, DF/PF 4/5  AKA site C/D, closed w staples        Neurological: She is alert.   Followed commands  Memory: 3/3 immediate, 1/3 delayed     Skin: Skin is warm and dry.   Psychiatric: She has a normal mood and affect. Her behavior is normal.     NEUROLOGICAL EXAMINATION:     CRANIAL NERVES     CN III, IV, VI   Pupils are equal, round, and reactive to light.  Extraocular motions are normal.

## 2017-08-16 NOTE — PROGRESS NOTES
Occupational Therapy   Seated Endurance Group    Aleta Herrera   MRN: [id number]     Patient participated in 45 minute seated high endurance group. The group activity challenged bilateral upper extremity and lower extremity strengthening. In addition, cardiovascular and functional endurance were challenged during this group.     Patient completed 20 reps x 5 sets bicep curls, side raises, shoulder flexion, shoulder extension (in UE circuit)    Patient completed 20 reps x 5 sets hip flexion, knee flexion, knee extension, toe and heel taps (in LE circuit)    - Alternating punches, side punches, diagonal reaches x 20 reps    Pt required short rest breaks during therapeutic exercises. These activities were performed in a group setting to encourage participation with peers and social interaction skills.            08/16/17 1300   OT Time Calculation   OT Start Time 1300   OT Stop Time 1345   OT Total Time (min) 45 min   General   OT Date of Treatment 08/16/17   Pt found with oxygen  (3L)   Plan   Plan Continue with current plan   Occupational Therapy Follow-up   OT Follow-up? Yes   Treatment/Billable Minutes   Therapeutic Group 45   Total Time 45

## 2017-08-16 NOTE — PROGRESS NOTES
Physical Therapy   Treatment    Aleta Herrera   MRN: [id number]   PTA visit #: 1     08/16/17 0845   PT Time Calculation   PT Start Time 0845   PT Stop Time 0930   PT Total Time (min) 45 min   Treatment   Treatment Type Treatment   PT/PTA PTA   PTA Visit Number 1   General   PT Received On 08/16/17   Family/Caregiver Present No   Patient Found (position) Supine in bed   Pt found with oxygen   Precautions   General Precautions fall   Visual/Auditory Vision impaired   Subjective   Patient states Pt agreeable to tx and stating that she wanted quin sling in wheelchar for dialysis later today.   Pain/Comfort   Pain Rating 1 no pain   Bed Mobility   Bed Mobility yes   Supine to Sit Minimum Assistance   Supine to Sit Comments bed   Transfers   Transfer yes   Bed to Chair   Bed <> Chair Technique Squat Pivot   Bed <> Chair Assistance Maximum Assistance   Bed <> Chair Assistive Device No Assistive Device   Wheelchair Activities   Propulsion Yes   Propulsion Type 1 Manual   Level 1 Level tile   Method 1 Right upper extremity;Left upper extremity   Level of Assistance 1 Stand by assistsance  (vc)   Description/ Details 1 50' X 2, 100' X 1   Gait   Gait No   Seated   Seated-Exercises Lower extremity;Specific exercises   Seated-Exercise Type Ankle pumps;Hip flexion;Long arc quads;ABduction;ADduction   Seated-Exercise Comments R LE 2 x 15, L LE  hip flex,abd/add   Equipment Use   Comments Leg press x 5 min R LE   Activity Tolerance   Activity Tolerance Patient tolerated treatment well   Other Comments   Comments O2 98%   After Treatment   Patient Position After Treatment Seated in wheelchair  (O2 3L)   Patient after treatment left call button in reach   Assessment   Prognosis Fair   Problem List Decreased strength;Decreased endurance;Impaired balance;Decreased mobility   Session Assessment (iman well)   Assessment Pt performed SQPT with max A and wc mob with sba and vc for direction.  Cont POC   Level of  Motivation/Participation good   Barriers to Discharge Decreased caregiver support   Discharge Recommendations   Equipment Needed After Discharge wheelchair   Discharge Facility/Level Of Care Needs home health PT   Plan   Planned Therapy Intervention Continue with current plan   Therapy Frequency 2 times/day;Monday-Friday;Saturday or Sunday   Physical Therapy Follow-up   PT Follow-up? Yes   Treatment/Billable Minutes   Therapeutic Activity 15   Therapeutic Exercise 20   Train/Wheelchair Management 10   Total Time 45       Letty Hickey, PTA  8/16/2017

## 2017-08-16 NOTE — ASSESSMENT & PLAN NOTE
8/9 PO intake aroudn 50%. Will hold Levemir 4u BID, and monitor trend.     8/14 -157,  cont to hold insulin   8/15 , restart insulin 4U nightly  8/16

## 2017-08-16 NOTE — PROGRESS NOTES
Speech Therapy   Treatment    Aleta Herrera   MRN: [id number]            08/16/17 1000   Speech Time Calculation   Speech Start Time 1000   Speech Stop Time 1045   Speech Total (min) 45 min   General Information   SLP Treatment Date 08/16/17   General Observations Patient seen while sitting upright in w/c in patient's room. PAtient's  and daughter present t/o session.   General Precautions fall   Visual/Auditory Vision impaired       SLP Cognitive Treatment   Treatment Detail (SLP Cognitive Treatment) Patient oriented x4 independently. Patient stated that she knew what time it was because of the program that was currently on television. ST encouraged patient to continue to use visual cues to orient, patient verbalized understanding. In a simple addition/subtraction math task, patient answered problems with 80% accuracy independently, 100% accuracy given min verbal cues. In a coin counting task, patient answered problems with 70% accuracy independently, 90% accuracy given moderate verbal cues. To improve recall skills, patient participated in picture retention task. Patient recalled details from pictures with 50% accuracy given min verbal cues. Patient recalled recent information with minimal difficulty - recent sessions, meals, tasks. ST educated patient's  and daughter regarding recall/attention strategies to implement upon pt d/c, patient's family verbalized understanding.    Assessment   SLP Diagnosis mod cog deficits   Prognosis Good   Session Assessment progressing toward goals   Level of Motivation/Participation Good   Discharge Recommendations   Discharge Facility/Level Of Care Needs home health speech therapy   Plan   Plan Continue with current plan   Therapy Frequency Monday-Friday   SLP Follow-up   SLP Follow-up? Yes   Treatment/Billable Minutes   Speech Therapy Individual 45   Total Time 45       FIM Scores  Auditory Comprehension:6  Expression: 6  Social Interaction:6  Problem  Solving:3  Memory: 3     Comprehension:  Complex= humor, finances, rationale for medical treatment(hip precautions, pressure relief)  Basic= pain, hunger, thirst, bathroom needs, cold, nutrition, sleep  Understands complex/abstract conversation/directions with extra time, assistance device(glasses, if visual mode or both; hearing aide if auditory mode or both) (6)     Expression:  Expresses complex / abstract ideas with extra time, assistive device (augmentative communication device    (6)     Social Interaction:  Interacts appropriately with others with medication or extra time(anti-anxiety, antidepressant)  (6)     .Problem Solving:  Solves basic problems 50-74% of the time  (3)     Memory:   Recognizes, recalls, or executes 50-74% of time 2 steps of 2 step request (3)    MIMI Mujica-SLP  8/16/2017

## 2017-08-16 NOTE — PROGRESS NOTES
[facility]  Physical Medicine & Rehab  Progress Note    Patient Name: Aleta Herrera  MRN: [id number]  Patient Class: IP- Rehab   Admission Date: 8/7/2017  Length of Stay: 9 days  Attending Physician: Amirah Cano MD  Primary Care Provider: Radha Lao MD    Subjective:     Principal Problem:Amputation of leg    Interval History: No further bleed from post-op AKA site. Pain remains well controlled, although continues to have phantom pain (stable.) She notes a strong appetite, suggesting improved PO intake. Slept well overnight. Last bowel movement yesterday.     Remains motivated with therapy; improved in sit to stand with min A (previously maxA).    Scheduled Medications:    amlodipine  10 mg Oral Daily    aspirin  81 mg Oral Daily    calcitRIOL  0.5 mcg Oral Daily    docusate sodium  100 mg Oral BID    heparin (porcine)  5,000 Units Subcutaneous Q8H    insulin detemir  4 Units Subcutaneous QHS    pantoprazole  40 mg Oral Daily    polyethylene glycol  17 g Oral Daily    pregabalin  75 mg Oral QHS    sevelamer carbonate  2,400 mg Oral TID WM       PRN Medications: acetaminophen, bisacodyl, calcium carbonate, dextrose 50%, dextrose 50%, glucagon (human recombinant), glucose, glucose, heparin (porcine), insulin aspart, methocarbamol, ondansetron, ondansetron, oxycodone, pneumoc 13-pramod conj-dip cr(PF), senna-docusate 8.6-50 mg    Review of Systems   Constitutional: Negative for chills and fever.   Eyes: Negative for visual disturbance.   Respiratory: Negative for cough and shortness of breath.    Cardiovascular: Negative for chest pain, palpitations and leg swelling.   Gastrointestinal: Negative for abdominal pain, blood in stool, constipation, diarrhea, nausea and vomiting.   Genitourinary: Negative for flank pain.   Musculoskeletal: Negative for back pain.   Skin: Positive for wound (2 wounds on buttocks).   Neurological: Positive for weakness. Negative for dizziness and  "headaches.   Hematological:        Bleeding from left leg surgical site; controlled     Objective:     Vital Signs (Most Recent):  Temp: 97.8 °F (36.6 °C) (08/16/17 0658)  Pulse: 88 (08/16/17 0658)  Resp: 16 (08/16/17 0658)  BP: 118/64 (08/16/17 0658)  SpO2: 97 % (08/15/17 1900)    Vital Signs (24h Range):  Temp:  [97.8 °F (36.6 °C)-98 °F (36.7 °C)] 97.8 °F (36.6 °C)  Pulse:  [87-88] 88  Resp:  [16] 16  SpO2:  [95 %-97 %] 97 %  BP: (114-140)/(56-64) 118/64     Physical Exam   Constitutional: She appears well-developed and well-nourished.   HENT:   Head: Normocephalic and atraumatic.   Eyes: EOM are normal. Pupils are equal, round, and reactive to light.   Neck: Normal range of motion. Neck supple.   Cardiovascular: Normal rate, regular rhythm, normal heart sounds and intact distal pulses.    Pulmonary/Chest: Effort normal and breath sounds normal.   Abdominal: Soft. Bowel sounds are normal. There is no tenderness.   Musculoskeletal: She exhibits no edema or tenderness.   UE: RUE: SA 4/5, EF/EE 3/5,  4/5  LUE: SA 4/5, EF/EE 4/5, 3/5,  4/5  RLE HF 3/5, KE 4/5, DF/PF 4/5  AKA site C/D, closed w staples        Neurological: She is alert.   Followed commands  Memory: 3/3 immediate, 1/3 delayed     Skin: Skin is warm and dry.   Psychiatric: She has a normal mood and affect. Her behavior is normal.     NEUROLOGICAL EXAMINATION:     CRANIAL NERVES     CN III, IV, VI   Pupils are equal, round, and reactive to light.  Extraocular motions are normal.       Assessment/Plan:      Aleta Herrera is a 62 y.o. female admitted to inpatient rehabilitation on 8/7/2017 for Amputation of leg with impaired mobility and ADLs. Patient remains appropriate for PT, OT, and as required Speech therapy. Patient continues to require 24 hour nursing care as well as daily Physician assessment.    * Amputation of leg    s/p L AKA on 7/31 2/2 gangrenous unsalvageable L foot   8/10 Has Vascular F/U today - Recs included " 1. Remove " "left  AKA staples on 8/21/17 at Rehab Facility.  F/U in 4 months with HD access US "    Patient will be going home w family, tentative DC date 8/22    Functional: Mod A for memory, prob solving, Mod I with expression/Comp  SBA/Mod w bed mobility, Min A for sit to stand (improved 8/15 from max A), Max A for transfers   UBD Suzie, Mod A for bathing   SBA for WC mobility    Pain controlled with oxy 5mg PRN & tylenol 650 mg PRN          Impaired mobility and ADLs    Other Rehab Plan/Continue to monitor:   Nutrition/Swallow Status:    - Prealbumin 08/10 - 11, cont with supplement    - Nutritionist on board   Bladder Management: continent  Bowel Management:  continent  - scheduled polyethylete glycol and docusate  - Colace BID and Suppository PRN   - Will monitor for regularity   Mood: stable   Sleep: monitor; Will consider sleep aid as clinically indicated   Skin: Monitor   DVT ppx: heparin          Stage III pressure ulcer of right buttock    - regular wound care, low air los mattress         Pressure ulcer of left buttock    - regular wound care, low air los mattress         Type 2 diabetes mellitus with left diabetic foot ulcer    8/9 PO intake aroudn 50%. Will hold Levemir 4u BID, and monitor trend.     8/14 -157,  cont to hold insulin   8/15 , restart insulin 4U nightly  8/16         ESRD (end stage renal disease)    ESRD, home HD MWF and L AVG placed 4/2017  - HD planned for today, cont to monitor     8/10 Labs stable, w WBC trending down. H/H stable. K WNL.            Hypertension, renal    -VSS, cont w current mgmt     Vitals:    08/15/17 0900 08/15/17 1525 08/15/17 1900 08/16/17 0658   BP:  (!) 140/64 (!) 114/56 118/64   BP Location:   Left arm Left arm   Patient Position:   Lying Lying   Pulse:  88 87 88   Resp:  16 16 16   Temp:  98 °F (36.7 °C) 97.8 °F (36.6 °C) 97.8 °F (36.6 °C)   TempSrc:  Oral Oral Oral   SpO2: (!) 94% 95% 97%    Weight:       Height:                     DISCHARGE " PLANNING:  Tentative Discharge Date: 8/22/2017    Future Appointments  Date Time Provider Department Center   9/6/2017 12:00 PM VASCULAR, LAB [facility] MARCIO Sanders   9/6/2017 12:30 PM VASCULAR, LAB [facility] CAMILLECLAB James Sanders   9/6/2017 1:00 PM Nathalie Madera MD [facility] ANAIS Sanders   9/12/2017 9:40 AM Radha Cortez MD [facility] James Sanders [facility]       Dahlia Weaver MD  Department of Physical Medicine & Rehab   [facility]

## 2017-08-16 NOTE — ASSESSMENT & PLAN NOTE
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 Remove left  AKA staples on 8/21/17 at Rehab Facility.  F/U in 4 months with HD access US "    Patient will be going home w family, tentative DC date 8/22    Functional: Mod A for memory, prob solving, Mod I with expression/Comp  SBA/Mod w bed mobility, Min A for sit to stand (improved 8/15 from max A), Max A for transfers   UBD Suzie, Mod A for bathing   SBA for WC mobility    Pain controlled with oxy 5mg PRN & tylenol 650 mg PRN    "

## 2017-08-16 NOTE — PROGRESS NOTES
"Occupational Therapy  AM Treatment Session / Family Training  Aleta Herrera   MRN: [id number]      08/16/17 1115   OT Time Calculation   OT Start Time 1115   OT Stop Time 1209   OT Total Time (min) 54 min   General   OT Date of Treatment 08/16/17   Family/Caregiver Present Yes  (spouse and daughter)   Patient Found (position) Seated in wheelchair  (safety belt)   Pt found with oxygen  (3L)   Precautions   General Precautions fall   Visual/Auditory Vision impaired   Subjective   Patient states "I roll some"    Pain/Comfort   Pain Rating no pain   Bed Mobility   Rolling/Turning to Left Stand by assistance   Rolling/Turning Left Comments cues for bedrail   Rolling/Turning Right Stand by assistance   Rolling/Turning Right Comments same   Scooting/Bridging Moderate Assistance   Scooting/Bridging Comments to EOB   Supine to Sit Moderate Assistance   Supine to Sit Comments (A) for trunk   Sit to Supine Moderate Assistance   Sit to Supine Comments same,  safety   Bed to Chair   Bed <> Chair Technique Slide Board   Bed <> Chair Transfer Assistance Maximum Assistance   Trials/Comments with family present; family educated for safety/tech with slide board TF   Chair to Bed   Technique Slide Board   Assistance Maximum Assistance;Total Assistance   Trials/Comments WC > EOB   Toilet Transfer   Toilet Transfer Assistance Maximum Assistance   Toilet Transfer Assistive Device Slide Board   Trials/Comments WC <> drop arm commode   Grooming   Grooming Level of Assistance Supervision   Hand Washing Level of Assistance Supervision   Face Washing Level of Assistance Supervision   Bathing   Bathing Level of Assistance Moderate assistance   Bathing Comments family educated for safety at bed level, barrier cream, and rolling to reach buttocks   UE Dressing   UE Dressing Level of Assistance Minimum assistance   UE Dressing Where Assessed Edge of bed   UE Dressing Comments v/c's for ortienting shirt due to decreased vision   LE " Dressing   LE Dressing  Level of Assistance Total assistance   LE Dressing Level of Assistance Total assistance   Sock Level of Assistance Total assistance   Adult Briefs Level of Assistance Total assistance   Additional Activities   Additional Activities Other (Comment)   Additional Activities Comments Pt and family present for family training with all aspects of functional ADLs, bed mobility and slide board TFs. Pt's family educated for safety and using proper technique for slide board with family (spouse and daughter) assisting and HONEY to assist.   Activity Tolerance   Activity Tolerance Patient tolerated treatment well   After Treatment   Patient Position After Treatment Seated in wheelchair  (safety belt)   Patient after treatment left call button in reach  (with family present)   Assessment   Prognosis Fair   Problem List Decreased Self Care skills;Decreased upper extremity range of motion;Decreased upper extremity strength;Decreased safe judgment during ADL;Decreased endurance;Visual deficit;Decreased functional mobility;Decreased IADLs;Decreased trunk control for functional activities   Assessment Pt and family present for family training with all aspects of functional ADLs, bed mobility and slide board TFs. Pt's family educated for safety and using proper technique for slide board with family (spouse and daughter) assisting and HONEY to assist. Pt would continue to benefit from OT services to increase functional mobiltiy.   Level of Motivation/Participation Good   Discharge Recommendations   Equipment Needed After Discharge wheelchair   Discharge Facility/Level Of Care Needs home with home health   Plan   Plan Continue with current plan   Therapy Frequency 2 times/day;Monday-Friday;Saturday or Sunday   Treatment/Billable Minutes   Therapeutic Activity 54   Total Time 54         The ROGELIO and EUFEMIA have collaborated and discussed the patient's status, treatment plan and progress toward established goals.      Cristopher Gutiérrez, [facility] 8/16/2017

## 2017-08-16 NOTE — PROGRESS NOTES
Physical Therapy   Daily FIM Scores    Altea Herrera   MRN: [id number]      08/16/17 0845   Transfers   Bed/Chair/WC 1   Locomotion   Distance Walked 0   Distance Wheelchair 2   Walk 0   Wheelchair 2   Mode C   Stairs 0         Letty Hickey, PTA  8/16/2017

## 2017-08-16 NOTE — PLAN OF CARE
Problem: Patient Care Overview  Goal: Plan of Care Review  Outcome: Ongoing (interventions implemented as appropriate)  Pt care plan updated and individualized as needed and progress continues.  See associated flowsheets for relevant documentation. Frequent rounds made per protocol to maintain pt safety and meet pt needs.  Continuing to monitor and follow up as needed.      Problem: Diabetes, Type 2 (Adult)  Goal: Signs and Symptoms of Listed Potential Problems Will be Absent, Minimized or Managed (Diabetes, Type 2)  Signs and symptoms of listed potential problems will be absent, minimized or managed by discharge/transition of care (reference Diabetes, Type 2 (Adult) CPG).   Outcome: Ongoing (interventions implemented as appropriate)  No new signs or symptoms noted.      Problem: Amputation, Lower Extremity (Adult)  Goal: Signs and Symptoms of Listed Potential Problems Will be Absent, Minimized or Managed (Amputation, Lower Extremity)  Signs and symptoms of listed potential problems will be absent, minimized or managed by discharge/transition of care (reference Amputation, Lower Extremity (Adult) CPG).   Outcome: Ongoing (interventions implemented as appropriate)  No new signs or symptoms noted.      Problem: Mobility, Physical Impaired (Adult)  Goal: Enhanced Mobility Skills  Patient will demonstrate the desired outcomes by discharge/transition of care.   Outcome: Ongoing (interventions implemented as appropriate)  Pt cooperative and compliant with mobility safety instructions and participates as able with activities as directed.  Pt transferring with less assistance and reports feeling stronger.    Problem: Skin Integrity Impairment, Risk/Actual (Adult)  Goal: Skin Integrity/Wound Healing  Patient will demonstrate the desired outcomes by discharge/transition of care.   Outcome: Ongoing (interventions implemented as appropriate)  Pt turned and repositioned as needed to maintain skin integrity.  No new breakdown  noted.      Problem: Fall Risk (Adult)  Goal: Absence of Falls  Patient will demonstrate the desired outcomes by discharge/transition of care.   Outcome: Ongoing (interventions implemented as appropriate)  Pt remains free from falls or trauma thus far this shift.

## 2017-08-17 LAB
ANION GAP SERPL CALC-SCNC: 5 MMOL/L
BASOPHILS # BLD AUTO: 0.04 K/UL
BASOPHILS NFR BLD: 0.4 %
BUN SERPL-MCNC: 19 MG/DL
CALCIUM SERPL-MCNC: 8.4 MG/DL
CHLORIDE SERPL-SCNC: 103 MMOL/L
CO2 SERPL-SCNC: 28 MMOL/L
CREAT SERPL-MCNC: 2.7 MG/DL
DIFFERENTIAL METHOD: ABNORMAL
EOSINOPHIL # BLD AUTO: 0.5 K/UL
EOSINOPHIL NFR BLD: 5 %
ERYTHROCYTE [DISTWIDTH] IN BLOOD BY AUTOMATED COUNT: 19.8 %
EST. GFR  (AFRICAN AMERICAN): 21.1 ML/MIN/1.73 M^2
EST. GFR  (NON AFRICAN AMERICAN): 18.3 ML/MIN/1.73 M^2
GLUCOSE SERPL-MCNC: 158 MG/DL
HCT VFR BLD AUTO: 23.7 %
HGB BLD-MCNC: 6.7 G/DL
LYMPHOCYTES # BLD AUTO: 0.9 K/UL
LYMPHOCYTES NFR BLD: 8.1 %
MAGNESIUM SERPL-MCNC: 1.9 MG/DL
MCH RBC QN AUTO: 27.2 PG
MCHC RBC AUTO-ENTMCNC: 28.3 G/DL
MCV RBC AUTO: 96 FL
MONOCYTES # BLD AUTO: 0.6 K/UL
MONOCYTES NFR BLD: 6 %
NEUTROPHILS # BLD AUTO: 8.5 K/UL
NEUTROPHILS NFR BLD: 80.5 %
PLATELET # BLD AUTO: 265 K/UL
PMV BLD AUTO: 11.2 FL
POCT GLUCOSE: 181 MG/DL (ref 70–110)
POCT GLUCOSE: 201 MG/DL (ref 70–110)
POCT GLUCOSE: 201 MG/DL (ref 70–110)
POCT GLUCOSE: 214 MG/DL (ref 70–110)
POTASSIUM SERPL-SCNC: 3.7 MMOL/L
RBC # BLD AUTO: 2.46 M/UL
SODIUM SERPL-SCNC: 136 MMOL/L
WBC # BLD AUTO: 10.5 K/UL

## 2017-08-17 PROCEDURE — 85025 COMPLETE CBC W/AUTO DIFF WBC: CPT

## 2017-08-17 PROCEDURE — 97542 WHEELCHAIR MNGMENT TRAINING: CPT

## 2017-08-17 PROCEDURE — 86920 COMPATIBILITY TEST SPIN: CPT

## 2017-08-17 PROCEDURE — 80048 BASIC METABOLIC PNL TOTAL CA: CPT

## 2017-08-17 PROCEDURE — 63600175 PHARM REV CODE 636 W HCPCS: Performed by: STUDENT IN AN ORGANIZED HEALTH CARE EDUCATION/TRAINING PROGRAM

## 2017-08-17 PROCEDURE — 83735 ASSAY OF MAGNESIUM: CPT

## 2017-08-17 PROCEDURE — 12800000 HC REHAB SEMI-PRIVATE ROOM

## 2017-08-17 PROCEDURE — 92508 TX SP LANG VOICE COMM GROUP: CPT

## 2017-08-17 PROCEDURE — 25000003 PHARM REV CODE 250: Performed by: STUDENT IN AN ORGANIZED HEALTH CARE EDUCATION/TRAINING PROGRAM

## 2017-08-17 PROCEDURE — 36415 COLL VENOUS BLD VENIPUNCTURE: CPT

## 2017-08-17 PROCEDURE — 97110 THERAPEUTIC EXERCISES: CPT

## 2017-08-17 PROCEDURE — 25000003 PHARM REV CODE 250: Performed by: PHYSICAL MEDICINE & REHABILITATION

## 2017-08-17 PROCEDURE — 27000221 HC OXYGEN, UP TO 24 HOURS

## 2017-08-17 PROCEDURE — 97530 THERAPEUTIC ACTIVITIES: CPT

## 2017-08-17 PROCEDURE — 99233 SBSQ HOSP IP/OBS HIGH 50: CPT | Mod: ,,, | Performed by: PHYSICAL MEDICINE & REHABILITATION

## 2017-08-17 RX ADMIN — HEPARIN SODIUM 5000 UNITS: 5000 INJECTION, SOLUTION INTRAVENOUS; SUBCUTANEOUS at 09:08

## 2017-08-17 RX ADMIN — AMLODIPINE BESYLATE 10 MG: 10 TABLET ORAL at 08:08

## 2017-08-17 RX ADMIN — OXYCODONE HYDROCHLORIDE 5 MG: 5 TABLET ORAL at 08:08

## 2017-08-17 RX ADMIN — INSULIN ASPART 2 UNITS: 100 INJECTION, SOLUTION INTRAVENOUS; SUBCUTANEOUS at 12:08

## 2017-08-17 RX ADMIN — INSULIN ASPART 2 UNITS: 100 INJECTION, SOLUTION INTRAVENOUS; SUBCUTANEOUS at 08:08

## 2017-08-17 RX ADMIN — OXYCODONE HYDROCHLORIDE 5 MG: 5 TABLET ORAL at 03:08

## 2017-08-17 RX ADMIN — SEVELAMER CARBONATE 2400 MG: 800 TABLET, FILM COATED ORAL at 08:08

## 2017-08-17 RX ADMIN — HEPARIN SODIUM 5000 UNITS: 5000 INJECTION, SOLUTION INTRAVENOUS; SUBCUTANEOUS at 03:08

## 2017-08-17 RX ADMIN — ASPIRIN 81 MG CHEWABLE TABLET 81 MG: 81 TABLET CHEWABLE at 08:08

## 2017-08-17 RX ADMIN — PANTOPRAZOLE SODIUM 40 MG: 40 TABLET, DELAYED RELEASE ORAL at 08:08

## 2017-08-17 RX ADMIN — HEPARIN SODIUM 5000 UNITS: 5000 INJECTION, SOLUTION INTRAVENOUS; SUBCUTANEOUS at 05:08

## 2017-08-17 RX ADMIN — CALCITRIOL 0.5 MCG: 0.5 CAPSULE, LIQUID FILLED ORAL at 08:08

## 2017-08-17 RX ADMIN — OXYCODONE HYDROCHLORIDE 5 MG: 5 TABLET ORAL at 09:08

## 2017-08-17 RX ADMIN — PREGABALIN 75 MG: 75 CAPSULE ORAL at 09:08

## 2017-08-17 RX ADMIN — INSULIN ASPART 2 UNITS: 100 INJECTION, SOLUTION INTRAVENOUS; SUBCUTANEOUS at 05:08

## 2017-08-17 NOTE — ASSESSMENT & PLAN NOTE
ESRD, home HD MWF and L AVG placed 4/2017  -HD planned for tomorrow, cont to monitor   -H/H today 6.7/23.7 (down from 7.3/25.4)  -asymptomatic   -will plan to transfuse 2U RBC tomorrow at dialysis.  -receives Epo at dialysis  -other labs: Cr 2.7 (decreased from 3.3); K WNL.

## 2017-08-17 NOTE — PROGRESS NOTES
[facility]  Physical Medicine & Rehab  Progress Note    Patient Name: Aleta Herrera  MRN: [id number]  Patient Class: IP- Rehab   Admission Date: 8/7/2017  Length of Stay: 10 days  Attending Physician: Amirah Cano MD  Primary Care Provider: Radha Lao MD    Subjective:     Principal Problem:Amputation of leg    Interval History: Family participated in therapy sessions yesterday; pt looking forward to going home next week. Wound care examined post-op AKA site yesterday; no further bleeding in 3 days, denies other signs of bleeding. She notes a strong appetite, suggesting improved PO intake. Slept well overnight. Last bowel movement 2 days ago. Feels strong in therapy sessions.    Hb 6.7 today, denies light-headedness, chest pain, dyspnea.    Scheduled Medications:    amlodipine  10 mg Oral Daily    aspirin  81 mg Oral Daily    calcitRIOL  0.5 mcg Oral Daily    docusate sodium  100 mg Oral BID    heparin (porcine)  5,000 Units Subcutaneous Q8H    insulin detemir  4 Units Subcutaneous QHS    pantoprazole  40 mg Oral Daily    polyethylene glycol  17 g Oral Daily    pregabalin  75 mg Oral QHS    sevelamer carbonate  2,400 mg Oral TID WM       PRN Medications: acetaminophen, bisacodyl, calcium carbonate, dextrose 50%, dextrose 50%, glucagon (human recombinant), glucose, glucose, heparin (porcine), insulin aspart, methocarbamol, ondansetron, ondansetron, oxycodone, pneumoc 13-pramod conj-dip cr(PF), senna-docusate 8.6-50 mg    Review of Systems   Constitutional: Negative for chills and fever.   Eyes: Negative for visual disturbance.   Respiratory: Negative for cough and shortness of breath.    Cardiovascular: Negative for chest pain, palpitations and leg swelling.   Gastrointestinal: Negative for abdominal pain, blood in stool, constipation, diarrhea, nausea and vomiting.   Genitourinary: Negative for flank pain.   Musculoskeletal: Negative for back pain.   Skin: Positive for  wound (2 wounds on buttocks).   Neurological: Positive for weakness. Negative for dizziness and headaches.   Hematological:        Bleeding from left leg surgical site; controlled     Objective:     Vital Signs (Most Recent):  Temp: 98.7 °F (37.1 °C) (08/17/17 0648)  Pulse: 92 (08/17/17 0648)  Resp: 18 (08/17/17 0648)  BP: (!) 115/58 (08/17/17 0648)  SpO2: (!) 91 % (08/17/17 0648)    Vital Signs (24h Range):  Temp:  [98.7 °F (37.1 °C)-98.8 °F (37.1 °C)] 98.7 °F (37.1 °C)  Pulse:  [92-96] 92  Resp:  [18] 18  SpO2:  [91 %] 91 %  BP: (106-115)/(58) 115/58     Physical Exam   Constitutional: She appears well-developed and well-nourished.   HENT:   Head: Normocephalic and atraumatic.   Eyes: EOM are normal. Pupils are equal, round, and reactive to light.   Neck: Normal range of motion. Neck supple.   Cardiovascular: Normal rate, regular rhythm, normal heart sounds and intact distal pulses.    Pulmonary/Chest: Effort normal and breath sounds normal.   Abdominal: Soft. Bowel sounds are normal. There is no tenderness.   Musculoskeletal: She exhibits no edema or tenderness.   UE: RUE: SA 4/5, EF/EE 3/5,  4/5  LUE: SA 4/5, EF/EE 4/5, 3/5,  4/5  RLE HF 3/5, KE 4/5, DF/PF 4/5  AKA site C/D, closed w staples        Neurological: She is alert.   Followed commands  Memory: 3/3 immediate, 1/3 delayed     Skin: Skin is warm and dry.   Psychiatric: She has a normal mood and affect. Her behavior is normal.     NEUROLOGICAL EXAMINATION:     CRANIAL NERVES     CN III, IV, VI   Pupils are equal, round, and reactive to light.  Extraocular motions are normal.       Assessment/Plan:      Aleta Herrera is a 62 y.o. female admitted to inpatient rehabilitation on 8/7/2017 for Amputation of leg with impaired mobility and ADLs. Patient remains appropriate for PT, OT, and as required Speech therapy. Patient continues to require 24 hour nursing care as well as daily Physician assessment.    * Amputation of leg    s/p L AKA on  "7/31 2/2 gangrenous unsalvageable L foot   8/10 Has Vascular F/U today - Recs included " 1. Remove left  AKA staples on 8/21/17 at Rehab Facility.  F/U in 4 months with HD access US "    Patient will be going home w family, tentative DC date 8/22    Patient's family participated in therapy sessions 08/16    Functional status:  Today 8/17: Pt iman tx well and performed sit to  // bars with mod A.   -memory Mod A, prob solving Mod A, expression/Comp Mod I   -Bed mobility SBA/Mod w   -Sit to stand Min A   -Transfers- Max A   -UBD Suzie  -Bathing Mod A   -WC mobility-SBA    Pain controlled with oxy 5mg PRN & tylenol 650 mg PRN          Impaired mobility and ADLs    Other Rehab Plan/Continue to monitor:   Nutrition/Swallow Status:    - Prealbumin 08/10 - 11, cont with supplement    - Nutritionist on board   Bladder Management: continent  Bowel Management:  continent  - scheduled polyethylete glycol and docusate  - Colace BID and Suppository PRN   - Will monitor for regularity   Mood: stable   Sleep: monitor; Will consider sleep aid as clinically indicated   Skin: Monitor   DVT ppx: heparin          Stage III pressure ulcer of right buttock    - regular wound care, low air los mattress         Pressure ulcer of left buttock    - regular wound care, low air los mattress         Type 2 diabetes mellitus with left diabetic foot ulcer    8/9 PO intake aroudn 50%. Will hold Levemir 4u BID, and monitor trend.     8/14 -157,  cont to hold levemir  8/15 , restart levemir 4U HS  8/16 -145, con't to monitor on levemir 4U HS  8/17 -216, con't to monitor on levemir 4U HS        ESRD (end stage renal disease)    ESRD, home HD MWF and L AVG placed 4/2017  -HD planned for tomorrow, cont to monitor   -H/H today 6.7/23.7 (down from 7.3/25.4)  -asymptomatic   -will plan to transfuse 2U RBC tomorrow at dialysis.  -receives Epo at dialysis  -other labs: Cr 2.7 (decreased from 3.3); K WNL.          "   Hypertension, renal    -VSS, cont w current mgmt     Vitals:    08/15/17 0900 08/15/17 1525 08/15/17 1900 08/16/17 0658   BP:  (!) 140/64 (!) 114/56 118/64   BP Location:   Left arm Left arm   Patient Position:   Lying Lying   Pulse:  88 87 88   Resp:  16 16 16   Temp:  98 °F (36.7 °C) 97.8 °F (36.6 °C) 97.8 °F (36.6 °C)   TempSrc:  Oral Oral Oral   SpO2: (!) 94% 95% 97%    Weight:       Height:                     DISCHARGE PLANNING:  Tentative Discharge Date: 8/22/2017    Future Appointments  Date Time Provider Department Center   9/6/2017 12:00 PM VASCULAR, LAB [facility] MARCIO Sanders   9/6/2017 12:30 PM VASCULAR, LAB [facility] MARCIO Sanders   9/6/2017 1:00 PM Nathalie Madera MD [facility] ANAIS Sanders   9/12/2017 9:40 AM Radha Cortez MD [facility] James Sanders W       Dahlia Weaver MD  Department of Physical Medicine & Rehab   [facility]

## 2017-08-17 NOTE — PROGRESS NOTES
"Occupational Therapy  AM & PM Treatment  Aleta Herrera   MRN: [id number]   Room/Bed: E263/E263 B       08/17/17 1118 08/17/17 1300   OT Time Calculation   OT Start Time 1118 1300   OT Stop Time 1206 1344   OT Total Time (min) 48 min 44 min   General   OT Date of Treatment 08/17/17 --    Family/Caregiver Present No --    Patient Found (position) Seated in wheelchair --    Pt found with oxygen  (3L) --    Precautions   General Precautions fall --    Subjective   Patient states "It's time?" --    Pain/Comfort   Pain Rating no pain --    Bed to Chair   Bed <> Chair Technique Slide Board --    Bed <> Chair Transfer Assistance Total Assistance --    Trials/Comments from wc<>EOM --    Chair to Bed   Technique Slide Board --    Assistance Total Assistance --    Trials/Comments from wc<>EOM --    Feeding   Feeding Level of Assistance Set-up Assistance --    Feeding Where Assessed Wheelchair --    Exercise Tools   Bilateral Manish 2# x 100 reps on incline --    Dynamic Sitting Balance   Dynamic Sitting-Balance Support --  No upper extremity supported   Dynamic Sitting-Balance --  Reaching for objects;Reaching across midline   Dynamic Sitting-Comments --  Pt completed dynamic sitting acitivty with Bioness BITS as follows: Bell Cancellation Task:  TTC: 4:47; 1 Miss; 5 Distractor Hits    Complex Array/Verbal:  Accuracy: 72.41%; TTC: 16:49; Reaction Time: 24.02sec; 42 Hits    Memory:  Trials Accuracy: 100%; 9 Successful Trials   Coordination   Fine Motor Pt completed nut & bolt box with vision occluded. Pt required significant time and verbal cues to complete 1 row. --    Activity Tolerance   Activity Tolerance --  Patient tolerated treatment well   After Treatment   Patient Position After Treatment --  Seated in wheelchair   Patient after treatment left --  call button in reach  (posey belt intact)   Assessment   Prognosis --  Fair   Problem List --  Decreased Self Care skills;Decreased upper extremity range of " motion;Decreased upper extremity strength;Decreased safe judgment during ADL;Decreased endurance;Visual deficit;Decreased functional mobility;Decreased IADLs;Decreased trunk control for functional activities   Assessment --  Pt particiapted well in tx session but remains with decreased (I) with ADLs, t/fs and functional mobility. Pt would contiue to benefit from skilled OT services.   Level of Motivation/Participation --  Good   Discharge Recommendations   Equipment Needed After Discharge --  wheelchair   Discharge Facility/Level Of Care Needs --  home with home health   Plan   Plan --  Continue with current plan   Therapy Frequency --  2 times/day   Occupational Therapy Follow-up   OT Follow-up? --  Yes   Treatment/Billable Minutes   Therapeutic Activity 36 44   Therapeutic Exercise 12 --    Total Time 48 44       Karan QuesadaROGELIO sethi  8/17/2017    LEGEND:   CGA: Contact Guard Assist   EOB: Edgeof Bed   HHA: Hand Held Assist   HOB: Head of Bed   (I): Independent-patient performs task in a timely manner   Max (A): Maximal Assist-patient performs 25-49% of task   Min (A): Minimal Assist- patient performs 75% or more of task   Mod (A): Moderate Assist- patient performs 50-74% of task   NA: Not applicable   NT: Not tested   OOB: Out of Bed   PTA: Prior to admit   QC: Quad Cane   RW: Rolling Walker   (S): Supervision- patient requires cues, coaxing, prompting   SBA: Stand By Assist   SC: Straight Cane   SW: Standard Walker   TBA: To be assessed   Total (A): Total Assist- patient performs less than 25% of task   WC: Wheelchair   WFL: Within Functional Limits   WNL: Within Normal Limits

## 2017-08-17 NOTE — PROGRESS NOTES
"Speech Therapy  Group Treatment   Progress Note    Aleta Herrera   MRN: [id number]     Short Term Goals   Goal 1 Patient will complete functional recall task with 75% accuracy given min A. CONTINUE   Goal 2 Patient will complete math/time/money management task with 70% accuracy given mod A. MET   Goal 3 Patient will complete delayed recall task with 70% accuracy given min A. GOAL MET & CONTINUE   Goal 4 Patient will complete bedside swallow evaluation to rule our risk of aspiration with PO intake. NO LONGER WARRANTED   Long Term Goals   Goal 1 Patient will complete functional recall task with 85% accuracy given min A.   Goal 2 Patient will complete math/time/money management task with 75% accuracy given min A. continue   Goal 3 Patient will complete delayed recall task with 80% accuracy given min A.         08/17/17 1025   Speech Time Calculation   Speech Start Time 1025   Speech Stop Time 1110   Speech Total (min) 45 min   General Information   SLP Treatment Date 08/17/17   General Observations Pt seen for cognitive group session.   General Precautions fall   Visual/Auditory Vision impaired   Subjective   Patient states Pt stated "I really enjoyed this group today."   Pain/Comfort   Pain Rating no pain       SLP Cognitive Treatment   Treatment Detail (SLP Cognitive Treatment) Patient participated in a 45 minute problem solving group.  The group activity assessed and trained improved critical thinking skills, problem solving and reasoning in regards to daily living activities of home environment, including financial management. .  Additionally group discussion (when participating) allows for functional carry-over and self-monitoring of safety/planning.  The patient was able to complete critical thinking task verbally with 65% and moderate-minimal assistance.  Patient was able to complete problem solving task of potential financial problems of home environment with stating logical solutions in a group setting " with 80% accuracy and minimal assistance.   These activities were performed in a group setting to encourage increased participation with peers and social interaction.   Treatment/Billable Minutes   Speech Therapy Individual 45   Total Time 45     Pt continues with progress towards stated goals. Pt requires supervision with financial/medication management at this time 2' decreased problem solving skills, decreased memory skills, and poor vision.    FIM Scores  Auditory Comprehension:6  Expression: 6  Social Interaction:6  Problem Solving:3  Memory: 3     Comprehension:  Complex= humor, finances, rationale for medical treatment(hip precautions, pressure relief)  Basic= pain, hunger, thirst, bathroom needs, cold, nutrition, sleep  Understands complex/abstract conversation/directions with extra time, assistance device(glasses, if visual mode or both; hearing aide if auditory mode or both) (6)     Expression:  Expresses complex / abstract ideas with extra time, assistive device (augmentative communication device    (6)     Social Interaction:  Interacts appropriately with others with medication or extra time(anti-anxiety, antidepressant)  (6)     .Problem Solving:  Solves basic problems 50-74% of the time  (3)     Memory:   Recognizes, recalls, or executes 50-74% of time 2 steps of 2 step request (3)    Mikki Gonzalez CCC-SLP  8/17/2017

## 2017-08-17 NOTE — ASSESSMENT & PLAN NOTE
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 Remove left  AKA staples on 8/21/17 at Rehab Facility.  F/U in 4 months with HD access US "    Patient will be going home w family, tentative DC date 8/22    Patient's family participated in therapy sessions 08/16    Functional status:  Today 8/17: Pt iman tx well and performed sit to  // bars with mod A.   -memory Mod A, prob solving Mod A, expression/Comp Mod I   -Bed mobility SBA/Mod w   -Sit to stand Min A   -Transfers- Max A   -UBD Suzie  -Bathing Mod A   -WC mobility-SBA    Pain controlled with oxy 5mg PRN & tylenol 650 mg PRN    "

## 2017-08-17 NOTE — PROGRESS NOTES
Patient arrived back on unit from dialysis via wheelchair escorted by reliant attendant. O2 in progress at 3l/nc. Assist to bed. Total assistx2 needed with  transfer. No acute distress noted. Call light in reach.

## 2017-08-17 NOTE — ASSESSMENT & PLAN NOTE
8/9 PO intake aroudn 50%. Will hold Levemir 4u BID, and monitor trend.     8/14 -157,  cont to hold levemir  8/15 , restart levemir 4U HS  8/16 -145, con't to monitor on levemir 4U HS  8/17 -216, con't to monitor on levemir 4U HS

## 2017-08-17 NOTE — SUBJECTIVE & OBJECTIVE
Interval History: Family participated in therapy sessions yesterday; pt looking forward to going home next week. Wound care examined post-op AKA site yesterday; no further bleeding in 3 days, denies other signs of bleeding. She notes a strong appetite, suggesting improved PO intake. Slept well overnight. Last bowel movement 2 days ago. Feels strong in therapy sessions.      Scheduled Medications:    amlodipine  10 mg Oral Daily    aspirin  81 mg Oral Daily    calcitRIOL  0.5 mcg Oral Daily    docusate sodium  100 mg Oral BID    heparin (porcine)  5,000 Units Subcutaneous Q8H    insulin detemir  4 Units Subcutaneous QHS    pantoprazole  40 mg Oral Daily    polyethylene glycol  17 g Oral Daily    pregabalin  75 mg Oral QHS    sevelamer carbonate  2,400 mg Oral TID WM       PRN Medications: acetaminophen, bisacodyl, calcium carbonate, dextrose 50%, dextrose 50%, glucagon (human recombinant), glucose, glucose, heparin (porcine), insulin aspart, methocarbamol, ondansetron, ondansetron, oxycodone, pneumoc 13-pramod conj-dip cr(PF), senna-docusate 8.6-50 mg    Review of Systems   Constitutional: Negative for chills and fever.   Eyes: Negative for visual disturbance.   Respiratory: Negative for cough and shortness of breath.    Cardiovascular: Negative for chest pain, palpitations and leg swelling.   Gastrointestinal: Negative for abdominal pain, blood in stool, constipation, diarrhea, nausea and vomiting.   Genitourinary: Negative for flank pain.   Musculoskeletal: Negative for back pain.   Skin: Positive for wound (2 wounds on buttocks).   Neurological: Positive for weakness. Negative for dizziness and headaches.   Hematological:        Bleeding from left leg surgical site; controlled     Objective:     Vital Signs (Most Recent):  Temp: 98.7 °F (37.1 °C) (08/17/17 0648)  Pulse: 92 (08/17/17 0648)  Resp: 18 (08/17/17 0648)  BP: (!) 115/58 (08/17/17 0648)  SpO2: (!) 91 % (08/17/17 0648)    Vital Signs (24h  Range):  Temp:  [98.7 °F (37.1 °C)-98.8 °F (37.1 °C)] 98.7 °F (37.1 °C)  Pulse:  [92-96] 92  Resp:  [18] 18  SpO2:  [91 %] 91 %  BP: (106-115)/(58) 115/58     Physical Exam   Constitutional: She appears well-developed and well-nourished.   HENT:   Head: Normocephalic and atraumatic.   Eyes: EOM are normal. Pupils are equal, round, and reactive to light.   Neck: Normal range of motion. Neck supple.   Cardiovascular: Normal rate, regular rhythm, normal heart sounds and intact distal pulses.    Pulmonary/Chest: Effort normal and breath sounds normal.   Abdominal: Soft. Bowel sounds are normal. There is no tenderness.   Musculoskeletal: She exhibits no edema or tenderness.   UE: RUE: SA 4/5, EF/EE 3/5,  4/5  LUE: SA 4/5, EF/EE 4/5, 3/5,  4/5  RLE HF 3/5, KE 4/5, DF/PF 4/5  AKA site C/D, closed w staples        Neurological: She is alert.   Followed commands  Memory: 3/3 immediate, 1/3 delayed     Skin: Skin is warm and dry.   Psychiatric: She has a normal mood and affect. Her behavior is normal.     NEUROLOGICAL EXAMINATION:     CRANIAL NERVES     CN III, IV, VI   Pupils are equal, round, and reactive to light.  Extraocular motions are normal.

## 2017-08-17 NOTE — PROGRESS NOTES
Physical Therapy   Treatment    Aleta Herrera   MRN: [id number]   PTA visit #: 2     08/17/17 0845   PT Time Calculation   PT Start Time 0845   PT Stop Time 0930   PT Total Time (min) 45 min   Treatment   Treatment Type Treatment   PT/PTA PTA   PTA Visit Number 2   General   PT Received On 08/17/17   Family/Caregiver Present No   Patient Found (position) Seated in wheelchair   Pt found with oxygen   Precautions   General Precautions fall   Visual/Auditory Vision impaired   Subjective   Patient states Pt agreeable to tx w/o complaints   Pain/Comfort   Pain Rating 1 no pain   Bed Mobility   Bed Mobility no   Transfers   Transfer yes   Sit to Stand   Sit <> Stand Assistance Moderate Assistance;Maximum Assistance   Sit <> Stand Assistive Device (// bars)   Trials/Comments x 3 trials   Wheelchair Activities   Propulsion Yes   Propulsion Type 1 Manual   Level 1 Level tile   Method 1 Right upper extremity;Left upper extremity   Level of Assistance 1 Stand by assistsance   Description/ Details 1 150'x 1   Gait   Gait No   Balance   Balance Yes   Static Standing Balance   Static Standing-Balance Support Right upper extremity supported;Left upper extremity supported   Static Standing-Level of Assistance Minimum assistance;Moderate assistance   Static Standing-Comment/# of Minutes x 3 trials in // bars for 2 min, 2 min 30sec, 1 min 15 sec.  Pt req A for post lean and blocking R knee   Seated   Seated-Exercises Lower extremity;Specific exercises   Seated-Exercise Type Ankle pumps;Hip flexion;Long arc quads;ADduction   Seated-Exercise Comments R LE x 20 reps   Activity Tolerance   Activity Tolerance Patient tolerated treatment well   After Treatment   Patient Position After Treatment Seated in wheelchair   Patient after treatment left call button in reach   Assessment   Prognosis Fair   Problem List Decreased strength;Decreased endurance;Impaired balance;Decreased mobility   Session Assessment progressing toward goals    Assessment Pt iman tx well and performed sit to  // bars with mod A.  Cont POC   Level of Motivation/Participation good   Barriers to Discharge Decreased caregiver support   Discharge Recommendations   Equipment Needed After Discharge wheelchair   Discharge Facility/Level Of Care Needs home health PT   Plan   Planned Therapy Intervention Continue with current plan   Therapy Frequency 2 times/day;Monday-Friday;Saturday or Sunday   Physical Therapy Follow-up   PT Follow-up? Yes   Treatment/Billable Minutes   Therapeutic Activity 15   Therapeutic Exercise 20   Train/Wheelchair Management 10   Total Time 45     A client care conference was performed between the PT and PTA, prior to treatment by PTA, to discuss the patient's status, treatment plan and established goals.     Letty Hickey, PTA  8/17/2017

## 2017-08-17 NOTE — PROGRESS NOTES
Wound care follow-up.    Left AKA incision is well approximated with staples intact. No erythema/drainage noted.  The patient is sitting up in a wheelchair with a chair cushion.  Unable to assess pressure injuries.  Plan of care discussed with patient/family who verbalized understanding.  Patient states the wounds are improving, frequent shifts in pressure, SHANT overlay on bed, wedges in place.

## 2017-08-17 NOTE — PROGRESS NOTES
Occupational Therapy  FIM SCORES    Aleta Herrera  MRN: [id number]  Room/Bed: E263/E263 B       08/17/17 1600   Transfers   Bed/Chair/WC 1   Self Care   Eating 5   Grooming 5   Bathing 3   Dressing-Upper 4   Dressing-Lower 2   Toileting 1       ROGELIO Bourne  8/17/2017

## 2017-08-17 NOTE — PLAN OF CARE
Patient was alert and oriented during this shift. Pt. Did complain of pain twice during this shift. Both times, PRN oxy was given. Patient stated that she felt better after pain meds.

## 2017-08-17 NOTE — PROGRESS NOTES
Physical Therapy   Daily FIM Scores    Aleta Penalozajosue Herrera   MRN: [id number]      08/17/17 0845   Locomotion   Distance Wheelchair 3   Wheelchair 5   Mode B       Letty Hickey, PTA  8/17/2017

## 2017-08-18 LAB
ABO + RH BLD: NORMAL
BLD GP AB SCN CELLS X3 SERPL QL: NORMAL
BLD PROD TYP BPU: NORMAL
BLD PROD TYP BPU: NORMAL
BLOOD UNIT EXPIRATION DATE: NORMAL
BLOOD UNIT EXPIRATION DATE: NORMAL
BLOOD UNIT TYPE CODE: 8400
BLOOD UNIT TYPE CODE: 8400
BLOOD UNIT TYPE: NORMAL
BLOOD UNIT TYPE: NORMAL
CODING SYSTEM: NORMAL
CODING SYSTEM: NORMAL
DISPENSE STATUS: NORMAL
DISPENSE STATUS: NORMAL
POCT GLUCOSE: 133 MG/DL (ref 70–110)
POCT GLUCOSE: 155 MG/DL (ref 70–110)
POCT GLUCOSE: 163 MG/DL (ref 70–110)
TRANS ERYTHROCYTES VOL PATIENT: NORMAL ML
TRANS ERYTHROCYTES VOL PATIENT: NORMAL ML

## 2017-08-18 PROCEDURE — 86901 BLOOD TYPING SEROLOGIC RH(D): CPT

## 2017-08-18 PROCEDURE — 99233 SBSQ HOSP IP/OBS HIGH 50: CPT | Mod: ,,, | Performed by: PHYSICAL MEDICINE & REHABILITATION

## 2017-08-18 PROCEDURE — 27000221 HC OXYGEN, UP TO 24 HOURS

## 2017-08-18 PROCEDURE — 63600175 PHARM REV CODE 636 W HCPCS: Performed by: STUDENT IN AN ORGANIZED HEALTH CARE EDUCATION/TRAINING PROGRAM

## 2017-08-18 PROCEDURE — 86900 BLOOD TYPING SEROLOGIC ABO: CPT

## 2017-08-18 PROCEDURE — 27201040 HC RC 50 FILTER

## 2017-08-18 PROCEDURE — 97150 GROUP THERAPEUTIC PROCEDURES: CPT

## 2017-08-18 PROCEDURE — P9021 RED BLOOD CELLS UNIT: HCPCS

## 2017-08-18 PROCEDURE — 36415 COLL VENOUS BLD VENIPUNCTURE: CPT

## 2017-08-18 PROCEDURE — 97530 THERAPEUTIC ACTIVITIES: CPT

## 2017-08-18 PROCEDURE — 97535 SELF CARE MNGMENT TRAINING: CPT

## 2017-08-18 PROCEDURE — 25000003 PHARM REV CODE 250: Performed by: STUDENT IN AN ORGANIZED HEALTH CARE EDUCATION/TRAINING PROGRAM

## 2017-08-18 PROCEDURE — 92507 TX SP LANG VOICE COMM INDIV: CPT

## 2017-08-18 PROCEDURE — 97110 THERAPEUTIC EXERCISES: CPT

## 2017-08-18 PROCEDURE — 25000003 PHARM REV CODE 250: Performed by: PHYSICAL MEDICINE & REHABILITATION

## 2017-08-18 PROCEDURE — 12800000 HC REHAB SEMI-PRIVATE ROOM

## 2017-08-18 RX ADMIN — HEPARIN SODIUM 5000 UNITS: 5000 INJECTION, SOLUTION INTRAVENOUS; SUBCUTANEOUS at 03:08

## 2017-08-18 RX ADMIN — HEPARIN SODIUM 5000 UNITS: 5000 INJECTION, SOLUTION INTRAVENOUS; SUBCUTANEOUS at 10:08

## 2017-08-18 RX ADMIN — SEVELAMER CARBONATE 2400 MG: 800 TABLET, FILM COATED ORAL at 10:08

## 2017-08-18 RX ADMIN — OXYCODONE HYDROCHLORIDE 5 MG: 5 TABLET ORAL at 07:08

## 2017-08-18 RX ADMIN — CALCITRIOL 0.5 MCG: 0.5 CAPSULE, LIQUID FILLED ORAL at 07:08

## 2017-08-18 RX ADMIN — PREGABALIN 75 MG: 75 CAPSULE ORAL at 10:08

## 2017-08-18 RX ADMIN — HEPARIN SODIUM 5000 UNITS: 5000 INJECTION, SOLUTION INTRAVENOUS; SUBCUTANEOUS at 05:08

## 2017-08-18 RX ADMIN — CALCIUM CARBONATE (ANTACID) CHEW TAB 500 MG 500 MG: 500 CHEW TAB at 12:08

## 2017-08-18 RX ADMIN — SEVELAMER CARBONATE 2400 MG: 800 TABLET, FILM COATED ORAL at 07:08

## 2017-08-18 RX ADMIN — PANTOPRAZOLE SODIUM 40 MG: 40 TABLET, DELAYED RELEASE ORAL at 07:08

## 2017-08-18 RX ADMIN — AMLODIPINE BESYLATE 10 MG: 10 TABLET ORAL at 07:08

## 2017-08-18 RX ADMIN — ASPIRIN 81 MG CHEWABLE TABLET 81 MG: 81 TABLET CHEWABLE at 07:08

## 2017-08-18 RX ADMIN — SEVELAMER CARBONATE 2400 MG: 800 TABLET, FILM COATED ORAL at 01:08

## 2017-08-18 NOTE — SUBJECTIVE & OBJECTIVE
Interval History: No new complaints. Expressed enjoyment from one of her therapy sessions yesterday in which she was required to match pictures and words. Hb yesterday 6.7 with plan for 2U RBC transfusion today at [facility]; con't to deny light-headedness, chest pain, dyspnea. Wound care examined post-op AKA site yesterday; no further bleeding in 4 days, denies other signs of bleeding. Pain is stable; required 3x doses oxy yesterday. She notes a strong appetite, suggesting improved PO intake. Slept well overnight. Bowel movement yesterday.    Scheduled Medications:    amlodipine  10 mg Oral Daily    aspirin  81 mg Oral Daily    calcitRIOL  0.5 mcg Oral Daily    docusate sodium  100 mg Oral BID    heparin (porcine)  5,000 Units Subcutaneous Q8H    insulin detemir  4 Units Subcutaneous QHS    pantoprazole  40 mg Oral Daily    polyethylene glycol  17 g Oral Daily    pregabalin  75 mg Oral QHS    sevelamer carbonate  2,400 mg Oral TID WM       PRN Medications: acetaminophen, bisacodyl, calcium carbonate, dextrose 50%, dextrose 50%, glucagon (human recombinant), glucose, glucose, heparin (porcine), insulin aspart, methocarbamol, ondansetron, ondansetron, oxycodone, pneumoc 13-pramod conj-dip cr(PF), senna-docusate 8.6-50 mg    Review of Systems   Constitutional: Negative for chills and fever.   Eyes: Negative for visual disturbance.   Respiratory: Negative for cough and shortness of breath.    Cardiovascular: Negative for chest pain, palpitations and leg swelling.   Gastrointestinal: Negative for abdominal pain, blood in stool, constipation, diarrhea, nausea and vomiting.   Genitourinary: Negative for flank pain.   Musculoskeletal: Negative for back pain.   Skin: Positive for wound (2 wounds on buttocks).   Neurological: Positive for weakness. Negative for dizziness and headaches.   Hematological:        Bleeding from left leg surgical site; controlled     Objective:     Vital Signs (Most Recent):  Temp: 98.3 °F (36.8  °C) (08/18/17 0653)  Pulse: 87 (08/18/17 0653)  Resp: 18 (08/18/17 0653)  BP: (!) 117/58 (08/18/17 0653)  SpO2: (!) 91 % (08/18/17 0653)    Vital Signs (24h Range):  Temp:  [98.3 °F (36.8 °C)-98.8 °F (37.1 °C)] 98.3 °F (36.8 °C)  Pulse:  [87-92] 87  Resp:  [18] 18  SpO2:  [91 %] 91 %  BP: (114-117)/(58) 117/58     Physical Exam   Constitutional: She appears well-developed and well-nourished.   HENT:   Head: Normocephalic and atraumatic.   Eyes: EOM are normal. Pupils are equal, round, and reactive to light.   Neck: Normal range of motion. Neck supple.   Cardiovascular: Normal rate, regular rhythm, normal heart sounds and intact distal pulses.    Pulmonary/Chest: Effort normal and breath sounds normal.   Abdominal: Soft. Bowel sounds are normal. There is no tenderness.   Musculoskeletal: She exhibits no edema or tenderness.   UE: RUE: SA 4/5, EF/EE 3/5,  4/5  LUE: SA 4/5, EF/EE 4/5, 3/5,  4/5  RLE HF 3/5, KE 4/5, DF/PF 4/5  AKA site C/D, closed w staples        Neurological: She is alert.   Followed commands  Memory: 3/3 immediate, 1/3 delayed     Skin: Skin is warm and dry.   Psychiatric: She has a normal mood and affect. Her behavior is normal.     NEUROLOGICAL EXAMINATION:     CRANIAL NERVES     CN III, IV, VI   Pupils are equal, round, and reactive to light.  Extraocular motions are normal.

## 2017-08-18 NOTE — PROGRESS NOTES
Physical Therapy   Daily Physical Therapy FIM Scores    Aleta Herrera   MRN: [id number]        08/18/17 1001   Transfers   Bed/Chair/WC 2   Toilet 2   Tub 1   Locomotion   Distance Wheelchair 3   Wheelchair 5   Mode C     Netta Barragan, PT  8/18/2017

## 2017-08-18 NOTE — PROGRESS NOTES
Occupational Therapy  FIM SCORES    Aleta Herrera  MRN: [id number]  Room/Bed: E263/E263 B       08/18/17 1000   Transfers   Bed/Chair/WC 2   Self Care   Eating 5   Grooming 5   Bathing 3   Dressing-Upper 4   Dressing-Lower 2   Toileting 1       ROGELIO Bourne  8/18/2017

## 2017-08-18 NOTE — PROGRESS NOTES
Physical Therapy   Volleyball Group Treatment    Aleta Herrera   MRN: [id number]        08/18/17 1330   PT Time Calculation   PT Start Time 1330   PT Stop Time 1415   PT Total Time (min) 45 min   Treatment   Treatment Type Treatment  (Volleyball Group Treatment)   PT/PTA PT   General   PT Received On 08/18/17   Family/Caregiver Present No   Patient Found (position) Seated in wheelchair   Pt found with oxygen  (posey belt donned, 3L O2)   Precautions   General Precautions fall   Orthopedic No   Required Braces or Orthoses No   Visual/Auditory Vision impaired   Subjective   Patient states Pt agreeable to participate in group treatment session   Pain/Comfort   Pain Rating 1 no pain   Pain Rating Post-Intervention 1 no pain   Other Activities   Comments Patient participated in 45 minute volleyball group activity.  The group activity challenged dynamic standing/sitting skills, bilateral upper extremity strengthening, cardiovascular and respiratory capacity, hand -eye coordination, visual scanning and social affect/mood.  The patient performed this activity at w/c level with stand by assist. The patient required no rest breaks during this activity.  Patient performed activity using bilateral upper extremities to volley the ball, lateral arm movement noted throughout the activity.  Endurance was fair, no pain complaints voiced are observed , social affect good as patient was observed throughout this activity ie., appropriately engaged in social exchange with peers and staff.     Activity Tolerance   Activity Tolerance Patient tolerated treatment well   After Treatment   Patient Position After Treatment Seated in wheelchair   Patient after treatment left call button in reach;with all lines intact   Discharge Recommendations   Equipment Needed After Discharge wheelchair;slide board   Discharge Facility/Level Of Care Needs home health PT   Plan   Planned Therapy Intervention Continue with current plan   Therapy  Frequency daily;Monday-Friday;Saturday or Sunday   Physical Therapy Follow-up   PT Follow-up? Yes   PT - Next Visit Date 08/19/17   Treatment/Billable Minutes   Therapeutic Activity Group 45   Total Time 45     Netta Barragan, PT  8/18/2017

## 2017-08-18 NOTE — ASSESSMENT & PLAN NOTE
ESRD, home HD MWF and L AVG placed 4/2017  -HD planned for today, cont to monitor   -H/H 8/17: 6.7/23.7 (down from 7.3/25.4)  -asymptomatic   -will plan to transfuse 2U RBC today at dialysis.  -receives Epo at dialysis  -other labs: Cr 2.7 (decreased from 3.3); K WNL.

## 2017-08-18 NOTE — PROGRESS NOTES
"Occupational Therapy  Weekly Reassessment/AM Treatment  Aleta Herrera   MRN: [id number]   Room/Bed: E263/E263 B     08/18/17 0729   OT Time Calculation   OT Start Time 0729   OT Stop Time 0815   OT Total Time (min) 46 min   General   OT Date of Treatment 08/18/17   Family/Caregiver Present No   Patient Found (position) Supine in bed   Pt found with oxygen  (3L)   Precautions   General Precautions fall   Subjective   Patient states "We going to get dressed!"   Pain/Comfort   Pain Rating no pain   Bed Mobility   Rolling/Turning to Left Modified independent   Rolling/Turning Left Comments with use of rails   Rolling/Turning Right Modified independent   Rolling/Turning Right Comments with use of rails   Scooting/Bridging Stand by Assistance   Scooting/Bridging Comments to scoot to EOB   Supine to Sit Moderate Assistance   Supine to Sit Comments for trunk elevation to EOB   Bed to Chair   Bed <> Chair Technique Slide Board   Bed <> Chair Transfer Assistance Maximum Assistance   Bed <> Chair Assistive Device Slide Board   Trials/Comments from EOB>wc   Feeding   Feeding Level of Assistance Set-up Assistance   Feeding Where Assessed Wheelchair   Feeding Comments to cut up food   Grooming   Grooming Level of Assistance Supervision   Grooming Where Assessed Sitting sinkside   Grooming Comments set up for applying toothpaste to toothbrush   Bathing   Bathing Level of Assistance Moderate assistance   Bathing Where Assessed Bed   Bathing Comments assist with R foot for thoroghness, pericare   UE Dressing   UE Dressing Level of Assistance Minimum assistance   UE Dressing Where Assessed Edge of bed   UE Dressing Comments to manage clothing down back   LE Dressing   LE Dressing Yes   LE Dressing  Level of Assistance Maximum assistance   LE Dressing Where Assessed Bed level   LE Dressing Comments Pt required assist with threading pants, clothing management over R hip.   Toileting   Toileting Level of Assistance Total " assistance   Toileting Where Assessed Bed level   Toileting Comments Pt remains incontinent bowel   Dynamic Sitting Balance   Dynamic Sitting-Balance Support No upper extremity supported   Dynamic Sitting-Balance Reaching for objects;Reaching across midline   Dynamic Sitting-Comments SBA to complete functional activities   Activity Tolerance   Activity Tolerance Patient tolerated treatment well   After Treatment   Patient Position After Treatment Seated in wheelchair   Patient after treatment left call button in reach  (posey belt intact)   Assessment   Prognosis Fair   Problem List Decreased Self Care skills;Decreased upper extremity range of motion;Decreased upper extremity strength;Decreased safe judgment during ADL;Decreased endurance;Visual deficit;Decreased functional mobility;Decreased IADLs;Decreased trunk control for functional activities   Assessment Pt participated well in tx session but continues to require significant assist with ADLs and t/fs. Pt would continue to benefit from skilled OT services.   Level of Motivation/Participation Good   Long Term Goals   Pt Will Perform Supine To Sit With minimal assist   Supine to Sit - Met/Not Met Not Met   Pt Will Perform Sit to Supine With minimal assist   Supine to Sit - Met/Not Met Not Met   Pt Will Transfer Bed/Chair With moderate assist   Transfer Bed/Chair - Met/Not Met Not Met   Pt Will Transfer To Bedside Commode With moderate assist   Transfer Bedside Commode -Met/Not Met Not Met   Pt Will Perform Eating Independently   Eating - Met/Not Met Not Met   Pt Will Perform Grooming With modified indepdenence   Grooming - Met/Not Met Not Met   Pt Will Perform Bathing With minimal assistance   Bathing - Met/Not Met Not Met   Pt Will Perform UE Dressing With modified independence   UE Dressing - Met/Not Met Not Met   Pt Will Perform LE Dressing With moderate assistance   LE Dressing - Met/Not Met Not Met   Pt Will Perform Toileting With maximum assistance    Toileting-Met/Not Met Not Met   Discharge Recommendations   Equipment Needed After Discharge wheelchair   Discharge Facility/Level Of Care Needs home with home health   Plan   Plan Continue with current plan   Therapy Frequency 2 times/day   Occupational Therapy Follow-up   OT Follow-up? Yes   Treatment/Billable Minutes   Self Care/Home Management 46   Total Time 46       ROGELIO Bourne  8/18/2017    LEGEND:   CGA: Contact Guard Assist   EOB: Edgeof Bed   HHA: Hand Held Assist   HOB: Head of Bed   (I): Independent-patient performs task in a timely manner   Max (A): Maximal Assist-patient performs 25-49% of task   Min (A): Minimal Assist- patient performs 75% or more of task   Mod (A): Moderate Assist- patient performs 50-74% of task   NA: Not applicable   NT: Not tested   OOB: Out of Bed   PTA: Prior to admit   QC: Quad Cane   RW: Rolling Walker   (S): Supervision- patient requires cues, coaxing, prompting   SBA: Stand By Assist   SC: Straight Cane   SW: Standard Walker   TBA: To be assessed   Total (A): Total Assist- patient performs less than 25% of task   WC: Wheelchair   WFL: Within Functional Limits   WNL: Within Normal Limits

## 2017-08-18 NOTE — PROGRESS NOTES
Pt discussed during treatment team staffing.  Expected discharge date is 8/24/17.  Pt and spouse informed and agree with discharge date.  Discharge plan is home with family assistance.

## 2017-08-18 NOTE — PROGRESS NOTES
"Physical Therapy   Treatment/Weekly Reassessment    Aleta Herrera   MRN: [id number]      08/18/17 0857   PT Time Calculation   PT Start Time 0857   PT Stop Time 0944   PT Total Time (min) 47 min   Treatment   Treatment Type Treatment  (Weekly Reassessment)   PT/PTA PT   General   PT Received On 08/18/17   Family/Caregiver Present No   Patient Found (position) Seated in wheelchair   Pt found with oxygen  (posey belt donned, 3LO2)   Precautions   General Precautions fall   Orthopedic No   Required Braces or Orthoses No   Visual/Auditory Vision impaired   Subjective   Patient states "I was hurting earlier but I got some medicine so now I am ok"   Pain/Comfort   Pain Rating 1 no pain   Pain Rating Post-Intervention 1 no pain   Bed Mobility   Bed Mobility yes   Rolling/Turning to Left Supervision  (x 1 trial on mat)   Rolling/Turning Right Minimum assistance  (x 1 trial on mat, assist for management of trunk)   Supine to Sit Minimum Assistance;Moderate Assistance   Supine to Sit Comments x 1 trial on mat, assistance required for appropriate log roll technique and for elevation of trunk to complete transfer    Sit to Supine Contact Guard Assistance;Minimum Assistance  (x 1 trial on mat, assist required for trunk)   Transfers   Transfer yes   Sit to Stand   Sit <> Stand Assistance Maximum Assistance;Moderate Assistance   Sit <> Stand Assistive Device Other (see comments)  (parallel bars)   Trials/Comments Pt performed x 2 trials of sit to stand with moderate to maximum physical assistance to elevate hips out of wheelchair to complete transfer    Chair to Mat   Chair<> Mat Technique Slide Board   Chair<>Mat Assistance Maximum Assistance   Chair <> Mat Assistive Device Slide Board   Trials/Comments x 1 trial, pt required maximum assistance for appropriate placement of slide board and to initate forward lean of trunk to assist with transferring across slide board    Mat to Chair   Technique Slide Board   Assistance " Maximum Assistance   Assistive Device Slide Board   Trials/Comments x 1 trial, pt required total assistance for placement of slide board. Pt required assistance for assisting to bring trunk forward to complete transfer into and out of wheelchair   Tub Bench Transfer   Technique Slide Board   Assistance Total Assistance  (assist of 2 )   Assistive Device Slide Board   Trials/Comments x 1 trial, pt required total assistance of 2 to complete transfer safely. pt required assistance for management of R LE into and out of tub   Wheelchair Activities   Propulsion Yes   Propulsion Type 1 Manual   Level 1 Level tile   Method 1 Right upper extremity;Left upper extremity   Level of Assistance 1 Supervision   Description/ Details 1 Pt propelled self in wheelchair x 154 feet, increased time required for pt to complete activity    Static Standing Balance   Static Standing-Balance Support Right upper extremity supported;Left upper extremity supported  (in parallel bars)   Static Standing-Level of Assistance Moderate assistance   Static Standing-Comment/# of Minutes Pt ambulated x 2 trials in parallel bars: 1st trial: 29 seconds, 2nd trial: 42 seconds. Pt required seated rest breaks following each trial due to fatigue.    Supine   Supine-Exercises Lower extremity;Specific exercises   Supine-Exercise Type ABD/ADD;SLR;Glut sets   Supine-Exercise Comments Pt performed 2 x 20 reps to L residual limb to promote increased L residual limb strength and improved ROM   Other Activities   Comments Patient performed slide board transfer from wheelchair to bedside commode with maximum to total assistance of 1.     PT called [place] and ordered pt L residual limb post operative  per MD order.    Activity Tolerance   Activity Tolerance Patient tolerated treatment well   After Treatment   Patient Position After Treatment Seated in wheelchair   Patient after treatment left call button in reach;with all lines intact  (posey belt donned)    Assessment   Prognosis Fair   Problem List Decreased strength;Decreased range of motion;Decreased endurance;Impaired balance;Decreased mobility;Decreased safety awareness;Pain;Decreased skin integrity   Session Assessment progressing toward goals;other (see comments)  (slow progress)   Assessment Pt continues to require maximum assistance for slide board transfers into and out of wheelchair. Pt requires frequent verbal cues from PT for appropriate slide board placement. Pt is greatly limited by decreased B UE and decreased R LE strength and poor endurance. At this time pt requires significant 24 hour care and will require supervision at home. Pt's long term goals were revised and remain appropriate for pt's next 7 days on IP Rehab. Pt and pt's family will benefit from family training with PT to ensure pt's family is safe with appropriate slide board transfer techniques and residual limb care.    Level of Motivation/Participation good   Barriers to Discharge Decreased caregiver support   Barriers to Discharge Comments PT is unsure if pt's family will be able to provide appropriate level of assistance upon D/C   Short Term Goals   Supine to Sit-Met/Not Met Met   Sit to Supine - Met/Not Met Not Met   Transfer Bed/Chair - Met/Not Met Met   Sit Edge Of Bed - Met Not Met   Tolerate Exercise - Met/Not Met Met   Propel Wheelchair - Met/Not Met Met   Demo Pressure Relief - Met/Not met Met   Other Goal - Progress Met   Long Term Goals   Pt Will Perform Supine To Sit Revised goal;With contact guard assist   Pt Will Perform Sit to Supine Revised goal;With stand by assist   Pt Will [place] Revised goal;With stand by assist   Pt Will Transfer Bed/Chair Revised goal;Slide Board;With maximum assist;With moderate assist   Propel Wheelchair - Met/Not Met Met   Discharge Recommendations   Equipment Needed After Discharge wheelchair;slide board  (possible Kell?)   Discharge Facility/Level Of Care Needs home health PT   Plan    Planned Therapy Intervention Continue with current plan   Therapy Frequency 2 times/day;daily;Monday-Friday;Saturday or Sunday   Physical Therapy Follow-up   PT Follow-up? Yes   PT - Next Visit Date 08/18/17   Treatment/Billable Minutes   Therapeutic Activity 27   Therapeutic Exercise 20   Total Time 47       Netta Barragan, PT  8/18/2017

## 2017-08-18 NOTE — PROGRESS NOTES
"Speech Therapy   Treatment    Aleta Herrera   MRN: [id number]            08/18/17 1030   Speech Time Calculation   Speech Start Time 1030   Speech Stop Time 1115   Speech Total (min) 45 min   General Information   SLP Treatment Date 08/18/17   General Observations Patient seen while sitting upright in wheelchair in patient's room.   General Precautions fall   Visual/Auditory Vision impaired   Subjective   Patient states "Yesterday's group session was a lot of fun. "   Pain/Comfort   Pain Rating no pain       SLP Cognitive Treatment   Treatment Detail (SLP Cognitive Treatment)  Patient oriented x4 independently. Patient reported that she used television program to orient to time. Patient recalled recent information with moderate difficulty - recalled therapy session from previous day, however, unable to identify therapists seen previous day. In a delayed recall task, patient recalled x3 unrelated words with 66% accuracy independently, 83% accuracy given min verbal cues. In a simple counting task, patient answered math problems with 90% accuracy given min verbal cues. In a coin counting task, answered questions with 80% accuracy independently, 90% accuracy given min verbal cues. Prior to coin counting task, ST educated patient regarding strategies to use t/o task, such as counting amount of money before calculating the sum/difference Patient demonstrated understanding with increased performance on task in comparison to previous session. In a time management task, patient answered questions with 60% accuracy independently, 70% accuracy given moderate verbal cues. ST to continue POC.   Assessment   SLP Diagnosis mild-mod cognitive deficits   Prognosis Good   Problem List decreased math/time/money management skills; decreased recall   Session Assessment progressing toward goals   Level of Motivation/Participation Good   Plan   Plan Continue with current plan   SLP Follow-up   SLP Follow-up? Yes "   Treatment/Billable Minutes   Speech Therapy Individual 45   Total Time 45       FIM Scores  Auditory Comprehension:6  Expression: 6  Social Interaction:6  Problem Solving:3  Memory: 3     Comprehension:  Complex= humor, finances, rationale for medical treatment(hip precautions, pressure relief)  Basic= pain, hunger, thirst, bathroom needs, cold, nutrition, sleep  Understands complex/abstract conversation/directions with extra time, assistance device(glasses, if visual mode or both; hearing aide if auditory mode or both) (6)     Expression:  Expresses complex / abstract ideas with extra time, assistive device (augmentative communication device    (6)     Social Interaction:  Interacts appropriately with others with medication or extra time(anti-anxiety, antidepressant)  (6)     .Problem Solving:  Solves basic problems 50-74% of the time  (3)     Memory:   Recognizes, recalls, or executes 50-74% of time 2 steps of 2 step request (3)      MIMI Mujica-SLP  8/18/2017

## 2017-08-18 NOTE — ASSESSMENT & PLAN NOTE
Other Rehab Plan/Continue to monitor:   Nutrition/Swallow Status:    - Prealbumin 08/10: 11, cont with supplement    - Nutritionist on board   Bladder Management: anuric (ESRD)  Bowel Management:  continent  - scheduled polyethylete glycol and docusate  - Colace BID and Suppository PRN   - Will monitor for regularity   Mood: stable   Sleep: monitor; Will consider sleep aid as clinically indicated   Skin: Monitor   DVT ppx: heparin

## 2017-08-18 NOTE — PROGRESS NOTES
[facility]  Physical Medicine & Rehab  Progress Note    Patient Name: Aleta Herrera  MRN: [id number]  Patient Class: IP- Rehab   Admission Date: 8/7/2017  Length of Stay: 11 days  Attending Physician: Amirah Cano MD  Primary Care Provider: Radha Lao MD    Subjective:     Principal Problem:Amputation of leg    Interval History: No new complaints. Expressed enjoyment from one of her therapy sessions yesterday in which she was required to match pictures and words. Hb yesterday 6.7 with plan for 2U RBC transfusion today at [facility]; con't to deny light-headedness, chest pain, dyspnea. Wound care examined post-op AKA site yesterday; no further bleeding in 4 days, denies other signs of bleeding. Pain is stable; required 3x doses oxy yesterday. She notes a strong appetite, suggesting improved PO intake. Slept well overnight. Bowel movement yesterday.    Scheduled Medications:    amlodipine  10 mg Oral Daily    aspirin  81 mg Oral Daily    calcitRIOL  0.5 mcg Oral Daily    docusate sodium  100 mg Oral BID    heparin (porcine)  5,000 Units Subcutaneous Q8H    insulin detemir  4 Units Subcutaneous QHS    pantoprazole  40 mg Oral Daily    polyethylene glycol  17 g Oral Daily    pregabalin  75 mg Oral QHS    sevelamer carbonate  2,400 mg Oral TID WM       PRN Medications: acetaminophen, bisacodyl, calcium carbonate, dextrose 50%, dextrose 50%, glucagon (human recombinant), glucose, glucose, heparin (porcine), insulin aspart, methocarbamol, ondansetron, ondansetron, oxycodone, pneumoc 13-pramod conj-dip cr(PF), senna-docusate 8.6-50 mg    Review of Systems   Constitutional: Negative for chills and fever.   Eyes: Negative for visual disturbance.   Respiratory: Negative for cough and shortness of breath.    Cardiovascular: Negative for chest pain, palpitations and leg swelling.   Gastrointestinal: Negative for abdominal pain, blood in stool, constipation, diarrhea, nausea and vomiting.    Genitourinary: Negative for flank pain.   Musculoskeletal: Negative for back pain.   Skin: Positive for wound (2 wounds on buttocks).   Neurological: Positive for weakness. Negative for dizziness and headaches.   Hematological:        Bleeding from left leg surgical site; controlled     Objective:     Vital Signs (Most Recent):  Temp: 98.3 °F (36.8 °C) (08/18/17 0653)  Pulse: 87 (08/18/17 0653)  Resp: 18 (08/18/17 0653)  BP: (!) 117/58 (08/18/17 0653)  SpO2: (!) 91 % (08/18/17 0653)    Vital Signs (24h Range):  Temp:  [98.3 °F (36.8 °C)-98.8 °F (37.1 °C)] 98.3 °F (36.8 °C)  Pulse:  [87-92] 87  Resp:  [18] 18  SpO2:  [91 %] 91 %  BP: (114-117)/(58) 117/58     Physical Exam   Constitutional: She appears well-developed and well-nourished.   HENT:   Head: Normocephalic and atraumatic.   Eyes: EOM are normal. Pupils are equal, round, and reactive to light.   Neck: Normal range of motion. Neck supple.   Cardiovascular: Normal rate, regular rhythm, normal heart sounds and intact distal pulses.    Pulmonary/Chest: Effort normal and breath sounds normal.   Abdominal: Soft. Bowel sounds are normal. There is no tenderness.   Musculoskeletal: She exhibits no edema or tenderness.   UE: RUE: SA 4/5, EF/EE 3/5,  4/5  LUE: SA 4/5, EF/EE 4/5, 3/5,  4/5  RLE HF 3/5, KE 4/5, DF/PF 4/5  AKA site C/D, closed w staples        Neurological: She is alert.   Followed commands  Memory: 3/3 immediate, 1/3 delayed     Skin: Skin is warm and dry.   Psychiatric: She has a normal mood and affect. Her behavior is normal.     NEUROLOGICAL EXAMINATION:     CRANIAL NERVES     CN III, IV, VI   Pupils are equal, round, and reactive to light.  Extraocular motions are normal.       Assessment/Plan:      Aleta Herrera is a 62 y.o. female admitted to inpatient rehabilitation on 8/7/2017 for Amputation of leg with impaired mobility and ADLs. Patient remains appropriate for PT, OT, and as required Speech therapy. Patient continues to  "require 24 hour nursing care as well as daily Physician assessment.    * Amputation of leg    s/p L AKA on 7/31 2/2 gangrenous unsalvageable L foot   8/10 Has Vascular F/U today - Recs included " 1. Remove left  AKA staples on 8/21/17 at Rehab Facility.  F/U in 4 months with HD access US "    Patient will be going home w family, tentative DC date 8/22    Pt's therapy sessions remain the same. She exhibits participation & motivation, but has not demonstrated much progression. Remains at Max A for transfers-will require 24 hr support at home, and as such, would benefit from repeat family therapy sessions. Last family therapy session 08/16.    Pain controlled with oxy 5mg PRN & tylenol 650 mg PRN  -Required 3x doses oxy yesterday, no tylenol.    Functional status:  -memory Mod A, prob solving Mod A, expression/Comp Mod I   -Bed mobility SBA/Mod w   -Sit to stand Min A   -Transfers- Max A   -UBD Suzie  -Bathing Mod A   -WC mobility-SBA          ESRD (end stage renal disease)    ESRD, home HD MWF and L AVG placed 4/2017  -HD planned for today, cont to monitor   -H/H 8/17: 6.7/23.7 (down from 7.3/25.4)  -asymptomatic   -will plan to transfuse 2U RBC today at dialysis.  -receives Epo at dialysis  -other labs: Cr 2.7 (decreased from 3.3); K WNL.            Type 2 diabetes mellitus with left diabetic foot ulcer    8/9 PO intake aroudn 50%. Will hold Levemir 4u BID, and monitor trend.     8/14 -157,  cont to hold levemir  8/15 , restart levemir 4U HS  8/16 -145, con't to monitor on levemir 4U HS  8/17 -214, con't to monitor on levemir 4U HS; considered BD but will remain HS  8/18 , con't to monitor on levemir 4U HS        Hypertension, renal    -VSS, cont w current mgmt     Vitals:    08/16/17 2100 08/17/17 0648 08/17/17 1946 08/18/17 0653   BP: (!) 106/58 (!) 115/58 (!) 114/58 (!) 117/58   BP Location: Left arm Right arm Right arm Left arm   Patient Position: Lying Lying Lying Lying   Pulse: 96 " 92 92 87   Resp: 18 18 18 18   Temp: 98.8 °F (37.1 °C) 98.7 °F (37.1 °C) 98.8 °F (37.1 °C) 98.3 °F (36.8 °C)   TempSrc: Oral Oral Oral Oral   SpO2:  (!) 91%  (!) 91%   Weight:       Height:                 Pressure ulcer of left buttock    - regular wound care, low air los mattress         Stage III pressure ulcer of right buttock    - regular wound care, low air los mattress         Impaired mobility and ADLs    Other Rehab Plan/Continue to monitor:   Nutrition/Swallow Status:    - Prealbumin 08/10: 11, cont with supplement    - Nutritionist on board   Bladder Management: anuric (ESRD)  Bowel Management:  continent  - scheduled polyethylete glycol and docusate  - Colace BID and Suppository PRN   - Will monitor for regularity   Mood: stable   Sleep: monitor; Will consider sleep aid as clinically indicated   Skin: Monitor   DVT ppx: heparin              DISCHARGE PLANNING:  Tentative Discharge Date: 8/22/2017    Future Appointments  Date Time Provider Department Center   9/6/2017 12:00 PM VASCULAR, LAB [facility] MARCIO Sanders   9/6/2017 12:30 PM VASCULAR, LAB [facility] MARCIO Sanders   9/6/2017 1:00 PM Nathalie Madera MD [facility] ANAIS Sanders   9/12/2017 9:40 AM Radha Cortez MD [facility] PCGABRIEL Weaver MD  Department of Physical Medicine & Rehab   [facility]

## 2017-08-18 NOTE — ASSESSMENT & PLAN NOTE
8/9 PO intake aroudn 50%. Will hold Levemir 4u BID, and monitor trend.     8/14 -157,  cont to hold levemir  8/15 , restart levemir 4U HS  8/16 -145, con't to monitor on levemir 4U HS  8/17 -214, con't to monitor on levemir 4U HS; considered BD but will remain HS  8/18 , con't to monitor on levemir 4U HS

## 2017-08-18 NOTE — ASSESSMENT & PLAN NOTE
-VSS, cont w current mgmt     Vitals:    08/16/17 2100 08/17/17 0648 08/17/17 1946 08/18/17 0653   BP: (!) 106/58 (!) 115/58 (!) 114/58 (!) 117/58   BP Location: Left arm Right arm Right arm Left arm   Patient Position: Lying Lying Lying Lying   Pulse: 96 92 92 87   Resp: 18 18 18 18   Temp: 98.8 °F (37.1 °C) 98.7 °F (37.1 °C) 98.8 °F (37.1 °C) 98.3 °F (36.8 °C)   TempSrc: Oral Oral Oral Oral   SpO2:  (!) 91%  (!) 91%   Weight:       Height:

## 2017-08-18 NOTE — ASSESSMENT & PLAN NOTE
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 Remove left  AKA staples on 8/21/17 at Rehab Facility.  F/U in 4 months with HD access US "    Patient will be going home w family, tentative DC date 8/22    Pt's therapy sessions remain the same. She exhibits participation & motivation, but has not demonstrated much progression. Remains at Max A for transfers-will require 24 hr support at home, and as such, would benefit from repeat family therapy sessions. Last family therapy session 08/16.    Pain controlled with oxy 5mg PRN & tylenol 650 mg PRN  -Required 3x doses oxy yesterday, no tylenol.    Functional status:  -memory Mod A, prob solving Mod A, expression/Comp Mod I   -Bed mobility SBA/Mod w   -Sit to stand Min A   -Transfers- Max A   -UBD Suzie  -Bathing Mod A   -WC mobility-SBA    "

## 2017-08-19 LAB
ANION GAP SERPL CALC-SCNC: 7 MMOL/L
BASOPHILS # BLD AUTO: 0.02 K/UL
BASOPHILS NFR BLD: 0.2 %
BUN SERPL-MCNC: 17 MG/DL
CALCIUM SERPL-MCNC: 8.6 MG/DL
CHLORIDE SERPL-SCNC: 103 MMOL/L
CO2 SERPL-SCNC: 29 MMOL/L
CREAT SERPL-MCNC: 2.4 MG/DL
DIFFERENTIAL METHOD: ABNORMAL
EOSINOPHIL # BLD AUTO: 0.5 K/UL
EOSINOPHIL NFR BLD: 3.6 %
ERYTHROCYTE [DISTWIDTH] IN BLOOD BY AUTOMATED COUNT: 19.1 %
EST. GFR  (AFRICAN AMERICAN): 24.4 ML/MIN/1.73 M^2
EST. GFR  (NON AFRICAN AMERICAN): 21.2 ML/MIN/1.73 M^2
GLUCOSE SERPL-MCNC: 131 MG/DL
HCT VFR BLD AUTO: 30.5 %
HGB BLD-MCNC: 9.5 G/DL
LYMPHOCYTES # BLD AUTO: 1 K/UL
LYMPHOCYTES NFR BLD: 7.6 %
MCH RBC QN AUTO: 27.8 PG
MCHC RBC AUTO-ENTMCNC: 31.1 G/DL
MCV RBC AUTO: 89 FL
MONOCYTES # BLD AUTO: 0.7 K/UL
MONOCYTES NFR BLD: 5.7 %
NEUTROPHILS # BLD AUTO: 10.7 K/UL
NEUTROPHILS NFR BLD: 82.9 %
PLATELET # BLD AUTO: 272 K/UL
PMV BLD AUTO: 10.7 FL
POCT GLUCOSE: 161 MG/DL (ref 70–110)
POCT GLUCOSE: 176 MG/DL (ref 70–110)
POCT GLUCOSE: 182 MG/DL (ref 70–110)
POCT GLUCOSE: 284 MG/DL (ref 70–110)
POTASSIUM SERPL-SCNC: 4.2 MMOL/L
RBC # BLD AUTO: 3.42 M/UL
SODIUM SERPL-SCNC: 139 MMOL/L
WBC # BLD AUTO: 12.95 K/UL

## 2017-08-19 PROCEDURE — 12800000 HC REHAB SEMI-PRIVATE ROOM

## 2017-08-19 PROCEDURE — 80048 BASIC METABOLIC PNL TOTAL CA: CPT

## 2017-08-19 PROCEDURE — 25000003 PHARM REV CODE 250: Performed by: STUDENT IN AN ORGANIZED HEALTH CARE EDUCATION/TRAINING PROGRAM

## 2017-08-19 PROCEDURE — 63600175 PHARM REV CODE 636 W HCPCS: Performed by: STUDENT IN AN ORGANIZED HEALTH CARE EDUCATION/TRAINING PROGRAM

## 2017-08-19 PROCEDURE — 85025 COMPLETE CBC W/AUTO DIFF WBC: CPT

## 2017-08-19 PROCEDURE — 27000221 HC OXYGEN, UP TO 24 HOURS

## 2017-08-19 PROCEDURE — 25000003 PHARM REV CODE 250: Performed by: PHYSICAL MEDICINE & REHABILITATION

## 2017-08-19 PROCEDURE — 99232 SBSQ HOSP IP/OBS MODERATE 35: CPT | Mod: ,,, | Performed by: PHYSICAL MEDICINE & REHABILITATION

## 2017-08-19 RX ADMIN — INSULIN ASPART 3 UNITS: 100 INJECTION, SOLUTION INTRAVENOUS; SUBCUTANEOUS at 05:08

## 2017-08-19 RX ADMIN — HEPARIN SODIUM 5000 UNITS: 5000 INJECTION, SOLUTION INTRAVENOUS; SUBCUTANEOUS at 01:08

## 2017-08-19 RX ADMIN — HEPARIN SODIUM 5000 UNITS: 5000 INJECTION, SOLUTION INTRAVENOUS; SUBCUTANEOUS at 09:08

## 2017-08-19 RX ADMIN — CALCITRIOL 0.5 MCG: 0.5 CAPSULE, LIQUID FILLED ORAL at 08:08

## 2017-08-19 RX ADMIN — PANTOPRAZOLE SODIUM 40 MG: 40 TABLET, DELAYED RELEASE ORAL at 08:08

## 2017-08-19 RX ADMIN — ASPIRIN 81 MG CHEWABLE TABLET 81 MG: 81 TABLET CHEWABLE at 08:08

## 2017-08-19 RX ADMIN — OXYCODONE HYDROCHLORIDE 5 MG: 5 TABLET ORAL at 05:08

## 2017-08-19 RX ADMIN — SEVELAMER CARBONATE 800 MG: 800 TABLET, FILM COATED ORAL at 08:08

## 2017-08-19 RX ADMIN — SEVELAMER CARBONATE 2400 MG: 800 TABLET, FILM COATED ORAL at 01:08

## 2017-08-19 RX ADMIN — SEVELAMER CARBONATE 2400 MG: 800 TABLET, FILM COATED ORAL at 05:08

## 2017-08-19 RX ADMIN — PREGABALIN 75 MG: 75 CAPSULE ORAL at 09:08

## 2017-08-19 RX ADMIN — AMLODIPINE BESYLATE 10 MG: 10 TABLET ORAL at 08:08

## 2017-08-19 RX ADMIN — HEPARIN SODIUM 5000 UNITS: 5000 INJECTION, SOLUTION INTRAVENOUS; SUBCUTANEOUS at 05:08

## 2017-08-19 NOTE — PROGRESS NOTES
Patient arrived on unit at this time via wheelchair, A & O x 3, dinner heated and medications given. Will continue to monitor.

## 2017-08-19 NOTE — PLAN OF CARE
Problem: Patient Care Overview  Goal: Plan of Care Review  Outcome: Ongoing (interventions implemented as appropriate)  AAOx4. Patient is pleasant and cooperative. Fall precautions in place for safety. Skin integrity managed with barrier cream and frequent turning, tolerates well. Will continue to monitor for safety and assessment.

## 2017-08-20 LAB
POCT GLUCOSE: 153 MG/DL (ref 70–110)
POCT GLUCOSE: 162 MG/DL (ref 70–110)
POCT GLUCOSE: 163 MG/DL (ref 70–110)

## 2017-08-20 PROCEDURE — 25000003 PHARM REV CODE 250: Performed by: PHYSICAL MEDICINE & REHABILITATION

## 2017-08-20 PROCEDURE — 27000221 HC OXYGEN, UP TO 24 HOURS

## 2017-08-20 PROCEDURE — 12800000 HC REHAB SEMI-PRIVATE ROOM

## 2017-08-20 PROCEDURE — 63600175 PHARM REV CODE 636 W HCPCS: Performed by: STUDENT IN AN ORGANIZED HEALTH CARE EDUCATION/TRAINING PROGRAM

## 2017-08-20 PROCEDURE — 25000003 PHARM REV CODE 250: Performed by: STUDENT IN AN ORGANIZED HEALTH CARE EDUCATION/TRAINING PROGRAM

## 2017-08-20 PROCEDURE — 99232 SBSQ HOSP IP/OBS MODERATE 35: CPT | Mod: ,,, | Performed by: PHYSICAL MEDICINE & REHABILITATION

## 2017-08-20 RX ADMIN — PANTOPRAZOLE SODIUM 40 MG: 40 TABLET, DELAYED RELEASE ORAL at 07:08

## 2017-08-20 RX ADMIN — SEVELAMER CARBONATE 2400 MG: 800 TABLET, FILM COATED ORAL at 11:08

## 2017-08-20 RX ADMIN — HEPARIN SODIUM 5000 UNITS: 5000 INJECTION, SOLUTION INTRAVENOUS; SUBCUTANEOUS at 02:08

## 2017-08-20 RX ADMIN — OXYCODONE HYDROCHLORIDE 5 MG: 5 TABLET ORAL at 05:08

## 2017-08-20 RX ADMIN — SEVELAMER CARBONATE 2400 MG: 800 TABLET, FILM COATED ORAL at 05:08

## 2017-08-20 RX ADMIN — ASPIRIN 81 MG CHEWABLE TABLET 81 MG: 81 TABLET CHEWABLE at 07:08

## 2017-08-20 RX ADMIN — PREGABALIN 75 MG: 75 CAPSULE ORAL at 08:08

## 2017-08-20 RX ADMIN — CALCITRIOL 0.5 MCG: 0.5 CAPSULE, LIQUID FILLED ORAL at 07:08

## 2017-08-20 RX ADMIN — SEVELAMER CARBONATE 2400 MG: 800 TABLET, FILM COATED ORAL at 07:08

## 2017-08-20 RX ADMIN — OXYCODONE HYDROCHLORIDE 5 MG: 5 TABLET ORAL at 01:08

## 2017-08-20 RX ADMIN — AMLODIPINE BESYLATE 10 MG: 10 TABLET ORAL at 07:08

## 2017-08-20 RX ADMIN — HEPARIN SODIUM 5000 UNITS: 5000 INJECTION, SOLUTION INTRAVENOUS; SUBCUTANEOUS at 10:08

## 2017-08-20 RX ADMIN — HEPARIN SODIUM 5000 UNITS: 5000 INJECTION, SOLUTION INTRAVENOUS; SUBCUTANEOUS at 05:08

## 2017-08-20 NOTE — PROGRESS NOTES
[facility]  Physical Medicine & Rehab  Progress Note    Patient Name: Aleta Herrera  MRN: [id number]  Patient Class: IP- Rehab   Admission Date: 8/7/2017  Length of Stay: 12 days  Attending Physician: Amirah Cano MD  Primary Care Provider: Radha Lao MD    Subjective:     Principal Problem:Amputation of leg    Interval History:   Patient c/o phantom pain that is present day/night.  MSK pain is controlled but tingling and itching of residual   On Lyrica, 75 mg at bedtime.  Reports regular BM.  She is ESRD pt, on HD on [facility].    Hb 6.7 today, denies light-headedness, chest pain, dyspnea.    Scheduled Medications:    amlodipine  10 mg Oral Daily    aspirin  81 mg Oral Daily    calcitRIOL  0.5 mcg Oral Daily    docusate sodium  100 mg Oral BID    heparin (porcine)  5,000 Units Subcutaneous Q8H    insulin detemir  4 Units Subcutaneous QHS    pantoprazole  40 mg Oral Daily    polyethylene glycol  17 g Oral Daily    pregabalin  75 mg Oral QHS    sevelamer carbonate  2,400 mg Oral TID WM       PRN Medications: acetaminophen, bisacodyl, calcium carbonate, dextrose 50%, dextrose 50%, glucagon (human recombinant), glucose, glucose, heparin (porcine), insulin aspart, methocarbamol, ondansetron, ondansetron, oxycodone, pneumoc 13-pramod conj-dip cr(PF), senna-docusate 8.6-50 mg    Review of Systems   Constitutional: Negative for chills and fever.   Eyes: Negative for visual disturbance.   Respiratory: Negative for cough and shortness of breath.    Cardiovascular: Negative for chest pain, palpitations and leg swelling.   Gastrointestinal: Negative for abdominal pain, blood in stool, constipation, diarrhea, nausea and vomiting.   Genitourinary: Negative for flank pain.   Musculoskeletal: Negative for back pain.   Skin: Positive for wound on buttock.  Neurological: Positive for weakness.   Negative for dizziness and headaches.   Hematological:        Objective:     Vital Signs (Most  Recent):  Temp: 98.7 °F (37.1 °C) (08/17/17 0648)  Pulse: 92 (08/17/17 0648)  Resp: 18 (08/17/17 0648)  BP: (!) 115/58 (08/17/17 0648)  SpO2: (!) 91 % (08/17/17 0648)    Vital Signs (24h Range):  Temp:  [98.7 °F (37.1 °C)-98.8 °F (37.1 °C)] 98.7 °F (37.1 °C)  Pulse:  [92-96] 92  Resp:  [18] 18  SpO2:  [91 %] 91 %  BP: (106-115)/(58) 115/58     Physical Exam   Constitutional: She appears well-developed and well-nourished.   HENT:   Head: Normocephalic and atraumatic.   Eyes: EOM are normal. Pupils are equal, round, and reactive to light.   Neck: Normal range of motion. Neck supple.   Cardiovascular: Normal rate, regular rhythm, normal heart sounds and intact distal pulses.    Pulmonary/Chest: Effort normal and breath sounds normal.   Abdominal: Soft. Bowel sounds are normal. There is no tenderness.   Musculoskeletal: She exhibits no edema or tenderness.   UE: RUE: SA 4/5, EF/EE 3/5,  4/5  LUE: SA 4/5, EF/EE 4/5, 3/5,  4/5  RLE HF 3/5, KE 4/5, DF/PF 4/5  AKA site C/D, closed w staples        Neurological: She is alert.   Followed commands  Memory: 3/3 immediate, 1/3 delayed     Skin: Skin is warm and dry.   Psychiatric: She has a normal mood and affect. Her behavior is normal.   NEUROLOGICAL EXAMINATION:   CRANIAL NERVES   CN III, IV, VI   Pupils are equal, round, and reactive to light.  Extraocular motions are normal.       Assessment/Plan:      Aleta Herrera is a 62 y.o. female admitted to inpatient rehabilitation on 8/7/2017 for Amputation of leg with impaired mobility and ADLs. Patient remains appropriate for PT, OT, and as required Speech therapy. Patient continues to require 24 hour nursing care as well as daily Physician assessment.  Today, she c/o phantom pain, states that pain meds are helping.   Still c/o itching and shooting pain in residual limb ( feels as she still have a foot). Started on Lyrica 75 mg QHS.   On HD, cannot get higher dose. Might consider Elavil at night or  Cymbalta.    Impaired mobility and ADLs    Other Rehab Plan/Continue to monitor:   Nutrition/Swallow Status:    - Prealbumin 08/10: 11, cont with supplement    - Nutritionist on board   Bladder Management: anuric (ESRD)  Bowel Management:  continent  - scheduled polyethylete glycol and docusate  - Colace BID and Suppository PRN   - Will monitor for regularity   Mood: stable   Sleep: monitor; Will consider sleep aid as clinically indicated   Skin: Monitor   DVT ppx: heparin          Stage III pressure ulcer of right buttock    - regular wound care, low air los mattress         Pressure ulcer of left buttock    - regular wound care, low air los mattress         Type 2 diabetes mellitus with left diabetic foot ulcer    8/9 PO intake aroudn 50%. Will hold Levemir 4u BID, and monitor trend.     8/14 -157,  cont to hold levemir  8/15 , restart levemir 4U HS  8/16 -145, con't to monitor on levemir 4U HS  8/17 -214, con't to monitor on levemir 4U HS; considered BD but will remain HS  8/18 , con't to monitor on levemir 4U HS        ESRD (end stage renal disease)    ESRD, home HD MWF and L AVG placed 4/2017  -HD planned for today, cont to monitor   -H/H 8/17: 6.7/23.7 (down from 7.3/25.4)  -asymptomatic   -will plan to transfuse 2U RBC today at dialysis.  -receives Epo at dialysis  -other labs: Cr 2.7 (decreased from 3.3); K WNL.            Hypertension, renal    -VSS, cont w current mgmt     Vitals:    08/16/17 2100 08/17/17 0648 08/17/17 1946 08/18/17 0653   BP: (!) 106/58 (!) 115/58 (!) 114/58 (!) 117/58   BP Location: Left arm Right arm Right arm Left arm   Patient Position: Lying Lying Lying Lying   Pulse: 96 92 92 87   Resp: 18 18 18 18   Temp: 98.8 °F (37.1 °C) 98.7 °F (37.1 °C) 98.8 °F (37.1 °C) 98.3 °F (36.8 °C)   TempSrc: Oral Oral Oral Oral   SpO2:  (!) 91%  (!) 91%   Weight:       Height:                 * Amputation of leg    s/p L AKA on 7/31 2/2 gangrenous unsalvageable L foot  "  8/10 Has Vascular F/U today - Recs included " 1. Remove left  AKA staples on 8/21/17 at Rehab Facility.  F/U in 4 months with HD access US "    Patient will be going home w family, tentative DC date 8/22    Pt's therapy sessions remain the same. She exhibits participation & motivation, but has not demonstrated much progression. Remains at Max A for transfers-will require 24 hr support at home, and as such, would benefit from repeat family therapy sessions. Last family therapy session 08/16.    Pain controlled with oxy 5mg PRN & tylenol 650 mg PRN  -Required 3x doses oxy yesterday, no tylenol.    Functional status:  -memory Mod A, prob solving Mod A, expression/Comp Mod I   -Bed mobility SBA/Mod w   -Sit to stand Min A   -Transfers- Max A   -UBD Suzie  -Bathing Mod A   -WC mobility-SBA               Kary Gruber MD  Department of Physical Medicine & Rehab   [facility]  "

## 2017-08-21 LAB
POCT GLUCOSE: 119 MG/DL (ref 70–110)
POCT GLUCOSE: 136 MG/DL (ref 70–110)
POCT GLUCOSE: 153 MG/DL (ref 70–110)
POCT GLUCOSE: 210 MG/DL (ref 70–110)

## 2017-08-21 PROCEDURE — 97110 THERAPEUTIC EXERCISES: CPT

## 2017-08-21 PROCEDURE — 25000003 PHARM REV CODE 250: Performed by: PHYSICAL MEDICINE & REHABILITATION

## 2017-08-21 PROCEDURE — 97150 GROUP THERAPEUTIC PROCEDURES: CPT

## 2017-08-21 PROCEDURE — 12800000 HC REHAB SEMI-PRIVATE ROOM

## 2017-08-21 PROCEDURE — 97542 WHEELCHAIR MNGMENT TRAINING: CPT

## 2017-08-21 PROCEDURE — 25000003 PHARM REV CODE 250: Performed by: STUDENT IN AN ORGANIZED HEALTH CARE EDUCATION/TRAINING PROGRAM

## 2017-08-21 PROCEDURE — 97535 SELF CARE MNGMENT TRAINING: CPT

## 2017-08-21 PROCEDURE — 99233 SBSQ HOSP IP/OBS HIGH 50: CPT | Mod: ,,, | Performed by: PHYSICAL MEDICINE & REHABILITATION

## 2017-08-21 PROCEDURE — 97803 MED NUTRITION INDIV SUBSEQ: CPT

## 2017-08-21 PROCEDURE — 63600175 PHARM REV CODE 636 W HCPCS: Performed by: STUDENT IN AN ORGANIZED HEALTH CARE EDUCATION/TRAINING PROGRAM

## 2017-08-21 PROCEDURE — 27000221 HC OXYGEN, UP TO 24 HOURS

## 2017-08-21 PROCEDURE — 97530 THERAPEUTIC ACTIVITIES: CPT

## 2017-08-21 RX ORDER — PREGABALIN 50 MG/1
50 CAPSULE ORAL 2 TIMES DAILY
Status: DISCONTINUED | OUTPATIENT
Start: 2017-08-21 | End: 2017-08-24 | Stop reason: HOSPADM

## 2017-08-21 RX ADMIN — AMLODIPINE BESYLATE 10 MG: 10 TABLET ORAL at 07:08

## 2017-08-21 RX ADMIN — CALCITRIOL 0.5 MCG: 0.5 CAPSULE, LIQUID FILLED ORAL at 07:08

## 2017-08-21 RX ADMIN — SEVELAMER CARBONATE 2400 MG: 800 TABLET, FILM COATED ORAL at 07:08

## 2017-08-21 RX ADMIN — OXYCODONE HYDROCHLORIDE 5 MG: 5 TABLET ORAL at 01:08

## 2017-08-21 RX ADMIN — HEPARIN SODIUM 5000 UNITS: 5000 INJECTION, SOLUTION INTRAVENOUS; SUBCUTANEOUS at 09:08

## 2017-08-21 RX ADMIN — PANTOPRAZOLE SODIUM 40 MG: 40 TABLET, DELAYED RELEASE ORAL at 07:08

## 2017-08-21 RX ADMIN — PREGABALIN 50 MG: 50 CAPSULE ORAL at 09:08

## 2017-08-21 RX ADMIN — HEPARIN SODIUM 5000 UNITS: 5000 INJECTION, SOLUTION INTRAVENOUS; SUBCUTANEOUS at 05:08

## 2017-08-21 RX ADMIN — OXYCODONE HYDROCHLORIDE 5 MG: 5 TABLET ORAL at 09:08

## 2017-08-21 RX ADMIN — INSULIN ASPART 2 UNITS: 100 INJECTION, SOLUTION INTRAVENOUS; SUBCUTANEOUS at 12:08

## 2017-08-21 RX ADMIN — ASPIRIN 81 MG CHEWABLE TABLET 81 MG: 81 TABLET CHEWABLE at 07:08

## 2017-08-21 RX ADMIN — SEVELAMER CARBONATE 2400 MG: 800 TABLET, FILM COATED ORAL at 09:08

## 2017-08-21 RX ADMIN — SEVELAMER CARBONATE 2400 MG: 800 TABLET, FILM COATED ORAL at 12:08

## 2017-08-21 RX ADMIN — OXYCODONE HYDROCHLORIDE 5 MG: 5 TABLET ORAL at 05:08

## 2017-08-21 NOTE — PROGRESS NOTES
"Physical Therapy   Treatment    Aleta Herrera   MRN: [id number]      08/21/17 0856   PT Time Calculation   PT Start Time 0856   PT Stop Time 0943   PT Total Time (min) 47 min   Treatment   Treatment Type Treatment   PT/PTA PT   General   PT Received On 08/21/17   Family/Caregiver Present No   Patient Found (position) Supine in bed   Precautions   General Precautions fall   Orthopedic No   Required Braces or Orthoses No   Visual/Auditory Vision impaired   Subjective   Patient states "I had some pain earlier but it feels better they gave me some medicine"   Pain/Comfort   Pain Rating 1 no pain   Pain Addressed 1 Pre-medicate for activity   Pain Rating Post-Intervention 1 no pain   Bed Mobility   Bed Mobility yes   Supine to Sit Minimum Assistance;Moderate Assistance   Supine to Sit Comments x 1 trial in bed with use of bedrail and minimal assistance to elevate trunk to complete transfer, x 1 trial on mat with min.moderate assistance to elevate trunk and to appropriately complete log roll sequence    Sit to Supine Contact Guard Assistance  (x 1 trial on mat, increased time required )   Transfers   Transfer yes   Sit to Stand   Sit <> Stand Assistance Maximum Assistance;Moderate Assistance   Sit <> Stand Assistive Device Other (see comments)  (parallel bars)   Trials/Comments Pt performed x 3 trials, pt required physical assistance from PT to elevate hips out of wheelchair to complete transfer. Flexed forward posture observed.    Bed to Chair   Bed <> Chair Technique Slide Board   Bed <> Chair Assistance Maximum Assistance   Bed <> Chair Assistive Device Slide Board  (x 1 trial )   Chair to Mat   Chair<> Mat Technique Slide Board   Chair<>Mat Assistance Maximum Assistance   Chair <> Mat Assistive Device Slide Board   Trials/Comments x 1 trial, pt required maximum assistance to assist with bringing B hips across slide board to complete transfer. Verbal and tactile cues from PT required for pt to complete transfer " with forward flexion of trunk instead of strong posterior lean   Mat to Chair   Technique Slide Board   Assistance Maximum Assistance   Assistive Device Slide Board   Trials/Comments x 1 trial, similar to above transfer    Wheelchair Activities   Propulsion Yes   Propulsion Type 1 Manual   Level 1 Level tile   Method 1 Right upper extremity;Left upper extremity   Level of Assistance 1 Supervision   Description/ Details 1 Pt propelled self in wheelchair x 100 feet, increased time required to complete activity    Balance   Balance Yes   Static Standing Balance   Static Standing-Balance Support Right upper extremity supported;Left upper extremity supported   Static Standing-Level of Assistance Moderate assistance  (parallel bars)   Static Standing-Comment/# of Minutes Pt tolerated x 3 trials of static standinst trial: 1 min 14 seconds, 2nd trial: 1 min 8 seconds, 3rd trial: 58 seconds. Pt required seated rest break following each trial due to fatigue. Flexed forward posture observed   Supine   Supine-Exercises Lower extremity;Specific exercises   Supine-Exercise Type Ankle pumps;Glut sets;SLR;Short arc quads;ABD/ADD  (SAQ and AP to R LE )   Supine-Exercise Comments Pt performed 3 x 15 reps, pt performed SLR and Hip ABD to L residual limb only. Pt performed AP and SAQ to R LE  only    Activity Tolerance   Activity Tolerance Patient tolerated treatment well   After Treatment   Patient Position After Treatment Seated in wheelchair   Patient after treatment left with all lines intact;call button in reach  (oxygen connected to wall)   Assessment   Prognosis Fair   Problem List Decreased strength;Decreased range of motion;Decreased endurance;Impaired balance;Decreased mobility;Pain;Impaired vision;Decreased safety awareness   Session Assessment progressing toward goals   Assessment Pt continues to require maximum assistance to perform all slide board transfers into and out of wheelchair due to poor R LE strength and poor  B UE strength. Pt will require significant 24 hour assistance upon D/C for functional mobility and transfers. Pt will continue to benefit from further skilled PT Intervention in order to address these above impairments and to improve pt's functional independence with mobility.    Level of Motivation/Participation good   Barriers to Discharge Decreased caregiver support   Barriers to Discharge Comments Pt will require significant 24 hour care    Discharge Recommendations   Equipment Needed After Discharge wheelchair;slide board;bedside commode  (Kell?)   Discharge Facility/Level Of Care Needs home health PT   Plan   Planned Therapy Intervention Continue with current plan   Therapy Frequency daily;Monday-Friday;Saturday or Sunday   Physical Therapy Follow-up   PT Follow-up? Yes   PT - Next Visit Date 08/21/17   Treatment/Billable Minutes   Therapeutic Activity 22   Therapeutic Exercise 25   Total Time 47         Netta Barragan, PT  8/21/2017

## 2017-08-21 NOTE — ASSESSMENT & PLAN NOTE
8/9 PO intake aroudn 50%. Will hold Levemir 4u BID, and monitor trend.     8/14 -157,  cont to hold levemir  8/15 , restart levemir 4U HS  8/16 -145, con't to monitor on levemir 4U HS  8/17 -214, con't to monitor on levemir 4U HS; considered BD but will remain HS  8/18 , con't to monitor on levemir 4U HS  8/19 -284, con't to monitor on levemir 4U HS  8/20 -162, con't to monitor on levemir 4U HS  8/21 , con't to monitor on levemir 4U HS

## 2017-08-21 NOTE — PROGRESS NOTES
"Occupational Therapy  AM Treatment  Aleta Herrera   MRN: [id number]   Room/Bed: E263/E263 B       08/21/17 1014   OT Time Calculation   OT Start Time 1014   OT Stop Time 1103   OT Total Time (min) 49 min   General   OT Date of Treatment 08/21/17   Family/Caregiver Present No   Patient Found (position) Seated in wheelchair   Pt found with oxygen  (3L)   Precautions   General Precautions fall   Visual/Auditory Vision impaired  (low vision)   Subjective   Patient states "We have a [place] and a Pick-up truk   Pain/Comfort   Pain Rating no pain   Sit to Stand   Sit <> Stand Assistance Total Assistance   Sit <> Stand Assistive Device No Assistive Device   Trials/Comments from EOM   Stand to Sit   Assistance Total Assistance   Assistive Device No Assistive Device   Trials/Comments to EOM   Bed to Chair   Bed <> Chair Technique Slide Board   Bed <> Chair Transfer Assistance Maximum Assistance   Trials/Comments from wc<>EOM   UE Dressing   UE Dressing Level of Assistance Minimum assistance   UE Dressing Where Assessed Edge of bed   UE Dressing Comments for clothing management over head while doffing shirt   LE Dressing   LE Dressing  Level of Assistance Maximum assistance   LE Dressing Where Assessed Bed level   LE Dressing Comments assist with threading RLE into R pant leg and with clothing management over hips   Exercise Tools   Bilateral Manish 2# x 100 reps on incline   Dynamic Sitting Balance   Dynamic Sitting-Balance Support No upper extremity supported   Dynamic Sitting-Balance Reaching for objects;Reaching across midline   Dynamic Sitting-Comments SBA to complete UE dressing activity   Additional Activities   Additional Activities Comments Discussed discharge plan for getting to and from dialysis after pt explaining that her source of transportation is a large truck. Pt will have difficulty with transferring into a large trunk with a slideboard. OT communicated with LCSW to ask to provide pt with guidance " for setting up transport to and from dialysis post discharge.   Activity Tolerance   Activity Tolerance Patient tolerated treatment well   After Treatment   Patient Position After Treatment Seated in wheelchair   Patient after treatment left with all lines intact  (posey belt intact awaiting endurance group with HONEY)   Assessment   Prognosis Fair   Problem List Decreased Self Care skills;Decreased upper extremity range of motion;Decreased upper extremity strength;Decreased safe judgment during ADL;Decreased endurance;Visual deficit;Decreased functional mobility;Decreased IADLs;Decreased trunk control for functional activities   Assessment Pt participiated well in tx session but remains with decreased (I) with ADLs and functional mobility and transfers. Pt would benefit from wheelchair transport or handicap modulR service bus to get to dialysis. Pt would continue to benefit from skilled OT services.   Level of Motivation/Participation Good   Discharge Recommendations   Equipment Needed After Discharge wheelchair;slide board;bath bench;other (see comments)  (Kell nava)   Discharge Facility/Level Of Care Needs home with home health   Plan   Plan Continue with current plan   Therapy Frequency 2 times/day   Occupational Therapy Follow-up   OT Follow-up? Yes   Treatment/Billable Minutes   Self Care/Home Management 33   Therapeutic Exercise 16   Total Time 49       ROGELIO Bourne  8/21/2017    LEGEND:   CGA: Contact Guard Assist   EOB: Edgeof Bed   HHA: Hand Held Assist   HOB: Head of Bed   (I): Independent-patient performs task in a timely manner   Max (A): Maximal Assist-patient performs 25-49% of task   Min (A): Minimal Assist- patient performs 75% or more of task   Mod (A): Moderate Assist- patient performs 50-74% of task   NA: Not applicable   NT: Not tested   OOB: Out of Bed   PTA: Prior to admit   QC: Quad Cane   RW: Rolling Walker   (S): Supervision- patient requires cues, coaxing, prompting   SBA: Stand By  Assist   SC: Straight Cane   SW: Standard Walker   TBA: To be assessed   Total (A): Total Assist- patient performs less than 25% of task   WC: Wheelchair   WFL: Within Functional Limits   WNL: Within Normal Limits

## 2017-08-21 NOTE — PROGRESS NOTES
[facility]  Adult Nutrition  Progress Note    SUMMARY     Recommendations    Recommendation/Intervention: Continue current renal diabetic diet, Pt refuses oral supplement, RD to follow to encourage intake of HBV protein  Goals: PO to meet 85% of EEN and EPN by discharge  Nutrition Goal Status: progressing towards goal  Communication of RD Recs: discussed on rounds     Reason for Assessment  Reason for Assessment: RD follow-up  Diagnosis:  (sp AKA)  Relevent Medical History: HTN,HLD, DM2< ESRD on dialysis PVD, CAD ,recent infection leg   Interdisciplinary Rounds: did not attend     General Information Comments: PO %    Nutrition Discharge Planning: DC on Renal diet Diabetic diet 2000 calories    Nutrition Prescription Ordered    Current Diet Order: Diabetic 2200 renal  Nutrition Order Comments: pt refuses renal nutrition supplement, states she gets phosphorus and potassium mixed up         Oral Nutrition Supplement: beneprotein tid     Evaluation of Received Nutrients/Fluid Intake       Energy Calories Required: meeting needs     Protein Required: meeting needs      Fluid Required: meeting needs     Comments: PO %     Tolerance: tolerating     % Intake of Estimated Energy Needs: 75 - 100 %  % Meal Intake: 75%     Nutrition Risk Screen     Nutrition Risk Screen: no indicators present    Nutrition/Diet History    Patient Reported Diet/Restrictions/Preferences: diabetic diet, renal  Typical Food/Fluid Intake: eats regular meals, test glucose few times per week.  cooks  Food Preferences: No cultural or Yazidism food preferences, no coffe no tea no fish, no milk no Nepro                          Labs/Tests/Procedures/Meds       Pertinent Labs Reviewed: other (see comments)  Pertinent Labs Comments: , PO4 8.6  Pertinent Medications Reviewed: reviewed, pertinent  Pertinent Medications Comments: insulin, renvela    Physical Findings    Overall Physical Appearance: weak,  "nourished  Tubes:  (-)  Oral/Mouth Cavity: WDL  Skin: incision    Anthropometrics     Height: 4' 11.02" (149.9 cm)  Weight Method: Bed Scale  Weight:  (weight stable)     Ideal Body Weight (IBW), Female: 95.1 lb     % Ideal Body Weight, Female (lb): 114.52 lb  BMI (Calculated): 22        Total Amputation %: 5.9  Amputee BMI (kg/m2): 23.44 kg/m2     Estimated/Assessed Needs    Weight Used For Calorie Calculations: 49.4 kg (108 lb 14.5 oz) (after amputation still weight decrepancy)   Height (cm): 149.9 cm  Energy Calorie Requirements (kcal): 2421-9549 (30-35 kg/kcal)  Energy Need Method: Kcal/kg      Weight Used For Protein Calculations: 49.4 kg (108 lb 14.5 oz)  Protein Requirements: 60-70 g/d    Fluid Requirements (mL): 1200 or per MD        CHO Requirement: 50% of calories     Assessment and Plan    Nutrition Diagnosis  Inadequate protein intake related to increased needs for wound healing and po intake of 50-75% and refusal of oral nutrition renal supplements to help meet those unmet needs.    Monitor and Evaluation  Food and Nutrient Intake: energy intake  Food and Nutrient Adminstration: diet order  Knowledge/Beliefs/Attitudes: food and nutrition knowledge/skill  Physical Activity and Function: nutrition-related ADLs and IADLs  Anthropometric Measurements: weight change  Biochemical Data, Medical Tests and Procedures: electrolyte and renal panel, glucose/endocrine profile  Nutrition-Focused Physical Findings: overall appearance  Nutrition Risk  Level of Risk:  (follow weekly)  Nutrition Follow-Up  RD Follow-up?: Yes  "

## 2017-08-21 NOTE — SUBJECTIVE & OBJECTIVE
Interval History: No new complaints. Patient reports that she slept very well over the weekend. She con't to have phantom pain, but it is overall unchanged from the past. Rated 7/10 at its worse; comes spontaneously at least daily. Overall requiring 1-2 doses oxy 5mg PRN. Hb 9.5 s/p 2U RBC transfusion 3 days ago at dialysis; con't to deny light-headedness, chest pain, dyspnea. Post-op site healing well without further bleeding in 7 days, denies other signs of bleeding. She notes a strong appetite, suggesting improved PO intake. Bowel movement yesterday.    Scheduled Medications:    amlodipine  10 mg Oral Daily    aspirin  81 mg Oral Daily    calcitRIOL  0.5 mcg Oral Daily    docusate sodium  100 mg Oral BID    heparin (porcine)  5,000 Units Subcutaneous Q8H    insulin detemir  4 Units Subcutaneous QHS    pantoprazole  40 mg Oral Daily    polyethylene glycol  17 g Oral Daily    pregabalin  75 mg Oral QHS    sevelamer carbonate  2,400 mg Oral TID WM       PRN Medications: acetaminophen, bisacodyl, calcium carbonate, dextrose 50%, dextrose 50%, glucagon (human recombinant), glucose, glucose, heparin (porcine), insulin aspart, methocarbamol, ondansetron, ondansetron, oxycodone, pneumoc 13-pramod conj-dip cr(PF), senna-docusate 8.6-50 mg    Review of Systems   Constitutional: Negative for chills and fever.   Eyes: Negative for visual disturbance.   Respiratory: Negative for cough and shortness of breath.    Cardiovascular: Negative for chest pain, palpitations and leg swelling.   Gastrointestinal: Negative for abdominal pain, blood in stool, constipation, diarrhea, nausea and vomiting.   Genitourinary: Negative for flank pain.   Musculoskeletal: Negative for back pain.   Skin: Positive for wound (2 wounds on buttocks).   Neurological: Positive for weakness. Negative for dizziness and headaches.   Hematological:        Bleeding from left leg surgical site; controlled     Objective:     Vital Signs (Most  Recent):  Temp: 98.4 °F (36.9 °C) (08/21/17 0651)  Pulse: 85 (08/21/17 0651)  Resp: 18 (08/21/17 0651)  BP: 136/64 (08/21/17 0651)  SpO2: (!) 92 % (08/21/17 0651)    Vital Signs (24h Range):  Temp:  [98.2 °F (36.8 °C)-98.4 °F (36.9 °C)] 98.4 °F (36.9 °C)  Pulse:  [85-95] 85  Resp:  [17-18] 18  SpO2:  [92 %-93 %] 92 %  BP: (120-136)/(58-64) 136/64     Physical Exam   Constitutional: She appears well-developed and well-nourished.   HENT:   Head: Normocephalic and atraumatic.   Eyes: EOM are normal. Pupils are equal, round, and reactive to light.   Neck: Normal range of motion. Neck supple.   Cardiovascular: Normal rate, regular rhythm, normal heart sounds and intact distal pulses.    Pulmonary/Chest: Effort normal and breath sounds normal.   Abdominal: Soft. Bowel sounds are normal. There is no tenderness.   Musculoskeletal: She exhibits no edema or tenderness.   UE: RUE: SA 4/5, EF/EE 3/5,  4/5  LUE: SA 4/5, EF/EE 4/5, 3/5,  4/5  RLE HF 3/5, KE 4/5, DF/PF 4/5  AKA site C/D, closed w staples        Neurological: She is alert.   Followed commands  Memory: 3/3 immediate, 1/3 delayed     Skin: Skin is warm and dry.   Psychiatric: She has a normal mood and affect. Her behavior is normal.     NEUROLOGICAL EXAMINATION:     CRANIAL NERVES     CN III, IV, VI   Pupils are equal, round, and reactive to light.  Extraocular motions are normal.

## 2017-08-21 NOTE — ASSESSMENT & PLAN NOTE
ESRD, home HD MWF and L AVG placed 4/2017  -HD planned for today, cont to monitor   -H/H 8/21: 9.5/30.5 (improved from 6.7/23.7)  -asymptomatic   -2U RBC 8/18 at dialysis.  -receives Epo at dialysis  -other labs: Cr 2.4 (decreased from 2.7); K 4.2, WNL.

## 2017-08-21 NOTE — PROGRESS NOTES
Physical Therapy   Daily Physical Therapy FIM Scores    Aleta Alatorrerichardson   MRN: [id number]        08/21/17 1200   Transfers   Bed/Chair/WC 2   Locomotion   Distance Wheelchair 2   Wheelchair 2   Mode C     Netta Barragan, PT  8/21/2017

## 2017-08-21 NOTE — PROGRESS NOTES
[facility]  Physical Medicine & Rehab  Progress Note    Patient Name: Aleta Herrera  MRN: [id number]  Patient Class: IP- Rehab   Admission Date: 8/7/2017  Length of Stay: 14 days  Attending Physician: Amirah Cano MD  Primary Care Provider: Radha Lao MD    Subjective:     Principal Problem:Amputation of leg    Interval History: No new complaints. Patient reports that she slept very well over the weekend. She con't to have phantom pain, but it is overall unchanged from the past. Rated 7/10 at its worse; comes spontaneously at least daily. Over the weekend, required 1-2 doses oxy 5mg PRN. Hb 9.5 s/p 2U RBC transfusion 3 days ago at dialysis; con't to deny light-headedness, chest pain, dyspnea. Post-op site healing well without further bleeding in 7 days, denies other signs of bleeding. She notes a strong appetite, suggesting improved PO intake. Bowel movement yesterday.    Scheduled Medications:    amlodipine  10 mg Oral Daily    aspirin  81 mg Oral Daily    calcitRIOL  0.5 mcg Oral Daily    docusate sodium  100 mg Oral BID    heparin (porcine)  5,000 Units Subcutaneous Q8H    insulin detemir  4 Units Subcutaneous QHS    pantoprazole  40 mg Oral Daily    polyethylene glycol  17 g Oral Daily    pregabalin  75 mg Oral QHS    sevelamer carbonate  2,400 mg Oral TID WM       PRN Medications: acetaminophen, bisacodyl, calcium carbonate, dextrose 50%, dextrose 50%, glucagon (human recombinant), glucose, glucose, heparin (porcine), insulin aspart, methocarbamol, ondansetron, ondansetron, oxycodone, pneumoc 13-pramod conj-dip cr(PF), senna-docusate 8.6-50 mg    Review of Systems   Constitutional: Negative for chills and fever.   Eyes: Negative for visual disturbance.   Respiratory: Negative for cough and shortness of breath.    Cardiovascular: Negative for chest pain, palpitations and leg swelling.   Gastrointestinal: Negative for abdominal pain, blood in stool, constipation,  diarrhea, nausea and vomiting.   Genitourinary: Negative for flank pain.   Musculoskeletal: Negative for back pain.   Skin: Positive for wound (2 wounds on buttocks).   Neurological: Positive for weakness. Negative for dizziness and headaches.   Hematological:        Bleeding from left leg surgical site; controlled     Objective:     Vital Signs (Most Recent):  Temp: 98.4 °F (36.9 °C) (08/21/17 0651)  Pulse: 85 (08/21/17 0651)  Resp: 18 (08/21/17 0651)  BP: 136/64 (08/21/17 0651)  SpO2: (!) 92 % (08/21/17 0651)    Vital Signs (24h Range):  Temp:  [98.2 °F (36.8 °C)-98.4 °F (36.9 °C)] 98.4 °F (36.9 °C)  Pulse:  [85-95] 85  Resp:  [17-18] 18  SpO2:  [92 %-93 %] 92 %  BP: (120-136)/(58-64) 136/64     Physical Exam   Constitutional: She appears well-developed and well-nourished.   HENT:   Head: Normocephalic and atraumatic.   Eyes: EOM are normal. Pupils are equal, round, and reactive to light.   Neck: Normal range of motion. Neck supple.   Cardiovascular: Normal rate, regular rhythm, normal heart sounds and intact distal pulses.    Pulmonary/Chest: Effort normal and breath sounds normal.   Abdominal: Soft. Bowel sounds are normal. There is no tenderness.   Musculoskeletal: She exhibits no edema or tenderness.   UE: RUE: SA 4/5, EF/EE 3/5,  4/5  LUE: SA 4/5, EF/EE 4/5, 3/5,  4/5  RLE HF 3/5, KE 4/5, DF/PF 4/5  AKA site C/D, closed w staples        Neurological: She is alert.   Followed commands  Memory: 3/3 immediate, 1/3 delayed     Skin: Skin is warm and dry.   Psychiatric: She has a normal mood and affect. Her behavior is normal.     NEUROLOGICAL EXAMINATION:     CRANIAL NERVES     CN III, IV, VI   Pupils are equal, round, and reactive to light.  Extraocular motions are normal.       Assessment/Plan:      Aleta Desvignes Palao is a 62 y.o. female admitted to inpatient rehabilitation on 8/7/2017 for Amputation of leg with impaired mobility and ADLs. Patient remains appropriate for PT, OT, and as required  "Speech therapy. Patient continues to require 24 hour nursing care as well as daily Physician assessment.    * Amputation of leg    s/p L AKA on 7/31 2/2 gangrenous unsalvageable L foot   8/10 Has Vascular F/U today - Recs included " 1. Remove left  AKA staples on 8/21/17 at Rehab Facility.  F/U in 4 months with HD access US "    Patient will be going home w family, tentative DC date 8/24.     Pt's therapy sessions remain the same. She exhibits participation & motivation, but has not demonstrated much progression. Remains at Max A for transfers-will require 24 hr support at home, and as such, would benefit from repeat family therapy sessions. Last family therapy session 08/16.    Pain regimen: oxy 5mg PRN & tylenol 650 mg PRN; lyrica 75mg QD  -Requires 1-2 doses oxy daily, no tylenol.  -8/21: will increase lyrica to 50 mg BD (CrCl 19)    Functional status:  -memory Mod A, prob solving Mod A, expression/Comp Mod I   -Bed mobility SBA/Mod w   -Sit to stand Min A   -Transfers- Max A   -UBD Suzie  -Bathing Mod A   -WC mobility-SBA          ESRD (end stage renal disease)    ESRD, home HD MWF and L AVG placed 4/2017  -HD planned for today, cont to monitor   -H/H 8/21: 9.5/30.5 (improved from 6.7/23.7)  -asymptomatic   -2U RBC 8/18 at dialysis.  -receives Epo at dialysis  -other labs: Cr 2.4 (decreased from 2.7); K 4.2, WNL.            Type 2 diabetes mellitus with left diabetic foot ulcer    8/9 PO intake aroudn 50%. Will hold Levemir 4u BID, and monitor trend.     8/14 -157,  cont to hold levemir  8/15 , restart levemir 4U HS  8/16 -145, con't to monitor on levemir 4U HS  8/17 -214, con't to monitor on levemir 4U HS; considered BD but will remain HS  8/18 , con't to monitor on levemir 4U HS  8/19 -284, con't to monitor on levemir 4U HS  8/20 -162, con't to monitor on levemir 4U HS  8/21 , con't to monitor on levemir 4U HS           Hypertension, renal    -VSS, cont w current " mgmt     Vitals:    08/20/17 0621 08/20/17 2125 08/21/17 0651 08/21/17 1200   BP: 138/67 (!) 120/58 136/64    BP Location: Left arm Left arm Right arm    Patient Position: Lying Lying Lying    Pulse: 84 95 85    Resp: 16 17 18    Temp: 98 °F (36.7 °C) 98.2 °F (36.8 °C) 98.4 °F (36.9 °C)    TempSrc: Oral Oral Oral    SpO2: (!) 93% (!) 93% (!) 92%    Weight:    Comment: weight stable   Height:                 Pressure ulcer of left buttock    - regular wound care, low air los mattress, frequent turnover        Stage III pressure ulcer of right buttock    - regular wound care, low air los mattress, frequent turnover        Impaired mobility and ADLs    Other Rehab Plan/Continue to monitor:   Nutrition/Swallow Status:    - Prealbumin 08/10: 11, cont with supplement    - Nutritionist on board   Bladder Management: anuric (ESRD)  Bowel Management:  Incontinent  -bowel program ordered 8/18  - scheduled polyethylete glycol and docusate  - Colace BID and Suppository PRN   - Will monitor for regularity   Mood: stable   Sleep: monitor; Will consider sleep aid as clinically indicated   Skin: Monitor   DVT ppx: heparin              DISCHARGE PLANNING:  Tentative Discharge Date: 8/24/2017    Future Appointments  Date Time Provider Department Center   9/6/2017 12:00 PM VASCULAR, LAB [facility] MARCIO Sanders   9/6/2017 12:30 PM VASCULAR, LAB [facility] MARCIO Sanders   9/6/2017 1:00 PM Nathalie Madera MD [facility] ANAIS Sanders   9/12/2017 9:40 AM Radha Cortez MD [facility] James Sanders PCGABRIEL Weaver MD  Department of Physical Medicine & Rehab   [facility]

## 2017-08-21 NOTE — PROGRESS NOTES
Occupational Therapy  AM Group Session  Aleta Herrera   MRN: [id number]          08/21/17 1115   OT Time Calculation   OT Start Time 1115   OT Stop Time 1200   OT Total Time (min) 45 min   General   OT Date of Treatment 08/21/17   Treatment/Billable Minutes   Therapeutic Group 45   Total Time 45   Patient participated in 45 minute seated high endurance group. The group activity challenged bilateral upper extremity and lower extremity strengthening. In addition, cardiovascular and functional endurance were challenged during this group. Pt utulized 1# dowel during the following exercises.    Patient completed 20 reps x 3 sets bicep curls, side raises, shoulder flexion, shoulder extension (in UE circuit)    Patient completed 20 reps x 3 sets hip flexion, knee flexion, knee extension, toe and heel taps (in LE circuit)    - Alternating punches, side punches, diagonal reaches, front/side/overhead arm clap, running, 3 x 20 reps (UE)    Pt required shprt rest breaks during therapeutic exercises. These activities were performed in a group setting to encourage participation with peers and social interaction skills.       The ROGELIO and EUFEMIA have collaborated and discussed the patient's status, treatment plan and progress toward established goals.     Cristopher Gutiérrez, HONEY 8/21/2017

## 2017-08-21 NOTE — PROGRESS NOTES
Occupational Therapy  FIM SCORES    Aleta Herrera  MRN: [id number]  Room/Bed: E263/E263 B       08/21/17 1226   Transfers   Bed/Chair/WC 2   Self Care   Eating 5   Grooming 5   Bathing 3   Dressing-Upper 4   Dressing-Lower 2   Toileting 1       ROGELIO Bourne  8/21/2017

## 2017-08-21 NOTE — PLAN OF CARE
Problem: Patient Care Overview  Goal: Plan of Care Review  Outcome: Ongoing (interventions implemented as appropriate)  Pt remained free from falls, injury and trauma throughout shift. Pt AAO x 4. Bed in lowest position and wheels locked. Pt instructed to call for assistance. Hourly rounds to monitor pt for safety and comfort. Blood glucose monitored. Personal items and call bell within reach. Pt denies pain. No signs or symptoms of distress.  Will continue to monitor pt.

## 2017-08-21 NOTE — ASSESSMENT & PLAN NOTE
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 Remove left  AKA staples on 8/21/17 at Rehab Facility.  F/U in 4 months with HD access US "    Patient will be going home w family, tentative DC date 8/24.     Pt's therapy sessions remain the same. She exhibits participation & motivation, but has not demonstrated much progression. Remains at Max A for transfers-will require 24 hr support at home, and as such, would benefit from repeat family therapy sessions. Last family therapy session 08/16.    Pain regimen: oxy 5mg PRN & tylenol 650 mg PRN; lyrica 75mg QD  -Requires 1-2 doses oxy daily, no tylenol.  -8/21: will increase lyrica to 50 mg BD (CrCl 19)    Functional status:  -memory Mod A, prob solving Mod A, expression/Comp Mod I   -Bed mobility SBA/Mod w   -Sit to stand Min A   -Transfers- Max A   -UBD Suzie  -Bathing Mod A   -WC mobility-SBA    "

## 2017-08-21 NOTE — PLAN OF CARE
Patient Care Overview    Diabetes Type 2  Goal - Monitor blood sugar level and cover with sliding scale when needed.  Outcome - Patient's blood sugar level was 210 at lunch and was given sliding scale as ordered by physician.    Amputation   Goal - Monitor left aka for signs/symptoms of infection  Outcome - No signs/symptoms of infection present. Staples are dry and intact. No drainage and no odor. Vital signs stable.    Fall Risk -  Goal - Patient remains free from falls during this shift.  Outcome - Pt remains free from falls this shift.  Continuing to monitor.     Pressure Ulcers   Goal - No new pressure ulcers formed during this shift. Turn patient every two hours during this shift.  Outcome - Patient turned per protocol to decrease risk of forming pressure ulcer. No new skin breakdown noted.

## 2017-08-21 NOTE — ASSESSMENT & PLAN NOTE
Other Rehab Plan/Continue to monitor:   Nutrition/Swallow Status:    - Prealbumin 08/10: 11, cont with supplement    - Nutritionist on board   Bladder Management: anuric (ESRD)  Bowel Management:  Incontinent  -bowel program ordered 8/18  - scheduled polyethylete glycol and docusate  - Colace BID and Suppository PRN   - Will monitor for regularity   Mood: stable   Sleep: monitor; Will consider sleep aid as clinically indicated   Skin: Monitor   DVT ppx: heparin

## 2017-08-21 NOTE — ASSESSMENT & PLAN NOTE
-VSS, cont w current mgmt     Vitals:    08/20/17 0621 08/20/17 2125 08/21/17 0651 08/21/17 1200   BP: 138/67 (!) 120/58 136/64    BP Location: Left arm Left arm Right arm    Patient Position: Lying Lying Lying    Pulse: 84 95 85    Resp: 16 17 18    Temp: 98 °F (36.7 °C) 98.2 °F (36.8 °C) 98.4 °F (36.9 °C)    TempSrc: Oral Oral Oral    SpO2: (!) 93% (!) 93% (!) 92%    Weight:    Comment: weight stable   Height:

## 2017-08-21 NOTE — PROGRESS NOTES
"Physical Therapy   Treatment    Aleta Herrera   MRN: [id number]   PTA visit        08/21/17 1300   PT Time Calculation   PT Start Time 1300   PT Stop Time 1345   PT Total Time (min) 45 min   Treatment   Treatment Type Treatment   PT/PTA PTA   PTA Visit Number 1   General   PT Received On 08/21/17   Family/Caregiver Present Yes   Patient Found (position) Seated in wheelchair   Pt found with oxygen  (3LO2, posey belt donned)   Precautions   General Precautions fall   Orthopedic No   Required Braces or Orthoses No   Visual/Auditory Vision impaired  (low vision)   Subjective   Patient states "I'm ready if you are."   Pain/Comfort   Pain Rating 1 7/10   Location - Side 1 Left   Location - Orientation 1 generalized   Location 1 leg   Pain Addressed 1 Pre-medicate for activity;Reposition;Distraction;Cessation of Activity;Nurse notified  (pt received pain meds prior to therapy)   Pain Rating Post-Intervention 1 no pain   Transfers   Transfer yes   Sit to Stand   Sit <> Stand Assistance Maximum Assistance   Sit <> Stand Assistive Device (// bars)   Trials/Comments three trials, cues for RLE placement, leaning forward   Chair to Mat   Chair<> Mat Technique Slide Board   Chair<>Mat Assistance Maximum Assistance   Chair <> Mat Assistive Device Slide Board   Trials/Comments to L, cues for B hand placement, leaning forward for improved efficiency   Mat to Chair   Technique Slide Board   Assistance Maximum Assistance   Assistive Device Slide Board   Trials/Comments to R, cues for safety, hand placement   Wheelchair Activities   Propulsion Yes   Propulsion Type 1 Manual   Level 1 Level tile   Method 1 Right upper extremity;Left upper extremity   Level of Assistance 1 Supervision   Description/ Details 1 150 feet   Gait   Gait No   Stairs   Stairs No   Balance   Balance Yes   Static Sitting Balance   Static Sitting-Balance Support Right upper extremity supported;Left upper extremity supported  (R foot supported)   Static " Sitting-Level of Assistance Stand by Assistance   Static Sitting-Comment/# of Minutes sitting edge of mat x 5 min   Dynamic Sitting Balance   Dynamic Sitting-Balance Support Unilateral upper extremity supported   Dynamic Sitting-Balance Ball toss   Dynamic Sitting-Comments sitting edge of mat x 5 min   Static Standing Balance   Static Standing-Balance Support Right upper extremity supported;Left upper extremity supported   Static Standing-Level of Assistance Moderate assistance   Static Standing-Comment/# of Minutes standing in // bars x three trials: two trials of less than 10 sec each and one trial x one min   Activity Tolerance   Activity Tolerance Patient tolerated treatment well   After Treatment   Patient Position After Treatment Seated in wheelchair   Patient after treatment left with all lines intact;call button in reach;nursing notified  (posey belt engaged)   Assessment   Prognosis Fair   Problem List Decreased strength;Decreased range of motion;Decreased endurance;Impaired balance;Decreased mobility;Decreased safety awareness;Impaired vision;Pain   Session Assessment progressing toward goals   Assessment Pt w/ good participation and fair endurance in therapy; pt requires max A for SB transfers including placing and removing board, initiating SB transfer, assisting to scoot and stabilizing SB   Level of Motivation/Participation good   Plan   Planned Therapy Intervention Continue with current plan   Therapy Frequency 2 times/day;Monday-Friday;Saturday or Sunday   Treatment/Billable Minutes   Therapeutic Activity 35   Train/Wheelchair Management 10   Total Time 45         Kevin Washington, PTA  8/21/2017

## 2017-08-21 NOTE — PROGRESS NOTES
[facility]  Physical Medicine & Rehab  Progress Note    Patient Name: Aleta Herrera  MRN: [id number]  Patient Class: IP- Rehab   Admission Date: 8/7/2017  Length of Stay: 13 days  Attending Physician: Amirah Cano MD  Primary Care Provider: Radha Lao MD    Subjective:     Principal Problem:Amputation of leg    Interval History:   Patient c/o phantom pain that is present day/night.  MSK pain is controlled but tingling and itching of residual   On Lyrica, 75 mg at bedtime.  Reports regular BM.  She is ESRD pt, on HD on [facility].    Hb 6.7 today, denies light-headedness, chest pain, dyspnea.    Scheduled Medications:    amlodipine  10 mg Oral Daily    aspirin  81 mg Oral Daily    calcitRIOL  0.5 mcg Oral Daily    docusate sodium  100 mg Oral BID    heparin (porcine)  5,000 Units Subcutaneous Q8H    insulin detemir  4 Units Subcutaneous QHS    pantoprazole  40 mg Oral Daily    polyethylene glycol  17 g Oral Daily    pregabalin  75 mg Oral QHS    sevelamer carbonate  2,400 mg Oral TID WM       PRN Medications: acetaminophen, bisacodyl, calcium carbonate, dextrose 50%, dextrose 50%, glucagon (human recombinant), glucose, glucose, heparin (porcine), insulin aspart, methocarbamol, ondansetron, ondansetron, oxycodone, pneumoc 13-pramod conj-dip cr(PF), senna-docusate 8.6-50 mg    Review of Systems   Constitutional: Negative for chills and fever.   Eyes: Negative for visual disturbance.   Respiratory: Negative for cough and shortness of breath.    Cardiovascular: Negative for chest pain, palpitations and leg swelling.   Gastrointestinal: Negative for abdominal pain, blood in stool, constipation, diarrhea, nausea and vomiting.   Genitourinary: Negative for flank pain.   Musculoskeletal: Negative for back pain.   Skin: Positive for wound on buttock.  Neurological: Positive for weakness.   Negative for dizziness and headaches.   Hematological:        Objective:     Vital Signs (Most  Recent):  Temp: 98.7 °F (37.1 °C) (08/17/17 0648)  Pulse: 92 (08/17/17 0648)  Resp: 18 (08/17/17 0648)  BP: (!) 115/58 (08/17/17 0648)  SpO2: (!) 91 % (08/17/17 0648)    Vital Signs (24h Range):  Temp:  [98.7 °F (37.1 °C)-98.8 °F (37.1 °C)] 98.7 °F (37.1 °C)  Pulse:  [92-96] 92  Resp:  [18] 18  SpO2:  [91 %] 91 %  BP: (106-115)/(58) 115/58     Physical Exam   Constitutional: She appears well-developed and well-nourished.   HENT:   Head: Normocephalic and atraumatic.   Eyes: EOM are normal. Pupils are equal, round, and reactive to light.   Neck: Normal range of motion. Neck supple.   Cardiovascular: Normal rate, regular rhythm, normal heart sounds and intact distal pulses.    Pulmonary/Chest: Effort normal and breath sounds normal.   Abdominal: Soft. Bowel sounds are normal. There is no tenderness.   Musculoskeletal: She exhibits no edema or tenderness.   UE: RUE: SA 4/5, EF/EE 3/5,  4/5  LUE: SA 4/5, EF/EE 4/5, 3/5,  4/5  RLE HF 3/5, KE 4/5, DF/PF 4/5  AKA site C/D, closed w staples        Neurological: She is alert.   Followed commands  Memory: 3/3 immediate, 1/3 delayed     Skin: Skin is warm and dry.   Psychiatric: She has a normal mood and affect. Her behavior is normal.   NEUROLOGICAL EXAMINATION:   CRANIAL NERVES   CN III, IV, VI   Pupils are equal, round, and reactive to light.  Extraocular motions are normal.       Assessment/Plan:      Aleta Herrera is a 62 y.o. female admitted to inpatient rehabilitation on 8/7/2017 for Amputation of leg with impaired mobility and ADLs. Patient remains appropriate for PT, OT, and as required Speech therapy. Patient continues to require 24 hour nursing care as well as daily Physician assessment.  Today, she c/o phantom pain, states that pain meds are helping.   Still c/o itching and shooting pain in residual limb ( feels as she still have a foot). Started on Lyrica 75 mg QHS.   On HD, cannot get higher dose. Might consider Elavil at night or  Cymbalta.    Impaired mobility and ADLs    Other Rehab Plan/Continue to monitor:   Nutrition/Swallow Status:    - Prealbumin 08/10: 11, cont with supplement    - Nutritionist on board   Bladder Management: anuric (ESRD)  Bowel Management:  continent  - scheduled polyethylete glycol and docusate  - Colace BID and Suppository PRN   - Will monitor for regularity   Mood: stable   Sleep: monitor; Will consider sleep aid as clinically indicated   Skin: Monitor   DVT ppx: heparin          Stage III pressure ulcer of right buttock    - regular wound care, low air los mattress         Pressure ulcer of left buttock    - regular wound care, low air los mattress         Type 2 diabetes mellitus with left diabetic foot ulcer    8/9 PO intake aroudn 50%. Will hold Levemir 4u BID, and monitor trend.     8/14 -157,  cont to hold levemir  8/15 , restart levemir 4U HS  8/16 -145, con't to monitor on levemir 4U HS  8/17 -214, con't to monitor on levemir 4U HS; considered BD but will remain HS  8/18 , con't to monitor on levemir 4U HS        ESRD (end stage renal disease)    ESRD, home HD MWF and L AVG placed 4/2017  -HD planned for today, cont to monitor   -H/H 8/17: 6.7/23.7 (down from 7.3/25.4)  -asymptomatic   -will plan to transfuse 2U RBC today at dialysis.  -receives Epo at dialysis  -other labs: Cr 2.7 (decreased from 3.3); K WNL.            Hypertension, renal    -VSS, cont w current mgmt     Vitals:    08/16/17 2100 08/17/17 0648 08/17/17 1946 08/18/17 0653   BP: (!) 106/58 (!) 115/58 (!) 114/58 (!) 117/58   BP Location: Left arm Right arm Right arm Left arm   Patient Position: Lying Lying Lying Lying   Pulse: 96 92 92 87   Resp: 18 18 18 18   Temp: 98.8 °F (37.1 °C) 98.7 °F (37.1 °C) 98.8 °F (37.1 °C) 98.3 °F (36.8 °C)   TempSrc: Oral Oral Oral Oral   SpO2:  (!) 91%  (!) 91%   Weight:       Height:                 * Amputation of leg    s/p L AKA on 7/31 2/2 gangrenous unsalvageable L foot  "  8/10 Has Vascular F/U today - Recs included " 1. Remove left  AKA staples on 8/21/17 at Rehab Facility.  F/U in 4 months with HD access US "    Patient will be going home w family, tentative DC date 8/22    Pt's therapy sessions remain the same. She exhibits participation & motivation, but has not demonstrated much progression. Remains at Max A for transfers-will require 24 hr support at home, and as such, would benefit from repeat family therapy sessions. Last family therapy session 08/16.    Pain controlled with oxy 5mg PRN & tylenol 650 mg PRN  -Required 3x doses oxy yesterday, no tylenol.    Functional status:  -memory Mod A, prob solving Mod A, expression/Comp Mod I   -Bed mobility SBA/Mod w   -Sit to stand Min A   -Transfers- Max A   -UBD Suzie  -Bathing Mod A   -WC mobility-SBA               Kary Gruber MD  Department of Physical Medicine & Rehab   [facility]  "

## 2017-08-22 PROBLEM — R21 SKIN RASH: Status: ACTIVE | Noted: 2017-08-22

## 2017-08-22 LAB
POCT GLUCOSE: 141 MG/DL (ref 70–110)
POCT GLUCOSE: 150 MG/DL (ref 70–110)
POCT GLUCOSE: 177 MG/DL (ref 70–110)

## 2017-08-22 PROCEDURE — 25000003 PHARM REV CODE 250: Performed by: STUDENT IN AN ORGANIZED HEALTH CARE EDUCATION/TRAINING PROGRAM

## 2017-08-22 PROCEDURE — 12800000 HC REHAB SEMI-PRIVATE ROOM

## 2017-08-22 PROCEDURE — 63600175 PHARM REV CODE 636 W HCPCS: Performed by: STUDENT IN AN ORGANIZED HEALTH CARE EDUCATION/TRAINING PROGRAM

## 2017-08-22 PROCEDURE — 25000003 PHARM REV CODE 250: Performed by: PHYSICAL MEDICINE & REHABILITATION

## 2017-08-22 PROCEDURE — 97530 THERAPEUTIC ACTIVITIES: CPT

## 2017-08-22 PROCEDURE — 99233 SBSQ HOSP IP/OBS HIGH 50: CPT | Mod: ,,, | Performed by: PHYSICAL MEDICINE & REHABILITATION

## 2017-08-22 PROCEDURE — 27000221 HC OXYGEN, UP TO 24 HOURS

## 2017-08-22 PROCEDURE — 92508 TX SP LANG VOICE COMM GROUP: CPT

## 2017-08-22 PROCEDURE — 97110 THERAPEUTIC EXERCISES: CPT

## 2017-08-22 RX ORDER — DOXYLAMINE SUCCINATE 25 MG
TABLET ORAL 2 TIMES DAILY
Status: DISCONTINUED | OUTPATIENT
Start: 2017-08-22 | End: 2017-08-24

## 2017-08-22 RX ADMIN — OXYCODONE HYDROCHLORIDE 5 MG: 5 TABLET ORAL at 08:08

## 2017-08-22 RX ADMIN — SEVELAMER CARBONATE 2400 MG: 800 TABLET, FILM COATED ORAL at 12:08

## 2017-08-22 RX ADMIN — SEVELAMER CARBONATE 2400 MG: 800 TABLET, FILM COATED ORAL at 05:08

## 2017-08-22 RX ADMIN — MICONAZOLE NITRATE: 20 CREAM TOPICAL at 09:08

## 2017-08-22 RX ADMIN — PANTOPRAZOLE SODIUM 40 MG: 40 TABLET, DELAYED RELEASE ORAL at 08:08

## 2017-08-22 RX ADMIN — METHOCARBAMOL 500 MG: 500 TABLET ORAL at 08:08

## 2017-08-22 RX ADMIN — CALCITRIOL 0.5 MCG: 0.5 CAPSULE, LIQUID FILLED ORAL at 08:08

## 2017-08-22 RX ADMIN — HEPARIN SODIUM 5000 UNITS: 5000 INJECTION, SOLUTION INTRAVENOUS; SUBCUTANEOUS at 06:08

## 2017-08-22 RX ADMIN — HEPARIN SODIUM 5000 UNITS: 5000 INJECTION, SOLUTION INTRAVENOUS; SUBCUTANEOUS at 03:08

## 2017-08-22 RX ADMIN — ASPIRIN 81 MG CHEWABLE TABLET 81 MG: 81 TABLET CHEWABLE at 08:08

## 2017-08-22 RX ADMIN — SEVELAMER CARBONATE 2400 MG: 800 TABLET, FILM COATED ORAL at 08:08

## 2017-08-22 RX ADMIN — AMLODIPINE BESYLATE 10 MG: 10 TABLET ORAL at 08:08

## 2017-08-22 RX ADMIN — OXYCODONE HYDROCHLORIDE 5 MG: 5 TABLET ORAL at 12:08

## 2017-08-22 RX ADMIN — PREGABALIN 50 MG: 50 CAPSULE ORAL at 08:08

## 2017-08-22 RX ADMIN — HEPARIN SODIUM 5000 UNITS: 5000 INJECTION, SOLUTION INTRAVENOUS; SUBCUTANEOUS at 08:08

## 2017-08-22 RX ADMIN — OXYCODONE HYDROCHLORIDE 5 MG: 5 TABLET ORAL at 05:08

## 2017-08-22 NOTE — PROGRESS NOTES
Physical Therapy  PM Treatment    Aleta Herrera   MRN: [id number]        08/22/17 1259   PT Time Calculation   PT Start Time 1259   PT Stop Time 1330   PT Total Time (min) 31 min   Treatment   Treatment Type Treatment   PT/PTA PT   General   PT Received On 08/22/17   Family/Caregiver Present No   Patient Found (position) Seated in wheelchair   Pt found with oxygen  (3L O2)   Precautions   General Precautions fall   Orthopedic No   Required Braces or Orthoses No   Visual/Auditory Vision impaired   Subjective   Patient states Pt agreeable to PT treatment session   Pain/Comfort   Pain Rating 1 5/10   Location - Side 1 Left   Location - Orientation 1 generalized   Location 1 (residual limb)   Pain Addressed 1 Reposition;Distraction   Pain Rating Post-Intervention 1 5/10   Other Activities   Comments Patient performed slide board transfer from wheelchair to EOM with maximum assistance  Patient performed sit to supine on mat with SBA  Patient performed rolling to B sides x 2 trials with SBA to minimal assistance required for management of hips   PT doffed pt's L residual limb , incision appeared intact with no erythema  Patient performed supine exercises 3 x 15 reps to L residual limb including: GS, SLR, hip ABD  Patient performed supine to sit on mat with minimal to moderate assistance  Patient performed lateral weight shift onto B forearms to increase B hip hike in order to promote more independence during slide board transfers  Patient performed slide board transfer from EOM to wheelchair with maximum assistance   Activity Tolerance   Activity Tolerance Patient tolerated treatment well   After Treatment   Patient Position After Treatment Seated in wheelchair   Patient after treatment left with all lines intact;call button in reach   Discharge Recommendations   Equipment Needed After Discharge wheelchair;bedside commode;bath bench;slide board   Discharge Facility/Level Of Care Needs home health PT   Plan    Planned Therapy Intervention Continue with current plan   Therapy Frequency 2 times/day;daily;Monday-Friday;Saturday or Sunday   Physical Therapy Follow-up   PT Follow-up? Yes   PT - Next Visit Date 08/23/17   Treatment/Billable Minutes   Therapeutic Activity 16   Therapeutic Exercise 15   Total Time 31     Netta Barragan, PT  8/22/2017

## 2017-08-22 NOTE — PROGRESS NOTES
[facility]  Physical Medicine & Rehab  Progress Note    Patient Name: Aleta Herrera  MRN: [id number]  Patient Class: IP- Rehab   Admission Date: 8/7/2017  Length of Stay: 15 days  Attending Physician: Amirah Cano MD  Primary Care Provider: Radha Lao MD    Subjective:     Principal Problem:Amputation of leg    Interval History: No new complaints. Pain stable. Rash noted under skin flap of left lower abdomen. States that cream is being applied. Post-op site healing well without further bleeding in 8 days; denies other signs of bleeding. Denies fever/chills, cough, diarrhea. Sleeping well, strong appetite. Last BM yesterday.    Scheduled Medications:    amlodipine  10 mg Oral Daily    aspirin  81 mg Oral Daily    calcitRIOL  0.5 mcg Oral Daily    docusate sodium  100 mg Oral BID    heparin (porcine)  5,000 Units Subcutaneous Q8H    insulin detemir  4 Units Subcutaneous QHS    pantoprazole  40 mg Oral Daily    polyethylene glycol  17 g Oral Daily    pregabalin  50 mg Oral BID    sevelamer carbonate  2,400 mg Oral TID WM       PRN Medications: acetaminophen, bisacodyl, calcium carbonate, dextrose 50%, dextrose 50%, glucagon (human recombinant), glucose, glucose, heparin (porcine), insulin aspart, methocarbamol, ondansetron, ondansetron, oxycodone, pneumoc 13-pramod conj-dip cr(PF), senna-docusate 8.6-50 mg    Review of Systems   Constitutional: Negative for chills and fever.   Eyes: Negative for visual disturbance.   Respiratory: Negative for cough and shortness of breath.    Cardiovascular: Negative for chest pain, palpitations and leg swelling.   Gastrointestinal: Negative for abdominal pain, blood in stool, constipation, diarrhea, nausea and vomiting.   Genitourinary: Negative for flank pain.   Musculoskeletal: Negative for back pain.   Skin: Positive for wound (2 on buttocks; 1 on left lower abdomen).   Neurological: Positive for weakness. Negative for dizziness and  headaches.   Hematological:        Bleeding from left leg surgical site; controlled     Objective:     Vital Signs (Most Recent):  Temp: 98.1 °F (36.7 °C) (08/22/17 0655)  Pulse: 84 (08/22/17 0655)  Resp: 18 (08/22/17 0655)  BP: 121/61 (08/22/17 0655)  SpO2: 97 % (08/22/17 0655)    Vital Signs (24h Range):  Temp:  [98.1 °F (36.7 °C)-98.5 °F (36.9 °C)] 98.1 °F (36.7 °C)  Pulse:  [] 84  Resp:  [18] 18  SpO2:  [92 %-97 %] 97 %  BP: (117-121)/(56-61) 121/61     Physical Exam   Constitutional: She appears well-developed and well-nourished.   HENT:   Head: Normocephalic and atraumatic.   Eyes: EOM are normal. Pupils are equal, round, and reactive to light.   Neck: Normal range of motion. Neck supple.   Cardiovascular: Normal rate, regular rhythm, normal heart sounds and intact distal pulses.    Pulmonary/Chest: Effort normal and breath sounds normal.   Abdominal: Soft. Bowel sounds are normal. There is no tenderness.   Musculoskeletal: She exhibits no edema or tenderness.   UE: RUE: SA 4/5, EF/EE 3/5,  4/5  LUE: SA 4/5, EF/EE 4/5, 3/5,  4/5  RLE HF 3/5, KE 4/5, DF/PF 4/5  AKA site C/D, closed w staples        Neurological: She is alert.   Followed commands  Memory: 3/3 immediate, 1/3 delayed     Skin: Skin is warm and dry.   Rash left lower abdomen; under skin flap; erythematous   Psychiatric: She has a normal mood and affect. Her behavior is normal.     NEUROLOGICAL EXAMINATION:     CRANIAL NERVES     CN III, IV, VI   Pupils are equal, round, and reactive to light.  Extraocular motions are normal.       Assessment/Plan:      Aleta Herrera is a 62 y.o. female admitted to inpatient rehabilitation on 8/7/2017 for Amputation of leg with impaired mobility and ADLs. Patient remains appropriate for PT, OT, and as required Speech therapy. Patient continues to require 24 hour nursing care as well as daily Physician assessment.    * Amputation of leg    s/p L AKA on 7/31 2/2 gangrenous unsalvageable L foot  "  8/10 Has Vascular F/U today - Recs included " 1. Remove left  AKA staples on 8/21/17 at Rehab Facility.  F/U in 4 months with HD access US "    Patient will be going home w family, tentative DC date 8/24.     Pt's therapy sessions remain the same. She exhibits participation & motivation but is slow with her progression. Remains at Max A for transfers-will require 24 hr support at home, and as such, would benefit from repeat family therapy sessions. Last family therapy session 08/16.    Pain regimen: oxy 5mg PRN & tylenol 650 mg PRN; lyrica 75mg QD  -Requires 1-3 doses oxy daily, no tylenol.  -8/21: increase lyrica to 50 mg BD (CrCl 19)    Functional status:  -memory Mod A, prob solving Mod A, expression/Comp Mod I   -Bed mobility SBA/Mod w   -Sit to stand Min A   -Transfers- Max A   -UBD Suzie  -Bathing Mod A   -WC mobility-SBA          Abdominal skin rash    -noted 8/21  -apply barrier cream  -will have wound care assess        ESRD (end stage renal disease)    ESRD, home HD MWF and L AVG placed 4/2017  -HD MWF, cont to monitor   -H/H 8/21: 9.5/30.5 (improved from 6.7/23.7)  -asymptomatic   -2U RBC 8/18 at dialysis.  -receives Epo at dialysis  -other labs: Cr 2.4 (decreased from 2.7); K 4.2, WNL.            Type 2 diabetes mellitus with left diabetic foot ulcer    8/9 PO intake aroudn 50%. Will hold Levemir 4u BID, and monitor trend.     8/14 -157,  cont to hold levemir  8/15 , restart levemir 4U HS  8/16 -145, con't to monitor on levemir 4U HS  8/17 -214, con't to monitor on levemir 4U HS; considered BD but will remain HS  8/18 , con't to monitor on levemir 4U HS  8/19 -284, con't to monitor on levemir 4U HS  8/20 -162, con't to monitor on levemir 4U HS  8/21 -210, midday PCOT elevated (210, given sliding scale), con't to monitor on levemir 4U HS   8/22 , con't to monitor on levemir 4U HS          Hypertension, renal    -VSS, cont w current mgmt     Vitals:    " 08/21/17 0651 08/21/17 1200 08/21/17 2120 08/22/17 0655   BP: 136/64  (!) 117/56 121/61   BP Location: Right arm  Right arm Left arm   Patient Position: Lying  Sitting Lying   Pulse: 85  101 84   Resp: 18   18   Temp: 98.4 °F (36.9 °C)  98.5 °F (36.9 °C) 98.1 °F (36.7 °C)   TempSrc: Oral  Oral Oral   SpO2: (!) 92%  (!) 92% 97%   Weight:  Comment: weight stable     Height:                 Pressure ulcer of left buttock    - regular wound care, low air los mattress, frequent turnover        Stage III pressure ulcer of right buttock    - regular wound care, low air los mattress, frequent turnover        Impaired mobility and ADLs    Other Rehab Plan/Continue to monitor:   Nutrition/Swallow Status:    - Prealbumin 08/10: 11, cont with supplement    - Nutritionist on board   Bladder Management: anuric (ESRD)  Bowel Management:  Incontinent  -bowel program ordered 8/18  - scheduled polyethylete glycol and docusate  - Colace BID and Suppository PRN   - Will monitor for regularity   Mood: stable   Sleep: monitor; Will consider sleep aid as clinically indicated   Skin: Monitor   DVT ppx: heparin              DISCHARGE PLANNING:  Tentative Discharge Date: 8/24/2017    Future Appointments  Date Time Provider Department Center   9/6/2017 12:00 PM VASCULAR, LAB [facility] MARCIO Sanders   9/6/2017 12:30 PM VASCULAR, LAB [facility] MARCIO Sanders   9/6/2017 1:00 PM Nathalie Madera MD [facility] ANAIS Sanders   9/12/2017 9:40 AM Radha Cortez MD [facility]       Dahlia Weaver MD  Department of Physical Medicine & Rehab   [facility]

## 2017-08-22 NOTE — PROGRESS NOTES
"Physical Therapy  AM Treatment    Aleta Herrera   MRN: [id number]        08/22/17 0857   PT Time Calculation   PT Start Time 0857   PT Stop Time 0958   PT Total Time (min) 61 min   Treatment   Treatment Type Treatment   PT/PTA PT   General   PT Received On 08/22/17   Family/Caregiver Present No   Patient Found (position) Seated in wheelchair   Pt found with oxygen  (3L O2)   Precautions   General Precautions fall   Orthopedic No   Required Braces or Orthoses No   Visual/Auditory Vision impaired   Subjective   Patient states "I am doing good"   Pain/Comfort   Pain Rating 1 5/10   Location - Side 1 Left   Location - Orientation 1 generalized   Location 1 (residual limb)   Pain Addressed 1 Reposition;Distraction;Other (see comments)  (Pt reported recieving pain meds prior to treatment )   Pain Rating Post-Intervention 1 4/10   Bed Mobility   Bed Mobility yes   Rolling/Turning to Left Supervision  (x 3 trials on mat while donning post op )   Rolling/Turning Right Minimum assistance  (x 3 trials on mat, while donning post op )   Supine to Sit Minimum Assistance;Moderate Assistance   Supine to Sit Comments x 1 trial on mat, assistance required for appropriate log roll technique and to elevate trunk to complete transfer    Sit to Supine Stand by Assistance  (x 1 trial on mat, increased time required)   Transfers   Transfer yes   Sit to Stand   Sit <> Stand Assistance Maximum Assistance   Sit <> Stand Assistive Device Other (see comments)  (parallel bars)   Trials/Comments Pt performed x 3 trials, pt required maximum assistance to elevate hips out of wheelchair    Stand to Sit   Assistance Moderate Assistance;Minimum Assistance   Assistive Device Other (see comments)  (parallel bars)   Trials/Comments Pt performed x 3 trials, pt required assistance to control descen into wheelchair    Chair to Mat   Chair<> Mat Technique Slide Board   Chair<>Mat Assistance Maximum Assistance   Chair <> Mat Assistive " Device Slide Board   Trials/Comments x 1 trial towards R, pt required assistance for placement of slide board, assist required for bring trunk forward. Verbal cues required for appropriate B UE placement on slide board to complete transfer   Mat to Chair   Technique Slide Board   Assistance Maximum Assistance   Assistive Device Slide Board   Trials/Comments x 1 trial to L, pt required maximum assistance for placement of slide board. Pt required verbal and manual cues to perform transfer with forward flexion of trunk to assist with bringing hips across slide board    Wheelchair Activities   Propulsion Yes   Propulsion Type 1 Manual   Level 1 Level tile   Method 1 Right upper extremity;Left upper extremity   Level of Assistance 1 Supervision   Description/ Details 1 Pt propelled self in wheelchair  x 192 feet, increased time required to complete activity. pt required seated break following activity due to fatigue   Balance   Balance Yes   Static Standing Balance   Static Standing-Balance Support Right upper extremity supported;Left upper extremity supported  (in parallel bars)   Static Standing-Level of Assistance Moderate assistance   Static Standing-Comment/# of Minutes Pt tolerated x 3 trials of static standing in parallel bars: 1st trial: 1 min 25 seconds, 2nd trial: 1 min 14 seconds, 3rd trial: 1 min 2 seconds. Pt required assistance to maintain erect posture in standing. Pt required seated rest breaks following trials due to fatigue.    Supine   Supine-Exercises Lower extremity;Specific exercises   Supine-Exercise Type Ankle pumps;Glut sets;Short arc quads;Heel slides;ABD/ADD   Supine-Exercise Comments Pt performed 3 x 15 reps, pt performed AP and SAQ to R LE only, pt performed hip ABD and SLR to L residual limb only    Other Activities   Comments Praveen from [facility] came and delivered pt's L residual limb post operative . PT informed MD as pt has dialysis port along L anterior thigh. Per MD, pt is to  only wear  during scheduled therapy. PT will doff post operative  during PM treatment session. Pt's post operative  is to be donned by therapy only.     Pt performed modified wheelchair pushups x 6 trials with moderate assistance in order to increase blood flow and decrease risk of pressure sore to sacrum and to improve B UE strength.   Activity Tolerance   Activity Tolerance Patient tolerated treatment well   After Treatment   Patient Position After Treatment Seated in wheelchair   Patient after treatment left with all lines intact  (in gym awaiting OT)   Assessment   Prognosis Fair   Problem List Decreased strength;Decreased range of motion;Decreased endurance;Impaired balance;Decreased mobility;Decreased cognition;Decreased safety awareness;Impaired vision;Pain   Session Assessment progressing toward goals   Assessment Pt continues to participate well in skilled PT intervention. Pt continues to require maximum assistance for slide board transfers into and out of wheelchair and pt will likely require this level of assistance upon D/C. Pt was fitted with L residual limb post operative  which pt did not report any discomfort with. Pt will continue to benefit from further skilled PT intervention in order to address these above impairments and improve pt's functional independence with mobility.    Level of Motivation/Participation good   Barriers to Discharge Decreased caregiver support   Barriers to Discharge Comments Pt will require significant 24 hour care upon D/C. PT unsure if pt's family will be able to provide this level of assistance   Discharge Recommendations   Equipment Needed After Discharge wheelchair;bedside commode;bath bench   Discharge Facility/Level Of Care Needs home health PT   Plan   Planned Therapy Intervention Continue with current plan   Therapy Frequency 2 times/day;daily;Saturday or Sunday;Monday-Friday   Physical Therapy Follow-up   PT Follow-up? Yes   PT -  Next Visit Date 08/22/17   Treatment/Billable Minutes   Therapeutic Activity 31   Therapeutic Exercise 30   Total Time 61     Netta Barragan, PT  8/22/2017

## 2017-08-22 NOTE — TREATMENT PLAN
"Rehab Services' DME recommendations  DME to be delivered home unless otherwise specified.         [] NO DME NEEDED _________________________________    []Walker:(can only select one of these) []Adult (5'4"-6'6") []Bebeto (4'4"-5'7")   [] Wide/ Heavy Duty         []Parviz                  []  Rollator       [] knee walker   Accessories/Other:__   Wheels:(must complete) [] Yes  [] No     []Crutches:  []Axillary []Loft strand    []Cane:              []Straight []Narrow based quad   []Wide based quad         [] Other:____________________________________________    [x] Transfer Devices:   []Kell Lift    [x]Slide Board ( [] with cut out  []26  [x]29)    ______________________________________________________________________    [x]Wheelchair: (please check)   Number of hours up in wheelchair per day:  Patient will be up in wheelchair for greater than 8 hours per day, due to pt's L AKA and inability to ambulate     Style:  [] Standard [] Pediatric  [x] Light weight  []Reclining     []Amputee [] Parviz [] POV []Custom     Custom Vendor:________________________________________                                                      Seat Width: [x]18 (standard adult)    []16" (small adult)   []14(child)      [] 20  [] 22 [] 24         Seat Depth:   [x]16    []18  []20      Back Height:    [x]Standard      []Parviz          []Other     Leg Support: [x]Standard []Heel loops []Elevating leg rest    []Articulating  []Swing Away     Arm Height:  []Detachable [x]Full   [] Desk  []Swing Away [] Adjustable Height      Lap Belt: []Velcro [x]Buckle                               Accessories: [] Front brakes          [] Brake Extensions  [x]Anti-tippers                          []Safety Belt    Other:_______________________________________________     Cushion: []Basic [x]Foam []Gel  []Roho []Luis I       Justification for Cushion(Must complete):Patient will require use of cushion due to patient  sitting upright in wheelchair for greater " than 8 hours per day due to patients inability to ambulate    For wheelchair order: (please choose all that apply)  - Caregiver is capable and willing to operate wheelchair safely. []  - Patients upper body strength is sufficient for propulsion. []   - The patient has a cast, brace, or musculoskeletal condition which prevents 90 degree flexion of the knee. []  - The patient has significant edema of the lower extremities that requires elevating leg rests.  []  - A reclining back is ordered. []  - The patient requires the use of a wheel chair for ADLs within the home. []  - Patient mobility limitations cannot be sufficiently resolved by the use of other ambulatory therapies. []    [x]3 in 1 commode: []Standard     []Wide-Heavy Duty           [x]Drop arm                                      []Heavy Duty Drop Arm                              [x]Tub bench:  []Padded      [x](Unpadded=standard)     []Commode Opening                                          [] Heavy Duty    []Shower Chair: []With back   []Without back      []Hospital Bed: []Semi Electric    []Manual    []Electric   []Heavy Duty  []Extra Heavy Duty(>600#)  Patient requires a bed height different than a fixed height hospital bed to permit transfers to chair, wheel chair or standing. []Yes         []N/A     []Accessories: [] Gel Overlay Mattress     []Low Air Mattress   []Alternating Pressure Pad                              []Trapeze    [] Hip Kit:  [] Short Horn     [] Long Horn     []Other:________________________________________________________________    [x]Home Health:  [x]OT [x]PT [x]SLP   [] MSW   [] Aide    []SN        []Outpatient:  []OT []PT []SLP [] MSW   [] CM      [] Internal Referral      [x] External Referral  ________________________________________________________________________    Ovi Branham 8/22/2017

## 2017-08-22 NOTE — PROGRESS NOTES
Pt arrived from dialysis, no signs of distress, states pain in her left leg, meds administered, eating, VSS

## 2017-08-22 NOTE — ASSESSMENT & PLAN NOTE
-VSS, cont w current mgmt     Vitals:    08/21/17 0651 08/21/17 1200 08/21/17 2120 08/22/17 0655   BP: 136/64  (!) 117/56 121/61   BP Location: Right arm  Right arm Left arm   Patient Position: Lying  Sitting Lying   Pulse: 85  101 84   Resp: 18   18   Temp: 98.4 °F (36.9 °C)  98.5 °F (36.9 °C) 98.1 °F (36.7 °C)   TempSrc: Oral  Oral Oral   SpO2: (!) 92%  (!) 92% 97%   Weight:  Comment: weight stable     Height:

## 2017-08-22 NOTE — ASSESSMENT & PLAN NOTE
8/9 PO intake aroudn 50%. Will hold Levemir 4u BID, and monitor trend.     8/14 -157,  cont to hold levemir  8/15 , restart levemir 4U HS  8/16 -145, con't to monitor on levemir 4U HS  8/17 -214, con't to monitor on levemir 4U HS; considered BD but will remain HS  8/18 , con't to monitor on levemir 4U HS  8/19 -284, con't to monitor on levemir 4U HS  8/20 -162, con't to monitor on levemir 4U HS  8/21 -210, midday PCOT elevated (210, given sliding scale), con't to monitor on levemir 4U HS   8/22 , con't to monitor on levemir 4U HS

## 2017-08-22 NOTE — ASSESSMENT & PLAN NOTE
ESRD, home HD MWF and L AVG placed 4/2017  -HD MWF, cont to monitor   -H/H 8/21: 9.5/30.5 (improved from 6.7/23.7)  -asymptomatic   -2U RBC 8/18 at dialysis.  -receives Epo at dialysis  -other labs: Cr 2.4 (decreased from 2.7); K 4.2, WNL.

## 2017-08-22 NOTE — PLAN OF CARE
Problem: Patient Care Overview  Goal: Plan of Care Review  Outcome: Ongoing (interventions implemented as appropriate)  POC reviewed with pt, no acute events overnight, pt repositioned q2h using wedge, 3 pressure ulcers noted to sacrum and buttocks, pt had large BM when returned from dialysis, cleansed and barrier cream applied. ! Dose PRN pain med given, incision c/d/i, staples intact, remains free of falls or injury. All questions answered and addressed, verbalizes compliance.

## 2017-08-22 NOTE — PROGRESS NOTES
"Occupational Therapy  AM Treatment  Aleta Herrera   MRN: [id number]   Room/Bed: E263/E263 B       08/22/17 1014   OT Time Calculation   OT Start Time 1014   OT Stop Time 1111   OT Total Time (min) 57 min   General   OT Date of Treatment 08/22/17   Family/Caregiver Present No   Patient Found (position) Seated in wheelchair   Pt found with oxygen  (3L)   Precautions   General Precautions fall   Visual/Auditory Vision impaired   Subjective   Patient states "I'm feeling the burn."   Pain/Comfort   Pain Rating no pain   Bed to Chair   Bed <> Chair Technique Slide Board   Bed <> Chair Transfer Assistance Maximum Assistance   Trials/Comments from wc<>EOB   Exercise Tools   Bilateral Manish 2# x 100 reps on incline   Dynamic Sitting Balance   Dynamic Sitting-Balance Support No upper extremity supported   Dynamic Sitting-Balance Reaching for objects;Reaching across midline   Dynamic Sitting-Comments (S) to complete dynamic sitting acitivy with Bioness BITS to increase trunk support and sitting balance and learn new center of gravity as follows:   Rotator/Sequence:  Accuracy: 61.54%; TTC: 11:52; Reaction time: 17.80sec; 40 Hits    Memory:  Trials Accuracy: 80%; 8 Successful Trials    Complex Array/Verbal:  Accuracy: 46.67%; TTC: 9:33; Reaction Time: 80.02sec; 7 Hits   Activity Tolerance   Activity Tolerance Patient tolerated treatment well   After Treatment   Patient Position After Treatment Seated in wheelchair   Patient after treatment left with all lines intact  (posey belt intact)   Assessment   Prognosis Fair   Problem List Decreased Self Care skills;Decreased upper extremity range of motion;Decreased upper extremity strength;Decreased safe judgment during ADL;Decreased endurance;Visual deficit;Decreased functional mobility;Decreased IADLs;Decreased trunk control for functional activities   Assessment Pt participated well in tx session but remains with decrerasd sitting balance related to endurance deficits and " decreased ability to transfer. Pt would continue to benefit from skilled OT services.   Level of Motivation/Participation Good   Discharge Recommendations   Equipment Needed After Discharge wheelchair;bath bench;slide board  (quin sling)   Discharge Facility/Level Of Care Needs home with home health   Plan   Plan Continue with current plan   Therapy Frequency 2 times/day   Occupational Therapy Follow-up   OT Follow-up? Yes   Treatment/Billable Minutes   Therapeutic Activity 38   Therapeutic Exercise 19   Total Time 57       ROGELIO Bourne  8/22/2017    LEGEND:   CGA: Contact Guard Assist   EOB: Edgeof Bed   HHA: Hand Held Assist   HOB: Head of Bed   (I): Independent-patient performs task in a timely manner   Max (A): Maximal Assist-patient performs 25-49% of task   Min (A): Minimal Assist- patient performs 75% or more of task   Mod (A): Moderate Assist- patient performs 50-74% of task   NA: Not applicable   NT: Not tested   OOB: Out of Bed   PTA: Prior to admit   QC: Quad Cane   RW: Rolling Walker   (S): Supervision- patient requires cues, coaxing, prompting   SBA: Stand By Assist   SC: Straight Cane   SW: Standard Walker   TBA: To be assessed   Total (A): Total Assist- patient performs less than 25% of task   WC: Wheelchair   WFL: Within Functional Limits   WNL: Within Normal Limits

## 2017-08-22 NOTE — PROGRESS NOTES
Physical Therapy   Daily Physical Therapy FIM Scores    Aleta Herrera   MRN: [id number]        08/22/17 1000   Transfers   Bed/Chair/WC 2   Locomotion   Distance Wheelchair 3   Wheelchair 5   Mode C     Netta Barragan, PT

## 2017-08-22 NOTE — ASSESSMENT & PLAN NOTE
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 Remove left  AKA staples on 8/21/17 at [facility]hab Facility.  F/U in 4 months with HD access US "    Patient will be going home w family, tentative DC date 8/24.     Pt's therapy sessions remain the same. She exhibits participation & motivation but is slow with her progression. Remains at Max A for transfers-will require 24 hr support at home, and as such, would benefit from repeat family therapy sessions. Last family therapy session 08/16.    Pain regimen: oxy 5mg PRN & tylenol 650 mg PRN; lyrica 75mg QD  -Requires 1-3 doses oxy daily, no tylenol.  -8/21: increase lyrica to 50 mg BD (CrCl 19)    Functional status:  -memory Mod A, prob solving Mod A, expression/Comp Mod I   -Bed mobility SBA/Mod w   -Sit to stand Min A   -Transfers- Max A   -UBD Suzie  -Bathing Mod A   -WC mobility-SBA    "

## 2017-08-22 NOTE — PROGRESS NOTES
Occupational Therapy  FIM SCORES    Aleta Herrera  MRN: [id number]  Room/Bed: E263/E263 B       08/22/17 1100   Transfers   Bed/Chair/WC 2   Self Care   Eating 5   Grooming 5   Bathing 3   Dressing-Upper 4   Dressing-Lower 2   Toileting 1       ROGELIO Bourne  8/22/2017

## 2017-08-22 NOTE — TREATMENT PLAN
"[facility]' DME recommendations  DME to be delivered home unless otherwise specified.         [] NO DME NEEDED ________________________________________________________________________    []Walker:(can only select one of these)  []Adult (5'4"-6'6") []Bebeto (4'4"-5'7")                           [] Wide/ Heavy Duty         []Parviz                  []  Rollator                                                 [] knee walker       Accessories/Other:____________________________________     Wheels:(must complete) [] Yes  [] No     []Crutches:  []Axillary []Loft strand    []Cane:              []Straight []Narrow based quad   []Wide based quad         [] Other:____________________________________________    [x] Transfer Devices:   []Kell Lift    [x]Slide Board ( [] with cut out  []26  [x]29)    ______________________________________________________________________    [x]Wheelchair: (please check)    Number of hours up in wheelchair per     Day:Patient will be up in wheelchair for greater than 8 hours per day, due to pt's L AKA and inability to ambulate     Style:  [] Standard [] Pediatric  [x] Light weight  []Reclining     []Amputee [] Parviz [] POV []Custom     Custom Vendor:________________________________________                                                      Seat Width: [x]18 (standard adult)    []16" (small adult)   []14(child)      [] 20  [] 22 [] 24         Seat Depth:   [x]16    []18  []20      Back Height:    [x]Standard      []Parviz          []Other     Leg Support: [x]Standard []Heel loops []Elevating leg rest    []Articulating              []Swing Away     Arm Height:  []Detachable [x]Full   [] Desk  []Swing Away [] Adjustable Height          Lap Belt: []Velcro [x]Buckle                               Accessories: [] Front brakes          [] Brake Extensions  [x]Anti-tippers                          []Safety Belt    Other:_______________________________________________ "     Cushion: []Basic [x]Foam []Gel  []Roho []Luis GRAJEDA      Justification for Cushion(Must complete):Patient will require use of cushion due to patient sitting upright in wheelchair for greater than 8 hours per day due to patients inability to ambulate        For wheelchair order: (please choose all that apply)  - Caregiver is capable and willing to operate wheelchair safely. []  - Patients upper body strength is sufficient for propulsion. []   - The patient has a cast, brace, or musculoskeletal condition which prevents 90 degree flexion of the knee. []  - The patient has significant edema of the lower extremities that requires elevating leg rests.  []  - A reclining back is ordered. []  - The patient requires the use of a wheel chair for ADLs within the home. []  - Patient mobility limitations cannot be sufficiently resolved by the use of other ambulatory therapies. []         __    [x]3 in 1 commode: []Standard     []Wide-Heavy Duty           [x]Drop arm                                      []Heavy Duty Drop Arm                              [x]Tub bench:  []Padded      [x](Unpadded=standard)     []Commode Opening                                          [] Heavy Duty    []Shower Chair: []With back   []Without back      []Hospital Bed: []Semi Electric    []Manual    []Electric   []Heavy Duty  []Extra Heavy Duty(>600#)  Patient requires a bed height different than a fixed height hospital bed to permit transfers to chair, wheel chair or standing. []Yes         []N/A     []Accessories: [] Gel Overlay Mattress     []Low Air Mattress   []Alternating Pressure Pad                              []Trapeze    [] Hip Kit:  [] Short Horn     [] Long Horn         []Other:________________________________________________________________    ________________________________________________________________________    [x]Home Health:  [x]OT [x]PT [x]SLP   [] MSW   [] Aide    []SN        []Outpatient:  []OT []PT []SLP [] MSW   [] CM       [] Internal Referral      [] External Referral  ________________________________________________________________________    Netta Barragan PT 8/22/2017

## 2017-08-22 NOTE — SUBJECTIVE & OBJECTIVE
Interval History: No new complaints. Pain stable. Post-op site healing well without further bleeding in 7 days; denies other signs of bleeding. Rash noted under skin flap of left lower abdomen. States that cream is being applied. Denies fever/chills, cough, diarrhea. She reports a strong appetite. Last BM yesterday.    Scheduled Medications:    amlodipine  10 mg Oral Daily    aspirin  81 mg Oral Daily    calcitRIOL  0.5 mcg Oral Daily    docusate sodium  100 mg Oral BID    heparin (porcine)  5,000 Units Subcutaneous Q8H    insulin detemir  4 Units Subcutaneous QHS    pantoprazole  40 mg Oral Daily    polyethylene glycol  17 g Oral Daily    pregabalin  50 mg Oral BID    sevelamer carbonate  2,400 mg Oral TID WM       PRN Medications: acetaminophen, bisacodyl, calcium carbonate, dextrose 50%, dextrose 50%, glucagon (human recombinant), glucose, glucose, heparin (porcine), insulin aspart, methocarbamol, ondansetron, ondansetron, oxycodone, pneumoc 13-pramod conj-dip cr(PF), senna-docusate 8.6-50 mg    Review of Systems   Constitutional: Negative for chills and fever.   Eyes: Negative for visual disturbance.   Respiratory: Negative for cough and shortness of breath.    Cardiovascular: Negative for chest pain, palpitations and leg swelling.   Gastrointestinal: Negative for abdominal pain, blood in stool, constipation, diarrhea, nausea and vomiting.   Genitourinary: Negative for flank pain.   Musculoskeletal: Negative for back pain.   Skin: Positive for wound (2 on buttocks; 1 on left lower abdomen).   Neurological: Positive for weakness. Negative for dizziness and headaches.   Hematological:        Bleeding from left leg surgical site; controlled     Objective:     Vital Signs (Most Recent):  Temp: 98.1 °F (36.7 °C) (08/22/17 0655)  Pulse: 84 (08/22/17 0655)  Resp: 18 (08/22/17 0655)  BP: 121/61 (08/22/17 0655)  SpO2: 97 % (08/22/17 0655)    Vital Signs (24h Range):  Temp:  [98.1 °F (36.7 °C)-98.5 °F (36.9 °C)]  98.1 °F (36.7 °C)  Pulse:  [] 84  Resp:  [18] 18  SpO2:  [92 %-97 %] 97 %  BP: (117-121)/(56-61) 121/61     Physical Exam   Constitutional: She appears well-developed and well-nourished.   HENT:   Head: Normocephalic and atraumatic.   Eyes: EOM are normal. Pupils are equal, round, and reactive to light.   Neck: Normal range of motion. Neck supple.   Cardiovascular: Normal rate, regular rhythm, normal heart sounds and intact distal pulses.    Pulmonary/Chest: Effort normal and breath sounds normal.   Abdominal: Soft. Bowel sounds are normal. There is no tenderness.   Musculoskeletal: She exhibits no edema or tenderness.   UE: RUE: SA 4/5, EF/EE 3/5,  4/5  LUE: SA 4/5, EF/EE 4/5, 3/5,  4/5  RLE HF 3/5, KE 4/5, DF/PF 4/5  AKA site C/D, closed w staples        Neurological: She is alert.   Followed commands  Memory: 3/3 immediate, 1/3 delayed     Skin: Skin is warm and dry.   Rash left lower abdomen; under skin flap; erythematous   Psychiatric: She has a normal mood and affect. Her behavior is normal.     NEUROLOGICAL EXAMINATION:     CRANIAL NERVES     CN III, IV, VI   Pupils are equal, round, and reactive to light.  Extraocular motions are normal.

## 2017-08-22 NOTE — PROGRESS NOTES
Speech Therapy   Group Cognitive Treatment    Aleta Herrera   MRN: [id number]          08/22/17 1410   Speech Time Calculation   Speech Start Time 1410   Speech Stop Time 1455   Speech Total (min) 45 min   General Information   SLP Treatment Date 08/22/17   General Observations Patient seen for group cognitive therapy session.   General Precautions fall   Visual/Auditory Vision impaired       SLP Cognitive Treatment   Treatment Detail (SLP Cognitive Treatment)   Patient participated in a 45 minute functional cognitive task. The group activity focused on attention, reasoning, short term memory, sequencing, and auditory comprehension. Additionally, group discussion (when participating allows for functional carryover and self-monitoring of memory strategies/skills, reasoning, attention, and sequencing. The patient was able to use short term/cognitive strategies with mod assistance. Pt was able to attend to tasks within a group setting for 45 minutes with mod verbal cues. These activities were performed in a group setting to encourage increased participation with peers and social interaction.   Plan   Plan Continue with current plan   SLP Follow-up   SLP Follow-up? Yes   Treatment/Billable Minutes   Speech Therapy Individual 45   Total Time 45       Melissa Hill, CF-SLP  8/22/2017

## 2017-08-23 LAB
POCT GLUCOSE: 144 MG/DL (ref 70–110)
POCT GLUCOSE: 185 MG/DL (ref 70–110)
POCT GLUCOSE: 197 MG/DL (ref 70–110)
POCT GLUCOSE: 198 MG/DL (ref 70–110)

## 2017-08-23 PROCEDURE — 97150 GROUP THERAPEUTIC PROCEDURES: CPT

## 2017-08-23 PROCEDURE — 25000003 PHARM REV CODE 250: Performed by: PHYSICAL MEDICINE & REHABILITATION

## 2017-08-23 PROCEDURE — 97530 THERAPEUTIC ACTIVITIES: CPT

## 2017-08-23 PROCEDURE — 63600175 PHARM REV CODE 636 W HCPCS: Performed by: STUDENT IN AN ORGANIZED HEALTH CARE EDUCATION/TRAINING PROGRAM

## 2017-08-23 PROCEDURE — 99233 SBSQ HOSP IP/OBS HIGH 50: CPT | Mod: ,,, | Performed by: PHYSICAL MEDICINE & REHABILITATION

## 2017-08-23 PROCEDURE — 12800000 HC REHAB SEMI-PRIVATE ROOM

## 2017-08-23 PROCEDURE — 25000003 PHARM REV CODE 250: Performed by: STUDENT IN AN ORGANIZED HEALTH CARE EDUCATION/TRAINING PROGRAM

## 2017-08-23 PROCEDURE — 97535 SELF CARE MNGMENT TRAINING: CPT

## 2017-08-23 PROCEDURE — 92507 TX SP LANG VOICE COMM INDIV: CPT

## 2017-08-23 RX ADMIN — PREGABALIN 50 MG: 50 CAPSULE ORAL at 09:08

## 2017-08-23 RX ADMIN — CALCITRIOL 0.5 MCG: 0.5 CAPSULE, LIQUID FILLED ORAL at 08:08

## 2017-08-23 RX ADMIN — HEPARIN SODIUM 5000 UNITS: 5000 INJECTION, SOLUTION INTRAVENOUS; SUBCUTANEOUS at 05:08

## 2017-08-23 RX ADMIN — METHOCARBAMOL 500 MG: 500 TABLET ORAL at 11:08

## 2017-08-23 RX ADMIN — MICONAZOLE NITRATE: 20 CREAM TOPICAL at 08:08

## 2017-08-23 RX ADMIN — SEVELAMER CARBONATE 2400 MG: 800 TABLET, FILM COATED ORAL at 08:08

## 2017-08-23 RX ADMIN — SEVELAMER CARBONATE 2400 MG: 800 TABLET, FILM COATED ORAL at 11:08

## 2017-08-23 RX ADMIN — HEPARIN SODIUM 5000 UNITS: 5000 INJECTION, SOLUTION INTRAVENOUS; SUBCUTANEOUS at 01:08

## 2017-08-23 RX ADMIN — ASPIRIN 81 MG CHEWABLE TABLET 81 MG: 81 TABLET CHEWABLE at 08:08

## 2017-08-23 RX ADMIN — AMLODIPINE BESYLATE 10 MG: 10 TABLET ORAL at 08:08

## 2017-08-23 RX ADMIN — MICONAZOLE NITRATE: 20 CREAM TOPICAL at 09:08

## 2017-08-23 RX ADMIN — PREGABALIN 50 MG: 50 CAPSULE ORAL at 08:08

## 2017-08-23 RX ADMIN — PANTOPRAZOLE SODIUM 40 MG: 40 TABLET, DELAYED RELEASE ORAL at 08:08

## 2017-08-23 RX ADMIN — HEPARIN SODIUM 5000 UNITS: 5000 INJECTION, SOLUTION INTRAVENOUS; SUBCUTANEOUS at 09:08

## 2017-08-23 NOTE — SUBJECTIVE & OBJECTIVE
Interval History: No new complaints. Pain stable. Abdominal/groin fungal rash non-tender & non-pruritic; being treated with miconazole cream. Denies fever/chills, cough, diarrhea. Sleeping well, strong appetite, last BM yesterday.    Scheduled Medications:    amlodipine  10 mg Oral Daily    aspirin  81 mg Oral Daily    calcitRIOL  0.5 mcg Oral Daily    docusate sodium  100 mg Oral BID    heparin (porcine)  5,000 Units Subcutaneous Q8H    insulin detemir  4 Units Subcutaneous QHS    miconazole   Topical (Top) BID    pantoprazole  40 mg Oral Daily    polyethylene glycol  17 g Oral Daily    pregabalin  50 mg Oral BID    sevelamer carbonate  2,400 mg Oral TID WM       PRN Medications: acetaminophen, bisacodyl, calcium carbonate, dextrose 50%, dextrose 50%, glucagon (human recombinant), glucose, glucose, heparin (porcine), insulin aspart, methocarbamol, ondansetron, ondansetron, oxycodone, pneumoc 13-pramod conj-dip cr(PF), senna-docusate 8.6-50 mg    Review of Systems   Constitutional: Negative for chills and fever.   Eyes: Negative for visual disturbance.   Respiratory: Negative for cough and shortness of breath.    Cardiovascular: Negative for chest pain, palpitations and leg swelling.   Gastrointestinal: Negative for abdominal pain, blood in stool, constipation, diarrhea, nausea and vomiting.   Genitourinary: Negative for flank pain.   Musculoskeletal: Negative for back pain.   Skin: Positive for wound (2 pressure ulcers on buttocks; 1 fungal rash on left lower abdomen extending into groin).   Neurological: Positive for weakness. Negative for dizziness and headaches.   Hematological:        Bleeding from left leg surgical site; controlled     Objective:     Vital Signs (Most Recent):  Temp: 97.9 °F (36.6 °C) (08/23/17 0640)  Pulse: 88 (08/23/17 0640)  Resp: 16 (08/23/17 0640)  BP: (!) 117/58 (08/23/17 0640)  SpO2: (!) 93 % (08/23/17 0640)    Vital Signs (24h Range):  Temp:  [97.9 °F (36.6 °C)-98 °F (36.7 °C)]  97.9 °F (36.6 °C)  Pulse:  [85-88] 88  Resp:  [16] 16  SpO2:  [93 %-96 %] 93 %  BP: (105-117)/(55-58) 117/58     Physical Exam   Constitutional: She appears well-developed and well-nourished.   HENT:   Head: Normocephalic and atraumatic.   Eyes: EOM are normal. Pupils are equal, round, and reactive to light.   Neck: Normal range of motion. Neck supple.   Cardiovascular: Normal rate, regular rhythm, normal heart sounds and intact distal pulses.    Pulmonary/Chest: Effort normal and breath sounds normal.   Abdominal: Soft. Bowel sounds are normal. There is no tenderness.   Musculoskeletal: She exhibits no edema or tenderness.   UE: RUE: SA 4/5, EF/EE 3/5,  4/5  LUE: SA 4/5, EF/EE 4/5, 3/5,  4/5  RLE HF 3/5, KE 4/5, DF/PF 4/5  AKA site C/D, closed w staples        Neurological: She is alert.   Followed commands  Memory: 3/3 immediate, 1/3 delayed     Skin: Skin is warm and dry.   Rash under skin flap left lower abdomen extending into groin; erythematous with pustular islands   Psychiatric: She has a normal mood and affect. Her behavior is normal.     NEUROLOGICAL EXAMINATION:     CRANIAL NERVES     CN III, IV, VI   Pupils are equal, round, and reactive to light.  Extraocular motions are normal.

## 2017-08-23 NOTE — ASSESSMENT & PLAN NOTE
8/9 PO intake around 50%. Will hold Levemir 4u BID, and monitor trend.     8/14 -157,  cont to hold levemir  8/15 , restart levemir 4U HS  8/16 -145, con't to monitor on levemir 4U HS  8/17 -214, con't to monitor on levemir 4U HS; considered BD but will remain HS  8/18 , con't to monitor on levemir 4U HS  8/19 -284, con't to monitor on levemir 4U HS  8/20 -162, con't to monitor on levemir 4U HS  8/21 -210, midday PCOT elevated (210, given sliding scale), con't to monitor on levemir 4U HS   8/22 -177, con't to monitor on levemir 4U HS  8/23 , con't to monitor on levemir 4U HS

## 2017-08-23 NOTE — PROGRESS NOTES
[facility]  Physical Medicine & Rehab  Progress Note    Patient Name: Aleta Herrera  MRN: [id number]  Patient Class: IP- Rehab   Admission Date: 8/7/2017  Length of Stay: 16 days  Attending Physician: Amirah Cano MD  Primary Care Provider: Radha Lao MD    Subjective:     Principal Problem:Amputation of leg    Interval History: No new complaints. Pain stable. Abdominal/groin fungal rash non-tender & non-pruritic; being treated with miconazole cream. Denies fever/chills, cough, diarrhea. Sleeping well, strong appetite, last BM yesterday.    Scheduled Medications:    amlodipine  10 mg Oral Daily    aspirin  81 mg Oral Daily    calcitRIOL  0.5 mcg Oral Daily    docusate sodium  100 mg Oral BID    heparin (porcine)  5,000 Units Subcutaneous Q8H    insulin detemir  4 Units Subcutaneous QHS    miconazole   Topical (Top) BID    pantoprazole  40 mg Oral Daily    polyethylene glycol  17 g Oral Daily    pregabalin  50 mg Oral BID    sevelamer carbonate  2,400 mg Oral TID WM       PRN Medications: acetaminophen, bisacodyl, calcium carbonate, dextrose 50%, dextrose 50%, glucagon (human recombinant), glucose, glucose, heparin (porcine), insulin aspart, methocarbamol, ondansetron, ondansetron, oxycodone, pneumoc 13-pramod conj-dip cr(PF), senna-docusate 8.6-50 mg    Review of Systems   Constitutional: Negative for chills and fever.   Eyes: Negative for visual disturbance.   Respiratory: Negative for cough and shortness of breath.    Cardiovascular: Negative for chest pain, palpitations and leg swelling.   Gastrointestinal: Negative for abdominal pain, blood in stool, constipation, diarrhea, nausea and vomiting.   Genitourinary: Negative for flank pain.   Musculoskeletal: Negative for back pain.   Skin: Positive for wound (2 pressure ulcers on buttocks; 1 fungal rash on left lower abdomen extending into groin).   Neurological: Positive for weakness. Negative for dizziness and  headaches.   Hematological:        Bleeding from left leg surgical site; controlled     Objective:     Vital Signs (Most Recent):  Temp: 97.9 °F (36.6 °C) (08/23/17 0640)  Pulse: 88 (08/23/17 0640)  Resp: 16 (08/23/17 0640)  BP: (!) 117/58 (08/23/17 0640)  SpO2: (!) 93 % (08/23/17 0640)    Vital Signs (24h Range):  Temp:  [97.9 °F (36.6 °C)-98 °F (36.7 °C)] 97.9 °F (36.6 °C)  Pulse:  [85-88] 88  Resp:  [16] 16  SpO2:  [93 %-96 %] 93 %  BP: (105-117)/(55-58) 117/58     Physical Exam   Constitutional: She appears well-developed and well-nourished.   HENT:   Head: Normocephalic and atraumatic.   Eyes: EOM are normal. Pupils are equal, round, and reactive to light.   Neck: Normal range of motion. Neck supple.   Cardiovascular: Normal rate, regular rhythm, normal heart sounds and intact distal pulses.    Pulmonary/Chest: Effort normal and breath sounds normal.   Abdominal: Soft. Bowel sounds are normal. There is no tenderness.   Musculoskeletal: She exhibits no edema or tenderness.   UE: RUE: SA 4/5, EF/EE 3/5,  4/5  LUE: SA 4/5, EF/EE 4/5, 3/5,  4/5  RLE HF 3/5, KE 4/5, DF/PF 4/5  AKA site C/D, closed w staples        Neurological: She is alert.   Followed commands  Memory: 3/3 immediate, 1/3 delayed     Skin: Skin is warm and dry.   Rash under skin flap left lower abdomen extending into groin; erythematous with pustular islands   Psychiatric: She has a normal mood and affect. Her behavior is normal.     NEUROLOGICAL EXAMINATION:     CRANIAL NERVES     CN III, IV, VI   Pupils are equal, round, and reactive to light.  Extraocular motions are normal.       Assessment/Plan:      Aleta Herrera is a 62 y.o. female admitted to inpatient rehabilitation on 8/7/2017 for Amputation of leg with impaired mobility and ADLs. Patient remains appropriate for PT, OT, and as required Speech therapy. Patient continues to require 24 hour nursing care as well as daily Physician assessment.    * Amputation of leg    s/p L  "AKA on 7/31 2/2 gangrenous unsalvageable L foot   8/10 Has Vascular F/U today - Recs included " 1. Remove left  AKA staples on 8/21/17 at Rehab Facility.  F/U in 4 months with HD access US "    Patient will be going home w family, tentative DC date 8/24.     Pt's therapy sessions remain the same. She exhibits participation & motivation but is slow with her progression. Remains at Max A for transfers-will require 24 hr support at home. Plan for family therapy tomorrow (last family therapy session 08/16.)     Pain regimen: oxy 5mg PRN & tylenol 650 mg PRN; lyrica 75mg QD  -Requires 1-3 doses oxy daily, no tylenol.  -lyrica 50 mg BD (CrCl 19)    Functional status:  -memory Mod A, prob solving Mod A, expression/Comp Mod I   -Bed mobility SBA/Mod w   -Sit to stand Min A   -Transfers- Max A   -UBD Suzie  -Bathing Mod A   -WC mobility-SBA          Abdominal skin rash    -noted 8/21  -fungal in nature  -wound care assessed  -miconazole cream, cleanse area        ESRD (end stage renal disease)    ESRD, home HD MWF and L AVG placed 4/2017  -HD MWF, cont to monitor   -H/H 8/21: 9.5/30.5 (improved from 6.7/23.7)  -asymptomatic   -2U RBC 8/18 at dialysis.  -receives Epo at dialysis  -other labs: Cr 2.4 (decreased from 2.7); K 4.2, WNL.            Type 2 diabetes mellitus with left diabetic foot ulcer    8/9 PO intake around 50%. Will hold Levemir 4u BID, and monitor trend.     8/14 -157,  cont to hold levemir  8/15 , restart levemir 4U HS  8/16 -145, con't to monitor on levemir 4U HS  8/17 -214, con't to monitor on levemir 4U HS; considered BD but will remain HS  8/18 , con't to monitor on levemir 4U HS  8/19 -284, con't to monitor on levemir 4U HS  8/20 -162, con't to monitor on levemir 4U HS  8/21 -210, midday PCOT elevated (210, given sliding scale), con't to monitor on levemir 4U HS   8/22 -177, con't to monitor on levemir 4U HS  8/23 , con't to monitor on levemir 4U " HS        Hypertension, renal    -VSS, cont w current mgmt     Vitals:    08/21/17 2120 08/22/17 0655 08/22/17 1925 08/23/17 0640   BP: (!) 117/56 121/61 (!) 105/55 (!) 117/58   BP Location: Right arm Left arm Left arm Right arm   Patient Position: Sitting Lying Lying Lying   Pulse: 101 84 85 88   Resp:  18 16 16   Temp: 98.5 °F (36.9 °C) 98.1 °F (36.7 °C) 98 °F (36.7 °C) 97.9 °F (36.6 °C)   TempSrc: Oral Oral Oral Oral   SpO2: (!) 92% 97% 96% (!) 93%   Weight:       Height:                 Pressure ulcer of left buttock    - regular wound care, low air los mattress, frequent turnover        Stage III pressure ulcer of right buttock    - regular wound care, low air los mattress, frequent turnover        Impaired mobility and ADLs    Other Rehab Plan/Continue to monitor:   Nutrition/Swallow Status:    - Prealbumin 08/10: 11, cont with supplement    - Nutritionist on board   Bladder Management: anuric (ESRD)  Bowel Management:  Incontinent  -bowel program ordered 8/18  - scheduled polyethylete glycol and docusate  - Colace BID and Suppository PRN   - Will monitor for regularity   Mood: stable   Sleep: monitor; Will consider sleep aid as clinically indicated   Skin: Monitor   DVT ppx: heparin              DISCHARGE PLANNING:  Tentative Discharge Date: 8/24/2017    Future Appointments  Date Time Provider Department Center   9/6/2017 12:00 PM VASCULAR, LAB [facility] MARCIO Sanders   9/6/2017 12:30 PM VASCULAR, LAB [facility] MARCIO Sanders   9/6/2017 1:00 PM Nathalie Madera MD [facility] ANAIS Sanders   9/12/2017 9:40 AM Radha Cortez MD [facility] James Sanders PCW       Dahlia Weaver MD  Department of Physical Medicine & Rehab   [facility]

## 2017-08-23 NOTE — PROGRESS NOTES
"Occupational Therapy   Discharge Summary    Aleta Herrera  MRN: [id number]  Room/Bed: E263/E263 B     08/23/17 0730   OT Time Calculation   OT Start Time 0730   OT Stop Time 0826   OT Total Time (min) 56 min   General   OT Date of Treatment 08/23/17   Family/Caregiver Present No   Patient Found (position) Supine in bed   Pt found with oxygen  (3L)   Precautions   General Precautions fall   Subjective   Patient states "Are we getting out of bed."   Pain/Comfort   Pain Rating no pain   Bed Mobility   Rolling/Turning to Left Modified independent   Rolling/Turning Left Comments with use of rail   Rolling/Turning Right Modified independent   Rolling/Turning Right Comments with use of rail   Scooting/Bridging Stand by Assistance   Scooting/Bridging Comments to scoot to EOB   Supine to Sit Moderate Assistance   Supine to Sit Comments for trunk elevation to EOB   Sit to Supine Minimum Assistance   Sit to Supine Comments to flat bed surface   Bed to Chair   Bed <> Chair Technique Slide Board   Bed <> Chair Transfer Assistance Maximum Assistance   Trials/Comments from EOB>wc   Feeding   Feeding Level of Assistance Set-up Assistance   Feeding Where Assessed Wheelchair   Feeding Comments to cut up food 2/2 poor vision   Grooming   Grooming Level of Assistance Supervision   Grooming Where Assessed Sitting sinkside   Bathing   Bathing Level of Assistance Moderate assistance   Bathing Where Assessed Bed;Edge of bed   Bathing Comments assist with pericare and RLE for thoroghness. Steadying assist from sitting EOB to bathe UE and trunk   UE Dressing   UE Dressing Level of Assistance Minimum assistance   UE Dressing Where Assessed Edge of bed   UE Dressing Comments for clothing maangment down trunk and steadying from sitting unsupported EOB   LE Dressing   LE Dressing  Level of Assistance Maximum assistance   LE Dressing Where Assessed Bed level   LE Dressing Comments threading RLE, hand over hand for L residual limb and " assist with clothing management over hips   Toileting   Toileting Level of Assistance Total assistance   Toileting Where Assessed Bed level   Toileting Comments Pt remains incontinent bowel/bladder   Dynamic Sitting Balance   Dynamic Sitting-Balance Support Unilateral upper extremity supported   Dynamic Sitting-Balance Reaching for objects;Reaching across midline   Dynamic Sitting-Comments Min (A) during functional activities EOB   Activity Tolerance   Activity Tolerance Patient tolerated treatment well   After Treatment   Patient Position After Treatment Seated in wheelchair   Patient after treatment left with all lines intact  (posey belt intact)   Assessment   Prognosis Fair   Problem List Decreased Self Care skills;Decreased upper extremity range of motion;Decreased upper extremity strength;Decreased safe judgment during ADL;Decreased endurance;Visual deficit;Decreased functional mobility;Decreased IADLs;Decreased trunk control for functional activities   Assessment Pt participated well in tx session but remains with significant decreased  (I) with ADLs and functional mobility and decreased balance. Pt would continue to benefit from skilled OT services.   Level of Motivation/Participation Good   Long Term Goals   Pt Will Perform Supine To Sit With minimal assist   Supine to Sit - Met/Not Met Not Met   Pt Will Perform Sit to Supine With minimal assist   Supine to Sit - Met/Not Met Not Met   Pt Will Transfer Bed/Chair With moderate assist   Transfer Bed/Chair - Met/Not Met Not Met   Pt Will Transfer To Bedside Commode With moderate assist   Transfer Bedside Commode -Met/Not Met Not Met   Pt Will Perform Eating Independently   Eating - Met/Not Met Not Met   Pt Will Perform Grooming With modified indepdenence   Grooming - Met/Not Met Not Met   Pt Will Perform Bathing With minimal assistance   Bathing - Met/Not Met Not Met   Pt Will Perform UE Dressing With modified independence   UE Dressing - Met/Not Met Not Met    Pt Will Perform LE Dressing With moderate assistance   LE Dressing - Met/Not Met Met   Pt Will Perform Toileting With maximum assistance   Toileting-Met/Not Met Not Met   Discharge Recommendations   Equipment Needed After Discharge wheelchair;bedside commode;bath bench;slide board   Discharge Facility/Level Of Care Needs home with home health   Plan   Plan Continue HHOT   Therapy Frequency 2 times/day   Occupational Therapy Follow-up   OT Follow-up? Yes   Treatment/Billable Minutes   Self Care/Home Management 56   Total Time 56       ROGELIO Bourne  8/23/2017    LEGEND:   CGA: Contact Guard Assist   EOB: Edge of Bed   HHA: Hand Held Assist   HOB: Head of Bed   (I): Independent-patient performs task in a timely manner   Max (A): Maximal Assist-patient performs 25-49% of task   Min (A): Minimal Assist- patient performs 75% or more of task   Mod (A): Moderate Assist- patient performs 50-74% of task   NA: Not applicable   NT: Not tested   OOB: Out of Bed   PTA: Prior to admit   QC: Quad Cane   RW: Rolling Walker   (S): Supervision- patient requires cues, coaxing, prompting   SBA: Stand By Assist   SC: Straight Cane   SW: Standard Walker   TBA: To be assessed   Total (A): Total Assist- patient performs less than 25% of task   WC: Wheelchair   WFL: Within Functional Limits   WNL: Within Normal Limits

## 2017-08-23 NOTE — PROGRESS NOTES
Occupational Therapy  PM Group Session  Aleta Herrera   MRN: [id number]        08/23/17 1330   OT Time Calculation   OT Start Time 1330   OT Stop Time 1415   OT Total Time (min) 45 min   General   OT Date of Treatment 08/23/17   Treatment/Billable Minutes   Therapeutic Group 45   Total Time 45       Patient participated in 45 minute seated high endurance group. The group activity challenged bilateral upper extremity and lower extremity strengthening. In addition, cardiovascular and functional endurance were challenged during this group. Pt utulized 2# dowel during the following exercises.    Patient completed 20 reps x 3  sets bicep curls, side raises, shoulder flexion, shoulder extension (in UE circuit)    Patient completed 20 reps x 3 sets hip flexion, knee flexion, knee extension, toe and heel taps (in LE circuit)    - Alternating punches, side punches, diagonal reaches, side/front/overhead claps, running arms 3 x 20 reps    Pt required short rest breaks during therapeutic exercises. These activities were performed in a group setting to encourage participation with peers and social interaction skills.         The ROGELIO and EUFEMIA have collaborated and discussed the patient's status, treatment plan and progress toward established goals.     Cristopher Gutiérrez, HONEY 8/23/2017

## 2017-08-23 NOTE — DISCHARGE INSTRUCTIONS
[facility] [phone].  You will resume your regular dialysis schedule of M-W-F at 1000.    [facility] transportation [phone] option 1 to schedule a ride.    DME

## 2017-08-23 NOTE — PROGRESS NOTES
Physical Therapy   Daily Physical Therapy FIM Scores    Aleta Herrera   MRN: [id number]        08/23/17 1600   Transfers   Bed/Chair/WC 2   Toilet 2   Tub 2   Locomotion   Distance Wheelchair 3   Wheelchair 5   Mode C     Netta Barragan, PT  8/23/2017

## 2017-08-23 NOTE — PROGRESS NOTES
Occupational Therapy  FIM SCORES    Aleta Herrera  MRN: [id number]  Room/Bed: E263/E263 B       08/23/17 0900   Transfers   Bed/Chair/WC 2   Self Care   Eating 5   Grooming 5   Bathing 3   Dressing-Upper 4   Dressing-Lower 2   Toileting 1       ROGELIO Bourne  8/23/2017

## 2017-08-23 NOTE — NURSING
Mrs. Herrera's only issue today was pain management. PRN pain meds were given before therapy and at lunch time during this shift. Patient was incontinent of bowel movement during this shift. Wound care nurse assessed left abdominal fold area and miconazole cream was ordered to place on that area. Patient was taught the importance of keeping area clean, due to close proximity of dialysis access.

## 2017-08-23 NOTE — PROGRESS NOTES
M[facility] at [facility] Kiana Bullard informed of scheduled discharge date and request to return to home schedule of MWF at 1000.  Kiana will inform clinic director and contact MIR if pt unable to dialyze Friday at her usual time.

## 2017-08-23 NOTE — ASSESSMENT & PLAN NOTE
-VSS, cont w current mgmt     Vitals:    08/21/17 2120 08/22/17 0655 08/22/17 1925 08/23/17 0640   BP: (!) 117/56 121/61 (!) 105/55 (!) 117/58   BP Location: Right arm Left arm Left arm Right arm   Patient Position: Sitting Lying Lying Lying   Pulse: 101 84 85 88   Resp:  18 16 16   Temp: 98.5 °F (36.9 °C) 98.1 °F (36.7 °C) 98 °F (36.7 °C) 97.9 °F (36.6 °C)   TempSrc: Oral Oral Oral Oral   SpO2: (!) 92% 97% 96% (!) 93%   Weight:       Height:

## 2017-08-23 NOTE — PROGRESS NOTES
"Physical Therapy   Treatment/ Patient Discharge Summary    Aleta Herrera   MRN: [id number]      08/23/17 0855   PT Time Calculation   PT Start Time 0855   PT Stop Time 0942   PT Total Time (min) 47 min   Treatment   Treatment Type Treatment  (FIM Assessment/Patient Discharge Summary)   PT/PTA PT   General   PT Received On 08/23/17   Family/Caregiver Present No   Patient Found (position) Seated in wheelchair   Pt found with oxygen  (posey belt donned, 3L O2, post operative )   Precautions   General Precautions fall   Orthopedic No   Required Braces or Orthoses No   Visual/Auditory Vision impaired   Subjective   Patient states "I am doing ok today"   Pain/Comfort   Pain Rating 1 5/10   Location - Side 1 Left   Location - Orientation 1 generalized   Location 1 (residual limb)   Pain Addressed 1 Reposition;Distraction   Pain Rating Post-Intervention 1 5/10   Bed Mobility   Bed Mobility yes   Rolling/Turning to Left Supervision  (x 2 trials on mat )   Rolling/Turning Right Supervision  (x 2 trials on mat )   Supine to Sit Minimum Assistance   Supine to Sit Comments x 1 trial on mat, assistance required to elevate trunk off of mat    Sit to Supine Stand by Assistance  (x 1 trial on mat )   Transfers   Transfer yes   Sit to Stand   Sit <> Stand Assistance Maximum Assistance   Sit <> Stand Assistive Device Other (see comments)  (parallel bars)   Trials/Comments Pt performed x 2 trials, maximum physical assistance required for pt to elevate hips out of wheelchair   Stand to Sit   Assistance Minimum Assistance;Moderate Assistance   Assistive Device Other (see comments)  (parallel bars)   Trials/Comments Pt performed x 2 trials, pt required assistance to control descent into sitting in wheelchair    Chair to Mat   Chair<> Mat Technique Slide Board   Chair<>Mat Assistance Maximum Assistance   Chair <> Mat Assistive Device Slide Board   Trials/Comments x 1 trial, pt required assistance for placement of slide " board. Pt required maximum assistance from PT to bring hips across slide board to complete transfer. Verbal cues required for forward flexion of trunk    Mat to Chair   Technique Slide Board   Assistance Maximum Assistance   Assistive Device Slide Board   Trials/Comments x 1 trial, pt required maximum assistance to bring hips across slide board to complete transfer. Pt required maximum assistance for placement of slide board    Tub Bench Transfer   Technique Slide Board   Assistance Maximum Assistance   Assistive Device Slide Board   Trials/Comments x 1 trial, pt required maximum verbal cues for appropriate sequence of this transfer. Pt required assistance to elevate hips across slide board. Pt required assistance with bringing L LE into and out of tub to complete transfer   Car Transfer   Trials/Comments PT deferred performing this transfer with pt due to pt requiring increased assistance to complete transfers at this time and due to small size of car located in therapy gym   Wheelchair Activities   Propulsion Yes   Propulsion Type 1 Manual   Level 1 Level tile   Method 1 Right upper extremity;Left upper extremity   Level of Assistance 1 Supervision   Description/ Details 1 Pt propelled self in wheelchair x 180 feet, increased time required increased time to complete activity    Gait   Gait No  (Due to pt's difficulty performing static standing, PT deferred performing ambulation at this time due to safety concerns)   Stairs   Stairs No  (Due to pt's difficulty performing static standing, PT deferred performing stair and curb step negotiation at this time due to safety concerns)   Balance   Balance Yes   Static Standing Balance   Static Standing-Balance Support Right upper extremity supported;Left upper extremity supported  (in parallel bars)   Static Standing-Level of Assistance Moderate assistance   Static Standing-Comment/# of Minutes Pt tolerated static standing in parallel bars x 2 trials: 1st trial: 1 min 15  seconds, 2nd trial: 1 min 31 seconds. Pt required moderate assistance to maintain balance and to maintain upright posture in standing    Other Activities   Comments Patient performed slide board transfer from wheelchair to bedside commode with maximum assistance, to drop arm commode    PT deferred having pt perform car transfer, picking object up off of floor, and ambulating over uneven surfaces due to safety concerns    PT educated and gave pt a handout regarding L residual limb care, use of post operative , and appropriate residual limb exercises to perform. Pt was informed to hold off of donning post operative  at this time until rash along pt's L anterior thigh and flank resolves per MD order.     PT doffed pt's post operative  while pt was supine on mat per MD order   Activity Tolerance   Activity Tolerance Patient tolerated treatment well   After Treatment   Patient Position After Treatment Seated in wheelchair   Patient after treatment left with all lines intact;call button in reach  (posey belt donned)   Assessment   Prognosis Fair   Problem List Decreased strength;Decreased range of motion;Decreased endurance;Impaired balance;Decreased mobility;Impaired vision;Decreased safety awareness;Decreased cognition;Pain   Session Assessment progressing toward goals   Assessment Pt continues to require maximum assistance to perform all slide board transfers into and out of wheelchair due to decreased B UE strength and decreased endurance for activity. Pt will require this level of significant assistance upon D/C. Pt was able to achieve 5 of 8 long term goals for physical therapy during ehr stay on IP rehab. Pt will continue to benefit from further home health PT upon D/C in order to increase pt's functional independence for mobility and decrease pt's caregiver burden.    Level of Motivation/Participation good   Barriers to Discharge Decreased caregiver support   Barriers to Discharge Comments  Pt will require significant assistance to complete all care upon D/C.    Long Term Goals   Supine to Sit-Met/Not Met Not Met   Sit to Supine - Met/Not Met Met   Logroll - Met/Not Met Met   Transfer Bed/Chair - Met/Not Met Not Met   Propel Wheelchair - Met/Not Met Met   Demo Pressure Relief - Met/Not met Met   Other Goal - Progress Met   Other Goal - Progress 2 Not met    Discharge Recommendations   Equipment Needed After Discharge bedside commode;bath bench;wheelchair;slide board   Discharge Facility/Level Of Care Needs home health PT   Plan   Planned Therapy Intervention Continue with current plan   Therapy Frequency 2 times/day;daily;Monday-Friday;Saturday or Sunday   Physical Therapy Follow-up   PT Follow-up? Yes   PT - Next Visit Date 08/24/17   Treatment/Billable Minutes   Therapeutic Activity 47   Total Time 47     Netta Barragan, PT  8/23/2017

## 2017-08-23 NOTE — PROGRESS NOTES
Speech Therapy   Discharge Note    Aleta Herrera   MRN: [id number]                  Short Term Goals   Goal 1 Patient will complete functional recall task with 75% accuracy given min A. GOAL MET   Goal 2 Patient will complete math/time/money management task with 70% accuracy given mod A. GOAL MET   Goal 3 Patient will complete delayed recall task with 70% accuracy given min A. GOAL MET & CONTINUE   Goal 4 Patient will complete bedside swallow evaluation to rule our risk of aspiration with PO intake. NO LONGER WARRANTED   Long Term Goals   Goal 1 Patient will complete functional recall task with 85% accuracy given min A.   Goal 2 Patient will complete math/time/money management task with 75% accuracy given min A.    Goal 3 Patient will complete delayed recall task with 80% accuracy given min A.     Recommend continued skilled ST services via home health speech therapy targeting cognitive skills for return to independent living style with spouse and daughter. Patient has made improvements in recall skills, using compensatory strategies learned in therapy.          08/23/17 1035   Speech Time Calculation   Speech Start Time 1035   Speech Stop Time 1125   Speech Total (min) 50 min   General Information   SLP Treatment Date 08/23/17   General Observations Patient seen while sitting upright in w/c in patient's room.   General Precautions fall   Visual/Auditory Vision impaired       SLP Cognitive Treatment   Treatment Detail (SLP Cognitive Treatment) Patient oriented x4 independently. Patient recalled recent information in detail with no difficulty. Patient recalled recall strategies learned in previous ST group session with no difficulty. In a short term recall task, patient recalled information from a paragraph with 80% accuracy independently, 88% accuracy given min verbal cues. In a calculations problem solving task, patient provided solutions to time management questions with 60% accuracy independently, 80%  accuracy given max verbal cues. In a simple counting task, patient provided solutions to word problems with 70% accuracy independently, 90% accuracy given moderate verbal cues. ST to continue POC.   Assessment   SLP Diagnosis mild-mod cog deficits   Prognosis Good   Problem List decreased recall; decreased math/money/time management skills   Session Assessment progressing toward goals   Level of Motivation/Participation Good   Discharge Recommendations   Discharge Facility/Level Of Care Needs home health speech therapy   Plan   Plan Continue with current plan   SLP Follow-up   SLP Follow-up? Yes   Treatment/Billable Minutes   Speech Therapy Individual 50   Total Time 50         FIM Scores  Auditory Comprehension:6  Expression: 6  Social Interaction:6  Problem Solving:3  Memory: 4     Comprehension:  Complex= humor, finances, rationale for medical treatment(hip precautions, pressure relief)  Basic= pain, hunger, thirst, bathroom needs, cold, nutrition, sleep  Understands complex/abstract conversation/directions with extra time, assistance device(glasses, if visual mode or both; hearing aide if auditory mode or both) (6)     Expression:  Expresses complex / abstract ideas with extra time, assistive device (augmentative communication device    (6)     Social Interaction:  Interacts appropriately with others with medication or extra time(anti-anxiety, antidepressant)  (6)     .Problem Solving:  Solves basic problems 50-74% of the time  (3)     Memory:   Recognizes, recalls, or executes 75-89% of time 2 steps of 3 step request  (4)          MIMI Mujica-SLP  8/23/2017

## 2017-08-23 NOTE — ASSESSMENT & PLAN NOTE
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 Remove left  AKA staples on 8/21/17 at Rehab Facility.  F/U in 4 months with HD access US "    Patient will be going home w family, tentative DC date 8/24.     Pt's therapy sessions remain the same. She exhibits participation & motivation but is slow with her progression. Remains at [facility] for transfers-will require 24 hr support at home. Plan for family therapy tomorrow (last family therapy session 08/16.)     Pain regimen: oxy 5mg PRN & tylenol 650 mg PRN; lyrica 75mg QD  -Requires 1-3 doses oxy daily, no tylenol.  -lyrica 50 mg BD (CrCl 19)    Functional status:  -memory Mod A, prob solving Mod A, expression/Comp Mod I   -Bed mobility SBA/Mod w   -Sit to stand Min A   -Transfers- [facility]   -UBD Suzie  -Bathing Mod A   -WC mobility-SBA    "

## 2017-08-24 VITALS
RESPIRATION RATE: 16 BRPM | HEART RATE: 87 BPM | WEIGHT: 108.94 LBS | HEIGHT: 59 IN | DIASTOLIC BLOOD PRESSURE: 60 MMHG | OXYGEN SATURATION: 92 % | TEMPERATURE: 99 F | BODY MASS INDEX: 21.96 KG/M2 | SYSTOLIC BLOOD PRESSURE: 124 MMHG

## 2017-08-24 PROBLEM — L89.90 PRESSURE INJURY OF SKIN: Status: ACTIVE | Noted: 2017-08-24

## 2017-08-24 LAB
ANION GAP SERPL CALC-SCNC: 6 MMOL/L
BASOPHILS # BLD AUTO: 0.02 K/UL
BASOPHILS NFR BLD: 0.3 %
BUN SERPL-MCNC: 13 MG/DL
CALCIUM SERPL-MCNC: 8.5 MG/DL
CHLORIDE SERPL-SCNC: 104 MMOL/L
CO2 SERPL-SCNC: 29 MMOL/L
CREAT SERPL-MCNC: 2.8 MG/DL
DIFFERENTIAL METHOD: ABNORMAL
EOSINOPHIL # BLD AUTO: 0.6 K/UL
EOSINOPHIL NFR BLD: 7.6 %
ERYTHROCYTE [DISTWIDTH] IN BLOOD BY AUTOMATED COUNT: 19.8 %
EST. GFR  (AFRICAN AMERICAN): 20.2 ML/MIN/1.73 M^2
EST. GFR  (NON AFRICAN AMERICAN): 17.6 ML/MIN/1.73 M^2
GLUCOSE SERPL-MCNC: 141 MG/DL
HCT VFR BLD AUTO: 29.4 %
HGB BLD-MCNC: 8.5 G/DL
LYMPHOCYTES # BLD AUTO: 0.9 K/UL
LYMPHOCYTES NFR BLD: 12.4 %
MCH RBC QN AUTO: 28 PG
MCHC RBC AUTO-ENTMCNC: 28.9 G/DL
MCV RBC AUTO: 97 FL
MONOCYTES # BLD AUTO: 0.5 K/UL
MONOCYTES NFR BLD: 6.6 %
NEUTROPHILS # BLD AUTO: 5.3 K/UL
NEUTROPHILS NFR BLD: 73.1 %
PLATELET # BLD AUTO: 261 K/UL
PMV BLD AUTO: 11.2 FL
POCT GLUCOSE: 184 MG/DL (ref 70–110)
POCT GLUCOSE: 194 MG/DL (ref 70–110)
POTASSIUM SERPL-SCNC: 3.8 MMOL/L
RBC # BLD AUTO: 3.04 M/UL
SODIUM SERPL-SCNC: 139 MMOL/L
WBC # BLD AUTO: 7.26 K/UL

## 2017-08-24 PROCEDURE — 3E0234Z INTRODUCTION OF SERUM, TOXOID AND VACCINE INTO MUSCLE, PERCUTANEOUS APPROACH: ICD-10-PCS | Performed by: PHYSICAL MEDICINE & REHABILITATION

## 2017-08-24 PROCEDURE — 99238 HOSP IP/OBS DSCHRG MGMT 30/<: CPT | Mod: ,,, | Performed by: PHYSICAL MEDICINE & REHABILITATION

## 2017-08-24 PROCEDURE — 36415 COLL VENOUS BLD VENIPUNCTURE: CPT

## 2017-08-24 PROCEDURE — 63600175 PHARM REV CODE 636 W HCPCS: Performed by: STUDENT IN AN ORGANIZED HEALTH CARE EDUCATION/TRAINING PROGRAM

## 2017-08-24 PROCEDURE — G0009 ADMIN PNEUMOCOCCAL VACCINE: HCPCS | Performed by: PHYSICAL MEDICINE & REHABILITATION

## 2017-08-24 PROCEDURE — 27000221 HC OXYGEN, UP TO 24 HOURS

## 2017-08-24 PROCEDURE — 80048 BASIC METABOLIC PNL TOTAL CA: CPT

## 2017-08-24 PROCEDURE — 25000003 PHARM REV CODE 250: Performed by: STUDENT IN AN ORGANIZED HEALTH CARE EDUCATION/TRAINING PROGRAM

## 2017-08-24 PROCEDURE — 63600175 PHARM REV CODE 636 W HCPCS: Performed by: PHYSICAL MEDICINE & REHABILITATION

## 2017-08-24 PROCEDURE — 25000003 PHARM REV CODE 250: Performed by: PHYSICAL MEDICINE & REHABILITATION

## 2017-08-24 PROCEDURE — 90670 PCV13 VACCINE IM: CPT | Performed by: PHYSICAL MEDICINE & REHABILITATION

## 2017-08-24 PROCEDURE — 90471 IMMUNIZATION ADMIN: CPT | Performed by: PHYSICAL MEDICINE & REHABILITATION

## 2017-08-24 PROCEDURE — 85025 COMPLETE CBC W/AUTO DIFF WBC: CPT

## 2017-08-24 RX ORDER — DOXYLAMINE SUCCINATE 25 MG
TABLET ORAL 2 TIMES DAILY
Refills: 0 | COMMUNITY
Start: 2017-08-24

## 2017-08-24 RX ORDER — PANTOPRAZOLE SODIUM 40 MG/1
40 TABLET, DELAYED RELEASE ORAL DAILY
Qty: 90 TABLET | Refills: 0 | Status: SHIPPED | OUTPATIENT
Start: 2017-08-24 | End: 2017-09-28 | Stop reason: SDUPTHER

## 2017-08-24 RX ORDER — PREGABALIN 50 MG/1
50 CAPSULE ORAL 2 TIMES DAILY
Qty: 180 CAPSULE | Refills: 2 | Status: SHIPPED | OUTPATIENT
Start: 2017-08-24 | End: 2017-09-28 | Stop reason: SDUPTHER

## 2017-08-24 RX ADMIN — SEVELAMER CARBONATE 2400 MG: 800 TABLET, FILM COATED ORAL at 07:08

## 2017-08-24 RX ADMIN — SEVELAMER CARBONATE 2400 MG: 800 TABLET, FILM COATED ORAL at 11:08

## 2017-08-24 RX ADMIN — PNEUMOCOCCAL 13-VALENT CONJUGATE VACCINE 0.5 ML: 2.2; 2.2; 2.2; 2.2; 2.2; 4.4; 2.2; 2.2; 2.2; 2.2; 2.2; 2.2; 2.2 INJECTION, SUSPENSION INTRAMUSCULAR at 11:08

## 2017-08-24 RX ADMIN — MICONAZOLE NITRATE: 20 CREAM TOPICAL at 07:08

## 2017-08-24 RX ADMIN — PANTOPRAZOLE SODIUM 40 MG: 40 TABLET, DELAYED RELEASE ORAL at 07:08

## 2017-08-24 RX ADMIN — CALCITRIOL 0.5 MCG: 0.5 CAPSULE, LIQUID FILLED ORAL at 07:08

## 2017-08-24 RX ADMIN — ASPIRIN 81 MG CHEWABLE TABLET 81 MG: 81 TABLET CHEWABLE at 07:08

## 2017-08-24 RX ADMIN — PREGABALIN 50 MG: 50 CAPSULE ORAL at 07:08

## 2017-08-24 RX ADMIN — AMLODIPINE BESYLATE 10 MG: 10 TABLET ORAL at 07:08

## 2017-08-24 RX ADMIN — HEPARIN SODIUM 5000 UNITS: 5000 INJECTION, SOLUTION INTRAVENOUS; SUBCUTANEOUS at 05:08

## 2017-08-24 NOTE — SUBJECTIVE & OBJECTIVE
Interval History: No new complaints. Pain stable (required no doses of oxycodone PRN.) Abdominal/groin fungal rash non-tender & non-pruritic; being treated with miconazole cream. Denies fever/chills, cough, diarrhea. Sleeping well, strong appetite, last BM yesterday. Plan for discharge today.    Scheduled Medications:    amlodipine  10 mg Oral Daily    aspirin  81 mg Oral Daily    calcitRIOL  0.5 mcg Oral Daily    docusate sodium  100 mg Oral BID    heparin (porcine)  5,000 Units Subcutaneous Q8H    insulin detemir  4 Units Subcutaneous QHS    miconazole nitrate 2%   Topical (Top) BID    pantoprazole  40 mg Oral Daily    polyethylene glycol  17 g Oral Daily    pregabalin  50 mg Oral BID    sevelamer carbonate  2,400 mg Oral TID WM       PRN Medications: acetaminophen, bisacodyl, calcium carbonate, dextrose 50%, dextrose 50%, glucagon (human recombinant), glucose, glucose, heparin (porcine), insulin aspart, methocarbamol, ondansetron, ondansetron, oxycodone, senna-docusate 8.6-50 mg    Review of Systems   Constitutional: Negative for chills and fever.   Eyes: Negative for visual disturbance.   Respiratory: Negative for cough and shortness of breath.    Cardiovascular: Negative for chest pain, palpitations and leg swelling.   Gastrointestinal: Negative for abdominal pain, blood in stool, constipation, diarrhea, nausea and vomiting.   Genitourinary: Negative for flank pain.   Musculoskeletal: Negative for back pain.   Skin: Positive for wound (3 pressure ulcers on buttocks; 1 fungal rash on left lower abdomen extending into groin).   Neurological: Positive for weakness. Negative for dizziness and headaches.   Hematological:        Bleeding from left leg surgical site; controlled     Objective:     Vital Signs (Most Recent):  Temp: 98.6 °F (37 °C) (08/24/17 0621)  Pulse: 87 (08/24/17 0621)  Resp: 16 (08/24/17 0621)  BP: 124/60 (08/24/17 0751)  SpO2: (!) 92 % (08/24/17 0621)    Vital Signs (24h Range):  Temp:   [98.6 °F (37 °C)-98.7 °F (37.1 °C)] 98.6 °F (37 °C)  Pulse:  [87-98] 87  Resp:  [16-17] 16  SpO2:  [92 %-93 %] 92 %  BP: (100-124)/(53-60) 124/60     Physical Exam   Constitutional: She appears well-developed and well-nourished.   HENT:   Head: Normocephalic and atraumatic.   Eyes: EOM are normal. Pupils are equal, round, and reactive to light.   Neck: Normal range of motion. Neck supple.   Cardiovascular: Normal rate, regular rhythm, normal heart sounds and intact distal pulses.    Pulmonary/Chest: Effort normal and breath sounds normal.   Abdominal: Soft. Bowel sounds are normal. There is no tenderness.   Musculoskeletal: She exhibits no edema or tenderness.   UE: RUE: SA 4/5, EF/EE 3/5,  4/5  LUE: SA 4/5, EF/EE 4/5, 3/5,  4/5  RLE HF 3/5, KE 4/5, DF/PF 4/5  AKA site C/D, closed w staples        Neurological: She is alert.   Followed commands  Memory: 3/3 immediate, 1/3 delayed     Skin: Skin is warm and dry.   Rash under skin flap left lower abdomen extending into groin; erythematous with pustular islands   Psychiatric: She has a normal mood and affect. Her behavior is normal.     NEUROLOGICAL EXAMINATION:     CRANIAL NERVES     CN III, IV, VI   Pupils are equal, round, and reactive to light.  Extraocular motions are normal.

## 2017-08-24 NOTE — ASSESSMENT & PLAN NOTE
8/9 PO intake around 50%. Will hold Levemir 4u BID, and monitor trend.     8/14 -157,  cont to hold levemir  8/15 , restart levemir 4U HS  8/16 -145, con't to monitor on levemir 4U HS  8/17 -214, con't to monitor on levemir 4U HS; considered BD but will remain HS  8/18 , con't to monitor on levemir 4U HS  8/19 -284, con't to monitor on levemir 4U HS  8/20 -162, con't to monitor on levemir 4U HS  8/21 -210, midday PCOT elevated (210, given sliding scale), con't to monitor on levemir 4U HS   8/22 -177, con't to monitor on levemir 4U HS  8/23 -198, con't to monitor on levemir 4U HS  8/24 , will plan to discharge home on levemir 4U HS

## 2017-08-24 NOTE — PROGRESS NOTES
Wound care follow-up.    The diaper was removed from the patient, cleaned perineal skin with soap and water, applied barrier paste to abdominal folds and buttocks.  Noted pressure injuries to lower buttocks x 3- shallow stage 2 not resolved.  Pink/moist wound bed with overlay of dead skin, area extremely moist- possible shearing caused new skin to reopen.  Plan of care discussed with patient who verbalized understanding.  Miconazole cream changed to ointment due to better absorption and Triad barrier cream supplied to patient for perineal/buttocks area.  Discussed leaving diaper off at home to dry perineal area, bowel regimen at home already established. Verbalized understanding of wound care instructions.

## 2017-08-24 NOTE — PROGRESS NOTES
[facility]  Physical Medicine & Rehab  Progress Note    Patient Name: Aleta Herrera  MRN: [id number]  Patient Class: IP- Rehab   Admission Date: 8/7/2017  Length of Stay: 17 days  Attending Physician: Amirah Cano MD  Primary Care Provider: Radha Lao MD    Subjective:     Principal Problem:Amputation of leg    Interval History: No new complaints. Pain stable (required no doses of oxycodone PRN.) Abdominal/groin fungal rash non-tender & non-pruritic; being treated with miconazole cream. Denies fever/chills, cough, diarrhea. Sleeping well, strong appetite, last BM yesterday. Plan for discharge today.    Scheduled Medications:    amlodipine  10 mg Oral Daily    aspirin  81 mg Oral Daily    calcitRIOL  0.5 mcg Oral Daily    docusate sodium  100 mg Oral BID    heparin (porcine)  5,000 Units Subcutaneous Q8H    insulin detemir  4 Units Subcutaneous QHS    miconazole nitrate 2%   Topical (Top) BID    pantoprazole  40 mg Oral Daily    polyethylene glycol  17 g Oral Daily    pregabalin  50 mg Oral BID    sevelamer carbonate  2,400 mg Oral TID WM       PRN Medications: acetaminophen, bisacodyl, calcium carbonate, dextrose 50%, dextrose 50%, glucagon (human recombinant), glucose, glucose, heparin (porcine), insulin aspart, methocarbamol, ondansetron, ondansetron, oxycodone, senna-docusate 8.6-50 mg    Review of Systems   Constitutional: Negative for chills and fever.   Eyes: Negative for visual disturbance.   Respiratory: Negative for cough and shortness of breath.    Cardiovascular: Negative for chest pain, palpitations and leg swelling.   Gastrointestinal: Negative for abdominal pain, blood in stool, constipation, diarrhea, nausea and vomiting.   Genitourinary: Negative for flank pain.   Musculoskeletal: Negative for back pain.   Skin: Positive for wound (3 pressure ulcers on buttocks; 1 fungal rash on left lower abdomen extending into groin).   Neurological: Positive for  weakness. Negative for dizziness and headaches.   Hematological:        Bleeding from left leg surgical site; controlled     Objective:     Vital Signs (Most Recent):  Temp: 98.6 °F (37 °C) (08/24/17 0621)  Pulse: 87 (08/24/17 0621)  Resp: 16 (08/24/17 0621)  BP: 124/60 (08/24/17 0751)  SpO2: (!) 92 % (08/24/17 0621)    Vital Signs (24h Range):  Temp:  [98.6 °F (37 °C)-98.7 °F (37.1 °C)] 98.6 °F (37 °C)  Pulse:  [87-98] 87  Resp:  [16-17] 16  SpO2:  [92 %-93 %] 92 %  BP: (100-124)/(53-60) 124/60     Physical Exam   Constitutional: She appears well-developed and well-nourished.   HENT:   Head: Normocephalic and atraumatic.   Eyes: EOM are normal. Pupils are equal, round, and reactive to light.   Neck: Normal range of motion. Neck supple.   Cardiovascular: Normal rate, regular rhythm, normal heart sounds and intact distal pulses.    Pulmonary/Chest: Effort normal and breath sounds normal.   Abdominal: Soft. Bowel sounds are normal. There is no tenderness.   Musculoskeletal: She exhibits no edema or tenderness.   UE: RUE: SA 4/5, EF/EE 3/5,  4/5  LUE: SA 4/5, EF/EE 4/5, 3/5,  4/5  RLE HF 3/5, KE 4/5, DF/PF 4/5  AKA site C/D, closed w staples        Neurological: She is alert.   Followed commands  Memory: 3/3 immediate, 1/3 delayed     Skin: Skin is warm and dry.   Rash under skin flap left lower abdomen extending into groin; erythematous with pustular islands   Psychiatric: She has a normal mood and affect. Her behavior is normal.     NEUROLOGICAL EXAMINATION:     CRANIAL NERVES     CN III, IV, VI   Pupils are equal, round, and reactive to light.  Extraocular motions are normal.       Assessment/Plan:      Aleta Herrera is a 62 y.o. female admitted to inpatient rehabilitation on 8/7/2017 for Amputation of leg with impaired mobility and ADLs. Patient remains appropriate for PT, OT, and as required Speech therapy. Patient continues to require 24 hour nursing care as well as daily Physician  "assessment.    * Amputation of leg    s/p L AKA on 7/31 2/2 gangrenous unsalvageable L foot   8/10 Has Vascular F/U today - Recs included " 1. Remove left  AKA staples on 8/21/17 at Rehab Facility.  F/U in 4 months with HD access US "    Patient will be going home w family, tentative DC date 8/24.     Pt's therapy sessions remain the same. She exhibits participation & motivation but is slow with her progression. Remains at [facility] for transfers-will require 24 hr support at home. Last family therapy session 08/16.)     Pain regimen: oxy 5mg PRN & tylenol 650 mg PRN; lyrica 75mg QD  -Requires 1-3 doses oxy daily, no tylenol.  -lyrica 50 mg BD (CrCl 19)    Functional status:  -memory Min A (improved from Mod A), prob solving Mod A, expression/Comp Mod I   -Bed mobility SBA/Mod w   -Sit to stand Min A   -Transfers- Max A   -UBD Suzie  -Bathing Mod A   -WC mobility-SBA          Abdominal skin rash    -fungal in nature  -wound care assessed  -miconazole ointment, cleanse area        ESRD (end stage renal disease)    ESRD, home HD MWF and L AVG placed 4/2017  -HD MWF, cont to monitor   -H/H 08/24: 8.5/29.4 (s/p 2U RBC transfusion 08/18)  -asymptomatic anemia  -receives Epo at dialysis  -other labs: Cr 2.8, K 3.8, WNL.            Type 2 diabetes mellitus with left diabetic foot ulcer    8/9 PO intake around 50%. Will hold Levemir 4u BID, and monitor trend.     8/14 -157,  cont to hold levemir  8/15 , restart levemir 4U HS  8/16 -145, con't to monitor on levemir 4U HS  8/17 -214, con't to monitor on levemir 4U HS; considered BD but will remain HS  8/18 , con't to monitor on levemir 4U HS  8/19 -284, con't to monitor on levemir 4U HS  8/20 -162, con't to monitor on levemir 4U HS  8/21 -210, midday PCOT elevated (210, given sliding scale), con't to monitor on levemir 4U HS   8/22 -177, con't to monitor on levemir 4U HS  8/23 -198, con't to monitor on levemir 4U HS  8/24 " , will plan to discharge home on levemir 4U HS          Hypertension, renal    -VSS, cont w current mgmt     Vitals:    08/23/17 0640 08/23/17 2149 08/24/17 0621 08/24/17 0751   BP: (!) 117/58 (!) 100/53 124/60 124/60   BP Location: Right arm Right arm Right arm    Patient Position: Lying Lying Lying    Pulse: 88 98 87    Resp: 16 17 16    Temp: 97.9 °F (36.6 °C) 98.7 °F (37.1 °C) 98.6 °F (37 °C)    TempSrc: Oral Oral Oral    SpO2: (!) 93% (!) 93% (!) 92%    Weight:       Height:                 Pressure ulcer of left buttock    - regular wound care, low air los mattress, frequent turnover        Stage III pressure ulcer of right buttock    - regular wound care, low air los mattress, frequent turnover        Impaired mobility and ADLs    Other Rehab Plan/Continue to monitor:   Nutrition/Swallow Status:    - Prealbumin 08/10: 11, cont with supplement    - Nutritionist on board   Bladder Management: anuric (ESRD)  Bowel Management:  Incontinent  -bowel program ordered 8/18  - scheduled polyethylete glycol and docusate  - Colace BID and Suppository PRN   - Will monitor for regularity   Mood: stable   Sleep: monitor; Will consider sleep aid as clinically indicated   Skin: Monitor   DVT ppx: heparin              DISCHARGE PLANNING:  Tentative Discharge Date: 8/24/2017    Future Appointments  Date Time Provider Department Center   9/6/2017 12:00 PM VASCULAR, LAB [facility] MARCIO Sanders   9/6/2017 12:30 PM VASCULAR, LAB [facility] MARCIO Sanders   9/6/2017 1:00 PM Nathalie Madera MD [facility] ANAIS Sanders   9/12/2017 9:40 AM Radha Cortez MD [facility] KOLE Sanders [facility]   9/20/2017 3:00 PM Kary Gruber MD [facility] BENJAMIN Weaver MD  Department of Physical Medicine & Rehab   [facility]

## 2017-08-24 NOTE — ASSESSMENT & PLAN NOTE
ESRD, home HD MWF and L AVG placed 4/2017  -HD MWF, cont to monitor   -H/H 08/24: 8.5/29.4 (s/p 2U RBC transfusion 08/18)  -asymptomatic anemia  -receives Epo at dialysis  -other labs: Cr 2.8, K 3.8, WNL.

## 2017-08-24 NOTE — ASSESSMENT & PLAN NOTE
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 Remove left  AKA staples on 8/21/17 at Rehab Facility.  F/U in 4 months with HD access US "    Patient will be going home w family, tentative DC date 8/24.     Pt's therapy sessions remain the same. She exhibits participation & motivation but is slow with her progression. Remains at [facility] for transfers-will require 24 hr support at home. Last family therapy session 08/16.)     Pain regimen: oxy 5mg PRN & tylenol 650 mg PRN; lyrica 75mg QD  -Requires 1-3 doses oxy daily, no tylenol.  -lyrica 50 mg BD (CrCl 19)    Functional status:  -memory Min A (improved from Mod A), prob solving Mod A, expression/Comp Mod I   -Bed mobility SBA/Mod w   -Sit to stand Min A   -Transfers- Max A   -UBD Suzie  -Bathing Mod A   -WC mobility-SBA    "

## 2017-08-24 NOTE — PROGRESS NOTES
Pt returned back up to floor via wheelchair escorted to room.  Pt AAOX.4.  NAD.  Will continue to resume care.

## 2017-08-24 NOTE — ASSESSMENT & PLAN NOTE
-VSS, cont w current mgmt     Vitals:    08/23/17 0640 08/23/17 2149 08/24/17 0621 08/24/17 0751   BP: (!) 117/58 (!) 100/53 124/60 124/60   BP Location: Right arm Right arm Right arm    Patient Position: Lying Lying Lying    Pulse: 88 98 87    Resp: 16 17 16    Temp: 97.9 °F (36.6 °C) 98.7 °F (37.1 °C) 98.6 °F (37 °C)    TempSrc: Oral Oral Oral    SpO2: (!) 93% (!) 93% (!) 92%    Weight:       Height:

## 2017-08-24 NOTE — PROGRESS NOTES
Pt's discharge equipment delivered to her home.  Home health arranged with [facility].  Pt will be contacted to schedule initial visit.

## 2017-08-24 NOTE — DISCHARGE SUMMARY
[facility]  Physical Medicine & Rehab  Discharge Summary    Patient Name: Aleta Herrera  MRN: [id number]  Admission Date: 8/7/2017  Hospital Length of Stay: 17 days  Discharge Date and Time:  08/24/2017 2:38 PM  Attending Physician: Amirah Cano MD  Discharging Provider: Amirah Cano MD  Primary Care Physician: Radha Lao MD    HPI:  62-year-old female with PMHx of stroke, HTN, CHF (EF 60%; on home O2 2.5 liters), anemia 2/2 CKD and DM2 with retinopathy, neuropathy and ESRD (HD MWF and L AVG placed 4/2017) presented [facility] [place]17 with painful, bleeding L foot ulcer and intermittent fever/chills x 1 week.   In the ED, L foot x-ray revealed soft tissue swelling.  CXR showed mild bilateral effusions with leukocytosis (24K) and started on Ceftriaxone.  MRI L foot did not demonstrate osteomyelitis or drainable fluid collections.   JULIUS L showed mod-severe occlusive disease, R with moderate occlusive disease. Blood cultures were obtained in the ED and resulted as clusters and gram positive rods.  On 7/25, Vascular Surgery and Podiatry were consulted. On 7/26, Podiatry performed I & D and debridement of left foot for extensive left plantar tissue necrosis with pending cultures. On 7/27, podiatry noted she had wet gangrenous changes noted to plantar arch of left foot. She became febrile that same day and repeat blood cultures resulted as NGTD.   On 7/28, LLE angiogram per vascular surgery revealed significant AVG steal and her left foot was deemed unsalvageable.  She underwent a successful L AKA on 7/31.  She is currently finished her abx treatment.   Admitted to [facility] on 8/7/2017. Functional status at time of admit was Bed mobility ModA, Sit to stand totalA & RW and transfers totalA-MaxA, UBD Independent and LBD MaxA.      Indwelling Lines/Drains at time of discharge:   Lines/Drains/Airways     Drain                 Hemodialysis AV Graft Left thigh 82727 days                Hospital  Course: While admitted to [facility], medically managed for:  s/p L AKA on 7/31   - Had f/u with Vascular, staples removed during admission  - received Post Op  however held given the rash on Left side of abd./groin proximal thigh/buttock  - Pain controlled with lyrica 50mg BID, Tylenol PRN   - will cont with HH PT/OT, has F/U with [facility] clinic    Left sided Abdominal/Groin skin rash  - appeared fungal in nature  - wound care on board   - miconazole ointment, regularly cleanse area, continued ongoing care upon DC. Family training on wound care completed.   - also was monitored for Left and Right buttock pressure ulcers, present at time of admit. Maintained on low air loss mattress, regular monitoring, educated on importance of pressure relief     ESRD (end stage renal disease)  ESRD, home HD MWF and L AVG placed 4/2017  -HD MWF, cont to monitor   -H/H 08/24: 8.5/29.4 (s/p 2U RBC transfusion 08/18)  -receives Epo at dialysis    DMT2  - restarted on Levamir as PO intake improved. DC to home on 4u qhS.     Hypertension, renal  - vitals remained stable,  Continued on amlodipine      Functionally improved to:   -memory Min A (improved from Mod A), prob solving Mod A, expression/Comp Mod I   -Bed mobility SBA/Mod w, sit to stand Min A, Transfers- Max A, UBD Suzie, Bathing Mod A  -WC mobility-SBA          Consults         Status Ordering Provider     IP consult to case management  Once     Provider:  (Not yet assigned)    Acknowledged SUKH MORALES     IP consult to dietary  Once     Provider:  (Not yet assigned)    Completed SUKH MORALES          Significant Diagnostic Studies: Labs:   BMP:   Recent Labs  Lab 08/24/17  0533   *      K 3.8      CO2 29   BUN 13   CREATININE 2.8*   CALCIUM 8.5*    and CBC   Recent Labs  Lab 08/24/17  0533   WBC 7.26   HGB 8.5*   HCT 29.4*          Final Active Diagnoses:    Diagnosis Date Noted POA    PRINCIPAL PROBLEM:  Amputation of leg [Z89.619] 08/03/2017 Not  "Applicable    Type 2 diabetes mellitus with left diabetic foot ulcer [E11.621, L97.529] 07/25/2017 Yes    ESRD (end stage renal disease) [N18.6] 07/23/2017 Yes    Hypertension, renal [I12.9] 09/05/2013 Yes     Chronic    Pressure ulcer of left buttock [L89.329] 08/04/2017 Yes    Stage III pressure ulcer of right buttock [L89.313] 08/04/2017 Yes    Impaired mobility and ADLs [Z74.09] 08/07/2017 Yes    Pressure injury of skin [L89.90] 08/24/2017 Yes    Abdominal skin rash [R21] 08/22/2017 Unknown      Problems Resolved During this Admission:    Diagnosis Date Noted Date Resolved POA       Discharged Condition: stable    Disposition: Home or Self Care    Follow Up:  Follow-up Information     Radha Lao MD In 1 month.    Specialty:  Internal Medicine  Contact information:  [address]  [facility] [address]  [phone]             Kary Gruber MD In 6 weeks.    Specialty:  Physical Medicine and Rehabilitation  Why:  Patient should follow up in Amputation clinic.   Contact information:  [address]  [facility] [address]  [phone]             Nathalie Madera MD On 9/6/2017.    Specialty:  Vascular Surgery  [facility] information[facility]  [facility] [address]  [phone]                 Patient Instructions:     SLIDING BOARD FOR HOME USE   Order Comments: ith cut out 29"   Order Specific Question Answer Comments   Height: 4' 11.02" (1.499 m)    Weight: 108lbs    Does patient have medical equipment at home? rollator transport wheelchair  / transport wheelchair  / transport wheelchair    Does patient have medical equipment at home? walker, standard    Does patient have medical equipment at home? bedside commode    Length of need (1-99 months): 99    Need for transfer? Yes    Vendor: Other (use comments) [employer]    Expected Date of Delivery: 8/24/2017      WHEELCHAIR FOR HOME USE   Order Specific Question Answer Comments   Hours in W/C per day: 12    Type of " "Wheelchair: Lightweight    Patient unable to propel in Standard wheelchair? Yes    Size(Width): 18"(STD adult)    Leg Support: STD footrests    Lap Belt: Buckle    Accessories: Anti-tippers    Cushion: Foam    Height: 4' 11.02" (1.499 m)    Weight: 108lbs    Does patient have medical equipment at home? rollator transport wheelchair  / transport wheelchair  / transport wheelchair    Does patient have medical equipment at home? walker, standard    Does patient have medical equipment at home? bedside commode    Length of need (1-99 months): 99    Please check all that apply: Caregiver is capable and willing to operate wheelchair safely.    Please check all that apply: Patient mobility limitations cannot be sufficiently resolved by the use of other ambulatory therapies.    Vendor: Other (use comments) [employer]     Expected Date of Delivery: 8/24/2017      COMMODE FOR HOME USE   Order Specific Question Answer Comments   Type: Drop arm    Height: 4' 11.02" (1.499 m)    Weight: 108lbs    Does patient have medical equipment at home? rollator transport wheelchair  / transport wheelchair  / transport wheelchair    Does patient have medical equipment at home? walker, standard    Does patient have medical equipment at home? bedside commode    Length of need (1-99 months): 99    Vendor: Other (use comments) [employer]    Expected Date of Delivery: 8/24/2017      TRANSFER TUB BENCH FOR HOME USE   Order Specific Question Answer Comments   Type of Transfer Tub Bench: Unpadded    Height: 4' 11.02" (1.499 m)    Weight: 108lbs    Does patient have medical equipment at home? rollator transport wheelchair  / transport wheelchair  / transport wheelchair    Does patient have medical equipment at home? walker, standard    Does patient have medical equipment at home? bedside commode    Length of need (1-99 months): 99    Vendor: Other (use comments) [employer]    Expected Date of Delivery: 8/24/2017      Referral to Home " health   Referral Priority: Routine Referral Type: Home Health   Referral Reason: Specialty Services Required    Requested Specialty: Home Health Services    Number of Visits Requested: 1      Diet general   Order Specific Question Answer Comments   Additional restrictions: Diabetic 2000        Medications:  Reconciled Home Medications:   Current Discharge Medication List      START taking these medications    Details   miconazole (MICOTIN) 2 % cream Apply topically 2 (two) times daily.  Refills: 0      pantoprazole (PROTONIX) 40 MG tablet Take 1 tablet (40 mg total) by mouth once daily.  Qty: 90 tablet, Refills: 0      pregabalin (LYRICA) 50 MG capsule Take 1 capsule (50 mg total) by mouth 2 (two) times daily.  Qty: 180 capsule, Refills: 2         CONTINUE these medications which have CHANGED    Details   insulin detemir (LEVEMIR FLEXPEN) 100 unit/mL (3 mL) SubQ InPn pen Inject 4 Units into the skin every evening.         CONTINUE these medications which have NOT CHANGED    Details   amlodipine (NORVASC) 10 MG tablet TAKE ONE TABLET BY MOUTH ONCE DAILY  Qty: 90 tablet, Refills: 0      aspirin 81 mg Tab Take 1 tablet by mouth once daily.       calcitRIOL (ROCALTROL) 0.5 MCG Cap Take 1 capsule (0.5 mcg total) by mouth once daily.  Qty: 30 capsule, Refills: 1      epoetin batsheva (PROCRIT) 10,000 unit/mL injection Inject 0.74 mLs (7,400 Units total) into the skin every Mon, Wed, Fri.      insulin aspart (NOVOLOG FLEXPEN) 100 unit/mL InPn as dir Insulin Pen Subcutaneous Before meals and at bedtime.  If blood sugar is 151-200 1 units SQif blood sugar is 201-250 2 units SQIf blood sugar is 251-300 3 units SQIf blood sugar is 301-350 4 units SQIf blood sugar is > or = 351 5 units SQand call MD;  Dispense with needles      lidocaine-prilocaine (EMLA) cream APPLY SMALL AMOUNT TO ACCESS SITE 1 TO 2 HOURS BEFORE TREATMENT. COVER AREA WITH AN OCCLUSIVE DRESSING.  Refills: 3      polyethylene glycol (GLYCOLAX) 17 gram/dose powder  Take 17 g by mouth once daily. May take up to 3 times per day as needed for constipation.  Qty: 510 g, Refills: 11      sevelamer carbonate (RENVELA) 800 mg Tab Take 2,400 mg by mouth 3 (three) times daily with meals.         STOP taking these medications       hydrocodone-acetaminophen 5-325mg (NORCO) 5-325 mg per tablet Comments:   Reason for Stopping:         inulin (FIBER CHOICE, INULIN,) 1.5 gram Chew Comments:   Reason for Stopping:         lisinopril (PRINIVIL,ZESTRIL) 40 MG tablet Comments:   Reason for Stopping:         metoprolol tartrate (LOPRESSOR) 100 MG tablet Comments:   Reason for Stopping:         oxycodone (ROXICODONE) 5 MG immediate release tablet Comments:   Reason for Stopping:               Time spent on the discharge of patient: 25 minutes    Amirah Cano MD  Department of Physical Medicine & Rehab  [facility]

## 2017-08-28 ENCOUNTER — PATIENT OUTREACH (OUTPATIENT)
Dept: ADMINISTRATIVE | Facility: CLINIC | Age: 62
End: 2017-08-28

## 2017-08-28 NOTE — PATIENT INSTRUCTIONS
What to Expect: The Months After Amputation Surgery  Your recovery will progress in stages. This sheet tells you what to expect during each stage. Keep in mind that not everyone follows this exact timeline. Your progress depends on your overall health, your diagnosis, and age.  The 5 basic stages are:  · Recovering in the hospital  · Preparing for your prosthesis  · Getting fitted for your prosthesis  · Learning to use your prosthesis  · Returning to routine activities  Recovering in the hospital  After surgery, youll stay in the hospital about 3 to 7 days. Older people or people with other health problems may stay longer. During this stage, the main goals are:  · Pain control  · Taking care of your wound as it heals  · Stretching and strengthening your muscles  · Learning to transfer safely between your bed and other surfaces  · Learning to use walking aids as needed  · Learning to manage daily living skills  Preparing for a prosthesis at home  After you arrive home, you may begin to prepare for your prosthesis fitting. This stage may take 3 to 4 weeks. During this stage, the main goals are:  · Taking care of your wound (with sutures or staples still in)  · Keeping your residual limb straight as often as you can  · Continuing exercises learned in the hospital  · Moving safely at all times to prevent falls  · Keeping all follow-up appointments  Getting fitted for a prosthesis  Once your wound has healed, your first visit to the prosthetist may take place. He or she will begin fitting you for a prosthesis. About 3 weeks after the first fitting, youll receive a preparatory (sometimes called temporary) prosthesis. During this stage, the main goals are:  · Daily care of your residual limb  · Daily use of a  sock  · Desensitization and scar massage  · Continued stretching and strengthening of muscles  Learning to use a prosthesis  You will use the preparatory prosthesis until your residual limb has reached a  stable size. This can take 2 to 6 months. Then you may receive a definitive (sometimes called permanent) prosthesis. This prosthesis may have a more natural look or have more advanced parts. Or, you will continue to use the preparatory prosthesis and it will serve as your definitive prosthesis. During this stage, youll learn how to:  · Don (put on) and doff (take off) the prosthesis  · Adjust sock ply (thickness)  · Walk with your prosthesis using parallel bars  · Use a walking aid (such as a walker or cane)  · Walk without an aid, if possible  · Prevent falls  · Care for and clean the prosthesis  · Gradually increase the length of time the prosthesis is worn each day  Returning to routine activities  When youre ready, you may resume many activities that have been part of your life. But life may present new challenges. As you become more active, keep these goals in mind:  · Work with your health care team to maintain your health  · Develop a support system of family and friends  · Return to meaningful activity such as a job, volunteer work, or social activities  · Practice coping techniques, such as meditation and relaxation to help you deal with new challenges as they arise  When to contact your amputation team  During recovery, you may need to contact members of your amputation team. Call your:  · Surgeon or primary care doctor if you notice signs of infection in your healing wound. Watch for sores or wounds that appear on your residual limb. And call if you fall or receive a blow to your limb.  · Physical therapist if you have trouble walking or doing exercises.  · Prosthetist if your prosthesis feels loose, rubs, or pinches your residual limb.  ·  if you need home assistance or help with insurance.  · Psychologist or peer counselor if you could use more emotional support.   Date Last Reviewed: 11/16/2015  © 8569-1675 [employer] [address] [place] [address], [place] [address]. All rights  reserved. This information is not intended as a substitute for professional medical care. Always follow your healthcare professional's instructions.

## 2017-08-30 ENCOUNTER — HOSPITAL ENCOUNTER (INPATIENT)
Facility: HOSPITAL | Age: 62
LOS: 4 days | Discharge: HOME | DRG: 189 | End: 2017-09-05
Attending: EMERGENCY MEDICINE | Admitting: EMERGENCY MEDICINE
Payer: MEDICARE

## 2017-08-30 DIAGNOSIS — N25.81 SECONDARY HYPERPARATHYROIDISM OF RENAL ORIGIN: Chronic | ICD-10-CM

## 2017-08-30 DIAGNOSIS — R06.03 ACUTE RESPIRATORY DISTRESS: ICD-10-CM

## 2017-08-30 DIAGNOSIS — I50.9 HEART FAILURE: ICD-10-CM

## 2017-08-30 DIAGNOSIS — I12.0 TYPE 2 DM WITH HYPERTENSION AND ESRD ON DIALYSIS: ICD-10-CM

## 2017-08-30 DIAGNOSIS — N18.6 END STAGE RENAL DISEASE: Chronic | ICD-10-CM

## 2017-08-30 DIAGNOSIS — R07.9 CHEST PAIN: ICD-10-CM

## 2017-08-30 DIAGNOSIS — R79.89 ELEVATED TROPONIN: ICD-10-CM

## 2017-08-30 DIAGNOSIS — E11.22 TYPE 2 DM WITH HYPERTENSION AND ESRD ON DIALYSIS: ICD-10-CM

## 2017-08-30 DIAGNOSIS — R55 SYNCOPE: ICD-10-CM

## 2017-08-30 DIAGNOSIS — D63.1 ANEMIA OF CHRONIC RENAL FAILURE, STAGE 5: ICD-10-CM

## 2017-08-30 DIAGNOSIS — T82.590D AV GRAFT MALFUNCTION, SUBSEQUENT ENCOUNTER: Primary | ICD-10-CM

## 2017-08-30 DIAGNOSIS — N18.6 TYPE 2 DM WITH HYPERTENSION AND ESRD ON DIALYSIS: ICD-10-CM

## 2017-08-30 DIAGNOSIS — T82.590A AV GRAFT MALFUNCTION, INITIAL ENCOUNTER: ICD-10-CM

## 2017-08-30 DIAGNOSIS — R55 SYNCOPE, UNSPECIFIED SYNCOPE TYPE: ICD-10-CM

## 2017-08-30 DIAGNOSIS — I50.30 (HFPEF) HEART FAILURE WITH PRESERVED EJECTION FRACTION: ICD-10-CM

## 2017-08-30 DIAGNOSIS — N18.5 ANEMIA OF CHRONIC RENAL FAILURE, STAGE 5: ICD-10-CM

## 2017-08-30 DIAGNOSIS — B37.31 VAGINAL CANDIDIASIS: ICD-10-CM

## 2017-08-30 DIAGNOSIS — Z99.2 TYPE 2 DM WITH HYPERTENSION AND ESRD ON DIALYSIS: ICD-10-CM

## 2017-08-30 PROBLEM — K21.9 GERD (GASTROESOPHAGEAL REFLUX DISEASE): Status: ACTIVE | Noted: 2017-08-30

## 2017-08-30 LAB
ALBUMIN SERPL BCP-MCNC: 2.5 G/DL
ALP SERPL-CCNC: 111 U/L
ALT SERPL W/O P-5'-P-CCNC: <5 U/L
ANION GAP SERPL CALC-SCNC: 12 MMOL/L
APTT BLDCRRT: 24.6 SEC
AST SERPL-CCNC: 9 U/L
BASOPHILS # BLD AUTO: 0.02 K/UL
BASOPHILS NFR BLD: 0.2 %
BILIRUB SERPL-MCNC: 0.5 MG/DL
BUN SERPL-MCNC: 44 MG/DL
CALCIUM SERPL-MCNC: 8.6 MG/DL
CHLORIDE SERPL-SCNC: 100 MMOL/L
CO2 SERPL-SCNC: 27 MMOL/L
CREAT SERPL-MCNC: 5.9 MG/DL
DIFFERENTIAL METHOD: ABNORMAL
EOSINOPHIL # BLD AUTO: 0.8 K/UL
EOSINOPHIL NFR BLD: 7.4 %
ERYTHROCYTE [DISTWIDTH] IN BLOOD BY AUTOMATED COUNT: 17.9 %
EST. GFR  (AFRICAN AMERICAN): 8.2 ML/MIN/1.73 M^2
EST. GFR  (NON AFRICAN AMERICAN): 7.1 ML/MIN/1.73 M^2
FUNGUS SPEC CULT: NORMAL
GLUCOSE SERPL-MCNC: 111 MG/DL
HCT VFR BLD AUTO: 32.6 %
HGB BLD-MCNC: 9.9 G/DL
INR PPP: 1
LYMPHOCYTES # BLD AUTO: 1 K/UL
LYMPHOCYTES NFR BLD: 9.9 %
MCH RBC QN AUTO: 27.3 PG
MCHC RBC AUTO-ENTMCNC: 30.4 G/DL
MCV RBC AUTO: 90 FL
MONOCYTES # BLD AUTO: 0.4 K/UL
MONOCYTES NFR BLD: 3.4 %
NEUTROPHILS # BLD AUTO: 8 K/UL
NEUTROPHILS NFR BLD: 78.8 %
PLATELET # BLD AUTO: 262 K/UL
PMV BLD AUTO: 10 FL
POCT GLUCOSE: 151 MG/DL (ref 70–110)
POCT GLUCOSE: 97 MG/DL (ref 70–110)
POTASSIUM SERPL-SCNC: 4.4 MMOL/L
PROT SERPL-MCNC: 8.8 G/DL
PROTHROMBIN TIME: 10.7 SEC
RBC # BLD AUTO: 3.63 M/UL
SODIUM SERPL-SCNC: 139 MMOL/L
TROPONIN I SERPL DL<=0.01 NG/ML-MCNC: 0.06 NG/ML
TROPONIN I SERPL DL<=0.01 NG/ML-MCNC: 0.06 NG/ML
WBC # BLD AUTO: 10.19 K/UL

## 2017-08-30 PROCEDURE — 25000003 PHARM REV CODE 250: Performed by: PHYSICIAN ASSISTANT

## 2017-08-30 PROCEDURE — 36415 COLL VENOUS BLD VENIPUNCTURE: CPT

## 2017-08-30 PROCEDURE — 99214 OFFICE O/P EST MOD 30 MIN: CPT | Mod: ,,, | Performed by: INTERNAL MEDICINE

## 2017-08-30 PROCEDURE — 99219 PR INITIAL OBSERVATION CARE,LEVL II: CPT | Mod: ,,, | Performed by: PHYSICIAN ASSISTANT

## 2017-08-30 PROCEDURE — 80053 COMPREHEN METABOLIC PANEL: CPT

## 2017-08-30 PROCEDURE — G0378 HOSPITAL OBSERVATION PER HR: HCPCS

## 2017-08-30 PROCEDURE — 93005 ELECTROCARDIOGRAM TRACING: CPT

## 2017-08-30 PROCEDURE — 96365 THER/PROPH/DIAG IV INF INIT: CPT

## 2017-08-30 PROCEDURE — 85025 COMPLETE CBC W/AUTO DIFF WBC: CPT

## 2017-08-30 PROCEDURE — 99285 EMERGENCY DEPT VISIT HI MDM: CPT | Mod: 25

## 2017-08-30 PROCEDURE — P9047 ALBUMIN (HUMAN), 25%, 50ML: HCPCS | Performed by: NURSE PRACTITIONER

## 2017-08-30 PROCEDURE — 99285 EMERGENCY DEPT VISIT HI MDM: CPT | Mod: ,,, | Performed by: EMERGENCY MEDICINE

## 2017-08-30 PROCEDURE — G0257 UNSCHED DIALYSIS ESRD PT HOS: HCPCS

## 2017-08-30 PROCEDURE — 85730 THROMBOPLASTIN TIME PARTIAL: CPT

## 2017-08-30 PROCEDURE — 25000003 PHARM REV CODE 250: Performed by: NURSE PRACTITIONER

## 2017-08-30 PROCEDURE — A4216 STERILE WATER/SALINE, 10 ML: HCPCS | Performed by: PHYSICIAN ASSISTANT

## 2017-08-30 PROCEDURE — 80100016 HC MAINTENANCE HEMODIALYSIS

## 2017-08-30 PROCEDURE — 63600175 PHARM REV CODE 636 W HCPCS: Performed by: NURSE PRACTITIONER

## 2017-08-30 PROCEDURE — 82962 GLUCOSE BLOOD TEST: CPT

## 2017-08-30 PROCEDURE — 96361 HYDRATE IV INFUSION ADD-ON: CPT

## 2017-08-30 PROCEDURE — 84484 ASSAY OF TROPONIN QUANT: CPT | Mod: 91

## 2017-08-30 PROCEDURE — 93010 ELECTROCARDIOGRAM REPORT: CPT | Mod: ,,, | Performed by: INTERNAL MEDICINE

## 2017-08-30 PROCEDURE — 85610 PROTHROMBIN TIME: CPT

## 2017-08-30 PROCEDURE — 96366 THER/PROPH/DIAG IV INF ADDON: CPT

## 2017-08-30 RX ORDER — ATORVASTATIN CALCIUM 20 MG/1
40 TABLET, FILM COATED ORAL DAILY
Status: DISCONTINUED | OUTPATIENT
Start: 2017-08-31 | End: 2017-09-05 | Stop reason: HOSPADM

## 2017-08-30 RX ORDER — PREGABALIN 50 MG/1
50 CAPSULE ORAL 2 TIMES DAILY
Status: DISCONTINUED | OUTPATIENT
Start: 2017-08-30 | End: 2017-09-05 | Stop reason: HOSPADM

## 2017-08-30 RX ORDER — ONDANSETRON 2 MG/ML
4 INJECTION INTRAMUSCULAR; INTRAVENOUS EVERY 12 HOURS PRN
Status: DISCONTINUED | OUTPATIENT
Start: 2017-08-30 | End: 2017-09-05 | Stop reason: HOSPADM

## 2017-08-30 RX ORDER — SODIUM CHLORIDE 9 MG/ML
INJECTION, SOLUTION INTRAVENOUS ONCE
Status: COMPLETED | OUTPATIENT
Start: 2017-08-30 | End: 2017-08-30

## 2017-08-30 RX ORDER — SODIUM CHLORIDE 9 MG/ML
INJECTION, SOLUTION INTRAVENOUS
Status: DISCONTINUED | OUTPATIENT
Start: 2017-08-30 | End: 2017-09-05 | Stop reason: HOSPADM

## 2017-08-30 RX ORDER — ALBUMIN HUMAN 250 G/1000ML
25 SOLUTION INTRAVENOUS ONCE
Status: COMPLETED | OUTPATIENT
Start: 2017-08-30 | End: 2017-08-30

## 2017-08-30 RX ORDER — NAPROXEN SODIUM 220 MG/1
81 TABLET, FILM COATED ORAL DAILY
Status: DISCONTINUED | OUTPATIENT
Start: 2017-08-31 | End: 2017-09-05 | Stop reason: HOSPADM

## 2017-08-30 RX ORDER — IBUPROFEN 200 MG
16 TABLET ORAL
Status: DISCONTINUED | OUTPATIENT
Start: 2017-08-30 | End: 2017-09-05 | Stop reason: HOSPADM

## 2017-08-30 RX ORDER — IBUPROFEN 200 MG
24 TABLET ORAL
Status: DISCONTINUED | OUTPATIENT
Start: 2017-08-30 | End: 2017-09-05 | Stop reason: HOSPADM

## 2017-08-30 RX ORDER — SODIUM CHLORIDE 0.9 % (FLUSH) 0.9 %
3 SYRINGE (ML) INJECTION EVERY 8 HOURS
Status: DISCONTINUED | OUTPATIENT
Start: 2017-08-30 | End: 2017-09-05 | Stop reason: HOSPADM

## 2017-08-30 RX ORDER — ONDANSETRON 8 MG/1
8 TABLET, ORALLY DISINTEGRATING ORAL EVERY 8 HOURS PRN
Status: DISCONTINUED | OUTPATIENT
Start: 2017-08-30 | End: 2017-09-05 | Stop reason: HOSPADM

## 2017-08-30 RX ORDER — AMOXICILLIN 250 MG
1 CAPSULE ORAL 2 TIMES DAILY PRN
Status: DISCONTINUED | OUTPATIENT
Start: 2017-08-30 | End: 2017-09-05 | Stop reason: HOSPADM

## 2017-08-30 RX ORDER — AMLODIPINE BESYLATE 10 MG/1
10 TABLET ORAL DAILY
Status: DISCONTINUED | OUTPATIENT
Start: 2017-08-31 | End: 2017-08-31

## 2017-08-30 RX ORDER — PANTOPRAZOLE SODIUM 40 MG/1
40 TABLET, DELAYED RELEASE ORAL DAILY
Status: DISCONTINUED | OUTPATIENT
Start: 2017-08-31 | End: 2017-09-05 | Stop reason: HOSPADM

## 2017-08-30 RX ORDER — SEVELAMER CARBONATE 800 MG/1
2400 TABLET, FILM COATED ORAL
Status: DISCONTINUED | OUTPATIENT
Start: 2017-08-30 | End: 2017-09-05

## 2017-08-30 RX ORDER — DOXYLAMINE SUCCINATE 25 MG
TABLET ORAL 2 TIMES DAILY PRN
Status: DISCONTINUED | OUTPATIENT
Start: 2017-08-30 | End: 2017-09-05 | Stop reason: HOSPADM

## 2017-08-30 RX ORDER — GLUCAGON 1 MG
1 KIT INJECTION
Status: DISCONTINUED | OUTPATIENT
Start: 2017-08-30 | End: 2017-09-05 | Stop reason: HOSPADM

## 2017-08-30 RX ORDER — ACETAMINOPHEN 500 MG
500 TABLET ORAL EVERY 8 HOURS PRN
Status: DISCONTINUED | OUTPATIENT
Start: 2017-08-30 | End: 2017-09-03

## 2017-08-30 RX ORDER — CALCITRIOL 0.25 UG/1
0.5 CAPSULE ORAL DAILY
Status: DISCONTINUED | OUTPATIENT
Start: 2017-08-31 | End: 2017-09-05 | Stop reason: HOSPADM

## 2017-08-30 RX ORDER — INSULIN ASPART 100 [IU]/ML
0-5 INJECTION, SOLUTION INTRAVENOUS; SUBCUTANEOUS
Status: DISCONTINUED | OUTPATIENT
Start: 2017-08-30 | End: 2017-09-05 | Stop reason: HOSPADM

## 2017-08-30 RX ADMIN — ALBUMIN (HUMAN) 25 G: 12.5 SOLUTION INTRAVENOUS at 04:08

## 2017-08-30 RX ADMIN — Medication 10 ML: at 09:08

## 2017-08-30 RX ADMIN — PREGABALIN 50 MG: 50 CAPSULE ORAL at 09:08

## 2017-08-30 RX ADMIN — SODIUM CHLORIDE: 0.9 INJECTION, SOLUTION INTRAVENOUS at 04:08

## 2017-08-30 NOTE — ED TRIAGE NOTES
"Pt reports she passed out at dialysis for a "second." Reports she remembers staff suddenly being in front of her and pressing on her chest. Pt denies any current complaints. Did not received dialysis today or Monday.   "

## 2017-08-30 NOTE — ASSESSMENT & PLAN NOTE
Last  on 8/2/17. Calcitriol noted on home meds? Outpt HD Hectorol 2 mcg IV with HD. Continue renvela. Will clarify with patient. Add phos to labs.

## 2017-08-30 NOTE — ASSESSMENT & PLAN NOTE
Hypertension    Last ECHO showed diastolic dysfunction with an EF 60% in 2016.  - 2D echo pending as above  - Amlodipine 10mg daily.

## 2017-08-30 NOTE — ASSESSMENT & PLAN NOTE
Left leg, above knee amputation. Patient uses wheelchair at home. Recently discharged from inpatient rehab 8/24 and reports she has been doing well at home, has all needed equipment.   - Stable, incision CDI.

## 2017-08-30 NOTE — ASSESSMENT & PLAN NOTE
Patient on oxygen at baseline, reports generally at 2.5-3 lpm. Per patient, oxygen was removed during weigh in at dialysis and was never put back on, while oxygen off she subsequently had syncopal event.  - Continuous oxygen at 2-3L.  - Vitals q4  - Orthostatics pending.  - Cardiology consulted given elevated troponin, recommended TTE and trending troponins, high intensity statin.  - 2D ECHO pending, last ECHO 6/2016 showed EF 60% with normal systolic function with diastolic dysfunction.  - Atorvastatin 40mg daily, will discuss with patient possible need to titrate up.

## 2017-08-30 NOTE — ED NOTES
IM(SUSHIL Huertas NP) at bedside, made aware of current pt status and currently oxygen amount. Stated would put order in for patient to received dialysis.

## 2017-08-30 NOTE — ED NOTES
Pt oxygen sats noted to be in high 80's while on 2.5L, denies any SOB. No distress noted. Oxygen increased. 94% obtained on venti mask at 8L. Will continue to monitor.

## 2017-08-30 NOTE — HOSPITAL COURSE
Patient admitted to observation for evaluation of syncopal episode at dialysis. Found to have elevated troponin and Cadiology was consulted, recommended trending troponins and a TTE and high dose statin. Troponins remained stable, TTE showed EF 60% with a PA pressure of 88. Nephrology was consulted and HD completed for volume removal. Upgraded to inpatient for ongoing management. Patient respiratory status improved. Patient found to have L buttock stage 3 decubitus ulcer, wound care examined the patient and gave recommendations. Plan was scheduled for discharge 9/2 but cancelled as patient was noted to have hematoma of AVG. Vascular Surgery consulted and recommended attempting again Monday 9/4. Access to the AVG obtained and dialyzed 9/4. Patient found to have vaginitis over the weekend and was treated with fluconazole. Patient stable for discharge.

## 2017-08-30 NOTE — PROGRESS NOTES
Maintenance hemodialysis treatment started to pt left thigh avg with two 15 G needles. Pt tolerated well.

## 2017-08-30 NOTE — ASSESSMENT & PLAN NOTE
- H/H 9.9/32.6 on admission, improved from most recent labs 8/24  - Receives Micera 225 mcg q 2 weeks, last dose 8/25.

## 2017-08-30 NOTE — PLAN OF CARE
Problem: Hemodialysis (Adult)  Intervention: Protect/Monitor Dialysis Access Site  Dialysis access  Site precautions maintained

## 2017-08-30 NOTE — SUBJECTIVE & OBJECTIVE
Past Medical History:   Diagnosis Date    Anemia of chronic renal failure 2013    Anticoagulant long-term use 2015    CHF (congestive heart failure)     Coronary artery disease     Diabetes mellitus     Diabetic neuropathy 2013    DR (diabetic retinopathy) 2013    Encounter for blood transfusion     End stage renal disease on dialysis     Fever 2017    Hypertension     Hypertension, renal 2013    Kidney transplant candidate 2013    Secondary hyperparathyroidism of renal origin 2013    Stroke 2015    Type 2 diabetes mellitus since 2013       Past Surgical History:   Procedure Laterality Date    AV FISTULA PLACEMENT      CATARACT SURGERY       SECTION, LOW TRANSVERSE      x 3    COLONOSCOPY N/A 2016    Procedure: COLONOSCOPY;  Surgeon: Roverto Patel MD;  Location: [facility] ([address] [place] [address]);  Service: Endoscopy;  Laterality: N/A;    EYE SURGERY      HYSTERECTOMY      Endometriosis    LEG AMPUTATION THROUGH KNEE Left 2017    TONSILLECTOMY      VASCULAR SURGERY         Review of patient's allergies indicates:  No Known Allergies  Current Facility-Administered Medications   Medication Frequency    0.9%  NaCl infusion PRN    0.9%  NaCl infusion Once    acetaminophen tablet 500 mg Q8H PRN    [START ON 2017] amlodipine tablet 10 mg Daily    [START ON 2017] aspirin chewable tablet 81 mg Daily    [START ON 2017] calcitRIOL capsule 0.5 mcg Daily    dextrose 50% injection 12.5 g PRN    dextrose 50% injection 25 g PRN    glucagon (human recombinant) injection 1 mg PRN    glucose chewable tablet 16 g PRN    glucose chewable tablet 24 g PRN    insulin aspart pen 0-5 Units QID (AC + HS) PRN    miconazole 2 % cream BID PRN    ondansetron disintegrating tablet 8 mg Q8H PRN    ondansetron injection 4 mg Q12H PRN    [START ON 2017] pantoprazole EC tablet 40 mg Daily    pregabalin capsule 50 mg BID     senna-docusate 8.6-50 mg per tablet 1 tablet BID PRN    sevelamer carbonate tablet 2,400 mg TID WM    sodium chloride 0.9% flush 3 mL Q8H     Current Outpatient Prescriptions   Medication    amlodipine (NORVASC) 10 MG tablet    aspirin 81 mg Tab    calcitRIOL (ROCALTROL) 0.5 MCG Cap    insulin aspart (NOVOLOG FLEXPEN) 100 unit/mL InPn    insulin detemir (LEVEMIR FLEXPEN) 100 unit/mL (3 mL) SubQ InPn pen    lidocaine-prilocaine (EMLA) cream    miconazole (MICOTIN) 2 % cream    pantoprazole (PROTONIX) 40 MG tablet    pregabalin (LYRICA) 50 MG capsule    sevelamer carbonate (RENVELA) 800 mg Tab     Family History     Problem Relation (Age of Onset)    Cancer Mother (70)    Heart disease Mother    Hypertension Mother        Social History Main Topics    Smoking status: Former Smoker     Packs/day: 0.25     Years: 0.50     Types: Cigarettes     Quit date: 9/5/1975    Smokeless tobacco: Never Used      Comment: quit smoking cigarettes 40+ years ago    Alcohol use No    Drug use: No    Sexual activity: Yes     Review of Systems   Constitutional: Negative for chills.   HENT: Negative.    Eyes: Negative.    Respiratory: Positive for shortness of breath.    Cardiovascular: Negative.    Gastrointestinal: Negative.    Genitourinary: Negative.    Musculoskeletal: Negative.    Skin: Negative.    Neurological: Negative for numbness.   Psychiatric/Behavioral: Negative.      Objective:     Vital Signs (Most Recent):  Temp: 98.8 °F (37.1 °C) (08/30/17 1023)  Pulse: 83 (08/30/17 1600)  Resp: (!) 23 (08/30/17 1052)  BP: (!) 93/49 (08/30/17 1600)  SpO2: 96 % (08/30/17 1402)  O2 Device (Oxygen Therapy): nasal cannula (08/30/17 1023) Vital Signs (24h Range):  Temp:  [98.8 °F (37.1 °C)] 98.8 °F (37.1 °C)  Pulse:  [78-87] 83  Resp:  [20-23] 23  SpO2:  [95 %-99 %] 96 %  BP: ()/(49-80) 93/49     Weight: 49.9 kg (110 lb) (08/30/17 1023)  Body mass index is 22.22 kg/m².  Body surface area is 1.44 meters squared.    No  intake/output data recorded.    Physical Exam   Constitutional: She is oriented to person, place, and time. She appears well-developed and well-nourished. No distress.   HENT:   Head: Normocephalic and atraumatic.   Eyes: EOM are normal. Pupils are equal, round, and reactive to light. Left eye exhibits no discharge.   Neck: Normal range of motion. Neck supple.   Cardiovascular: Normal rate and regular rhythm.    No murmur heard.  Pulmonary/Chest: Effort normal.   Poor air exchange. Diminished throughout.    Abdominal: Soft. Bowel sounds are normal.   Musculoskeletal: Normal range of motion. She exhibits no edema, tenderness or deformity.   LEFt AKA   Neurological: She is alert and oriented to person, place, and time. No cranial nerve deficit.   Skin: Skin is warm and dry.   Left thigh graft   Psychiatric: She has a normal mood and affect.       Significant Labs:  All labs within the past 24 hours have been reviewed.    Significant Imaging:  Labs: Reviewed
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
## 2017-08-30 NOTE — CONSULTS
"Cardiology Initial Consult Note    8/30/2017    Reason for Consult: Elevated troponin    SUBJECTIVE  Aleta Herrera is a 62 y.o. female with a history significant for ESRD on HD via left leg access, hx DVT, PVD s/p L AKA, HTN, DM.  She presented to dialysis today, and while they were picking her up to move her to the chair, she had a pre-syncopal event.  Some notes say that she lost consciousness, but she says that she remembered what was going on but she was just staring into space.  They laid her down, tried to start CPR but she had immediately regained full consciousness and told them to stop as they were starting.  She denies chest pain, palpitations, dyspnea, nausea, vomiting.  She says this has happened before, usually when her oxygen gets too low.  The last episode was several months ago, where she was just sitting, and she just "almost went out of it" for a few seconds prior to regaining full consciousness.    ?  Review of Systems   Constitution: Negative for chills, fever  HENT: Negative.    Eyes: Negative.    Cardiovascular: As per HPI  Respiratory: Negative for shortness of breath. Negative for cough.    Endocrine: Negative.    Hematologic/Lymphatic: Negative.    Skin: Negative for color change and rash.   Musculoskeletal: Negative.    Gastrointestinal: Negative for abdominal pain, change in bowel habit  Genitourinary: Negative.    Neurological: Negative for dizziness, light-headedness  Psychiatric/Behavioral: Negative for altered mental status.     OBJECTIVE  No current facility-administered medications for this encounter.      Current Outpatient Prescriptions   Medication Sig Dispense Refill    amlodipine (NORVASC) 10 MG tablet TAKE ONE TABLET BY MOUTH ONCE DAILY 90 tablet 0    aspirin 81 mg Tab Take 1 tablet by mouth once daily.       calcitRIOL (ROCALTROL) 0.5 MCG Cap Take 1 capsule (0.5 mcg total) by mouth once daily. 30 capsule 1    epoetin batsheva (PROCRIT) 10,000 unit/mL injection " Inject 0.74 mLs (7,400 Units total) into the skin every Mon, Wed, Fri.      insulin aspart (NOVOLOG FLEXPEN) 100 unit/mL InPn as dir Insulin Pen Subcutaneous Before meals and at bedtime.  If blood sugar is 151-200 1 units SQif blood sugar is 201-250 2 units SQIf blood sugar is 251-300 3 units SQIf blood sugar is 301-350 4 units SQIf blood sugar is > or = 351 5 units SQand call MD;  Dispense with needles      insulin detemir (LEVEMIR FLEXPEN) 100 unit/mL (3 mL) SubQ InPn pen Inject 4 Units into the skin every evening.      lidocaine-prilocaine (EMLA) cream APPLY SMALL AMOUNT TO ACCESS SITE 1 TO 2 HOURS BEFORE TREATMENT. COVER AREA WITH AN OCCLUSIVE DRESSING.  3    miconazole (MICOTIN) 2 % cream Apply topically 2 (two) times daily.  0    pantoprazole (PROTONIX) 40 MG tablet Take 1 tablet (40 mg total) by mouth once daily. 90 tablet 0    polyethylene glycol (GLYCOLAX) 17 gram/dose powder Take 17 g by mouth once daily. May take up to 3 times per day as needed for constipation. 510 g 11    pregabalin (LYRICA) 50 MG capsule Take 1 capsule (50 mg total) by mouth 2 (two) times daily. 180 capsule 2    sevelamer carbonate (RENVELA) 800 mg Tab Take 2,400 mg by mouth 3 (three) times daily with meals.         Past Medical History:   Diagnosis Date    Anemia of chronic renal failure 2013    Anticoagulant long-term use 2015    CHF (congestive heart failure)     Coronary artery disease     Diabetes mellitus     Diabetic neuropathy 2013     (diabetic retinopathy) 2013    Encounter for blood transfusion     End stage renal disease on dialysis     Fever 2017    Hypertension     Hypertension, renal 2013    Kidney transplant candidate 2013    Secondary hyperparathyroidism of renal origin 2013    Stroke 2015    Type 2 diabetes mellitus since 2013       Past Surgical History:   Procedure Laterality Date    AV FISTULA PLACEMENT      CATARACT SURGERY        SECTION, LOW TRANSVERSE      x 3    COLONOSCOPY N/A 7/8/2016    Procedure: COLONOSCOPY;  Surgeon: Roverto Patel MD;  Location: [facility] ([address] [place] [address]);  Service: Endoscopy;  Laterality: N/A;    EYE SURGERY      HYSTERECTOMY  2003    Endometriosis    LEG AMPUTATION THROUGH KNEE Left 07/2017    TONSILLECTOMY      VASCULAR SURGERY         Review of patient's allergies indicates:  No Known Allergies    Family History   Problem Relation Age of Onset    Heart disease Mother      MI    Hypertension Mother     Cancer Mother 70     breast    Heart attack Neg Hx        Social History     Social History    Marital status:      Spouse name: N/A    Number of children: N/A    Years of education: N/A     Occupational History     2009     Social History Main Topics    Smoking status: Former Smoker     Packs/day: 0.25     Years: 0.50     Types: Cigarettes     Quit date: 9/5/1975    Smokeless tobacco: Never Used      Comment: quit smoking cigarettes 40+ years ago    Alcohol use No    Drug use: No    Sexual activity: Yes     Other Topics Concern    Not on file     Social History Narrative    December 2015        The patient is  she lives with her , eldest son, middle son Christopher age 26 who accompanies her to this visit     and her youngest child who is her daughter.      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    She had been a  in [place] but retired in May 2009 when she required dialysis        This summer she had a stroke which affected her left side.  She had a dense left hemiparesis which quickly resolved.  Now she is able to walk a bit at home without a walker.  She is able to wash dishes, do the laundry and cook.  She sometimes goes grocery shopping.  She was recently placed on the renal transplant list.       Physical  "Exam  Vitals: /62   Pulse 86   Temp 98.8 °F (37.1 °C) (Oral)   Resp (!) 23   Ht 4' 11" (1.499 m)   Wt 49.9 kg (110 lb)   SpO2 95%   Breastfeeding? No   BMI 22.22 kg/m²   Gen: NAD, O2 via NC  Head/Eyes/Ears/Nose: NCAT, MMM   Neck: Soft, supple  Lung: Eupneic, symmetric  Heart: Regular rate and rhythm, I/VI systolic murmur  Abdomen: Soft, NT/ND, NABS  Extremities: Trace LE edema, L AKA  Skin: Normal color and turgor  Neuro: AAOx3      Recent Labs  Lab 08/24/17  0533 08/30/17  1049    139   K 3.8 4.4   CO2 29 27    100   BUN 13 44*   CREATININE 2.8* 5.9*   * 111*   CALCIUM 8.5* 8.6*   ALT  --  <5*   AST  --  9*   ALKPHOS  --  111   BILITOT  --  0.5   PROT  --  8.8*   ALBUMIN  --  2.5*         Recent Labs  Lab 08/24/17  0533 08/30/17  1049   WBC 7.26 10.19   HGB 8.5* 9.9*   HCT 29.4* 32.6*    262   MCV 97 90     No results for input(s): APTT, INR, PTT in the last 168 hours.    Recent Labs  Lab 08/30/17  1049   TROPONINI 0.061*        Results  TTE 6/2016  1 - Normal left ventricular systolic function (EF 60-65%).     2 - Concentric hypertrophy.     3 - Biatrial enlargement.     4 - Left ventricular diastolic dysfunction.     5 - Normal right ventricular systolic function .     6 - Pulmonary hypertension. The estimated PA systolic pressure is 57 mmHg.     7 - Increased central venous pressure.     EKG   Normal sinus rhythm, right axis deviation, non-specific T wave flattening  ?  ASSESSMENT AND PLAN  62 y.o. female with history significant for ESRD on HD via left leg access, hx DVT, PVD s/p L AKA, HTN, DM. She presents with pre-syncope while moving to the chair in dialysis, no prodrome.  Her troponin is 0.06 in the setting of ESRD and no symptoms of chest pain.  She says similar events have happened before where she is staring off but not responding, but remembers what people are saying.  Unlikely to be cardiac in nature, likely vagal with additional ESRD. EKG without ischemic " changes.     Recommendations:   - Agree with observation by medicine  - Continue aspirin  - Trend troponin, would not treat ACS unless significant troponin bump  - TTE to evaluate cardiac function AFTER dialysis is done, if normal no need for further workup from this standpoint  - Monitor on telemetry while inpatient  - Should be on high intensity statin for ASCVD    Discussed with Cardiology Attending: Dr. Marcela Chaudhari MD  Cardiology Fellow

## 2017-08-30 NOTE — SUBJECTIVE & OBJECTIVE
Past Medical History:   Diagnosis Date    Anemia of chronic renal failure 2013    Anticoagulant long-term use 2015    CHF (congestive heart failure)     Coronary artery disease     Diabetes mellitus     Diabetic neuropathy 2013    DR (diabetic retinopathy) 2013    Encounter for blood transfusion     End stage renal disease on dialysis     Fever 2017    Hypertension     Hypertension, renal 2013    Kidney transplant candidate 2013    Secondary hyperparathyroidism of renal origin 2013    Stroke 2015    Type 2 diabetes mellitus since 2013       Past Surgical History:   Procedure Laterality Date    AV FISTULA PLACEMENT      CATARACT SURGERY       SECTION, LOW TRANSVERSE      x 3    COLONOSCOPY N/A 2016    Procedure: COLONOSCOPY;  Surgeon: Roverto Patel MD;  Location: [facility] ([address] [place] [address]);  Service: Endoscopy;  Laterality: N/A;    EYE SURGERY      HYSTERECTOMY      Endometriosis    LEG AMPUTATION THROUGH KNEE Left 2017    TONSILLECTOMY      VASCULAR SURGERY         Review of patient's allergies indicates:  No Known Allergies    No current facility-administered medications on file prior to encounter.      Current Outpatient Prescriptions on File Prior to Encounter   Medication Sig    amlodipine (NORVASC) 10 MG tablet TAKE ONE TABLET BY MOUTH ONCE DAILY    aspirin 81 mg Tab Take 1 tablet by mouth once daily.     calcitRIOL (ROCALTROL) 0.5 MCG Cap Take 1 capsule (0.5 mcg total) by mouth once daily.    insulin aspart (NOVOLOG FLEXPEN) 100 unit/mL InPn as dir Insulin Pen Subcutaneous Before meals and at bedtime.  If blood sugar is 151-200 1 units SQif blood sugar is 201-250 2 units SQIf blood sugar is 251-300 3 units SQIf blood sugar is 301-350 4 units SQIf blood sugar is > or = 351 5 units SQand call MD;  Dispense with needles    insulin detemir (LEVEMIR FLEXPEN) 100 unit/mL (3 mL) SubQ InPn pen Inject 4 Units into the skin every  evening.    lidocaine-prilocaine (EMLA) cream APPLY SMALL AMOUNT TO ACCESS SITE 1 TO 2 HOURS BEFORE TREATMENT. COVER AREA WITH AN OCCLUSIVE DRESSING.    miconazole (MICOTIN) 2 % cream Apply topically 2 (two) times daily.    pantoprazole (PROTONIX) 40 MG tablet Take 1 tablet (40 mg total) by mouth once daily.    pregabalin (LYRICA) 50 MG capsule Take 1 capsule (50 mg total) by mouth 2 (two) times daily.    sevelamer carbonate (RENVELA) 800 mg Tab Take 2,400 mg by mouth 3 (three) times daily with meals.    [DISCONTINUED] epoetin batsheva (PROCRIT) 10,000 unit/mL injection Inject 0.74 mLs (7,400 Units total) into the skin every Mon, Wed, Fri.    [DISCONTINUED] polyethylene glycol (GLYCOLAX) 17 gram/dose powder Take 17 g by mouth once daily. May take up to 3 times per day as needed for constipation.     Family History     Problem Relation (Age of Onset)    Cancer Mother (70)    Heart disease Mother    Hypertension Mother        Social History Main Topics    Smoking status: Former Smoker     Packs/day: 0.25     Years: 0.50     Types: Cigarettes     Quit date: 9/5/1975    Smokeless tobacco: Never Used      Comment: quit smoking cigarettes 40+ years ago    Alcohol use No    Drug use: No    Sexual activity: Yes     Review of Systems   Constitutional: Negative for chills, diaphoresis and fever.   HENT: Negative for congestion and trouble swallowing.    Eyes: Negative for photophobia and visual disturbance.   Respiratory: Positive for cough and shortness of breath. Negative for apnea, chest tightness and wheezing.    Cardiovascular: Negative for chest pain, palpitations and leg swelling.   Gastrointestinal: Negative for abdominal distention, abdominal pain, constipation, diarrhea and nausea.   Musculoskeletal: Negative for arthralgias and back pain.   Skin: Negative for rash.   Neurological: Positive for dizziness, syncope and weakness. Negative for numbness.   Psychiatric/Behavioral: Positive for decreased  concentration. The patient is not nervous/anxious.      Objective:     Vital Signs (Most Recent):  Temp: 98.8 °F (37.1 °C) (08/30/17 1023)  Pulse: 86 (08/30/17 1545)  Resp: (!) 23 (08/30/17 1052)  BP: (!) 102/55 (08/30/17 1545)  SpO2: 96 % (08/30/17 1402) Vital Signs (24h Range):  Temp:  [98.8 °F (37.1 °C)] 98.8 °F (37.1 °C)  Pulse:  [78-87] 86  Resp:  [20-23] 23  SpO2:  [95 %-99 %] 96 %  BP: (102-140)/(55-80) 102/55     Weight: 49.9 kg (110 lb)  Body mass index is 22.22 kg/m².    Physical Exam   Constitutional: She is oriented to person, place, and time. She appears well-developed and well-nourished. No distress.   HENT:   Head: Normocephalic and atraumatic.   Eyes: EOM are normal. Pupils are equal, round, and reactive to light.   Neck: Normal range of motion. No JVD present.   Cardiovascular: Normal rate, regular rhythm and normal heart sounds.    No murmur heard.  Pulmonary/Chest: Effort normal. No respiratory distress. She has decreased breath sounds in the right middle field, the right lower field, the left middle field and the left lower field. She has no wheezes.   Breathing mask in place   Abdominal: Soft. Bowel sounds are normal. She exhibits no distension.   Musculoskeletal: Normal range of motion. She exhibits edema (RLE).   Neurological: She is alert and oriented to person, place, and time.   Skin: Skin is warm and dry.   Psychiatric: She has a normal mood and affect. Her behavior is normal.   Nursing note and vitals reviewed.       Significant Labs:   CBC:   Recent Labs  Lab 08/30/17  1049   WBC 10.19   HGB 9.9*   HCT 32.6*        CMP:   Recent Labs  Lab 08/30/17  1049      K 4.4      CO2 27   *   BUN 44*   CREATININE 5.9*   CALCIUM 8.6*   PROT 8.8*   ALBUMIN 2.5*   BILITOT 0.5   ALKPHOS 111   AST 9*   ALT <5*   ANIONGAP 12   EGFRNONAA 7.1*     Troponin:   Recent Labs  Lab 08/30/17  1049   TROPONINI 0.061*     All pertinent labs within the past 24 hours have been  reviewed.    Significant Imaging: I have reviewed all pertinent imaging results/findings within the past 24 hours.

## 2017-08-30 NOTE — ED NOTES
Patient identifiers have been checked and are correct.      LOC: The patient is awake, alert, and aware of environment. The patient is oriented x 3 and speaking appropriately.   APPEARANCE: No acute distress noted.   PSYCHOSOCIAL: Patient is calm and cooperative.   SKIN: The skin is warm, dry. Healing left AKA amputation noted, scabbed with edgesapproximated- without redness, swelling, drainage, or pain. Stage 2 sores noted to bilateral buttocks.   RESPIRATORY: Airway is open and patent. Bilateral chest rise and fall. Respirations are spontaneous, even and unlabored. Normal effort and rate noted. No accessory muscle use noted.   CARDIAC: Patient has a normal rate and rhythm. On 2.5L oxygen-baseline for patient.   ABDOMEN: Soft and non tender to palpation. No distention noted.   NEUROLOGIC: Eyes open spontaneously. Speech clear. Tolerating saliva secretions well. Able to follow commands. Symmetrical facial muscles. Moving all extremities. Movement is purposeful.  MUSCULOSKELETAL: Left AKA with dialysis access noted to left upper thigh.     Side rails up x2. Call light within reach.  at bedside. Will continue to monitor.
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
## 2017-08-30 NOTE — ED PROVIDER NOTES
Encounter Date: 8/30/2017    SCRIBE #1 NOTE: I, Yariel Woods, am scribing for, and in the presence of,  Melchor Woodward MD. I have scribed the following portions of the note - the Resident attestation and the EKG reading.       History     Chief Complaint   Patient presents with    Loss of Consciousness     pateint was being moved to dialysis chair after being moved, had syncopal episode, dialysis nurse started compressions and patient immediately grabbed nurse and told her to stop, patient aaox4     HPI  Review of patient's allergies indicates:  No Known Allergies  Past Medical History:   Diagnosis Date    Anemia of chronic renal failure 9/5/2013    Anticoagulant long-term use 11/23/2015    CHF (congestive heart failure)     Coronary artery disease     Diabetes mellitus     Diabetic neuropathy 9/5/2013    DR (diabetic retinopathy) 9/5/2013    Encounter for blood transfusion     End stage renal disease on dialysis     Fever 7/24/2017    Hypertension     Hypertension, renal 9/5/2013    Kidney transplant candidate 9/5/2013    Secondary hyperparathyroidism of renal origin 9/5/2013    Stroke 7/31/2015    Type 2 diabetes mellitus since 1995 9/5/2013   Patient is a 62-year-old female with complex medical history most notable for ESRD, CHF on 2.5 L of home O2. Patient BIBEMS after witnessed syncope just prior to arrival at pt's regularly scheduled HD at [facility]. Per EMS pt was witness to have decreased responsiveness upon transferred to dialysis chair.  [facility] staff immediately started CPR, after 2 chest compressions patient was alert and requested cessation of compressions. Pt denies any prodromal symptoms, no recent FCS. No NVD. No CP or SOB.  No speech difficulty or facial droop.  Patient denies extremity weakness. Pt recently discharged 5 days ago for admission during which she had a left AKA.     Past Surgical History:   Procedure Laterality Date    AV FISTULA PLACEMENT      CATARACT SURGERY        SECTION, LOW TRANSVERSE      x 3    COLONOSCOPY N/A 2016    Procedure: COLONOSCOPY;  Surgeon: Roverto Patel MD;  Location: [facility] ([address] [place] [address]);  Service: Endoscopy;  Laterality: N/A;    EYE SURGERY      HYSTERECTOMY      Endometriosis    LEG AMPUTATION THROUGH KNEE Left 2017    TONSILLECTOMY      VASCULAR SURGERY       Family History   Problem Relation Age of Onset    Heart disease Mother      MI    Hypertension Mother     Cancer Mother 70     breast    Heart attack Neg Hx      Social History   Substance Use Topics    Smoking status: Former Smoker     Packs/day: 0.25     Years: 0.50     Types: Cigarettes     Quit date: 1975    Smokeless tobacco: Never Used      Comment: quit smoking cigarettes 40+ years ago    Alcohol use No     Review of Systems   Constitutional: Negative for activity change, appetite change, chills, fatigue and fever.   HENT: Negative for sinus pressure, sore throat, trouble swallowing and voice change.    Eyes: Negative for visual disturbance.   Respiratory: Negative for cough, shortness of breath and wheezing.    Cardiovascular: Negative for chest pain, palpitations and leg swelling.   Gastrointestinal: Negative for abdominal pain, nausea and vomiting.   Genitourinary: Negative for pelvic pain and vaginal pain.   Skin: Negative for rash and wound.   Neurological: Positive for syncope. Negative for dizziness, tremors, seizures, facial asymmetry, speech difficulty, weakness, light-headedness, numbness and headaches.   Psychiatric/Behavioral: Positive for decreased concentration. Negative for confusion and sleep disturbance.       Physical Exam     Initial Vitals [17 1023]   BP Pulse Resp Temp SpO2   (!) 140/80 78 20 98.8 °F (37.1 °C) 99 %      MAP       100         Physical Exam    Nursing note and vitals reviewed.  Constitutional: She appears well-developed and well-nourished. She is not diaphoretic. No distress.   HENT:   Head: Normocephalic and  atraumatic.   Eyes: EOM are normal. Pupils are equal, round, and reactive to light.   Neck: Normal range of motion. Neck supple. No JVD present.   Cardiovascular: Normal rate, regular rhythm, normal heart sounds and intact distal pulses. Exam reveals no gallop and no friction rub.    No murmur heard.  Pulmonary/Chest: Breath sounds normal. No respiratory distress. She has no wheezes. She has no rhonchi. She has no rales. She exhibits no tenderness.   Abdominal: Soft. Bowel sounds are normal. She exhibits no distension and no mass. There is no tenderness. There is no rebound and no guarding.   Musculoskeletal: Normal range of motion. She exhibits edema (RLE: 1+ to mid calf).   Neurological: She is alert and oriented to person, place, and time. She has normal strength. She displays no tremor. No cranial nerve deficit or sensory deficit. She exhibits normal muscle tone. She displays no seizure activity. GCS eye subscore is 4. GCS verbal subscore is 5. GCS motor subscore is 6.   Skin: Skin is warm and dry. Capillary refill takes less than 2 seconds.        Psychiatric: She has a normal mood and affect. Judgment and thought content normal.         ED Course   Procedures  Labs Reviewed   CBC W/ AUTO DIFFERENTIAL - Abnormal; Notable for the following:        Result Value    RBC 3.63 (*)     Hemoglobin 9.9 (*)     Hematocrit 32.6 (*)     MCHC 30.4 (*)     RDW 17.9 (*)     Gran # 8.0 (*)     Eos # 0.8 (*)     Gran% 78.8 (*)     Lymph% 9.9 (*)     Mono% 3.4 (*)     All other components within normal limits   COMPREHENSIVE METABOLIC PANEL - Abnormal; Notable for the following:     Glucose 111 (*)     BUN, Bld 44 (*)     Creatinine 5.9 (*)     Calcium 8.6 (*)     Total Protein 8.8 (*)     Albumin 2.5 (*)     AST 9 (*)     ALT <5 (*)     eGFR if  8.2 (*)     eGFR if non  7.1 (*)     All other components within normal limits   TROPONIN I - Abnormal; Notable for the following:     Troponin I 0.061  (*)     All other components within normal limits   PHOSPHORUS   APTT   PROTIME-INR   URINALYSIS     EKG Readings: (Independently Interpreted)   Sinus rhythm with RAD with normal ST segments at 88 bpm          Medical Decision Making:   History:   I obtained history from: EMS provider.  Old Medical Records: I decided to obtain old medical records.  Old Records Summarized: records from clinic visits and records from previous admission(s).  Initial Assessment:   62-year-old female with complex medical history presenting status post syncopal episode prior to her scheduled hemodialysis.  Patient received 2 chest compressions for suspected cardiac arrest.  Patient has been at her baseline since episode.  Differential Diagnosis:   Syncope, near syncope, anemia, electrolyte abnormality, arrhythmia, less likely cardiac arrest, CVA.  Independently Interpreted Test(s):   I have ordered and independently interpreted EKG Reading(s) - see prior notes  Clinical Tests:   Lab Tests: Ordered and Reviewed  Radiological Study: Ordered and Reviewed  Medical Tests: Ordered and Reviewed  ED Management:  1154: Pt with elevated trop, no evidence of STEMI on EKG. Discussed likely admission with hospitalist service who requested ED eval by cardiology. \    1341: Pt to be admitted to Dr. Cantor.    Issa Valenzuela MD, MPH  Emergency Med/Pediatrics PGY-4  8/30/2017 1:41 PM              Scribe Attestation:   Scribe #1: I performed the above scribed service and the documentation accurately describes the services I performed. I attest to the accuracy of the note.    Attending Attestation:   Physician Attestation Statement for Resident:  As the supervising MD   Physician Attestation Statement: I have personally seen and examined this patient.   I agree with the above history. -: 62 year old  female with PMHx of ESRD and CHF presents by EMS after syncopal episode at dialysis this am.   As the supervising MD I agree with the above PE.    As the  supervising MD I agree with the above treatment, course, plan, and disposition.  I have reviewed and agree with the residents interpretation of the following: lab data, x-rays and EKG.  I have reviewed the following: old records at this facility.          Physician Attestation for Scribe:  Physician Attestation Statement for Scribe #1: I, Melchor Woodward MD, reviewed documentation, as scribed by Yariel Woods in my presence, and it is both accurate and complete.                 ED Course     Clinical Impression:   The primary encounter diagnosis was Elevated troponin. Diagnoses of Syncope, Chest pain, and Heart failure were also pertinent to this visit.    Disposition:   Disposition: Placed in Observation  Condition: Stable                        Alli Valenzueal MD  Resident  09/04/17 7716       Melchor Woodward MD  09/05/17 [id number]

## 2017-08-30 NOTE — ED NOTES
Pt transported to dialysis with Janelle Colindres RN and transport on tele box and venti mask at 8L in no acute distress noted.

## 2017-08-30 NOTE — ED NOTES
Pt resting in bed. No acute distress noted. Respirations remain even and unlabored. Pt denies any current complaints. Updated on room assignment and that she would be going to dialysis before going to room, just waiting on dialysis to call when room clean. Verbalized understanding. Call light within reach. Side rails up x2. Will continue to monitor.

## 2017-08-31 ENCOUNTER — DOCUMENTATION ONLY (OUTPATIENT)
Dept: CARDIOLOGY | Facility: CLINIC | Age: 62
End: 2017-08-31

## 2017-08-31 LAB
ALBUMIN SERPL BCP-MCNC: 2.5 G/DL
ANION GAP SERPL CALC-SCNC: 9 MMOL/L
BASOPHILS # BLD AUTO: 0.03 K/UL
BASOPHILS NFR BLD: 0.3 %
BUN SERPL-MCNC: 24 MG/DL
CALCIUM SERPL-MCNC: 8.3 MG/DL
CHLORIDE SERPL-SCNC: 106 MMOL/L
CO2 SERPL-SCNC: 25 MMOL/L
CREAT SERPL-MCNC: 4.1 MG/DL
DIFFERENTIAL METHOD: ABNORMAL
EOSINOPHIL # BLD AUTO: 0.7 K/UL
EOSINOPHIL NFR BLD: 8.1 %
ERYTHROCYTE [DISTWIDTH] IN BLOOD BY AUTOMATED COUNT: 18.2 %
EST. GFR  (AFRICAN AMERICAN): 12.8 ML/MIN/1.73 M^2
EST. GFR  (NON AFRICAN AMERICAN): 11.1 ML/MIN/1.73 M^2
GLUCOSE SERPL-MCNC: 98 MG/DL
HCT VFR BLD AUTO: 26.4 %
HGB BLD-MCNC: 8 G/DL
LYMPHOCYTES # BLD AUTO: 1.1 K/UL
LYMPHOCYTES NFR BLD: 11.6 %
MCH RBC QN AUTO: 27.1 PG
MCHC RBC AUTO-ENTMCNC: 30.3 G/DL
MCV RBC AUTO: 90 FL
MONOCYTES # BLD AUTO: 0.5 K/UL
MONOCYTES NFR BLD: 5.8 %
NEUTROPHILS # BLD AUTO: 6.8 K/UL
NEUTROPHILS NFR BLD: 74 %
PHOSPHATE SERPL-MCNC: 2.8 MG/DL
PLATELET # BLD AUTO: 233 K/UL
PMV BLD AUTO: 9.8 FL
POCT GLUCOSE: 101 MG/DL (ref 70–110)
POCT GLUCOSE: 107 MG/DL (ref 70–110)
POCT GLUCOSE: 118 MG/DL (ref 70–110)
POCT GLUCOSE: 159 MG/DL (ref 70–110)
POCT GLUCOSE: 97 MG/DL (ref 70–110)
POTASSIUM SERPL-SCNC: 4 MMOL/L
RBC # BLD AUTO: 2.95 M/UL
SODIUM SERPL-SCNC: 140 MMOL/L
TROPONIN I SERPL DL<=0.01 NG/ML-MCNC: 0.05 NG/ML
WBC # BLD AUTO: 9.16 K/UL

## 2017-08-31 PROCEDURE — G0257 UNSCHED DIALYSIS ESRD PT HOS: HCPCS

## 2017-08-31 PROCEDURE — 80100016 HC MAINTENANCE HEMODIALYSIS

## 2017-08-31 PROCEDURE — 25000003 PHARM REV CODE 250: Performed by: NURSE PRACTITIONER

## 2017-08-31 PROCEDURE — 90935 HEMODIALYSIS ONE EVALUATION: CPT | Mod: ,,, | Performed by: INTERNAL MEDICINE

## 2017-08-31 PROCEDURE — 99226 PR SUBSEQUENT OBSERVATION CARE,LEVEL III: CPT | Mod: ,,, | Performed by: PHYSICIAN ASSISTANT

## 2017-08-31 PROCEDURE — G0378 HOSPITAL OBSERVATION PER HR: HCPCS

## 2017-08-31 PROCEDURE — 85025 COMPLETE CBC W/AUTO DIFF WBC: CPT

## 2017-08-31 PROCEDURE — 25000003 PHARM REV CODE 250: Performed by: PHYSICIAN ASSISTANT

## 2017-08-31 PROCEDURE — 63600175 PHARM REV CODE 636 W HCPCS: Performed by: NURSE PRACTITIONER

## 2017-08-31 PROCEDURE — P9047 ALBUMIN (HUMAN), 25%, 50ML: HCPCS | Performed by: NURSE PRACTITIONER

## 2017-08-31 PROCEDURE — 36415 COLL VENOUS BLD VENIPUNCTURE: CPT

## 2017-08-31 PROCEDURE — A4216 STERILE WATER/SALINE, 10 ML: HCPCS | Performed by: PHYSICIAN ASSISTANT

## 2017-08-31 PROCEDURE — 84484 ASSAY OF TROPONIN QUANT: CPT

## 2017-08-31 PROCEDURE — 80069 RENAL FUNCTION PANEL: CPT

## 2017-08-31 PROCEDURE — 27000221 HC OXYGEN, UP TO 24 HOURS

## 2017-08-31 RX ORDER — SODIUM CHLORIDE 9 MG/ML
INJECTION, SOLUTION INTRAVENOUS ONCE
Status: DISCONTINUED | OUTPATIENT
Start: 2017-08-31 | End: 2017-09-03

## 2017-08-31 RX ORDER — ALBUMIN HUMAN 250 G/1000ML
25 SOLUTION INTRAVENOUS ONCE
Status: COMPLETED | OUTPATIENT
Start: 2017-08-31 | End: 2017-08-31

## 2017-08-31 RX ORDER — SODIUM CHLORIDE 9 MG/ML
INJECTION, SOLUTION INTRAVENOUS
Status: DISCONTINUED | OUTPATIENT
Start: 2017-08-31 | End: 2017-09-03

## 2017-08-31 RX ADMIN — SEVELAMER CARBONATE 2400 MG: 800 TABLET, FILM COATED ORAL at 08:08

## 2017-08-31 RX ADMIN — PREGABALIN 50 MG: 50 CAPSULE ORAL at 08:08

## 2017-08-31 RX ADMIN — SEVELAMER CARBONATE 2400 MG: 800 TABLET, FILM COATED ORAL at 12:08

## 2017-08-31 RX ADMIN — PANTOPRAZOLE SODIUM 40 MG: 40 TABLET, DELAYED RELEASE ORAL at 08:08

## 2017-08-31 RX ADMIN — CALCITRIOL 0.5 MCG: 0.25 CAPSULE, LIQUID FILLED ORAL at 08:08

## 2017-08-31 RX ADMIN — SODIUM CHLORIDE 400 ML: 0.9 INJECTION, SOLUTION INTRAVENOUS at 03:08

## 2017-08-31 RX ADMIN — ATORVASTATIN CALCIUM 40 MG: 20 TABLET, FILM COATED ORAL at 08:08

## 2017-08-31 RX ADMIN — ASPIRIN 81 MG CHEWABLE TABLET 81 MG: 81 TABLET CHEWABLE at 08:08

## 2017-08-31 RX ADMIN — ALBUMIN (HUMAN) 25 G: 12.5 SOLUTION INTRAVENOUS at 03:08

## 2017-08-31 RX ADMIN — Medication 10 ML: at 08:08

## 2017-08-31 RX ADMIN — Medication 10 ML: at 05:08

## 2017-08-31 NOTE — PROGRESS NOTES
Unable to bring patient to Echo lab for 2D ech w CFD this afternoon due to patient being in dialysis.  She will be placed on tomorrows schedule.

## 2017-08-31 NOTE — PLAN OF CARE
08/31/17 1050   Discharge Assessment   Assessment Type Discharge Planning Assessment   Confirmed/corrected address and phone number on facesheet? Yes   Assessment information obtained from? Patient   Expected Length of Stay (days) 1   Communicated expected length of stay with patient/caregiver yes   Prior to hospitilization cognitive status: Alert/Oriented   Prior to hospitalization functional status: Assistive Equipment   Current cognitive status: Alert/Oriented   Current Functional Status: Needs Assistance   Lives With spouse;child(maryana), adult  (Spouse, Barrera Sharon Sr. ([phone]) & Johnathan wray)   Able to Return to Prior Arrangements yes   Is patient able to care for self after discharge? Yes   Patient's perception of discharge disposition home or selfcare   Readmission Within The Last 30 Days current reason for admission unrelated to previous admission   If yes, most recent facility name: [facility]   Patient currently being followed by outpatient case management? Yes   If yes, name of outpatient case management following: [facility] outpatient case management  ([place])   Patient currently receives any other outside agency services? No   Equipment Currently Used at Home rollator;walker, standard;bedside commode;wheelchair;oxygen   Do you have any problems affording any of your prescribed medications? No   Is the patient taking medications as prescribed? yes   Does the patient have transportation home? Yes   Transportation Available family or friend will provide   Does the patient receive services at the [facility]? No   Discharge Plan A Home Health   Discharge Plan B Skilled Nursing Facility   Patient/Family In Agreement With Plan yes   Readmission Questionnaire   At the time of your discharge, did someone talk to you about what your health problems were? Yes   At the time of discharge, did someone talk to you about what to watch out for regarding worsening of your health problem? Yes   At the time  of discharge, did someone talk to you about what to do if you experienced worsening of your health problem? Yes   At the time of discharge, did someone talk to you about which medication to take when you left the hospital and which ones to stop taking? Yes   At the time of discharge, did someone talk to you about when and where to follow up with a doctor after you left the hospital? Yes   What do you believe caused you to be sick enough to be re-admitted? witnessed LOC   How often do you need to have someone help you when you read instructions, pamphlets, or other written material from your doctor or pharmacy? Often   Do you have problems taking your medications as prescribed? No   Do you have any problems affording any of  your prescribed medications? No   Do you have problems obtaining/receiving your medications? No   Does the patient have transportation to healthcare appointments? Yes   Living Arrangements house   Does the patient have family/friends to help with healtcare needs after discharge? yes   Does your caregiver provide all the help you need? Yes   Are you currently feeling confused? No   Are you currently having problems thinking? No   Are you currently having memory problems? No   Have you felt down, depressed, or hopeless? 0   Have you felt little interest or pleasure in doing things? 0   In the last 7 days, my sleep quality was: fair        08/31/17 1050   Discharge Assessment   Assessment Type Discharge Planning Assessment   Confirmed/corrected address and phone number on facesheet? Yes   Assessment information obtained from? Patient   Expected Length of Stay (days) 1   Communicated expected length of stay with patient/caregiver yes   Prior to hospitilization cognitive status: Alert/Oriented   Prior to hospitalization functional status: Assistive Equipment   Current cognitive status: Alert/Oriented   Current Functional Status: Needs Assistance   Lives With spouse;child(maryana), adult  (Spouse, Barrera  Sharon Trejo. ([phone]) & Johnathan wray)   Able to Return to Prior Arrangements yes   Is patient able to care for self after discharge? Yes   Patient's perception of discharge disposition home or selfcare   Readmission Within The Last 30 Days current reason for admission unrelated to previous admission   If yes, most recent [place] [facility]   Patient currently being followed by outpatient case management? Yes   If yes, name of outpatient case management following: [facility] outpatient case management  (Silvio)   Patient currently receives any other outside agency services? No   Equipment Currently Used at Home rollator;walker, standard;bedside commode;wheelchair;oxygen   Do you have any problems affording any of your prescribed medications? No   Is the patient taking medications as prescribed? yes   Does the patient have transportation home? Yes   Transportation Available family or friend will provide   Does the patient receive services at the [facility]? No   Discharge Plan A Home Health   Discharge Plan B Skilled Nursing Facility   Patient/Family In Agreement With Plan yes   Readmission Questionnaire   At the time of your discharge, did someone talk to you about what your health problems were? Yes   At the time of discharge, did someone talk to you about what to watch out for regarding worsening of your health problem? Yes   At the time of discharge, did someone talk to you about what to do if you experienced worsening of your health problem? Yes   At the time of discharge, did someone talk to you about which medication to take when you left the hospital and which ones to stop taking? Yes   At the time of discharge, did someone talk to you about when and where to follow up with a doctor after you left the hospital? Yes   What do you believe caused you to be sick enough to be re-admitted? witnessed LOC   How often do you need to have someone help you when you read instructions, pamphlets, or other  written material from your doctor or pharmacy? Often   Do you have problems taking your medications as prescribed? No   Do you have any problems affording any of  your prescribed medications? No   Do you have problems obtaining/receiving your medications? No   Does the patient have transportation to healthcare appointments? Yes   Living Arrangements house   Does the patient have family/friends to help with healtcare needs after discharge? yes   Does your caregiver provide all the help you need? Yes   Are you currently feeling confused? No   Are you currently having problems thinking? No   Are you currently having memory problems? No   Have you felt down, depressed, or hopeless? 0   Have you felt little interest or pleasure in doing things? 0   In the last 7 days, my sleep quality was: fair     Patient resting quietly in bed when CM rounded. No family at the bedside. Patient was admitted with syncope. Patient was [place] at [facility] [place]23[place]17-8/7/17 for a left AKA surgery & was admitted to [facility] 8/7/17-8/24/17. Patient is currently receiving home health care from [facility]. CM informed Nila ([id number]) w/[facility] of patient's hospitalization. Patient lives with her spouse, Barrera Herrera Sr. ([phone]), & adult daughter Johnathan Herrera and has equipment to assist with ambulation. Patient uses 2.5L O2 via NC at all times at home. Pox 92% on 5L O2 this AM. Plan to discharge patient home with OHH or SNF placement when medically stable. Patient denied the need for assistance with transportation at time of discharge. CM encouraged patient to remind family to bring portable oxygen for transportation home. Previously scheduled appointment with Dr. Cortez (PCP) on 9/12/17 at 0940 noted. CM encouraged patient to attend appointment Patient verbalized understanding. Will continue to follow.
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
## 2017-08-31 NOTE — NURSING
RICKEY hose applied to RLE. Right foot warm, pale.  Dorsalis pedis 2+.  No edema,.  No injury to heel.  Spontaneous movement of RLE, but mild foot drop observed.  Left AKA incision healed.  Dialysis fistula to left groin.  Multiple stage three decubiti to buttocks, more pronounced left buttock.  All wounds with pale wound bed.  Positioned to side with pillow to support.  Will ask wound care team to make recommendation.
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
## 2017-08-31 NOTE — SUBJECTIVE & OBJECTIVE
Interval History: No events overnight. Patient without SOB today. Patient saturating 88% on 3L of O2.     Review of Systems   Constitutional: Negative for chills and fever.   Respiratory: Positive for cough and shortness of breath (improving). Negative for apnea, chest tightness and wheezing.    Cardiovascular: Negative for chest pain, palpitations and leg swelling.   Gastrointestinal: Negative for abdominal distention, abdominal pain and nausea.   Musculoskeletal: Negative for arthralgias and back pain.   Skin: Negative for rash.   Neurological: Positive for syncope and weakness. Negative for dizziness and numbness.   Psychiatric/Behavioral: Negative for decreased concentration. The patient is not nervous/anxious.      Objective:     Vital Signs (Most Recent):  Temp: 98.1 °F (36.7 °C) (08/31/17 1100)  Pulse: 86 (08/31/17 1502)  Resp: 18 (08/31/17 1100)  BP: (!) 117/57 (08/31/17 1100)  SpO2: (!) 88 % (08/31/17 1502) Vital Signs (24h Range):  Temp:  [98.1 °F (36.7 °C)-98.4 °F (36.9 °C)] 98.1 °F (36.7 °C)  Pulse:  [71-91] 86  Resp:  [17-20] 18  SpO2:  [59 %-98 %] 88 %  BP: ()/(47-72) 117/57     Weight: 65.8 kg (145 lb)  Body mass index is 29.29 kg/m².    Intake/Output Summary (Last 24 hours) at 08/31/17 1552  Last data filed at 08/31/17 0600   Gross per 24 hour   Intake              620 ml   Output             3500 ml   Net            -2880 ml      Physical Exam   Constitutional: She is oriented to person, place, and time. She appears well-developed and well-nourished. No distress.   HENT:   Head: Normocephalic and atraumatic.   Eyes: EOM are normal. Pupils are equal, round, and reactive to light.   Neck: Normal range of motion. No JVD present.   Cardiovascular: Normal rate, regular rhythm and normal heart sounds.    No murmur heard.  Pulmonary/Chest: Effort normal. No respiratory distress. She has decreased breath sounds. She has no wheezes. She has rales in the right lower field and the left lower field.    Breathing mask in place   Abdominal: Soft. Bowel sounds are normal. She exhibits no distension.   Musculoskeletal: Normal range of motion. She exhibits edema (RLE, improving).   Neurological: She is alert and oriented to person, place, and time.   Skin: Skin is warm and dry.   Psychiatric: She has a normal mood and affect. Her behavior is normal.   Nursing note and vitals reviewed.      Significant Labs:   CBC:   Recent Labs  Lab 08/30/17  1049 08/31/17  0426   WBC 10.19 9.16   HGB 9.9* 8.0*   HCT 32.6* 26.4*    233     CMP:   Recent Labs  Lab 08/30/17  1049 08/31/17  0426    140   K 4.4 4.0    106   CO2 27 25   * 98   BUN 44* 24*   CREATININE 5.9* 4.1*   CALCIUM 8.6* 8.3*   PROT 8.8*  --    ALBUMIN 2.5* 2.5*   BILITOT 0.5  --    ALKPHOS 111  --    AST 9*  --    ALT <5*  --    ANIONGAP 12 9   EGFRNONAA 7.1* 11.1*     Coagulation:   Recent Labs  Lab 08/30/17  1409   INR 1.0   APTT 24.6     Magnesium: No results for input(s): MG in the last 48 hours.  TSH:   Recent Labs  Lab 07/23/17  1946   TSH 1.899       Significant Imaging: I have reviewed all pertinent imaging results/findings within the past 24 hours.

## 2017-08-31 NOTE — PROGRESS NOTES
Pt completed 3.5 hour hemodialysis treatment. Net UF 3 liters. Pt tolerated well with albumin given for pressure support. Blood returned with normal saline to left thigh avg, two needles removed, pressure held for 15 mins, hemostasis achieved, covered with gauze and paper tape. +bruit/+thrill. Pt transported via stretcher from dialysis to room 902B by transport.
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
## 2017-08-31 NOTE — PLAN OF CARE
Problem: Patient Care Overview  Goal: Plan of Care Review  Outcome: Ongoing (interventions implemented as appropriate)  POC reviewed with pt. AAO x 4. Telemetry monitor on with continuous pulse ox. pt on 5L/NC. Pt went to HD today. glucose monitoring continued.  Pt remained free from falls. Questions and concerns addressed. Pt progressing towards goals. Will continue to monitor. See flow sheets for full assessment and VS

## 2017-08-31 NOTE — PROGRESS NOTES
Report received from JEFFERY Eaton. Pt arrived from floor AAOx4. Maintenance dialysis initiated via L thigh graft without difficulty. Albumin given as ordered.

## 2017-08-31 NOTE — PROGRESS NOTES
4hr ultra filtration only treatment completed 3.5L of fluid removed pt tolerated well. Both needles of a L thigh graft removed and pressure held until hemostasis achieved dressing applied. Report given to JEFFERY Eaton..

## 2017-08-31 NOTE — PROGRESS NOTES
Attempted to see patient for wound care consult. Patient off floor. Will attempt to see at another time.

## 2017-08-31 NOTE — NURSING
Patient has venti mask off and is eating a deepak cracker.  Decreasing LOC observed.  Respirations not labored.  Oxygen saturation checked and shows 59%.  Ventimask reapplied.  Saturation gradually increasing.   is at bedside.  RN ask RT to come and check patient.  2125:  RT was here.  Saturation now 96% with ventimask on.  ADRI Soares NP informed above.  Will try cannula instead of ventimask so that patient may take a sip of water or eat a cracker.  2200:   telling patient that ABG's not necessary and painful stick.  Patient refuses ABG.  RT notify NP.  Saturation 96-97% 4L O2.  Telemetry shows RSR, rate 88.  Continuous pulse oximetry now in place.

## 2017-09-01 PROBLEM — L89.323 DECUBITUS ULCER OF LEFT BUTTOCK, STAGE 3: Status: ACTIVE | Noted: 2017-08-04

## 2017-09-01 PROBLEM — L89.309 DECUBITUS ULCER OF BUTTOCK: Status: ACTIVE | Noted: 2017-08-04

## 2017-09-01 PROBLEM — R06.03 ACUTE RESPIRATORY DISTRESS: Status: ACTIVE | Noted: 2017-09-01

## 2017-09-01 LAB
ALBUMIN SERPL BCP-MCNC: 2.8 G/DL
ANION GAP SERPL CALC-SCNC: 8 MMOL/L
BASOPHILS # BLD AUTO: 0.02 K/UL
BASOPHILS NFR BLD: 0.2 %
BUN SERPL-MCNC: 33 MG/DL
CALCIUM SERPL-MCNC: 8.7 MG/DL
CHLORIDE SERPL-SCNC: 104 MMOL/L
CO2 SERPL-SCNC: 26 MMOL/L
CREAT SERPL-MCNC: 5.1 MG/DL
DIASTOLIC DYSFUNCTION: YES
DIFFERENTIAL METHOD: ABNORMAL
EOSINOPHIL # BLD AUTO: 0.8 K/UL
EOSINOPHIL NFR BLD: 8.5 %
ERYTHROCYTE [DISTWIDTH] IN BLOOD BY AUTOMATED COUNT: 17.8 %
EST. GFR  (AFRICAN AMERICAN): 9.8 ML/MIN/1.73 M^2
EST. GFR  (NON AFRICAN AMERICAN): 8.5 ML/MIN/1.73 M^2
ESTIMATED PA SYSTOLIC PRESSURE: 87.59
GLOBAL PERICARDIAL EFFUSION: ABNORMAL
GLUCOSE SERPL-MCNC: 114 MG/DL
HCT VFR BLD AUTO: 28.5 %
HGB BLD-MCNC: 8.4 G/DL
LYMPHOCYTES # BLD AUTO: 1.2 K/UL
LYMPHOCYTES NFR BLD: 13 %
MCH RBC QN AUTO: 27.2 PG
MCHC RBC AUTO-ENTMCNC: 29.5 G/DL
MCV RBC AUTO: 92 FL
MITRAL VALVE REGURGITATION: ABNORMAL
MONOCYTES # BLD AUTO: 0.4 K/UL
MONOCYTES NFR BLD: 4.5 %
NEUTROPHILS # BLD AUTO: 6.6 K/UL
NEUTROPHILS NFR BLD: 73.5 %
PHOSPHATE SERPL-MCNC: 3 MG/DL
PLATELET # BLD AUTO: 236 K/UL
PMV BLD AUTO: 10.1 FL
POCT GLUCOSE: 132 MG/DL (ref 70–110)
POCT GLUCOSE: 141 MG/DL (ref 70–110)
POCT GLUCOSE: 145 MG/DL (ref 70–110)
POCT GLUCOSE: 93 MG/DL (ref 70–110)
POTASSIUM SERPL-SCNC: 4.3 MMOL/L
RBC # BLD AUTO: 3.09 M/UL
RETIRED EF AND QEF - SEE NOTES: 60 (ref 55–65)
SODIUM SERPL-SCNC: 138 MMOL/L
TRICUSPID VALVE REGURGITATION: ABNORMAL
WBC # BLD AUTO: 9.03 K/UL

## 2017-09-01 PROCEDURE — 36415 COLL VENOUS BLD VENIPUNCTURE: CPT

## 2017-09-01 PROCEDURE — 25000003 PHARM REV CODE 250: Performed by: PHYSICIAN ASSISTANT

## 2017-09-01 PROCEDURE — 93306 TTE W/DOPPLER COMPLETE: CPT

## 2017-09-01 PROCEDURE — 80069 RENAL FUNCTION PANEL: CPT

## 2017-09-01 PROCEDURE — 90935 HEMODIALYSIS ONE EVALUATION: CPT | Mod: ,,, | Performed by: INTERNAL MEDICINE

## 2017-09-01 PROCEDURE — A4216 STERILE WATER/SALINE, 10 ML: HCPCS | Performed by: PHYSICIAN ASSISTANT

## 2017-09-01 PROCEDURE — 12000002 HC ACUTE/MED SURGE SEMI-PRIVATE ROOM

## 2017-09-01 PROCEDURE — 80100016 HC MAINTENANCE HEMODIALYSIS

## 2017-09-01 PROCEDURE — 93306 TTE W/DOPPLER COMPLETE: CPT | Mod: 26,,, | Performed by: INTERNAL MEDICINE

## 2017-09-01 PROCEDURE — 85025 COMPLETE CBC W/AUTO DIFF WBC: CPT

## 2017-09-01 PROCEDURE — 99233 SBSQ HOSP IP/OBS HIGH 50: CPT | Mod: ,,, | Performed by: PHYSICIAN ASSISTANT

## 2017-09-01 PROCEDURE — 25000003 PHARM REV CODE 250: Performed by: NURSE PRACTITIONER

## 2017-09-01 RX ORDER — SODIUM CHLORIDE 9 MG/ML
INJECTION, SOLUTION INTRAVENOUS ONCE
Status: COMPLETED | OUTPATIENT
Start: 2017-09-01 | End: 2017-09-01

## 2017-09-01 RX ORDER — SODIUM CHLORIDE 9 MG/ML
INJECTION, SOLUTION INTRAVENOUS
Status: DISCONTINUED | OUTPATIENT
Start: 2017-09-01 | End: 2017-09-03

## 2017-09-01 RX ADMIN — PREGABALIN 50 MG: 50 CAPSULE ORAL at 09:09

## 2017-09-01 RX ADMIN — Medication 3 ML: at 09:09

## 2017-09-01 RX ADMIN — PREGABALIN 50 MG: 50 CAPSULE ORAL at 12:09

## 2017-09-01 RX ADMIN — Medication 3 ML: at 02:09

## 2017-09-01 RX ADMIN — Medication 10 ML: at 06:09

## 2017-09-01 RX ADMIN — CALCITRIOL 0.5 MCG: 0.25 CAPSULE, LIQUID FILLED ORAL at 12:09

## 2017-09-01 RX ADMIN — PANTOPRAZOLE SODIUM 40 MG: 40 TABLET, DELAYED RELEASE ORAL at 12:09

## 2017-09-01 RX ADMIN — ASPIRIN 81 MG CHEWABLE TABLET 81 MG: 81 TABLET CHEWABLE at 12:09

## 2017-09-01 RX ADMIN — SEVELAMER CARBONATE 2400 MG: 800 TABLET, FILM COATED ORAL at 08:09

## 2017-09-01 RX ADMIN — SEVELAMER CARBONATE 2400 MG: 800 TABLET, FILM COATED ORAL at 12:09

## 2017-09-01 RX ADMIN — SODIUM CHLORIDE: 0.9 INJECTION, SOLUTION INTRAVENOUS at 03:09

## 2017-09-01 RX ADMIN — ATORVASTATIN CALCIUM 40 MG: 20 TABLET, FILM COATED ORAL at 12:09
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
## 2017-09-01 NOTE — NURSING
O2 sat 99% 5L per cannula; decreased to 4L.  Will monitor continuous pulse oximeter.  Telemetry shows RSR, rate 86.

## 2017-09-01 NOTE — ASSESSMENT & PLAN NOTE
Patient found to have 3 ulcers on her buttock overnight.  - Wound care consulted, awaiting recommendations.  - Flip patient [id number].  - Clear barrier cream with miconazole

## 2017-09-01 NOTE — NURSING
Alert.  JETER.  Appetite good.  Oxygen 5L per cannula with desired saturation maintained.  Three large stage III decubiti to buttocks cleaned with soap and water.  Barrier cream applied.  Large amount of green odorous drainage on depend brief. Patient insists on having this soiled brief reapplied.  Would not accept any alternative.  Positioned on side with wedge to support.

## 2017-09-01 NOTE — ASSESSMENT & PLAN NOTE
- H/H 9.9/32.6 on admission, improved from most recent labs 8/24  - Receives Micera 225 mcg q 2 weeks, last dose 8/25.  - H/H today 8.4/28.5  - Daily CBC

## 2017-09-01 NOTE — ASSESSMENT & PLAN NOTE
Patient on oxygen at baseline, reports generally at 2.5-3 lpm. Per patient, oxygen was removed during weigh in at dialysis and was never put back on, while oxygen off she subsequently had syncopal event.  - Continuous oxygen at 2-3L.  - Vitals q4  - Orthostatics pending.  - Cardiology consulted given elevated troponin, recommended TTE and trending troponins, high intensity statin. Troponins stable today.  - Last ECHO 6/2016 showed EF 60% with normal systolic function with diastolic dysfunction.  - Repeat ECHO showed increased PA pressure of 88, likely 2/2 to fluid overload.
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
## 2017-09-01 NOTE — PROGRESS NOTES
Consult received on patient. Stage 3 pressure injury noted to left upper buttocks with surrounding tissue with incontinent associated dermatitis and topical yeast to left lower abdomen/hip. Patient's diaper soiled per RN patient insisted on keeping her soiled diaper. Educated patient that soiled diaper could lead to further skin breakdown. Patient stated  would bring her diapers from home later this evening after approximately 4pm after work. Patient refused inpatient diaper and did not want to be without diaper, patient willing to have mesh underwear and peripad. Nursing to remove soiled diaper and replace with mesh under and peripad until  delivers home supplies of diapers. Recommend applying clear barrier cream with miconazole to left lower abdomen/hip and to buttocks/wounds. Discussed with Narendra MARIE with [facility], orders placed for nursing. Nursing to continue care.     09/01/17 1012       Pressure Ulcer 08/31/17 0000 Left upper buttocks Stage III   Date First Assessed/Time First Assessed: 08/31/17 0000   Pressure Ulcer Present on Admission: yes  Side: Left  Orientation: upper  Location: buttocks  Staging: Stage III   Wound Image    Staging Stage III   Pressure Ulcer Risk Factors activity;mobility;nutrition;shear/friction;moisture   Drainage Amount none   Drainage Characteristics/Odor no odor   Appearance moist;pink;yellow;slough;adipose  (small amount of yellow slough)   Periwound Area moist  (Incontinent associated dermatitis bilateral buttocks)   Wound Edges open   Wound Length (cm) 4   Wound Width (cm) 1   Depth (cm) 0.2

## 2017-09-01 NOTE — ASSESSMENT & PLAN NOTE
Hypertension    Last ECHO showed diastolic dysfunction with an EF 60% in 2016.  - 2D echo with EF 60 and PA pressure 88.  - Amlodipine 10mg daily.

## 2017-09-01 NOTE — SUBJECTIVE & OBJECTIVE
Interval History: Patient agitated today. Overnight she desatted to the 50s and her mask was off while eating a snack.  She was found to have 1 decubitus ulcer on left buttock when her diaper was being changed. She argued with the nurse and wound care to have her dirty diaper placed back on her. She was able to be convinced to have a clean one applied. Patient says she wants to go home, but is aware she is not at her O2 baseline and needs more diuresis. Patient was counseled thoroughly on the need to have her mask on at all times to avoid these events.    Review of Systems   Constitutional: Negative for chills and fever.   Respiratory: Positive for cough and shortness of breath (improving). Negative for apnea, chest tightness and wheezing.    Cardiovascular: Negative for chest pain, palpitations and leg swelling.   Gastrointestinal: Negative for abdominal distention, abdominal pain and nausea.   Musculoskeletal: Negative for arthralgias and back pain.   Skin: Negative for rash.   Neurological: Positive for syncope and weakness. Negative for dizziness and numbness.   Psychiatric/Behavioral: Negative for decreased concentration. The patient is not nervous/anxious.      Objective:     Vital Signs (Most Recent):  Temp: 98.7 °F (37.1 °C) (09/01/17 1220)  Pulse: 89 (09/01/17 1220)  Resp: 18 (09/01/17 1220)  BP: 134/75 (09/01/17 1220)  SpO2: (!) 90 % (09/01/17 1220) Vital Signs (24h Range):  Temp:  [97.8 °F (36.6 °C)-98.8 °F (37.1 °C)] 98.7 °F (37.1 °C)  Pulse:  [80-98] 89  Resp:  [16-19] 18  SpO2:  [88 %-100 %] 90 %  BP: (102-140)/(56-81) 134/75     Weight: 65.8 kg (145 lb)  Body mass index is 29.29 kg/m².    Intake/Output Summary (Last 24 hours) at 09/01/17 1444  Last data filed at 09/01/17 0600   Gross per 24 hour   Intake              700 ml   Output             4200 ml   Net            -3500 ml      Physical Exam   Constitutional: She is oriented to person, place, and time. She appears well-developed and  well-nourished. No distress.   HENT:   Head: Normocephalic and atraumatic.   Eyes: EOM are normal. Pupils are equal, round, and reactive to light.   Neck: Normal range of motion. No JVD present.   Cardiovascular: Normal rate, regular rhythm and normal heart sounds.    No murmur heard.  Pulmonary/Chest: Effort normal. No respiratory distress. She has decreased breath sounds. She has no wheezes. She has rales in the right lower field and the left lower field.   Breathing mask in place   Abdominal: Soft. Bowel sounds are normal. She exhibits no distension. A hernia is present. Hernia confirmed positive in the left inguinal area (Painless, non reducible).   Musculoskeletal: Normal range of motion. She exhibits edema (RLE, improving).   Neurological: She is alert and oriented to person, place, and time.   Skin: Skin is warm and dry.   Psychiatric: She has a normal mood and affect. Her behavior is normal.   Nursing note and vitals reviewed.      Significant Labs:   BMP:   Recent Labs  Lab 09/01/17  0444   *      K 4.3      CO2 26   BUN 33*   CREATININE 5.1*   CALCIUM 8.7     CBC:   Recent Labs  Lab 08/31/17 0426 09/01/17  0444   WBC 9.16 9.03   HGB 8.0* 8.4*   HCT 26.4* 28.5*    236     CMP:   Recent Labs  Lab 08/31/17 0426 09/01/17  0444    138   K 4.0 4.3    104   CO2 25 26   GLU 98 114*   BUN 24* 33*   CREATININE 4.1* 5.1*   CALCIUM 8.3* 8.7   ALBUMIN 2.5* 2.8*   ANIONGAP 9 8   EGFRNONAA 11.1* 8.5*     All pertinent labs within the past 24 hours have been reviewed.    Significant Imaging: I have reviewed all pertinent imaging results/findings within the past 24 hours.

## 2017-09-01 NOTE — PROGRESS NOTES
Maintenance dialysis treatment started to pt left thigh avg with two 15 G needles. Pt tolerated well.

## 2017-09-01 NOTE — PLAN OF CARE
Problem: Patient Care Overview  Goal: Plan of Care Review  Outcome: Ongoing (interventions implemented as appropriate)  POC reviewed with pt at 1400. Dialysis.  Blood sugar management.  Pt verbalized understanding. Questions and concerns addressed. No acute events today. Pt progressing toward goals. Will continue to monitor. See flowsheets for full assessment and VS info.

## 2017-09-01 NOTE — ASSESSMENT & PLAN NOTE
Patient still requiring 5L of O2, baseline is 2-3L.  - upgraded to inpatient status  - Continue dialysis per Nephrology recs/schedule (W, TH, F)  - Reassess respiratory status after last dialysis session.  - Continuous pulse ox

## 2017-09-02 PROBLEM — R55 SYNCOPE: Status: RESOLVED | Noted: 2017-08-30 | Resolved: 2017-09-02

## 2017-09-02 PROBLEM — R06.03 ACUTE RESPIRATORY DISTRESS: Status: RESOLVED | Noted: 2017-09-01 | Resolved: 2017-09-02

## 2017-09-02 PROBLEM — T82.590A AV GRAFT MALFUNCTION: Status: ACTIVE | Noted: 2017-09-02

## 2017-09-02 LAB
ALBUMIN SERPL BCP-MCNC: 2.7 G/DL
ANION GAP SERPL CALC-SCNC: 8 MMOL/L
BASOPHILS # BLD AUTO: 0.03 K/UL
BASOPHILS NFR BLD: 0.3 %
BUN SERPL-MCNC: 19 MG/DL
CALCIUM SERPL-MCNC: 8.5 MG/DL
CHLORIDE SERPL-SCNC: 111 MMOL/L
CO2 SERPL-SCNC: 19 MMOL/L
CREAT SERPL-MCNC: 3.8 MG/DL
DIFFERENTIAL METHOD: ABNORMAL
EOSINOPHIL # BLD AUTO: 0.7 K/UL
EOSINOPHIL NFR BLD: 7.8 %
ERYTHROCYTE [DISTWIDTH] IN BLOOD BY AUTOMATED COUNT: 17.4 %
EST. GFR  (AFRICAN AMERICAN): 14 ML/MIN/1.73 M^2
EST. GFR  (NON AFRICAN AMERICAN): 12.1 ML/MIN/1.73 M^2
ESTIMATED AVG GLUCOSE: 108 MG/DL
GLUCOSE SERPL-MCNC: 96 MG/DL
HBA1C MFR BLD HPLC: 5.4 %
HCT VFR BLD AUTO: 29.3 %
HGB BLD-MCNC: 8.9 G/DL
LYMPHOCYTES # BLD AUTO: 1.1 K/UL
LYMPHOCYTES NFR BLD: 12.9 %
MAGNESIUM SERPL-MCNC: 2.1 MG/DL
MCH RBC QN AUTO: 27.7 PG
MCHC RBC AUTO-ENTMCNC: 30.4 G/DL
MCV RBC AUTO: 91 FL
MONOCYTES # BLD AUTO: 0.5 K/UL
MONOCYTES NFR BLD: 6.2 %
NEUTROPHILS # BLD AUTO: 6.4 K/UL
NEUTROPHILS NFR BLD: 72.7 %
PHOSPHATE SERPL-MCNC: 2.1 MG/DL
PLATELET # BLD AUTO: 250 K/UL
PMV BLD AUTO: 10.1 FL
POCT GLUCOSE: 105 MG/DL (ref 70–110)
POCT GLUCOSE: 123 MG/DL (ref 70–110)
POCT GLUCOSE: 132 MG/DL (ref 70–110)
POCT GLUCOSE: 221 MG/DL (ref 70–110)
POTASSIUM SERPL-SCNC: 4.4 MMOL/L
POTASSIUM SERPL-SCNC: 4.6 MMOL/L
RBC # BLD AUTO: 3.21 M/UL
SODIUM SERPL-SCNC: 138 MMOL/L
WBC # BLD AUTO: 8.75 K/UL

## 2017-09-02 PROCEDURE — 27201247 HC HEMODIALYSIS, SET-UP & CANCEL

## 2017-09-02 PROCEDURE — A4216 STERILE WATER/SALINE, 10 ML: HCPCS | Performed by: PHYSICIAN ASSISTANT

## 2017-09-02 PROCEDURE — 84132 ASSAY OF SERUM POTASSIUM: CPT

## 2017-09-02 PROCEDURE — 25000003 PHARM REV CODE 250: Performed by: PHYSICIAN ASSISTANT

## 2017-09-02 PROCEDURE — 99233 SBSQ HOSP IP/OBS HIGH 50: CPT | Mod: ,,, | Performed by: INTERNAL MEDICINE

## 2017-09-02 PROCEDURE — 36415 COLL VENOUS BLD VENIPUNCTURE: CPT

## 2017-09-02 PROCEDURE — 93005 ELECTROCARDIOGRAM TRACING: CPT

## 2017-09-02 PROCEDURE — 27000221 HC OXYGEN, UP TO 24 HOURS

## 2017-09-02 PROCEDURE — 93010 ELECTROCARDIOGRAM REPORT: CPT | Mod: ,,, | Performed by: INTERNAL MEDICINE

## 2017-09-02 PROCEDURE — 83036 HEMOGLOBIN GLYCOSYLATED A1C: CPT

## 2017-09-02 PROCEDURE — 85025 COMPLETE CBC W/AUTO DIFF WBC: CPT

## 2017-09-02 PROCEDURE — 83735 ASSAY OF MAGNESIUM: CPT

## 2017-09-02 PROCEDURE — 12000002 HC ACUTE/MED SURGE SEMI-PRIVATE ROOM

## 2017-09-02 PROCEDURE — 99233 SBSQ HOSP IP/OBS HIGH 50: CPT | Mod: ,,, | Performed by: PHYSICIAN ASSISTANT

## 2017-09-02 PROCEDURE — 80069 RENAL FUNCTION PANEL: CPT

## 2017-09-02 RX ADMIN — MICONAZOLE NITRATE: 20 OINTMENT TOPICAL at 10:09

## 2017-09-02 RX ADMIN — SEVELAMER CARBONATE 2400 MG: 800 TABLET, FILM COATED ORAL at 05:09

## 2017-09-02 RX ADMIN — Medication 3 ML: at 02:09

## 2017-09-02 RX ADMIN — SEVELAMER CARBONATE 2400 MG: 800 TABLET, FILM COATED ORAL at 09:09

## 2017-09-02 RX ADMIN — PREGABALIN 50 MG: 50 CAPSULE ORAL at 10:09

## 2017-09-02 RX ADMIN — PANTOPRAZOLE SODIUM 40 MG: 40 TABLET, DELAYED RELEASE ORAL at 09:09

## 2017-09-02 RX ADMIN — ATORVASTATIN CALCIUM 40 MG: 20 TABLET, FILM COATED ORAL at 09:09

## 2017-09-02 RX ADMIN — CALCITRIOL 0.5 MCG: 0.25 CAPSULE, LIQUID FILLED ORAL at 09:09

## 2017-09-02 RX ADMIN — MICONAZOLE NITRATE: 20 OINTMENT TOPICAL at 09:09

## 2017-09-02 RX ADMIN — ASPIRIN 81 MG CHEWABLE TABLET 81 MG: 81 TABLET CHEWABLE at 09:09

## 2017-09-02 RX ADMIN — PREGABALIN 50 MG: 50 CAPSULE ORAL at 08:09

## 2017-09-02 RX ADMIN — SEVELAMER CARBONATE 2400 MG: 800 TABLET, FILM COATED ORAL at 12:09

## 2017-09-02 RX ADMIN — ACETAMINOPHEN 500 MG: 500 TABLET ORAL at 05:09

## 2017-09-02 NOTE — PLAN OF CARE
[facility]    HOME HEALTH ORDERS  FACE TO FACE ENCOUNTER    Patient Name: Aleta Herrera  YOB: 1955    PCP: Radha Lao MD   PCP Address: [address] [place] [address]  PCP Phone Number: [phone]  PCP Fax: [phone]    Encounter Date: 09/02/2017    Admit to Home Health    Diagnoses:  Active Hospital Problems    Diagnosis  POA    *Syncope [R55]  Yes    GERD (gastroesophageal reflux disease) [K21.9]  Yes    Unilateral AKA [Z89.619]  Not Applicable    Decubitus ulcer of left buttock, stage 3 [L89.323]  Yes    (HFpEF) heart failure with preserved ejection fraction [I50.30]  Yes    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 [E11.22, I12.0, Z99.2, N18.6]  Not Applicable    Anemia of chronic renal failure [N18.9, D63.1]  Yes     Chronic    Secondary hyperparathyroidism of renal origin [N25.81]  Yes     Chronic    ESRD 2/2 HTN on HD since 11/12/09 [N18.6]  Yes     Chronic      Resolved Hospital Problems    Diagnosis Date Resolved POA    Acute respiratory distress [R06.00] 09/02/2017 Yes       Future Appointments  Date Time Provider Department Center   9/6/2017 12:00 PM VASCULAR, LAB [facility] MARCIO Ramirez shayy   9/6/2017 12:30 PM VASCULAR, LAB [facility] MARCIO Ramirez shayy   9/6/2017 1:00 PM Nathalie Madera MD [facility] ANAIS Ramirez shayy   9/12/2017 9:40 AM Radha Lao MD [facility]   9/20/2017 3:00 PM [facility]     Follow-up Information     Radha Lao MD On 9/12/2017.    Specialty:  Internal Medicine  Why:  at 9:40 AM  Contact information:  [address]  [place] [address]  [phone]                     I have seen and examined this patient face to face today. My clinical findings that support the need for the home health skilled services and home bound status are the following:  Weakness/numbness causing balance and gait disturbance due to Weakness/Debility making it taxing to leave  home.  Requiring assistive device to leave home due to unsteady gait caused by  Weakness/Debility.    Allergies:Review of patient's allergies indicates:  No Known Allergies    Diet: diabetic diet: 1800 calorie and renal diet    Activities: activity as tolerated    Nursing:   SN to complete comprehensive assessment including routine vital signs. Instruct on disease process and s/s of complications to report to MD. Review/verify medication list sent home with the patient at time of discharge  and instruct patient/caregiver as needed. Frequency may be adjusted depending on start of care date.    Notify MD if SBP > 160 or < 90; DBP > 90 or < 50; HR > 120 or < 50; Temp > 101; Other:         CONSULTS:    Physical Therapy to evaluate and treat. Evaluate for home safety and equipment needs; Establish/upgrade home exercise program. Perform / instruct on therapeutic exercises, gait training, transfer training, and Range of Motion.  Occupational Therapy to evaluate and treat. Evaluate home environment for safety and equipment needs. Perform/Instruct on transfers, ADL training, ROM, and therapeutic exercises.   to evaluate for community resources/long-range planning.  Aide to provide assistance with personal care, ADLs, and vital signs.    MISCELLANEOUS CARE:  Wound Care Orders:  yes:  Pressure Ulcer(s) Stage III:  Location: left upper buttock    Consult ET nurse        Apply the following to wound:   Other:  clear barrier cream with miconazole to left lower abdomen/hip and to buttocks/wounds.    Medications: Review discharge medications with patient and family and provide education.    Current Discharge Medication List      START taking these medications    Details   fluconazole (DIFLUCAN) 150 MG Tab Take 1 tablet (150 mg total) by mouth once.  Qty: 1 tablet, Refills: 0         CONTINUE these medications which have NOT CHANGED    Details   amlodipine (NORVASC) 10 MG tablet TAKE ONE TABLET BY MOUTH ONCE DAILY  Qty:  90 tablet, Refills: 0      aspirin 81 mg Tab Take 1 tablet by mouth once daily.       calcitRIOL (ROCALTROL) 0.5 MCG Cap Take 1 capsule (0.5 mcg total) by mouth once daily.  Qty: 30 capsule, Refills: 1      insulin aspart (NOVOLOG FLEXPEN) 100 unit/mL InPn as dir Insulin Pen Subcutaneous Before meals and at bedtime.  If blood sugar is 151-200 1 units SQif blood sugar is 201-250 2 units SQIf blood sugar is 251-300 3 units SQIf blood sugar is 301-350 4 units SQIf blood sugar is > or = 351 5 units SQand call MD;  Dispense with needles      insulin detemir (LEVEMIR FLEXPEN) 100 unit/mL (3 mL) SubQ InPn pen Inject 4 Units into the skin every evening.      lidocaine-prilocaine (EMLA) cream APPLY SMALL AMOUNT TO ACCESS SITE 1 TO 2 HOURS BEFORE TREATMENT. COVER AREA WITH AN OCCLUSIVE DRESSING.  Refills: 3      miconazole (MICOTIN) 2 % cream Apply topically 2 (two) times daily.  Refills: 0      pantoprazole (PROTONIX) 40 MG tablet Take 1 tablet (40 mg total) by mouth once daily.  Qty: 90 tablet, Refills: 0      pregabalin (LYRICA) 50 MG capsule Take 1 capsule (50 mg total) by mouth 2 (two) times daily.  Qty: 180 capsule, Refills: 2      sevelamer carbonate (RENVELA) 800 mg Tab Take 2,400 mg by mouth 3 (three) times daily with meals.             I certify that this patient is confined to her home and needs intermittent skilled nursing care, physical therapy and occupational therapy.

## 2017-09-02 NOTE — ASSESSMENT & PLAN NOTE
- Patient on Home O2 at baseline, reports generally at 2.5-3 lpm. Per patient, oxygen was removed during weigh in at dialysis and was never put back on, while oxygen off she subsequently had syncopal event.  - Continuous oxygen at 2-3L.  - Vitals q4  - Cardiology consulted given elevated troponin, recommended TTE and trending troponins, high intensity statin. Troponins flat and no chest pain.  - Repeat ECHO showed increased PA pressure of 88, likely 2/2 to fluid overload.

## 2017-09-02 NOTE — CONSULTS
[facility]  General Surgery  History & Physical    Patient Name: Aleta Herrera  MRN: [id number]  Admission Date: 8/30/2017  Attending Physician: Daina Cantor MD   Primary Care Provider: Radha Lao MD    Patient information was obtained from patient, past medical records and ER records.     Subjective:     Chief Complaint/Reason for Admission: Syncope    History of Present Illness:  Patient is a 62 y.o. female with ESRD on HD MWF with a left thigh AV graft who was originally admitted for a syncopal episode she experienced in dialysis earlier in the week. She has been undergoing a cardiac workup while inpatient which was negative. Patient underwent HD yesterday and was set to have it again this afternoon prior to her discharge when she was noted to have a large groin hematoma over her access site. Per patient she noticed it this morning when she took down her bandage from yesterday. She does not feel it is enlarging. She denies any associated pain. Vascular surgery was consulted for evaluation of the graft hematoma.     No current facility-administered medications on file prior to encounter.      Current Outpatient Prescriptions on File Prior to Encounter   Medication Sig    amlodipine (NORVASC) 10 MG tablet TAKE ONE TABLET BY MOUTH ONCE DAILY    aspirin 81 mg Tab Take 1 tablet by mouth once daily.     calcitRIOL (ROCALTROL) 0.5 MCG Cap Take 1 capsule (0.5 mcg total) by mouth once daily.    insulin aspart (NOVOLOG FLEXPEN) 100 unit/mL InPn as dir Insulin Pen Subcutaneous Before meals and at bedtime.  If blood sugar is 151-200 1 units SQif blood sugar is 201-250 2 units SQIf blood sugar is 251-300 3 units SQIf blood sugar is 301-350 4 units SQIf blood sugar is > or = 351 5 units SQand call MD;  Dispense with needles    insulin detemir (LEVEMIR FLEXPEN) 100 unit/mL (3 mL) SubQ InPn pen Inject 4 Units into the skin every evening.    lidocaine-prilocaine (EMLA) cream APPLY  SMALL AMOUNT TO ACCESS SITE 1 TO 2 HOURS BEFORE TREATMENT. COVER AREA WITH AN OCCLUSIVE DRESSING.    miconazole (MICOTIN) 2 % cream Apply topically 2 (two) times daily.    pantoprazole (PROTONIX) 40 MG tablet Take 1 tablet (40 mg total) by mouth once daily.    pregabalin (LYRICA) 50 MG capsule Take 1 capsule (50 mg total) by mouth 2 (two) times daily.    sevelamer carbonate (RENVELA) 800 mg Tab Take 2,400 mg by mouth 3 (three) times daily with meals.       Review of patient's allergies indicates:  No Known Allergies    Past Medical History:   Diagnosis Date    Anemia of chronic renal failure 2013    Anticoagulant long-term use 2015    CHF (congestive heart failure)     Coronary artery disease     Diabetes mellitus     Diabetic neuropathy 2013    DR (diabetic retinopathy) 2013    Encounter for blood transfusion     End stage renal disease on dialysis     Fever 2017    Hypertension     Hypertension, renal 2013    Kidney transplant candidate 2013    Secondary hyperparathyroidism of renal origin 2013    Stroke 2015    Type 2 diabetes mellitus since 2013     Past Surgical History:   Procedure Laterality Date    AV FISTULA PLACEMENT      CATARACT SURGERY       SECTION, LOW TRANSVERSE      x 3    COLONOSCOPY N/A 2016    Procedure: COLONOSCOPY;  Surgeon: Roverto Patel MD;  Location: [facility] ([address] [place] [address]);  Service: Endoscopy;  Laterality: N/A;    EYE SURGERY      HYSTERECTOMY      Endometriosis    LEG AMPUTATION THROUGH KNEE Left 2017    TONSILLECTOMY      VASCULAR SURGERY       Family History     Problem Relation (Age of Onset)    Cancer Mother (70)    Heart disease Mother    Hypertension Mother        Social History Main Topics    Smoking status: Former Smoker     Packs/day: 0.25     Years: 0.50     Types: Cigarettes     Quit date: 1975    Smokeless tobacco: Never Used      Comment: quit smoking cigarettes 40+ years ago     Alcohol use No    Drug use: No    Sexual activity: Yes     Review of Systems   Constitutional: Negative for activity change and appetite change.   Respiratory: Positive for shortness of breath. Negative for cough.    Cardiovascular: Negative for chest pain and palpitations.   Gastrointestinal: Negative for abdominal pain, nausea and vomiting.   Musculoskeletal: Negative for arthralgias and myalgias.   Neurological: Negative for syncope and weakness.     Objective:     Vital Signs (Most Recent):  Temp: 99.1 °F (37.3 °C) (09/02/17 1645)  Pulse: 94 (09/02/17 1645)  Resp: 18 (09/02/17 1645)  BP: 127/66 (09/02/17 1645)  SpO2: 95 % (09/02/17 1621) Vital Signs (24h Range):  Temp:  [96.4 °F (35.8 °C)-99.1 °F (37.3 °C)] 99.1 °F (37.3 °C)  Pulse:  [72-94] 94  Resp:  [16-22] 18  SpO2:  [93 %-100 %] 95 %  BP: (122-145)/(58-81) 127/66     Weight: 65.8 kg (145 lb)  Body mass index is 29.29 kg/m².    Physical Exam   Constitutional: She is oriented to person, place, and time. She appears well-developed and well-nourished. No distress.   HENT:   Head: Normocephalic and atraumatic.   Cardiovascular: Normal rate and regular rhythm.    Pulmonary/Chest: No respiratory distress (on nasal cannula oxygen).   Neurological: She is alert and oriented to person, place, and time.   Skin: Skin is warm and dry.   Left thigh graft with roughly 15x6cm hematoma over AV graft site. Does not appear to be expanding. Gentle firm pressure held for 15 minutes over the site with no change. No areas of skin necrosis or breakdown. AV graft with palpable thrill.    Significant Labs:  CBC:   Recent Labs  Lab 09/02/17  0340   WBC 8.75   RBC 3.21*   HGB 8.9*   HCT 29.3*      MCV 91   MCH 27.7   MCHC 30.4*     BMP:   Recent Labs  Lab 09/02/17  0340   GLU 96      K 4.4   *   CO2 19*   BUN 19   CREATININE 3.8*   CALCIUM 8.5*       Assessment/Plan:     Active Diagnoses:    Diagnosis Date Noted POA    PRINCIPAL PROBLEM:  Syncope [R55]  08/30/2017 Yes    GERD (gastroesophageal reflux disease) [K21.9] 08/30/2017 Yes    Unilateral AKA [Z89.619] 08/07/2017 Not Applicable    Decubitus ulcer of left buttock, stage 3 [L89.323] 08/04/2017 Yes    (HFpEF) heart failure with preserved ejection fraction [I50.30] 07/24/2017 Yes    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 [E11.22, I12.0, Z99.2, N18.6] 08/02/2015 Not Applicable    Anemia of chronic renal failure [N18.9, D63.1] 09/05/2013 Yes     Chronic    Secondary hyperparathyroidism of renal origin [N25.81] 09/05/2013 Yes     Chronic    ESRD 2/2 HTN on HD since 11/12/09 [N18.6] 07/02/2013 Yes     Chronic      Problems Resolved During this Admission:    Diagnosis Date Noted Date Resolved POA    Acute respiratory distress [R06.00] 09/01/2017 09/02/2017 Yes     VTE Risk Mitigation         Ordered     Medium Risk of VTE  Once      08/30/17 1543     Place RICKEY hose  Until discontinued      08/30/17 1543     Place RICKEY hose  Until discontinued      08/30/17 1336     Place sequential compression device  Until discontinued      08/30/17 1336        63 yo female with hematoma over her left thigh AV graft    Will continue to follow patient. Keep elevated if possible. May need temporary dialysis access. Please call with any questions or concerns.      Caridad Crews MD  General Surgery  [facility]

## 2017-09-02 NOTE — SUBJECTIVE & OBJECTIVE
Interval History: No acute events overnight, patient with no complaints. Plan was for HD this afternoon and discharge home. However notified of swelling in thigh AVG and found to have hematoma. Vascular Surgery consulted and case discussed.     Review of Systems   Constitutional: Negative for chills and fever.   Respiratory: Positive for cough and shortness of breath (improved). Negative for apnea, chest tightness and wheezing.    Cardiovascular: Negative for chest pain, palpitations and leg swelling.   Gastrointestinal: Negative for abdominal distention, abdominal pain and nausea.   Musculoskeletal: Negative for arthralgias and back pain.   Skin: Negative for rash.   Neurological: Positive for weakness. Negative for dizziness, syncope and numbness.   Psychiatric/Behavioral: Negative for decreased concentration. The patient is not nervous/anxious.      Objective:     Vital Signs (Most Recent):  Temp: 99.1 °F (37.3 °C) (09/02/17 1645)  Pulse: 90 (09/02/17 1800)  Resp: 18 (09/02/17 1645)  BP: 127/66 (09/02/17 1645)  SpO2: 95 % (09/02/17 1621) Vital Signs (24h Range):  Temp:  [96.4 °F (35.8 °C)-99.1 °F (37.3 °C)] 99.1 °F (37.3 °C)  Pulse:  [72-94] 90  Resp:  [16-22] 18  SpO2:  [93 %-100 %] 95 %  BP: (122-145)/(58-72) 127/66     Weight: 65.8 kg (145 lb)  Body mass index is 29.29 kg/m².    Intake/Output Summary (Last 24 hours) at 09/02/17 1827  Last data filed at 09/02/17 0700   Gross per 24 hour   Intake              850 ml   Output             4700 ml   Net            -3850 ml      Physical Exam   Constitutional: She is oriented to person, place, and time. She appears well-developed and well-nourished. No distress.   Neck: Normal range of motion. No JVD present.   Cardiovascular: Normal rate, regular rhythm and normal heart sounds.    No murmur heard.  Pulmonary/Chest: Effort normal. No respiratory distress. She has no wheezes. She has no rales.   Breathing mask in place   Abdominal: Soft. Bowel sounds are normal. She  exhibits no distension. A hernia is present. Hernia confirmed positive in the left inguinal area (Painless, non reducible).   Musculoskeletal: Normal range of motion. She exhibits edema (RLE, improving).   Neurological: She is alert and oriented to person, place, and time. No cranial nerve deficit.   Nursing note and vitals reviewed.    Significant Labs:   BMP:   Recent Labs  Lab 09/02/17  0340   GLU 96      K 4.4   *   CO2 19*   BUN 19   CREATININE 3.8*   CALCIUM 8.5*     CBC:   Recent Labs  Lab 09/01/17  0444 09/02/17  0340   WBC 9.03 8.75   HGB 8.4* 8.9*   HCT 28.5* 29.3*    250     All pertinent labs within the past 24 hours have been reviewed.

## 2017-09-02 NOTE — PLAN OF CARE
Problem: Patient Care Overview  Goal: Plan of Care Review  Outcome: Ongoing (interventions implemented as appropriate)  AOx4, VSS on 4LNC, patient denies the presence of pain. Required no coverage for pm CBG. No falls or trauma during shift. Bed in low position with wheels locked and call light in reach.

## 2017-09-02 NOTE — ASSESSMENT & PLAN NOTE
- left thigh AVG with swelling and hematoma   - Vascular Sx consulted and recs appreciated. Elevate extremity as tolerated and may need temporary dialysis. Await further recs.

## 2017-09-02 NOTE — ASSESSMENT & PLAN NOTE
- S/P Left leg, above knee amputation. Patient uses wheelchair at home. Recently discharged from inpatient rehab 8/24 and reports she has been doing well at home, has all needed equipment.   - Stable, incision CDI.

## 2017-09-02 NOTE — PROGRESS NOTES
[facility]  Renal  Medicine  Progress Note    Patient Name: Aleta Herrera  MRN: [id number]  Patient Class: IP- Inpatient   Admission Date: 8/30/2017  Length of Stay: 1 days  Attending Physician: Daina Cantor MD  Primary Care Provider: Radha Lao MD        Subjective:     Principal Problem:Syncope    HPI:     Hospital Course:    Interval History: Swelling in the Thigh AVG    Review of Systems   Constitutional: No fever or chills, no weight changes.  Eyes: No visual changes or photophobia  HEENT: No nasal congestion or sore throat  Respiratory: No cough or shortness of breath  Cardiovascular: No chest pain or palpitations  Gastrointestinal: Good appetite, no nausea or vomiting, no change in bowel habits  Genitourinary: No hematuria or dysuria  Skin: No rash or pruritis  Hematologic/lymphatic: No easy bruising, bleeding or lymphadenopathy  Musculoskeletal: No arthralgias or myalgias  Neurological: No seizures or tremors  Endocrine: No heat/cold intolerance.  No polyuria/polydipsia.  Psychiatric:  No depression or anxiety.  Objective:     Vital Signs (Most Recent):  Temp: 98.4 °F (36.9 °C) (09/02/17 1621)  Pulse: 72 (09/02/17 1621)  Resp: 16 (09/02/17 1621)  BP: (!) 124/58 (09/02/17 1621)  SpO2: 95 % (09/02/17 1621) Vital Signs (24h Range):  Temp:  [96.4 °F (35.8 °C)-98.8 °F (37.1 °C)] 98.4 °F (36.9 °C)  Pulse:  [72-91] 72  Resp:  [16-22] 16  SpO2:  [93 %-100 %] 95 %  BP: (122-145)/(58-81) 124/58     Weight: 65.8 kg (145 lb)  Body mass index is 29.29 kg/m².    Intake/Output Summary (Last 24 hours) at 09/02/17 1634  Last data filed at 09/02/17 0700   Gross per 24 hour   Intake              850 ml   Output             4700 ml   Net            -3850 ml      Physical Exam   General appearance: Well developed, well nourished  Head: Normocephalic, atraumatic  Eyes:  Conjunctivae nl. Sclera anicteric. PERRL.  HEENT: Lips, mucosa, and tongue normal; teeth and gums normal and oropharynx  clear.  Neck: Supple, trachea midline, thyroid not enlarged,   Lungs: Clear to auscultation bilaterally and normal respiratory effort  Heart: Regular rate and rhythm, S1, S2 normal, no murmur, click, rub or gallop  Abdomen: Soft, non-tender non-distended; bowel sounds normal; no masses,  no organomegaly  Extremities:No edema, BKA  Pulses: 2+ and symmetric  Skin: Skin color, texture, turgor normal. No rashes or lesions  Lymph nodes: Cervical, supraclavicular, and axillary nodes normal.  Neurologic: Normal strength and tone. No focal numbness or weakness  Psychiatric:  Alert and oriented times 3.  Affect appropriate.  Left Thigh AVG; Hematoma in the AVG, positive for bruit.    Assessment/Plan:      Active Diagnoses:    Diagnosis Date Noted POA    PRINCIPAL PROBLEM:  Syncope [R55] 08/30/2017 Yes    GERD (gastroesophageal reflux disease) [K21.9] 08/30/2017 Yes    Unilateral AKA [Z89.619] 08/07/2017 Not Applicable    Decubitus ulcer of left buttock, stage 3 [L89.323] 08/04/2017 Yes    (HFpEF) heart failure with preserved ejection fraction [I50.30] 07/24/2017 Yes    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 [E11.22, I12.0, Z99.2, N18.6] 08/02/2015 Not Applicable    Anemia of chronic renal failure [N18.9, D63.1] 09/05/2013 Yes     Chronic    Secondary hyperparathyroidism of renal origin [N25.81] 09/05/2013 Yes     Chronic    ESRD 2/2 HTN on HD since 11/12/09 [N18.6] 07/02/2013 Yes     Chronic      Problems Resolved During this Admission:    Diagnosis Date Noted Date Resolved POA    Acute respiratory distress [R06.00] 09/01/2017 09/02/2017 Yes     VTE Risk Mitigation         Ordered     Medium Risk of VTE  Once      08/30/17 1543     Place RICKEY hose  Until discontinued      08/30/17 1543     Place RICKEY hose  Until discontinued      08/30/17 1336     Place sequential compression device  Until discontinued      08/30/17 1336         1. ESRD;  On maintenance IHD ( MWF) ,she complains of  swelling of the left  thigh AVG,   Will hold HD today , consult vascular surgery.    Bobby Sanabria MD  Department of Renal  Medicine   [facility]

## 2017-09-02 NOTE — PROGRESS NOTES
Notified Dr. Sanabria at bedside of swelling at access site leg thigh. States will contact vascular team and hold dialysis treatment for today.
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
## 2017-09-02 NOTE — ASSESSMENT & PLAN NOTE
- HD MWF and additional HD in setting of volume overload  - Nephrology following  - continue home meds  - See above, AVG malfunction and cancelled HD for today

## 2017-09-02 NOTE — ASSESSMENT & PLAN NOTE
- Stage III decub ulcer on her buttock, POA  - Wound care consulted, awaiting recommendations.  - Flip patient [id number].  - Clear barrier cream with miconazole

## 2017-09-02 NOTE — PROGRESS NOTES
Pt completed 3.5 hour hemodialysis treatment. Net UF 4 liters. Pt tolerated well. Blood returned with normal saline to left thigh avg, two needles removed, pressure held for 10 mins, hemostasis achieved, covered with gauze and paper tape, +bruit/+thrill. Pt transported via stretcher from dialysis to room 902B by transport.

## 2017-09-02 NOTE — ASSESSMENT & PLAN NOTE
- Well controlled, see HD plan above  - Continue Basal/Bolus and cover with Low dose SSI  - POCT glucose checks ACHS

## 2017-09-02 NOTE — PLAN OF CARE
Problem: Patient Care Overview  Goal: Plan of Care Review  AA&Ox4 Updated on plan of care, all questions and concerns addressed, remains free from falls, tele monitoring maintained with continuous pulse ox.  Sent to dialysis, hematoma developed to graft site, pt seen by Vasc Surg who recommended no dialysis and to watch site, Site marked in dialysis, will monitor.  No other significant events

## 2017-09-03 PROBLEM — B37.31 VAGINAL CANDIDIASIS: Status: ACTIVE | Noted: 2017-09-03

## 2017-09-03 LAB
ALBUMIN SERPL BCP-MCNC: 2.6 G/DL
ANION GAP SERPL CALC-SCNC: 7 MMOL/L
ANISOCYTOSIS BLD QL SMEAR: SLIGHT
BASOPHILS # BLD AUTO: 0.03 K/UL
BASOPHILS NFR BLD: 0.3 %
BUN SERPL-MCNC: 34 MG/DL
BURR CELLS BLD QL SMEAR: ABNORMAL
CALCIUM SERPL-MCNC: 9.1 MG/DL
CHLORIDE SERPL-SCNC: 109 MMOL/L
CO2 SERPL-SCNC: 21 MMOL/L
CREAT SERPL-MCNC: 4.9 MG/DL
DACRYOCYTES BLD QL SMEAR: ABNORMAL
DIFFERENTIAL METHOD: ABNORMAL
EOSINOPHIL # BLD AUTO: 1.1 K/UL
EOSINOPHIL NFR BLD: 10.2 %
ERYTHROCYTE [DISTWIDTH] IN BLOOD BY AUTOMATED COUNT: 17.5 %
EST. GFR  (AFRICAN AMERICAN): 10.3 ML/MIN/1.73 M^2
EST. GFR  (NON AFRICAN AMERICAN): 8.9 ML/MIN/1.73 M^2
GIANT PLATELETS BLD QL SMEAR: PRESENT
GLUCOSE SERPL-MCNC: 89 MG/DL
HCT VFR BLD AUTO: 33.2 %
HGB BLD-MCNC: 10.2 G/DL
HYPOCHROMIA BLD QL SMEAR: ABNORMAL
LYMPHOCYTES # BLD AUTO: 1.7 K/UL
LYMPHOCYTES NFR BLD: 15.2 %
MCH RBC QN AUTO: 27.4 PG
MCHC RBC AUTO-ENTMCNC: 30.7 G/DL
MCV RBC AUTO: 89 FL
MONOCYTES # BLD AUTO: 0.6 K/UL
MONOCYTES NFR BLD: 5.2 %
NEUTROPHILS # BLD AUTO: 7.5 K/UL
NEUTROPHILS NFR BLD: 69.1 %
PHOSPHATE SERPL-MCNC: 2.2 MG/DL
PLATELET # BLD AUTO: 268 K/UL
PLATELET BLD QL SMEAR: ABNORMAL
PMV BLD AUTO: 10.1 FL
POCT GLUCOSE: 111 MG/DL (ref 70–110)
POCT GLUCOSE: 126 MG/DL (ref 70–110)
POCT GLUCOSE: 169 MG/DL (ref 70–110)
POCT GLUCOSE: 216 MG/DL (ref 70–110)
POIKILOCYTOSIS BLD QL SMEAR: SLIGHT
POLYCHROMASIA BLD QL SMEAR: ABNORMAL
POTASSIUM SERPL-SCNC: 4.3 MMOL/L
RBC # BLD AUTO: 3.72 M/UL
SCHISTOCYTES BLD QL SMEAR: ABNORMAL
SODIUM SERPL-SCNC: 137 MMOL/L
WBC # BLD AUTO: 10.93 K/UL

## 2017-09-03 PROCEDURE — 25000003 PHARM REV CODE 250: Performed by: PHYSICIAN ASSISTANT

## 2017-09-03 PROCEDURE — 99233 SBSQ HOSP IP/OBS HIGH 50: CPT | Mod: ,,, | Performed by: PHYSICIAN ASSISTANT

## 2017-09-03 PROCEDURE — A4216 STERILE WATER/SALINE, 10 ML: HCPCS | Performed by: PHYSICIAN ASSISTANT

## 2017-09-03 PROCEDURE — 63600175 PHARM REV CODE 636 W HCPCS: Performed by: PHYSICIAN ASSISTANT

## 2017-09-03 PROCEDURE — 80069 RENAL FUNCTION PANEL: CPT

## 2017-09-03 PROCEDURE — 36415 COLL VENOUS BLD VENIPUNCTURE: CPT

## 2017-09-03 PROCEDURE — 85025 COMPLETE CBC W/AUTO DIFF WBC: CPT

## 2017-09-03 PROCEDURE — 12000002 HC ACUTE/MED SURGE SEMI-PRIVATE ROOM

## 2017-09-03 PROCEDURE — 99232 SBSQ HOSP IP/OBS MODERATE 35: CPT | Mod: ,,, | Performed by: INTERNAL MEDICINE

## 2017-09-03 RX ORDER — SODIUM CHLORIDE 9 MG/ML
INJECTION, SOLUTION INTRAVENOUS
Status: DISCONTINUED | OUTPATIENT
Start: 2017-09-03 | End: 2017-09-05 | Stop reason: HOSPADM

## 2017-09-03 RX ORDER — SODIUM CHLORIDE 9 MG/ML
INJECTION, SOLUTION INTRAVENOUS ONCE
Status: DISCONTINUED | OUTPATIENT
Start: 2017-09-03 | End: 2017-09-05 | Stop reason: HOSPADM

## 2017-09-03 RX ORDER — ACETAMINOPHEN 500 MG
500 TABLET ORAL EVERY 8 HOURS PRN
Status: DISCONTINUED | OUTPATIENT
Start: 2017-09-03 | End: 2017-09-05 | Stop reason: HOSPADM

## 2017-09-03 RX ORDER — FLUCONAZOLE 150 MG/1
150 TABLET ORAL
Status: DISCONTINUED | OUTPATIENT
Start: 2017-09-03 | End: 2017-09-05 | Stop reason: HOSPADM

## 2017-09-03 RX ADMIN — ATORVASTATIN CALCIUM 40 MG: 20 TABLET, FILM COATED ORAL at 08:09

## 2017-09-03 RX ADMIN — Medication 3 ML: at 09:09

## 2017-09-03 RX ADMIN — CALCITRIOL 0.5 MCG: 0.25 CAPSULE, LIQUID FILLED ORAL at 08:09

## 2017-09-03 RX ADMIN — MICONAZOLE NITRATE: 20 OINTMENT TOPICAL at 09:09

## 2017-09-03 RX ADMIN — SEVELAMER CARBONATE 2400 MG: 800 TABLET, FILM COATED ORAL at 08:09

## 2017-09-03 RX ADMIN — SEVELAMER CARBONATE 2400 MG: 800 TABLET, FILM COATED ORAL at 12:09

## 2017-09-03 RX ADMIN — INSULIN ASPART 1 UNITS: 100 INJECTION, SOLUTION INTRAVENOUS; SUBCUTANEOUS at 10:09

## 2017-09-03 RX ADMIN — MICONAZOLE NITRATE: 20 OINTMENT TOPICAL at 08:09

## 2017-09-03 RX ADMIN — PREGABALIN 50 MG: 50 CAPSULE ORAL at 09:09

## 2017-09-03 RX ADMIN — SEVELAMER CARBONATE 2400 MG: 800 TABLET, FILM COATED ORAL at 05:09

## 2017-09-03 RX ADMIN — PANTOPRAZOLE SODIUM 40 MG: 40 TABLET, DELAYED RELEASE ORAL at 08:09

## 2017-09-03 RX ADMIN — ACETAMINOPHEN 500 MG: 500 TABLET ORAL at 10:09

## 2017-09-03 RX ADMIN — FLUCONAZOLE 150 MG: 150 TABLET ORAL at 12:09

## 2017-09-03 RX ADMIN — ASPIRIN 81 MG CHEWABLE TABLET 81 MG: 81 TABLET CHEWABLE at 08:09

## 2017-09-03 RX ADMIN — PREGABALIN 50 MG: 50 CAPSULE ORAL at 08:09

## 2017-09-03 RX ADMIN — Medication 3 ML: at 05:09

## 2017-09-03 NOTE — SUBJECTIVE & OBJECTIVE
Medications:  Continuous Infusions:   Scheduled Meds:   sodium chloride 0.9%   Intravenous Once    aspirin  81 mg Oral Daily    atorvastatin  40 mg Oral Daily    calcitRIOL  0.5 mcg Oral Daily    miconazole nitrate 2%   Topical (Top) BID    pantoprazole  40 mg Oral Daily    pregabalin  50 mg Oral BID    sevelamer carbonate  2,400 mg Oral TID WM    sodium chloride 0.9%  3 mL Intravenous Q8H     PRN Meds:sodium chloride 0.9%, sodium chloride 0.9%, sodium chloride 0.9%, acetaminophen, dextrose 50%, dextrose 50%, glucagon (human recombinant), glucose, glucose, insulin aspart, miconazole, ondansetron, ondansetron, senna-docusate 8.6-50 mg     Objective:     Vital Signs (Most Recent):  Temp: 97.9 °F (36.6 °C) (09/03/17 0735)  Pulse: 78 (09/03/17 0735)  Resp: 16 (09/03/17 0735)  BP: (!) 111/55 (09/03/17 0735)  SpO2: 96 % (09/03/17 0735) Vital Signs (24h Range):  Temp:  [97.7 °F (36.5 °C)-99.1 °F (37.3 °C)] 97.9 °F (36.6 °C)  Pulse:  [72-94] 78  Resp:  [16-20] 16  SpO2:  [95 %-100 %] 96 %  BP: (111-143)/(55-69) 111/55       Date 09/03/17 0700 - 09/04/17 0659   Shift 2181-9002 1761-5388 6521-4235 24 Hour Total   I  N  T  A  K  E   P.O. 250   250    Shift Total  (mL/kg) 250  (4.1)   250  (4.1)   O  U  T  P  U  T   Urine  (mL/kg/hr) 0   0    Shift Total  (mL/kg) 0  (0)   0  (0)   Weight (kg) 60.3 60.3 60.3 60.3       Physical Exam   Constitutional: She is oriented to person, place, and time. She appears well-developed and well-nourished. No distress.   HENT:   Head: Normocephalic and atraumatic.   Pulmonary/Chest: Effort normal. No respiratory distress (on nasal cannula oxygen).   Neurological: She is alert and oriented to person, place, and time.   Left leg swelling with no signs of expansion or skin necrosis. No erythema or induration. Softer than yesterday.  Thrill present in graft. AKA incision is clean, dry and intact. 2+ femoral pulse.  Upon exam, patient was noted to have significant amounts of purulent  drainage on her groin which appear to be coming from her vagina which is new from yesterday afternoon.     Significant Labs:  BMP:   Recent Labs  Lab 09/02/17 2221 09/03/17  0352   GLU  --  89   NA  --  137   K 4.6 4.3   CL  --  109   CO2  --  21*   BUN  --  34*   CREATININE  --  4.9*   CALCIUM  --  9.1   MG 2.1  --      CBC:   Recent Labs  Lab 09/03/17 0352   WBC 10.93   RBC 3.72*   HGB 10.2*   HCT 33.2*      MCV 89   MCH 27.4   MCHC 30.7*

## 2017-09-03 NOTE — PLAN OF CARE
Problem: Patient Care Overview  Goal: Plan of Care Review  Outcome: Ongoing (interventions implemented as appropriate)  AOx4, VSS on 4LNC, telemetry monitoring SR, continuous pulse ox sensor replaced due to intermittent desat 8 beat run of VT, EKG, Mg, Phos returned normal. Patient denies the presence of pain.

## 2017-09-03 NOTE — ASSESSMENT & PLAN NOTE
61 yo female with ESRD on HD with left thigh AV graft who presented with a hematoma    Plan:  Please try accessing the graft on patient's Monday dialysis, if able to use, no need for temporary dialysis line  Please have gyn evaluate patient for new discharge  Rest of care per primary team. We will continue to follow along.

## 2017-09-03 NOTE — SUBJECTIVE & OBJECTIVE
Interval History: No acute events overnight, patient with no complaints. Case d/w vascular and would like to re-attempt to use graft on Monday. Noted to have vaginal discharge on exam, patient states this is new. She does not make urine but does note some burning.     Review of Systems   Constitutional: Negative for chills and fever.   Respiratory: Positive for cough. Negative for apnea, chest tightness, shortness of breath (improved) and wheezing.    Cardiovascular: Negative for chest pain, palpitations and leg swelling.   Gastrointestinal: Negative for abdominal distention, abdominal pain and nausea.   Genitourinary: Positive for dysuria and vaginal discharge.   Musculoskeletal: Negative for arthralgias and back pain.   Skin: Negative for rash.   Neurological: Positive for weakness. Negative for dizziness, syncope and numbness.   Psychiatric/Behavioral: Negative for decreased concentration. The patient is not nervous/anxious.      Objective:     Vital Signs (Most Recent):  Temp: 99.1 °F (37.3 °C) (09/03/17 1634)  Pulse: 86 (09/03/17 1634)  Resp: 18 (09/03/17 1634)  BP: 138/69 (09/03/17 1634)  SpO2: 96 % (09/03/17 1634) Vital Signs (24h Range):  Temp:  [97.7 °F (36.5 °C)-99.1 °F (37.3 °C)] 99.1 °F (37.3 °C)  Pulse:  [77-92] 86  Resp:  [16-18] 18  SpO2:  [93 %-100 %] 96 %  BP: (111-143)/(55-69) 138/69     Weight: 60.3 kg (132 lb 14.4 oz)  Body mass index is 26.84 kg/m².    Intake/Output Summary (Last 24 hours) at 09/03/17 1725  Last data filed at 09/03/17 0700   Gross per 24 hour   Intake              450 ml   Output                0 ml   Net              450 ml      Physical Exam   Constitutional: She is oriented to person, place, and time. She appears well-developed and well-nourished. No distress.   Neck: Normal range of motion. No JVD present.   Cardiovascular: Normal rate, regular rhythm and normal heart sounds.    No murmur heard.  Pulmonary/Chest: Effort normal. No respiratory distress. She has no wheezes.  She has no rales.   Breathing mask in place   Abdominal: Soft. Bowel sounds are normal. She exhibits no distension. A hernia is present. Hernia confirmed positive in the left inguinal area (Painless, non reducible).   Genitourinary: Vaginal discharge found.   Musculoskeletal: Normal range of motion. She exhibits edema (RLE, improving).   Neurological: She is alert and oriented to person, place, and time. No cranial nerve deficit.   Nursing note and vitals reviewed.      Significant Labs:   BMP:   Recent Labs  Lab 09/02/17  2221 09/03/17  0352   GLU  --  89   NA  --  137   K 4.6 4.3   CL  --  109   CO2  --  21*   BUN  --  34*   CREATININE  --  4.9*   CALCIUM  --  9.1   MG 2.1  --      CBC:   Recent Labs  Lab 09/02/17  0340 09/03/17  0352   WBC 8.75 10.93   HGB 8.9* 10.2*   HCT 29.3* 33.2*    268     All pertinent labs within the past 24 hours have been reviewed.

## 2017-09-03 NOTE — ASSESSMENT & PLAN NOTE
- Patient on Home O2 at baseline, reports generally at 2.5-3 lpm. Per patient, oxygen was removed during weigh in at dialysis and was never put back on, while oxygen off she subsequently had syncopal event.  - Continuous oxygen at 2-3L.  - Cardiology consulted given elevated troponin, recommended TTE and trending troponins, high intensity statin. Troponins flat and no chest pain.  - Repeat ECHO showed increased PA pressure of 88, likely 2/2 to fluid overload.

## 2017-09-03 NOTE — ASSESSMENT & PLAN NOTE
- left thigh AVG with swelling and hematoma noted on 9/1  - Vascular Sx consulted and recs appreciated. Elevate extremity as tolerated and reattempt to access graft on Monday, 9/5

## 2017-09-03 NOTE — PROGRESS NOTES
[facility]  Renal  Medicine  Progress Note    Patient Name: Aleta Herrera  MRN: [id number]  Patient Class: IP- Inpatient   Admission Date: 8/30/2017  Length of Stay: 2 days  Attending Physician: Daina Cantor MD  Primary Care Provider: Radha Lao MD        Subjective:     Principal Problem: ESRD    Interval History: Swelling in the Thigh AVG better today    Review of Systems   Constitutional: No fever or chills, no weight changes.  Eyes: No visual changes or photophobia  HEENT: No nasal congestion or sore throat  Respiratory: No cough or shortness of breath  Cardiovascular: No chest pain or palpitations  Gastrointestinal: Good appetite, no nausea or vomiting, no change in bowel habits  Genitourinary: No hematuria or dysuria  Skin: No rash or pruritis  Hematologic/lymphatic: No easy bruising, bleeding or lymphadenopathy  Musculoskeletal: No arthralgias or myalgias  Neurological: No seizures or tremors  Endocrine: No heat/cold intolerance.  No polyuria/polydipsia.  Psychiatric:  No depression or anxiety.  Objective:     Vital Signs (Most Recent):  Temp: 99.1 °F (37.3 °C) (09/03/17 1634)  Pulse: 86 (09/03/17 1634)  Resp: 18 (09/03/17 1634)  BP: 138/69 (09/03/17 1634)  SpO2: 96 % (09/03/17 1634) Vital Signs (24h Range):  Temp:  [97.7 °F (36.5 °C)-99.1 °F (37.3 °C)] 99.1 °F (37.3 °C)  Pulse:  [77-92] 86  Resp:  [16-18] 18  SpO2:  [93 %-100 %] 96 %  BP: (111-143)/(55-69) 138/69     Weight: 60.3 kg (132 lb 14.4 oz)  Body mass index is 26.84 kg/m².    Intake/Output Summary (Last 24 hours) at 09/03/17 1849  Last data filed at 09/03/17 1700   Gross per 24 hour   Intake              990 ml   Output                0 ml   Net              990 ml      Physical Exam   General appearance: Well developed, well nourished  Head: Normocephalic, atraumatic  Eyes:  Conjunctivae nl. Sclera anicteric. PERRL.  HEENT: Lips, mucosa, and tongue normal; teeth and gums normal and oropharynx clear.  Neck:  Supple, trachea midline, thyroid not enlarged,   Lungs: Clear to auscultation bilaterally and normal respiratory effort  Heart: Regular rate and rhythm, S1, S2 normal, no murmur, click, rub or gallop  Abdomen: Soft, non-tender non-distended; bowel sounds normal; no masses,  no organomegaly  Extremities:No edema, BKA  Pulses: 2+ and symmetric  Skin: Skin color, texture, turgor normal. No rashes or lesions  Lymph nodes: Cervical, supraclavicular, and axillary nodes normal.  Neurologic: Normal strength and tone. No focal numbness or weakness  Psychiatric:  Alert and oriented times 3.  Affect appropriate.  Left Thigh AVG; Hematoma in the AVG, positive for bruit.    Assessment/Plan:      Active Diagnoses:    Diagnosis Date Noted POA    PRINCIPAL PROBLEM:  Syncope [R55] 08/30/2017 Yes    Vaginal candidiasis [B37.3] 09/03/2017 Yes    AV graft malfunction [T82.590A] 09/02/2017 No    GERD (gastroesophageal reflux disease) [K21.9] 08/30/2017 Yes    Unilateral AKA [Z89.619] 08/07/2017 Not Applicable    Decubitus ulcer of left buttock, stage 3 [L89.323] 08/04/2017 Yes    (HFpEF) heart failure with preserved ejection fraction [I50.30] 07/24/2017 Yes    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 [E11.22, I12.0, Z99.2, N18.6] 08/02/2015 Not Applicable    Anemia of chronic renal failure [N18.9, D63.1] 09/05/2013 Yes     Chronic    ESRD 2/2 HTN on HD since 11/12/09 [N18.6] 07/02/2013 Yes     Chronic      Problems Resolved During this Admission:    Diagnosis Date Noted Date Resolved POA    Acute respiratory distress [R06.00] 09/01/2017 09/02/2017 Yes     VTE Risk Mitigation         Ordered     Medium Risk of VTE  Once      08/30/17 1543     Place RICKEY hose  Until discontinued      08/30/17 1543     Place RICKEY hose  Until discontinued      08/30/17 1336     Place sequential compression device  Until discontinued      08/30/17 1336         1. ESRD;  On maintenance IHD ( MWF) ,she complains of  swelling of the left thigh  AVG, some improvement in swelling  Will hold HD tomorrow.    Bobby Sanabria MD  Department of Renal  Medicine   [facility]
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
## 2017-09-03 NOTE — ASSESSMENT & PLAN NOTE
- vaginal discharge and odor noted on exam 9/3  - start on fluconazole 150 mg PO every 72 hours x 3 doses

## 2017-09-03 NOTE — PLAN OF CARE
Problem: Patient Care Overview  Goal: Plan of Care Review  Outcome: Ongoing (interventions implemented as appropriate)  AA&Ox4 Updated on plan of care, all questions and concerns addressed, tele monitoring maintained, denies pain, shunt site to LLE without pain, VSS.  Remains free from falls, no distress

## 2017-09-03 NOTE — PROGRESS NOTES
[facility]  Vascular Surgery  Progress Note    Patient Name: Aleta Herrera  MRN: [id number]  Admission Date: 8/30/2017  Primary Care Provider: Radha Lao MD    Subjective:     Interval History: No acute events overnight. No new issues with thigh swelling. Minimal pain associated with it.     Post-Op Info:  * No surgery found *           Medications:  Continuous Infusions:   Scheduled Meds:   sodium chloride 0.9%   Intravenous Once    aspirin  81 mg Oral Daily    atorvastatin  40 mg Oral Daily    calcitRIOL  0.5 mcg Oral Daily    miconazole nitrate 2%   Topical (Top) BID    pantoprazole  40 mg Oral Daily    pregabalin  50 mg Oral BID    sevelamer carbonate  2,400 mg Oral TID WM    sodium chloride 0.9%  3 mL Intravenous Q8H     PRN Meds:sodium chloride 0.9%, sodium chloride 0.9%, sodium chloride 0.9%, acetaminophen, dextrose 50%, dextrose 50%, glucagon (human recombinant), glucose, glucose, insulin aspart, miconazole, ondansetron, ondansetron, senna-docusate 8.6-50 mg     Objective:     Vital Signs (Most Recent):  Temp: 97.9 °F (36.6 °C) (09/03/17 0735)  Pulse: 78 (09/03/17 0735)  Resp: 16 (09/03/17 0735)  BP: (!) 111/55 (09/03/17 0735)  SpO2: 96 % (09/03/17 0735) Vital Signs (24h Range):  Temp:  [97.7 °F (36.5 °C)-99.1 °F (37.3 °C)] 97.9 °F (36.6 °C)  Pulse:  [72-94] 78  Resp:  [16-20] 16  SpO2:  [95 %-100 %] 96 %  BP: (111-143)/(55-69) 111/55       Date 09/03/17 0700 - 09/04/17 0659   Shift 0992-7582 0857-7611 9810-3759 24 Hour Total   I  N  T  A  K  E   P.O. 250   250    Shift Total  (mL/kg) 250  (4.1)   250  (4.1)   O  U  T  P  U  T   Urine  (mL/kg/hr) 0   0    Shift Total  (mL/kg) 0  (0)   0  (0)   Weight (kg) 60.3 60.3 60.3 60.3       Physical Exam   Constitutional: She is oriented to person, place, and time. She appears well-developed and well-nourished. No distress.   HENT:   Head: Normocephalic and atraumatic.   Pulmonary/Chest: Effort normal. No respiratory  distress (on nasal cannula oxygen).   Neurological: She is alert and oriented to person, place, and time.   Left leg swelling with no signs of expansion or skin necrosis. No erythema or induration. Softer than yesterday.  Thrill present in graft. AKA incision is clean, dry and intact. 2+ femoral pulse.  Upon exam, patient was noted to have significant amounts of purulent drainage on her groin which appear to be coming from her vagina which is new from yesterday afternoon.     Significant Labs:  BMP:   Recent Labs  Lab 09/02/17  2221 09/03/17  0352   GLU  --  89   NA  --  137   K 4.6 4.3   CL  --  109   CO2  --  21*   BUN  --  34*   CREATININE  --  4.9*   CALCIUM  --  9.1   MG 2.1  --      CBC:   Recent Labs  Lab 09/03/17  0352   WBC 10.93   RBC 3.72*   HGB 10.2*   HCT 33.2*      MCV 89   MCH 27.4   MCHC 30.7*         Assessment/Plan:     ESRD 2/2 HTN on HD since 11/12/09    61 yo female with ESRD on HD with left thigh AV graft who presented with a hematoma    Plan:  Please try accessing the graft on patient's Monday dialysis, if able to use, no need for temporary dialysis line  Please have gyn evaluate patient for new discharge  Rest of care per primary team. We will continue to follow along.            Caridad Crews MD  Vascular Surgery  [facility]

## 2017-09-04 PROBLEM — R79.89 ELEVATED TROPONIN: Status: ACTIVE | Noted: 2017-09-04

## 2017-09-04 LAB
ALBUMIN SERPL BCP-MCNC: 2.5 G/DL
ANION GAP SERPL CALC-SCNC: 8 MMOL/L
BASOPHILS # BLD AUTO: 0.02 K/UL
BASOPHILS NFR BLD: 0.2 %
BUN SERPL-MCNC: 49 MG/DL
CALCIUM SERPL-MCNC: 8.9 MG/DL
CHLORIDE SERPL-SCNC: 106 MMOL/L
CO2 SERPL-SCNC: 19 MMOL/L
CREAT SERPL-MCNC: 6.3 MG/DL
DIFFERENTIAL METHOD: ABNORMAL
EOSINOPHIL # BLD AUTO: 1.3 K/UL
EOSINOPHIL NFR BLD: 9.8 %
ERYTHROCYTE [DISTWIDTH] IN BLOOD BY AUTOMATED COUNT: 17.2 %
EST. GFR  (AFRICAN AMERICAN): 7.6 ML/MIN/1.73 M^2
EST. GFR  (NON AFRICAN AMERICAN): 6.6 ML/MIN/1.73 M^2
GLUCOSE SERPL-MCNC: 108 MG/DL
HCT VFR BLD AUTO: 31.3 %
HGB BLD-MCNC: 9.4 G/DL
LYMPHOCYTES # BLD AUTO: 1.3 K/UL
LYMPHOCYTES NFR BLD: 10.2 %
MCH RBC QN AUTO: 27.2 PG
MCHC RBC AUTO-ENTMCNC: 30 G/DL
MCV RBC AUTO: 91 FL
MONOCYTES # BLD AUTO: 0.7 K/UL
MONOCYTES NFR BLD: 5.2 %
NEUTROPHILS # BLD AUTO: 9.7 K/UL
NEUTROPHILS NFR BLD: 74.4 %
PHOSPHATE SERPL-MCNC: 2.5 MG/DL
PLATELET # BLD AUTO: 307 K/UL
PMV BLD AUTO: 11 FL
POCT GLUCOSE: 113 MG/DL (ref 70–110)
POCT GLUCOSE: 123 MG/DL (ref 70–110)
POCT GLUCOSE: 98 MG/DL (ref 70–110)
POTASSIUM SERPL-SCNC: 4.9 MMOL/L
RBC # BLD AUTO: 3.45 M/UL
SODIUM SERPL-SCNC: 133 MMOL/L
WBC # BLD AUTO: 12.98 K/UL

## 2017-09-04 PROCEDURE — 99232 SBSQ HOSP IP/OBS MODERATE 35: CPT | Mod: ,,, | Performed by: PHYSICIAN ASSISTANT

## 2017-09-04 PROCEDURE — 80100016 HC MAINTENANCE HEMODIALYSIS

## 2017-09-04 PROCEDURE — 25000003 PHARM REV CODE 250: Performed by: PHYSICIAN ASSISTANT

## 2017-09-04 PROCEDURE — 12000002 HC ACUTE/MED SURGE SEMI-PRIVATE ROOM

## 2017-09-04 PROCEDURE — 25000003 PHARM REV CODE 250: Performed by: INTERNAL MEDICINE

## 2017-09-04 PROCEDURE — 90935 HEMODIALYSIS ONE EVALUATION: CPT | Mod: ,,, | Performed by: INTERNAL MEDICINE

## 2017-09-04 PROCEDURE — 85025 COMPLETE CBC W/AUTO DIFF WBC: CPT

## 2017-09-04 PROCEDURE — 80069 RENAL FUNCTION PANEL: CPT

## 2017-09-04 PROCEDURE — 36415 COLL VENOUS BLD VENIPUNCTURE: CPT

## 2017-09-04 PROCEDURE — A4216 STERILE WATER/SALINE, 10 ML: HCPCS | Performed by: PHYSICIAN ASSISTANT

## 2017-09-04 RX ADMIN — SODIUM CHLORIDE: 0.9 INJECTION, SOLUTION INTRAVENOUS at 04:09

## 2017-09-04 RX ADMIN — MICONAZOLE NITRATE: 20 OINTMENT TOPICAL at 08:09

## 2017-09-04 RX ADMIN — ASPIRIN 81 MG CHEWABLE TABLET 81 MG: 81 TABLET CHEWABLE at 08:09

## 2017-09-04 RX ADMIN — SEVELAMER CARBONATE 2400 MG: 800 TABLET, FILM COATED ORAL at 08:09

## 2017-09-04 RX ADMIN — SEVELAMER CARBONATE 2400 MG: 800 TABLET, FILM COATED ORAL at 12:09

## 2017-09-04 RX ADMIN — PREGABALIN 50 MG: 50 CAPSULE ORAL at 09:09

## 2017-09-04 RX ADMIN — PANTOPRAZOLE SODIUM 40 MG: 40 TABLET, DELAYED RELEASE ORAL at 08:09

## 2017-09-04 RX ADMIN — ATORVASTATIN CALCIUM 40 MG: 20 TABLET, FILM COATED ORAL at 08:09

## 2017-09-04 RX ADMIN — PREGABALIN 50 MG: 50 CAPSULE ORAL at 08:09

## 2017-09-04 RX ADMIN — CALCITRIOL 0.5 MCG: 0.25 CAPSULE, LIQUID FILLED ORAL at 08:09

## 2017-09-04 RX ADMIN — SEVELAMER CARBONATE 2400 MG: 800 TABLET, FILM COATED ORAL at 07:09

## 2017-09-04 RX ADMIN — Medication 3 ML: at 09:09

## 2017-09-04 NOTE — ASSESSMENT & PLAN NOTE
- left thigh AVG with swelling and hematoma noted on 9/1  - Vascular Sx consulted and recs appreciated. Elevate extremity as tolerated and reattempt to access graft on Monday, 9/4
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
## 2017-09-04 NOTE — ASSESSMENT & PLAN NOTE
- vaginal discharge and odor noted on exam 9/3, symptoms improved  - start on fluconazole 150 mg PO every 72 hours x 3 doses  - D/W Dr. Cantor and will arrange outpatient gyn follow up

## 2017-09-04 NOTE — PROGRESS NOTES
Maintenance hemodialysis treatment started to pt left thigh avg with two 15G needles. Pt tolerated well.

## 2017-09-04 NOTE — PROGRESS NOTES
[facility]  Vascular Surgery  Progress Note    Patient Name: Aleta Herrera  MRN: [id number]  Admission Date: 8/30/2017  Primary Care Provider: Radha Lao MD    Subjective:     Interval History: No acute events overnight. Feels well this morning.    Post-Op Info:  * No surgery found *           Medications:  Continuous Infusions:   Scheduled Meds:   sodium chloride 0.9%   Intravenous Once    aspirin  81 mg Oral Daily    atorvastatin  40 mg Oral Daily    calcitRIOL  0.5 mcg Oral Daily    fluconazole  150 mg Oral Q3 Days    miconazole nitrate 2%   Topical (Top) BID    pantoprazole  40 mg Oral Daily    pregabalin  50 mg Oral BID    sevelamer carbonate  2,400 mg Oral TID WM    sodium chloride 0.9%  3 mL Intravenous Q8H     PRN Meds:sodium chloride 0.9%, sodium chloride 0.9%, acetaminophen, dextrose 50%, dextrose 50%, glucagon (human recombinant), glucose, glucose, insulin aspart, miconazole, ondansetron, ondansetron, senna-docusate 8.6-50 mg     Objective:     Vital Signs (Most Recent):  Temp: 98.4 °F (36.9 °C) (09/04/17 0722)  Pulse: 96 (09/04/17 0722)  Resp: 18 (09/04/17 0722)  BP: 137/71 (09/04/17 0722)  SpO2: 97 % (09/04/17 0722) Vital Signs (24h Range):  Temp:  [97.5 °F (36.4 °C)-99.1 °F (37.3 °C)] 98.4 °F (36.9 °C)  Pulse:  [] 96  Resp:  [18] 18  SpO2:  [93 %-100 %] 97 %  BP: (130-139)/(59-78) 137/71          Physical Exam   Constitutional: She is oriented to person, place, and time. She appears well-developed and well-nourished. No distress.   HENT:   Head: Normocephalic and atraumatic.   Pulmonary/Chest: Effort normal. No respiratory distress (on nasal cannula oxygen).   Neurological: She is alert and oriented to person, place, and time.   Left leg swelling with no signs of expansion or skin necrosis. No erythema or induration. Continues to improve.  Thrill present in graft. AKA incision is clean, dry and intact. 2+ femoral pulse.  Vaginal drainage seems improved  from yesterday, but still present. Recommend gyn consult.    Significant Labs:  BMP:   Recent Labs  Lab 09/02/17  2221  09/04/17  0653   GLU  --   < > 108   NA  --   < > 133*   K 4.6  < > 4.9   CL  --   < > 106   CO2  --   < > 19*   BUN  --   < > 49*   CREATININE  --   < > 6.3*   CALCIUM  --   < > 8.9   MG 2.1  --   --    < > = values in this interval not displayed.  CBC:     Recent Labs  Lab 09/04/17  0653   WBC 12.98*   RBC 3.45*   HGB 9.4*   HCT 31.3*      MCV 91   MCH 27.2   MCHC 30.0*         Assessment/Plan:     ESRD 2/2 HTN on HD since 11/12/09    61 yo female with ESRD on HD with left thigh AV graft who presented with a hematoma    Plan:  Please try accessing the graft today, if able to use, no need for temporary dialysis line  Please have gyn evaluate patient for new discharge  Rest of care per primary team. We will continue to follow along.            Caridad Crews MD  Vascular Surgery  [facility]

## 2017-09-04 NOTE — SUBJECTIVE & OBJECTIVE
Medications:  Continuous Infusions:   Scheduled Meds:   sodium chloride 0.9%   Intravenous Once    aspirin  81 mg Oral Daily    atorvastatin  40 mg Oral Daily    calcitRIOL  0.5 mcg Oral Daily    fluconazole  150 mg Oral Q3 Days    miconazole nitrate 2%   Topical (Top) BID    pantoprazole  40 mg Oral Daily    pregabalin  50 mg Oral BID    sevelamer carbonate  2,400 mg Oral TID WM    sodium chloride 0.9%  3 mL Intravenous Q8H     PRN Meds:sodium chloride 0.9%, sodium chloride 0.9%, acetaminophen, dextrose 50%, dextrose 50%, glucagon (human recombinant), glucose, glucose, insulin aspart, miconazole, ondansetron, ondansetron, senna-docusate 8.6-50 mg     Objective:     Vital Signs (Most Recent):  Temp: 98.4 °F (36.9 °C) (09/04/17 0722)  Pulse: 96 (09/04/17 0722)  Resp: 18 (09/04/17 0722)  BP: 137/71 (09/04/17 0722)  SpO2: 97 % (09/04/17 0722) Vital Signs (24h Range):  Temp:  [97.5 °F (36.4 °C)-99.1 °F (37.3 °C)] 98.4 °F (36.9 °C)  Pulse:  [] 96  Resp:  [18] 18  SpO2:  [93 %-100 %] 97 %  BP: (130-139)/(59-78) 137/71          Physical Exam   Constitutional: She is oriented to person, place, and time. She appears well-developed and well-nourished. No distress.   HENT:   Head: Normocephalic and atraumatic.   Pulmonary/Chest: Effort normal. No respiratory distress (on nasal cannula oxygen).   Neurological: She is alert and oriented to person, place, and time.   Left leg swelling with no signs of expansion or skin necrosis. No erythema or induration. Continues to improve.  Thrill present in graft. AKA incision is clean, dry and intact. 2+ femoral pulse.  Vaginal drainage seems improved from yesterday, but still present. Recommend gyn consult.    Significant Labs:  BMP:   Recent Labs  Lab 09/02/17  2221  09/04/17  0653   GLU  --   < > 108   NA  --   < > 133*   K 4.6  < > 4.9   CL  --   < > 106   CO2  --   < > 19*   BUN  --   < > 49*   CREATININE  --   < > 6.3*   CALCIUM  --   < > 8.9   MG 2.1  --   --    < >  = values in this interval not displayed.  CBC:     Recent Labs  Lab 09/04/17  0653   WBC 12.98*   RBC 3.45*   HGB 9.4*   HCT 31.3*      MCV 91   MCH 27.2   MCHC 30.0*

## 2017-09-04 NOTE — ASSESSMENT & PLAN NOTE
Hypertension  - BP well controlled and volume status improved  - Last ECHO showed diastolic dysfunction with an EF 60% in 2016.  - 2D echo with EF 60 and PA pressure 88.  - Amlodipine 10mg daily

## 2017-09-04 NOTE — ASSESSMENT & PLAN NOTE
- HD MWF and additional HD in setting of volume overload  - Nephrology following  - continue home meds

## 2017-09-04 NOTE — ASSESSMENT & PLAN NOTE
- Stage III decub ulcer on her buttock, POA  - Wound care consulted, awaiting recommendations.  - Flip patient [id number].  - Clear barrier cream with miconazole

## 2017-09-04 NOTE — PROGRESS NOTES
Pt completed three hour hemodialysis treatment. Net UF 2 liters. Pt with low bp at start of treatment, asymptomatic. Blood returned with normal saline to left thigh graft, two needles removed, pressure held for 10 mins, hemostasis achieved, covered with gauze and paper tape. +bruit/+thrill.

## 2017-09-04 NOTE — SUBJECTIVE & OBJECTIVE
Interval History: No acute events overnight, patient resting in bed. Plan for HD this afternoon and will re-attempt to use AVG. Swelling in left thigh appears improved. No further burning/vaginal discomfort.     Review of Systems   Constitutional: Negative for chills and fever.   Respiratory: Positive for cough. Negative for apnea, chest tightness, shortness of breath (improved) and wheezing.    Cardiovascular: Negative for chest pain, palpitations and leg swelling.   Gastrointestinal: Negative for abdominal distention, abdominal pain and nausea.   Genitourinary: Positive for vaginal discharge. Negative for dysuria.   Musculoskeletal: Negative for arthralgias and back pain.   Skin: Negative for rash.   Neurological: Positive for weakness. Negative for dizziness, syncope and numbness.   Psychiatric/Behavioral: Negative for decreased concentration. The patient is not nervous/anxious.      Objective:     Vital Signs (Most Recent):  Temp: 98.9 °F (37.2 °C) (09/04/17 1119)  Pulse: 93 (09/04/17 1131)  Resp: 17 (09/04/17 1119)  BP: 131/64 (09/04/17 1119)  SpO2: 99 % (09/04/17 1131) Vital Signs (24h Range):  Temp:  [97.5 °F (36.4 °C)-99.1 °F (37.3 °C)] 98.9 °F (37.2 °C)  Pulse:  [] 93  Resp:  [17-18] 17  SpO2:  [94 %-100 %] 99 %  BP: (131-139)/(64-78) 131/64     Weight: 58.6 kg (129 lb 3.2 oz)  Body mass index is 26.1 kg/m².    Intake/Output Summary (Last 24 hours) at 09/04/17 1324  Last data filed at 09/03/17 2100   Gross per 24 hour   Intake              390 ml   Output                0 ml   Net              390 ml      Physical Exam   Constitutional: She is oriented to person, place, and time. She appears well-developed and well-nourished. No distress.   Cardiovascular: Normal rate, regular rhythm and normal heart sounds.    No murmur heard.  Pulmonary/Chest: Effort normal. No respiratory distress. She has no wheezes. She has no rales.   Nasal cannula   Abdominal: Soft. Bowel sounds are normal. She exhibits no  distension. A hernia is present. Hernia confirmed positive in the left inguinal area (Painless, non reducible).   Genitourinary: Vaginal discharge found.   Musculoskeletal: Normal range of motion. She exhibits edema (RLE, improved).   Neurological: She is alert and oriented to person, place, and time. No cranial nerve deficit.   Nursing note and vitals reviewed.    Significant Labs:   BMP:   Recent Labs  Lab 09/02/17  2221  09/04/17  0653   GLU  --   < > 108   NA  --   < > 133*   K 4.6  < > 4.9   CL  --   < > 106   CO2  --   < > 19*   BUN  --   < > 49*   CREATININE  --   < > 6.3*   CALCIUM  --   < > 8.9   MG 2.1  --   --    < > = values in this interval not displayed.  CBC:   Recent Labs  Lab 09/03/17  0352 09/04/17  0653   WBC 10.93 12.98*   HGB 10.2* 9.4*   HCT 33.2* 31.3*    307     All pertinent labs within the past 24 hours have been reviewed.

## 2017-09-04 NOTE — ASSESSMENT & PLAN NOTE
61 yo female with ESRD on HD with left thigh AV graft who presented with a hematoma    Plan:  Please try accessing the graft today, if able to use, no need for temporary dialysis line  Please have gyn evaluate patient for new discharge  Rest of care per primary team. We will continue to follow along.

## 2017-09-05 VITALS
HEART RATE: 95 BPM | SYSTOLIC BLOOD PRESSURE: 130 MMHG | HEIGHT: 59 IN | OXYGEN SATURATION: 99 % | WEIGHT: 120.13 LBS | BODY MASS INDEX: 24.22 KG/M2 | TEMPERATURE: 99 F | RESPIRATION RATE: 16 BRPM | DIASTOLIC BLOOD PRESSURE: 66 MMHG

## 2017-09-05 LAB
ALBUMIN SERPL BCP-MCNC: 2.6 G/DL
ANION GAP SERPL CALC-SCNC: 8 MMOL/L
BASOPHILS # BLD AUTO: 0.03 K/UL
BASOPHILS NFR BLD: 0.3 %
BUN SERPL-MCNC: 30 MG/DL
CALCIUM SERPL-MCNC: 8.9 MG/DL
CHLORIDE SERPL-SCNC: 102 MMOL/L
CO2 SERPL-SCNC: 24 MMOL/L
CREAT SERPL-MCNC: 4.7 MG/DL
DIFFERENTIAL METHOD: ABNORMAL
EOSINOPHIL # BLD AUTO: 1 K/UL
EOSINOPHIL NFR BLD: 9.1 %
ERYTHROCYTE [DISTWIDTH] IN BLOOD BY AUTOMATED COUNT: 17 %
EST. GFR  (AFRICAN AMERICAN): 10.8 ML/MIN/1.73 M^2
EST. GFR  (NON AFRICAN AMERICAN): 9.4 ML/MIN/1.73 M^2
GLUCOSE SERPL-MCNC: 104 MG/DL
HCT VFR BLD AUTO: 30.9 %
HGB BLD-MCNC: 9.4 G/DL
LYMPHOCYTES # BLD AUTO: 1.2 K/UL
LYMPHOCYTES NFR BLD: 10.7 %
MCH RBC QN AUTO: 27.2 PG
MCHC RBC AUTO-ENTMCNC: 30.4 G/DL
MCV RBC AUTO: 90 FL
MONOCYTES # BLD AUTO: 0.5 K/UL
MONOCYTES NFR BLD: 4.2 %
NEUTROPHILS # BLD AUTO: 8.2 K/UL
NEUTROPHILS NFR BLD: 75.5 %
PHOSPHATE SERPL-MCNC: 1.8 MG/DL
PLATELET # BLD AUTO: 275 K/UL
PMV BLD AUTO: 10.9 FL
POCT GLUCOSE: 113 MG/DL (ref 70–110)
POCT GLUCOSE: 123 MG/DL (ref 70–110)
POTASSIUM SERPL-SCNC: 4.2 MMOL/L
RBC # BLD AUTO: 3.45 M/UL
SODIUM SERPL-SCNC: 134 MMOL/L
WBC # BLD AUTO: 10.84 K/UL

## 2017-09-05 PROCEDURE — 94761 N-INVAS EAR/PLS OXIMETRY MLT: CPT

## 2017-09-05 PROCEDURE — 36415 COLL VENOUS BLD VENIPUNCTURE: CPT

## 2017-09-05 PROCEDURE — 25000003 PHARM REV CODE 250: Performed by: NURSE PRACTITIONER

## 2017-09-05 PROCEDURE — 85025 COMPLETE CBC W/AUTO DIFF WBC: CPT

## 2017-09-05 PROCEDURE — 80069 RENAL FUNCTION PANEL: CPT

## 2017-09-05 PROCEDURE — 27000221 HC OXYGEN, UP TO 24 HOURS

## 2017-09-05 PROCEDURE — 99239 HOSP IP/OBS DSCHRG MGMT >30: CPT | Mod: ,,, | Performed by: PHYSICIAN ASSISTANT

## 2017-09-05 PROCEDURE — 25000003 PHARM REV CODE 250: Performed by: PHYSICIAN ASSISTANT

## 2017-09-05 RX ORDER — FLUCONAZOLE 150 MG/1
150 TABLET ORAL ONCE
Qty: 1 TABLET | Refills: 0 | Status: SHIPPED | OUTPATIENT
Start: 2017-09-06 | End: 2017-09-06

## 2017-09-05 RX ORDER — SODIUM,POTASSIUM PHOSPHATES 280-250MG
2 POWDER IN PACKET (EA) ORAL EVERY 4 HOURS
Status: DISCONTINUED | OUTPATIENT
Start: 2017-09-05 | End: 2017-09-05 | Stop reason: HOSPADM

## 2017-09-05 RX ADMIN — POTASSIUM & SODIUM PHOSPHATES POWDER PACK 280-160-250 MG 2 PACKET: 280-160-250 PACK at 10:09

## 2017-09-05 RX ADMIN — PANTOPRAZOLE SODIUM 40 MG: 40 TABLET, DELAYED RELEASE ORAL at 09:09

## 2017-09-05 RX ADMIN — ASPIRIN 81 MG CHEWABLE TABLET 81 MG: 81 TABLET CHEWABLE at 09:09

## 2017-09-05 RX ADMIN — MICONAZOLE NITRATE: 20 OINTMENT TOPICAL at 09:09

## 2017-09-05 RX ADMIN — SEVELAMER CARBONATE 2400 MG: 800 TABLET, FILM COATED ORAL at 08:09

## 2017-09-05 RX ADMIN — PREGABALIN 50 MG: 50 CAPSULE ORAL at 09:09

## 2017-09-05 RX ADMIN — ATORVASTATIN CALCIUM 40 MG: 20 TABLET, FILM COATED ORAL at 09:09

## 2017-09-05 RX ADMIN — CALCITRIOL 0.5 MCG: 0.25 CAPSULE, LIQUID FILLED ORAL at 10:09
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
## 2017-09-05 NOTE — PROGRESS NOTES
D/c instructions reviewed and given. Pt verbalized understanding. PIV removed with catheter intact. Pt will notify nurse when she is ready for transport

## 2017-09-05 NOTE — ASSESSMENT & PLAN NOTE
- left thigh AVG with swelling and hematoma noted on 9/1  - Vascular Sx consulted and recs appreciated. Elevate extremity as tolerated and reattempt to access graft on Monday, 9/4  - AVG access achieved with no issues 9/4.

## 2017-09-05 NOTE — PLAN OF CARE
SW has completed referral to [facility] through [facility].     Elizabeth Escobar, MIR  [id number]

## 2017-09-05 NOTE — ASSESSMENT & PLAN NOTE
- HD MWF and additional HD in setting of volume overload  - Nephrology following  - continue home meds.

## 2017-09-05 NOTE — PLAN OF CARE
09/05/17 0910   Discharge Reassessment   Assessment Type Discharge Planning Reassessment   Provided patient/caregiver education on the expected discharge date and the discharge plan Yes   Do you have any problems affording any of your prescribed medications? No   Discharge Plan A Home Health   Discharge Plan B Skilled Nursing Facility   Patient choice form signed by patient/caregiver No   Can the patient answer the patient profile reliably? Yes, cognitively intact   How does the patient rate their overall health at the present time? Fair   Describe the patient's ability to walk at the present time. Major restrictions/daily assistance from another person   How often would a person be available to care for the patient? Whenever needed   Number of comorbid conditions (as recorded on the chart) Five or more   During the past month, has the patient often been bothered by feeling down, depressed or hopeless? No   During the past month, has the patient often been bothered by little interest or pleasure in doing things? No     Patient awake & alert eating breakfast in bed when CM rounded. No family at the bedside. Patient in agreement with plan to discharge home with [facility] today. Patient stated that her spouse, Barrera Herrera .([phone]), will provide transportation at time of discharge. CM reminded patient to have Barrera bring her portable oxygen to the hospital for transportation home. Patient verbalized understanding. Pox 99% on 3L O2 via NC this AM. Will continue to follow.

## 2017-09-05 NOTE — PHYSICIAN QUERY
PT Name: Aleta Herrera  MR #: [id number]    Physician Query Form - Respiratory Condition Clarification      CDS/: Patito Briones               Contact information: [email or web address]    This form is a permanent document in the medical record.    Query Date: September 5, 2017    By submitting this query, we are merely seeking further clarification of documentation. Please utilize your independent clinical judgment when addressing the question(s) below.    The Medical record contains the following   Indicators   Supporting Clinical Findings Location in Medical Record   x   SOB, HOWARD, Wheezing, Productive Cough, Use of Accessory Muscles, etc. Shortness of breath H&P 8/30  PN 9/1-1/neph consult 8/30   x   Acute/Chronic Illness ESRD/HTN/chronic diastolic heart failure/DM H&P/PN/consults 8/30-9/4   x   Radiology Findings Repeat chest xray showed pulmonary edema neph PN 9/1   x   Respiratory Distress or Failure Acute respiratory distress PN 9/1-3      Hypoxia or Hypercapnia     x   RR         ABGs         O2 sat 60-95%  RR 18-23 ED MD/H&P/PN/consulte 8/30-9/4      BiPAP/Intubation     x   Supplemental O2 5L nc PN 9/1   x   Home O2, Oxygen Dependence 2.5L at home H&P/PN 8/30-9/4      Treatment     x   Other While oxygen was off, she had an episode of decreased consciousness ED MD/H&P/PN/consults 8/30-9/4     Provider, please specify diagnosis or diagnoses associated with above clinical findings.    [x  ] Acute and (on) Chronic Respiratory Failure with Hypoxia  [  ] Acute and (on) Chronic Respiratory Failure with Hypercapnia  [  ] Acute and (on) Chronic Respiratory Failure with Hypoxia and Hypercapnia  [  ] Other Acute and (on) Chronic Respiratory Failure  [  ] Other Respiratory Diagnosis (please specify): _______________________________  [  ] Clinically Undetermined    Please document in your progress notes daily for the duration of treatment until resolved and include in your discharge summary.

## 2017-09-05 NOTE — PHYSICIAN QUERY
"PT Name: Aleta Herrera  MR #: [id number]    Physician Query Form - Heart  Condition Clarification     CDS/: Patito Briones               Contact information: [email or web address]  This form is a permanent document in the medical record.     Query Date: September 5, 2017    By submitting this query, we are merely seeking further clarification of documentation. Please utilize your independent clinical judgment when addressing the question(s) below.    The medical record contains the following   Indicators     Supporting Clinical Findings Location in Medical Record    BNP     x EF 60 H&P/PN/consult 8/30-9/4   x Radiology findings CXR showed volume overload PN 8/31   x Echo Results   1 - Normal left ventricular systolic function (EF 60-65%).     2 - Impaired LV relaxation, elevated LAP (grade 2 diastolic dysfunction).     3 - Right atrial enlargement.     4 - Mildly to moderately depressed right ventricular systolic function .     5 - Pulmonary hypertension. The estimated PA systolic pressure is 88 mmHg.     6 - Mild mitral regurgitation.     7 - Mild tricuspid regurgitation.     8 - Trivial pericardial effusion.     9 - Increased central venous pressure.     10 - No wall motion abnormalities.  2D echo 9/1    "Ascites" documented     x "SOB" or "HOWARD" documented Shortness of breath ED MD/H&P/PN 8/30-9/2    "Hypoxia" documented     x Heart Failure documented CHF/HFpEF/Chronic systolic diastolic heart failure H&P/PN 8/30-9/4  Consult 8/30   x "Edema" documented She exhibits edema (RLE: 1+ to mid calf) ED MD    Diuretics/Meds     x Treatment: Additional HD for volume removal consult/PN 8/30-9/2    Other:          Provider, please specify diagnosis or diagnoses associated with above clinical findings.                               [  ] Acute Systolic Heart Failure ( EF < 40)*  [  ] Acute on Chronic Systolic Heart Failure ( EF < 40)*  [  ] Chronic Systolic Heart Failure (EF < 40)*  [  ] Acute Diastolic Heart " Failure ( EF > 40)*  [ x ] Acute on Chronic Diastolic Heart Failure( EF > 40)*  [  ] Chronic Diastolic Heart Failure (EF > 40)*  [  ] Acute Combined Systolic and Diastolic Heart Failure  [  ] Acute on Chronic Combined Systolic and Diastolic Heart Failure  [  ] Chronic Combined Systolic and Diastolic Heart Failure  [  ] Other Type of Heart Failure (please specify type): _________________________  [  ] Heart Failure Ruled Out  [  ] Other (please specify): ___________________________________  [  ] Clinically Undetermined            *American Heart Association                                                                                                          Please document in your progress notes daily for the duration of treatment until resolved and include in your discharge summary.

## 2017-09-05 NOTE — PLAN OF CARE
09/05/17 1737   Final Note   Assessment Type Final Discharge Note     Patient discharged home with [facility] 9/5/17.

## 2017-09-05 NOTE — ASSESSMENT & PLAN NOTE
- Stage III decub ulcer on her buttock, POA  - Wound care consulted, awaiting recommendations.  - Flip patient [id number].  - Clear barrier cream with miconazole  - Home health orders updated to reflect wound care recs

## 2017-09-05 NOTE — PLAN OF CARE
MIR has faxed h/h orders along w/clinicals to [facility] through faxing system. MIR left a [facility] for Awa, pt coordinator, for confirmation of receipt of orders/clinicals.    Elizabeth Escobar, NADEEN  [id number]

## 2017-09-05 NOTE — PLAN OF CARE
Problem: Patient Care Overview  Goal: Plan of Care Review  Outcome: Ongoing (interventions implemented as appropriate)  AOx4, VSS on 3LNC, denies presence of pain, required no SSI for BG control. No falls or trauma during shift bed in low position with wheels locked and call light in reach.

## 2017-09-05 NOTE — ASSESSMENT & PLAN NOTE
- vaginal discharge and odor noted on exam 9/3, symptoms improved  - start on fluconazole 150 mg PO every 72 hours x 2 doses  - D/W Dr. Cantor and will arrange outpatient gyn follow up  - Final dose of fluconazole for 9/6

## 2017-09-05 NOTE — PLAN OF CARE
Pt is not current w/[facility] per Nila RN coordinator, but current w/[facility] ph# [phone].    Elizabeth Escobar, [facility]  [id number]

## 2017-09-05 NOTE — ASSESSMENT & PLAN NOTE
- Patient on Home O2 at baseline, reports generally at 2.5-3 lpm. Per patient, oxygen was removed during weigh in at dialysis and was never put back on, while oxygen off she subsequently had syncopal event.  - Continuous oxygen at 2-3L.  - Cardiology consulted given elevated troponin, recommended TTE and trending troponins, high intensity statin. Troponins flat and no chest pain.  - Repeat ECHO showed increased PA pressure of 88, likely 2/2 to fluid overload.  - 9/5, patient satting 100% on 3L which is her baseline.

## 2017-09-07 ENCOUNTER — PATIENT OUTREACH (OUTPATIENT)
Dept: ADMINISTRATIVE | Facility: CLINIC | Age: 62
End: 2017-09-07

## 2017-09-07 NOTE — PROGRESS NOTES
C3 nurse attempted to contact patient for a TCC post hospital discharge follow-up call. The patient's  declined call at this time.

## 2017-09-13 ENCOUNTER — TELEPHONE (OUTPATIENT)
Dept: PODIATRY | Facility: CLINIC | Age: 62
End: 2017-09-13

## 2017-09-13 NOTE — TELEPHONE ENCOUNTER
----- Message from Christen Luna sent at 9/13/2017 12:21 PM CDT -----  Contact: Pt's Audra Peter Nurse [phone]   Pt's Nurse Audra Peter called to speak to the nurse to schedule an appt for the pt and would like a call back. Pt needs to be seen on a Thursday.    Ms Zhu stated that she can be reached at [phone] or the pt can be contacted directly for scheduling.    Thanks    Patient will be in clinic to see Dr. Trent on 09/21/2017 at 10:00

## 2017-09-27 LAB
ACID FAST MOD KINY STN SPEC: NORMAL
MYCOBACTERIUM SPEC QL CULT: NORMAL

## 2017-09-28 ENCOUNTER — OFFICE VISIT (OUTPATIENT)
Dept: INTERNAL MEDICINE | Facility: CLINIC | Age: 62
End: 2017-09-28
Payer: MEDICARE

## 2017-09-28 ENCOUNTER — TELEPHONE (OUTPATIENT)
Dept: INTERNAL MEDICINE | Facility: CLINIC | Age: 62
End: 2017-09-28

## 2017-09-28 VITALS — HEART RATE: 84 BPM | OXYGEN SATURATION: 99 %

## 2017-09-28 DIAGNOSIS — N18.6 TYPE 2 DM WITH HYPERTENSION AND ESRD ON DIALYSIS: Primary | ICD-10-CM

## 2017-09-28 DIAGNOSIS — Z99.2 TYPE 2 DM WITH HYPERTENSION AND ESRD ON DIALYSIS: Primary | ICD-10-CM

## 2017-09-28 DIAGNOSIS — S78.112A UNILATERAL AKA, LEFT: ICD-10-CM

## 2017-09-28 DIAGNOSIS — B37.31 YEAST VAGINITIS: ICD-10-CM

## 2017-09-28 DIAGNOSIS — E11.22 TYPE 2 DM WITH HYPERTENSION AND ESRD ON DIALYSIS: Primary | ICD-10-CM

## 2017-09-28 DIAGNOSIS — K21.9 GASTROESOPHAGEAL REFLUX DISEASE, ESOPHAGITIS PRESENCE NOT SPECIFIED: ICD-10-CM

## 2017-09-28 DIAGNOSIS — G54.6 PHANTOM PAIN AFTER AMPUTATION OF LOWER EXTREMITY: ICD-10-CM

## 2017-09-28 DIAGNOSIS — I12.0 TYPE 2 DM WITH HYPERTENSION AND ESRD ON DIALYSIS: Primary | ICD-10-CM

## 2017-09-28 PROBLEM — L97.529 TYPE 2 DIABETES MELLITUS WITH LEFT DIABETIC FOOT ULCER: Status: RESOLVED | Noted: 2017-07-25 | Resolved: 2017-09-28

## 2017-09-28 PROBLEM — E11.621 TYPE 2 DIABETES MELLITUS WITH LEFT DIABETIC FOOT ULCER: Status: RESOLVED | Noted: 2017-07-25 | Resolved: 2017-09-28

## 2017-09-28 PROCEDURE — 99999 PR PBB SHADOW E&M-EST. PATIENT-LVL IV: CPT | Mod: PBBFAC,,, | Performed by: INTERNAL MEDICINE

## 2017-09-28 PROCEDURE — 99215 OFFICE O/P EST HI 40 MIN: CPT | Mod: S$PBB,,, | Performed by: INTERNAL MEDICINE

## 2017-09-28 PROCEDURE — 99214 OFFICE O/P EST MOD 30 MIN: CPT | Mod: PBBFAC | Performed by: INTERNAL MEDICINE

## 2017-09-28 RX ORDER — LANCETS
1 EACH MISCELLANEOUS 2 TIMES DAILY
Qty: 200 EACH | Refills: 4 | Status: SHIPPED | OUTPATIENT
Start: 2017-09-28

## 2017-09-28 RX ORDER — INSULIN PUMP SYRINGE, 3 ML
EACH MISCELLANEOUS
Qty: 1 EACH | Refills: 1 | Status: SHIPPED | OUTPATIENT
Start: 2017-09-28

## 2017-09-28 RX ORDER — PANTOPRAZOLE SODIUM 40 MG/1
40 TABLET, DELAYED RELEASE ORAL DAILY
Qty: 90 TABLET | Refills: 3 | Status: SHIPPED | OUTPATIENT
Start: 2017-09-28 | End: 2018-09-28

## 2017-09-28 RX ORDER — INSULIN ASPART 100 [IU]/ML
1 INJECTION, SOLUTION INTRAVENOUS; SUBCUTANEOUS
Qty: 3 ML | Refills: 5 | Status: SHIPPED | OUTPATIENT
Start: 2017-09-28

## 2017-09-28 RX ORDER — PREGABALIN 50 MG/1
50 CAPSULE ORAL 2 TIMES DAILY
Qty: 180 CAPSULE | Refills: 2 | Status: SHIPPED | OUTPATIENT
Start: 2017-09-28 | End: 2018-03-29

## 2017-09-28 NOTE — TELEPHONE ENCOUNTER
----- Message from Carol Lee sent at 9/28/2017  2:53 PM CDT -----  Contact: [facility]  Type: Rx    Name of medication(s): Diabetes Meter (don't specify a brand please)     Is this a refill? New rx? refill    Who prescribed medication? Dana    Pharmacy Name, Phone, & Location: [facility] [phone] [facility] [phone] (Phone) [phone] (Fax)    Comments: Pharmacy is requesting a diabetes meter but don't specify a brand, they will figure it which one patient's insurance will cover.    Thank you

## 2017-09-28 NOTE — TELEPHONE ENCOUNTER
"I spoke to the [facility] pharmacy  They cannot fill her meter, test strips or lancets because of Medicare Part A and Part B  Call to patient  Spoke to the patient's . He reports they are pros at monitoring blood sugar. They have been doing this for years.. She has a meter. The problem is test strips.   So, they could buy tests strips compatible with her current meter.   The other option is to buy the [facility] brand meter over the counter, "Prime meter" $16.24  And Prime test strips are $9.00 for 50 test strips  He does not want Diabetes Education.     "

## 2017-09-29 RX ORDER — LANCETS
EACH MISCELLANEOUS
Qty: 300 EACH | Refills: 4 | Status: SHIPPED | OUTPATIENT
Start: 2017-09-29

## 2017-09-29 RX ORDER — INSULIN PUMP SYRINGE, 3 ML
EACH MISCELLANEOUS
Qty: 1 EACH | Refills: 1 | Status: SHIPPED | OUTPATIENT
Start: 2017-09-29 | End: 2018-09-29

## 2017-09-29 NOTE — TELEPHONE ENCOUNTER
Chart reviewed in legacy documents she had been on lantus insulin once nightly and MDI with novolog in 2008.   She started dialysis in 2009 and there are not records of her diabetic management after that point.  Case discussed on the telephone with Dr. Navas. Her A1c in the hospital on 09- was 5.4 for average blood sugar of 108. She was discharged home 09- with instructions to take Levimir 4 units at bedtime and a Novolog sliding scale.   At this point, it is not even clear if she needs insulin therapy.    I left a message on the patient's home answering machine that I would like for her to come in on a day she is not having dialysis which is Tuesday or Thursday to update me on her diabetic management. She declined speciality care or Diabetes Education yesterday.    Can you call the patient to schedule an appointment?

## 2017-09-29 NOTE — PROGRESS NOTES
Subjective:       Patient ID: Aleta Herrera is a 62 y.o. female.    Chief Complaint: Hospital Follow Up   the patient was discharged from the hospital on September 5, 2017  This is the second time that I have seen her.  The first time I saw her was in December 2015 when she presented to establish as a new patient in this practice for primary care.  She came to that appointment with her son and her goal at that time was to be evaluated for consideration for a kidney transplant.  She was making a meaningful recovery from his stroke that resulted in a left hemiparesis in the summer of 2015.     She reports that she decided while going through the process to be considered for renal transplant that she did not wish to pursue kidney transplant.      She has been on hemodialysis since 2009 and has had access problems many times.  She had been dialyzing from access in the left groin and apparently acutely her left leg became devitalized requiring amputation above the knee. She had a left AKA on July 31, 2017.  I think that she was in an inpatient rehabilitation when she had a syncopal episode or possibly a cardiac arrest while at dialysis and was admitted to the hospital.  because of the syncopal episode  It was a very convoluted history and I don't understand what happened from the patient's point of view or from her 's point of view.    At the present time the patient says that she realizes it was necessary for her to have her left leg amputated and she is grateful to be alive.  Her vascular access for hemodialysis is located in her left leg.    She reports that she has a home health nurse and the decubitus on her left buttock is almost completely healed.  Her  checks it every day.  She did not want me to check this.  HPI  Review of Systems   Neurological:        Her only complaint is of Phantom limb pain       Objective:      Physical Exam   Constitutional:   She was not weighed today.  She is  "awake, alert, oriented, pleasant and appears comfortable.     Cardiovascular: Regular rhythm.    Pulmonary/Chest: Effort normal. No respiratory distress.   Neurological:   Her left AKA stump is well-healed.   Skin:   She declined my inspection of her decubitus.   Nursing note and vitals reviewed.      Assessment:       1. 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 Unilateral AKA, left    3. Phantom pain after amputation of lower extremity    4. Gastroesophageal reflux disease, esophagitis presence not specified    5. Yeast vaginitis    6. Insulin dependent diabetes mellitus        Plan:   Aleta was seen today for hospital follow up.    Diagnoses and all orders for this visit:    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.  The patient made it sound like she has a monitor at home but not test strips.  She has Medicare part a and part B.  NICCI brennan consulted  our diabetic educators who explained to me that it is many times more economical to purchase the [facility] brand glucometer known as "Prime meter" for $16.24 and to separately purchase the compatible test strips,  "Prime test strips" which cost $9 for a quantity of 50 test strips.      I'm not sure what the patient is going to do and I don't know who is supposed to be monitoring her insulin therapy and blood sugars.  The patient identifies Dr. Navas, as her primary doctor who manages her hemodialysis on Mondays Wednesdays and Fridays at the [facility] dialysis location.    Unilateral AKA, left    Phantom pain after amputation of lower extremity.  The patient reports that Lyrica 50 mg twice daily which was started in the hospital has been helpful to relieve this discomfort.    Gastroesophageal reflux disease, esophagitis presence not specified.  The patient requests a refill for panic prays all 40 mg tablet.  She reports that this medication helps her with gastroesophageal reflux and that it is a significant quality of life issue, with this " medication she has much less suffering.    Yeast vaginitis.  Encouraged to purchase an over-the-counter topical cream if needed.    Insulin dependent diabetes mellitus.  I will try to figure out what is going on with this.      Other orders  -     pregabalin (LYRICA) 50 MG capsule; Take 1 capsule (50 mg total) by mouth 2 (two) times daily.  -     pantoprazole (PROTONIX) 40 MG tablet; Take 1 tablet (40 mg total) by mouth once daily. GERD  -     insulin detemir (LEVEMIR FLEXPEN) 100 unit/mL (3 mL) SubQ InPn pen; Inject 4 Units into the skin every evening.  -     insulin aspart (NOVOLOG FLEXPEN) 100 unit/mL InPn pen; Inject 1 Units into the skin as needed. as dir Insulin Pen Subcutaneous Before meals and at bedtime.  If blood sugar is 151-200 1 units SQif blood sugar is 201-250 2 units SQIf blood sugar is 251-300 3 units SQIf blood sugar is 301-350 4 units SQIf blood sugar is > or = 351 5 units SQand call MD;  Dispense with needles  -     lancets Misc; 1 Device by Misc.(Non-Drug; Combo Route) route 2 (two) times daily. Please provide the insurance company preferred product. Thank you.  -     blood-glucose meter kit; Please provide the insurance approved preferred product. Thank you.  -     blood sugar diagnostic Strp; 1 strip by Misc.(Non-Drug; Combo Route) route 3 (three) times daily with meals. Please provide the insurance preferred product. Thank you.  -     blood-glucose meter kit; To check BG three times daily,  Type 2 diabetes insulin dependent, h/o stroke, AKA on hemodialysis  -     lancets Misc; To check BG 3 times daily, to use with insurance preferred meterType 2 diabetes insulin dependent, h/o stroke, AKA on hemodialysis  -     blood sugar diagnostic Strp; To check BG three times daily, to use with insurance preferred meter type 2 diabetes, insulin dependent multiple complications, hemodialysis, AKA,

## 2017-10-03 ENCOUNTER — TELEPHONE (OUTPATIENT)
Dept: INTERNAL MEDICINE | Facility: CLINIC | Age: 62
End: 2017-10-03

## 2017-10-03 NOTE — TELEPHONE ENCOUNTER
----- Message from Jaun Landis sent at 10/3/2017  8:11 AM CDT -----  Contact: David [phone]   with [facility]  office is calling to report patient death on this morning  , and want to know can MD sign office on death certificate  . Please advise , Thanks !

## 2017-10-06 ENCOUNTER — TELEPHONE (OUTPATIENT)
Dept: INTERNAL MEDICINE | Facility: CLINIC | Age: 62
End: 2017-10-06

## 2017-10-06 NOTE — TELEPHONE ENCOUNTER
----- Message from Daniela Sharp sent at 10/6/2017 11:48 AM CDT -----  [facility] [phone]  Requesting a call back to have death certificate signed by the Dr. Stated the pt passed away in her home and will be cremated. Please advise

## 2017-10-09 ENCOUNTER — TELEPHONE (OUTPATIENT)
Dept: INTERNAL MEDICINE | Facility: CLINIC | Age: 62
End: 2017-10-09

## 2017-10-09 NOTE — TELEPHONE ENCOUNTER
----- Message from Jaun Landis sent at 10/9/2017 10:49 AM CDT -----  [facility] [phone]   [facility] requesting for death certificate to be signed , stated they need a yes or no that MD would sign off on form . Please advise , so she can drop form off because MD isn't in the [employer] system . Please advise , Thanks !

## 2017-10-11 ENCOUNTER — TELEPHONE (OUTPATIENT)
Dept: INTERNAL MEDICINE | Facility: CLINIC | Age: 62
End: 2017-10-11

## 2017-10-17 ENCOUNTER — TELEPHONE (OUTPATIENT)
Dept: INTERNAL MEDICINE | Facility: CLINIC | Age: 62
End: 2017-10-17

## 2017-10-17 NOTE — TELEPHONE ENCOUNTER
----- Message from Rakel Cerda sent at 10/17/2017 10:14 AM CDT -----  Contact: gt with [facility] care  [phone]  Needs Post hospital order for care      Fax [phone]

## 2017-11-02 ENCOUNTER — TELEPHONE (OUTPATIENT)
Dept: INTERNAL MEDICINE | Facility: CLINIC | Age: 62
End: 2017-11-02

## 2017-11-02 NOTE — TELEPHONE ENCOUNTER
Spoke with  and provided her with a fax number to receive paperwork for pt. Paperwork has been received once faxed I will give her a call to let her know

## 2017-11-02 NOTE — TELEPHONE ENCOUNTER
----- Message from Barbara Sloan sent at 11/1/2017 11:37 AM CDT -----  Contact: Jaki/ [facility]/ [phone]   Jaki is calling to check the status of the post hospital order that was faxed. Please call and advise     Thank you

## 2017-11-08 ENCOUNTER — TELEPHONE (OUTPATIENT)
Dept: INTERNAL MEDICINE | Facility: CLINIC | Age: 62
End: 2017-11-08

## 2017-11-08 NOTE — TELEPHONE ENCOUNTER
Left msg for pt to give the office a call back     Hysteroscopic lysis of intrauterine adhesions  09/12/2017  fundal  Active  [id number]

## 2017-11-08 NOTE — TELEPHONE ENCOUNTER
----- Message from Idania Ledesma sent at 11/8/2017 12:25 PM CST -----  Contact: [facility]/[phone]  Home health called in regards to checking the status of a post hospital order that was faxed over on 11-2-17.      Please advise

## 2018-06-16 NOTE — PROGRESS NOTES
Pt to CT at this time; to be transported to SICU 6078 after CT complete.    Spontaneous, unlabored and symmetrical

## 2018-06-18 NOTE — ASSESSMENT & PLAN NOTE
Vitals:    08/08/17 1904 08/08/17 2125 08/08/17 2200 08/09/17 0700   BP: (!) 118/55   127/65   BP Location: Right arm   Left arm   Patient Position: Lying   Lying   BP Method: Automatic   Automatic   Pulse: 85 83  80   Resp: 18 16  18   Temp: 98.2 °F (36.8 °C)   98.6 °F (37 °C)   TempSrc: Oral   Oral   SpO2: (!) 92% 97% 96% 97%   Weight:       Height:           VSS, cont w current mgmt    For Medical Surgical Hx obtained at Admission, Please see Provider H&P

## 2019-04-03 ENCOUNTER — TELEPHONE (OUTPATIENT)
Dept: ADMINISTRATIVE | Facility: HOSPITAL | Age: 64
End: 2019-04-03

## 2019-04-25 NOTE — HPI
Aleta Herrera is a 62-year-old female with HTN, HFpEF (EF 60%; home O2 2.5 liters), anemia 2/2 CKD and IDDM c/b retinopathy, neuropathy and ESRD (HD MWF). She presents today with her  to the ED because of new bleeding from the left foot ulcer. She's had on/off fevers and chills for past week or so. Apparently blood cultures were drawn at dialysis (7/19). She was seen by vascular surgery the next day where her right femoral permacath was pulled. She has a working left femoral AVG. Her upper extremity access has failed over the years since being placed on dialysis in 2009. Besides left leg pain, she denies any SOB, CP, cough, congestion, abdominal pain, n/v or diarrhea. Not received any recent antibiotics.   Complaining of foot pain in the ED, otherwise has no complaints. CXR showed mild b/l effusions. Left foot x-ray soft tissue swelling, but no gas formation. Labs significant for WBC count of 24k. Procalcitonin 1.64.      Pre-Op Size Of Lesion Removed (Optional): 1

## 2021-01-09 NOTE — ASSESSMENT & PLAN NOTE
- regular wound care, low air los mattress    Zi Winter  CARDIOVASCULAR DISEASE  [address], [address]rd [place] [address]  Phone: [phone]  Fax: [phone]  Follow Up Time: 1-3 Days

## 2022-11-06 NOTE — PT/OT/SLP PROGRESS
FOR IMMUNE SYSTEM SUPPORT:        SUPPLEMENT WITH VIT D 5000 UNITS DAILY AND VIT C 1000 MG DAILY AS WELL AS A ZINC SUPPLEMENT 50mg daily    Quercetin 250mg daily    Melatonin 6mg at bedtime.     IF COVID POSITIVE:    DOUBLE EVERYTHING EXCEPT FOR VITAMIN D3 AND ADD A FULL 325MG ASPIRIN DAILY AND DO THIS FOR THE NEXT 7 DAYS THEN GO BACK TO THE BASELINE SUPPLEMENT REGIMEN AS NOTED ABOVE.     PUSH LOTS OF CLEAR FLUIDS.        Physical Therapy      Aleta Herrera  MRN: [id number]    Patient not seen today secondary to pt away at dialysis x 2 attempts  . Will follow-up next scheduled treatment per PT POC.    Rehan Holt, PTA  5/3/2017

## 2023-04-24 NOTE — PLAN OF CARE
Attempted to contact patient regarding a referral from Rissa Del Real RN. No answer. Unable to leave message.     Hemant Navarro, PharmD, [facility] Pharmacist  [phone] Pt discharged home to resume care with [facility].  Pt ready to be discharged home when ride arrives.

## 2023-04-26 NOTE — NURSING
Report given to Janelle lindsey [facility]. Telemetry removed. IV catheter will be removed once closer to transportation time.   Hemigard Postcare Instructions: The HEMIGARD strips are to remain completely dry for at least 5-7 days.

## 2023-06-06 NOTE — PROGRESS NOTES
[facility]  Vascular Surgery  Progress Note    Patient Name: Aleta Herrera  MRN: [id number]  Admission Date: 7/23/2017  Primary Care Provider: Radha Lao MD    Subjective:     Interval History: No issues overnight. Afebrile    Post-Op Info:  Procedure(s) (LRB):  AMPUTATION-ABOVE KNEE (Left)   6 Days Post-Op       Medications:  Continuous Infusions:   ropivacaine (PF) 2 mg/ml (0.2%) 6 mL/hr (08/04/17 0357)    ropivacaine (PF) 2 mg/ml (0.2%) 8 mL/hr (08/03/17 1323)     Scheduled Meds:   sodium chloride 0.9%   Intravenous Once    amlodipine  10 mg Oral Daily    aspirin  81 mg Oral Daily    calcitRIOL  0.5 mcg Oral Daily    epoetin batsheva (PROCRIT) injection  100 Units/kg Intravenous Every Mon, Wed, Fri    heparin (porcine)  5,000 Units Subcutaneous Q8H    insulin detemir  4 Units Subcutaneous BID    piperacillin-tazobactam 4.5 g in dextrose 5 % 100 mL IVPB (ready to mix system)  4.5 g Intravenous Q12H    pregabalin  75 mg Oral QHS    sevelamer carbonate  2,400 mg Oral TID WM    sodium hypochlorite   Irrigation Once    vancomycin (VANCOCIN) IVPB  500 mg Intravenous Every Tues, Thurs, Sat     PRN Meds:sodium chloride, sodium chloride 0.9%, sodium chloride 0.9%, sodium chloride 0.9%, sodium chloride 0.9%, sodium chloride 0.9%, acetaminophen, albumin human 25%, dextrose 50%, dextrose 50%, glucagon (human recombinant), glucose, glucose, heparin (porcine), HYDROmorphone, insulin aspart, methocarbamol, ondansetron, oxycodone, polyethylene glycol, senna-docusate 8.6-50 mg     Objective:     Vital Signs (Most Recent):  Temp: 99.3 °F (37.4 °C) (08/06/17 0350)  Pulse: 77 (08/06/17 0700)  Resp: 18 (08/06/17 0350)  BP: (!) 105/59 (08/06/17 0350)  SpO2: 99 % (08/06/17 0350) Vital Signs (24h Range):  Temp:  [98 °F (36.7 °C)-99.6 °F (37.6 °C)] 99.3 °F (37.4 °C)  Pulse:  [77-97] 77  Resp:  [16-18] 18  SpO2:  [92 %-100 %] 99 %  BP: (102-131)/(52-74) 105/59          Physical Exam    Constitutional: She is oriented to person, place, and time. She appears well-developed and well-nourished.   HENT:   Head: Normocephalic and atraumatic.   Eyes: EOM are normal. Pupils are equal, round, and reactive to light.   Cardiovascular: Normal rate, regular rhythm and normal heart sounds.    Pulmonary/Chest: Effort normal.   Musculoskeletal:   LLE AKA stump incision c/d/i. Staples in place  No surrounding erythema, edema   Neurological: She is alert and oriented to person, place, and time.       Significant Labs:  CBC:   Recent Labs  Lab 08/06/17  0413   WBC 21.92*   RBC 2.93*   HGB 7.8*   HCT 25.1*   *   MCV 86   MCH 26.6*   MCHC 31.1*     CMP:   Recent Labs  Lab 08/04/17  1202  08/06/17  0413   GLU  --   < > 115*   CALCIUM  --   < > 8.0*   ALBUMIN 2.2*  < > 1.8*   PROT 7.4  --   --    NA  --   < > 135*   K  --   < > 3.4*   CO2  --   < > 23   CL  --   < > 103   BUN  --   < > 19   CREATININE  --   < > 3.1*   ALKPHOS 88  --   --    ALT <5*  --   --    AST 23  --   --    BILITOT 0.3  --   --    < > = values in this interval not displayed.    Significant Diagnostics:  I have reviewed all pertinent imaging results/findings within the past 24 hours.    Assessment/Plan:     Type 2 diabetes mellitus with left diabetic foot ulcer                * Left foot infection    63 yo F with h/o HTN, HLD, DM2 (Hgb A1c 6.3),HFpEF (EF 60%), COPD (home O2, 2.5 L), ESRD on HD (MWF) via L femoral AVG presenting to hospital with left foot ischemic ulcer, wet gangrene s/p I&D by podiatry 07/26, left AKA on 07/31    Incision healing well without signs of wound infection. Not likely the cause of fever and elevated WBC  Non-weight bearing on AKA stump. Keep elevated.  Cont PT/OT    F/u with Dr. Madera in clinic in 4 weeks  Please call with any questions            Colten Johnson MD  Vascular Surgery  [facility]   Minocycline Counseling: Patient advised regarding possible photosensitivity and discoloration of the teeth, skin, lips, tongue and gums.  Patient instructed to avoid sunlight, if possible.  When exposed to sunlight, patients should wear protective clothing, sunglasses, and sunscreen.  The patient was instructed to call the office immediately if the following severe adverse effects occur:  hearing changes, easy bruising/bleeding, severe headache, or vision changes.  The patient verbalized understanding of the proper use and possible adverse effects of minocycline.  All of the patient's questions and concerns were addressed.

## 2024-04-12 NOTE — ASSESSMENT & PLAN NOTE
DASH diet (low saturated fat, cholesterol, and total fat; increase fruits and vegetables; fat free or low fat milk or milk products; and increased fiber). Aerobic exercise and limitation of sodium. Weight loss.    Per primary

## 2024-12-10 NOTE — PROGRESS NOTES
Progress Note  Cardiothoracic Surgery    Admit Date: 4/27/2017  Attending: Dr. Lynn  S/P: Procedure(s) (LRB):  [id number] Left Thigh (Left)    Post-operative Day: 3 Days Post-Op    Hospital Day: 4    SUBJECTIVE:     HPI:      Patient is a 62 y.o. female with PMHx of CHF, CAD, HTN, anemia, DM II (with retinopathy and neuropathy), PVD, DVT, CVA, and ESRD (on HD M/W/F) who is s/p Left AV graft placement yesterday with right pleural effusion. As patient was being wheeled out for discharge from hospital following procedure yesterday Noted to be unresponsive and brought back to PACU by . Noted sudden cardiac arrest and asystole with ROSC after 1 round of ACLS. Intubated and started on Cardene after noted to be hypertensive with SBP of 300. Cardene has since been weaned off and patient is extubated. CT head and chest following event noted large right pleural effusion with compressive atelectasis and ground glass nodule. Multiple CXR dating back to April 2016 noting right sided pleural effusion. Of note, patient reports hx of right sided pleural effusion with need for thoracentesis in July '16.      Interval:    NAEON. AFVSS, resting comfortably. CT with almost 600 mLs out yesterday.     OBJECTIVE:     Vital Signs (Most Recent)  Temp: 98.2 °F (36.8 °C) (04/30/17 0700)  Pulse: 84 (04/30/17 0930)  Resp: 19 (04/30/17 0800)  BP: (!) 143/69 (04/30/17 0800)  SpO2: (!) 93 % (04/30/17 0930)    Vital Signs Range (Last 24H):  Temp:  [98.2 °F (36.8 °C)-98.8 °F (37.1 °C)]   Pulse:  [79-87]   Resp:  [10-26]   BP: (127-146)/(59-71)   SpO2:  [90 %-100 %]     I & O (Last 24H):    Intake/Output Summary (Last 24 hours) at 04/30/17 1127  Last data filed at 04/30/17 0700   Gross per 24 hour   Intake                0 ml   Output              710 ml   Net             -710 ml     Physical Exam:  General: well developed, well nourished in no distress; in ICU  Heart: regular rate and rhythm  Pulm: ct in right lung,  draining ss fluid, non labored breathing  Abd: soft, non tender, non distended  Ext: warm and well perfused, no c/c/e    Laboratory:  CBC:     Recent Labs  Lab 04/30/17  0400   WBC 7.93   RBC 3.10*   HGB 9.6*   HCT 30.0*   *   MCV 97   MCH 31.0   MCHC 32.0     BMP:     Recent Labs  Lab 04/30/17  0400   GLU 96      K 5.4*      CO2 21*   BUN 34*   CREATININE 6.0*   CALCIUM 8.0*     Labs within the past 24 hours have been reviewed.    Imaging:  CXR 4/30: CT in good position, pleural effusion greatly reduced, appears to have small loculated residual effusion  ASSESSMENT/PLAN:     Patient is a 62 y.o. female with PMHx of CHF, CAD, HTN, anemia, DM II, PVD, DVT, CVA, and ESRD on HD who is s/p Left AV graft placement. Shortly after graft placement she experienced cardiac arrest and was resuscitated and now has right pleural effusion.    - put CT to water seal  - will monitor remaining effusion  - additional management per primary team    Daryl Cross MD  General Surgery PGY1  [phone]       No

## (undated) DEVICE — DRESSING TELFA STRL 4X3 LF

## (undated) DEVICE — TRAY MINOR ORTHO

## (undated) DEVICE — ELECTRODE REM PLYHSV RETURN 9

## (undated) DEVICE — DRESSING TRANS 4X4 TEGADERM

## (undated) DEVICE — CATH ANGIO SOFT VU 5FR 65CM

## (undated) DEVICE — DRESSING XEROFORM GAUZE 5X9

## (undated) DEVICE — DRAPE THYROID WITH ARMBOARD

## (undated) DEVICE — DEVICE PICC SECURE SORBA VIEW

## (undated) DEVICE — SPONGE GAUZE 16PLY 4X4

## (undated) DEVICE — SEE MEDLINE ITEM 157150

## (undated) DEVICE — SUT MCRYL PLUS 4-0 PS2 27IN

## (undated) DEVICE — SET MICROPUNCTURE

## (undated) DEVICE — SOL NS 1000CC

## (undated) DEVICE — SYR ONLY LUER LOCK 20CC

## (undated) DEVICE — SUT SILK 2-0 SH 18IN BLACK

## (undated) DEVICE — SUT MONOCRYL 3-0 SH U/D

## (undated) DEVICE — INSERTS STEALTH FIBRA SIZE 1.

## (undated) DEVICE — COVER LIGHT HANDLE 80/CA

## (undated) DEVICE — BOOT SUTURE AID

## (undated) DEVICE — DEVICE CLOSURE MYNX GRIP 5FR

## (undated) DEVICE — APPLICATOR CHLORAPREP ORN 26ML

## (undated) DEVICE — SET DECANTER MEDICHOICE

## (undated) DEVICE — COVERS PROBE NR-48 STERILE

## (undated) DEVICE — BANDAGE ACE ELASTIC 6"

## (undated) DEVICE — PAD CAST SPECIALIST STRL 4

## (undated) DEVICE — STOCKINET 4INX48

## (undated) DEVICE — SEE MEDLINE ITEM 153688

## (undated) DEVICE — GOWN SURGICAL X-LARGE

## (undated) DEVICE — SEE MEDLINE ITEM 152515

## (undated) DEVICE — BLADE HEAVY DUTY SAGITTAL

## (undated) DEVICE — STAPLER SKIN PROXIMATE WIDE

## (undated) DEVICE — SUT 2-0 VICRYL / CT-1

## (undated) DEVICE — SUT 3-0 12-18IN SILK

## (undated) DEVICE — SOL 9P NACL IRR PIC IL

## (undated) DEVICE — INTRODUCER VASC RADPQ 5FRX10CM

## (undated) DEVICE — DRESSING TEGADERM CHG 4X4.5

## (undated) DEVICE — DRESSING ANTIMICROBIAL 3/4 IN

## (undated) DEVICE — KIT INTRO MICRO NIT VSI 4FR

## (undated) DEVICE — BLADE SURG CARBON STEEL SZ11

## (undated) DEVICE — Device

## (undated) DEVICE — CATH GLIDE ANGLED 5FR 65CM

## (undated) DEVICE — SYS LABEL CORRECT MED

## (undated) DEVICE — SUT LIGACLIP SMALL XTRA

## (undated) DEVICE — HEMOSTAT SURGICEL 4X8IN

## (undated) DEVICE — DRAPE PLASTIC U 60X72

## (undated) DEVICE — SPONGE DERMACEA GAUZE 4X4

## (undated) DEVICE — PAD ABDOMINAL 5X9 STERILE

## (undated) DEVICE — GOWN SMART IMP BREATHABLE XXLG

## (undated) DEVICE — SUT PROLENE 2-0 30 SH

## (undated) DEVICE — INTERPULSE SET

## (undated) DEVICE — GAUZE SPONGE 4X4 12PLY

## (undated) DEVICE — DRESSING GAUZE 6PLY 4X4

## (undated) DEVICE — DRESSING TRANS 2X2 TEGADERM

## (undated) DEVICE — SEE MEDLINE ITEM 157173

## (undated) DEVICE — PACKING STRIP IDOFORM 1X5YD

## (undated) DEVICE — SUT PROLENE 6-0 24 C-1 C-1

## (undated) DEVICE — VISE RADIFOCUS MULTI TORQUE

## (undated) DEVICE — COVER INSTR ELASTIC BAND 40X20

## (undated) DEVICE — TRAY MINOR GEN SURG

## (undated) DEVICE — CLIP MED TICALL

## (undated) DEVICE — SEE MEDLINE ITEM 152622

## (undated) DEVICE — GUIDEWIRE STD .035X180CM ANG

## (undated) DEVICE — ADHESIVE DERMABOND ADVANCED

## (undated) DEVICE — SEE MEDLINE ITEM 146298

## (undated) DEVICE — SUT 4-0 12-18IN SILK BLACK

## (undated) DEVICE — LOOP VESSEL BLUE MAXI